# Patient Record
Sex: FEMALE | Race: WHITE | NOT HISPANIC OR LATINO | Employment: OTHER | URBAN - METROPOLITAN AREA
[De-identification: names, ages, dates, MRNs, and addresses within clinical notes are randomized per-mention and may not be internally consistent; named-entity substitution may affect disease eponyms.]

---

## 2023-02-22 LAB
ALANINE AMINOTRANSFERASE (SGPT) (U/L) IN SER/PLAS: 10 U/L (ref 7–45)
ALBUMIN (G/DL) IN SER/PLAS: 3.7 G/DL (ref 3.4–5)
ALBUMIN (MG/L) IN URINE: 124.6 MG/L
ALBUMIN/CREATININE (UG/MG) IN URINE: 106.5 UG/MG CRT (ref 0–30)
ALKALINE PHOSPHATASE (U/L) IN SER/PLAS: 61 U/L (ref 33–136)
ANION GAP IN SER/PLAS: 14 MMOL/L (ref 10–20)
ASPARTATE AMINOTRANSFERASE (SGOT) (U/L) IN SER/PLAS: 11 U/L (ref 9–39)
BASOPHILS (10*3/UL) IN BLOOD BY AUTOMATED COUNT: 0.05 X10E9/L (ref 0–0.1)
BASOPHILS/100 LEUKOCYTES IN BLOOD BY AUTOMATED COUNT: 0.6 % (ref 0–2)
BILIRUBIN TOTAL (MG/DL) IN SER/PLAS: 0.6 MG/DL (ref 0–1.2)
CALCIDIOL (25 OH VITAMIN D3) (NG/ML) IN SER/PLAS: 50 NG/ML
CALCIUM (MG/DL) IN SER/PLAS: 9.1 MG/DL (ref 8.6–10.3)
CARBON DIOXIDE, TOTAL (MMOL/L) IN SER/PLAS: 25 MMOL/L (ref 21–32)
CHLORIDE (MMOL/L) IN SER/PLAS: 106 MMOL/L (ref 98–107)
CHOLESTEROL (MG/DL) IN SER/PLAS: 240 MG/DL (ref 0–199)
CHOLESTEROL IN HDL (MG/DL) IN SER/PLAS: 43 MG/DL
CHOLESTEROL/HDL RATIO: 5.6
COBALAMIN (VITAMIN B12) (PG/ML) IN SER/PLAS: 302 PG/ML (ref 211–911)
CREATININE (MG/DL) IN SER/PLAS: 0.79 MG/DL (ref 0.5–1.05)
CREATININE (MG/DL) IN URINE: 117 MG/DL (ref 20–320)
EOSINOPHILS (10*3/UL) IN BLOOD BY AUTOMATED COUNT: 0.31 X10E9/L (ref 0–0.4)
EOSINOPHILS/100 LEUKOCYTES IN BLOOD BY AUTOMATED COUNT: 3.6 % (ref 0–6)
ERYTHROCYTE DISTRIBUTION WIDTH (RATIO) BY AUTOMATED COUNT: 15.5 % (ref 11.5–14.5)
ERYTHROCYTE MEAN CORPUSCULAR HEMOGLOBIN CONCENTRATION (G/DL) BY AUTOMATED: 29.2 G/DL (ref 32–36)
ERYTHROCYTE MEAN CORPUSCULAR VOLUME (FL) BY AUTOMATED COUNT: 104 FL (ref 80–100)
ERYTHROCYTES (10*6/UL) IN BLOOD BY AUTOMATED COUNT: 3.98 X10E12/L (ref 4–5.2)
ESTIMATED AVERAGE GLUCOSE FOR HBA1C: 137 MG/DL
GFR FEMALE: 80 ML/MIN/1.73M2
GLUCOSE (MG/DL) IN SER/PLAS: 145 MG/DL (ref 74–99)
HEMATOCRIT (%) IN BLOOD BY AUTOMATED COUNT: 41.5 % (ref 36–46)
HEMOGLOBIN (G/DL) IN BLOOD: 12.1 G/DL (ref 12–16)
HEMOGLOBIN A1C/HEMOGLOBIN TOTAL IN BLOOD: 6.4 %
IMMATURE GRANULOCYTES/100 LEUKOCYTES IN BLOOD BY AUTOMATED COUNT: 0.4 % (ref 0–0.9)
LDL: 149 MG/DL (ref 0–99)
LEUKOCYTES (10*3/UL) IN BLOOD BY AUTOMATED COUNT: 8.5 X10E9/L (ref 4.4–11.3)
LYMPHOCYTES (10*3/UL) IN BLOOD BY AUTOMATED COUNT: 1.63 X10E9/L (ref 0.8–3)
LYMPHOCYTES/100 LEUKOCYTES IN BLOOD BY AUTOMATED COUNT: 19.2 % (ref 13–44)
MONOCYTES (10*3/UL) IN BLOOD BY AUTOMATED COUNT: 0.46 X10E9/L (ref 0.05–0.8)
MONOCYTES/100 LEUKOCYTES IN BLOOD BY AUTOMATED COUNT: 5.4 % (ref 2–10)
NEUTROPHILS (10*3/UL) IN BLOOD BY AUTOMATED COUNT: 6.03 X10E9/L (ref 1.6–5.5)
NEUTROPHILS/100 LEUKOCYTES IN BLOOD BY AUTOMATED COUNT: 70.8 % (ref 40–80)
NON HDL CHOLESTEROL: 197 MG/DL
PLATELETS (10*3/UL) IN BLOOD AUTOMATED COUNT: 276 X10E9/L (ref 150–450)
POTASSIUM (MMOL/L) IN SER/PLAS: 3.6 MMOL/L (ref 3.5–5.3)
PROTEIN TOTAL: 6.5 G/DL (ref 6.4–8.2)
SODIUM (MMOL/L) IN SER/PLAS: 141 MMOL/L (ref 136–145)
THYROTROPIN (MIU/L) IN SER/PLAS BY DETECTION LIMIT <= 0.05 MIU/L: 1.76 MIU/L (ref 0.44–3.98)
TRIGLYCERIDE (MG/DL) IN SER/PLAS: 241 MG/DL (ref 0–149)
UREA NITROGEN (MG/DL) IN SER/PLAS: 20 MG/DL (ref 6–23)
VLDL: 48 MG/DL (ref 0–40)

## 2023-03-20 DIAGNOSIS — E53.8 VITAMIN B12 DEFICIENCY: Primary | ICD-10-CM

## 2023-03-20 RX ORDER — CYANOCOBALAMIN 1000 UG/ML
1000 INJECTION INTRAMUSCULAR; SUBCUTANEOUS
COMMUNITY
End: 2023-03-20 | Stop reason: SDUPTHER

## 2023-03-20 RX ORDER — CYANOCOBALAMIN 1000 UG/ML
1000 INJECTION, SOLUTION INTRAMUSCULAR; SUBCUTANEOUS
Qty: 10 ML | Refills: 0 | Status: SHIPPED | OUTPATIENT
Start: 2023-03-20 | End: 2023-07-24 | Stop reason: SDUPTHER

## 2023-05-30 DIAGNOSIS — I10 PRIMARY HYPERTENSION: Primary | ICD-10-CM

## 2023-05-30 RX ORDER — LOSARTAN POTASSIUM 25 MG/1
0.5 TABLET ORAL DAILY
COMMUNITY
Start: 2023-02-23 | End: 2023-05-30 | Stop reason: SDUPTHER

## 2023-05-30 RX ORDER — LOSARTAN POTASSIUM 25 MG/1
12.5 TABLET ORAL DAILY
Qty: 45 TABLET | Refills: 1 | Status: SHIPPED | OUTPATIENT
Start: 2023-05-30 | End: 2023-12-20 | Stop reason: SDUPTHER

## 2023-06-12 LAB
ALANINE AMINOTRANSFERASE (SGPT) (U/L) IN SER/PLAS: 9 U/L (ref 7–45)
ALBUMIN (G/DL) IN SER/PLAS: 3.8 G/DL (ref 3.4–5)
ALKALINE PHOSPHATASE (U/L) IN SER/PLAS: 66 U/L (ref 33–136)
ANION GAP IN SER/PLAS: 16 MMOL/L (ref 10–20)
ASPARTATE AMINOTRANSFERASE (SGOT) (U/L) IN SER/PLAS: 13 U/L (ref 9–39)
BASOPHILS (10*3/UL) IN BLOOD BY AUTOMATED COUNT: 0.06 X10E9/L (ref 0–0.1)
BASOPHILS/100 LEUKOCYTES IN BLOOD BY AUTOMATED COUNT: 0.5 % (ref 0–2)
BILIRUBIN TOTAL (MG/DL) IN SER/PLAS: 0.8 MG/DL (ref 0–1.2)
CALCIUM (MG/DL) IN SER/PLAS: 8.9 MG/DL (ref 8.6–10.3)
CARBON DIOXIDE, TOTAL (MMOL/L) IN SER/PLAS: 24 MMOL/L (ref 21–32)
CHLORIDE (MMOL/L) IN SER/PLAS: 106 MMOL/L (ref 98–107)
CREATININE (MG/DL) IN SER/PLAS: 0.84 MG/DL (ref 0.5–1.05)
EOSINOPHILS (10*3/UL) IN BLOOD BY AUTOMATED COUNT: 0.34 X10E9/L (ref 0–0.4)
EOSINOPHILS/100 LEUKOCYTES IN BLOOD BY AUTOMATED COUNT: 2.8 % (ref 0–6)
ERYTHROCYTE DISTRIBUTION WIDTH (RATIO) BY AUTOMATED COUNT: 14.1 % (ref 11.5–14.5)
ERYTHROCYTE MEAN CORPUSCULAR HEMOGLOBIN CONCENTRATION (G/DL) BY AUTOMATED: 29.3 G/DL (ref 32–36)
ERYTHROCYTE MEAN CORPUSCULAR VOLUME (FL) BY AUTOMATED COUNT: 112 FL (ref 80–100)
ERYTHROCYTES (10*6/UL) IN BLOOD BY AUTOMATED COUNT: 3.61 X10E12/L (ref 4–5.2)
GFR FEMALE: 74 ML/MIN/1.73M2
GLUCOSE (MG/DL) IN SER/PLAS: 176 MG/DL (ref 74–99)
HEMATOCRIT (%) IN BLOOD BY AUTOMATED COUNT: 40.6 % (ref 36–46)
HEMOGLOBIN (G/DL) IN BLOOD: 11.9 G/DL (ref 12–16)
IMMATURE GRANULOCYTES/100 LEUKOCYTES IN BLOOD BY AUTOMATED COUNT: 0.4 % (ref 0–0.9)
LEUKOCYTES (10*3/UL) IN BLOOD BY AUTOMATED COUNT: 12.3 X10E9/L (ref 4.4–11.3)
LYMPHOCYTES (10*3/UL) IN BLOOD BY AUTOMATED COUNT: 1.58 X10E9/L (ref 0.8–3)
LYMPHOCYTES/100 LEUKOCYTES IN BLOOD BY AUTOMATED COUNT: 12.9 % (ref 13–44)
MONOCYTES (10*3/UL) IN BLOOD BY AUTOMATED COUNT: 0.58 X10E9/L (ref 0.05–0.8)
MONOCYTES/100 LEUKOCYTES IN BLOOD BY AUTOMATED COUNT: 4.7 % (ref 2–10)
NEUTROPHILS (10*3/UL) IN BLOOD BY AUTOMATED COUNT: 9.67 X10E9/L (ref 1.6–5.5)
NEUTROPHILS/100 LEUKOCYTES IN BLOOD BY AUTOMATED COUNT: 78.7 % (ref 40–80)
PLATELETS (10*3/UL) IN BLOOD AUTOMATED COUNT: 270 X10E9/L (ref 150–450)
POLYCHROMASIA IN BLOOD BY LIGHT MICROSCOPY: NORMAL
POTASSIUM (MMOL/L) IN SER/PLAS: 3.5 MMOL/L (ref 3.5–5.3)
PROTEIN TOTAL: 6.6 G/DL (ref 6.4–8.2)
RBC MORPHOLOGY IN BLOOD: NORMAL
SODIUM (MMOL/L) IN SER/PLAS: 142 MMOL/L (ref 136–145)
UREA NITROGEN (MG/DL) IN SER/PLAS: 17 MG/DL (ref 6–23)

## 2023-06-13 LAB — CBC DIFFERENTIAL PATH REVIEW: NORMAL

## 2023-06-20 ENCOUNTER — OFFICE VISIT (OUTPATIENT)
Dept: PRIMARY CARE | Facility: CLINIC | Age: 73
End: 2023-06-20
Payer: MEDICARE

## 2023-06-20 VITALS
WEIGHT: 293 LBS | HEART RATE: 87 BPM | DIASTOLIC BLOOD PRESSURE: 81 MMHG | BODY MASS INDEX: 49.55 KG/M2 | SYSTOLIC BLOOD PRESSURE: 132 MMHG | OXYGEN SATURATION: 89 %

## 2023-06-20 DIAGNOSIS — R53.83 FATIGUE, UNSPECIFIED TYPE: ICD-10-CM

## 2023-06-20 DIAGNOSIS — E66.01 OBESITY, CLASS III, BMI 40-49.9 (MORBID OBESITY) (MULTI): ICD-10-CM

## 2023-06-20 DIAGNOSIS — Z79.4 TYPE 2 DIABETES MELLITUS WITHOUT COMPLICATION, WITH LONG-TERM CURRENT USE OF INSULIN (MULTI): Primary | ICD-10-CM

## 2023-06-20 DIAGNOSIS — E78.2 MIXED HYPERLIPIDEMIA: ICD-10-CM

## 2023-06-20 DIAGNOSIS — I27.20 PULMONARY HYPERTENSION (MULTI): ICD-10-CM

## 2023-06-20 DIAGNOSIS — Z12.31 ENCOUNTER FOR SCREENING MAMMOGRAM FOR BREAST CANCER: ICD-10-CM

## 2023-06-20 DIAGNOSIS — D51.0 PERNICIOUS ANEMIA: ICD-10-CM

## 2023-06-20 DIAGNOSIS — E11.42 DIABETIC PERIPHERAL NEUROPATHY (MULTI): ICD-10-CM

## 2023-06-20 DIAGNOSIS — Z12.11 SCREENING FOR COLON CANCER: ICD-10-CM

## 2023-06-20 DIAGNOSIS — M47.816 LUMBAR SPONDYLOSIS: ICD-10-CM

## 2023-06-20 DIAGNOSIS — E11.9 TYPE 2 DIABETES MELLITUS WITHOUT COMPLICATION, WITH LONG-TERM CURRENT USE OF INSULIN (MULTI): Primary | ICD-10-CM

## 2023-06-20 DIAGNOSIS — I48.91 ATRIAL FIBRILLATION WITH RAPID VENTRICULAR RESPONSE (MULTI): ICD-10-CM

## 2023-06-20 DIAGNOSIS — I10 PRIMARY HYPERTENSION: ICD-10-CM

## 2023-06-20 PROBLEM — H25.12 AGE-RELATED NUCLEAR CATARACT OF LEFT EYE: Status: ACTIVE | Noted: 2023-06-20

## 2023-06-20 PROBLEM — L12.1: Status: ACTIVE | Noted: 2023-06-20

## 2023-06-20 PROBLEM — E78.5 DYSLIPIDEMIA: Status: ACTIVE | Noted: 2023-06-20

## 2023-06-20 PROBLEM — L84 PRE-ULCERATIVE CORN OR CALLOUS: Status: ACTIVE | Noted: 2023-06-20

## 2023-06-20 PROBLEM — R79.9 ABNORMAL BLOOD CHEMISTRY: Status: ACTIVE | Noted: 2023-06-20

## 2023-06-20 PROBLEM — E66.09 CLASS 2 OBESITY DUE TO EXCESS CALORIES IN ADULT: Status: ACTIVE | Noted: 2023-06-20

## 2023-06-20 PROBLEM — M19.90 ARTHRITIS: Status: ACTIVE | Noted: 2023-06-20

## 2023-06-20 PROBLEM — J18.9 PNEUMONIA: Status: ACTIVE | Noted: 2023-06-20

## 2023-06-20 PROBLEM — E78.5 HYPERLIPEMIA: Status: ACTIVE | Noted: 2023-06-20

## 2023-06-20 PROBLEM — J31.0 CHRONIC RHINITIS: Status: ACTIVE | Noted: 2023-06-20

## 2023-06-20 PROBLEM — M54.12 CERVICAL RADICULAR PAIN: Status: ACTIVE | Noted: 2023-06-20

## 2023-06-20 PROBLEM — N20.0 CALCULUS OF KIDNEY: Status: ACTIVE | Noted: 2023-06-20

## 2023-06-20 PROBLEM — J32.9 CHRONIC SINUSITIS: Status: ACTIVE | Noted: 2023-06-20

## 2023-06-20 PROBLEM — F17.210 CIGARETTE NICOTINE DEPENDENCE, UNCOMPLICATED: Status: ACTIVE | Noted: 2023-06-20

## 2023-06-20 PROBLEM — R63.2 POLYPHAGIA: Status: ACTIVE | Noted: 2023-06-20

## 2023-06-20 PROBLEM — R06.00 DYSPNEA: Status: ACTIVE | Noted: 2023-06-20

## 2023-06-20 PROBLEM — I72.8 SPLENIC ARTERY ANEURYSM (CMS-HCC): Status: ACTIVE | Noted: 2023-06-20

## 2023-06-20 PROBLEM — Z97.5 IUD (INTRAUTERINE DEVICE) IN PLACE: Status: ACTIVE | Noted: 2023-06-20

## 2023-06-20 PROBLEM — H04.129 DRY EYE SYNDROME: Status: ACTIVE | Noted: 2023-06-20

## 2023-06-20 PROBLEM — J01.90 ACUTE SINUSITIS: Status: ACTIVE | Noted: 2023-06-20

## 2023-06-20 PROBLEM — R06.83 SNORING: Status: ACTIVE | Noted: 2023-06-20

## 2023-06-20 PROBLEM — E66.813 OBESITY, CLASS III, BMI 40-49.9 (MORBID OBESITY): Status: ACTIVE | Noted: 2023-06-20

## 2023-06-20 PROBLEM — I89.0 LYMPHEDEMA: Status: ACTIVE | Noted: 2023-06-20

## 2023-06-20 PROBLEM — E66.812 CLASS 2 OBESITY DUE TO EXCESS CALORIES IN ADULT: Status: ACTIVE | Noted: 2023-06-20

## 2023-06-20 PROBLEM — N93.9 VAGINAL BLEEDING: Status: ACTIVE | Noted: 2023-06-20

## 2023-06-20 PROBLEM — H25.11 AGE-RELATED NUCLEAR CATARACT OF RIGHT EYE: Status: ACTIVE | Noted: 2023-06-20

## 2023-06-20 PROBLEM — D05.11 DUCTAL CARCINOMA IN SITU (DCIS) OF RIGHT BREAST: Status: ACTIVE | Noted: 2023-06-20

## 2023-06-20 PROBLEM — N81.89 PELVIC FLOOR WEAKNESS: Status: ACTIVE | Noted: 2023-06-20

## 2023-06-20 PROBLEM — R92.8 MAMMOGRAM ABNORMAL: Status: ACTIVE | Noted: 2023-06-20

## 2023-06-20 PROBLEM — J11.1 INFLUENZA: Status: ACTIVE | Noted: 2023-06-20

## 2023-06-20 PROBLEM — N95.2 ATROPHIC VAGINITIS: Status: ACTIVE | Noted: 2023-06-20

## 2023-06-20 PROBLEM — M54.2 NECK PAIN: Status: ACTIVE | Noted: 2023-06-20

## 2023-06-20 PROBLEM — M51.9 LUMBAR DISC DISEASE: Status: ACTIVE | Noted: 2023-06-20

## 2023-06-20 PROBLEM — J45.909 ASTHMA (HHS-HCC): Status: ACTIVE | Noted: 2023-06-20

## 2023-06-20 PROBLEM — Q89.9 DEFORMITY: Status: ACTIVE | Noted: 2023-06-20

## 2023-06-20 PROBLEM — N63.0 BREAST LUMP: Status: ACTIVE | Noted: 2023-06-20

## 2023-06-20 PROBLEM — L98.9 SKIN LESION: Status: ACTIVE | Noted: 2023-06-20

## 2023-06-20 PROBLEM — R23.4 SKIN FISSURES: Status: ACTIVE | Noted: 2023-06-20

## 2023-06-20 PROBLEM — R05.9 COUGH: Status: ACTIVE | Noted: 2023-06-20

## 2023-06-20 PROBLEM — D22.9 BENIGN MOLE: Status: ACTIVE | Noted: 2023-06-20

## 2023-06-20 PROBLEM — R31.9 HEMATURIA: Status: ACTIVE | Noted: 2023-06-20

## 2023-06-20 PROBLEM — I26.99 PULMONARY EMBOLISM (MULTI): Status: ACTIVE | Noted: 2023-06-20

## 2023-06-20 PROBLEM — R60.9 EDEMA: Status: ACTIVE | Noted: 2023-06-20

## 2023-06-20 PROBLEM — R30.0 DYSURIA: Status: ACTIVE | Noted: 2023-06-20

## 2023-06-20 PROBLEM — T83.32XA IUD MIGRATION: Status: ACTIVE | Noted: 2023-06-20

## 2023-06-20 PROBLEM — R09.02 HYPOXIA: Status: ACTIVE | Noted: 2023-06-20

## 2023-06-20 PROBLEM — M54.16 ACUTE LUMBAR RADICULOPATHY: Status: ACTIVE | Noted: 2023-06-20

## 2023-06-20 PROBLEM — H11.30 SUBCONJUNCTIVAL HEMORRHAGE: Status: ACTIVE | Noted: 2023-06-20

## 2023-06-20 PROBLEM — R06.02 SOB (SHORTNESS OF BREATH) ON EXERTION: Status: ACTIVE | Noted: 2023-06-20

## 2023-06-20 PROBLEM — M25.539 PAIN, WRIST JOINT: Status: ACTIVE | Noted: 2023-06-20

## 2023-06-20 PROBLEM — E66.9 OBESITY: Status: ACTIVE | Noted: 2023-06-20

## 2023-06-20 PROBLEM — N39.41 URGE INCONTINENCE OF URINE: Status: ACTIVE | Noted: 2023-06-20

## 2023-06-20 PROBLEM — G47.30 APNEA, SLEEP: Status: ACTIVE | Noted: 2023-06-20

## 2023-06-20 PROBLEM — M54.50 LOW BACK PAIN: Status: ACTIVE | Noted: 2023-06-20

## 2023-06-20 PROBLEM — I26.99 PULMONARY ARTERY THROMBOSIS (MULTI): Status: ACTIVE | Noted: 2023-06-20

## 2023-06-20 PROBLEM — M72.0 DUPUYTREN CONTRACTURE: Status: ACTIVE | Noted: 2023-06-20

## 2023-06-20 PROBLEM — H52.4 MYOPIA WITH PRESBYOPIA: Status: ACTIVE | Noted: 2023-06-20

## 2023-06-20 PROBLEM — E78.00 LOW-DENSITY-LIPOID-TYPE (LDL) HYPERLIPOPROTEINEMIA: Status: ACTIVE | Noted: 2023-06-20

## 2023-06-20 PROBLEM — R93.89 ABNORMAL X-RAY: Status: ACTIVE | Noted: 2023-06-20

## 2023-06-20 PROBLEM — Z96.1 PSEUDOPHAKIA OF BOTH EYES: Status: ACTIVE | Noted: 2023-06-20

## 2023-06-20 PROBLEM — N95.0 POSTMENOPAUSAL VAGINAL BLEEDING: Status: ACTIVE | Noted: 2023-06-20

## 2023-06-20 PROBLEM — F17.200 NICOTINE USE DISORDER: Status: ACTIVE | Noted: 2022-10-31

## 2023-06-20 PROBLEM — I35.0 AORTIC STENOSIS: Status: ACTIVE | Noted: 2023-06-20

## 2023-06-20 PROBLEM — N39.0 CHRONIC UTI: Status: ACTIVE | Noted: 2023-06-20

## 2023-06-20 PROBLEM — M16.11 PRIMARY OSTEOARTHRITIS OF RIGHT HIP: Status: ACTIVE | Noted: 2023-06-20

## 2023-06-20 PROBLEM — M25.559 PAIN, HIP: Status: ACTIVE | Noted: 2023-06-20

## 2023-06-20 PROBLEM — H52.10 MYOPIA WITH PRESBYOPIA: Status: ACTIVE | Noted: 2023-06-20

## 2023-06-20 PROCEDURE — 1159F MED LIST DOCD IN RCRD: CPT | Performed by: PHYSICIAN ASSISTANT

## 2023-06-20 PROCEDURE — 99214 OFFICE O/P EST MOD 30 MIN: CPT | Performed by: PHYSICIAN ASSISTANT

## 2023-06-20 PROCEDURE — 3044F HG A1C LEVEL LT 7.0%: CPT | Performed by: PHYSICIAN ASSISTANT

## 2023-06-20 PROCEDURE — 1160F RVW MEDS BY RX/DR IN RCRD: CPT | Performed by: PHYSICIAN ASSISTANT

## 2023-06-20 PROCEDURE — 4010F ACE/ARB THERAPY RXD/TAKEN: CPT | Performed by: PHYSICIAN ASSISTANT

## 2023-06-20 PROCEDURE — 3075F SYST BP GE 130 - 139MM HG: CPT | Performed by: PHYSICIAN ASSISTANT

## 2023-06-20 PROCEDURE — 3079F DIAST BP 80-89 MM HG: CPT | Performed by: PHYSICIAN ASSISTANT

## 2023-06-20 RX ORDER — ISOPROPYL ALCOHOL 0.75 G/1
SWAB TOPICAL
COMMUNITY
Start: 2022-11-07

## 2023-06-20 RX ORDER — ASPIRIN 325 MG
50000 TABLET, DELAYED RELEASE (ENTERIC COATED) ORAL
COMMUNITY

## 2023-06-20 RX ORDER — DILTIAZEM HYDROCHLORIDE 240 MG/1
1 CAPSULE, COATED, EXTENDED RELEASE ORAL DAILY
COMMUNITY
Start: 2023-02-26 | End: 2023-06-20 | Stop reason: SDUPTHER

## 2023-06-20 RX ORDER — ENOXAPARIN SODIUM 150 MG/ML
INJECTION SUBCUTANEOUS
COMMUNITY
Start: 2020-10-19 | End: 2023-12-13

## 2023-06-20 RX ORDER — SIMVASTATIN 40 MG/1
1 TABLET, FILM COATED ORAL NIGHTLY
COMMUNITY
Start: 2011-12-12 | End: 2023-06-20 | Stop reason: ALTCHOICE

## 2023-06-20 RX ORDER — FUROSEMIDE 40 MG/1
40 TABLET ORAL DAILY
Qty: 90 TABLET | Refills: 1 | Status: SHIPPED | OUTPATIENT
Start: 2023-06-20 | End: 2023-12-11 | Stop reason: SDUPTHER

## 2023-06-20 RX ORDER — GLIMEPIRIDE 2 MG/1
TABLET ORAL
COMMUNITY
End: 2023-11-18

## 2023-06-20 RX ORDER — TRIAMCINOLONE ACETONIDE 1 MG/G
OINTMENT TOPICAL
COMMUNITY
Start: 2022-12-05

## 2023-06-20 RX ORDER — METFORMIN HYDROCHLORIDE 1000 MG/1
TABLET ORAL
COMMUNITY
Start: 2023-05-01 | End: 2023-10-24

## 2023-06-20 RX ORDER — HYDROCORTISONE 25 MG/G
OINTMENT TOPICAL
COMMUNITY
Start: 2023-01-17 | End: 2023-12-13

## 2023-06-20 RX ORDER — INSULIN DETEMIR 100 [IU]/ML
INJECTION, SOLUTION SUBCUTANEOUS
COMMUNITY
Start: 2023-05-03

## 2023-06-20 RX ORDER — PEN NEEDLE, DIABETIC 32GX 5/32"
NEEDLE, DISPOSABLE MISCELLANEOUS
Qty: 12 EACH | Refills: 11 | Status: SHIPPED | OUTPATIENT
Start: 2023-06-20

## 2023-06-20 RX ORDER — LEVOFLOXACIN 750 MG/1
1 TABLET ORAL DAILY
COMMUNITY
Start: 2022-11-21 | End: 2023-06-20 | Stop reason: ALTCHOICE

## 2023-06-20 RX ORDER — ATORVASTATIN CALCIUM 20 MG/1
20 TABLET, FILM COATED ORAL NIGHTLY
Qty: 90 TABLET | Refills: 1 | Status: SHIPPED | OUTPATIENT
Start: 2023-06-20 | End: 2024-03-25 | Stop reason: SDUPTHER

## 2023-06-20 RX ORDER — ERGOCALCIFEROL 1.25 MG/1
1 CAPSULE ORAL
COMMUNITY
Start: 2023-05-22

## 2023-06-20 RX ORDER — AZELASTINE 1 MG/ML
1 SPRAY, METERED NASAL 2 TIMES DAILY
COMMUNITY
Start: 2020-12-15 | End: 2023-12-13

## 2023-06-20 RX ORDER — DAPSONE 100 MG/1
1 TABLET ORAL
COMMUNITY

## 2023-06-20 RX ORDER — WARFARIN SODIUM 5 MG/1
TABLET ORAL
COMMUNITY
End: 2023-07-27 | Stop reason: SDUPTHER

## 2023-06-20 RX ORDER — FUROSEMIDE 40 MG/1
1 TABLET ORAL DAILY
COMMUNITY
Start: 2023-02-26 | End: 2023-06-20 | Stop reason: SDUPTHER

## 2023-06-20 RX ORDER — DILTIAZEM HYDROCHLORIDE 240 MG/1
240 CAPSULE, COATED, EXTENDED RELEASE ORAL DAILY
Qty: 90 CAPSULE | Refills: 1 | Status: SHIPPED | OUTPATIENT
Start: 2023-06-20 | End: 2024-01-17 | Stop reason: HOSPADM

## 2023-06-20 RX ORDER — PEN NEEDLE, DIABETIC 32GX 5/32"
NEEDLE, DISPOSABLE MISCELLANEOUS
COMMUNITY
Start: 2023-05-08 | End: 2023-06-20 | Stop reason: SDUPTHER

## 2023-06-20 RX ORDER — DULAGLUTIDE 0.75 MG/.5ML
INJECTION, SOLUTION SUBCUTANEOUS
COMMUNITY
Start: 2022-08-29 | End: 2023-06-20 | Stop reason: ALTCHOICE

## 2023-06-20 RX ORDER — ASPIRIN 81 MG/1
1 TABLET ORAL DAILY
COMMUNITY
Start: 2016-10-03

## 2023-06-20 RX ORDER — GABAPENTIN 300 MG/1
CAPSULE ORAL
COMMUNITY
Start: 2022-10-17 | End: 2024-05-13

## 2023-06-20 RX ORDER — ATORVASTATIN CALCIUM 20 MG/1
20 TABLET, FILM COATED ORAL NIGHTLY
COMMUNITY
Start: 2023-02-21 | End: 2023-06-20 | Stop reason: SDUPTHER

## 2023-06-20 RX ORDER — ALBUTEROL SULFATE 90 UG/1
AEROSOL, METERED RESPIRATORY (INHALATION)
COMMUNITY

## 2023-06-20 RX ORDER — NYSTATIN 100000 U/G
CREAM TOPICAL 2 TIMES DAILY
COMMUNITY
Start: 2023-04-19 | End: 2023-12-13

## 2023-06-20 RX ORDER — QUINIDINE GLUCONATE 324 MG
1 TABLET, EXTENDED RELEASE ORAL DAILY
COMMUNITY
End: 2023-12-11 | Stop reason: WASHOUT

## 2023-06-20 RX ORDER — POTASSIUM CITRATE 10 MEQ/1
TABLET, EXTENDED RELEASE ORAL EVERY 12 HOURS
COMMUNITY
Start: 2020-09-23 | End: 2023-06-20 | Stop reason: ALTCHOICE

## 2023-06-20 RX ORDER — TOPIRAMATE 100 MG/1
100 TABLET, FILM COATED ORAL 2 TIMES DAILY
COMMUNITY
End: 2023-11-14

## 2023-06-20 NOTE — ASSESSMENT & PLAN NOTE
Stable.  Continue gabapentin 300 mg.  Encourage strict control of glucose levels.  Schedule for diabetic foot exam if not up-to-date

## 2023-06-20 NOTE — ASSESSMENT & PLAN NOTE
Stable. Continue atorvastatin 20mg and 81mg ASA. Follow a mediterranean style diet and exercise as tolerated   passed out

## 2023-06-20 NOTE — ASSESSMENT & PLAN NOTE
Continue glimepiride, metformin, and levemir. Hemoglobin A1c ordered. Schedule for diabetic foot and eye exams if not UTD.

## 2023-06-20 NOTE — ASSESSMENT & PLAN NOTE
Well controlled. Continue current regimen unchanged. Follow a strict DASH diet and exercise as tolerated.

## 2023-06-20 NOTE — PROGRESS NOTES
Subjective   Frannie Blanco is a 73 y.o. female who presents for Follow-up.  HPI Frannie Blanco is a 73 y.o. female presenting for a follow up. Generally doing well.     HTN, HLD: stable. Denies any headache, dizziness, chest pain, SOB/OSHEA, palpitations.     T2DM: compliant with current regimen. Hemoglobin A1c 2/22/2021 was 6.4%. She does check BG levels at home. Running 100-120s. Diabetic eye exam: due, will schedule. Diabetic foot exam: due, will schedule. Following with Dr. Tapia, scheduled next in October.     Lung nodules: Stable. Repeat CT chest in 1 year.    OSHEA, hypoxia: On nocturnal oxygen, but uses the supplemental O2 at home when she has any chest tightness. Does monitor O2 sats at home. Using albuterol inhaler as needed. Could consider using LAMA/LABA in the future if needed.  Per pulmonary medicine, likely COPD based on PFTs.    Postmenopausal bleeding: s/p D&C and Mirena IUD placement 8/15/2019.  Follows annually with GynOnc, last seen 4/18/2023.  IUD to be changed 8/20/2024.    Pernicious anemia: due for vitamin B12 injection, daughter will give at home.     Health maintenance:  Immunizations:  -Flu: deferred to fall 2023  -Pneumococcal: UTD  -Shingrix: Recommended, obtain from pharmacy  -Tdap: recommended, obtain from local pharmacy  Mammogram due (last 2/21/22) - ordered 6/20/23  Colon CA screening due (last 4/12/13 ) - 10yrs. Ordered 6/20/23  DEXA due - ordered   PAP UTD     Last MCR: 10/3/2022 (plain MCR)    12 point ROS reviewed and negative other than as stated in HPI    /81   Pulse 87   Wt 139 kg (307 lb)   SpO2 (!) 89%   BMI 49.55 kg/m²   Objective   Physical Exam  Vitals reviewed.   Constitutional:       General: She is not in acute distress.     Appearance: Normal appearance. She is not ill-appearing.   HENT:      Head: Normocephalic and atraumatic.   Eyes:      General: No scleral icterus.     Extraocular Movements: Extraocular movements intact.      Conjunctiva/sclera:  "Conjunctivae normal.      Pupils: Pupils are equal, round, and reactive to light.   Cardiovascular:      Rate and Rhythm: Normal rate and regular rhythm.      Heart sounds: Normal heart sounds. No murmur heard.     No friction rub. No gallop.   Pulmonary:      Effort: Pulmonary effort is normal. No respiratory distress.      Breath sounds: Normal breath sounds. No stridor. No wheezing, rhonchi or rales.   Musculoskeletal:      Cervical back: Normal range of motion.      Right lower leg: No edema.      Left lower leg: No edema.   Skin:     General: Skin is warm and dry.   Neurological:      Mental Status: She is alert and oriented to person, place, and time. Mental status is at baseline.      Cranial Nerves: No cranial nerve deficit.      Gait: Gait normal.   Psychiatric:         Mood and Affect: Mood normal.         Behavior: Behavior normal.         Assessment/Plan   Problem List Items Addressed This Visit       Pulmonary hypertension (CMS/[facility])     CT chest 1/11/23 showed \"dilatation pulmonary outflow tract can be seen in the setting of  pulmonary arterial hypertension.\" Echo 5/9/22 was normal without enlargement of the pulmonary artery.     Follow with pulmonary medicine and cardiology.          Primary hypertension     Well controlled. Continue current regimen unchanged. Follow a strict DASH diet and exercise as tolerated.           Relevant Medications    furosemide (Lasix) 40 mg tablet    dilTIAZem CD (Cardizem CD) 240 mg 24 hr capsule    Pernicious anemia     Continue vitamin B12 injections monthly. Refilled syringes. CBC w/ diff and VB12 level ordered         Relevant Medications    BD Dorothy 2nd Gen Pen Needle 32 gauge x 5/32\" needle    syringe with needle 3 mL 23 x 1\" syringe    Other Relevant Orders    CBC and Auto Differential    Vitamin B12    Obesity, Class III, BMI 40-49.9 (morbid obesity) (CMS/[facility])     Follow a low carb, ADA diet. Exercise for 30 minutes daily as tolerated.          Lumbar spondylosis "     Disability placard provided         Relevant Orders    Disability Placard    Hyperlipemia     Stable. Continue atorvastatin 20mg and 81mg ASA. Follow a mediterranean style diet and exercise as tolerated         Relevant Medications    atorvastatin (Lipitor) 20 mg tablet    Other Relevant Orders    Lipid panel    Fatigue    Relevant Orders    Tsh With Reflex To Free T4 If Abnormal    Diabetic peripheral neuropathy (CMS/HCC)     Stable.  Continue gabapentin 300 mg.  Encourage strict control of glucose levels.  Schedule for diabetic foot exam if not up-to-date         Diabetes mellitus (CMS/HCC) - Primary     Continue glimepiride, metformin, and levemir. Hemoglobin A1c ordered. Schedule for diabetic foot and eye exams if not UTD.          Relevant Orders    Hemoglobin A1c    Comprehensive metabolic panel    Atrial fibrillation with rapid ventricular response (CMS/HCC)     Stable.  Continue medications unchanged.  Follow with cardiology.         Relevant Medications    dilTIAZem CD (Cardizem CD) 240 mg 24 hr capsule     Other Visit Diagnoses       Encounter for screening mammogram for breast cancer        Relevant Orders    BI mammo bilateral screening tomosynthesis    Screening for colon cancer        Relevant Orders    Colonoscopy             Follow-up in 3 to 4 months for MCR wellness or sooner as needed

## 2023-06-21 PROBLEM — I26.99 PULMONARY EMBOLISM (MULTI): Status: RESOLVED | Noted: 2023-06-20 | Resolved: 2023-06-21

## 2023-06-21 PROBLEM — I10 PRIMARY HYPERTENSION: Status: ACTIVE | Noted: 2023-06-21

## 2023-06-21 NOTE — ASSESSMENT & PLAN NOTE
"CT chest 1/11/23 showed \"dilatation pulmonary outflow tract can be seen in the setting of  pulmonary arterial hypertension.\" Echo 5/9/22 was normal without enlargement of the pulmonary artery.     Follow with pulmonary medicine and cardiology.   "

## 2023-07-24 DIAGNOSIS — E53.8 VITAMIN B12 DEFICIENCY: ICD-10-CM

## 2023-07-24 RX ORDER — CYANOCOBALAMIN 1000 UG/ML
1000 INJECTION, SOLUTION INTRAMUSCULAR; SUBCUTANEOUS
Qty: 10 ML | Refills: 0 | Status: SHIPPED | OUTPATIENT
Start: 2023-07-24 | End: 2024-03-25 | Stop reason: SDUPTHER

## 2023-07-27 DIAGNOSIS — I48.91 ATRIAL FIBRILLATION WITH RAPID VENTRICULAR RESPONSE (MULTI): Primary | ICD-10-CM

## 2023-07-27 RX ORDER — WARFARIN SODIUM 5 MG/1
TABLET ORAL
Qty: 90 TABLET | Refills: 1 | Status: SHIPPED | OUTPATIENT
Start: 2023-07-27

## 2023-07-31 LAB
ALANINE AMINOTRANSFERASE (SGPT) (U/L) IN SER/PLAS: 10 U/L (ref 7–45)
ALBUMIN (G/DL) IN SER/PLAS: 3.8 G/DL (ref 3.4–5)
ALKALINE PHOSPHATASE (U/L) IN SER/PLAS: 74 U/L (ref 33–136)
ANION GAP IN SER/PLAS: 15 MMOL/L (ref 10–20)
ASPARTATE AMINOTRANSFERASE (SGOT) (U/L) IN SER/PLAS: 12 U/L (ref 9–39)
BASOPHILS (10*3/UL) IN BLOOD BY AUTOMATED COUNT: 0.06 X10E9/L (ref 0–0.1)
BASOPHILS/100 LEUKOCYTES IN BLOOD BY AUTOMATED COUNT: 0.5 % (ref 0–2)
BILIRUBIN TOTAL (MG/DL) IN SER/PLAS: 0.9 MG/DL (ref 0–1.2)
CALCIUM (MG/DL) IN SER/PLAS: 9.3 MG/DL (ref 8.6–10.3)
CARBON DIOXIDE, TOTAL (MMOL/L) IN SER/PLAS: 23 MMOL/L (ref 21–32)
CHLORIDE (MMOL/L) IN SER/PLAS: 106 MMOL/L (ref 98–107)
CREATININE (MG/DL) IN SER/PLAS: 0.84 MG/DL (ref 0.5–1.05)
EOSINOPHILS (10*3/UL) IN BLOOD BY AUTOMATED COUNT: 0.31 X10E9/L (ref 0–0.4)
EOSINOPHILS/100 LEUKOCYTES IN BLOOD BY AUTOMATED COUNT: 2.4 % (ref 0–6)
ERYTHROCYTE DISTRIBUTION WIDTH (RATIO) BY AUTOMATED COUNT: 14.6 % (ref 11.5–14.5)
ERYTHROCYTE MEAN CORPUSCULAR HEMOGLOBIN CONCENTRATION (G/DL) BY AUTOMATED: 30.1 G/DL (ref 32–36)
ERYTHROCYTE MEAN CORPUSCULAR VOLUME (FL) BY AUTOMATED COUNT: 111 FL (ref 80–100)
ERYTHROCYTES (10*6/UL) IN BLOOD BY AUTOMATED COUNT: 3.32 X10E12/L (ref 4–5.2)
GFR FEMALE: 74 ML/MIN/1.73M2
GLUCOSE (MG/DL) IN SER/PLAS: 153 MG/DL (ref 74–99)
HEMATOCRIT (%) IN BLOOD BY AUTOMATED COUNT: 36.9 % (ref 36–46)
HEMOGLOBIN (G/DL) IN BLOOD: 11.1 G/DL (ref 12–16)
IMMATURE GRANULOCYTES/100 LEUKOCYTES IN BLOOD BY AUTOMATED COUNT: 0.3 % (ref 0–0.9)
LEUKOCYTES (10*3/UL) IN BLOOD BY AUTOMATED COUNT: 13.1 X10E9/L (ref 4.4–11.3)
LYMPHOCYTES (10*3/UL) IN BLOOD BY AUTOMATED COUNT: 1.48 X10E9/L (ref 0.8–3)
LYMPHOCYTES/100 LEUKOCYTES IN BLOOD BY AUTOMATED COUNT: 11.3 % (ref 13–44)
MONOCYTES (10*3/UL) IN BLOOD BY AUTOMATED COUNT: 0.61 X10E9/L (ref 0.05–0.8)
MONOCYTES/100 LEUKOCYTES IN BLOOD BY AUTOMATED COUNT: 4.7 % (ref 2–10)
NEUTROPHILS (10*3/UL) IN BLOOD BY AUTOMATED COUNT: 10.57 X10E9/L (ref 1.6–5.5)
NEUTROPHILS/100 LEUKOCYTES IN BLOOD BY AUTOMATED COUNT: 80.8 % (ref 40–80)
PLATELETS (10*3/UL) IN BLOOD AUTOMATED COUNT: 292 X10E9/L (ref 150–450)
POTASSIUM (MMOL/L) IN SER/PLAS: 3.8 MMOL/L (ref 3.5–5.3)
PROTEIN TOTAL: 6.8 G/DL (ref 6.4–8.2)
SODIUM (MMOL/L) IN SER/PLAS: 140 MMOL/L (ref 136–145)
UREA NITROGEN (MG/DL) IN SER/PLAS: 16 MG/DL (ref 6–23)

## 2023-08-18 PROBLEM — E78.2 MIXED HYPERLIPIDEMIA: Status: ACTIVE | Noted: 2023-08-18

## 2023-08-18 PROBLEM — E11.65 TYPE 2 DIABETES MELLITUS WITH HYPERGLYCEMIA (MULTI): Status: ACTIVE | Noted: 2023-08-18

## 2023-08-18 PROBLEM — R94.6 ABNORMAL THYROID EXAM: Status: ACTIVE | Noted: 2023-08-18

## 2023-08-18 PROBLEM — E55.9 VITAMIN D DEFICIENCY, UNSPECIFIED: Status: ACTIVE | Noted: 2023-08-18

## 2023-08-18 RX ORDER — DULAGLUTIDE 1.5 MG/.5ML
1.5 INJECTION, SOLUTION SUBCUTANEOUS
COMMUNITY
Start: 2021-09-13 | End: 2023-11-27 | Stop reason: ALTCHOICE

## 2023-08-18 RX ORDER — CALCIUM CARBONATE 600 MG
600 TABLET ORAL EVERY 12 HOURS
COMMUNITY

## 2023-08-18 RX ORDER — LIDOCAINE 40 MG/G
CREAM TOPICAL
COMMUNITY
End: 2023-12-13

## 2023-08-18 RX ORDER — DULAGLUTIDE 0.75 MG/.5ML
0.75 INJECTION, SOLUTION SUBCUTANEOUS
COMMUNITY
Start: 2021-09-13 | End: 2023-11-14 | Stop reason: ALTCHOICE

## 2023-08-18 RX ORDER — FERROUS SULFATE 325(65) MG
TABLET, DELAYED RELEASE (ENTERIC COATED) ORAL DAILY
COMMUNITY

## 2023-08-18 RX ORDER — DAPSONE 25 MG/1
2 TABLET ORAL
COMMUNITY

## 2023-09-22 ENCOUNTER — HOSPITAL ENCOUNTER (INPATIENT)
Dept: DATA CONVERSION | Facility: HOSPITAL | Age: 73
LOS: 5 days | Discharge: HOME | End: 2023-09-27
Attending: EMERGENCY MEDICINE | Admitting: INTERNAL MEDICINE
Payer: MEDICARE

## 2023-09-22 DIAGNOSIS — J44.1 CHRONIC OBSTRUCTIVE PULMONARY DISEASE WITH (ACUTE) EXACERBATION (MULTI): ICD-10-CM

## 2023-09-22 DIAGNOSIS — R09.02 HYPOXEMIA: ICD-10-CM

## 2023-09-22 LAB
ALANINE AMINOTRANSFERASE (SGPT) (U/L) IN SER/PLAS: 10 U/L (ref 7–45)
ALBUMIN (G/DL) IN SER/PLAS: 3.8 G/DL (ref 3.4–5)
ALKALINE PHOSPHATASE (U/L) IN SER/PLAS: 66 U/L (ref 33–136)
ANION GAP IN SER/PLAS: 12 MMOL/L (ref 10–20)
ASPARTATE AMINOTRANSFERASE (SGOT) (U/L) IN SER/PLAS: 11 U/L (ref 9–39)
BASOPHILS (10*3/UL) IN BLOOD BY AUTOMATED COUNT: 0.08 X10E9/L (ref 0–0.1)
BASOPHILS/100 LEUKOCYTES IN BLOOD BY AUTOMATED COUNT: 0.5 % (ref 0–2)
BILIRUBIN TOTAL (MG/DL) IN SER/PLAS: 1.1 MG/DL (ref 0–1.2)
CALCIUM (MG/DL) IN SER/PLAS: 9 MG/DL (ref 8.6–10.3)
CARBON DIOXIDE, TOTAL (MMOL/L) IN SER/PLAS: 26 MMOL/L (ref 21–32)
CHLORIDE (MMOL/L) IN SER/PLAS: 107 MMOL/L (ref 98–107)
CREATININE (MG/DL) IN SER/PLAS: 0.93 MG/DL (ref 0.5–1.05)
EOSINOPHILS (10*3/UL) IN BLOOD BY AUTOMATED COUNT: 0.25 X10E9/L (ref 0–0.4)
EOSINOPHILS/100 LEUKOCYTES IN BLOOD BY AUTOMATED COUNT: 1.5 % (ref 0–6)
ERYTHROCYTE DISTRIBUTION WIDTH (RATIO) BY AUTOMATED COUNT: 14.8 % (ref 11.5–14.5)
ERYTHROCYTE MEAN CORPUSCULAR HEMOGLOBIN CONCENTRATION (G/DL) BY AUTOMATED: 30.3 G/DL (ref 32–36)
ERYTHROCYTE MEAN CORPUSCULAR VOLUME (FL) BY AUTOMATED COUNT: 114 FL (ref 80–100)
ERYTHROCYTES (10*6/UL) IN BLOOD BY AUTOMATED COUNT: 3.04 X10E12/L (ref 4–5.2)
GFR FEMALE: 65 ML/MIN/1.73M2
GLUCOSE (MG/DL) IN SER/PLAS: 143 MG/DL (ref 74–99)
HEMATOCRIT (%) IN BLOOD BY AUTOMATED COUNT: 34.7 % (ref 36–46)
HEMOGLOBIN (G/DL) IN BLOOD: 10.5 G/DL (ref 12–16)
IMMATURE GRANULOCYTES/100 LEUKOCYTES IN BLOOD BY AUTOMATED COUNT: 0.4 % (ref 0–0.9)
LEUKOCYTES (10*3/UL) IN BLOOD BY AUTOMATED COUNT: 16.5 X10E9/L (ref 4.4–11.3)
LYMPHOCYTES (10*3/UL) IN BLOOD BY AUTOMATED COUNT: 1.07 X10E9/L (ref 0.8–3)
LYMPHOCYTES/100 LEUKOCYTES IN BLOOD BY AUTOMATED COUNT: 6.5 % (ref 13–44)
MONOCYTES (10*3/UL) IN BLOOD BY AUTOMATED COUNT: 0.64 X10E9/L (ref 0.05–0.8)
MONOCYTES/100 LEUKOCYTES IN BLOOD BY AUTOMATED COUNT: 3.9 % (ref 2–10)
NATRIURETIC PEPTIDE B (PG/ML) IN SER/PLAS: 222 PG/ML (ref 0–99)
NEUTROPHILS (10*3/UL) IN BLOOD BY AUTOMATED COUNT: 14.41 X10E9/L (ref 1.6–5.5)
NEUTROPHILS/100 LEUKOCYTES IN BLOOD BY AUTOMATED COUNT: 87.2 % (ref 40–80)
PLATELETS (10*3/UL) IN BLOOD AUTOMATED COUNT: 275 X10E9/L (ref 150–450)
POCT GLUCOSE: 309 MG/DL (ref 74–99)
POLYCHROMASIA IN BLOOD BY LIGHT MICROSCOPY: NORMAL
POTASSIUM (MMOL/L) IN SER/PLAS: 3.9 MMOL/L (ref 3.5–5.3)
PROTEIN TOTAL: 6.6 G/DL (ref 6.4–8.2)
RBC MORPHOLOGY IN BLOOD: NORMAL
SARS-COV-2 RESULT: NOT DETECTED
SODIUM (MMOL/L) IN SER/PLAS: 141 MMOL/L (ref 136–145)
TROPONIN I, HIGH SENSITIVITY: 7 NG/L (ref 0–13)
TROPONIN I, HIGH SENSITIVITY: 7 NG/L (ref 0–13)
UREA NITROGEN (MG/DL) IN SER/PLAS: 17 MG/DL (ref 6–23)

## 2023-09-23 LAB
ANION GAP IN SER/PLAS: 14 MMOL/L (ref 10–20)
CALCIUM (MG/DL) IN SER/PLAS: 8.8 MG/DL (ref 8.6–10.3)
CARBON DIOXIDE, TOTAL (MMOL/L) IN SER/PLAS: 23 MMOL/L (ref 21–32)
CHLORIDE (MMOL/L) IN SER/PLAS: 105 MMOL/L (ref 98–107)
CREATININE (MG/DL) IN SER/PLAS: 0.88 MG/DL (ref 0.5–1.05)
ERYTHROCYTE DISTRIBUTION WIDTH (RATIO) BY AUTOMATED COUNT: 14.8 % (ref 11.5–14.5)
ERYTHROCYTE MEAN CORPUSCULAR HEMOGLOBIN CONCENTRATION (G/DL) BY AUTOMATED: 29.3 G/DL (ref 32–36)
ERYTHROCYTE MEAN CORPUSCULAR VOLUME (FL) BY AUTOMATED COUNT: 115 FL (ref 80–100)
ERYTHROCYTES (10*6/UL) IN BLOOD BY AUTOMATED COUNT: 2.72 X10E12/L (ref 4–5.2)
GFR FEMALE: 70 ML/MIN/1.73M2
GLUCOSE (MG/DL) IN SER/PLAS: 243 MG/DL (ref 74–99)
HEMATOCRIT (%) IN BLOOD BY AUTOMATED COUNT: 31.4 % (ref 36–46)
HEMOGLOBIN (G/DL) IN BLOOD: 9.2 G/DL (ref 12–16)
INR IN PPP BY COAGULATION ASSAY: 3.6 (ref 0.9–1.1)
LEUKOCYTES (10*3/UL) IN BLOOD BY AUTOMATED COUNT: 8.2 X10E9/L (ref 4.4–11.3)
NRBC (PER 100 WBCS) BY AUTOMATED COUNT: 0.2 /100 WBC (ref 0–0)
PLATELETS (10*3/UL) IN BLOOD AUTOMATED COUNT: 249 X10E9/L (ref 150–450)
POCT GLUCOSE: 245 MG/DL (ref 74–99)
POCT GLUCOSE: 286 MG/DL (ref 74–99)
POCT GLUCOSE: 329 MG/DL (ref 74–99)
POCT GLUCOSE: 346 MG/DL (ref 74–99)
POTASSIUM (MMOL/L) IN SER/PLAS: 4.1 MMOL/L (ref 3.5–5.3)
PROTHROMBIN TIME (PT) IN PPP BY COAGULATION ASSAY: 41.4 SEC (ref 9.8–12.8)
SODIUM (MMOL/L) IN SER/PLAS: 138 MMOL/L (ref 136–145)
UREA NITROGEN (MG/DL) IN SER/PLAS: 21 MG/DL (ref 6–23)

## 2023-09-24 LAB
ANION GAP IN SER/PLAS: 13 MMOL/L (ref 10–20)
CALCIUM (MG/DL) IN SER/PLAS: 8.6 MG/DL (ref 8.6–10.3)
CARBON DIOXIDE, TOTAL (MMOL/L) IN SER/PLAS: 24 MMOL/L (ref 21–32)
CHLORIDE (MMOL/L) IN SER/PLAS: 104 MMOL/L (ref 98–107)
CREATININE (MG/DL) IN SER/PLAS: 1 MG/DL (ref 0.5–1.05)
ERYTHROCYTE DISTRIBUTION WIDTH (RATIO) BY AUTOMATED COUNT: 15.3 % (ref 11.5–14.5)
ERYTHROCYTE MEAN CORPUSCULAR HEMOGLOBIN CONCENTRATION (G/DL) BY AUTOMATED: 28.5 G/DL (ref 32–36)
ERYTHROCYTE MEAN CORPUSCULAR VOLUME (FL) BY AUTOMATED COUNT: 121 FL (ref 80–100)
ERYTHROCYTES (10*6/UL) IN BLOOD BY AUTOMATED COUNT: 2.61 X10E12/L (ref 4–5.2)
GFR FEMALE: 60 ML/MIN/1.73M2
GLUCOSE (MG/DL) IN SER/PLAS: 275 MG/DL (ref 74–99)
HEMATOCRIT (%) IN BLOOD BY AUTOMATED COUNT: 31.6 % (ref 36–46)
HEMOGLOBIN (G/DL) IN BLOOD: 9 G/DL (ref 12–16)
INR IN PPP BY COAGULATION ASSAY: 4.7 (ref 0.9–1.1)
IRON (UG/DL) IN SER/PLAS: 57 UG/DL (ref 35–150)
IRON BINDING CAPACITY (UG/DL) IN SER/PLAS: 263 UG/DL (ref 240–445)
IRON SATURATION (%) IN SER/PLAS: 22 % (ref 25–45)
LEUKOCYTES (10*3/UL) IN BLOOD BY AUTOMATED COUNT: 8.3 X10E9/L (ref 4.4–11.3)
PLATELETS (10*3/UL) IN BLOOD AUTOMATED COUNT: 257 X10E9/L (ref 150–450)
POCT GLUCOSE: 249 MG/DL (ref 74–99)
POCT GLUCOSE: 259 MG/DL (ref 74–99)
POCT GLUCOSE: 278 MG/DL (ref 74–99)
POCT GLUCOSE: 349 MG/DL (ref 74–99)
POTASSIUM (MMOL/L) IN SER/PLAS: 4.2 MMOL/L (ref 3.5–5.3)
PROTHROMBIN TIME (PT) IN PPP BY COAGULATION ASSAY: 53.9 SEC (ref 9.8–12.8)
SODIUM (MMOL/L) IN SER/PLAS: 137 MMOL/L (ref 136–145)
UREA NITROGEN (MG/DL) IN SER/PLAS: 28 MG/DL (ref 6–23)

## 2023-09-25 LAB
ANION GAP IN SER/PLAS: 13 MMOL/L (ref 10–20)
CALCIUM (MG/DL) IN SER/PLAS: 8.5 MG/DL (ref 8.6–10.3)
CARBON DIOXIDE, TOTAL (MMOL/L) IN SER/PLAS: 25 MMOL/L (ref 21–32)
CHLORIDE (MMOL/L) IN SER/PLAS: 104 MMOL/L (ref 98–107)
CREATININE (MG/DL) IN SER/PLAS: 1.15 MG/DL (ref 0.5–1.05)
ERYTHROCYTE DISTRIBUTION WIDTH (RATIO) BY AUTOMATED COUNT: 15 % (ref 11.5–14.5)
ERYTHROCYTE MEAN CORPUSCULAR HEMOGLOBIN CONCENTRATION (G/DL) BY AUTOMATED: 29.6 G/DL (ref 32–36)
ERYTHROCYTE MEAN CORPUSCULAR VOLUME (FL) BY AUTOMATED COUNT: 116 FL (ref 80–100)
ERYTHROCYTES (10*6/UL) IN BLOOD BY AUTOMATED COUNT: 2.69 X10E12/L (ref 4–5.2)
GFR FEMALE: 51 ML/MIN/1.73M2
GLUCOSE (MG/DL) IN SER/PLAS: 236 MG/DL (ref 74–99)
HEMATOCRIT (%) IN BLOOD BY AUTOMATED COUNT: 31.1 % (ref 36–46)
HEMOGLOBIN (G/DL) IN BLOOD: 9.2 G/DL (ref 12–16)
INR IN PPP BY COAGULATION ASSAY: 2.3 (ref 0.9–1.1)
LEUKOCYTES (10*3/UL) IN BLOOD BY AUTOMATED COUNT: 9.5 X10E9/L (ref 4.4–11.3)
PLATELETS (10*3/UL) IN BLOOD AUTOMATED COUNT: 260 X10E9/L (ref 150–450)
POCT GLUCOSE: 110 MG/DL (ref 74–99)
POCT GLUCOSE: 191 MG/DL (ref 74–99)
POCT GLUCOSE: 233 MG/DL (ref 74–99)
POCT GLUCOSE: 339 MG/DL (ref 74–99)
POTASSIUM (MMOL/L) IN SER/PLAS: 4 MMOL/L (ref 3.5–5.3)
PROTHROMBIN TIME (PT) IN PPP BY COAGULATION ASSAY: 26 SEC (ref 9.8–12.8)
SODIUM (MMOL/L) IN SER/PLAS: 138 MMOL/L (ref 136–145)
UREA NITROGEN (MG/DL) IN SER/PLAS: 34 MG/DL (ref 6–23)

## 2023-09-26 PROBLEM — R91.8 MULTIPLE LUNG NODULES ON CT: Status: ACTIVE | Noted: 2023-09-26

## 2023-09-26 PROBLEM — D18.01 HEMANGIOMA OF SKIN AND SUBCUTANEOUS TISSUE: Status: ACTIVE | Noted: 2023-08-01

## 2023-09-26 PROBLEM — J44.9 COPD (CHRONIC OBSTRUCTIVE PULMONARY DISEASE) (MULTI): Status: ACTIVE | Noted: 2023-09-26

## 2023-09-26 LAB
ANION GAP IN SER/PLAS: 17 MMOL/L (ref 10–20)
CALCIUM (MG/DL) IN SER/PLAS: 9.3 MG/DL (ref 8.6–10.3)
CARBON DIOXIDE, TOTAL (MMOL/L) IN SER/PLAS: 23 MMOL/L (ref 21–32)
CBC DIFFERENTIAL PATH REVIEW: NORMAL
CHLORIDE (MMOL/L) IN SER/PLAS: 102 MMOL/L (ref 98–107)
CREATININE (MG/DL) IN SER/PLAS: 0.98 MG/DL (ref 0.5–1.05)
ERYTHROCYTE DISTRIBUTION WIDTH (RATIO) BY AUTOMATED COUNT: 14.9 % (ref 11.5–14.5)
ERYTHROCYTE MEAN CORPUSCULAR HEMOGLOBIN CONCENTRATION (G/DL) BY AUTOMATED: 29.7 G/DL (ref 32–36)
ERYTHROCYTE MEAN CORPUSCULAR VOLUME (FL) BY AUTOMATED COUNT: 116 FL (ref 80–100)
ERYTHROCYTES (10*6/UL) IN BLOOD BY AUTOMATED COUNT: 3 X10E12/L (ref 4–5.2)
GFR FEMALE: 61 ML/MIN/1.73M2
GLUCOSE (MG/DL) IN SER/PLAS: 197 MG/DL (ref 74–99)
HEMATOCRIT (%) IN BLOOD BY AUTOMATED COUNT: 34.7 % (ref 36–46)
HEMOGLOBIN (G/DL) IN BLOOD: 10.3 G/DL (ref 12–16)
INR IN PPP BY COAGULATION ASSAY: 1.7 (ref 0.9–1.1)
LEUKOCYTES (10*3/UL) IN BLOOD BY AUTOMATED COUNT: 14.4 X10E9/L (ref 4.4–11.3)
PLATELETS (10*3/UL) IN BLOOD AUTOMATED COUNT: 285 X10E9/L (ref 150–450)
POCT GLUCOSE: 143 MG/DL (ref 74–99)
POCT GLUCOSE: 163 MG/DL (ref 74–99)
POCT GLUCOSE: 233 MG/DL (ref 74–99)
POCT GLUCOSE: 264 MG/DL (ref 74–99)
POTASSIUM (MMOL/L) IN SER/PLAS: 3.8 MMOL/L (ref 3.5–5.3)
PROTHROMBIN TIME (PT) IN PPP BY COAGULATION ASSAY: 19.2 SEC (ref 9.8–12.8)
SODIUM (MMOL/L) IN SER/PLAS: 138 MMOL/L (ref 136–145)
UREA NITROGEN (MG/DL) IN SER/PLAS: 41 MG/DL (ref 6–23)

## 2023-09-27 LAB
ANION GAP IN SER/PLAS: 15 MMOL/L (ref 10–20)
CALCIUM (MG/DL) IN SER/PLAS: 8.7 MG/DL (ref 8.6–10.3)
CARBON DIOXIDE, TOTAL (MMOL/L) IN SER/PLAS: 24 MMOL/L (ref 21–32)
CHLORIDE (MMOL/L) IN SER/PLAS: 104 MMOL/L (ref 98–107)
CREATININE (MG/DL) IN SER/PLAS: 1.1 MG/DL (ref 0.5–1.05)
ERYTHROCYTE DISTRIBUTION WIDTH (RATIO) BY AUTOMATED COUNT: 15.1 % (ref 11.5–14.5)
ERYTHROCYTE MEAN CORPUSCULAR HEMOGLOBIN CONCENTRATION (G/DL) BY AUTOMATED: 28.1 G/DL (ref 32–36)
ERYTHROCYTE MEAN CORPUSCULAR VOLUME (FL) BY AUTOMATED COUNT: 122 FL (ref 80–100)
ERYTHROCYTES (10*6/UL) IN BLOOD BY AUTOMATED COUNT: 2.77 X10E12/L (ref 4–5.2)
GFR FEMALE: 53 ML/MIN/1.73M2
GLUCOSE (MG/DL) IN SER/PLAS: 252 MG/DL (ref 74–99)
HEMATOCRIT (%) IN BLOOD BY AUTOMATED COUNT: 33.8 % (ref 36–46)
HEMOGLOBIN (G/DL) IN BLOOD: 9.5 G/DL (ref 12–16)
INR IN PPP BY COAGULATION ASSAY: 1.7 (ref 0.9–1.1)
LEUKOCYTES (10*3/UL) IN BLOOD BY AUTOMATED COUNT: 12.1 X10E9/L (ref 4.4–11.3)
NRBC (PER 100 WBCS) BY AUTOMATED COUNT: 0.2 /100 WBC (ref 0–0)
PLATELETS (10*3/UL) IN BLOOD AUTOMATED COUNT: 252 X10E9/L (ref 150–450)
POCT GLUCOSE: 154 MG/DL (ref 74–99)
POCT GLUCOSE: 234 MG/DL (ref 74–99)
POTASSIUM (MMOL/L) IN SER/PLAS: 4.3 MMOL/L (ref 3.5–5.3)
PROTHROMBIN TIME (PT) IN PPP BY COAGULATION ASSAY: 19.6 SEC (ref 9.8–12.8)
SODIUM (MMOL/L) IN SER/PLAS: 139 MMOL/L (ref 136–145)
UREA NITROGEN (MG/DL) IN SER/PLAS: 44 MG/DL (ref 6–23)

## 2023-09-29 LAB
INR IN PPP BY COAGULATION ASSAY EXTERNAL: 2.2
PROTHROMBIN TIME (PT) IN PPP BY COAGULATION ASSAY EXTERNAL: NORMAL SECONDS

## 2023-09-30 NOTE — DISCHARGE SUMMARY
Send Summary:   Discharge Summary Providers:  Provider Role Provider Name   · Referring RhiannajudyfilemonKelvin   · Attending Terra Young   · Consulting Laurel Sumemrs   · Primary Head, Siobhan JASON   · Primary Soraida Ayala       Note Recipients: HeadSiobhan, [facility] - [phone]  []       Discharge:    Summary:   Admission Date: .22-Sep-2023 12:18:00   Discharge Date: 27-Sep-2023   Attending Physician at Discharge: Terra Young   Admission Reason: COPD exacerbation   Final Discharge Diagnoses: Acute exacerbation of  COPD   Procedures: none   Condition at Discharge: Satisfactory   Disposition at Discharge: Skilled Nursing Facility  (SNF)   Vital Signs:        T   P  R  BP   MAP  SpO2   Value  36.2  75  18  122/65      93%  Date/Time 9/27 2:01 9/27 8:27 9/27 8:27 9/27 8:27 9/27 8:27  Range  (36.2C - 36.7C )  (75 - 94 )  (18 - 19 )  (122 - 141 )/ (65 - 79 )    (89% - 93% )   As of 27-Sep-2023 02:01:00, patient is on 4 L/min of oxygen via nasal cannula.    Date:            Weight/Scale Type:  Height:   22-Sep-2023 23:15  141  kg / bed  167.5  cm  Physical Exam:    Constitutional: Well developed, awake/alert/oriented x3, no distress, alert and cooperative  Eyes: Clear sclera  ENMT: mucous membranes moist, no apparent injury, no lesions seen  Head/Neck: Neck supple, no apparent injury, trachea midline  Respiratory/Thorax: Patent airways, breath sounds diminished bilaterally. On 6L NC  Cardiovascular: Regular rate and rhythm. No murmurs, gallops, or rubs  Gastrointestinal: Soft, non-tender, non-distended. +BS  Musculoskeletal: No joint swelling, normal strength  Extremities: Dependent edema bilaterally  Neurological: alert and oriented x3, grossly intact senses  Lymphatic: No significant lymphadenopathy  Psychological: Appropriate mood and behavior  Skin: Warm and dry, no lesions, no rashes    Hospital Course:    PATRICIA CHANG is a 73 year old Female who came to ED with shortness of breath, cough worse when lying  down. No chest pain. Reports dizziness/lightheadedness  when up walking around (w/o oxygen). Cough productive of yellow sputum. Denies any chest pain, pressure. No abd pain, n/v/d/c, dysuria, hematuria. Reports lower extremity edema worse when sitting up, better with lower extremities elevated. Wears 3L oxygen  chronically.     Hospital Course:  #Acute on chronic respiratory failure 2/2 COPD exacerbation, Pneumonia, HF exacerb  -Leukocytosis 16.5 >8.2> 8.3> 9.5  -Abx: Ceftriaxone, azithro (Day 3)  -BD PRN  -c/w home inhalers  -Currently on 4L via NC  -home O2: 2-3L chronically  -follows w/ pulm  -repeat CXR shows new atelectasis  -PT/OT on consult  -RT to eval/treat    #Acute on chronic diastolic heart failure (HFpEF) w/ medication noncompliance.   #Chronic Afib (Warfarin)  #HLD  -PT has not been taking her diuretic @ home  -Supratherapuetic INR (4.7) recheck in AM, hold dose for tonight  -INR 2.3; resume warfarin today  -IV Lasix BID  -Strict I&O  -c/w home ASA, Atorvastatin, Losartan.   -Cardiology consulted, appreciate recs     #Anemia, macrocytic/hypochromic  -H/H 9.2/31.4 > 9.0/31.6 > 9.2/31.1  -resume home Iron    #Mucous pemphigoid  -resume home dapsone    PPx:   -DVT: Warfarin  -GI: PPI    F: PRN  E: Replete per protocol  N:  Cardiac   A: PIV    Disposition: Pt stable to DC to SNF. Will g on 4L oxygen. Will go home on a few additional days of abx.  Code Status: Full Code     Total cumulative time spent in preparation of this discharge including documentation review, coordination of care with the medical team including PT/SW/care coordinators and treating consultants, discussion with patient and pertinent family members and  finalization of prescriptions, followup appointments and this discharge summary was approximately  45 minutes. Over 50% of the time was spent face-to-face counseling the patient on diagnosis, prescriptions, and follow up appointments.        Discharge Information:    and Continuing Care:    Lab Results - Pending:    None  Radiology Results - Pending: None   Discharge Instructions:    Activity:           activity as tolerated.          May shower..      Nutrition/Diet:           low sodium    Respiratory:           Oxygen:   Administer oxygen at 4 liters/min via nasal cannula continuous      Additional Orders:           Additional Instructions:   You have been admitted to the [facility].  If you or your family have any questions concerning your stay, you may feel free to contact us and we will be happy to assist you with any questions you have.  If it very important that you followup with your regular doctor within one week of discharge. This is to ensure that you are recovering from your hospital stay. If you do not have a regular doctor, we will be happy to assist you with obtaining a regular  doctor.   Review your medication list after discharge carefully. If you have questions regarding your medications, feel free to contact us. Make sure to bring this list to all followup appointments.  You need to call your doctor, [facility] or return to the closest ED if you develop any new or concerning symptoms such as fevers, chills, chest pain, shortness of breath, diarrhea, and so forth. New or worsening symptoms may require you to be re-evaluated  by someone sooner after discharge.   If you have any immediate questions or concerns after your discharge you may - call the [facility] Nursing Supervisor at [phone]. This phone is answered 24 hours a day but if the supervisor is temporarily unavailable, it may be 1 hour before  you receive a return call from one of the supervisors.      Rehab Services:           Occupational Therapy Orders:   Eval and Treat ([facility])          Physical Therapy Orders:   Eval and Treat ([facility])    Care Recommendation:           I recommend that INPATIENT care is required at::   Skilled          Estimated Stay:   Convalescent  stay < 30 days    Follow Up Appointments:    Coumadin (Warfarin) Follow-Up Monitoring:     Follow-Up Appointment 01:           Physician/Dept/Service:   PCP Dr. Terra Young          Reason for Referral:   follow up on hospitalization and medication management          Call to Schedule in:   1 week          Location:   [address] [place] [address]          Phone Number:   [phone]    Follow-Up Appointment 02:           Physician/Dept/Service:   Dr. Méndez- Pulmonology    Discharge Medications: Home Medication   glimepiride 2 mg oral tablet - 1 tab(s) oral once a day  metFORMIN 1000 mg oral tablet - 1 tab(s) oral 2 times a day  topiramate 100 mg oral tablet - 1 tab(s) oral 2 times a day  warfarin 5 mg oral tablet - 1 tab(s) oral once a day on Mondays and Fridays  Calcium 600+D - 1  oral 2 times a day  aspirin 81 mg oral tablet - 1 tab(s) oral once a day  gabapentin 300 mg oral tablet - 1  orally 2 times a day  acetaminophen 500 mg oral tablet - 1 tab(s) orally every 6 hours   dapsone 100 mg oral tablet - 1 tab(s) orally once a day  atorvastatin 20 mg oral tablet - 1 tab(s) orally once a day  cyanocobalamin 2 mcg/mL intramuscular solution - 1000 microgram(s) intramuscular once a month  dapsone 25 mg oral tablet - 1 tab(s) orally once a day  DilTIAZem (Eqv-Cardizem CD) 240 mg/24 hours oral capsule, extended release - 1 cap(s) orally once a day  furosemide 40 mg oral tablet - 1 tab(s) orally once a day  Levemir FlexPen - 15 units subcutaneous once a day (at bedtime)  losartan 25 mg oral tablet - 0.5 tab(s) orally once a day (at bedtime)  Vitamin D2 50,000 intl units (1.25 mg) oral capsule - 1 cap(s) orally every 2 weeks, on thursdays  warfarin 2.5 mg oral tablet - 1 tab(s) orally once a day on Tuesdays ,Wednesdays, Thursdays, Saturdays and Sundays  ferrous sulfate 325 mg (65 mg elemental iron) oral tablet - 1 tab(s) orally once a day  cefuroxime 500 mg oral tablet - 1 tab(s) orally every 12 hours x3  days  through 9/29/23     PRN Medication   Albuterol (Eqv-Proventil HFA) 90 mcg/inh inhalation aerosol - 2 puff(s) inhaled every 4 hours, As Needed   Issues to Discuss at Follow-up / Goals for Continuing Care:    Your patient was evaluated during their recent admission for Wellness Screenings.   They met the criteria to complete the following screenings:   Colonoscopy  Coronary Calcium  Please discuss these screenings at their next visit and order as you deem necessary.     DNR Status:   ·  Code Status Code Status order at time of discharge: Full Code       Electronic Signatures:  Daija Lea (APRN-CNP)  (Signed 27-Sep-2023 11:31)   Authored: Send Summary, Summary Content, Ongoing Care,  DNR Status, Note Completion      Last Updated: 27-Sep-2023 11:31 by Daija Lea (APRN-CNP)

## 2023-09-30 NOTE — H&P
History of Present Illness:   HPI:    PATRICIA CHANG is a 73 year old Female who came to ED with shortness of breath, cough worse when lying down. No chest pain. Reports dizziness/lightheadedness  when up walking around (w/o oxygen). Cough productive of yellow sputum. Denies any chest pain, pressure. No abd pain, n/v/d/c, dysuria, hematuria. Reports lower extremity edema worse when sitting up, better with lower extremities elevated. Wears 3L oxygen  chronically.     ED VS: 36.4C, HR 68, RR 24, Pox 85%, 106/67    Imaging: CT Pe: No pulmonary embolism or other acute intrathoracic  process. Mild to moderate coronary artery calcifications. Hepatic steatosis with some morphologic changes raising the possibility of underlying cirrhosis. Rim calcified splenic artery aneurysms measuring up to 2.3 cm unchanged from prior.     Labs: WBC 8.2, H/H 9.2/31.4, Plt 249, , Na 138, K 4.1, Cl 105, Bicarb 23, BUN/Cr 21/0.88  Trop 7 x2, ; INR 3.6  COVID: Negative     PMH: DM 2, Chronic Afib (Warfarin), HLD, Obesity, COPD w/ chronic respiratory failure, Anemia, Lumbar spondylosis      SH: Former smoker (quit 1 week ago) no etoh or illicit drug use  Surgical Hx: Breast biopsy (12/2018), Lumpectomy, Cataract surgery, Colonoscopy (2007,negative) Nasal septal deviation, renal lithotripsy, tubal ligation   FH: Mother (Alzheimer's dementia) Father (CAD) breast cancer  Home Meds: ASA, Losartan,  Cardizem, Warfarin, Gabapentin, Topamax, Glimepiride, Levemir, Metformin, Atorvastatin, Albuterol, Furosemide , Dapsone, VD, V12        Comorbidities:   Comorbidites:  ·  Comorbid Conditions atrial fibrillation, chronic obstructive pulmonary disease, congestive heart failure, diabetes, hypertension   ·  Type of Atrial Fibrillation Paroxysmal   ·  Type of Congestive Heart Failure diastolic   ·  CHF Additional Specificity with hypertension   ·  Acuity of CHF acute on chronic   ·  COPD with oxygen dependence   ·  Chronic Respiratory  Failure yes   ·  Oxygen Delivery at Home nasal cannula   ·  Oxygen Amount (L/min) 2   ·  Diabetes Type Type 2   ·  Insulin Dependent unknown   ·  DM Acuity or Status unknown     Social History:   Social History:  Smoking Status occasional user (use that is infrequent, sporadic, not on a daily basis) (1)   Alcohol Use denies(1)   Drug Use denies (1)              Allergies:  ·  No Known Allergies :     Medications Prior to Admission:     aspirin 81 mg oral tablet: 1 tab(s) oral once a day  Calcium 600+D: 1  oral 2 times a day  gabapentin 300 mg oral tablet: 1  orally 2 times a day  glimepiride 2 mg oral tablet: 1 tab(s) oral once a day  metFORMIN 1000 mg oral tablet: 1 tab(s) oral 2 times a day  warfarin 5 mg oral tablet: 1 tab(s) oral once a day on Mondays and Fridays  topiramate 100 mg oral tablet: 1 tab(s) oral 2 times a day  acetaminophen 500 mg oral tablet: 1 tab(s) orally every 6 hours   dapsone 100 mg oral tablet: 1 tab(s) orally once a day  Albuterol (Eqv-Proventil HFA) 90 mcg/inh inhalation aerosol: 2 puff(s) inhaled every 4 hours, As Needed  atorvastatin 20 mg oral tablet: 1 tab(s) orally once a day  cyanocobalamin 2 mcg/mL intramuscular solution: 1000 microgram(s) intramuscular once a month  dapsone 25 mg oral tablet: 1 tab(s) orally once a day  DilTIAZem (Eqv-Cardizem CD) 240 mg/24 hours oral capsule, extended release: 1 cap(s) orally once a day  furosemide 40 mg oral tablet: 1 tab(s) orally once a day  Levemir FlexPen: 15 units subcutaneous once a day (at bedtime)  losartan 25 mg oral tablet: 0.5 tab(s) orally once a day (at bedtime)  Vitamin D2 50,000 intl units (1.25 mg) oral capsule: 1 cap(s) orally every 2 weeks, on thursdays  warfarin 2.5 mg oral tablet: 1 tab(s) orally once a day on Tuesdays ,Wednesdays, Thursdays, Saturdays and Sundays.    Review of Systems:   Incomplete ROS: See HPI       Objective:     Objective Information:      T   P  R  BP   MAP  SpO2    Value  36.5  85  18  134/73   77  91%  Date/Time 9/23 6:45 9/23 6:45 9/23 6:45 9/23 6:45  9/22 18:35 9/23 6:45  Range  (36.4C - 36.5C )  (68 - 137 )  (17 - 27 )  (106 - 152 )/ (52 - 109 )  (73 - 77 )  (85% - 92% )   As of 23-Sep-2023 06:45:00, patient is on 6 L/min of oxygen via nasal cannula.      Pain reported at 9/23 5:18: sleeping    Physical Exam by System:    Constitutional: Well developed, awake/alert/oriented  x3, no distress, alert and cooperative   Eyes: Clear sclera   ENMT: MMM   Respiratory/Thorax: Faint crackles b/l, diffuse rhonchi  more prominent on L side, expiratory wheezing b/l. No accessory muscle use. POx >92% on 6L via NC   Cardiovascular: Regular, rate and rhythm, no murmurs   Gastrointestinal: Protuberant, soft   Musculoskeletal: Gross ROM intact, no joint swelling   Extremities: Dependent edema b/l   Neurological: alert and oriented x3, grossly intact  senses, motor, response and strength   Lymphatic: No significant lymphadenopathy   Psychological: Appropriate mood and behavior   Skin: Warm, dry     Medications:    Medications:          Continuous Medications       --------------------------------  No continuous medications are active       Scheduled Medications       --------------------------------    1. Albuterol 2.5 mg - Ipratropium 0.5 mg/ 3 mL Neb Soln:  3  mL  Inhalation  Every 6 Hours    2. Aspirin Enteric Coated:  81  mg  Oral  Daily    3. Atorvastatin:  20  mg  Oral  Daily    4. Azithromycin:  500  mg  Oral  Every 24 Hours    5. Calcium 500 mg - Vitamin D 200 Units:  1  tablet(s)  Oral  2 Times a Day    6. cefTRIAXone 1 gram/ Dextrose 5% IVPB Premixed Soln 50 mL:  50  mL  IntraVenous Piggyback  Every 24 Hours    7. dilTIAZem (CARDIZEM CD) Extended Release (24 hour):  240  mg  Oral  Every 24 Hours    8. Furosemide Injectable:  40  mg  IntraVenous Push  2 Times a Day    9. Gabapentin:  300  mg  Oral  2 Times a Day    10. Glimepiride:  2  mg  Oral  Daily    11. Insulin Glargine  (Lantus) Injectable:  10  unit(s)  SubCutaneous  At Bedtime    12. Insulin Lispro Moderate Corrective Scale:  unit(s)  SubCutaneous  3 Times a Day Before Meals    13. Losartan:  12.5  mg  Oral  Daily    14. predniSONE:  50  mg  Oral  Every 24 Hours    15. Topiramate:  100  mg  Oral  2 Times a Day         PRN Medications       --------------------------------    1. Acetaminophen:  650  mg  Oral  Every 4 Hours    2. Acetaminophen:  650  mg  Oral  Every 4 Hours    3. Albuterol 2.5 mg/ 3 mL Nebulizer Soln:  3  mL  Inhalation  Every 2 Hours    4. Dextromethorphan - guaiFENesin Oral Liquid:  5  mL  Oral  Every 4 Hours    5. Glucagon Injectable:  1  mg  IntraMuscular  Every 15 Minutes    6. guaiFENesin Extended Release:  600  mg  Oral  Every 12 Hours    7. LORazepam:  0.5  mg  Oral  Every 6 Hours    8. Magnesium Hydroxide -Al Hydrox -Simethicone Oral Liquid:  30  mL  Oral  Every 6 Hours    9. Magnesium Hydroxide Oral Liquid:  30  mL  Oral  Every 24 Hours    10. Ondansetron Injectable:  4  mg  IntraVenous Push  Every 4 Hours    11. Sodium Chloride 0.9% Injectable Flush:  10  mL  IntraVenous Flush  Every 8 Hours and as Needed    12. Sore Throat Lozenge:  1  lozenge(s)  Oral  Every 2 Hours    13. traZODone:  25  mg  Oral  At Bedtime         Conditional Medication Orders       --------------------------------    1. Perflutren Lipid Microsphere (Activated) 1.3 mL / NaCL 0.9% T.V. 10 mL Injectable:  0.5  mL  IntraVenous Push  Once         Currently Suspended Medications       --------------------------------    1. Furosemide:  40  mg  Oral  Daily    2. metFORMIN (GLUCOPHAGE):  1000  mg  Oral  2 Times a Day With Meals      Recent Lab Results:    Results:    CBC: 9/23/2023 06:02              \     Hgb     /                              \     9.2 L    /  WBC  ----------------  Plt               8.2       ----------------    249              /     Hct     \                              /     31.4 L    \            RBC: 2.72 L     MCV: 115 H    Neutrophil  %: 87.2      BMP: 9/23/2023 06:02  NA+        Cl-     BUN  /                         138    105    21  /  --------------------------------  Glucose                ---------------------------  243 H    K+     HCO3-   Creat \                         4.1  23    0.88  \  Calcium : 8.8     Anion Gap : 14      CMP: 9/22/2023 12:43  NA+        Cl-     BUN  /                         141    107    17  /  --------------------------------  Glucose                ---------------------------  143 H    K+     HCO3-   Creat \                         3.9    26    0.93  \           \  T Bili  /                    \  1.1  /  AST  x ---- x ALT        11 x ---- x 10         /  Alk P   \               /  66  \  Calcium : 9.0     Anion Gap : 12     Albumin : 3.8     T Protein : 6.6           Coagulation: 9/23/2023 06:02  PT  /                    41.4 H /  -------<    INR          ----------<      3.6 H  PTT\                              \                       Radiology Results:    Results:        Impression:    1. No pulmonary embolism or other acute intrathoracic process.  2. Mild to moderate coronary artery calcifications.  3. Hepatic steatosis with some morphologic changes raising the  possibility of underlying cirrhosis.  4. Rim calcified splenic artery aneurysms measuring up to 2.3 cm  unchanged from prior.        Signed by Main Chung MD     TH CT Angio Chest for PE [Sep 22 2023  3:54PM]      Impression:       1. No acute cardiopulmonary disease..        Signed by Kashif Baig MD     Xray Chest 1 View [Sep 22 2023  2:18PM]      Assessment and Plan:   Assessment:    Frannie Blanco is a 72 yo female with a PMH of  DM 2, Chronic Afib (Warfarin), HLD, Obesity, COPD w/ chronic respiratory failure, Anemia, Lumbar spondylosis who  presented to the ED with progressive shortness of breath.     #Acute on chronic respiratory failure 2/2 COPD exacerbation, Pneumonia, HF exacerb  -Leukocytosis  "16.5 >8.2  -Abx: Ceftriaxone, azithro  -BD PRN  -c/w home inhalers  -Currently on 6L via NC  -home O2: 2L chronically  -follows w/ pulm    #Acute on chronic diastolic heart failure (HFpEF) w/ medication noncompliance.   #Chronic Afib (Warfarin)  #HLD  -PT has not been taking her diuretic @ home  -Supratherapuetic INR (3.6) recheck in AM, hold dose for tonight  -IV Lasix BID  -Strict I&O  -c/w home ASA, Atorvastatin, Losartan.   -Cardiology consulted, appreciate recs     #Anemia, macrocytic/hypochromic  -H/H 9.2/31.4  -Iron studies pending     PPx:   -DVT: Warfarin   -GI: PPI      F: PRN  E: Replete per protocol  N:  CArdiac   A: PIV  Disposition: Patient requires inpatient mgmt at this time.   Code Status: Full Code           Attestation:   Note Completion:  I am a:  Scribe   Scribing on behalf of (Provider name) Terra Young   Scribe Only - Scribe Attestation I am scribing for, and in the presence of the provider.    Scribed Provider Only - Attestation to Scribe The documentation recorded by the individual acting as Scribe, in my presence, accurately and completely reflects the service(s) I personally performed and  the decisions made by me.          Electronic Signatures:  Alison Aaron (PAC)  (Entered 23-Sep-2023 08:10)   Entered: History of Present Illness, Comorbidities, Social  History, Allergies, Medications Prior to Admission, Review of Systems, Objective, Assessment and Plan, Note Completion  Terra Young)  (Signed 23-Sep-2023 20:41)   Entered: Note Completion   Authored: History of Present Illness, Comorbidities, Social History, Allergies, Medications Prior to Admission, Review of Systems, Objective,  Assessment and Plan, Note Completion      Last Updated: 23-Sep-2023 20:41 by Terra Young)    References:  1.  Data Referenced From \"Patient Profile - Adult v2\" 22-Sep-2023 23:15   "

## 2023-09-30 NOTE — PROGRESS NOTES
Service: Medicine     Subjective Data:   PATRICIA CHANG is a 73 year old Female who is Hospital Day # 5.     Pt sitting up in bed. Some shortness of breath while eating. Requiring 6lpm.    Denies fever, chills, h/a, vision or hearing changes, runny nose, sore throat, c/p, palpitations, cough, abd pain, n/v/d/c, dysuria, hematuria, numbness or tingling.    Objective Data:     Objective Information:      T   P  R  BP   MAP  SpO2   Value  36.5  83  18  141/79      90%  Date/Time 9/26 7:31 9/26 7:31 9/26 7:31 9/26 7:31 9/26 7:31  Range  (36.5C - 36.8C )  (83 - 106 )  (18 - 19 )  (133 - 166 )/ (70 - 87 )    (90% - 91% )   As of 26-Sep-2023 07:31:00, patient is on 6 L/min of oxygen via nasal cannula.      Pain reported at 9/26 9:08: 0 = None    ---- Intake and Output  -----  Mn/Dy/Year Time  Intake   Output  Net  Sep 26, 2023 6:00 am  600   1200  -600  Sep 25, 2023 10:00 pm  300   700  -400  Sep 25, 2023 2:00 pm  680   1300  -620    The Intake and Output Totals for the last 24 hours are:      Intake   Output  Net      1580   3200  -1620         Weights   9/25 11:35: BMI (kg/m2) (BMI (kg/m2))  50.196    Physical Exam by System:    Constitutional: Well developed, awake/alert/oriented  x3, no distress, alert and cooperative   Eyes: Clear sclera   ENMT: mucous membranes moist, no apparent injury,  no lesions seen   Head/Neck: Neck supple, no apparent injury, trachea  midline   Respiratory/Thorax: Patent airways, breath sounds  diminished bilaterally. On 6L NC   Cardiovascular: Regular rate and rhythm. No murmurs,  gallops, or rubs   Gastrointestinal: Soft, non-tender, non-distended.  +BS   Musculoskeletal: No joint swelling, normal strength   Extremities: Dependent edema bilaterally   Neurological: alert and oriented x3, grossly intact  senses   Lymphatic: No significant lymphadenopathy   Psychological: Appropriate mood and behavior   Skin: Warm and dry, no lesions, no rashes     Radiology Results:    Results:     I have reviewed this radiology result:         Impression:    Basilar atelectasis.     Xray Chest 1 View [Sep 26 2023  7:38AM]      Assessment and Plan:   Comorbidities:  ·  Comorbidity anemia, obesity   ·  Anemia iron deficiency anemia   ·  Obesity morbid obesity (BMI 40+)   ·  BMI 50.26     Code Status:  ·  Code Status Full Code     Assessment:    Frannie Blanco is a 74 yo female with a PMH of  DM 2, Chronic Afib (Warfarin), HLD, Obesity, COPD w/ chronic respiratory failure, Anemia, Lumbar spondylosis who  presented to the ED with progressive shortness of breath.     #Acute on chronic respiratory failure 2/2 COPD exacerbation, Pneumonia, HF exacerb  -Leukocytosis 16.5 >8.2> 8.3> 9.5  -Abx: Ceftriaxone, azithro (Day 3)  -BD PRN  -c/w home inhalers  -Currently on 6L via NC  -home O2: 2L chronically  -follows w/ pulm  -repeat CXR shows new atelectasis  -PT/OT on consult  -RT to eval/treat    #Acute on chronic diastolic heart failure (HFpEF) w/ medication noncompliance.   #Chronic Afib (Warfarin)  #HLD  -PT has not been taking her diuretic @ home  -Supratherapuetic INR (4.7) recheck in AM, hold dose for tonight  -INR 2.3; resume warfarin today  -IV Lasix BID  -Strict I&O  -c/w home ASA, Atorvastatin, Losartan.   -Cardiology consulted, appreciate recs     #Anemia, macrocytic/hypochromic  -H/H 9.2/31.4 > 9.0/31.6 > 9.2/31.1  -resume home Iron    #Mucous pemphigoid  -resume home dapsone    PPx:   -DVT: Warfarin  -GI: PPI    F: PRN  E: Replete per protocol  N:  Cardiac   A: PIV    Disposition: Patient requires inpatient mgmt at this time.   Code Status: Full Code     Total accumulated time spent face to face and not face to face preparing to see the patient, obtaining and reviewing separately obtained history; performing a medically appropriate examination and/or evaluation; counseling and educating the patient, family;  ordering medications, tests, or procedures; referring and communicating with other health care  professionals; documenting clinical information in the patient's medical record; independently interpreting results and communicating the results to the patient,  family; and care coordination was 30 minutes.      *Pt to follow up dr. Hoffman at discharge, requesting POC oxygen concentrator.           Electronic Signatures:  Daija Lea (APRN-CNP)  (Signed 26-Sep-2023 10:41)   Authored: Service, Subjective Data, Objective Data, Assessment  and Plan, Note Completion      Last Updated: 26-Sep-2023 10:41 by Daija Lea (APRN-CNP)

## 2023-09-30 NOTE — PROGRESS NOTES
Consult Type: subsequent visit/care     Service: Cardiology     Subjective Data:   PATRICIA CHANG is a 73 year old Female who is Hospital Day # 4.    Additional Information:    Sitting up in bed, no complaints. Was just up to the restroom and HR became elevated when attempting to go to the restroom. Asymptomatic. Feels breathing is intermittently  improved, still requiring continuous O2.    Objective Data:     Objective Information:      T   P  R  BP   MAP  SpO2   Value  36.6  102  18  160/85      90%  Date/Time 9/25 6:54 9/25 6:54 9/25 6:54 9/25 6:54    9/25 6:54  Range  (36.5C - 36.8C )  (68 - 106 )  (16 - 18 )  (100 - 166 )/ (60 - 87 )    (90% - 92% )   As of 25-Sep-2023 06:54:00, patient is on 5 L/min of oxygen via nasal cannula with humidification.      Pain reported at 9/25 8:55: 0 = None    ---- Intake and Output  -----  Mn/Dy/Year Time  Intake   Output  Net  Sep 25, 2023 6:00 am  0   600  -600  Sep 24, 2023 10:00 pm  0   1100  -1100  Sep 24, 2023 2:00 pm  720   2400  -1790    The Intake and Output Totals for the last 24 hours are:      Intake   Output  Net      720   7205  -9035    Physical Exam by System:    Constitutional: sitting up in bed, awake and alert,  no distress, cooperative   Eyes: sclera white, EOMI   ENMT: mucous membranes moist, no apparent injury,  no lesions seen   Head/Neck: Neck supple, no apparent injury, brisk  upstroke JVP while sitting upright   Respiratory/Thorax: Posterior bilateral lung fields  diminished to auscultation, expiratory wheezing throughout, no rhonchi or rales, equal chest expansion, mild conversational dyspnea, equal chest expansion, thorax symmetric   Cardiovascular: irregular rhythm, regular rate,   soft grade 2/6 systolic murmur   Gastrointestinal: soft, nontender, +BS   Musculoskeletal: ROM intact, no joint swelling   Extremities: No Edema, +2 DP/PT   Neurological: Alert and Oriented x3   Psychological: Appropriate mood and behavior   Skin: Warm and dry      Medication:    Medications:          Continuous Medications       --------------------------------  No continuous medications are active       Scheduled Medications       --------------------------------    1. Albuterol 2.5 mg - Ipratropium 0.5 mg/ 3 mL Neb Soln:  3  mL  Inhalation  Every 6 Hours    2. Aspirin Enteric Coated:  81  mg  Oral  Daily    3. Atorvastatin:  20  mg  Oral  Daily    4. Azithromycin:  500  mg  Oral  Every 24 Hours    5. Calcium 500 mg - Vitamin D 200 Units:  1  tablet(s)  Oral  2 Times a Day    6. cefTRIAXone 1 gram/ Dextrose 5% IVPB Premixed Soln 50 mL:  50  mL  IntraVenous Piggyback  Every 24 Hours    7. Dapsone:  125  mg  Oral  Every 24 Hours    8. dilTIAZem (CARDIZEM CD) Extended Release (24 hour):  240  mg  Oral  Every 24 Hours    9. Ferrous Gluconate:  324  mg  Oral  Daily    10. Gabapentin:  300  mg  Oral  2 Times a Day    11. Glimepiride:  2  mg  Oral  Daily    12. Insulin Glargine (Lantus) Injectable:  10  unit(s)  SubCutaneous  At Bedtime    13. Insulin Lispro Moderate Corrective Scale:  unit(s)  SubCutaneous  3 Times a Day Before Meals    14. Losartan:  12.5  mg  Oral  Daily    15. predniSONE:  50  mg  Oral  Every 24 Hours    16. Topiramate:  100  mg  Oral  2 Times a Day    17. Warfarin:  2.5  mg  Oral  <User Schedule>    18. Warfarin:  5  mg  Oral  <User Schedule>         PRN Medications       --------------------------------    1. Acetaminophen:  650  mg  Oral  Every 4 Hours    2. Acetaminophen:  650  mg  Oral  Every 4 Hours    3. Albuterol 2.5 mg/ 3 mL Nebulizer Soln:  3  mL  Inhalation  Every 2 Hours    4. Dextromethorphan - guaiFENesin Oral Liquid:  5  mL  Oral  Every 4 Hours    5. Glucagon Injectable:  1  mg  IntraMuscular  Every 15 Minutes    6. guaiFENesin Extended Release:  600  mg  Oral  Every 12 Hours    7. LORazepam:  0.5  mg  Oral  Every 6 Hours    8. Magnesium Hydroxide -Al Hydrox -Simethicone Oral Liquid:  30  mL  Oral  Every 6 Hours    9. Magnesium Hydroxide Oral  Liquid:  30  mL  Oral  Every 24 Hours    10. Ondansetron Injectable:  4  mg  IntraVenous Push  Every 4 Hours    11. Sodium Chloride 0.9% Injectable Flush:  10  mL  IntraVenous Flush  Every 8 Hours and as Needed    12. Sore Throat Lozenge:  1  lozenge(s)  Oral  Every 2 Hours    13. traZODone:  25  mg  Oral  At Bedtime         Conditional Medication Orders       --------------------------------    1. Perflutren Lipid Microsphere (Activated) 1.3 mL / NaCL 0.9% T.V. 10 mL Injectable:  0.5  mL  IntraVenous Push  Once         Currently Suspended Medications       --------------------------------    1. Furosemide:  40  mg  Oral  Daily    2. Furosemide Injectable:  40  mg  IntraVenous Push  2 Times a Day    3. metFORMIN (GLUCOPHAGE):  1000  mg  Oral  2 Times a Day With Meals      Recent Lab Results:    Results:    CBC: 9/25/2023 05:18              \     Hgb     /                              \     9.2 L    /  WBC  ----------------  Plt               9.5       ----------------    260              /     Hct     \                              /     31.1 L    \            RBC: 2.69 L    MCV: 116 H          BMP: 9/25/2023 05:18  NA+        Cl-     BUN  /                         138    104    34 H /  --------------------------------  Glucose                ---------------------------  236 H    K+     HCO3-   Creat \                         4.0  25    1.15 H \  Calcium : 8.5 L    Anion Gap : 13      Coagulation: 9/25/2023 05:18  PT  /                    26.0 H /  -------<    INR          ----------<      2.3 H  PTT\                              \                       Radiology Results:    Results:        Impression:    1. No pulmonary embolism or other acute intrathoracic process.  2. Mild to moderate coronary artery calcifications.  3. Hepatic steatosis with some morphologic changes raising the  possibility of underlying cirrhosis.  4. Rim calcified splenic artery aneurysms measuring up to 2.3 cm  unchanged from prior.         Signed by Main Chung MD     TH CT Angio Chest for PE [Sep 22 2023  3:54PM]      Impression:       1. No acute cardiopulmonary disease..        Signed by Kashif Baig MD     Xray Chest 1 View [Sep 22 2023  2:18PM]      Assessment and Plan:   Code Status:  ·  Code Status Full Code     Assessment:    72 yo women admitted with acute on chronic hypoxic respiratory failure 2/2 COPD exac vs Viral illness vs acute CHF exacerbation  Chronic Diastolic HF  Chronic Afib on Warfarin  Type II DM  obesity  HTN  HLD    Recommendations  Echo 10/2022 preserved LVEF, mild concentric LVH, moderate mitral stenosis  Repeat Echo today to assess LVEF and Valve  Appears to be approaching euvolemia, Crt slightly bumped hold furosemide today and reassess in the AM, can likely resume PO dosing  Strict Is and Os  Daily weights  Close monitoring of electrolytes and kidney function  Replace as needed for goal K>4 and Mag >2   Supportive treatment per primary team    Further recommendations to follow    Thank you for the consult, please reach out with any questions.   Discussed with Daija Lea APRN-CNP      Electronic Signatures:  Siobhan Sams (APRN-CNP)  (Signed 25-Sep-2023 11:02)   Authored: Service, Subjective Data, Objective Data, Assessment  and Plan, Note Completion      Last Updated: 25-Sep-2023 11:02 by Siobhan Sams (APRN-CNP)

## 2023-09-30 NOTE — PROGRESS NOTES
"      Service: Medicine     Subjective Data:   PATRICIA CHANG is a 73 year old Female who is Hospital Day # 3.     Overnight: no acute issues noted.   CC: States feels somewhat better, breathing has improved. Remains onO2, notes she \"drops\" quickly when she takes it off.   Complains of cough ongoing.    Objective Data:     Objective Information:      T   P  R  BP   MAP  SpO2   Value  36.5  80  18  100/63      92%  Date/Time 9/24 7:28 9/24 7:28 9/24 7:28 9/24 7:28 9/24 7:28  Range  (36C - 36.7C )  (58 - 96 )  (18 - 22 )  (100 - 135 )/ (63 - 76 )    (90% - 94% )   As of 24-Sep-2023 07:28:00, patient is on 5 L/min of oxygen via nasal cannula.      Pain reported at 9/24 3:01: sleeping    Physical Exam by System:    Constitutional: Well developed, awake/alert/oriented  x3, no distress, alert and cooperative   Eyes: Clear sclera   ENMT: MMM   Respiratory/Thorax: On 6LHFNC   Cardiovascular: Regular, rate and rhythm, no murmurs   Gastrointestinal: Protuberant, soft   Musculoskeletal: Gross ROM intact, no joint swelling   Extremities: Dependent edema b/l   Neurological: alert and oriented x3, grossly intact  senses, motor, response and strength   Lymphatic: No significant lymphadenopathy   Psychological: Appropriate mood and behavior   Skin: Warm, dry     Medication:    Medications:          Continuous Medications       --------------------------------  No continuous medications are active       Scheduled Medications       --------------------------------    1. Albuterol 2.5 mg - Ipratropium 0.5 mg/ 3 mL Neb Soln:  3  mL  Inhalation  Every 6 Hours    2. Aspirin Enteric Coated:  81  mg  Oral  Daily    3. Atorvastatin:  20  mg  Oral  Daily    4. Azithromycin:  500  mg  Oral  Every 24 Hours    5. Calcium 500 mg - Vitamin D 200 Units:  1  tablet(s)  Oral  2 Times a Day    6. cefTRIAXone 1 gram/ Dextrose 5% IVPB Premixed Soln 50 mL:  50  mL  IntraVenous Piggyback  Every 24 Hours    7. dilTIAZem (CARDIZEM CD) Extended " Release (24 hour):  240  mg  Oral  Every 24 Hours    8. Furosemide Injectable:  40  mg  IntraVenous Push  2 Times a Day    9. Gabapentin:  300  mg  Oral  2 Times a Day    10. Glimepiride:  2  mg  Oral  Daily    11. Insulin Glargine (Lantus) Injectable:  10  unit(s)  SubCutaneous  At Bedtime    12. Insulin Lispro Moderate Corrective Scale:  unit(s)  SubCutaneous  3 Times a Day Before Meals    13. Losartan:  12.5  mg  Oral  Daily    14. predniSONE:  50  mg  Oral  Every 24 Hours    15. Topiramate:  100  mg  Oral  2 Times a Day         PRN Medications       --------------------------------    1. Acetaminophen:  650  mg  Oral  Every 4 Hours    2. Acetaminophen:  650  mg  Oral  Every 4 Hours    3. Albuterol 2.5 mg/ 3 mL Nebulizer Soln:  3  mL  Inhalation  Every 2 Hours    4. Dextromethorphan - guaiFENesin Oral Liquid:  5  mL  Oral  Every 4 Hours    5. Glucagon Injectable:  1  mg  IntraMuscular  Every 15 Minutes    6. guaiFENesin Extended Release:  600  mg  Oral  Every 12 Hours    7. LORazepam:  0.5  mg  Oral  Every 6 Hours    8. Magnesium Hydroxide -Al Hydrox -Simethicone Oral Liquid:  30  mL  Oral  Every 6 Hours    9. Magnesium Hydroxide Oral Liquid:  30  mL  Oral  Every 24 Hours    10. Ondansetron Injectable:  4  mg  IntraVenous Push  Every 4 Hours    11. Sodium Chloride 0.9% Injectable Flush:  10  mL  IntraVenous Flush  Every 8 Hours and as Needed    12. Sore Throat Lozenge:  1  lozenge(s)  Oral  Every 2 Hours    13. traZODone:  25  mg  Oral  At Bedtime         Conditional Medication Orders       --------------------------------    1. Perflutren Lipid Microsphere (Activated) 1.3 mL / NaCL 0.9% T.V. 10 mL Injectable:  0.5  mL  IntraVenous Push  Once         Currently Suspended Medications       --------------------------------    1. Furosemide:  40  mg  Oral  Daily    2. metFORMIN (GLUCOPHAGE):  1000  mg  Oral  2 Times a Day With Meals      Recent Lab Results:    Results:    CBC: 9/24/2023 06:03              \     Hgb      /                              \     9.0 L    /  WBC  ----------------  Plt               8.3       ----------------    257              /     Hct     \                              /     31.6 L    \            RBC: 2.61 L    MCV: 121 H          BMP: 9/24/2023 06:03  NA+        Cl-     BUN  /                         137    104    28 H /  --------------------------------  Glucose                ---------------------------  275 H    K+     HCO3-   Creat \                         4.2  24    1.00  \  Calcium : 8.6     Anion Gap : 13      Coagulation: 9/24/2023 06:03  PT  /                    53.9 H /  -------<    INR          ----------<      4.7 H  PTT\                              \                       Assessment and Plan:   Comorbidities:  ·  Comorbidity anemia, obesity   ·  Anemia iron deficiency anemia   ·  Obesity morbid obesity (BMI 40+)   ·  BMI 50.26     Code Status:  ·  Code Status Full Code     Advance Care Planning:  Advance Care Planning: Patient didn't wish to or  was unable to provide advance care plan     Assessment:    Frannie Blanco is a 74 yo female with a PMH of  DM 2, Chronic Afib (Warfarin), HLD, Obesity, COPD w/ chronic respiratory failure, Anemia, Lumbar spondylosis who  presented to the ED with progressive shortness of breath.     #Acute on chronic respiratory failure 2/2 COPD exacerbation, Pneumonia, HF exacerb  -Leukocytosis 16.5 >8.2> 8.3   -Abx: Ceftriaxone, azithro  -BD PRN  -c/w home inhalers  -Currently on 6L via NC  -home O2: 2L chronically  -follows w/ pulm    #Acute on chronic diastolic heart failure (HFpEF) w/ medication noncompliance.   #Chronic Afib (Warfarin)  #HLD  -PT has not been taking her diuretic @ home  -Supratherapuetic INR (4.7) recheck in AM, hold dose for tonight  -IV Lasix BID  -Strict I&O  -c/w home ASA, Atorvastatin, Losartan.   -Cardiology consulted, appreciate recs     #Anemia, macrocytic/hypochromic  -H/H 9.2/31.4 > 9.0/31.6  -resume home  Iron    #Mucous pemphigoid  -resume home dapsone    PPx:   -DVT: Warfarin (held)   -GI: PPI      F: PRN  E: Replete per protocol  N:  CArdiac   A: PIV  Disposition: Patient requires inpatient mgmt at this time.   Code Status: Full Code     *Pt to follow up dr. Hoffman at discharge, requesting POC oxygen concentrator.         Attestation:   Note Completion:  I am a:  Scribe   Scribing on behalf of (Provider name) Terra Young   Scribe Only - Scribe Attestation I am scribing for, and in the presence of the provider.    Scribed Provider Only - Attestation to Scribe The documentation recorded by the individual acting as Scribe, in my presence, accurately and completely reflects the service(s) I personally performed and  the decisions made by me.          Electronic Signatures:  Alison Aaron (PAC)  (Entered 24-Sep-2023 11:12)   Entered: Service, Subjective Data, Objective Data, Assessment  and Plan, Note Completion  Terra Young)  (Signed 24-Sep-2023 18:24)   Entered: Note Completion   Authored: Service, Subjective Data, Objective Data, Assessment and Plan, Note Completion      Last Updated: 24-Sep-2023 18:24 by Terra Young)

## 2023-09-30 NOTE — PROGRESS NOTES
Service: Medicine     Subjective Data:   PATRICIA CHANG is a 73 year old Female who is Hospital Day # 4.     Pt sitting up in bed eating breakfast. Her shortness of breath doesn't feel any better or worse. She is reporting a cough that is nonproductive.    Denies fever, chills, h/a, vision or hearing changes, runny nose, sore throat, c/p, palpitations, abd pain, n/v/d/c, dysuria, hematuria, numbness or tingling.    Objective Data:     Objective Information:      T   P  R  BP   MAP  SpO2   Value  36.6  102  18  160/85      90%  Date/Time 9/25 6:54 9/25 6:54 9/25 6:54 9/25 6:54    9/25 6:54  Range  (36.5C - 36.8C )  (68 - 106 )  (16 - 18 )  (100 - 166 )/ (60 - 87 )    (90% - 92% )   As of 25-Sep-2023 06:54:00, patient is on 5 L/min of oxygen via nasal cannula with humidification.      Pain reported at 9/25 8:55: 0 = None    ---- Intake and Output  -----  Mn/Dy/Year Time  Intake   Output  Net  Sep 25, 2023 6:00 am  0   600  -600  Sep 24, 2023 10:00 pm  0   1100  -1100  Sep 24, 2023 2:00 pm  720   2400  -1680    The Intake and Output Totals for the last 24 hours are:      Intake   Output  Net      720   4100  -1660    Physical Exam by System:    Constitutional: Well developed, awake/alert/oriented  x3, no distress, alert and cooperative   Eyes: Clear sclera   ENMT: mucous membranes moist, no apparent injury,  no lesions seen   Head/Neck: Neck supple, no apparent injury, trachea  midline   Respiratory/Thorax: Patent airways, breath sounds  diminished bilaterally. On 6L NC   Cardiovascular: Regular rate and rhythm. No murmurs,  gallops, or rubs   Gastrointestinal: Soft, non-tender, non-distended.  +BS   Musculoskeletal: No joint swelling, normal strength   Extremities: Dependent edema bilaterally   Neurological: alert and oriented x3, grossly intact  senses   Lymphatic: No significant lymphadenopathy   Psychological: Appropriate mood and behavior   Skin: Warm and dry, no lesions, no rashes     Recent Lab  Results:    Results:    CBC: 9/25/2023 05:18              \     Hgb     /                              \     9.2 L    /  WBC  ----------------  Plt               9.5       ----------------    260              /     Hct     \                              /     31.1 L    \            RBC: 2.69 L    MCV: 116 H          BMP: 9/25/2023 05:18  NA+        Cl-     BUN  /                         138    104    34 H /  --------------------------------  Glucose                ---------------------------  236 H    K+     HCO3-   Creat \                         4.0  25    1.15 H \  Calcium : 8.5 L    Anion Gap : 13      Coagulation: 9/25/2023 05:18  PT  /                    26.0 H /  -------<    INR          ----------<      2.3 H  PTT\                              \                       Assessment and Plan:   Comorbidities:  ·  Comorbidity anemia, obesity   ·  Anemia iron deficiency anemia   ·  Obesity morbid obesity (BMI 40+)   ·  BMI 50.26     Code Status:  ·  Code Status Full Code     Assessment:    Frannie Blanco is a 74 yo female with a PMH of  DM 2, Chronic Afib (Warfarin), HLD, Obesity, COPD w/ chronic respiratory failure, Anemia, Lumbar spondylosis who  presented to the ED with progressive shortness of breath.     #Acute on chronic respiratory failure 2/2 COPD exacerbation, Pneumonia, HF exacerb  -Leukocytosis 16.5 >8.2> 8.3> 9.5  -Abx: Ceftriaxone, azithro (Day 2)  -BD PRN  -c/w home inhalers  -Currently on 6L via NC  -home O2: 2L chronically  -follows w/ pulm    #Acute on chronic diastolic heart failure (HFpEF) w/ medication noncompliance.   #Chronic Afib (Warfarin)  #HLD  -PT has not been taking her diuretic @ home  -Supratherapuetic INR (4.7) recheck in AM, hold dose for tonight  -INR 2.3; resume warfarin today  -IV Lasix BID  -Strict I&O  -c/w home ASA, Atorvastatin, Losartan.   -Cardiology consulted, appreciate recs     #Anemia, macrocytic/hypochromic  -H/H 9.2/31.4 > 9.0/31.6 > 9.2/31.1  -resume home  Iron    #Mucous pemphigoid  -resume home dapsone    PPx:   -DVT: Warfarin  -GI: PPI    F: PRN  E: Replete per protocol  N:  Cardiac   A: PIV    Disposition: Patient requires inpatient mgmt at this time.   Code Status: Full Code     Total accumulated time spent face to face and not face to face preparing to see the patient, obtaining and reviewing separately obtained history; performing a medically appropriate examination and/or evaluation; counseling and educating the patient, family;  ordering medications, tests, or procedures; referring and communicating with other health care professionals; documenting clinical information in the patient's medical record; independently interpreting results and communicating the results to the patient,  family; and care coordination was 30 minutes.      *Pt to follow up dr. Hoffman at discharge, requesting POC oxygen concentrator.           Electronic Signatures:  Daija Lea (APRN-CNP)  (Signed 25-Sep-2023 10:01)   Authored: Service, Subjective Data, Objective Data, Assessment  and Plan, Note Completion      Last Updated: 25-Sep-2023 10:01 by Daija Lea (APRN-CNP)

## 2023-10-02 ENCOUNTER — APPOINTMENT (OUTPATIENT)
Dept: ENDOCRINOLOGY | Facility: CLINIC | Age: 73
End: 2023-10-02
Payer: MEDICARE

## 2023-10-03 ENCOUNTER — ANTICOAGULATION - WARFARIN VISIT (OUTPATIENT)
Dept: CARDIOLOGY | Facility: CLINIC | Age: 73
End: 2023-10-03
Payer: MEDICARE

## 2023-10-03 LAB
ATRIAL RATE: 110 BPM
INR IN PPP BY COAGULATION ASSAY EXTERNAL: 2.9
PROTHROMBIN TIME (PT) IN PPP BY COAGULATION ASSAY EXTERNAL: NORMAL SECONDS
Q ONSET: 219 MS
QRS COUNT: 18 BEATS
QRS DURATION: 92 MS
QT INTERVAL: 326 MS
QTC CALCULATION(BAZETT): 441 MS
QTC FREDERICIA: 399 MS
R AXIS: -19 DEGREES
T AXIS: 53 DEGREES
T OFFSET: 382 MS
VENTRICULAR RATE: 110 BPM

## 2023-10-03 NOTE — PROGRESS NOTES
Patient identification verified with 2 identifiers.    Location: [facility] Patient Self-Testing Program [phone]    Referring Physician: Siobhan TONY  Enrollment/ Re-enrollment date: 6/15/23 Paper renewal order sent via fax to Siobhan TONY on 10/3  INR Goal: 2.0-3.0  INR monitoring is per [facility] protocol.  Anticoagulation Medication: warfarin  Indication: PE    Subjective   Bleeding signs/symptoms: No    Bruising: No   Major bleeding event: No  Thrombosis signs/symptoms: No  Thromboembolic event: No  Missed doses: No  Extra doses: No  Medication changes: No  Dietary changes: No  Change in health: No  Change in activity: No  Alcohol: No  Other concerns: No    Upcoming Surgeries:  Does the Patient Have any upcoming surgeries that require interruption in anticoagulation therapy? no  Does the patient require bridging? no      Anticoagulation Summary  As of 10/3/2023      INR goal:  2.0-3.0   TTR:  --   INR used for dosin.90 (10/3/2023)   Weekly warfarin total:  22.5 mg               Assessment/Plan   Therapeutic     1. New dose: no change    2. Next INR: 2 weeks    Received faxed INR and called patient. Verified by name and . Patient stated correct dose was taken, and denies changes or complications. She correctly read back dosing instructions.       Education provided to patient during the visit:  Patient instructed to call in interim with questions, concerns and changes.

## 2023-10-06 ENCOUNTER — ANTICOAGULATION - WARFARIN VISIT (OUTPATIENT)
Dept: CARDIOLOGY | Facility: CLINIC | Age: 73
End: 2023-10-06
Payer: MEDICARE

## 2023-10-09 ENCOUNTER — TELEPHONE (OUTPATIENT)
Dept: ENDOCRINOLOGY | Facility: CLINIC | Age: 73
End: 2023-10-09
Payer: MEDICARE

## 2023-10-09 NOTE — TELEPHONE ENCOUNTER
Frannie Blanco   1950   [id number]   [phone]       Called patient and left voice message in regards to canceling appt on 10/26 and reschedule due to provider is on PTO. Patient was informed to call office to schedule appt.

## 2023-10-17 ENCOUNTER — ANTICOAGULATION - WARFARIN VISIT (OUTPATIENT)
Dept: CARDIOLOGY | Facility: CLINIC | Age: 73
End: 2023-10-17
Payer: MEDICARE

## 2023-10-17 LAB
INR IN PPP BY COAGULATION ASSAY EXTERNAL: 3.1
PROTHROMBIN TIME (PT) IN PPP BY COAGULATION ASSAY EXTERNAL: NORMAL SECONDS

## 2023-10-17 NOTE — PROGRESS NOTES
Patient identification verified with 2 identifiers.    Location: [facility] - [address] [place] [id number] [phone]     Referring Physician: Siobhan TONY  Enrollment/ Re-enrollment date: 9/21/24   INR Goal: 2.0-3.0  INR monitoring is per [facility] protocol.  Anticoagulation Medication: warfarin  Indication: PE    Subjective   Bleeding signs/symptoms: No    Bruising: No   Major bleeding event: No  Thrombosis signs/symptoms: No  Thromboembolic event: No  Missed doses: No  Extra doses: No  Medication changes: No  Dietary changes: Yes  Diet has been abnormal since being in SNF  Change in health: No  Change in activity: No  Alcohol: No  Other concerns: No    Upcoming Surgeries:  Does the Patient Have any upcoming surgeries that require interruption in anticoagulation therapy? no  Does the patient require bridging? no      Anticoagulation Summary  As of 10/17/2023      INR goal:  2.0-3.0   TTR:  12.5 % (1.1 wk)   INR used for dosing:  3.10 (10/17/2023)   Weekly warfarin total:  22.5 mg               Assessment/Plan   Supratherapeutic     Patient states she has been in SNF for 3 weeks, and is being discharged today. Patient states diet has been inconsistent, and she is not sure how much warfarin she has been getting. Instructed patient to resume previously ordered dose and retest in one week. Pt had previously been therapeutic on current dose.     1. New dose: no change    2. Next INR: 1 week      Education provided to patient during the visit:  Patient instructed to call in interim with questions, concerns and changes.   Patient educated on interactions between medications and warfarin.   Patient educated on dietary consistency in vitamin k consumption.   Patient educated on affects of alcohol consumption while taking warfarin.   Patient educated on signs of bleeding/clotting.   Patient educated on compliance with dosing, follow up appointments, and prescribed plan of care.

## 2023-10-18 ENCOUNTER — DOCUMENTATION (OUTPATIENT)
Dept: PRIMARY CARE | Facility: CLINIC | Age: 73
End: 2023-10-18
Payer: MEDICARE

## 2023-10-18 ENCOUNTER — PATIENT OUTREACH (OUTPATIENT)
Dept: PRIMARY CARE | Facility: CLINIC | Age: 73
End: 2023-10-18
Payer: MEDICARE

## 2023-10-18 NOTE — PROGRESS NOTES
Discharge Facility:[facility]  Discharge Diagnosis:COPD Exacerbation  Admission Date:9/22/23  Discharge Date: 9/27/23    PCP Appointment Date:10/23/23  Specialist Appointment Date:   Hospital Encounter and Summary: Linked  2 attempts was made to outreach patient, no contact as of this note.

## 2023-10-24 ENCOUNTER — ANTICOAGULATION - WARFARIN VISIT (OUTPATIENT)
Dept: CARDIOLOGY | Facility: CLINIC | Age: 73
End: 2023-10-24
Payer: MEDICARE

## 2023-10-24 DIAGNOSIS — E11.65 TYPE 2 DIABETES MELLITUS WITH HYPERGLYCEMIA, UNSPECIFIED WHETHER LONG TERM INSULIN USE (MULTI): Primary | ICD-10-CM

## 2023-10-24 LAB
INR IN PPP BY COAGULATION ASSAY EXTERNAL: 3.7
PROTHROMBIN TIME (PT) IN PPP BY COAGULATION ASSAY EXTERNAL: NORMAL SECONDS

## 2023-10-24 RX ORDER — METFORMIN HYDROCHLORIDE 1000 MG/1
TABLET ORAL
Qty: 180 TABLET | Refills: 3 | Status: SHIPPED | OUTPATIENT
Start: 2023-10-24 | End: 2024-03-11 | Stop reason: SDUPTHER

## 2023-10-24 NOTE — PROGRESS NOTES
Patient identification verified with 2 identifiers.    Location: [facility] Patient Self-Testing Program [phone]    Referring Physician: JENNIFER COCHRAN PAC  Enrollment/ Re-enrollment date: 9/21/2024   INR Goal: 2.0-3.0  INR monitoring is per [facility] protocol.  Anticoagulation Medication: warfarin  Indication: Pulmonary Embolism (PE)    Subjective   Bleeding signs/symptoms: No    Bruising: No   Major bleeding event: No  Thrombosis signs/symptoms: No  Thromboembolic event: No  Missed doses: No  Extra doses: No  Medication changes: No  Dietary changes: Yes  has had zero vitamin k this week  Change in health: No  Change in activity: No  Alcohol: No  Other concerns: PT WILL EAT USUAL AMOUNT OF VIT K THIS WEEK.    Upcoming Surgeries:  Does the Patient Have any upcoming surgeries that require interruption in anticoagulation therapy? no  Does the patient require bridging? no      Anticoagulation Summary  As of 10/24/2023      INR goal:  2.0-3.0   TTR:  6.7 % (2.1 wk)   INR used for dosing:  3.70 (10/24/2023)   Weekly warfarin total:  22.5 mg               Assessment/Plan   Supratherapeutic     1. New dose:  PT WILL HOLD TODAY'S DOSE     2. Next INR: 1 week      Education provided to patient during the visit:  Patient instructed to call in interim with questions, concerns and changes.   Patient educated on interactions between medications and warfarin.   Patient educated on dietary consistency in vitamin k consumption.   Patient educated on affects of alcohol consumption while taking warfarin.   Patient educated on signs of bleeding/clotting.   Patient educated on compliance with dosing, follow up appointments, and prescribed plan of care.

## 2023-10-26 ENCOUNTER — APPOINTMENT (OUTPATIENT)
Dept: ENDOCRINOLOGY | Facility: CLINIC | Age: 73
End: 2023-10-26
Payer: MEDICARE

## 2023-10-30 DIAGNOSIS — J44.9 CHRONIC OBSTRUCTIVE PULMONARY DISEASE, UNSPECIFIED COPD TYPE (MULTI): Primary | ICD-10-CM

## 2023-10-30 RX ORDER — TIOTROPIUM BROMIDE 18 UG/1
1 CAPSULE ORAL; RESPIRATORY (INHALATION)
Qty: 30 CAPSULE | Refills: 11 | Status: ON HOLD | OUTPATIENT
Start: 2023-10-30 | End: 2024-01-03 | Stop reason: WASHOUT

## 2023-10-31 ENCOUNTER — ANTICOAGULATION - WARFARIN VISIT (OUTPATIENT)
Dept: CARDIOLOGY | Facility: CLINIC | Age: 73
End: 2023-10-31

## 2023-10-31 ENCOUNTER — TELEPHONE (OUTPATIENT)
Dept: CARDIOLOGY | Facility: CLINIC | Age: 73
End: 2023-10-31

## 2023-10-31 ENCOUNTER — TELEMEDICINE (OUTPATIENT)
Dept: DERMATOLOGY | Facility: CLINIC | Age: 73
End: 2023-10-31
Payer: MEDICARE

## 2023-10-31 DIAGNOSIS — L12.1 MUCOUS MEMBRANE PEMPHIGOID (MULTI): Primary | ICD-10-CM

## 2023-10-31 LAB
INR IN PPP BY COAGULATION ASSAY EXTERNAL: 2.9
PROTHROMBIN TIME (PT) IN PPP BY COAGULATION ASSAY EXTERNAL: NORMAL SECONDS

## 2023-10-31 PROCEDURE — 99214 OFFICE O/P EST MOD 30 MIN: CPT | Performed by: DERMATOLOGY

## 2023-10-31 NOTE — PROGRESS NOTES
Patient identification verified with 2 identifiers.    Location: [facility] Patient Self-Testing Program [phone]    Referring Physician: JENNIFER COCHRAN PA-C  Enrollment/ Re-enrollment date: 24   INR Goal: 2.0-3.0  INR monitoring is per [facility] protocol.  Anticoagulation Medication: warfarin  Indication: Pulmonary Embolism (PE)    Subjective   Bleeding signs/symptoms: No    Bruising: No   Major bleeding event: No  Thrombosis signs/symptoms: No  Thromboembolic event: No  Missed doses: No  Extra doses: No  Medication changes: No  Dietary changes: No  Change in health: No  Change in activity: No  Alcohol: No  Other concerns: No    Upcoming Surgeries:  Does the Patient Have any upcoming surgeries that require interruption in anticoagulation therapy? no  Does the patient require bridging? no      Anticoagulation Summary  As of 10/31/2023      INR goal:  2.0-3.0   TTR:  8.5 % (3.1 wk)   INR used for dosin.90 (10/31/2023)   Weekly warfarin total:  22.5 mg               Assessment/Plan   Therapeutic     1. New dose: no change    2. Next INR: 1 week      Education provided to patient during the visit:  Patient instructed to call in interim with questions, concerns and changes.

## 2023-10-31 NOTE — PROGRESS NOTES
Subjective     Frannie Blanco is a 73 y.o. female who presents for the following: No chief complaint on file..     Patient presents for follow up of her mucous membrane pemphigoid. The patient's last clinic visit was 8/2023. Patient states that her condition continues to flare, with frequent new lesions in her mouth. She states that the dental trays with triamcinolone ointment are helpful, but do not fully improve the condition. She has not noticed much improvement since increasing to 125mg of Dapsone.    Notably, patient had recent hospital admission for COPD exacerbation with pneumonia, and CHF exacerbation. She reports that her Dapsone was continued throughout her admission so there was no lapse in her medication.    Review of Systems:  No other skin or systemic complaints other than what is documented elsewhere in the note.    The following portions of the chart were reviewed this encounter and updated as appropriate:         Skin Cancer History  No skin cancer on file.      Specialty Problems          Dermatology Problems    Benign mole    Ocular pemphigoid    Pre-ulcerative corn or callous    Skin fissures    Skin lesion    Hemangioma of skin and subcutaneous tissue        Objective   Well appearing patient in no apparent distress; mood and affect are within normal limits.    A full examination was performed including scalp, head, eyes, ears, nose, lips, neck, chest, axillae, abdomen, back, buttocks, bilateral upper extremities, bilateral lower extremities, hands, feet, fingers, toes, fingernails, and toenails. All findings within normal limits unless otherwise noted below.    Assessment/Plan   1. Mucous membrane pemphigoid (2)  Left Mandibular Gingiva, Right Labial Mucosa of the Lower Lip  Oral erosions present    Mucous Membrane Pemphigoid- Flaring  - The autoimmune etiology of this diagnosis and treatment options were extensively discussed with the patient. Diagnosis is confirmed with H&E (subepi blister  with eos) and DIF (linear IgG and C3 at BM)   - Currently on Dapsone 125 mg daily and using dental trays with triamcinolone ointment  - Unable to increase dose of dapsone at this time given her hemoglobin of 9.5. No appreciable difference in hemoglobin between when she was on 100 mg and now on 125 mg.  - Continue Dapsone 125 mg daily  - Continue use of dental trays with Triamcinolone ointment   - Continue l4ogcaar CBC and CMP monitoring.  - RTC 3 Months

## 2023-11-01 ENCOUNTER — PATIENT OUTREACH (OUTPATIENT)
Dept: PRIMARY CARE | Facility: CLINIC | Age: 73
End: 2023-11-01
Payer: MEDICARE

## 2023-11-03 DIAGNOSIS — J44.9 CHRONIC OBSTRUCTIVE PULMONARY DISEASE, UNSPECIFIED COPD TYPE (MULTI): Primary | ICD-10-CM

## 2023-11-06 RX ORDER — UMECLIDINIUM 62.5 UG/1
1 AEROSOL, POWDER ORAL DAILY
Qty: 1 EACH | Refills: 5 | Status: SHIPPED | OUTPATIENT
Start: 2023-11-06 | End: 2024-11-05

## 2023-11-06 NOTE — PROGRESS NOTES
Dr. Nahum Méndez out on leave. Covering current provider, Okay for alternative inhaler covered by insurance.     Thanks,  Gill Bolton, CNP

## 2023-11-07 ENCOUNTER — ANTICOAGULATION - WARFARIN VISIT (OUTPATIENT)
Dept: CARDIOLOGY | Facility: CLINIC | Age: 73
End: 2023-11-07
Payer: MEDICARE

## 2023-11-07 NOTE — PROGRESS NOTES
Patient identification verified with 2 identifiers.    Location: [facility] Patient Self-Testing Program [phone]    Referring Physician:JENNIFER COCHRAN PA-C  Enrollment/ Re-enrollment date: 9/21/24   INR Goal: 2.0-3.0  INR monitoring is per [facility] protocol.  Anticoagulation Medication: warfarin  Indication: Pulmonary Embolism (PE)    Subjective   Bleeding signs/symptoms: No    Bruising: No   Major bleeding event: No  Thrombosis signs/symptoms: No  Thromboembolic event: No  Missed doses: No  Extra doses: No  Medication changes: No  Dietary changes: No  Change in health: No  Change in activity: No  Alcohol: No  Other concerns: No    Upcoming Surgeries:  Does the Patient Have any upcoming surgeries that require interruption in anticoagulation therapy? no  Does the patient require bridging? no      Anticoagulation Summary  As of 11/7/2023      INR goal:  2.0-3.0   TTR:  9.5 % (4.1 wk)   INR used for dosing:  3.70 (11/7/2023)   Weekly warfarin total:  20 mg               Assessment/Plan   Supratherapeutic     1. New dose:  HOLD today's warfarin dose and decrease TWD     2. Next INR: 1 week      Education provided to patient during the visit:  Patient instructed to call in interim with questions, concerns and changes.

## 2023-11-14 ENCOUNTER — ANTICOAGULATION - WARFARIN VISIT (OUTPATIENT)
Dept: CARDIOLOGY | Facility: CLINIC | Age: 73
End: 2023-11-14

## 2023-11-14 ENCOUNTER — TELEMEDICINE (OUTPATIENT)
Dept: PULMONOLOGY | Facility: CLINIC | Age: 73
End: 2023-11-14
Payer: MEDICARE

## 2023-11-14 DIAGNOSIS — R06.02 SOB (SHORTNESS OF BREATH) ON EXERTION: ICD-10-CM

## 2023-11-14 DIAGNOSIS — J44.9 CHRONIC OBSTRUCTIVE PULMONARY DISEASE, UNSPECIFIED COPD TYPE (MULTI): Primary | ICD-10-CM

## 2023-11-14 DIAGNOSIS — R09.02 HYPOXIA: ICD-10-CM

## 2023-11-14 DIAGNOSIS — M51.9 LUMBAR DISC DISEASE: Primary | ICD-10-CM

## 2023-11-14 LAB
INR IN PPP BY COAGULATION ASSAY EXTERNAL: 2.8
PROTHROMBIN TIME (PT) IN PPP BY COAGULATION ASSAY EXTERNAL: NORMAL SECONDS

## 2023-11-14 PROCEDURE — 99215 OFFICE O/P EST HI 40 MIN: CPT | Performed by: PEDIATRICS

## 2023-11-14 RX ORDER — TOPIRAMATE 100 MG/1
100 TABLET, FILM COATED ORAL 2 TIMES DAILY
Qty: 180 TABLET | Refills: 0 | Status: SHIPPED | OUTPATIENT
Start: 2023-11-14

## 2023-11-14 NOTE — PROGRESS NOTES
"Subjective   Patient ID: Frannie Blanco is a 73 y.o. female who presents for COPD hypoxia    HPI  She has requested changing to a virtual appointment.  The video for the appt worked but the audio did not so we converted to a phone visit.   Ms Blanco is having significant issues with lower extremity edema and due to that is not able to move around much at all.   She has increased her lasix dose and the edema is getting a little better.  She has an appointment with cardiology in the next week or so.  She feels her breathing is  Worse but still better than when she was admitted.  She is using oxygen continuously.  We have ordered a POC through [employer] but that has not happened as yet.  She was ordered incruse and spiriva but those were too costly so she is just using albuterol.      Previous note 8/23/2023:  Ms Blanco feels about the same, however when walking into the office her saturation was 83% in room air. with sitting she remained at 86%. we titrated oxygen for her and she needed 3lpm to maintain saturation (93%) she occasionally uses albuterol and does not feel the need for using it any more.    previous note 2/22/2023: Ms Blanco is doing well. SHe is still using oxygen at night. She has albuterol but only needs it rarely (usually when she has a \"cold\")   She ahd a PFT which shows moderate obstruction with bronchodilator response.  She ahd a repeat CT which shows resolution of previous pneumonia and a few tiny nodules that are unchanged.. There is mention of enlarged PA which could represent pulmonary hypertension however there is an echo done May 2022 which is normal.      previous note 12/28/2022: Ms Blanco is a 72yr old that was admitted to [facility] in November with MRSA pneumonia, CHF and A Fib. She feel she is now recovered. She was needing oxygen during the day but has weaned off that. She does use oxygen at night which is not new. She uses albuterol but only when she \"gets a cold\".   She apparently had a " sleep study a few years ago and that found no EULALIA but hypoxia     I reviewed the CT report from 10/31/2022 which reports patchy infiltrates consistent with pneumonia and several <8mm nodules.     She is a current smoker 1ppd for 50 years (she is down to a few cigarettes a day now     Review of Systems    See scanned documents attached to this note for review of systems, and appropriate scales/scores for this visit.       Assessment/Plan     73yr old with obesity, OSHEA and nocturnal hypoxia      1. pneumonia, COPD/SOB:  better but now with significant edema/CHF slightly better with increased lasix dose.  - keep appt with cardiology  - continue albuterol as needed  - rx for trelegy, instructed to call insurance if that is not covered to see what inhaler might be.  - would benefit from pulmonary rehab once cardiac issues are optimized      2. lung nodules:  - repeat CT stable nodules  - f/u CT in 1 year Speenber 2024    3. hypoxia: wears at night  - order for continuous with POC sent to [facility]    f/u 2-3months

## 2023-11-14 NOTE — PROGRESS NOTES
Patient identification verified with 2 identifiers.    Location: [facility] Patient Self-Testing Program [phone]    Referring Physician: JENNIFER COCHRAN  Enrollment/ Re-enrollment date: 24   INR Goal: 2.0-3.0  INR monitoring is per [facility] protocol.  Anticoagulation Medication: warfarin  Indication: Pulmonary Embolism (PE)    Subjective   Bleeding signs/symptoms: No    Bruising: No   Major bleeding event: No  Thrombosis signs/symptoms: No  Thromboembolic event: No  Missed doses: No  Extra doses: No  Medication changes: No  Dietary changes: No  Change in health: No  Change in activity: No  Alcohol: No  Other concerns: No    Upcoming Surgeries:  Does the Patient Have any upcoming surgeries that require interruption in anticoagulation therapy? no  Does the patient require bridging? no      Anticoagulation Summary  As of 2023      INR goal:  2.0-3.0   TTR:  12.0 % (1.2 mo)   INR used for dosin.80 (2023)   Weekly warfarin total:  20 mg               Assessment/Plan   Therapeutic     1. New dose: no change    2. Next INR: 1 week      Education provided to patient during the visit:  Patient instructed to call in interim with questions, concerns and changes.

## 2023-11-16 ENCOUNTER — PATIENT OUTREACH (OUTPATIENT)
Dept: PRIMARY CARE | Facility: CLINIC | Age: 73
End: 2023-11-16
Payer: MEDICARE

## 2023-11-18 DIAGNOSIS — E11.65 TYPE 2 DIABETES MELLITUS WITH HYPERGLYCEMIA, UNSPECIFIED WHETHER LONG TERM INSULIN USE (MULTI): Primary | ICD-10-CM

## 2023-11-18 RX ORDER — GLIMEPIRIDE 2 MG/1
TABLET ORAL
Qty: 90 TABLET | Refills: 3 | Status: SHIPPED | OUTPATIENT
Start: 2023-11-18 | End: 2024-11-17

## 2023-11-21 ENCOUNTER — ANTICOAGULATION - WARFARIN VISIT (OUTPATIENT)
Dept: CARDIOLOGY | Facility: CLINIC | Age: 73
End: 2023-11-21
Payer: MEDICARE

## 2023-11-21 LAB
INR IN PPP BY COAGULATION ASSAY EXTERNAL: 3.9
PROTHROMBIN TIME (PT) IN PPP BY COAGULATION ASSAY EXTERNAL: NORMAL SECONDS

## 2023-11-21 NOTE — PROGRESS NOTES
Patient identification verified with 2 identifiers.    Location: [facility] Patient Self-Testing Program [phone]    Referring Physician: Siobhan Palafox  Enrollment/ Re-enrollment date: 9/21/24   INR Goal: 2.0-3.0  INR monitoring is per [facility] protocol.  Anticoagulation Medication: warfarin  Indication: Pulmonary Embolism (PE)    Subjective   Bleeding signs/symptoms: No    Bruising: No   Major bleeding event: No  Thrombosis signs/symptoms: No  Thromboembolic event: No  Missed doses: No  Extra doses: No  Medication changes: No  Dietary changes: Yes  pt has been eating more grapefruit this past week  Change in health: No  Change in activity: No  Alcohol: No  Other concerns: No    Upcoming Surgeries:  Does the Patient Have any upcoming surgeries that require interruption in anticoagulation therapy? no  Does the patient require bridging? no      Anticoagulation Summary  As of 11/21/2023      INR goal:  2.0-3.0   TTR:  13.0 % (1.4 mo)   INR used for dosing:  3.90 (11/21/2023)   Weekly warfarin total:  20 mg               Assessment/Plan   Supratherapeutic     1. New dose:  hold 1 dose.     2. Next INR:  3 days      Education provided to patient during the visit:  Patient instructed to call in interim with questions, concerns and changes.   Patient educated on compliance with dosing, follow up appointments, and prescribed plan of care.      3

## 2023-11-22 PROBLEM — I50.9 ACUTE ON CHRONIC CONGESTIVE HEART FAILURE (MULTI): Status: ACTIVE | Noted: 2023-11-22

## 2023-11-22 PROBLEM — I50.32 CHRONIC DIASTOLIC CONGESTIVE HEART FAILURE (MULTI): Status: ACTIVE | Noted: 2023-11-22

## 2023-11-22 PROBLEM — J96.20 ACUTE ON CHRONIC RESPIRATORY FAILURE (MULTI): Status: ACTIVE | Noted: 2023-11-22

## 2023-11-27 ENCOUNTER — LAB (OUTPATIENT)
Dept: LAB | Facility: LAB | Age: 73
End: 2023-11-27
Payer: MEDICARE

## 2023-11-27 ENCOUNTER — OFFICE VISIT (OUTPATIENT)
Dept: PRIMARY CARE | Facility: CLINIC | Age: 73
End: 2023-11-27
Payer: MEDICARE

## 2023-11-27 VITALS — HEART RATE: 92 BPM | OXYGEN SATURATION: 94 % | DIASTOLIC BLOOD PRESSURE: 80 MMHG | SYSTOLIC BLOOD PRESSURE: 130 MMHG

## 2023-11-27 DIAGNOSIS — I10 PRIMARY HYPERTENSION: ICD-10-CM

## 2023-11-27 DIAGNOSIS — I50.32 CHRONIC DIASTOLIC CONGESTIVE HEART FAILURE (MULTI): ICD-10-CM

## 2023-11-27 DIAGNOSIS — E08.00 DIABETES MELLITUS DUE TO UNDERLYING CONDITION WITH HYPEROSMOLARITY WITHOUT COMA, UNSPECIFIED WHETHER LONG TERM INSULIN USE (MULTI): ICD-10-CM

## 2023-11-27 DIAGNOSIS — J44.9 CHRONIC OBSTRUCTIVE PULMONARY DISEASE, UNSPECIFIED COPD TYPE (MULTI): ICD-10-CM

## 2023-11-27 DIAGNOSIS — R53.81 PHYSICAL DECONDITIONING: ICD-10-CM

## 2023-11-27 DIAGNOSIS — R79.9 ELEVATED BUN: ICD-10-CM

## 2023-11-27 DIAGNOSIS — E11.65 TYPE 2 DIABETES MELLITUS WITH HYPERGLYCEMIA, WITH LONG-TERM CURRENT USE OF INSULIN (MULTI): ICD-10-CM

## 2023-11-27 DIAGNOSIS — Z79.4 TYPE 2 DIABETES MELLITUS WITH HYPERGLYCEMIA, WITH LONG-TERM CURRENT USE OF INSULIN (MULTI): ICD-10-CM

## 2023-11-27 DIAGNOSIS — I50.9 ACUTE ON CHRONIC CONGESTIVE HEART FAILURE, UNSPECIFIED HEART FAILURE TYPE (MULTI): ICD-10-CM

## 2023-11-27 DIAGNOSIS — I48.91 ATRIAL FIBRILLATION WITH RAPID VENTRICULAR RESPONSE (MULTI): ICD-10-CM

## 2023-11-27 DIAGNOSIS — E78.2 MIXED HYPERLIPIDEMIA: ICD-10-CM

## 2023-11-27 DIAGNOSIS — I35.0 AORTIC VALVE STENOSIS, ETIOLOGY OF CARDIAC VALVE DISEASE UNSPECIFIED: ICD-10-CM

## 2023-11-27 LAB — POC HEMOGLOBIN A1C: 4.1 % (ref 4.2–6.5)

## 2023-11-27 PROCEDURE — 3079F DIAST BP 80-89 MM HG: CPT | Performed by: INTERNAL MEDICINE

## 2023-11-27 PROCEDURE — 3075F SYST BP GE 130 - 139MM HG: CPT | Performed by: INTERNAL MEDICINE

## 2023-11-27 PROCEDURE — 4010F ACE/ARB THERAPY RXD/TAKEN: CPT | Performed by: INTERNAL MEDICINE

## 2023-11-27 PROCEDURE — 1125F AMNT PAIN NOTED PAIN PRSNT: CPT | Performed by: INTERNAL MEDICINE

## 2023-11-27 PROCEDURE — 99214 OFFICE O/P EST MOD 30 MIN: CPT | Performed by: INTERNAL MEDICINE

## 2023-11-27 PROCEDURE — 1159F MED LIST DOCD IN RCRD: CPT | Performed by: INTERNAL MEDICINE

## 2023-11-27 PROCEDURE — 3044F HG A1C LEVEL LT 7.0%: CPT | Performed by: INTERNAL MEDICINE

## 2023-11-27 PROCEDURE — 1160F RVW MEDS BY RX/DR IN RCRD: CPT | Performed by: INTERNAL MEDICINE

## 2023-11-27 PROCEDURE — 83036 HEMOGLOBIN GLYCOSYLATED A1C: CPT | Performed by: INTERNAL MEDICINE

## 2023-11-27 ASSESSMENT — PATIENT HEALTH QUESTIONNAIRE - PHQ9
2. FEELING DOWN, DEPRESSED OR HOPELESS: NOT AT ALL
SUM OF ALL RESPONSES TO PHQ9 QUESTIONS 1 AND 2: 0
1. LITTLE INTEREST OR PLEASURE IN DOING THINGS: NOT AT ALL

## 2023-11-27 NOTE — PROGRESS NOTES
Follow up from nursing home discharge, was at [facility].  Had Covid right before discharge from nursing home- got home three weeks ago  Having leg issues, retaining water, could barely walk, is now taking water pills so the edema went down. Was seeing a NP but was not seen because she was late to her appt.  She is taking Lasix 60mg in am and 40 in afternoon on her own.  She quit smoking in September.  Hx of breast ca- had mammogram in July  Due for Colonoscopy.  Using 5 liters of 02 continuous.  Cat scratched her when jumping , when the cat jumps on her she leaks a clear fluid.  Sleeps elevated  Advise low salt diet, no canned foods or fast foods.  Physical deconditioning,- start therapy, may be related to covid or fluid retention  Would like to order a new sleep study at her next visit- she was told her sleep study was negative in the past but her oxygen was dropping at night.  INR Friday was 3.2  C/w Lasix 60mg and 40 in pm - 1800ml fluid restriction.  Follow up in four months.

## 2023-11-28 ENCOUNTER — ANTICOAGULATION - WARFARIN VISIT (OUTPATIENT)
Dept: CARDIOLOGY | Facility: CLINIC | Age: 73
End: 2023-11-28
Payer: MEDICARE

## 2023-11-28 ASSESSMENT — ENCOUNTER SYMPTOMS: COUGH: 1

## 2023-11-28 NOTE — PATIENT INSTRUCTIONS
It was great to see you in the office today! Here is what we discussed at your visit today:  Please continue to take your current medications   Please get bloodwork drawn as soon as you are able. We will call you with results.  We have placed referral for physical therapy as discussed

## 2023-11-28 NOTE — PROGRESS NOTES
Patient ID: Follow up from nursing home discharge, was at [facility]. She had Covid right before discharge from nursing home- got home three weeks ago. She reports she has been having leg issues, retaining water, could barely walk, is now taking water pills so the edema has gonetdown. Was seeing a NP but was not seen for recent apt because she was late.  She reports she has been taking Lasix 60mg in am and 40 in afternoon on her own. She quit smoking in September. She reports Hx of breast ca- had mammogram in July. She reports she has been using 5 liters of 02 continuously.  She reports her cat scratched her when jumping , when the cat jumps on her she leaks a clear fluid. She sleeps with head of bed elevated.       HPI: Frannie Blanco is a 73 y.o. female with PMH remarkable for COPD, tobacco dependence, CHF, Afib, lung nodules who presents to the office today for Post Hospitalization and Establish Care.    HEALTH MAINTENANCE: Establish Care  Previous PCP: Siobhan Palafox PA-C  Smoking: Current Smoker, 1ppd x50 yrs and now a few cig per day  Mammogram (40-75): 2023  -- pt's sister passed away from breast cancer  Pap smear (21-65): 2022  Labs: BMP and CBC on 2023  HgbA1c 6.4 on 2023  Colonoscopy (45-75): -ve in , DUE  Lung cancer screening (55-80 + 30 pack year + smoking/quit in last 15 years):   DEXA (65+, q 2 years): this has been ordered multiple times but it does not appear that it has been done.  ECHO 2022 showed LVSF 60-65%, impaired relaxation, LA mild to moderately dilated, moderate MAC, plaque in the ascending aorta, peak gradient of 15.2 mmHg of aortic valve.    SOCIAL HISTORY:  Social History     Tobacco Use    Smoking status: Former     Types: Cigarettes     Quit date: 2023     Years since quittin.2    Smokeless tobacco: Never   Substance Use Topics    Alcohol use: Never    Drug use: Never     IMMUNIZATIONS:  Immunization History   Administered Date(s) Administered     Flu vaccine, quadrivalent, high-dose, preservative free, age 65y+ (FLUZONE) 10/05/2020    Influenza, High Dose Seasonal, Preservative Free 10/03/2022    Influenza, Unspecified 12/06/2011, 09/10/2012, 10/01/2013, 08/27/2014    Influenza, injectable, quadrivalent 09/03/2019    Influenza, seasonal, injectable 10/19/2015, 09/13/2016    Influenza, trivalent, adjuvanted 10/29/2019    Moderna SARS-CoV-2 Vaccination 03/04/2021, 04/01/2021, 12/02/2021, 08/03/2022    Novel influenza-H1N1-09, preservative-free 02/07/2010    Pneumococcal conjugate vaccine, 13-valent (PREVNAR 13) 10/05/2020    Pneumococcal polysaccharide vaccine, 23-valent, age 2 years and older (PNEUMOVAX 23) 09/25/2008, 08/17/2020    Td vaccine, age 7 years and older (TDVAX) 02/04/2008    Tdap vaccine, age 7 year and older (BOOSTRIX) 04/02/2013     REVIEW OF SYSTEMS:  Review of Systems   Respiratory:  Positive for cough.    All other systems reviewed and are negative.    ALLERGIES:  No Known Allergies     VITAL SIGNS:  Vitals:    11/27/23 1504   BP: 130/80   Pulse: 92   SpO2: 94%     Physical Exam  Vitals reviewed.   Constitutional:       General: She is not in acute distress.     Appearance: Normal appearance. She is not ill-appearing.   HENT:      Head: Normocephalic and atraumatic.      Right Ear: Tympanic membrane and external ear normal.      Left Ear: Tympanic membrane and external ear normal.      Nose: Nose normal.      Mouth/Throat:      Mouth: Mucous membranes are moist.      Pharynx: Oropharynx is clear.   Eyes:      Conjunctiva/sclera: Conjunctivae normal.      Pupils: Pupils are equal, round, and reactive to light.   Cardiovascular:      Rate and Rhythm: Normal rate and regular rhythm.      Heart sounds: Normal heart sounds. No murmur heard.  Pulmonary:      Effort: Pulmonary effort is normal. No respiratory distress.      Breath sounds: Normal breath sounds. No wheezing.   Abdominal:      General: There is no distension.      Palpations:  "Abdomen is soft. There is no mass.      Tenderness: There is no abdominal tenderness.   Musculoskeletal:         General: Normal range of motion.      Cervical back: Normal range of motion and neck supple.   Skin:     General: Skin is warm and dry.   Neurological:      General: No focal deficit present.      Mental Status: She is alert and oriented to person, place, and time.      Sensory: No sensory deficit.      Motor: No weakness.      Coordination: Coordination normal.      Gait: Gait normal.   Psychiatric:         Mood and Affect: Mood normal.         Behavior: Behavior normal.     MEDICATIONS:  Current Outpatient Medications on File Prior to Visit   Medication Sig Dispense Refill    albuterol 90 mcg/actuation inhaler inhale 1 to 2 puffs by mouth and INTO THE LUNGS every 4 to 6 hours if needed      aspirin 81 mg EC tablet Take 1 tablet (81 mg) by mouth once daily.      atorvastatin (Lipitor) 20 mg tablet Take 1 tablet (20 mg) by mouth once daily at bedtime. 90 tablet 1    azelastine (Astelin) 137 mcg (0.1 %) nasal spray Administer 1 spray into affected nostril(s) 2 times a day.      BD Alcohol Swabs pads, medicated       BD Dorothy 2nd Gen Pen Needle 32 gauge x 5/32\" needle Use with vitamin B12 injections monthly 12 each 11    calcium carbonate 600 mg calcium (1,500 mg) tablet Take 1 tablet (600 mg) by mouth every 12 hours. + D per directive      cholecalciferol (Vitamin D-3) 1,250 mcg (50,000 unit) capsule Take by mouth.      cyanocobalamin (Dodex) 1,000 mcg/mL injection Inject 1 mL (1,000 mcg) into the shoulder, thigh, or buttocks every 30 (thirty) days. 10 mL 0    dapsone 100 mg tablet Take 1 tablet (100 mg) by mouth once daily in the morning. Take before meals.      dapsone 25 mg tablet Take 3 tablets (75 mg) by mouth once daily in the morning. Take before meals.      dilTIAZem CD (Cardizem CD) 240 mg 24 hr capsule Take 1 capsule (240 mg) by mouth once daily. 90 capsule 1    ergocalciferol (Vitamin D-2) 1.25 " "MG (07752 UT) capsule Take 1 capsule (1,250 mcg) by mouth every 14 (fourteen) days.      ferrous gluconate (Fergon) 240 (27 Fe) MG tablet Take 1 tablet (27 mg) by mouth once daily.      ferrous sulfate 325 (65 Fe) MG EC tablet Take by mouth once daily.      fluticasone-umeclidin-vilanter (TRELEGY-ELLIPTA) 200-62.5-25 mcg blister with device Inhale 1 puff early in the morning.. Rinse mouth after taking dose 1 each 11    furosemide (Lasix) 40 mg tablet Take 1 tablet (40 mg) by mouth once daily. 90 tablet 1    gabapentin (Neurontin) 300 mg capsule take 1 capsule by mouth three times a day for 90 DAYS      glimepiride (Amaryl) 2 mg tablet take 1 tablet by mouth once daily Once a day 90 days 90 tablet 3    hydrocortisone 2.5 % ointment apply 1 APPLICATION twice a day topically if needed to affected area ON THE BODY      insulin detemir (LEVEMIR FLEXTOUCH U100 INSULIN SUBQ) Inject under the skin. As directed      losartan (Cozaar) 25 mg tablet Take 0.5 tablets (12.5 mg) by mouth once daily. 45 tablet 1    metFORMIN (Glucophage) 1,000 mg tablet take 1 tablet by mouth twice a day 90 180 tablet 3    nystatin (Mycostatin) cream Apply topically 2 times a day. to affected area      syringe with needle 3 mL 23 x 1\" syringe Use to inject vitamin b12 once monthly 12 each 11    topiramate (Topamax) 100 mg tablet take 1 tablet by mouth twice a day 180 tablet 0    triamcinolone (Kenalog) 0.1 % ointment APPLY 1 APPLICATOR AS NEEDED TOPICALLY ONCE DAILY AS NEEDED UNDER DENTAL TRAYS      warfarin (Coumadin) 5 mg tablet Take 1 tab daily or directed as coumadin clinic 90 tablet 1    CALCIUM CARBONATE-VITAMIN D3 ORAL Take 1 tablet by mouth 2 times a day.      enoxaparin (Lovenox) 150 mg/mL injection inject 1 subcutaneously twice a day as directed      Levemir FlexPen 100 unit/mL (3 mL) pen inject up to 15 units once daily with EVENING MEAL OR BEDTIME      lidocaine 4 % dressing Apply topically. Lidocaine External Cream; apply to the hip " 2-3 times daily      tiotropium (Spiriva) 18 mcg inhalation capsule Place 1 capsule (18 mcg) into inhaler and inhale once daily. 30 capsule 11    topiramate (TOPAMAX ORAL) Take 1 tablet by mouth 2 times a day.      umeclidinium (Incruse Ellipta) 62.5 mcg/actuation inhalation Inhale 1 puff (62.5 mcg) once daily. 1 each 5    [DISCONTINUED] dulaglutide (Trulicity) 1.5 mg/0.5 mL pen injector injection Inject 1.5 mg under the skin 1 (one) time per week.       No current facility-administered medications on file prior to visit.        LABORATORY DATA:  Lab Results   Component Value Date    WBC 12.1 (H) 09/27/2023    HGB 9.5 (L) 09/27/2023    HCT 33.8 (L) 09/27/2023     09/27/2023    CHOL 240 (H) 02/22/2023    TRIG 241 (H) 02/22/2023    HDL 43.0 02/22/2023    ALT 10 09/22/2023    AST 11 09/22/2023     09/27/2023    K 4.3 09/27/2023     09/27/2023    CREATININE 1.10 (H) 09/27/2023    BUN 44 (H) 09/27/2023    CO2 24 09/27/2023    TSH 1.76 02/22/2023    INR 2.80 11/28/2023    HGBA1C 4.1 (A) 11/27/2023     ASSESSMENT AND PLAN:  Healthcare Maintenance: Establish care, hospital follow up  - she was recently admitted to [facility] from 09/22-->09/27/23 for acute on chronic respiratory failure 2/2  COPD exacerbation, PNA, CHF exacerbation, was discharged to SNF from there  - she was to follow up with PCP after discharge from SNF and she was not seen because she was late to the appointment  - she received course of IV antibiotics while inpatient  - per hospital discharge summary, she was treated for acute on chronic DHF with medication noncompliance  - she reported in hospital that she had not been taking her diuretic at home  - she was given IV Lasix twice daily while inpatient  - cardiology was consulted  - 2D echo on 09/25/23 revealed: 1. Left ventricular systolic function is normal with a 60-65% estimated ejection fraction 2. Spectral Doppler shows an abnormal pattern of left ventricular diastolic  filling 3. The left atrium is moderate to severely dilated 4. The right atrium is mild to moderately dilated 5. There is moderate thickening and calcification of the anterior and posterior mitral valve leaflets 6. There is moderate to severe mitral annular calcification with mild mitral stenosis and mild mitral regurgitation.  7. The patient is in atrial fibrillation which may influence the estimate of left ventricular function and transvalvular flows 8. Aortic valve sclerosis.  - CT angio chest on 09/22/23 revealed: 1. No pulmonary embolism or other acute intrathoracic process 2. Mild to moderate coronary artery calcifications 3. Hepatic steatosis with some morphologic changes raising the possibility of underlying cirrhosis 4. Rim calcified splenic artery aneurysms measuring up to 2.3 cm unchanged from prior.  - she reports she has been using 5lpm supplemental 02 continuously since being home  - she has been taking Lasix 60mg in am and 40mg in pm, advised to continue with this dose  - Advised low salt diet, no canned foods or fast foods  - continue with 1800ml fluid restriction  - she reports she has Physical deconditioning,- start therapy, may be related to covid or fluid retention, order inputted for physical therapy  - Would like to order a new sleep study at her next visit- she was told her sleep study was negative in the past but her oxygen was dropping at night.  - INR Friday was 3.2, continue with current dose of Coumadin   - will check CMP and CBC  - Follow up in four months.       HTN/AFIB/CHF/Aortic stenosis  - see above  - c/w ASA, lipid lowering agent  - c/w Cardizem, Losartan  - c/w Coumadin  - c/w Lasix  - BP today is ok, 130/80  - follow up with cardiology    Macrocytic anemia  - continue with iron supplement     Mucous pemphigoid  -continue with dapsone    COPD  - continue with supplemental 02  - continue with inhalers  - follow up with pulmonology    DM Type 2  - hgbA1c 4.1 today  - continue with  Glimepiride and Metformin, Levemir    HLD  - c/w statin      --------------------  Written by Jazmyne Jesus LPN, acting as a scribe for Dr. Muñoz. This note accurately reflects the work and decisions made by Dr. Muñoz.     I, Dr. Muñoz, attest all medical record entries made by the scribe were under my direction and were personally dictated by me. I have reviewed the chart and agree that the record accurately reflects my performance of the history, physical exam, and assessment and plan.

## 2023-11-28 NOTE — PROGRESS NOTES
Patient identification verified with 2 identifiers.    Location: [facility] Patient Self-Testing Program [phone]    Referring Physician: Siobhan Palafox  Enrollment/ Re-enrollment date: 24   INR Goal: 2.0-3.0  INR monitoring is per [facility] protocol.  Anticoagulation Medication: warfarin  Indication: Pulmonary Embolism (PE)    Subjective   Bleeding signs/symptoms: No    Bruising: No   Major bleeding event: No  Thrombosis signs/symptoms: No  Thromboembolic event: No  Missed doses: No  Extra doses: No  Medication changes: No  Dietary changes: No  Change in health: No  Change in activity: No  Alcohol: No  Other concerns: No    Upcoming Surgeries:  Does the Patient Have any upcoming surgeries that require interruption in anticoagulation therapy? no  Does the patient require bridging? no      Anticoagulation Summary  As of 2023      INR goal:  2.0-3.0   TTR:  13.7 % (1.7 mo)   INR used for dosin.80 (2023)   Weekly warfarin total:  20 mg               Assessment/Plan   Therapeutic     1. New dose: Maintain weekly dose   2. Next INR: 1 week      Education provided to patient during the visit:  Patient instructed to call in interim with questions, concerns and changes.   Patient educated on compliance with dosing, follow up appointments, and prescribed plan of care.      3

## 2023-11-30 ENCOUNTER — APPOINTMENT (OUTPATIENT)
Dept: ENDOCRINOLOGY | Facility: CLINIC | Age: 73
End: 2023-11-30
Payer: MEDICARE

## 2023-12-02 ENCOUNTER — LAB (OUTPATIENT)
Dept: LAB | Facility: LAB | Age: 73
End: 2023-12-02
Payer: MEDICARE

## 2023-12-02 DIAGNOSIS — D51.0 PERNICIOUS ANEMIA: ICD-10-CM

## 2023-12-02 DIAGNOSIS — R79.9 ELEVATED BUN: ICD-10-CM

## 2023-12-02 DIAGNOSIS — E78.2 MIXED HYPERLIPIDEMIA: ICD-10-CM

## 2023-12-02 DIAGNOSIS — E11.9 TYPE 2 DIABETES MELLITUS WITHOUT COMPLICATION, WITH LONG-TERM CURRENT USE OF INSULIN (MULTI): ICD-10-CM

## 2023-12-02 DIAGNOSIS — Z79.4 TYPE 2 DIABETES MELLITUS WITHOUT COMPLICATION, WITH LONG-TERM CURRENT USE OF INSULIN (MULTI): ICD-10-CM

## 2023-12-02 DIAGNOSIS — R53.83 FATIGUE, UNSPECIFIED TYPE: ICD-10-CM

## 2023-12-02 LAB
ALBUMIN SERPL BCP-MCNC: 3.7 G/DL (ref 3.4–5)
ALP SERPL-CCNC: 49 U/L (ref 33–136)
ALT SERPL W P-5'-P-CCNC: 7 U/L (ref 7–45)
ANION GAP SERPL CALC-SCNC: 16 MMOL/L (ref 10–20)
AST SERPL W P-5'-P-CCNC: 9 U/L (ref 9–39)
BASOPHILS # BLD AUTO: 0.04 X10*3/UL (ref 0–0.1)
BASOPHILS NFR BLD AUTO: 0.4 %
BILIRUB SERPL-MCNC: 0.9 MG/DL (ref 0–1.2)
BUN SERPL-MCNC: 13 MG/DL (ref 6–23)
CALCIUM SERPL-MCNC: 8.4 MG/DL (ref 8.6–10.3)
CHLORIDE SERPL-SCNC: 103 MMOL/L (ref 98–107)
CHOLEST SERPL-MCNC: 123 MG/DL (ref 0–199)
CHOLESTEROL/HDL RATIO: 3.3
CO2 SERPL-SCNC: 28 MMOL/L (ref 21–32)
CREAT SERPL-MCNC: 0.8 MG/DL (ref 0.5–1.05)
EOSINOPHIL # BLD AUTO: 0.83 X10*3/UL (ref 0–0.4)
EOSINOPHIL NFR BLD AUTO: 8.9 %
ERYTHROCYTE [DISTWIDTH] IN BLOOD BY AUTOMATED COUNT: 14.3 % (ref 11.5–14.5)
EST. AVERAGE GLUCOSE BLD GHB EST-MCNC: 59 MG/DL
GFR SERPL CREATININE-BSD FRML MDRD: 78 ML/MIN/1.73M*2
GLUCOSE SERPL-MCNC: 152 MG/DL (ref 74–99)
HBA1C MFR BLD: 3.7 %
HCT VFR BLD AUTO: 33.8 % (ref 36–46)
HDLC SERPL-MCNC: 37.8 MG/DL
HGB BLD-MCNC: 9.6 G/DL (ref 12–16)
IMM GRANULOCYTES # BLD AUTO: 0.03 X10*3/UL (ref 0–0.5)
IMM GRANULOCYTES NFR BLD AUTO: 0.3 % (ref 0–0.9)
LDLC SERPL CALC-MCNC: 63 MG/DL
LYMPHOCYTES # BLD AUTO: 1.08 X10*3/UL (ref 0.8–3)
LYMPHOCYTES NFR BLD AUTO: 11.5 %
MCH RBC QN AUTO: 31.5 PG (ref 26–34)
MCHC RBC AUTO-ENTMCNC: 28.4 G/DL (ref 32–36)
MCV RBC AUTO: 111 FL (ref 80–100)
MONOCYTES # BLD AUTO: 0.55 X10*3/UL (ref 0.05–0.8)
MONOCYTES NFR BLD AUTO: 5.9 %
NEUTROPHILS # BLD AUTO: 6.84 X10*3/UL (ref 1.6–5.5)
NEUTROPHILS NFR BLD AUTO: 73 %
NON HDL CHOLESTEROL: 85 MG/DL (ref 0–149)
NRBC BLD-RTO: 0 /100 WBCS (ref 0–0)
PLATELET # BLD AUTO: 273 X10*3/UL (ref 150–450)
POTASSIUM SERPL-SCNC: 4.2 MMOL/L (ref 3.5–5.3)
PROT SERPL-MCNC: 6 G/DL (ref 6.4–8.2)
RBC # BLD AUTO: 3.05 X10*6/UL (ref 4–5.2)
SODIUM SERPL-SCNC: 143 MMOL/L (ref 136–145)
TRIGL SERPL-MCNC: 111 MG/DL (ref 0–149)
TSH SERPL-ACNC: 2.1 MIU/L (ref 0.44–3.98)
VIT B12 SERPL-MCNC: 267 PG/ML (ref 211–911)
VLDL: 22 MG/DL (ref 0–40)
WBC # BLD AUTO: 9.4 X10*3/UL (ref 4.4–11.3)

## 2023-12-02 PROCEDURE — 80053 COMPREHEN METABOLIC PANEL: CPT

## 2023-12-02 PROCEDURE — 80061 LIPID PANEL: CPT

## 2023-12-02 PROCEDURE — 82607 VITAMIN B-12: CPT

## 2023-12-02 PROCEDURE — 85025 COMPLETE CBC W/AUTO DIFF WBC: CPT

## 2023-12-02 PROCEDURE — 84443 ASSAY THYROID STIM HORMONE: CPT

## 2023-12-02 PROCEDURE — 83036 HEMOGLOBIN GLYCOSYLATED A1C: CPT

## 2023-12-02 PROCEDURE — 36415 COLL VENOUS BLD VENIPUNCTURE: CPT

## 2023-12-05 ENCOUNTER — ANTICOAGULATION - WARFARIN VISIT (OUTPATIENT)
Dept: CARDIOLOGY | Facility: CLINIC | Age: 73
End: 2023-12-05
Payer: MEDICARE

## 2023-12-05 LAB
INR IN PPP BY COAGULATION ASSAY EXTERNAL: 3.4
PROTHROMBIN TIME (PT) IN PPP BY COAGULATION ASSAY EXTERNAL: NORMAL SECONDS

## 2023-12-05 NOTE — PROGRESS NOTES
Patient identification verified with 2 identifiers.    Location: [facility] Patient Self-Testing Program [phone]    Referring Physician: Siobhan Palafox  Enrollment/ Re-enrollment date: 9/21/24   INR Goal: 2.0-3.0  INR monitoring is per [facility] protocol.  Anticoagulation Medication: warfarin  Indication: Pulmonary Embolism (PE)    Subjective   Bleeding signs/symptoms: No    Bruising: No   Major bleeding event: No  Thrombosis signs/symptoms: No  Thromboembolic event: No  Missed doses: No  Extra doses: No  Medication changes: No  Dietary changes: No  Change in health: No  Change in activity: No  Alcohol: No  Other concerns: No    Upcoming Surgeries:  Does the Patient Have any upcoming surgeries that require interruption in anticoagulation therapy? no  Does the patient require bridging? no      Anticoagulation Summary  As of 12/5/2023      INR goal:  2.0-3.0   TTR:  16.1 % (1.9 mo)   INR used for dosing:  3.40 (12/5/2023)   Weekly warfarin total:  17.5 mg               Assessment/Plan   SUPRA THERAPEUTIC    1. New dose: Decrease weekly dose   2. Next INR: 1 week      Education provided to patient during the visit:  Patient instructed to call in interim with questions, concerns and changes.   Patient educated on compliance with dosing, follow up appointments, and prescribed plan of care.

## 2023-12-11 ENCOUNTER — OFFICE VISIT (OUTPATIENT)
Dept: CARDIOLOGY | Facility: CLINIC | Age: 73
End: 2023-12-11
Payer: MEDICARE

## 2023-12-11 VITALS
SYSTOLIC BLOOD PRESSURE: 110 MMHG | HEIGHT: 66 IN | OXYGEN SATURATION: 90 % | DIASTOLIC BLOOD PRESSURE: 70 MMHG | HEART RATE: 90 BPM | BODY MASS INDEX: 49.55 KG/M2

## 2023-12-11 DIAGNOSIS — I48.91 ATRIAL FIBRILLATION WITH RAPID VENTRICULAR RESPONSE (MULTI): ICD-10-CM

## 2023-12-11 DIAGNOSIS — I50.32 CHRONIC DIASTOLIC CONGESTIVE HEART FAILURE (MULTI): ICD-10-CM

## 2023-12-11 DIAGNOSIS — E78.2 MIXED HYPERLIPIDEMIA: Primary | ICD-10-CM

## 2023-12-11 DIAGNOSIS — I10 PRIMARY HYPERTENSION: ICD-10-CM

## 2023-12-11 PROCEDURE — 99214 OFFICE O/P EST MOD 30 MIN: CPT | Mod: 25 | Performed by: NURSE PRACTITIONER

## 2023-12-11 PROCEDURE — 1125F AMNT PAIN NOTED PAIN PRSNT: CPT | Performed by: NURSE PRACTITIONER

## 2023-12-11 PROCEDURE — 3048F LDL-C <100 MG/DL: CPT | Performed by: NURSE PRACTITIONER

## 2023-12-11 PROCEDURE — 4010F ACE/ARB THERAPY RXD/TAKEN: CPT | Performed by: NURSE PRACTITIONER

## 2023-12-11 PROCEDURE — 1160F RVW MEDS BY RX/DR IN RCRD: CPT | Performed by: NURSE PRACTITIONER

## 2023-12-11 PROCEDURE — 3044F HG A1C LEVEL LT 7.0%: CPT | Performed by: NURSE PRACTITIONER

## 2023-12-11 PROCEDURE — 99214 OFFICE O/P EST MOD 30 MIN: CPT | Performed by: NURSE PRACTITIONER

## 2023-12-11 PROCEDURE — 93010 ELECTROCARDIOGRAM REPORT: CPT | Performed by: INTERNAL MEDICINE

## 2023-12-11 PROCEDURE — 93005 ELECTROCARDIOGRAM TRACING: CPT | Mod: PO | Performed by: NURSE PRACTITIONER

## 2023-12-11 PROCEDURE — 1159F MED LIST DOCD IN RCRD: CPT | Performed by: NURSE PRACTITIONER

## 2023-12-11 PROCEDURE — 3078F DIAST BP <80 MM HG: CPT | Performed by: NURSE PRACTITIONER

## 2023-12-11 PROCEDURE — 3074F SYST BP LT 130 MM HG: CPT | Performed by: NURSE PRACTITIONER

## 2023-12-11 RX ORDER — FUROSEMIDE 40 MG/1
80 TABLET ORAL DAILY
Qty: 90 TABLET | Refills: 3 | Status: SHIPPED | OUTPATIENT
Start: 2023-12-11 | End: 2024-03-25 | Stop reason: SDUPTHER

## 2023-12-11 NOTE — PROGRESS NOTES
Subjective   Frannie Blanco is a 73 y.o. female.    Chief Complaint:  Atrial Fibrillation and Hyperlipidemia    Mrs. Blanco returns for a 10 month follow up. She is seen in collaboration with Dr. Siegel. She was recently hospitalized for a COPD exacerbation, pneumonia and CHF exacerbation due to medication noncompliance at home. She is now on continuous 5 L NC oxygen. Her swelling initially improved, though she feels her legs are starting to become more swollen again. Echocardiogram 9/2023 showed an EF of 60-65% with moderate-severe mitral annular calcification with mild MS/MR and aortic valve sclerosis. She was also in a-fib during her recent echo. She remains in a-fib today, though asymptomatic and unaware. She monitors her INR at home, and is scheduled to test again tomorrow. She offers no other complaints or concerns today. She denies any complaints of chest pain, shortness of breath, lightheadedness, dizziness, palpitations, syncope, orthopnea, paroxysmal nocturnal dyspnea or bleeding concerns.          Review of Systems   All other systems reviewed and are negative.      Objective   Physical Exam  Constitutional:       Appearance: Healthy appearance. In no distress  Pulmonary:      Effort: Pulmonary effort is normal.      Breath sounds: Normal breath sounds.   Cardiovascular:      Normal rate. Irregularly irregular rhythm. Normal S1. Normal S2.       Murmurs: There is no murmur.      Carotids: right carotid pulse +2, no bruit heard over the right carotid. left carotid pulse +2, no bruit heard over the left carotid.  Edema:     Peripheral edema absent.   Abdominal:      Palpations: Abdomen is soft.   Musculoskeletal:       Cervical back: Normal range of motion.   Skin:     General: Skin is warm and dry. Normal color and pigmentation   Neurological:      Mental Status: Alert and oriented to person, place and time.   Psychiatric:     Mood and Affect: appropriate mood and appropriate affect.     EKG  obtained and reviewed. Atrial fibrillation. Anterior infarct, age undetermined. HR 85      Lab Review:   Lab Results   Component Value Date     12/02/2023    K 4.2 12/02/2023     12/02/2023    CO2 28 12/02/2023    BUN 13 12/02/2023    CREATININE 0.80 12/02/2023    GLUCOSE 152 (H) 12/02/2023    CALCIUM 8.4 (L) 12/02/2023     Lab Results   Component Value Date    WBC 9.4 12/02/2023    HGB 9.6 (L) 12/02/2023    HCT 33.8 (L) 12/02/2023     (H) 12/02/2023     12/02/2023     Lab Results   Component Value Date    CHOL 123 12/02/2023    TRIG 111 12/02/2023    HDL 37.8 12/02/2023       Assessment/Plan   Mrs. Blanco is a pleasant 73 year old  female with a past medical history significant for hypertension, hyperlipidemia, obesity, diabetes, multiple PE's on chronic anticoagulation with warfarin, COPD, breast cancer and a-fib (diagnosed 11/2022) s/p GEORGE cardioversion. Echocardiogram 9/2023 showed an EF of 60-65% with moderate-severe mitral annular calcification with mild MS/MR and aortic valve sclerosis. She was also in a-fib during her recent echo. She presents today following a recent hospitalization for a COPD exacerbation, pneumonia and CHF exacerbation due to medication noncompliance at home. She is now on continuous 5L NC O2. Her VS remain stable. Her EKG shows atrial fibrillation. We will plan for another cardioversion for this Wednesday 12/13 at [facility] should her INR remain within range. We will also increase lasix to 80 mg daily in hopes to argelia her swelling down. She will continue all other medications unchanged. She will follow up with us in clinic post cardioversion, and we will plan referral to EP at that time as well. She knows to call for any concerns.

## 2023-12-11 NOTE — PATIENT INSTRUCTIONS
We will plan for a cardioversion Wednesday this week     Increase lasix to 80 mg daily    Call for any concerns

## 2023-12-12 ENCOUNTER — ANTICOAGULATION - WARFARIN VISIT (OUTPATIENT)
Dept: CARDIOLOGY | Facility: CLINIC | Age: 73
End: 2023-12-12
Payer: MEDICARE

## 2023-12-12 ENCOUNTER — TELEPHONE (OUTPATIENT)
Dept: CARDIOLOGY | Facility: CLINIC | Age: 73
End: 2023-12-12
Payer: MEDICARE

## 2023-12-12 LAB
INR IN PPP BY COAGULATION ASSAY EXTERNAL: 2.1 (ref 2–3)
PROTHROMBIN TIME (PT) IN PPP BY COAGULATION ASSAY EXTERNAL: NORMAL SECONDS

## 2023-12-12 NOTE — PROGRESS NOTES
Patient identification verified with 2 identifiers.    Location: [facility] Patient Self-Testing Program [phone]    Referring Physician: Siobhan Palafox  Enrollment/ Re-enrollment date: 24   INR Goal: 2.0-3.0  INR monitoring is per [facility] protocol.  Anticoagulation Medication: warfarin  Indication: Pulmonary Embolism (PE)    Subjective   Bleeding signs/symptoms: No    Bruising: No   Major bleeding event: No  Thrombosis signs/symptoms: No  Thromboembolic event: No  Missed doses: No  Extra doses: No  Medication changes: No  Dietary changes: No  Change in health: No  Change in activity: No  Alcohol: No  Other concerns: No    Upcoming Surgeries:  Does the Patient Have any upcoming surgeries that require interruption in anticoagulation therapy? no  Does the patient require bridging? no      Anticoagulation Summary  As of 2023      INR goal:  2.0-3.0   TTR:  21.9 % (2.1 mo)   INR used for dosin.10 (2023)   Weekly warfarin total:  17.5 mg               Assessment/Plan  Received faxed INR self-test results and called patient. Pt confirms dosing schedule.  THERAPEUTIC    1. New dose: no change   2. Next INR: 1 week      Education provided to patient during the visit:  Patient instructed to call in interim with questions, concerns and changes.   Patient educated on compliance with dosing, follow up appointments, and prescribed plan of care.

## 2023-12-13 ENCOUNTER — HOSPITAL ENCOUNTER (OUTPATIENT)
Dept: CARDIOLOGY | Facility: HOSPITAL | Age: 73
Discharge: HOME | End: 2023-12-13
Payer: MEDICARE

## 2023-12-13 ENCOUNTER — ANESTHESIA (OUTPATIENT)
Dept: CARDIOLOGY | Facility: HOSPITAL | Age: 73
End: 2023-12-13
Payer: MEDICARE

## 2023-12-13 ENCOUNTER — ANESTHESIA EVENT (OUTPATIENT)
Dept: CARDIOLOGY | Facility: HOSPITAL | Age: 73
End: 2023-12-13
Payer: MEDICARE

## 2023-12-13 VITALS
HEART RATE: 73 BPM | TEMPERATURE: 98.4 F | SYSTOLIC BLOOD PRESSURE: 121 MMHG | DIASTOLIC BLOOD PRESSURE: 59 MMHG | BODY MASS INDEX: 47.09 KG/M2 | WEIGHT: 293 LBS | OXYGEN SATURATION: 94 % | RESPIRATION RATE: 16 BRPM | HEIGHT: 66 IN

## 2023-12-13 DIAGNOSIS — I48.91 ATRIAL FIBRILLATION STATUS POST CARDIOVERSION (MULTI): Primary | ICD-10-CM

## 2023-12-13 DIAGNOSIS — I48.91 A-FIB (MULTI): ICD-10-CM

## 2023-12-13 PROCEDURE — 92960 CARDIOVERSION ELECTRIC EXT: CPT | Performed by: INTERNAL MEDICINE

## 2023-12-13 PROCEDURE — 3700000002 HC GENERAL ANESTHESIA TIME - EACH INCREMENTAL 1 MINUTE: Performed by: ANESTHESIOLOGY

## 2023-12-13 PROCEDURE — 99100 ANES PT EXTEME AGE<1 YR&>70: CPT | Performed by: ANESTHESIOLOGY

## 2023-12-13 PROCEDURE — 93005 ELECTROCARDIOGRAM TRACING: CPT

## 2023-12-13 PROCEDURE — A92960 PR CARDIOVERSION, ELECTIVE;EXTERN: Performed by: ANESTHESIOLOGY

## 2023-12-13 PROCEDURE — 93005 ELECTROCARDIOGRAM TRACING: CPT | Mod: MUE

## 2023-12-13 PROCEDURE — 93010 ELECTROCARDIOGRAM REPORT: CPT | Performed by: STUDENT IN AN ORGANIZED HEALTH CARE EDUCATION/TRAINING PROGRAM

## 2023-12-13 PROCEDURE — 2500000004 HC RX 250 GENERAL PHARMACY W/ HCPCS (ALT 636 FOR OP/ED): Performed by: ANESTHESIOLOGIST ASSISTANT

## 2023-12-13 PROCEDURE — 99222 1ST HOSP IP/OBS MODERATE 55: CPT | Performed by: NURSE PRACTITIONER

## 2023-12-13 PROCEDURE — 92960 CARDIOVERSION ELECTRIC EXT: CPT

## 2023-12-13 PROCEDURE — 3700000001 HC GENERAL ANESTHESIA TIME - INITIAL BASE CHARGE: Performed by: ANESTHESIOLOGY

## 2023-12-13 PROCEDURE — A92960 PR CARDIOVERSION, ELECTIVE;EXTERN: Performed by: ANESTHESIOLOGIST ASSISTANT

## 2023-12-13 PROCEDURE — 2500000004 HC RX 250 GENERAL PHARMACY W/ HCPCS (ALT 636 FOR OP/ED): Performed by: PHYSICIAN ASSISTANT

## 2023-12-13 RX ORDER — SODIUM CHLORIDE 9 MG/ML
100 INJECTION, SOLUTION INTRAVENOUS CONTINUOUS
Status: DISCONTINUED | OUTPATIENT
Start: 2023-12-13 | End: 2023-12-13

## 2023-12-13 RX ORDER — PROPOFOL 10 MG/ML
INJECTION, EMULSION INTRAVENOUS AS NEEDED
Status: DISCONTINUED | OUTPATIENT
Start: 2023-12-13 | End: 2023-12-13

## 2023-12-13 RX ADMIN — PROPOFOL 50 MG: 10 INJECTION, EMULSION INTRAVENOUS at 08:44

## 2023-12-13 RX ADMIN — SODIUM CHLORIDE 100 ML/HR: 9 INJECTION, SOLUTION INTRAVENOUS at 07:56

## 2023-12-13 SDOH — HEALTH STABILITY: MENTAL HEALTH: CURRENT SMOKER: 0

## 2023-12-13 ASSESSMENT — ENCOUNTER SYMPTOMS
SHORTNESS OF BREATH: 1
HEMATOLOGIC/LYMPHATIC NEGATIVE: 1
EYES NEGATIVE: 1
MUSCULOSKELETAL NEGATIVE: 1
CONSTITUTIONAL NEGATIVE: 1
NEUROLOGICAL NEGATIVE: 1
PSYCHIATRIC NEGATIVE: 1
GASTROINTESTINAL NEGATIVE: 1
ALLERGIC/IMMUNOLOGIC NEGATIVE: 1
CARDIOVASCULAR NEGATIVE: 1
ENDOCRINE NEGATIVE: 1

## 2023-12-13 ASSESSMENT — COLUMBIA-SUICIDE SEVERITY RATING SCALE - C-SSRS
2. HAVE YOU ACTUALLY HAD ANY THOUGHTS OF KILLING YOURSELF?: NO
1. IN THE PAST MONTH, HAVE YOU WISHED YOU WERE DEAD OR WISHED YOU COULD GO TO SLEEP AND NOT WAKE UP?: NO
6. HAVE YOU EVER DONE ANYTHING, STARTED TO DO ANYTHING, OR PREPARED TO DO ANYTHING TO END YOUR LIFE?: NO

## 2023-12-13 NOTE — H&P
History Of Present Illness  Frannie Blanco is a 73 y.o. female presenting with atrial fibrillation.     Past Medical History  She has a past medical history of Acute upper respiratory infection, unspecified (09/25/2019), Acute upper respiratory infection, unspecified (10/11/2016), Encounter for screening mammogram for malignant neoplasm of breast (03/10/2015), Morbid (severe) obesity due to excess calories (CMS/[facility]) (09/17/2018), Personal history of nicotine dependence (10/26/2020), Personal history of other diseases of the respiratory system, Personal history of other drug therapy (08/17/2020), Personal history of other endocrine, nutritional and metabolic disease, Personal history of other specified conditions (10/04/2016), and Personal history of other specified conditions (07/15/2019).    Surgical History  She has a past surgical history that includes Nasal septum surgery (11/14/2012); Breast lumpectomy (11/14/2012); Tubal ligation (11/14/2012); Other surgical history (12/14/2018); Other surgical history (01/08/2021); Other surgical history (08/17/2020); Colonoscopy (06/25/2013); and BI mammo guided breast right localization (Right, 12/13/2018).     Social History  She reports that she quit smoking about 3 months ago. Her smoking use included cigarettes. She has never used smokeless tobacco. She reports that she does not drink alcohol and does not use drugs.    Family History  Family History   Problem Relation Name Age of Onset    Alzheimer's disease Mother      Coronary artery disease Father      Breast cancer Other family history         Allergies  Patient has no known allergies.    Home Medications  Current Outpatient Medications on File Prior to Encounter   Medication Sig Dispense Refill    albuterol 90 mcg/actuation inhaler inhale 1 to 2 puffs by mouth and INTO THE LUNGS every 4 to 6 hours if needed      aspirin 81 mg EC tablet Take 1 tablet (81 mg) by mouth once daily.      atorvastatin (Lipitor) 20 mg  "tablet Take 1 tablet (20 mg) by mouth once daily at bedtime. 90 tablet 1    azelastine (Astelin) 137 mcg (0.1 %) nasal spray Administer 1 spray into affected nostril(s) 2 times a day.      BD Alcohol Swabs pads, medicated       BD Dorothy 2nd Gen Pen Needle 32 gauge x 5/32\" needle Use with vitamin B12 injections monthly 12 each 11    calcium carbonate 600 mg calcium (1,500 mg) tablet Take 1 tablet (600 mg) by mouth every 12 hours. + D per directive      CALCIUM CARBONATE-VITAMIN D3 ORAL Take 1 tablet by mouth 2 times a day.      cholecalciferol (Vitamin D-3) 1,250 mcg (50,000 unit) capsule Take by mouth.      cyanocobalamin (Dodex) 1,000 mcg/mL injection Inject 1 mL (1,000 mcg) into the shoulder, thigh, or buttocks every 30 (thirty) days. 10 mL 0    dapsone 100 mg tablet Take 1 tablet (100 mg) by mouth once daily in the morning. Take before meals.      dapsone 25 mg tablet Take 3 tablets (75 mg) by mouth once daily in the morning. Take before meals.      dilTIAZem CD (Cardizem CD) 240 mg 24 hr capsule Take 1 capsule (240 mg) by mouth once daily. 90 capsule 1    enoxaparin (Lovenox) 150 mg/mL injection inject 1 subcutaneously twice a day as directed      ergocalciferol (Vitamin D-2) 1.25 MG (84035 UT) capsule Take 1 capsule (1,250 mcg) by mouth every 14 (fourteen) days.      ferrous sulfate 325 (65 Fe) MG EC tablet Take by mouth once daily.      fluticasone-umeclidin-vilanter (TRELEGY-ELLIPTA) 200-62.5-25 mcg blister with device Inhale 1 puff early in the morning.. Rinse mouth after taking dose 1 each 11    furosemide (Lasix) 40 mg tablet Take 2 tablets (80 mg) by mouth once daily. 90 tablet 3    gabapentin (Neurontin) 300 mg capsule take 1 capsule by mouth three times a day for 90 DAYS      glimepiride (Amaryl) 2 mg tablet take 1 tablet by mouth once daily Once a day 90 days 90 tablet 3    hydrocortisone 2.5 % ointment apply 1 APPLICATION twice a day topically if needed to affected area ON THE BODY      insulin detemir " "(LEVEMIR FLEXTOUCH U100 INSULIN SUBQ) Inject under the skin. As directed      Levemir FlexPen 100 unit/mL (3 mL) pen inject up to 15 units once daily with EVENING MEAL OR BEDTIME      lidocaine 4 % dressing Apply topically. Lidocaine External Cream; apply to the hip 2-3 times daily      losartan (Cozaar) 25 mg tablet Take 0.5 tablets (12.5 mg) by mouth once daily. 45 tablet 1    metFORMIN (Glucophage) 1,000 mg tablet take 1 tablet by mouth twice a day 90 180 tablet 3    nystatin (Mycostatin) cream Apply topically 2 times a day. to affected area      syringe with needle 3 mL 23 x 1\" syringe Use to inject vitamin b12 once monthly 12 each 11    tiotropium (Spiriva) 18 mcg inhalation capsule Place 1 capsule (18 mcg) into inhaler and inhale once daily. 30 capsule 11    topiramate (TOPAMAX ORAL) Take 1 tablet by mouth 2 times a day.      topiramate (Topamax) 100 mg tablet take 1 tablet by mouth twice a day 180 tablet 0    triamcinolone (Kenalog) 0.1 % ointment APPLY 1 APPLICATOR AS NEEDED TOPICALLY ONCE DAILY AS NEEDED UNDER DENTAL TRAYS      umeclidinium (Incruse Ellipta) 62.5 mcg/actuation inhalation Inhale 1 puff (62.5 mcg) once daily. 1 each 5    warfarin (Coumadin) 5 mg tablet Take 1 tab daily or directed as coumadin clinic 90 tablet 1    [DISCONTINUED] ferrous gluconate (Fergon) 240 (27 Fe) MG tablet Take 1 tablet (27 mg) by mouth once daily.      [DISCONTINUED] furosemide (Lasix) 40 mg tablet Take 1 tablet (40 mg) by mouth once daily. 90 tablet 1     No current facility-administered medications on file prior to encounter.          Inpatient Medications:  Scheduled medications   Medication Dose Route Frequency     PRN medications   Medication     Continuous Medications   Medication Dose Last Rate    sodium chloride 0.9%  100 mL/hr           Review of Systems   Constitutional: Negative.    HENT: Negative.     Eyes: Negative.    Respiratory:  Positive for shortness of breath.    Cardiovascular: Negative.  "   Gastrointestinal: Negative.    Endocrine: Negative.    Genitourinary: Negative.    Musculoskeletal: Negative.    Skin:         Reports open lymphedema wound to left lower leg   Allergic/Immunologic: Negative.    Neurological: Negative.    Hematological: Negative.    Psychiatric/Behavioral: Negative.            Physical Exam  Constitutional:       General: She is awake. She is not in acute distress.  HENT:      Nose: Nose normal.      Mouth/Throat:      Mouth: Mucous membranes are moist.      Pharynx: Oropharynx is clear.   Eyes:      Extraocular Movements: Extraocular movements intact.      Conjunctiva/sclera: Conjunctivae normal.   Cardiovascular:      Rate and Rhythm: Tachycardia present. Rhythm irregularly irregular.      Pulses: Normal pulses.           Radial pulses are 2+ on the right side and 2+ on the left side.        Dorsalis pedis pulses are 2+ on the right side and 2+ on the left side.      Heart sounds: Normal heart sounds. No murmur heard.  Pulmonary:      Effort: Pulmonary effort is normal.      Breath sounds: Normal breath sounds and air entry.      Comments: On 5L NC  Abdominal:      General: Bowel sounds are normal. There is no distension.      Palpations: Abdomen is soft.      Tenderness: There is no abdominal tenderness.   Musculoskeletal:      Cervical back: Normal range of motion and neck supple.      Right lower leg: Edema (severe lymphedema) present.      Left lower leg: Edema (severe lymphedema) present.   Skin:     General: Skin is warm and dry.   Neurological:      General: No focal deficit present.      Mental Status: She is alert and oriented to person, place, and time. Mental status is at baseline.      GCS: GCS eye subscore is 4. GCS verbal subscore is 5. GCS motor subscore is 6.   Psychiatric:         Mood and Affect: Mood normal.         Behavior: Behavior normal. Behavior is cooperative.         Thought Content: Thought content normal.         Judgment: Judgment normal.         "Sedation Plan    ASA 3     Mallampati class: II.         Last Recorded Vitals  There were no vitals taken for this visit.    Relevant Results    Labs    CBC:   Recent Labs     12/02/23  0948 09/27/23  0456 09/26/23  1230 09/25/23  0518 09/24/23  0603 09/23/23  0602   WBC 9.4 12.1* 14.4* 9.5 8.3 8.2   HGB 9.6* 9.5* 10.3* 9.2* 9.0* 9.2*   HCT 33.8* 33.8* 34.7* 31.1* 31.6* 31.4*    252 285 260 257 249   * 122* 116* 116* 121* 115*     BMP/CMP:   Recent Labs     12/02/23  0948 09/27/23  0456 09/26/23  1230 09/25/23  0518 09/24/23  0603 09/23/23  0602 09/22/23  1243 07/31/23  1330 06/12/23  1150 02/22/23  1028 05/09/22  1232    139 138 138 137 138 141 140 142 141 140   K 4.2 4.3 3.8 4.0 4.2 4.1 3.9 3.8 3.5 3.6 4.0    104 102 104 104 105 107 106 106 106 107   BUN 13 44* 41* 34* 28* 21 17 16 17 20 19   CREATININE 0.80 1.10* 0.98 1.15* 1.00 0.88 0.93 0.84 0.84 0.79 0.81   CO2 28 24 23 25 24 23 26 23 24 25 26   CALCIUM 8.4* 8.7 9.3 8.5* 8.6 8.8 9.0 9.3 8.9 9.1 9.3   PROT 6.0*  --   --   --   --   --  6.6 6.8 6.6 6.5 6.4   BILITOT 0.9  --   --   --   --   --  1.1 0.9 0.8 0.6 1.2   ALKPHOS 49  --   --   --   --   --  66 74 66 61 63   ALT 7  --   --   --   --   --  10 10 9 10 10   AST 9  --   --   --   --   --  11 12 13 11 12   GLUCOSE 152* 252* 197* 236* 275* 243* 143* 153* 176* 145* 142*      Lipid Panel:   Recent Labs     12/02/23  0948 02/22/23  1028 05/09/22  1232 09/15/21  1007 03/09/21  0933 08/03/20  1127 03/03/20  1349 10/29/19  1030 07/11/19  1540 09/21/18  1035 05/18/18  1130   CHOL 123 240* 150 125 154 145 158 158 150 140 130   HDL 37.8 43.0 42.4 40.0 48.0 47.8 45.8 48.8 50.7 44.8 40.0   CHHDL 3.3 5.6* 3.5 3.1 3.2 3.0 3.4 3.2 3.0 3.1 3.3   VLDL 22 48* 31 22 24 30 28 28 26 18 22   TRIG 111 241* 156* 110 118 148 138 138 131 90 110   NHDL 85 197  --   --   --   --   --   --   --   --   --      Cardiac       No lab exists for component: \"CK\", \"CKMBP\"   Hemoglobin A1C:   Recent Labs     " "12/02/23  0948 11/27/23  1522 02/22/23  1028 05/09/22  1232 09/15/21  1007 03/09/21  0933 08/03/20  1127 03/03/20  1349 10/29/19  1030 07/11/19  1540 09/21/18  1035 05/18/18  1130   HGBA1C 3.7 4.1* 6.4* <3.5 3.9 4.1 6.4 6.2 6.1 5.5 6.0 6.0     TSH/ Free T4:   Recent Labs     12/02/23  0948 02/22/23  1028 05/09/22  1232 09/15/21  1007 08/03/20  1127   TSH 2.10 1.76 1.42 1.07 1.57     Iron:   Recent Labs     09/24/23  0603 09/22/23  1243   TIBC 263  --    IRONSAT 22*  --    BNP  --  222*     Coag:   Results from last 7 days   Lab Units 12/12/23  0000   INR EXT  2.10     ABO: No results found for: \"ABO\"    Past Cardiology Tests (Last 3 Years):  EKG:  Encounter Date: 12/11/23   ECG 12 lead (Clinic Performed)   Result Value    Ventricular Rate 85    Atrial Rate 62    QRS Duration 102    QT Interval 384    QTC Calculation(Bazett) 456    R Axis -23    T Axis 36    QRS Count 14    Q Onset 206    T Offset 398    QTC Fredericia 431    Narrative    Atrial fibrillation  Anterior infarct , age undetermined  Abnormal ECG  When compared with ECG of 25-SEP-2023 10:00,  Nonspecific T wave abnormality, improved in Inferior leads     Echo:  No results found for this or any previous visit.    Ejection Fractions:  No results found for: \"EF\"  Cath:  No results found for this or any previous visit.    Stress Test:  No results found for this or any previous visit.    Cardiac Imaging:  No results found for this or any previous visit.      === 08/07/21 ===    MR LUMBAR SPINE WO CONTRAST    - Impression -  * Focal narrowing of the left lateral recess and neural foramen at  L3/L4.    The examination was interpreted [facility]  === 09/22/23 ===    CT ANGIO CHEST FOR PULMONARY EMBOLISM    - Impression -  1. No pulmonary embolism or other acute intrathoracic process.  2. Mild to moderate coronary artery calcifications.  3. Hepatic steatosis with some morphologic changes raising the  possibility of underlying cirrhosis.  4. Rim " calcified splenic artery aneurysms measuring up to 2.3 cm  unchanged from prior.      Signed by Main Chung MD     Assessment/Plan  Assessment/Plan   Active Problems:  There are no active Hospital Problems.        Atrial fibrillation  -INR 2.1 yesterday  -DCCV with Dr. Siegel on 12/13/23       I spent 30 minutes in the professional and overall care of this patient.      Mahin Roa, APRN-CNP, DNP

## 2023-12-13 NOTE — ANESTHESIA POSTPROCEDURE EVALUATION
Patient: Frannie Blanco    Procedure Summary       Date: 12/13/23 Room / Location: [facility]    Anesthesia Start: 0837 Anesthesia Stop: 0851    Procedure: CARDIOVERSION EXTERNAL Diagnosis: A-fib (CMS/HCC)    Scheduled Providers: Oj Siegel MD; Zeferino Alfred MD; ROSALINE Templeton Responsible Provider: Zeferino Alfred MD    Anesthesia Type: general ASA Status: 3            Anesthesia Type: general    Vitals Value Taken Time   /59 12/13/23 0950   Temp  12/13/23 1025   Pulse 73 12/13/23 0950   Resp 16 12/13/23 0950   SpO2 94 % 12/13/23 0950       Anesthesia Post Evaluation    Patient location during evaluation: bedside  Patient participation: complete - patient participated  Level of consciousness: awake and alert  Pain management: adequate  Airway patency: patent  Cardiovascular status: acceptable  Respiratory status: acceptable  Hydration status: acceptable  Postoperative Nausea and Vomiting: none        No notable events documented.

## 2023-12-13 NOTE — ANESTHESIA PREPROCEDURE EVALUATION
Patient: Frannie Blanco    Procedure Information       Anesthesia Start Date/Time: 12/13/23 0837    Scheduled providers: Oj Siegel MD; Zeferino Alfred MD; ROSALINE Templeton    Procedure: CARDIOVERSION EXTERNAL    Location: [facility]            Relevant Problems   Cardiovascular   (+) Acute on chronic congestive heart failure (CMS/HCC)   (+) Aortic stenosis   (+) Atrial fibrillation with rapid ventricular response (CMS/HCC)   (+) Chronic diastolic congestive heart failure (CMS/HCC)   (+) Hyperlipemia   (+) Low-density-lipoid-type (LDL) hyperlipoproteinemia   (+) Mixed hyperlipidemia   (+) Primary hypertension   (+) Pulmonary hypertension (CMS/HCC)      Endocrine   (+) Class 2 obesity due to excess calories in adult   (+) Diabetic peripheral neuropathy (CMS/HCC)   (+) Obesity   (+) Type 2 diabetes mellitus with hyperglycemia (CMS/HCC)      /Renal   (+) Calculus of kidney   (+) Chronic UTI      Neuro/Psych   (+) Acute lumbar radiculopathy   (+) Diabetic peripheral neuropathy (CMS/HCC)      Pulmonary   (+) Asthma   (+) COPD (chronic obstructive pulmonary disease) (CMS/HCC)   (+) Multiple lung nodules on CT   (+) Pneumonia   (+) Pulmonary hypertension (CMS/HCC)   (+) SOB (shortness of breath) on exertion      Hematology   (+) Pernicious anemia      Musculoskeletal   (+) Lumbar disc disease   (+) Lumbar spondylosis   (+) Primary osteoarthritis of right hip      Infectious Disease   (+) Chronic UTI   (+) Influenza      Other   (+) Arthritis       Clinical information reviewed:   Tobacco  Allergies  Meds   Med Hx  Surg Hx   Fam Hx          NPO Detail:  NPO/Void Status  Date of Last Liquid: 12/13/23  Date of Last Solid: 12/12/23         Physical Exam    Airway  Mallampati: I  TM distance: >3 FB  Neck ROM: full     Cardiovascular - normal exam     Dental - normal exam     Pulmonary - normal exam     Abdominal - normal exam             Anesthesia Plan    ASA 3     MAC     The patient is  not a current smoker.  Patient was not previously instructed to abstain from smoking on day of procedure.  Patient did not smoke on day of procedure.    intravenous induction   Postoperative administration of opioids is intended.  Anesthetic plan and risks discussed with patient.  Use of blood products discussed with patient who.    Plan discussed with CAA.

## 2023-12-13 NOTE — POST-PROCEDURE NOTE
Direct current cardioversion    Procedures  Direct current cardioversion (71475)    Patient history:  Please refer to the detailed history and physical on the patient's medical chart.     Procedure narrative:  The risks, benefits, and alternatives to the procedure and sedation were explained to the patient, and informed consent was obtained. The patient was in the fasting state. A grounding pad was placed. Self-adhesive anterior-posterior defibrillation pads were applied. A defibrillator was used for monitoring and the defibrillator waveform was set to biphasic. The patient was set up for continuous monitoring of surface 12 lead ECG, capnography, and pulse oximetry. Blood pressure was monitored with automatic cuff measurements. The procedure was performed under IV conscious sedation supplemented with intermittent deep sedation.   Patient reported taking their anticoagulation Warfarin without interruption in the last 4-6 weeks.    When pt had been adequately sedated, they were cardioverted with a 200J biphasic shock.  This restored sinus rhythm which was confirmed by tele and 12-lead ECG.      Summary:  Patient was successfully cardioverted from Atrial fibrillation to Sinus rhythm    Patient Instructions:  - No Driving or making legal decisions for 24 hours  - DO NOT Stop your blood thinner (Warfarin) unless emergency without checking in with your doctor    Follow up:   Tentatively patient will be discharged Today, following bed rest and subsequent ambulation, provided the recovery parameters are appropriate.   Resume AC Warfarin, DO NOT Stop your blood thinner unless emergency without checking in with your doctor.  No driving, alcohol or making legal decisions for 24 hours.  Plan for follow up with patient's cardiologist/electrophysiologist as scheduled    See complete procedural log and parameters.

## 2023-12-14 LAB
ATRIAL RATE: 115 BPM
ATRIAL RATE: 73 BPM
ATRIAL RATE: 74 BPM
P AXIS: 64 DEGREES
P AXIS: 67 DEGREES
P OFFSET: 176 MS
P OFFSET: 182 MS
P ONSET: 117 MS
P ONSET: 120 MS
PR INTERVAL: 198 MS
PR INTERVAL: 198 MS
Q ONSET: 202 MS
Q ONSET: 216 MS
Q ONSET: 219 MS
QRS COUNT: 12 BEATS
QRS COUNT: 12 BEATS
QRS COUNT: 15 BEATS
QRS DURATION: 96 MS
QRS DURATION: 98 MS
QRS DURATION: 98 MS
QT INTERVAL: 386 MS
QT INTERVAL: 408 MS
QT INTERVAL: 410 MS
QTC CALCULATION(BAZETT): 449 MS
QTC CALCULATION(BAZETT): 455 MS
QTC CALCULATION(BAZETT): 461 MS
QTC FREDERICIA: 435 MS
QTC FREDERICIA: 435 MS
QTC FREDERICIA: 440 MS
R AXIS: -17 DEGREES
R AXIS: -18 DEGREES
R AXIS: -9 DEGREES
T AXIS: 49 DEGREES
T AXIS: 55 DEGREES
T AXIS: 79 DEGREES
T OFFSET: 395 MS
T OFFSET: 421 MS
T OFFSET: 423 MS
VENTRICULAR RATE: 73 BPM
VENTRICULAR RATE: 74 BPM
VENTRICULAR RATE: 86 BPM

## 2023-12-16 LAB
ATRIAL RATE: 62 BPM
Q ONSET: 206 MS
QRS COUNT: 14 BEATS
QRS DURATION: 102 MS
QT INTERVAL: 384 MS
QTC CALCULATION(BAZETT): 456 MS
QTC FREDERICIA: 431 MS
R AXIS: -23 DEGREES
T AXIS: 36 DEGREES
T OFFSET: 398 MS
VENTRICULAR RATE: 85 BPM

## 2023-12-18 DIAGNOSIS — I10 PRIMARY HYPERTENSION: ICD-10-CM

## 2023-12-19 ENCOUNTER — ANTICOAGULATION - WARFARIN VISIT (OUTPATIENT)
Dept: CARDIOLOGY | Facility: CLINIC | Age: 73
End: 2023-12-19
Payer: MEDICARE

## 2023-12-19 LAB
INR IN PPP BY COAGULATION ASSAY EXTERNAL: 2.6
PROTHROMBIN TIME (PT) IN PPP BY COAGULATION ASSAY EXTERNAL: NORMAL SECONDS

## 2023-12-19 NOTE — PROGRESS NOTES
Patient identification verified with 2 identifiers.    Location: [facility] Patient Self-Testing Program [phone]    Referring Physician: Siobhan Palafox  Enrollment/ Re-enrollment date: 24   INR Goal: 2.0-3.0  INR monitoring is per [facility] protocol.  Anticoagulation Medication: warfarin  Indication: Pulmonary Embolism (PE)    Subjective   Bleeding signs/symptoms: No    Bruising: No   Major bleeding event: No  Thrombosis signs/symptoms: No  Thromboembolic event: No  Missed doses: No  Extra doses: No  Medication changes: No  Dietary changes: No  Change in health: No  Change in activity: No  Alcohol: No  Other concerns: No    Upcoming Surgeries:  Does the Patient Have any upcoming surgeries that require interruption in anticoagulation therapy? no  Does the patient require bridging? no      Anticoagulation Summary  As of 2023      INR goal:  2.0-3.0   TTR:  29.6 % (2.4 mo)   INR used for dosin.60 (2023)   Weekly warfarin total:  17.5 mg               Assessment/Plan  Received faxed INR self-test results and called patient. Pt confirms dosing schedule.  THERAPEUTIC    1. New dose: no change   2. Next INR: 2 weeks      Education provided to patient during the visit:  Patient instructed to call in interim with questions, concerns and changes.   Patient educated on compliance with dosing, follow up appointments, and prescribed plan of care.

## 2023-12-20 RX ORDER — LOSARTAN POTASSIUM 25 MG/1
12.5 TABLET ORAL DAILY
Qty: 45 TABLET | Refills: 1 | Status: SHIPPED | OUTPATIENT
Start: 2023-12-20 | End: 2024-01-17 | Stop reason: HOSPADM

## 2023-12-26 ENCOUNTER — HOSPITAL ENCOUNTER (EMERGENCY)
Facility: HOSPITAL | Age: 73
Discharge: HOME | End: 2023-12-26
Attending: EMERGENCY MEDICINE
Payer: MEDICARE

## 2023-12-26 ENCOUNTER — APPOINTMENT (OUTPATIENT)
Dept: RADIOLOGY | Facility: HOSPITAL | Age: 73
End: 2023-12-26
Payer: MEDICARE

## 2023-12-26 VITALS
WEIGHT: 293 LBS | HEIGHT: 66 IN | TEMPERATURE: 96.8 F | SYSTOLIC BLOOD PRESSURE: 148 MMHG | BODY MASS INDEX: 47.09 KG/M2 | DIASTOLIC BLOOD PRESSURE: 92 MMHG | RESPIRATION RATE: 19 BRPM | HEART RATE: 89 BPM | OXYGEN SATURATION: 95 %

## 2023-12-26 DIAGNOSIS — R52 PAIN: ICD-10-CM

## 2023-12-26 DIAGNOSIS — J18.9 PNEUMONIA DUE TO INFECTIOUS ORGANISM, UNSPECIFIED LATERALITY, UNSPECIFIED PART OF LUNG: Primary | ICD-10-CM

## 2023-12-26 LAB
ALBUMIN SERPL BCP-MCNC: 3.9 G/DL (ref 3.4–5)
ALP SERPL-CCNC: 46 U/L (ref 33–136)
ALT SERPL W P-5'-P-CCNC: 10 U/L (ref 7–45)
ANION GAP SERPL CALC-SCNC: 11 MMOL/L (ref 10–20)
APPEARANCE UR: CLEAR
AST SERPL W P-5'-P-CCNC: 23 U/L (ref 9–39)
BASOPHILS # BLD AUTO: 0.05 X10*3/UL (ref 0–0.1)
BASOPHILS NFR BLD AUTO: 0.4 %
BILIRUB SERPL-MCNC: 0.7 MG/DL (ref 0–1.2)
BILIRUB UR STRIP.AUTO-MCNC: NEGATIVE MG/DL
BUN SERPL-MCNC: 15 MG/DL (ref 6–23)
CALCIUM SERPL-MCNC: 8.8 MG/DL (ref 8.6–10.3)
CHLORIDE SERPL-SCNC: 106 MMOL/L (ref 98–107)
CO2 SERPL-SCNC: 29 MMOL/L (ref 21–32)
COLOR UR: ABNORMAL
CREAT SERPL-MCNC: 0.78 MG/DL (ref 0.5–1.05)
EOSINOPHIL # BLD AUTO: 0.55 X10*3/UL (ref 0–0.4)
EOSINOPHIL NFR BLD AUTO: 4.5 %
ERYTHROCYTE [DISTWIDTH] IN BLOOD BY AUTOMATED COUNT: 16.1 % (ref 11.5–14.5)
GFR SERPL CREATININE-BSD FRML MDRD: 81 ML/MIN/1.73M*2
GLUCOSE SERPL-MCNC: 165 MG/DL (ref 74–99)
GLUCOSE UR STRIP.AUTO-MCNC: NEGATIVE MG/DL
HCT VFR BLD AUTO: 29.2 % (ref 36–46)
HGB BLD-MCNC: 8.6 G/DL (ref 12–16)
HYALINE CASTS #/AREA URNS AUTO: ABNORMAL /LPF
IMM GRANULOCYTES # BLD AUTO: 0.04 X10*3/UL (ref 0–0.5)
IMM GRANULOCYTES NFR BLD AUTO: 0.3 % (ref 0–0.9)
KETONES UR STRIP.AUTO-MCNC: NEGATIVE MG/DL
LACTATE SERPL-SCNC: 2.5 MMOL/L (ref 0.4–2)
LEUKOCYTE ESTERASE UR QL STRIP.AUTO: ABNORMAL
LYMPHOCYTES # BLD AUTO: 1.86 X10*3/UL (ref 0.8–3)
LYMPHOCYTES NFR BLD AUTO: 15.1 %
MCH RBC QN AUTO: 31.2 PG (ref 26–34)
MCHC RBC AUTO-ENTMCNC: 29.5 G/DL (ref 32–36)
MCV RBC AUTO: 106 FL (ref 80–100)
MONOCYTES # BLD AUTO: 0.7 X10*3/UL (ref 0.05–0.8)
MONOCYTES NFR BLD AUTO: 5.7 %
MUCOUS THREADS #/AREA URNS AUTO: ABNORMAL /LPF
NEUTROPHILS # BLD AUTO: 9.12 X10*3/UL (ref 1.6–5.5)
NEUTROPHILS NFR BLD AUTO: 74 %
NITRITE UR QL STRIP.AUTO: NEGATIVE
NRBC BLD-RTO: 0 /100 WBCS (ref 0–0)
PH UR STRIP.AUTO: 6 [PH]
PLATELET # BLD AUTO: 265 X10*3/UL (ref 150–450)
POTASSIUM SERPL-SCNC: 4.1 MMOL/L (ref 3.5–5.3)
PROT SERPL-MCNC: 6.5 G/DL (ref 6.4–8.2)
PROT UR STRIP.AUTO-MCNC: NEGATIVE MG/DL
RBC # BLD AUTO: 2.76 X10*6/UL (ref 4–5.2)
RBC # UR STRIP.AUTO: ABNORMAL /UL
RBC #/AREA URNS AUTO: ABNORMAL /HPF
SODIUM SERPL-SCNC: 142 MMOL/L (ref 136–145)
SP GR UR STRIP.AUTO: 1.01
SQUAMOUS #/AREA URNS AUTO: ABNORMAL /HPF
UROBILINOGEN UR STRIP.AUTO-MCNC: <2 MG/DL
WBC # BLD AUTO: 12.3 X10*3/UL (ref 4.4–11.3)
WBC #/AREA URNS AUTO: ABNORMAL /HPF

## 2023-12-26 PROCEDURE — 99284 EMERGENCY DEPT VISIT MOD MDM: CPT | Mod: 25

## 2023-12-26 PROCEDURE — 81001 URINALYSIS AUTO W/SCOPE: CPT | Performed by: EMERGENCY MEDICINE

## 2023-12-26 PROCEDURE — 99285 EMERGENCY DEPT VISIT HI MDM: CPT | Performed by: EMERGENCY MEDICINE

## 2023-12-26 PROCEDURE — 9420000001 HC RT PATIENT EDUCATION 5 MIN

## 2023-12-26 PROCEDURE — 94640 AIRWAY INHALATION TREATMENT: CPT

## 2023-12-26 PROCEDURE — 36415 COLL VENOUS BLD VENIPUNCTURE: CPT | Performed by: EMERGENCY MEDICINE

## 2023-12-26 PROCEDURE — 2500000004 HC RX 250 GENERAL PHARMACY W/ HCPCS (ALT 636 FOR OP/ED): Performed by: EMERGENCY MEDICINE

## 2023-12-26 PROCEDURE — 94664 DEMO&/EVAL PT USE INHALER: CPT

## 2023-12-26 PROCEDURE — 2500000002 HC RX 250 W HCPCS SELF ADMINISTERED DRUGS (ALT 637 FOR MEDICARE OP, ALT 636 FOR OP/ED): Performed by: EMERGENCY MEDICINE

## 2023-12-26 PROCEDURE — 87086 URINE CULTURE/COLONY COUNT: CPT | Mod: GENLAB | Performed by: EMERGENCY MEDICINE

## 2023-12-26 PROCEDURE — 71250 CT THORAX DX C-: CPT

## 2023-12-26 PROCEDURE — 71250 CT THORAX DX C-: CPT | Performed by: STUDENT IN AN ORGANIZED HEALTH CARE EDUCATION/TRAINING PROGRAM

## 2023-12-26 PROCEDURE — 80053 COMPREHEN METABOLIC PANEL: CPT | Performed by: EMERGENCY MEDICINE

## 2023-12-26 PROCEDURE — 85025 COMPLETE CBC W/AUTO DIFF WBC: CPT | Performed by: EMERGENCY MEDICINE

## 2023-12-26 PROCEDURE — 83605 ASSAY OF LACTIC ACID: CPT | Performed by: EMERGENCY MEDICINE

## 2023-12-26 PROCEDURE — 96375 TX/PRO/DX INJ NEW DRUG ADDON: CPT

## 2023-12-26 PROCEDURE — 2500000005 HC RX 250 GENERAL PHARMACY W/O HCPCS: Performed by: EMERGENCY MEDICINE

## 2023-12-26 PROCEDURE — 87040 BLOOD CULTURE FOR BACTERIA: CPT | Mod: GENLAB | Performed by: EMERGENCY MEDICINE

## 2023-12-26 PROCEDURE — 96365 THER/PROPH/DIAG IV INF INIT: CPT

## 2023-12-26 RX ORDER — DEXAMETHASONE SODIUM PHOSPHATE 10 MG/ML
10 INJECTION INTRAMUSCULAR; INTRAVENOUS ONCE
Status: COMPLETED | OUTPATIENT
Start: 2023-12-26 | End: 2023-12-26

## 2023-12-26 RX ORDER — DOXYCYCLINE 100 MG/1
100 TABLET ORAL 2 TIMES DAILY
Qty: 20 TABLET | Refills: 0 | Status: SHIPPED | OUTPATIENT
Start: 2023-12-26 | End: 2024-01-17 | Stop reason: HOSPADM

## 2023-12-26 RX ORDER — DOXYCYCLINE 100 MG/10ML
INJECTION, POWDER, LYOPHILIZED, FOR SOLUTION INTRAVENOUS
Status: DISCONTINUED
Start: 2023-12-26 | End: 2023-12-26 | Stop reason: HOSPADM

## 2023-12-26 RX ORDER — ALBUTEROL SULFATE 0.83 MG/ML
2.5 SOLUTION RESPIRATORY (INHALATION) ONCE
Status: COMPLETED | OUTPATIENT
Start: 2023-12-26 | End: 2023-12-26

## 2023-12-26 RX ORDER — DEXAMETHASONE 6 MG/1
12 TABLET ORAL DAILY
Qty: 2 TABLET | Refills: 0 | Status: SHIPPED | OUTPATIENT
Start: 2023-12-26 | End: 2024-01-17 | Stop reason: HOSPADM

## 2023-12-26 RX ORDER — IPRATROPIUM BROMIDE AND ALBUTEROL SULFATE 2.5; .5 MG/3ML; MG/3ML
6 SOLUTION RESPIRATORY (INHALATION) ONCE
Status: COMPLETED | OUTPATIENT
Start: 2023-12-26 | End: 2023-12-26

## 2023-12-26 RX ADMIN — DEXAMETHASONE SODIUM PHOSPHATE 10 MG: 10 INJECTION, SOLUTION INTRAMUSCULAR; INTRAVENOUS at 19:25

## 2023-12-26 RX ADMIN — DOXYCYCLINE 100 MG: 100 INJECTION, POWDER, LYOPHILIZED, FOR SOLUTION INTRAVENOUS at 19:26

## 2023-12-26 RX ADMIN — IPRATROPIUM BROMIDE AND ALBUTEROL SULFATE 6 ML: .5; 3 SOLUTION RESPIRATORY (INHALATION) at 19:50

## 2023-12-26 RX ADMIN — Medication 5 L/MIN: at 19:54

## 2023-12-26 RX ADMIN — SODIUM CHLORIDE 1000 ML: 9 INJECTION, SOLUTION INTRAVENOUS at 19:09

## 2023-12-26 RX ADMIN — ALBUTEROL SULFATE 2.5 MG: 2.5 SOLUTION RESPIRATORY (INHALATION) at 19:50

## 2023-12-26 ASSESSMENT — PAIN DESCRIPTION - LOCATION: LOCATION: HIP

## 2023-12-26 ASSESSMENT — COLUMBIA-SUICIDE SEVERITY RATING SCALE - C-SSRS
6. HAVE YOU EVER DONE ANYTHING, STARTED TO DO ANYTHING, OR PREPARED TO DO ANYTHING TO END YOUR LIFE?: NO
1. IN THE PAST MONTH, HAVE YOU WISHED YOU WERE DEAD OR WISHED YOU COULD GO TO SLEEP AND NOT WAKE UP?: NO
2. HAVE YOU ACTUALLY HAD ANY THOUGHTS OF KILLING YOURSELF?: NO

## 2023-12-26 ASSESSMENT — PAIN DESCRIPTION - PAIN TYPE: TYPE: CHRONIC PAIN

## 2023-12-26 ASSESSMENT — PAIN - FUNCTIONAL ASSESSMENT: PAIN_FUNCTIONAL_ASSESSMENT: 0-10

## 2023-12-26 ASSESSMENT — PAIN SCALES - GENERAL: PAINLEVEL_OUTOF10: 7

## 2023-12-27 LAB
BACTERIA UR CULT: NORMAL
HOLD SPECIMEN: NORMAL

## 2023-12-27 NOTE — ED PROVIDER NOTES
HPI   Chief Complaint   Patient presents with    Shortness of Breath     Increased shortness of breath and cough for about 10 days, wears 5 liters of oxygen at all times. Denies fevers. Coughing up yellow mucous       HPI                    Ryan Coma Scale Score: 15                  Patient History   Past Medical History:   Diagnosis Date    Acute upper respiratory infection, unspecified 09/25/2019    Acute upper respiratory infection    Acute upper respiratory infection, unspecified 10/11/2016    Acute upper respiratory infection    Encounter for screening mammogram for malignant neoplasm of breast 03/10/2015    Visit for screening mammogram    Morbid (severe) obesity due to excess calories (CMS/HCC) 09/17/2018    Morbid or severe obesity due to excess calories    Personal history of nicotine dependence 10/26/2020    Personal history of nicotine dependence    Personal history of other diseases of the respiratory system     Personal history of asthma    Personal history of other drug therapy 08/17/2020    History of pneumococcal vaccination    Personal history of other endocrine, nutritional and metabolic disease     History of diabetes mellitus    Personal history of other specified conditions 10/04/2016    History of edema    Personal history of other specified conditions 07/15/2019    History of abnormal mammogram     Past Surgical History:   Procedure Laterality Date    BI MAMMO GUIDED LOCALIZATION BREAST RIGHT Right 12/13/2018    BI MAMMO GUIDED LOCALIZATION BREAST RIGHT 12/13/2018 [facility]    BREAST LUMPECTOMY  11/14/2012    Breast Surgery Lumpectomy    COLONOSCOPY  06/25/2013    Complete Colonoscopy    NASAL SEPTUM SURGERY  11/14/2012    Nasal Septal Deviation Repair    OTHER SURGICAL HISTORY  12/14/2018    Breast biopsy excisional    OTHER SURGICAL HISTORY  01/08/2021    Cataract surgery    OTHER SURGICAL HISTORY  08/17/2020    Renal lithotripsy    TUBAL LIGATION  11/14/2012    Tubal Ligation      Family History   Problem Relation Name Age of Onset    Alzheimer's disease Mother      Coronary artery disease Father      Breast cancer Other family history      Social History     Tobacco Use    Smoking status: Former     Types: Cigarettes     Quit date: 2023     Years since quittin.3    Smokeless tobacco: Never   Substance Use Topics    Alcohol use: Never    Drug use: Never       Physical Exam   ED Triage Vitals [23 1608]   Temp Heart Rate Resp BP   36.3 °C (97.4 °F) 100 20 131/51      SpO2 Temp Source Heart Rate Source Patient Position   90 % Temporal Monitor --      BP Location FiO2 (%)     -- --       Physical Exam  Constitutional:       General: She is not in acute distress.     Appearance: Normal appearance. She is not toxic-appearing.   HENT:      Head: Normocephalic and atraumatic.      Right Ear: Tympanic membrane normal.      Left Ear: Tympanic membrane normal.      Mouth/Throat:      Mouth: Mucous membranes are moist.      Pharynx: Oropharynx is clear.   Eyes:      Conjunctiva/sclera: Conjunctivae normal.      Pupils: Pupils are equal, round, and reactive to light.   Cardiovascular:      Rate and Rhythm: Normal rate and regular rhythm.      Pulses: Normal pulses.      Heart sounds: Normal heart sounds.   Pulmonary:      Effort: Pulmonary effort is normal. No respiratory distress.      Breath sounds: Wheezing present.   Abdominal:      General: Bowel sounds are normal.      Palpations: Abdomen is soft.      Tenderness: There is no abdominal tenderness. There is no guarding or rebound.   Musculoskeletal:         General: Normal range of motion.      Cervical back: Normal range of motion.   Skin:     General: Skin is warm and dry.   Neurological:      General: No focal deficit present.      Mental Status: She is alert and oriented to person, place, and time.         ED Course & MDM   Diagnoses as of 23   Pneumonia due to infectious organism, unspecified laterality, unspecified  part of lung       Medical Decision Making  Pleasant 73-year-old female presents to the ER with chief complaint of a persistent cough.  Patient came to the ED for evaluation.  Patient have a history of COPD.  She is currently on 5 L chronically.  And she is at her baseline oxygen.  Her lung exam showed slight wheeze.  After the patient a full workup showed that she may have an early pneumonia.  Due to her underlying COPD already we will be cautious and start her on antibiotics.  Again patient is at baseline for oxygen need.  Overall she is well-appearing we will give her dose of steroids I think that be helpful that this may be also including little bit of COPD exacerbation.  Again patient is at baseline for oxygen.  Explained to the patient that she will be started on antibiotics.  For further evaluation and possible continuation treatment recommend 100 follow-up with her PCP.  However if she does not feel better she would need to be come back to the ER for further evaluation and possible admission at that point.  At this time we will give her steroids and antibiotics patient will be discharged home.        Procedure  Procedures     Kelvin Conde, DO  12/26/23 1922

## 2023-12-27 NOTE — ED NOTES
Patient says she has had cold symptoms for 7-10 days. Productive cough, yellowish/green colored. Reports home oxygen use at 5 lpm NC continuous. History of COPD.     Corrie Yuen RN  12/26/23 1934

## 2023-12-31 LAB
BACTERIA BLD CULT: NORMAL
BACTERIA BLD CULT: NORMAL

## 2024-01-02 ENCOUNTER — ANTICOAGULATION - WARFARIN VISIT (OUTPATIENT)
Dept: CARDIOLOGY | Facility: CLINIC | Age: 74
End: 2024-01-02

## 2024-01-02 ENCOUNTER — APPOINTMENT (OUTPATIENT)
Dept: RADIOLOGY | Facility: HOSPITAL | Age: 74
DRG: 193 | End: 2024-01-02
Payer: MEDICARE

## 2024-01-02 ENCOUNTER — OFFICE VISIT (OUTPATIENT)
Dept: ENDOCRINOLOGY | Facility: CLINIC | Age: 74
End: 2024-01-02
Payer: MEDICARE

## 2024-01-02 ENCOUNTER — HOSPITAL ENCOUNTER (INPATIENT)
Facility: HOSPITAL | Age: 74
LOS: 13 days | Discharge: NURSING, REHAB OR LONG-TERM CARE | DRG: 193 | End: 2024-01-17
Attending: EMERGENCY MEDICINE | Admitting: INTERNAL MEDICINE
Payer: MEDICARE

## 2024-01-02 ENCOUNTER — APPOINTMENT (OUTPATIENT)
Dept: CARDIOLOGY | Facility: HOSPITAL | Age: 74
DRG: 193 | End: 2024-01-02
Payer: MEDICARE

## 2024-01-02 VITALS
DIASTOLIC BLOOD PRESSURE: 68 MMHG | BODY MASS INDEX: 47.29 KG/M2 | SYSTOLIC BLOOD PRESSURE: 133 MMHG | HEART RATE: 154 BPM | WEIGHT: 293 LBS

## 2024-01-02 DIAGNOSIS — I50.32 CHRONIC DIASTOLIC CONGESTIVE HEART FAILURE (MULTI): ICD-10-CM

## 2024-01-02 DIAGNOSIS — I10 PRIMARY HYPERTENSION: ICD-10-CM

## 2024-01-02 DIAGNOSIS — J18.9 PNEUMONIA DUE TO INFECTIOUS ORGANISM, UNSPECIFIED LATERALITY, UNSPECIFIED PART OF LUNG: ICD-10-CM

## 2024-01-02 DIAGNOSIS — I48.91 ATRIAL FIBRILLATION WITH RAPID VENTRICULAR RESPONSE (MULTI): ICD-10-CM

## 2024-01-02 DIAGNOSIS — E78.2 MIXED HYPERLIPIDEMIA: ICD-10-CM

## 2024-01-02 DIAGNOSIS — E11.65 TYPE 2 DIABETES MELLITUS WITH HYPERGLYCEMIA, UNSPECIFIED WHETHER LONG TERM INSULIN USE (MULTI): Primary | ICD-10-CM

## 2024-01-02 DIAGNOSIS — R06.02 SHORTNESS OF BREATH: Primary | ICD-10-CM

## 2024-01-02 DIAGNOSIS — E04.1 THYROID NODULE: ICD-10-CM

## 2024-01-02 DIAGNOSIS — J44.1 COPD EXACERBATION (MULTI): ICD-10-CM

## 2024-01-02 DIAGNOSIS — B33.8 RSV (RESPIRATORY SYNCYTIAL VIRUS INFECTION): ICD-10-CM

## 2024-01-02 LAB
ALBUMIN SERPL BCP-MCNC: 4.1 G/DL (ref 3.4–5)
ALP SERPL-CCNC: 48 U/L (ref 33–136)
ALT SERPL W P-5'-P-CCNC: 8 U/L (ref 7–45)
ANION GAP BLDV CALCULATED.4IONS-SCNC: 14 MMOL/L (ref 10–25)
ANION GAP SERPL CALC-SCNC: 13 MMOL/L (ref 10–20)
APPEARANCE UR: CLEAR
AST SERPL W P-5'-P-CCNC: 8 U/L (ref 9–39)
BACTERIA #/AREA URNS AUTO: ABNORMAL /HPF
BASE EXCESS BLDV CALC-SCNC: 1 MMOL/L (ref -2–3)
BASOPHILS # BLD AUTO: 0.04 X10*3/UL (ref 0–0.1)
BASOPHILS NFR BLD AUTO: 0.3 %
BILIRUB SERPL-MCNC: 1.5 MG/DL (ref 0–1.2)
BILIRUB UR STRIP.AUTO-MCNC: NEGATIVE MG/DL
BNP SERPL-MCNC: 359 PG/ML (ref 0–99)
BODY TEMPERATURE: ABNORMAL
BUN SERPL-MCNC: 22 MG/DL (ref 6–23)
CA-I BLDV-SCNC: 1.19 MMOL/L (ref 1.1–1.33)
CALCIUM SERPL-MCNC: 9.1 MG/DL (ref 8.6–10.3)
CARDIAC TROPONIN I PNL SERPL HS: 7 NG/L (ref 0–13)
CARDIAC TROPONIN I PNL SERPL HS: 7 NG/L (ref 0–13)
CHLORIDE BLDV-SCNC: 100 MMOL/L (ref 98–107)
CHLORIDE SERPL-SCNC: 102 MMOL/L (ref 98–107)
CO2 SERPL-SCNC: 29 MMOL/L (ref 21–32)
COLOR UR: YELLOW
CREAT SERPL-MCNC: 0.88 MG/DL (ref 0.5–1.05)
EOSINOPHIL # BLD AUTO: 0.08 X10*3/UL (ref 0–0.4)
EOSINOPHIL NFR BLD AUTO: 0.6 %
ERYTHROCYTE [DISTWIDTH] IN BLOOD BY AUTOMATED COUNT: 17.5 % (ref 11.5–14.5)
FLUAV RNA RESP QL NAA+PROBE: NOT DETECTED
FLUBV RNA RESP QL NAA+PROBE: NOT DETECTED
GFR SERPL CREATININE-BSD FRML MDRD: 70 ML/MIN/1.73M*2
GLUCOSE BLDV-MCNC: 164 MG/DL (ref 74–99)
GLUCOSE SERPL-MCNC: 166 MG/DL (ref 74–99)
GLUCOSE UR STRIP.AUTO-MCNC: NEGATIVE MG/DL
HCO3 BLDV-SCNC: 27.4 MMOL/L (ref 22–26)
HCT VFR BLD AUTO: 30.4 % (ref 36–46)
HCT VFR BLD EST: 28 % (ref 36–46)
HGB BLD-MCNC: 9 G/DL (ref 12–16)
HGB BLDV-MCNC: 9.2 G/DL (ref 12–16)
HYALINE CASTS #/AREA URNS AUTO: ABNORMAL /LPF
IMM GRANULOCYTES # BLD AUTO: 0.04 X10*3/UL (ref 0–0.5)
IMM GRANULOCYTES NFR BLD AUTO: 0.3 % (ref 0–0.9)
INHALED O2 CONCENTRATION: 0 %
KETONES UR STRIP.AUTO-MCNC: NEGATIVE MG/DL
LACTATE BLDV-SCNC: 2.2 MMOL/L (ref 0.4–2)
LACTATE SERPL-SCNC: 1.9 MMOL/L (ref 0.4–2)
LEUKOCYTE ESTERASE UR QL STRIP.AUTO: NEGATIVE
LYMPHOCYTES # BLD AUTO: 0.33 X10*3/UL (ref 0.8–3)
LYMPHOCYTES NFR BLD AUTO: 2.4 %
MCH RBC QN AUTO: 31.9 PG (ref 26–34)
MCHC RBC AUTO-ENTMCNC: 29.6 G/DL (ref 32–36)
MCV RBC AUTO: 108 FL (ref 80–100)
MONOCYTES # BLD AUTO: 0.78 X10*3/UL (ref 0.05–0.8)
MONOCYTES NFR BLD AUTO: 5.6 %
MUCOUS THREADS #/AREA URNS AUTO: ABNORMAL /LPF
NEUTROPHILS # BLD AUTO: 12.65 X10*3/UL (ref 1.6–5.5)
NEUTROPHILS NFR BLD AUTO: 90.8 %
NITRITE UR QL STRIP.AUTO: NEGATIVE
NRBC BLD-RTO: 0 /100 WBCS (ref 0–0)
OXYHGB MFR BLDV: 71.5 % (ref 45–75)
PCO2 BLDV: 52 MM HG (ref 41–51)
PH BLDV: 7.33 PH (ref 7.33–7.43)
PH UR STRIP.AUTO: 5 [PH]
PLATELET # BLD AUTO: 226 X10*3/UL (ref 150–450)
PO2 BLDV: 49 MM HG (ref 35–45)
POC HEMOGLOBIN A1C: 4.6 % (ref 4.2–6.5)
POTASSIUM BLDV-SCNC: 3.5 MMOL/L (ref 3.5–5.3)
POTASSIUM SERPL-SCNC: 3.5 MMOL/L (ref 3.5–5.3)
PROT SERPL-MCNC: 6.7 G/DL (ref 6.4–8.2)
PROT UR STRIP.AUTO-MCNC: NEGATIVE MG/DL
RBC # BLD AUTO: 2.82 X10*6/UL (ref 4–5.2)
RBC # UR STRIP.AUTO: ABNORMAL /UL
RBC #/AREA URNS AUTO: >20 /HPF
RSV RNA RESP QL NAA+PROBE: DETECTED
SAO2 % BLDV: 80 % (ref 45–75)
SARS-COV-2 RNA RESP QL NAA+PROBE: NOT DETECTED
SODIUM BLDV-SCNC: 138 MMOL/L (ref 136–145)
SODIUM SERPL-SCNC: 140 MMOL/L (ref 136–145)
SP GR UR STRIP.AUTO: 1.01
SQUAMOUS #/AREA URNS AUTO: ABNORMAL /HPF
UROBILINOGEN UR STRIP.AUTO-MCNC: <2 MG/DL
WBC # BLD AUTO: 13.9 X10*3/UL (ref 4.4–11.3)
WBC #/AREA URNS AUTO: ABNORMAL /HPF

## 2024-01-02 PROCEDURE — 93005 ELECTROCARDIOGRAM TRACING: CPT

## 2024-01-02 PROCEDURE — 84132 ASSAY OF SERUM POTASSIUM: CPT

## 2024-01-02 PROCEDURE — 94664 DEMO&/EVAL PT USE INHALER: CPT

## 2024-01-02 PROCEDURE — 36415 COLL VENOUS BLD VENIPUNCTURE: CPT

## 2024-01-02 PROCEDURE — 71045 X-RAY EXAM CHEST 1 VIEW: CPT | Performed by: RADIOLOGY

## 2024-01-02 PROCEDURE — 2500000004 HC RX 250 GENERAL PHARMACY W/ HCPCS (ALT 636 FOR OP/ED)

## 2024-01-02 PROCEDURE — 94640 AIRWAY INHALATION TREATMENT: CPT

## 2024-01-02 PROCEDURE — 99285 EMERGENCY DEPT VISIT HI MDM: CPT | Mod: 25

## 2024-01-02 PROCEDURE — 84484 ASSAY OF TROPONIN QUANT: CPT | Mod: 91

## 2024-01-02 PROCEDURE — 96365 THER/PROPH/DIAG IV INF INIT: CPT

## 2024-01-02 PROCEDURE — 87637 SARSCOV2&INF A&B&RSV AMP PRB: CPT

## 2024-01-02 PROCEDURE — 83036 HEMOGLOBIN GLYCOSYLATED A1C: CPT | Performed by: NURSE PRACTITIONER

## 2024-01-02 PROCEDURE — 1125F AMNT PAIN NOTED PAIN PRSNT: CPT | Performed by: NURSE PRACTITIONER

## 2024-01-02 PROCEDURE — 3078F DIAST BP <80 MM HG: CPT | Performed by: NURSE PRACTITIONER

## 2024-01-02 PROCEDURE — 4010F ACE/ARB THERAPY RXD/TAKEN: CPT | Performed by: NURSE PRACTITIONER

## 2024-01-02 PROCEDURE — 85025 COMPLETE CBC W/AUTO DIFF WBC: CPT

## 2024-01-02 PROCEDURE — 83605 ASSAY OF LACTIC ACID: CPT

## 2024-01-02 PROCEDURE — 1159F MED LIST DOCD IN RCRD: CPT | Performed by: NURSE PRACTITIONER

## 2024-01-02 PROCEDURE — 3075F SYST BP GE 130 - 139MM HG: CPT | Performed by: NURSE PRACTITIONER

## 2024-01-02 PROCEDURE — 99213 OFFICE O/P EST LOW 20 MIN: CPT | Performed by: NURSE PRACTITIONER

## 2024-01-02 PROCEDURE — 2500000002 HC RX 250 W HCPCS SELF ADMINISTERED DRUGS (ALT 637 FOR MEDICARE OP, ALT 636 FOR OP/ED)

## 2024-01-02 PROCEDURE — 96375 TX/PRO/DX INJ NEW DRUG ADDON: CPT

## 2024-01-02 PROCEDURE — 99285 EMERGENCY DEPT VISIT HI MDM: CPT | Performed by: EMERGENCY MEDICINE

## 2024-01-02 PROCEDURE — 71045 X-RAY EXAM CHEST 1 VIEW: CPT

## 2024-01-02 PROCEDURE — 87086 URINE CULTURE/COLONY COUNT: CPT

## 2024-01-02 PROCEDURE — 81001 URINALYSIS AUTO W/SCOPE: CPT

## 2024-01-02 PROCEDURE — 83880 ASSAY OF NATRIURETIC PEPTIDE: CPT

## 2024-01-02 PROCEDURE — 84484 ASSAY OF TROPONIN QUANT: CPT

## 2024-01-02 PROCEDURE — 9420000001 HC RT PATIENT EDUCATION 5 MIN

## 2024-01-02 PROCEDURE — 1036F TOBACCO NON-USER: CPT | Performed by: NURSE PRACTITIONER

## 2024-01-02 RX ORDER — CEFTRIAXONE 1 G/50ML
1 INJECTION, SOLUTION INTRAVENOUS ONCE
Status: COMPLETED | OUTPATIENT
Start: 2024-01-02 | End: 2024-01-03

## 2024-01-02 RX ORDER — IPRATROPIUM BROMIDE AND ALBUTEROL SULFATE 2.5; .5 MG/3ML; MG/3ML
3 SOLUTION RESPIRATORY (INHALATION)
Status: DISCONTINUED | OUTPATIENT
Start: 2024-01-02 | End: 2024-01-03

## 2024-01-02 RX ADMIN — IPRATROPIUM BROMIDE AND ALBUTEROL SULFATE 3 ML: .5; 3 SOLUTION RESPIRATORY (INHALATION) at 18:55

## 2024-01-02 RX ADMIN — CEFTRIAXONE 1 G: 1 INJECTION, SOLUTION INTRAVENOUS at 23:41

## 2024-01-02 RX ADMIN — METHYLPREDNISOLONE SODIUM SUCCINATE 125 MG: 125 INJECTION, POWDER, FOR SOLUTION INTRAMUSCULAR; INTRAVENOUS at 18:55

## 2024-01-02 ASSESSMENT — PAIN - FUNCTIONAL ASSESSMENT: PAIN_FUNCTIONAL_ASSESSMENT: 0-10

## 2024-01-02 ASSESSMENT — PAIN SCALES - GENERAL
PAINLEVEL_OUTOF10: 0 - NO PAIN
PAINLEVEL: 6
PAINLEVEL_OUTOF10: 0 - NO PAIN
PAINLEVEL_OUTOF10: 0 - NO PAIN

## 2024-01-02 ASSESSMENT — PATIENT HEALTH QUESTIONNAIRE - PHQ9
1. LITTLE INTEREST OR PLEASURE IN DOING THINGS: NOT AT ALL
SUM OF ALL RESPONSES TO PHQ9 QUESTIONS 1 & 2: 0
2. FEELING DOWN, DEPRESSED OR HOPELESS: NOT AT ALL

## 2024-01-02 NOTE — PATIENT INSTRUCTIONS
Target blood sugars are 100-130 when waking, and under 180 two hours after a meal and before bedtime.

## 2024-01-02 NOTE — LETTER
January 3, 2024     Terra Young MD  [address] [address]    Patient: Frannie Blanco   YOB: 1950   Date of Visit: 1/2/2024       Dear Dr. Terra Young MD:    Thank you for referring Frannie Blanco to me for evaluation. Below are my notes for this consultation.  If you have questions, please do not hesitate to call me. I look forward to following your patient along with you.       Sincerely,     Lizandro Hayes, APRN-CNP      CC: Sanya Newton MD  ______________________________________________________________________________________        HPI  74 yo with Diabetes 2, stage 0 breast cancer, Dyslipidemia , vitd def, wearing O2 presents for followup with family member. A1c 4.6% (has anemia Hgb 9.0). Pt is testing sugars 0- 1 times per day. Pt is having low sugars 0 times/week. Pt's typical blood sugars are running 100-160 on waking. Pt is trying to follow a carb controlled diet and knows reasonable carb allowances. Pt is able to afford some of her medications. Pt is not exercising.         Taking metformin, glimepiride 2mg, hasn't taken trulicity 1.5mg due to cost. Taking 15 units levemir qhs         Taking atorvastatin 20mg for lipids tolerating without side effects LDL 63         Taking losartan 25mg, diltiazem 240mg cd, lasix 40mg daily         -taking weekly b12 shots         Pt having issues with walking, using walker.         Pt had sleep study in 2021, now wearing 2L o2 overnight while sleeping.    Patient was found to have a thyroid nodule during a CT Scan, then had a dedicated US Thyroid (07/223) showing a solid nodule in the left thyroid lobe measuring 2.4 cm. She has no compressive/obstructive neck complaints. No changes in her voice.       Allergies as of 01/02/2024   • (No Known Allergies)         Review of Systems  Cardiology: Lightheadedness-denies.  Chest pain-denies.  Leg edema-denies.  Palpitations-denies.  Respiratory: Cough-denies. Shortness of  breath-admits.  Wheezing-denies.  Gastroenterology: Constipation-denies.  Diarrhea-denies.  Heartburn-denies.  Endocrinology: Cold intolerance-denies.  Heat intolerance-denies.  Sweats-denies.  Neurology: Headache-denies.  Tremor-denies.  Neuropathy in extremities-denies.  Psychology: Low energy-denies.  Irritability-denies.  Sleep disturbances-denies.      /68 (BP Location: Left arm, Patient Position: Sitting)   Pulse (!) 154   Wt 133 kg (293 lb)   LMP  (LMP Unknown)   BMI 47.29 kg/m²       Labs:      Lab Results   Component Value Date    CHOL 123 12/02/2023    TRIG 111 12/02/2023    HDL 37.8 12/02/2023    LDLCALC 63 12/02/2023     Lab Results   Component Value Date    MICROALBCREA 106.5 (H) 02/22/2023     Lab Results   Component Value Date    TSH 2.10 12/02/2023     Lab Results   Component Value Date    [id number] 267 12/02/2023     Lab Results   Component Value Date    HGBA1C 4.6 01/02/2024         Physical Exam  General Appearance: pleasant, cooperative, no acute distress  HEENT: no chemosis, no proptosis, no lid lag, no lid retraction  Neck: no lymphadenopathy, no thyromegaly, no dominant thyroid nodules  Heart: no murmurs, irregular rate and rhythm, S1 and S2  Lungs: no wheezes, no rhonci, no rales  Extremities: 2+ edema BLE      Assessment/Plan  1. Type 2 diabetes mellitus with hyperglycemia, unspecified whether long term insulin use (CMS/[facility])    -A1c likely false low secondary to anemia  -not having lows, know symptoms  -reviewed target BS  -asked her to check BS 1 time a day at different times  -explore CGM  -will meet with PharmD for coverage GLP 1 and or SGLT2     - POCT glycosylated hemoglobin (Hb A1C) manually resulted    2. Thyroid nodule  -refer to Dr. Newton  -chemically euthyroid    3. HTN   -stable    4. HLD  -LDL < 70  -tolerates statin      Follow Up: Pharm D 3 months    -labs/tests/notes reviewed  -reviewed and counseled patient on medication monitoring and side effects

## 2024-01-02 NOTE — PROGRESS NOTES
HPI   74 yo with Diabetes 2, stage 0 breast cancer, Dyslipidemia , vitd def, wearing O2 presents for followup with family member. A1c 4.6% (has anemia Hgb 9.0). Pt is testing sugars 0- 1 times per day. Pt is having low sugars 0 times/week. Pt's typical blood sugars are running 100-160 on waking. Pt is trying to follow a carb controlled diet and knows reasonable carb allowances. Pt is able to afford some of her medications. Pt is not exercising.         Taking metformin, glimepiride 2mg, hasn't taken trulicity 1.5mg due to cost. Taking 15 units levemir qhs         Taking atorvastatin 20mg for lipids tolerating without side effects LDL 63         Taking losartan 25mg, diltiazem 240mg cd, lasix 40mg daily         -taking weekly b12 shots         Pt having issues with walking, using walker.         Pt had sleep study in 2021, now wearing 2L o2 overnight while sleeping.    Patient was found to have a thyroid nodule during a CT Scan, then had a dedicated US Thyroid (07/223) showing a solid nodule in the left thyroid lobe measuring 2.4 cm. She has no compressive/obstructive neck complaints. No changes in her voice.       Allergies as of 01/02/2024    (No Known Allergies)         Review of Systems   Cardiology: Lightheadedness-denies.  Chest pain-denies.  Leg edema-denies.  Palpitations-denies.  Respiratory: Cough-denies. Shortness of breath-admits.  Wheezing-denies.  Gastroenterology: Constipation-denies.  Diarrhea-denies.  Heartburn-denies.  Endocrinology: Cold intolerance-denies.  Heat intolerance-denies.  Sweats-denies.  Neurology: Headache-denies.  Tremor-denies.  Neuropathy in extremities-denies.  Psychology: Low energy-denies.  Irritability-denies.  Sleep disturbances-denies.      /68 (BP Location: Left arm, Patient Position: Sitting)   Pulse (!) 154   Wt 133 kg (293 lb)   LMP  (LMP Unknown)   BMI 47.29 kg/m²       Labs:      Lab Results   Component Value Date    CHOL 123 12/02/2023    TRIG 111  12/02/2023    HDL 37.8 12/02/2023    LDLCALC 63 12/02/2023     Lab Results   Component Value Date    MICROALBCREA 106.5 (H) 02/22/2023     Lab Results   Component Value Date    TSH 2.10 12/02/2023     Lab Results   Component Value Date    [id number] 267 12/02/2023     Lab Results   Component Value Date    HGBA1C 4.6 01/02/2024         Physical Exam   General Appearance: pleasant, cooperative, no acute distress  HEENT: no chemosis, no proptosis, no lid lag, no lid retraction  Neck: no lymphadenopathy, no thyromegaly, no dominant thyroid nodules  Heart: no murmurs, irregular rate and rhythm, S1 and S2  Lungs: no wheezes, no rhonci, no rales  Extremities: 2+ edema BLE      Assessment/Plan   1. Type 2 diabetes mellitus with hyperglycemia, unspecified whether long term insulin use (CMS/[facility])    -A1c likely false low secondary to anemia  -not having lows, know symptoms  -reviewed target BS  -asked her to check BS 1 time a day at different times  -explore CGM  -will meet with PharmD for coverage GLP 1 and or SGLT2     - POCT glycosylated hemoglobin (Hb A1C) manually resulted    2. Thyroid nodule  -refer to Dr. Newton  -chemically euthyroid    3. HTN   -stable    4. HLD  -LDL < 70  -tolerates statin      Follow Up: Pharm D 3 months    -labs/tests/notes reviewed  -reviewed and counseled patient on medication monitoring and side effects

## 2024-01-02 NOTE — ED PROVIDER NOTES
Limitations to history: None  Independent Historians: Family  External Records Reviewed: HIE, OARRS, outpatient notes, inpatient notes, paper charts if needed    History of Present Illness:  Patient is a 73-year-old female presents to ED chief complaint of shortness of breath for the past 2 days.  Patient reports that she was recently seen here on December 26, discharged home with an antibiotic for treatment of pneumonia.  Patient reports that she has since finished her antibiotics, but is still having worsening shortness of breath.  Patient reports that normally she wears 5 L nasal cannula oxygen at all times.  Patient also reports past medical history of COPD, CHF, diabetes, pneumonia.  Patient denies any fevers, chills, nausea, vomiting, diarrhea.  Patient is alert and oriented x 3 upon examination.    Denies HA, C/P, ABD pain, Nausea, Vomiting, Diarrhea, Weakness, Dizziness, Fever, Chills.    PMFSH:   As per HPI, otherwise nurses notes reviewed in EMR.    Physical Exam:  Appearance: Alert, oriented x3, supine on exam table with head elevated, cooperative.      Skin: Intact, dry skin, no lesions, rash, petechiae or purpura.     Eyes: PERRLA, EOMs intact, Conjunctiva pink with no redness or exudates. No scleral icterus.     Ears: Hearing grossly intact.      Nose: Nares patent, no epistaxis.     Mouth: Dentition without concerning abnormalities. no obstruction of posterior pharynx.     Neck: Supple, without meningismus. Trachea at midline.     Pulmonary: Right and left upper & lowert lung fields are wheezing bilaterally. No accessory muscle use or stridor. Talking in full sentences.     Cardiac: Normal S1, S2 without murmur, rub, gallop or extrasystole.     Abdomen: Soft, nontender to light and deep palpation to all quadrants, normoactive bowel sounds.  No palpable organomegaly.  No rebound or guarding.     Genitourinary: Physical exam deferred.     Musculoskeletal: Normal gait. Full range of motion to all  extremities. Rest of the exam reveals no pain on palpation, instability, or deformity. Pulses full and equal. No cyanosis or clubbing. capillary refill <2 seconds to all examined digits.     Neurological:  Cranial nerves II through XII are grossly intact, normal sensation, no weakness, no focal findings identified.      Psychiatric: Appropriate mood and affect.    EKG interpreted by me shows   Ventricular rate of  104 bpm  MN interval                ms  QTc      334/439                      ms  No T wave elevation or depression    Labs Reviewed   URINALYSIS WITH REFLEX MICROSCOPIC - Abnormal       Result Value    Color, Urine Yellow      Appearance, Urine Clear      Specific Gravity, Urine 1.011      pH, Urine 5.0      Protein, Urine NEGATIVE      Glucose, Urine NEGATIVE      Blood, Urine LARGE (3+) (*)     Ketones, Urine NEGATIVE      Bilirubin, Urine NEGATIVE      Urobilinogen, Urine <2.0      Nitrite, Urine NEGATIVE      Leukocyte Esterase, Urine NEGATIVE     CBC WITH AUTO DIFFERENTIAL - Abnormal    WBC 13.9 (*)     nRBC 0.0      RBC 2.82 (*)     Hemoglobin 9.0 (*)     Hematocrit 30.4 (*)      (*)     MCH 31.9      MCHC 29.6 (*)     RDW 17.5 (*)     Platelets 226      Neutrophils % 90.8      Immature Granulocytes %, Automated 0.3      Lymphocytes % 2.4      Monocytes % 5.6      Eosinophils % 0.6      Basophils % 0.3      Neutrophils Absolute 12.65 (*)     Immature Granulocytes Absolute, Automated 0.04      Lymphocytes Absolute 0.33 (*)     Monocytes Absolute 0.78      Eosinophils Absolute 0.08      Basophils Absolute 0.04     COMPREHENSIVE METABOLIC PANEL - Abnormal    Glucose 166 (*)     Sodium 140      Potassium 3.5      Chloride 102      Bicarbonate 29      Anion Gap 13      Urea Nitrogen 22      Creatinine 0.88      eGFR 70      Calcium 9.1      Albumin 4.1      Alkaline Phosphatase 48      Total Protein 6.7      AST 8 (*)     Bilirubin, Total 1.5 (*)     ALT 8     B-TYPE NATRIURETIC PEPTIDE - Abnormal      (*)     Narrative:        <100 pg/mL - Heart failure unlikely  100-299 pg/mL - Intermediate probability of acute heart                  failure exacerbation. Correlate with clinical                  context and patient history.    >=300 pg/mL - Heart Failure likely. Correlate with clinical                  context and patient history.    BNP testing is performed using different testing methodology at [facility] than at other [facility]. Direct result comparisons should only be made within the same method.      RSV PCR - Abnormal    RSV PCR Detected (*)     Narrative:     This assay is an FDA-cleared, in vitro diagnostic nucleic acid amplification test for the detection of RSV from nasopharyngeal specimens, and has been validated for use at [facility]. Negative results do not preclude RSV infections, and should not be used as the sole basis for diagnosis, treatment, or other management decisions. If Influenza A/B and RSV PCR results are negative, testing for Parainfluenza virus, Adenovirus and Metapneumovirus is routinely performed for pediatric oncology and intensive care inpatients at [facility], and is available on other patients by placing an add-on request.       BLOOD GAS VENOUS FULL PANEL - Abnormal    POCT pH, Venous 7.33      POCT pCO2, Venous 52 (*)     POCT pO2, Venous 49 (*)     POCT SO2, Venous 80 (*)     POCT Oxy Hemoglobin, Venous 71.5      POCT Hematocrit Calculated, Venous 28.0 (*)     POCT Sodium, Venous 138      POCT Potassium, Venous 3.5      POCT Chloride, Venous 100      POCT Ionized Calicum, Venous 1.19      POCT Glucose, Venous 164 (*)     POCT Lactate, Venous 2.2 (*)     POCT Base Excess, Venous 1.0      POCT HCO3 Calculated, Venous 27.4 (*)     POCT Hemoglobin, Venous 9.2 (*)     POCT Anion Gap, Venous 14.0      Patient Temperature        FiO2 0     MICROSCOPIC ONLY, URINE - Abnormal    WBC, Urine 1-5      RBC, Urine >20 (*)     Squamous  Epithelial Cells, Urine 1-9 (SPARSE)      Bacteria, Urine 1+ (*)     Mucus, Urine FEW      Hyaline Casts, Urine 1+ (*)    LACTATE - Normal    Lactate 1.9      Narrative:     Venipuncture immediately after or during the administration of Metamizole may lead to falsely low results. Testing should be performed immediately  prior to Metamizole dosing.   SARS-COV-2 PCR, SYMPTOMATIC - Normal    Coronavirus 2019, PCR Not Detected      Narrative:     This assay has received FDA Emergency Use Authorization (EUA) and is only authorized for the duration of time that circumstances exist to justify the authorization of the emergency use of in vitro diagnostic tests for the detection of SARS-CoV-2 virus and/or diagnosis of COVID-19 infection under section 564(b)(1) of the Act, 21 U.S.C. 360bbb-3(b)(1). This assay is an in vitro diagnostic nucleic acid amplification test for the qualitative detection of SARS-CoV-2 from nasopharyngeal specimens and has been validated for use at [facility]. Negative results do not preclude COVID-19 infections and should not be used as the sole basis for diagnosis, treatment, or other management decisions.     INFLUENZA A AND B PCR - Normal    Flu A Result Not Detected      Flu B Result Not Detected      Narrative:     This assay is an in vitro diagnostic multiplex nucleic acid amplification test for the detection and discrimination of Influenza A & B from nasopharyngeal specimens, and has been validated for use at [facility]. Negative results do not preclude Influenza A/B infections, and should not be used as the sole basis for diagnosis, treatment, or other management decisions. If Influenza A/B and RSV PCR results are negative, testing for Parainfluenza virus, Adenovirus and Metapneumovirus is routinely performed for [facility] [place]ology and intensive care inpatients, and is available on other patients by placing an add-on request.   SERIAL  TROPONIN-INITIAL - Normal    Troponin I, High Sensitivity 7      Narrative:     Less than 99th percentile of normal range cutoff-  Female and children under 18 years old <14 ng/L; Male <21 ng/L: Negative  Repeat testing should be performed if clinically indicated.     Female and children under 18 years old 14-50 ng/L; Male 21-50 ng/L:  Consistent with possible cardiac damage and possible increased clinical   risk. Serial measurements may help to assess extent of myocardial damage.     >50 ng/L: Consistent with cardiac damage, increased clinical risk and  myocardial infarction. Serial measurements may help assess extent of   myocardial damage.      NOTE: Children less than 1 year old may have higher baseline troponin   levels and results should be interpreted in conjunction with the overall   clinical context.     NOTE: Troponin I testing is performed using a different   testing methodology at [facility] than at other   [facility]. Direct result comparisons should only   be made within the same method.   SERIAL TROPONIN, 1 HOUR - Normal    Troponin I, High Sensitivity 7      Narrative:     Less than 99th percentile of normal range cutoff-  Female and children under 18 years old <14 ng/L; Male <21 ng/L: Negative  Repeat testing should be performed if clinically indicated.     Female and children under 18 years old 14-50 ng/L; Male 21-50 ng/L:  Consistent with possible cardiac damage and possible increased clinical   risk. Serial measurements may help to assess extent of myocardial damage.     >50 ng/L: Consistent with cardiac damage, increased clinical risk and  myocardial infarction. Serial measurements may help assess extent of   myocardial damage.      NOTE: Children less than 1 year old may have higher baseline troponin   levels and results should be interpreted in conjunction with the overall   clinical context.     NOTE: Troponin I testing is performed using a different   testing methodology  at [facility] than at other   [facility]. Direct result comparisons should only   be made within the same method.   TROPONIN SERIES- (INITIAL, 1 HR)    Narrative:     The following orders were created for panel order Troponin Series, (0, 1 HR).  Procedure                               Abnormality         Status                     ---------                               -----------         ------                     Troponin I, High Sensiti...[[id number]]  Normal              Final result               Troponin, High Sensitivi...[[id number]]  Normal              Final result                 Please view results for these tests on the individual orders.   BLOOD GAS LACTIC ACID, VENOUS      XR chest 1 view   Final Result   Small right pleural effusion and basilar atelectasis or airspace   disease and mild bilateral opacities concerning for infiltrates.        MACRO:   None        Signed by: Soren Ham 1/2/2024 7:38 PM   Dictation workstation:   [id number]           Repeat Evaluation below    Summary:  Medical Decision Making:   Patient presented as described in HPI. Patient case including ROS, PE, and treatment and plan discussed with ED attending if attached as cosigner. Due to patients presentation orders completed include as documented.  Patient evaluated for complaints of shortness of breath.  Chest x-ray revealed small right pleural effusion basilar atelectasis or airspace disease and mild bilateral opacities concerning for infiltrates.  Patient recently was here, was treated for pneumonia and discharged home.  Patient reports that she has to wear her oxygen continuously at 5 L/min due to shortness of breath.  Troponin negative patient was found to be RSV positive, also found to have a mild leukocytosis.  Patient was found to be hypoxic, EKG revealed atrial fibs with RVR.  Plan is to admit patient for shortness of breath, COPD exacerbation, pneumonia, RSV.  Case findings also discussed with  ED attending Dr. Colón.  Patient was advised to follow up with PCP or recommended provider in 2-3 days for another evaluation and exam. I advised patient/guardian to return or go to closest emergency room immediately if symptoms change, get worse, new symptoms develop prior to follow up. If there is no improvement in symptoms in the next 24 hours they are advised to return for further evaluation and exam. I also explained the plan and treatment course. Patient/guardian is in agreement with plan, treatment course, and follow up and states verbally that they will comply.    Tests/Medications/Escalations of Care considered but not given:    Patient care discussed with: N/A  Social Determinants affecting care: N/A    Final diagnosis and disposition as documented in impression         Disposition:  admit     Hospitalize to _ floor under  _ per their orders. Discussed findings and treatment done here in ED with admitting physician. It would be a risk to discharge the patient in their condition due to possibility of deterioration in their condition and the need for urgent interventions.    This note has been transcribed using voice recognition and may contain grammatical errors, misplaced words, incorrect words, incorrect phrases or other errors.     Evangelina Ro, JOSELITO-CNP  01/02/24 2027

## 2024-01-03 PROBLEM — B33.8 RSV (RESPIRATORY SYNCYTIAL VIRUS INFECTION): Status: ACTIVE | Noted: 2024-01-03

## 2024-01-03 LAB
ATRIAL RATE: 101 BPM
FERRITIN SERPL-MCNC: 296 NG/ML (ref 8–150)
GLUCOSE BLD MANUAL STRIP-MCNC: 267 MG/DL (ref 74–99)
GLUCOSE BLD MANUAL STRIP-MCNC: 301 MG/DL (ref 74–99)
INR PPP: 5.2 (ref 0.9–1.1)
IRON SATN MFR SERPL: 10 % (ref 25–45)
IRON SERPL-MCNC: 28 UG/DL (ref 35–150)
PROTHROMBIN TIME: 59.3 SECONDS (ref 9.8–12.8)
Q ONSET: 218 MS
QRS COUNT: 17 BEATS
QRS DURATION: 98 MS
QT INTERVAL: 334 MS
QTC CALCULATION(BAZETT): 439 MS
QTC FREDERICIA: 401 MS
R AXIS: -14 DEGREES
T AXIS: 55 DEGREES
T OFFSET: 385 MS
TIBC SERPL-MCNC: 272 UG/DL (ref 240–445)
UIBC SERPL-MCNC: 244 UG/DL (ref 110–370)
VENTRICULAR RATE: 104 BPM

## 2024-01-03 PROCEDURE — 36415 COLL VENOUS BLD VENIPUNCTURE: CPT

## 2024-01-03 PROCEDURE — 96376 TX/PRO/DX INJ SAME DRUG ADON: CPT

## 2024-01-03 PROCEDURE — 2500000002 HC RX 250 W HCPCS SELF ADMINISTERED DRUGS (ALT 637 FOR MEDICARE OP, ALT 636 FOR OP/ED)

## 2024-01-03 PROCEDURE — 9420000001 HC RT PATIENT EDUCATION 5 MIN

## 2024-01-03 PROCEDURE — 2500000002 HC RX 250 W HCPCS SELF ADMINISTERED DRUGS (ALT 637 FOR MEDICARE OP, ALT 636 FOR OP/ED): Performed by: INTERNAL MEDICINE

## 2024-01-03 PROCEDURE — 94667 MNPJ CHEST WALL 1ST: CPT

## 2024-01-03 PROCEDURE — 82947 ASSAY GLUCOSE BLOOD QUANT: CPT

## 2024-01-03 PROCEDURE — 96375 TX/PRO/DX INJ NEW DRUG ADDON: CPT

## 2024-01-03 PROCEDURE — 2500000004 HC RX 250 GENERAL PHARMACY W/ HCPCS (ALT 636 FOR OP/ED): Performed by: EMERGENCY MEDICINE

## 2024-01-03 PROCEDURE — 85610 PROTHROMBIN TIME: CPT

## 2024-01-03 PROCEDURE — 87040 BLOOD CULTURE FOR BACTERIA: CPT | Mod: GENLAB

## 2024-01-03 PROCEDURE — 2500000001 HC RX 250 WO HCPCS SELF ADMINISTERED DRUGS (ALT 637 FOR MEDICARE OP): Performed by: EMERGENCY MEDICINE

## 2024-01-03 PROCEDURE — 2500000004 HC RX 250 GENERAL PHARMACY W/ HCPCS (ALT 636 FOR OP/ED)

## 2024-01-03 PROCEDURE — G0378 HOSPITAL OBSERVATION PER HR: HCPCS

## 2024-01-03 PROCEDURE — 84145 PROCALCITONIN (PCT): CPT | Mod: GENLAB

## 2024-01-03 PROCEDURE — 82746 ASSAY OF FOLIC ACID SERUM: CPT | Mod: GENLAB

## 2024-01-03 PROCEDURE — 96367 TX/PROPH/DG ADDL SEQ IV INF: CPT

## 2024-01-03 PROCEDURE — 96366 THER/PROPH/DIAG IV INF ADDON: CPT

## 2024-01-03 PROCEDURE — 2500000001 HC RX 250 WO HCPCS SELF ADMINISTERED DRUGS (ALT 637 FOR MEDICARE OP)

## 2024-01-03 PROCEDURE — 94640 AIRWAY INHALATION TREATMENT: CPT

## 2024-01-03 PROCEDURE — 99223 1ST HOSP IP/OBS HIGH 75: CPT

## 2024-01-03 PROCEDURE — 2500000005 HC RX 250 GENERAL PHARMACY W/O HCPCS: Performed by: NURSE PRACTITIONER

## 2024-01-03 PROCEDURE — 83540 ASSAY OF IRON: CPT

## 2024-01-03 PROCEDURE — 82728 ASSAY OF FERRITIN: CPT

## 2024-01-03 PROCEDURE — 2500000002 HC RX 250 W HCPCS SELF ADMINISTERED DRUGS (ALT 637 FOR MEDICARE OP, ALT 636 FOR OP/ED): Performed by: EMERGENCY MEDICINE

## 2024-01-03 RX ORDER — ACETAMINOPHEN 325 MG/1
650 TABLET ORAL EVERY 4 HOURS PRN
Status: DISCONTINUED | OUTPATIENT
Start: 2024-01-03 | End: 2024-01-17 | Stop reason: HOSPADM

## 2024-01-03 RX ORDER — GABAPENTIN 300 MG/1
300 CAPSULE ORAL 3 TIMES DAILY
Status: DISCONTINUED | OUTPATIENT
Start: 2024-01-03 | End: 2024-01-08

## 2024-01-03 RX ORDER — SODIUM CHLORIDE 9 MG/ML
INJECTION, SOLUTION INTRAVENOUS
Status: COMPLETED
Start: 2024-01-03 | End: 2024-01-03

## 2024-01-03 RX ORDER — PANTOPRAZOLE SODIUM 40 MG/10ML
40 INJECTION, POWDER, LYOPHILIZED, FOR SOLUTION INTRAVENOUS
Status: DISCONTINUED | OUTPATIENT
Start: 2024-01-04 | End: 2024-01-17 | Stop reason: HOSPADM

## 2024-01-03 RX ORDER — INSULIN GLARGINE 100 [IU]/ML
15 INJECTION, SOLUTION SUBCUTANEOUS NIGHTLY
Status: DISCONTINUED | OUTPATIENT
Start: 2024-01-03 | End: 2024-01-09

## 2024-01-03 RX ORDER — METOPROLOL TARTRATE 1 MG/ML
5 INJECTION, SOLUTION INTRAVENOUS EVERY 4 HOURS PRN
Status: DISCONTINUED | OUTPATIENT
Start: 2024-01-03 | End: 2024-01-17 | Stop reason: HOSPADM

## 2024-01-03 RX ORDER — TOPIRAMATE 100 MG/1
100 TABLET, FILM COATED ORAL 2 TIMES DAILY
Status: DISCONTINUED | OUTPATIENT
Start: 2024-01-03 | End: 2024-01-17 | Stop reason: HOSPADM

## 2024-01-03 RX ORDER — WARFARIN SODIUM 5 MG/1
5 TABLET ORAL DAILY
Status: DISCONTINUED | OUTPATIENT
Start: 2024-01-03 | End: 2024-01-03

## 2024-01-03 RX ORDER — METFORMIN HYDROCHLORIDE 500 MG/1
1000 TABLET ORAL
Status: DISCONTINUED | OUTPATIENT
Start: 2024-01-03 | End: 2024-01-17 | Stop reason: HOSPADM

## 2024-01-03 RX ORDER — PANTOPRAZOLE SODIUM 40 MG/1
40 TABLET, DELAYED RELEASE ORAL
Status: DISCONTINUED | OUTPATIENT
Start: 2024-01-04 | End: 2024-01-17 | Stop reason: HOSPADM

## 2024-01-03 RX ORDER — IPRATROPIUM BROMIDE AND ALBUTEROL SULFATE 2.5; .5 MG/3ML; MG/3ML
3 SOLUTION RESPIRATORY (INHALATION)
Status: DISCONTINUED | OUTPATIENT
Start: 2024-01-03 | End: 2024-01-03

## 2024-01-03 RX ORDER — FUROSEMIDE 80 MG/1
80 TABLET ORAL DAILY
Status: DISCONTINUED | OUTPATIENT
Start: 2024-01-03 | End: 2024-01-14

## 2024-01-03 RX ORDER — ACETAMINOPHEN 160 MG/5ML
650 SOLUTION ORAL EVERY 4 HOURS PRN
Status: DISCONTINUED | OUTPATIENT
Start: 2024-01-03 | End: 2024-01-17 | Stop reason: HOSPADM

## 2024-01-03 RX ORDER — WARFARIN SODIUM 5 MG/1
5 TABLET ORAL DAILY
Status: DISCONTINUED | OUTPATIENT
Start: 2024-01-03 | End: 2024-01-17 | Stop reason: HOSPADM

## 2024-01-03 RX ORDER — IPRATROPIUM BROMIDE AND ALBUTEROL SULFATE 2.5; .5 MG/3ML; MG/3ML
3 SOLUTION RESPIRATORY (INHALATION) 3 TIMES DAILY
Status: DISCONTINUED | OUTPATIENT
Start: 2024-01-03 | End: 2024-01-09

## 2024-01-03 RX ORDER — SODIUM CHLORIDE 9 MG/ML
INJECTION, SOLUTION INTRAVENOUS
Status: COMPLETED
Start: 2024-01-03 | End: 2024-01-04

## 2024-01-03 RX ORDER — LOSARTAN POTASSIUM 25 MG/1
12.5 TABLET ORAL DAILY
Status: DISCONTINUED | OUTPATIENT
Start: 2024-01-03 | End: 2024-01-17 | Stop reason: HOSPADM

## 2024-01-03 RX ORDER — TOPIRAMATE 100 MG/1
100 TABLET, FILM COATED ORAL 2 TIMES DAILY
Status: DISCONTINUED | OUTPATIENT
Start: 2024-01-03 | End: 2024-01-03

## 2024-01-03 RX ORDER — DEXTROSE 50 % IN WATER (D50W) INTRAVENOUS SYRINGE
25
Status: DISCONTINUED | OUTPATIENT
Start: 2024-01-03 | End: 2024-01-17 | Stop reason: HOSPADM

## 2024-01-03 RX ORDER — FLUTICASONE FUROATE AND VILANTEROL 200; 25 UG/1; UG/1
1 POWDER RESPIRATORY (INHALATION)
Status: DISCONTINUED | OUTPATIENT
Start: 2024-01-03 | End: 2024-01-17 | Stop reason: HOSPADM

## 2024-01-03 RX ORDER — METFORMIN HYDROCHLORIDE 500 MG/1
1000 TABLET ORAL
Status: DISCONTINUED | OUTPATIENT
Start: 2024-01-03 | End: 2024-01-03

## 2024-01-03 RX ORDER — ACETAMINOPHEN 650 MG/1
650 SUPPOSITORY RECTAL EVERY 4 HOURS PRN
Status: DISCONTINUED | OUTPATIENT
Start: 2024-01-03 | End: 2024-01-17 | Stop reason: HOSPADM

## 2024-01-03 RX ORDER — ATORVASTATIN CALCIUM 10 MG/1
20 TABLET, FILM COATED ORAL NIGHTLY
Status: DISCONTINUED | OUTPATIENT
Start: 2024-01-03 | End: 2024-01-17 | Stop reason: HOSPADM

## 2024-01-03 RX ORDER — ONDANSETRON HYDROCHLORIDE 2 MG/ML
4 INJECTION, SOLUTION INTRAVENOUS EVERY 8 HOURS PRN
Status: DISCONTINUED | OUTPATIENT
Start: 2024-01-03 | End: 2024-01-17 | Stop reason: HOSPADM

## 2024-01-03 RX ORDER — CEFTRIAXONE 1 G/50ML
1 INJECTION, SOLUTION INTRAVENOUS EVERY 24 HOURS
Status: DISCONTINUED | OUTPATIENT
Start: 2024-01-04 | End: 2024-01-07

## 2024-01-03 RX ORDER — FUROSEMIDE 40 MG/1
TABLET ORAL
Status: COMPLETED
Start: 2024-01-03 | End: 2024-01-03

## 2024-01-03 RX ORDER — GUAIFENESIN 600 MG/1
600 TABLET, EXTENDED RELEASE ORAL EVERY 12 HOURS PRN
Status: DISCONTINUED | OUTPATIENT
Start: 2024-01-03 | End: 2024-01-03

## 2024-01-03 RX ORDER — ONDANSETRON 4 MG/1
4 TABLET, ORALLY DISINTEGRATING ORAL EVERY 8 HOURS PRN
Status: DISCONTINUED | OUTPATIENT
Start: 2024-01-03 | End: 2024-01-17 | Stop reason: HOSPADM

## 2024-01-03 RX ORDER — CEFTRIAXONE 1 G/50ML
1 INJECTION, SOLUTION INTRAVENOUS EVERY 12 HOURS
Status: DISCONTINUED | OUTPATIENT
Start: 2024-01-03 | End: 2024-01-03 | Stop reason: SDUPTHER

## 2024-01-03 RX ORDER — INSULIN LISPRO 100 [IU]/ML
0-5 INJECTION, SOLUTION INTRAVENOUS; SUBCUTANEOUS
Status: DISCONTINUED | OUTPATIENT
Start: 2024-01-03 | End: 2024-01-17 | Stop reason: HOSPADM

## 2024-01-03 RX ORDER — FUROSEMIDE 40 MG/1
40 TABLET ORAL ONCE
Status: COMPLETED | OUTPATIENT
Start: 2024-01-03 | End: 2024-01-03

## 2024-01-03 RX ORDER — DAPSONE 25 MG/1
100 TABLET ORAL
Status: DISCONTINUED | OUTPATIENT
Start: 2024-01-04 | End: 2024-01-17 | Stop reason: HOSPADM

## 2024-01-03 RX ORDER — GUAIFENESIN/DEXTROMETHORPHAN 100-10MG/5
5 SYRUP ORAL EVERY 4 HOURS PRN
Status: DISCONTINUED | OUTPATIENT
Start: 2024-01-03 | End: 2024-01-17 | Stop reason: HOSPADM

## 2024-01-03 RX ORDER — DILTIAZEM HYDROCHLORIDE 120 MG/1
240 CAPSULE, COATED, EXTENDED RELEASE ORAL DAILY
Status: DISCONTINUED | OUTPATIENT
Start: 2024-01-03 | End: 2024-01-03

## 2024-01-03 RX ORDER — ASPIRIN 81 MG/1
81 TABLET ORAL DAILY
Status: DISCONTINUED | OUTPATIENT
Start: 2024-01-03 | End: 2024-01-17 | Stop reason: HOSPADM

## 2024-01-03 RX ORDER — GUAIFENESIN 600 MG/1
600 TABLET, EXTENDED RELEASE ORAL 2 TIMES DAILY
Status: DISCONTINUED | OUTPATIENT
Start: 2024-01-03 | End: 2024-01-17 | Stop reason: HOSPADM

## 2024-01-03 RX ORDER — GLIMEPIRIDE 2 MG/1
2 TABLET ORAL
Status: DISCONTINUED | OUTPATIENT
Start: 2024-01-04 | End: 2024-01-17 | Stop reason: HOSPADM

## 2024-01-03 RX ORDER — ALBUTEROL SULFATE 0.83 MG/ML
2.5 SOLUTION RESPIRATORY (INHALATION) EVERY 2 HOUR PRN
Status: DISCONTINUED | OUTPATIENT
Start: 2024-01-03 | End: 2024-01-17 | Stop reason: HOSPADM

## 2024-01-03 RX ORDER — TALC
6 POWDER (GRAM) TOPICAL DAILY
Status: DISCONTINUED | OUTPATIENT
Start: 2024-01-03 | End: 2024-01-17 | Stop reason: HOSPADM

## 2024-01-03 RX ORDER — POLYETHYLENE GLYCOL 3350 17 G/17G
17 POWDER, FOR SOLUTION ORAL DAILY
Status: DISCONTINUED | OUTPATIENT
Start: 2024-01-03 | End: 2024-01-10

## 2024-01-03 RX ORDER — GLIMEPIRIDE 2 MG/1
2 TABLET ORAL
Status: DISCONTINUED | OUTPATIENT
Start: 2024-01-03 | End: 2024-01-03

## 2024-01-03 RX ORDER — FERROUS SULFATE 325(65) MG
65 TABLET ORAL DAILY
Status: DISCONTINUED | OUTPATIENT
Start: 2024-01-03 | End: 2024-01-17 | Stop reason: HOSPADM

## 2024-01-03 RX ORDER — DILTIAZEM HYDROCHLORIDE 120 MG/1
240 CAPSULE, COATED, EXTENDED RELEASE ORAL DAILY
Status: DISCONTINUED | OUTPATIENT
Start: 2024-01-03 | End: 2024-01-07

## 2024-01-03 RX ORDER — CALCIUM CARBONATE 200(500)MG
1500 TABLET,CHEWABLE ORAL EVERY 12 HOURS
Status: DISCONTINUED | OUTPATIENT
Start: 2024-01-03 | End: 2024-01-17 | Stop reason: HOSPADM

## 2024-01-03 RX ORDER — DEXTROSE MONOHYDRATE 100 MG/ML
0.3 INJECTION, SOLUTION INTRAVENOUS ONCE AS NEEDED
Status: DISCONTINUED | OUTPATIENT
Start: 2024-01-03 | End: 2024-01-17 | Stop reason: HOSPADM

## 2024-01-03 RX ORDER — CEFTRIAXONE 1 G/50ML
INJECTION, SOLUTION INTRAVENOUS
Status: COMPLETED
Start: 2024-01-03 | End: 2024-01-03

## 2024-01-03 RX ADMIN — DILTIAZEM HYDROCHLORIDE 240 MG: 120 CAPSULE, COATED, EXTENDED RELEASE ORAL at 09:20

## 2024-01-03 RX ADMIN — GUAIFENESIN 600 MG: 600 TABLET, EXTENDED RELEASE ORAL at 21:46

## 2024-01-03 RX ADMIN — Medication 5 L/MIN: at 21:21

## 2024-01-03 RX ADMIN — INSULIN GLARGINE 15 UNITS: 100 INJECTION, SOLUTION SUBCUTANEOUS at 22:08

## 2024-01-03 RX ADMIN — ATORVASTATIN CALCIUM 20 MG: 10 TABLET, FILM COATED ORAL at 21:47

## 2024-01-03 RX ADMIN — METHYLPREDNISOLONE SODIUM SUCCINATE 40 MG: 40 INJECTION, POWDER, FOR SOLUTION INTRAMUSCULAR; INTRAVENOUS at 08:07

## 2024-01-03 RX ADMIN — TOPIRAMATE 100 MG: 100 TABLET, FILM COATED ORAL at 21:47

## 2024-01-03 RX ADMIN — IPRATROPIUM BROMIDE AND ALBUTEROL SULFATE 3 ML: .5; 3 SOLUTION RESPIRATORY (INHALATION) at 15:54

## 2024-01-03 RX ADMIN — CEFTRIAXONE 1 G: 1 INJECTION, SOLUTION INTRAVENOUS at 08:07

## 2024-01-03 RX ADMIN — GABAPENTIN 300 MG: 300 CAPSULE ORAL at 14:49

## 2024-01-03 RX ADMIN — METHYLPREDNISOLONE SODIUM SUCCINATE 40 MG: 40 INJECTION, POWDER, FOR SOLUTION INTRAMUSCULAR; INTRAVENOUS at 15:33

## 2024-01-03 RX ADMIN — IPRATROPIUM BROMIDE AND ALBUTEROL SULFATE 3 ML: .5; 3 SOLUTION RESPIRATORY (INHALATION) at 21:21

## 2024-01-03 RX ADMIN — FERROUS SULFATE TAB 325 MG (65 MG ELEMENTAL FE) 1 TABLET: 325 (65 FE) TAB at 12:59

## 2024-01-03 RX ADMIN — TOPIRAMATE 100 MG: 100 TABLET, FILM COATED ORAL at 09:20

## 2024-01-03 RX ADMIN — METFORMIN HYDROCHLORIDE 1000 MG: 500 TABLET ORAL at 09:20

## 2024-01-03 RX ADMIN — FUROSEMIDE 40 MG: 40 TABLET ORAL at 08:07

## 2024-01-03 RX ADMIN — DOXYCYCLINE 100 MG: 100 INJECTION, POWDER, LYOPHILIZED, FOR SOLUTION INTRAVENOUS at 13:04

## 2024-01-03 RX ADMIN — GUAIFENESIN AND DEXTROMETHORPHAN 5 ML: 100; 10 SYRUP ORAL at 14:48

## 2024-01-03 RX ADMIN — GABAPENTIN 300 MG: 300 CAPSULE ORAL at 21:47

## 2024-01-03 RX ADMIN — AZITHROMYCIN MONOHYDRATE 500 MG: 500 INJECTION, POWDER, LYOPHILIZED, FOR SOLUTION INTRAVENOUS at 00:25

## 2024-01-03 RX ADMIN — CALCIUM CARBONATE (ANTACID) CHEW TAB 500 MG 1500 MG: 500 CHEW TAB at 12:59

## 2024-01-03 RX ADMIN — GUAIFENESIN 600 MG: 600 TABLET, EXTENDED RELEASE ORAL at 13:03

## 2024-01-03 RX ADMIN — Medication 6 MG: at 21:47

## 2024-01-03 RX ADMIN — GLIMEPIRIDE 2 MG: 2 TABLET ORAL at 09:21

## 2024-01-03 RX ADMIN — Medication 1 LOZENGE: at 13:04

## 2024-01-03 RX ADMIN — SODIUM CHLORIDE 250 ML: 9 INJECTION, SOLUTION INTRAVENOUS at 14:51

## 2024-01-03 RX ADMIN — ASPIRIN 81 MG: 81 TABLET, COATED ORAL at 13:03

## 2024-01-03 RX ADMIN — INSULIN LISPRO 3 UNITS: 100 INJECTION, SOLUTION INTRAVENOUS; SUBCUTANEOUS at 16:52

## 2024-01-03 RX ADMIN — LOSARTAN POTASSIUM 12.5 MG: 25 TABLET, FILM COATED ORAL at 13:03

## 2024-01-03 RX ADMIN — METFORMIN HYDROCHLORIDE 1000 MG: 500 TABLET ORAL at 16:52

## 2024-01-03 SDOH — SOCIAL STABILITY: SOCIAL INSECURITY: WITHIN THE LAST YEAR, HAVE YOU BEEN AFRAID OF YOUR PARTNER OR EX-PARTNER?: NO

## 2024-01-03 SDOH — SOCIAL STABILITY: SOCIAL INSECURITY: WERE YOU ABLE TO COMPLETE ALL THE BEHAVIORAL HEALTH SCREENINGS?: YES

## 2024-01-03 SDOH — SOCIAL STABILITY: SOCIAL INSECURITY
WITHIN THE LAST YEAR, HAVE YOU BEEN KICKED, HIT, SLAPPED, OR OTHERWISE PHYSICALLY HURT BY YOUR PARTNER OR EX-PARTNER?: NO

## 2024-01-03 SDOH — SOCIAL STABILITY: SOCIAL INSECURITY: ARE YOU OR HAVE YOU BEEN THREATENED OR ABUSED PHYSICALLY, EMOTIONALLY, OR SEXUALLY BY ANYONE?: NO

## 2024-01-03 SDOH — ECONOMIC STABILITY: HOUSING INSECURITY
IN THE LAST 12 MONTHS, WAS THERE A TIME WHEN YOU DID NOT HAVE A STEADY PLACE TO SLEEP OR SLEPT IN A SHELTER (INCLUDING NOW)?: NO

## 2024-01-03 SDOH — SOCIAL STABILITY: SOCIAL INSECURITY: DOES ANYONE TRY TO KEEP YOU FROM HAVING/CONTACTING OTHER FRIENDS OR DOING THINGS OUTSIDE YOUR HOME?: NO

## 2024-01-03 SDOH — ECONOMIC STABILITY: INCOME INSECURITY: IN THE LAST 12 MONTHS, WAS THERE A TIME WHEN YOU WERE NOT ABLE TO PAY THE MORTGAGE OR RENT ON TIME?: NO

## 2024-01-03 SDOH — ECONOMIC STABILITY: FOOD INSECURITY: WITHIN THE PAST 12 MONTHS, YOU WORRIED THAT YOUR FOOD WOULD RUN OUT BEFORE YOU GOT MONEY TO BUY MORE.: NEVER TRUE

## 2024-01-03 SDOH — ECONOMIC STABILITY: FOOD INSECURITY: WITHIN THE PAST 12 MONTHS, THE FOOD YOU BOUGHT JUST DIDN'T LAST AND YOU DIDN'T HAVE MONEY TO GET MORE.: NEVER TRUE

## 2024-01-03 SDOH — SOCIAL STABILITY: SOCIAL INSECURITY: WITHIN THE LAST YEAR, HAVE YOU BEEN HUMILIATED OR EMOTIONALLY ABUSED IN OTHER WAYS BY YOUR PARTNER OR EX-PARTNER?: NO

## 2024-01-03 SDOH — SOCIAL STABILITY: SOCIAL INSECURITY: DO YOU FEEL ANYONE HAS EXPLOITED OR TAKEN ADVANTAGE OF YOU FINANCIALLY OR OF YOUR PERSONAL PROPERTY?: NO

## 2024-01-03 SDOH — SOCIAL STABILITY: SOCIAL INSECURITY
WITHIN THE LAST YEAR, HAVE TO BEEN RAPED OR FORCED TO HAVE ANY KIND OF SEXUAL ACTIVITY BY YOUR PARTNER OR EX-PARTNER?: NO

## 2024-01-03 SDOH — ECONOMIC STABILITY: INCOME INSECURITY: HOW HARD IS IT FOR YOU TO PAY FOR THE VERY BASICS LIKE FOOD, HOUSING, MEDICAL CARE, AND HEATING?: NOT HARD AT ALL

## 2024-01-03 SDOH — ECONOMIC STABILITY: INCOME INSECURITY: IN THE PAST 12 MONTHS, HAS THE ELECTRIC, GAS, OIL, OR WATER COMPANY THREATENED TO SHUT OFF SERVICE IN YOUR HOME?: NO

## 2024-01-03 SDOH — ECONOMIC STABILITY: TRANSPORTATION INSECURITY
IN THE PAST 12 MONTHS, HAS THE LACK OF TRANSPORTATION KEPT YOU FROM MEDICAL APPOINTMENTS OR FROM GETTING MEDICATIONS?: NO

## 2024-01-03 SDOH — ECONOMIC STABILITY: TRANSPORTATION INSECURITY
IN THE PAST 12 MONTHS, HAS LACK OF TRANSPORTATION KEPT YOU FROM MEETINGS, WORK, OR FROM GETTING THINGS NEEDED FOR DAILY LIVING?: NO

## 2024-01-03 SDOH — SOCIAL STABILITY: SOCIAL INSECURITY: HAVE YOU HAD THOUGHTS OF HARMING ANYONE ELSE?: NO

## 2024-01-03 SDOH — ECONOMIC STABILITY: HOUSING INSECURITY: IN THE LAST 12 MONTHS, HOW MANY PLACES HAVE YOU LIVED?: 1

## 2024-01-03 SDOH — SOCIAL STABILITY: SOCIAL INSECURITY: ABUSE: ADULT

## 2024-01-03 SDOH — SOCIAL STABILITY: SOCIAL INSECURITY: DO YOU FEEL UNSAFE GOING BACK TO THE PLACE WHERE YOU ARE LIVING?: NO

## 2024-01-03 SDOH — SOCIAL STABILITY: SOCIAL INSECURITY: ARE THERE ANY APPARENT SIGNS OF INJURIES/BEHAVIORS THAT COULD BE RELATED TO ABUSE/NEGLECT?: NO

## 2024-01-03 SDOH — SOCIAL STABILITY: SOCIAL INSECURITY: HAS ANYONE EVER THREATENED TO HURT YOUR FAMILY OR YOUR PETS?: NO

## 2024-01-03 ASSESSMENT — ENCOUNTER SYMPTOMS
PALPITATIONS: 0
WEAKNESS: 1
SHORTNESS OF BREATH: 1
DIZZINESS: 0
FATIGUE: 1
COUGH: 1
CHILLS: 0
WHEEZING: 1
FEVER: 0

## 2024-01-03 ASSESSMENT — COGNITIVE AND FUNCTIONAL STATUS - GENERAL
EATING MEALS: A LITTLE
MOVING FROM LYING ON BACK TO SITTING ON SIDE OF FLAT BED WITH BEDRAILS: A LOT
WALKING IN HOSPITAL ROOM: A LITTLE
TOILETING: TOTAL
TURNING FROM BACK TO SIDE WHILE IN FLAT BAD: A LOT
PATIENT BASELINE BEDBOUND: NO
TURNING FROM BACK TO SIDE WHILE IN FLAT BAD: A LOT
MOBILITY SCORE: 13
STANDING UP FROM CHAIR USING ARMS: A LITTLE
MOVING TO AND FROM BED TO CHAIR: A LOT
DRESSING REGULAR UPPER BODY CLOTHING: A LITTLE
PERSONAL GROOMING: A LITTLE
MOVING FROM LYING ON BACK TO SITTING ON SIDE OF FLAT BED WITH BEDRAILS: A LOT
WALKING IN HOSPITAL ROOM: A LITTLE
DAILY ACTIVITIY SCORE: 15
STANDING UP FROM CHAIR USING ARMS: A LITTLE
CLIMB 3 TO 5 STEPS WITH RAILING: TOTAL
CLIMB 3 TO 5 STEPS WITH RAILING: TOTAL
DRESSING REGULAR LOWER BODY CLOTHING: A LITTLE
HELP NEEDED FOR BATHING: A LOT
MOBILITY SCORE: 13
MOVING TO AND FROM BED TO CHAIR: A LOT

## 2024-01-03 ASSESSMENT — LIFESTYLE VARIABLES
HOW OFTEN DO YOU HAVE A DRINK CONTAINING ALCOHOL: NEVER
HOW MANY STANDARD DRINKS CONTAINING ALCOHOL DO YOU HAVE ON A TYPICAL DAY: PATIENT DOES NOT DRINK
HOW OFTEN DO YOU HAVE 6 OR MORE DRINKS ON ONE OCCASION: NEVER
SKIP TO QUESTIONS 9-10: 1
AUDIT-C TOTAL SCORE: 0
AUDIT-C TOTAL SCORE: 0

## 2024-01-03 ASSESSMENT — COLUMBIA-SUICIDE SEVERITY RATING SCALE - C-SSRS
1. IN THE PAST MONTH, HAVE YOU WISHED YOU WERE DEAD OR WISHED YOU COULD GO TO SLEEP AND NOT WAKE UP?: NO
6. HAVE YOU EVER DONE ANYTHING, STARTED TO DO ANYTHING, OR PREPARED TO DO ANYTHING TO END YOUR LIFE?: NO
2. HAVE YOU ACTUALLY HAD ANY THOUGHTS OF KILLING YOURSELF?: NO

## 2024-01-03 ASSESSMENT — ACTIVITIES OF DAILY LIVING (ADL)
LACK_OF_TRANSPORTATION: NO
HEARING - LEFT EAR: FUNCTIONAL
ADEQUATE_TO_COMPLETE_ADL: YES
JUDGMENT_ADEQUATE_SAFELY_COMPLETE_DAILY_ACTIVITIES: YES
PATIENT'S MEMORY ADEQUATE TO SAFELY COMPLETE DAILY ACTIVITIES?: YES
TOILETING: NEEDS ASSISTANCE
FEEDING YOURSELF: NEEDS ASSISTANCE
GROOMING: NEEDS ASSISTANCE
ASSISTIVE_DEVICE: CANE;WALKER
WALKS IN HOME: NEEDS ASSISTANCE
DRESSING YOURSELF: NEEDS ASSISTANCE
BATHING: NEEDS ASSISTANCE
LACK_OF_TRANSPORTATION: NO
HEARING - RIGHT EAR: FUNCTIONAL

## 2024-01-03 ASSESSMENT — PAIN SCALES - GENERAL
PAINLEVEL_OUTOF10: 3
PAINLEVEL_OUTOF10: 0 - NO PAIN

## 2024-01-03 ASSESSMENT — PAIN - FUNCTIONAL ASSESSMENT: PAIN_FUNCTIONAL_ASSESSMENT: 0-10

## 2024-01-03 ASSESSMENT — PATIENT HEALTH QUESTIONNAIRE - PHQ9
2. FEELING DOWN, DEPRESSED OR HOPELESS: NOT AT ALL
1. LITTLE INTEREST OR PLEASURE IN DOING THINGS: NOT AT ALL
SUM OF ALL RESPONSES TO PHQ9 QUESTIONS 1 & 2: 0

## 2024-01-03 NOTE — CARE PLAN
The patient's goals for the shift include      The clinical goals for the shift include ambulate without getting breathless

## 2024-01-03 NOTE — PROGRESS NOTES
TCC spoke with patient regarding discharge planning and home going needs. Patient lives  with her daughter and 14 y/o grandson in a 2 story house.  She utilizes the main floor.  Patient says she uses a walker or cane with ambulation and has been able to use the stairs when needed.  She can drive but hasn't recently.  She says she is on 5 Liters baseline oxygen at home.    Confirmed with patient PCP is  .Dr. Terra Young.  Discharge Plan is for patient to return home.  TCC will continue to follow for changes in discharge planning needs.

## 2024-01-03 NOTE — DISCHARGE INSTR - OTHER ORDERS
Thank you for choosing [facility] for your Health Care needs.  Also, thank you for allowing us to take you and your families preferences into account when determining your discharge plan.  Stay well!    Your Care Transitions Team Member: Colleen Capellan Amanda or Eliel [phone]     For questions about medications listed on your discharge instructions, please call the Nurses Station at [phone].

## 2024-01-03 NOTE — H&P
History Of Present Illness  Frannie Blanco is a 73 y.o. female presenting with cough and shortness of breath. Patient has a history of COPD and wears 5L O2 continuously at baseline. She was seen in the [facility] ED on 12/26 for the same symptoms and discharged home on doxycycline and dexamethasone for COPD exacerbation. She reports that her shortness of breath and cough continued to worsen at home and she felt weak so she came in to the ED for further evaluation. She was found to be hypoxic in the 80s on her baseline 5L O2 and was initially requiring 6L O2. HR was elevated in the 100s; patient has a known history of atrial fibrillation. ED labs were significant for , WBC 13.9, H/H 9.0/30.4. VBG showed pH 7.33, pCO2 52. Patient was found to be RSV positive. CXR was concerning for a right-sided infiltrate. Patient was given azithromycin, ceftriaxone, solumedrol, and lasix PO in the ED. Patient was initially slated for transfer to [facility] due to no bed availability at [facility] but provider at [facility] did not feel that patient was appropriate for transfer due to tachycardia (HR 110s). Patient remained in the [facility] ED overnight on antibiotics awaiting an inpatient bed. She was admitted to med/surg this morning for further treatment of community acquired pneumonia.      Past Medical History  Past Medical History:   Diagnosis Date    Acute upper respiratory infection, unspecified 09/25/2019    Acute upper respiratory infection    Acute upper respiratory infection, unspecified 10/11/2016    Acute upper respiratory infection    COPD (chronic obstructive pulmonary disease) (CMS/[facility])     Diabetes mellitus (CMS/[facility])     Encounter for screening mammogram for malignant neoplasm of breast 03/10/2015    Visit for screening mammogram    Morbid (severe) obesity due to excess calories (CMS/HCC) 09/17/2018    Morbid or severe obesity due to excess calories    Personal history of nicotine dependence 10/26/2020    Personal  history of nicotine dependence    Personal history of other diseases of the respiratory system     Personal history of asthma    Personal history of other drug therapy 08/17/2020    History of pneumococcal vaccination    Personal history of other endocrine, nutritional and metabolic disease     History of diabetes mellitus    Personal history of other specified conditions 10/04/2016    History of edema    Personal history of other specified conditions 07/15/2019    History of abnormal mammogram     COPD- 5L chronic  HFpEF   AF (on coumadin)   Macrocytic anemia   Lymphedema  Breast cancer    Surgical History  Past Surgical History:   Procedure Laterality Date    BI MAMMO GUIDED LOCALIZATION BREAST RIGHT Right 12/13/2018    BI MAMMO GUIDED LOCALIZATION BREAST RIGHT 12/13/2018 [facility]    BREAST LUMPECTOMY  11/14/2012    Breast Surgery Lumpectomy    COLONOSCOPY  06/25/2013    Complete Colonoscopy    NASAL SEPTUM SURGERY  11/14/2012    Nasal Septal Deviation Repair    OTHER SURGICAL HISTORY  12/14/2018    Breast biopsy excisional    OTHER SURGICAL HISTORY  01/08/2021    Cataract surgery    OTHER SURGICAL HISTORY  08/17/2020    Renal lithotripsy    TUBAL LIGATION  11/14/2012    Tubal Ligation        Social History  She reports that she quit smoking about 4 months ago. Her smoking use included cigarettes. She has never used smokeless tobacco. She reports that she does not drink alcohol and does not use drugs.    Family History  Family History   Problem Relation Name Age of Onset    Alzheimer's disease Mother      Coronary artery disease Father      Breast cancer Other family history         Allergies  Patient has no known allergies.    Review of Systems   Constitutional:  Positive for fatigue. Negative for chills and fever.   Respiratory:  Positive for cough, shortness of breath and wheezing.    Cardiovascular:  Negative for chest pain and palpitations.   Neurological:  Positive for weakness (generalized).  "Negative for dizziness.   All other systems reviewed and are negative.       Physical Exam  Constitutional:       General: She is not in acute distress.     Appearance: She is not toxic-appearing.   HENT:      Head: Normocephalic and atraumatic.      Mouth/Throat:      Mouth: Mucous membranes are moist.   Eyes:      Conjunctiva/sclera: Conjunctivae normal.   Cardiovascular:      Rate and Rhythm: Tachycardia present. Rhythm irregular.      Heart sounds: No murmur heard.  Pulmonary:      Effort: No respiratory distress.      Breath sounds: Wheezing and rhonchi present.   Abdominal:      General: There is no distension.      Palpations: Abdomen is soft.      Tenderness: There is no abdominal tenderness.   Musculoskeletal:      Right lower leg: Edema (lymphedema, chronic) present.      Left lower leg: Edema (lymphedema, chronic) present.   Skin:     General: Skin is warm and dry.      Findings: No rash.      Comments: Scabbed over scratch on anterior LLE near ankle   Neurological:      Mental Status: She is alert.          Last Recorded Vitals  Blood pressure 141/63, pulse (!) 111, temperature 36.9 °C (98.5 °F), temperature source Oral, resp. rate 24, height 1.676 m (5' 6\"), weight (!) 150 kg (330 lb 14.6 oz), SpO2 93 %.    Relevant Results      Scheduled medications  [START ON 1/4/2024] azithromycin, 500 mg, intravenous, q24h  [START ON 1/4/2024] cefTRIAXone, 1 g, intravenous, q24h  dilTIAZem CD, 240 mg, oral, Daily  glimepiride, 2 mg, oral, Daily with breakfast  ipratropium-albuteroL, 3 mL, nebulization, TID  melatonin, 6 mg, oral, Daily  metFORMIN, 1,000 mg, oral, BID with meals  methylPREDNISolone sodium succinate (PF), 40 mg, intravenous, q6h  [START ON 1/4/2024] pantoprazole, 40 mg, oral, Daily before breakfast   Or  [START ON 1/4/2024] pantoprazole, 40 mg, intravenous, Daily before breakfast  polyethylene glycol, 17 g, oral, Daily  topiramate, 100 mg, oral, BID  warfarin, 5 mg, oral, Daily      Continuous " medications     PRN medications  PRN medications: acetaminophen **OR** acetaminophen **OR** acetaminophen, acetaminophen **OR** acetaminophen **OR** acetaminophen, benzocaine-menthoL, dextromethorphan-guaifenesin, guaiFENesin, metoprolol, ondansetron ODT **OR** ondansetron    Results for orders placed or performed during the hospital encounter of 01/02/24 (from the past 24 hour(s))   CBC and Auto Differential   Result Value Ref Range    WBC 13.9 (H) 4.4 - 11.3 x10*3/uL    nRBC 0.0 0.0 - 0.0 /100 WBCs    RBC 2.82 (L) 4.00 - 5.20 x10*6/uL    Hemoglobin 9.0 (L) 12.0 - 16.0 g/dL    Hematocrit 30.4 (L) 36.0 - 46.0 %     (H) 80 - 100 fL    MCH 31.9 26.0 - 34.0 pg    MCHC 29.6 (L) 32.0 - 36.0 g/dL    RDW 17.5 (H) 11.5 - 14.5 %    Platelets 226 150 - 450 x10*3/uL    Neutrophils % 90.8 40.0 - 80.0 %    Immature Granulocytes %, Automated 0.3 0.0 - 0.9 %    Lymphocytes % 2.4 13.0 - 44.0 %    Monocytes % 5.6 2.0 - 10.0 %    Eosinophils % 0.6 0.0 - 6.0 %    Basophils % 0.3 0.0 - 2.0 %    Neutrophils Absolute 12.65 (H) 1.60 - 5.50 x10*3/uL    Immature Granulocytes Absolute, Automated 0.04 0.00 - 0.50 x10*3/uL    Lymphocytes Absolute 0.33 (L) 0.80 - 3.00 x10*3/uL    Monocytes Absolute 0.78 0.05 - 0.80 x10*3/uL    Eosinophils Absolute 0.08 0.00 - 0.40 x10*3/uL    Basophils Absolute 0.04 0.00 - 0.10 x10*3/uL   Comprehensive metabolic panel   Result Value Ref Range    Glucose 166 (H) 74 - 99 mg/dL    Sodium 140 136 - 145 mmol/L    Potassium 3.5 3.5 - 5.3 mmol/L    Chloride 102 98 - 107 mmol/L    Bicarbonate 29 21 - 32 mmol/L    Anion Gap 13 10 - 20 mmol/L    Urea Nitrogen 22 6 - 23 mg/dL    Creatinine 0.88 0.50 - 1.05 mg/dL    eGFR 70 >60 mL/min/1.73m*2    Calcium 9.1 8.6 - 10.3 mg/dL    Albumin 4.1 3.4 - 5.0 g/dL    Alkaline Phosphatase 48 33 - 136 U/L    Total Protein 6.7 6.4 - 8.2 g/dL    AST 8 (L) 9 - 39 U/L    Bilirubin, Total 1.5 (H) 0.0 - 1.2 mg/dL    ALT 8 7 - 45 U/L   Lactate   Result Value Ref Range    Lactate 1.9  0.4 - 2.0 mmol/L   B-Type Natriuretic Peptide   Result Value Ref Range     (H) 0 - 99 pg/mL   Blood Gas Venous Full Panel   Result Value Ref Range    POCT pH, Venous 7.33 7.33 - 7.43 pH    POCT pCO2, Venous 52 (H) 41 - 51 mm Hg    POCT pO2, Venous 49 (H) 35 - 45 mm Hg    POCT SO2, Venous 80 (H) 45 - 75 %    POCT Oxy Hemoglobin, Venous 71.5 45.0 - 75.0 %    POCT Hematocrit Calculated, Venous 28.0 (L) 36.0 - 46.0 %    POCT Sodium, Venous 138 136 - 145 mmol/L    POCT Potassium, Venous 3.5 3.5 - 5.3 mmol/L    POCT Chloride, Venous 100 98 - 107 mmol/L    POCT Ionized Calicum, Venous 1.19 1.10 - 1.33 mmol/L    POCT Glucose, Venous 164 (H) 74 - 99 mg/dL    POCT Lactate, Venous 2.2 (H) 0.4 - 2.0 mmol/L    POCT Base Excess, Venous 1.0 -2.0 - 3.0 mmol/L    POCT HCO3 Calculated, Venous 27.4 (H) 22.0 - 26.0 mmol/L    POCT Hemoglobin, Venous 9.2 (L) 12.0 - 16.0 g/dL    POCT Anion Gap, Venous 14.0 10.0 - 25.0 mmol/L    Patient Temperature      FiO2 0 %   Troponin I, High Sensitivity, Initial   Result Value Ref Range    Troponin I, High Sensitivity 7 0 - 13 ng/L   RSV PCR   Result Value Ref Range    RSV PCR Detected (A) Not Detected   SARS-CoV-2 RT PCR   Result Value Ref Range    Coronavirus 2019, PCR Not Detected Not Detected   Influenza A, and B PCR   Result Value Ref Range    Flu A Result Not Detected Not Detected    Flu B Result Not Detected Not Detected   Troponin, High Sensitivity, 1 Hour   Result Value Ref Range    Troponin I, High Sensitivity 7 0 - 13 ng/L   Urinalysis with Reflex Microscopic   Result Value Ref Range    Color, Urine Yellow Straw, Yellow    Appearance, Urine Clear Clear    Specific Gravity, Urine 1.011 1.005 - 1.035    pH, Urine 5.0 5.0, 5.5, 6.0, 6.5, 7.0, 7.5, 8.0    Protein, Urine NEGATIVE NEGATIVE mg/dL    Glucose, Urine NEGATIVE NEGATIVE mg/dL    Blood, Urine LARGE (3+) (A) NEGATIVE    Ketones, Urine NEGATIVE NEGATIVE mg/dL    Bilirubin, Urine NEGATIVE NEGATIVE    Urobilinogen, Urine <2.0 <2.0  mg/dL    Nitrite, Urine NEGATIVE NEGATIVE    Leukocyte Esterase, Urine NEGATIVE NEGATIVE   Microscopic Only, Urine   Result Value Ref Range    WBC, Urine 1-5 1-5, NONE /HPF    RBC, Urine >20 (A) NONE, 1-2, 3-5 /HPF    Squamous Epithelial Cells, Urine 1-9 (SPARSE) Reference range not established. /HPF    Bacteria, Urine 1+ (A) NONE SEEN /HPF    Mucus, Urine FEW Reference range not established. /LPF    Hyaline Casts, Urine 1+ (A) NONE /LPF     XR chest 1 view    Result Date: 1/2/2024  Interpreted By:  Soren Ham, STUDY: XR CHEST 1 VIEW;  1/2/2024 7:26 pm   INDICATION: Signs/Symptoms:sob.   COMPARISON: 9/26/2023   ACCESSION NUMBER(S): [id number]   ORDERING CLINICIAN: CARMEN CONKLIN   FINDINGS: The cardiac silhouette is stable in size. There are bilateral opacities concerning for infiltrates. Small right pleural effusion and basilar atelectasis or airspace disease. No pneumothorax.       Small right pleural effusion and basilar atelectasis or airspace disease and mild bilateral opacities concerning for infiltrates.   MACRO: None   Signed by: Soren Ham 1/2/2024 7:38 PM Dictation workstation:   [id number]    CT chest wo IV contrast    Result Date: 12/26/2023  Interpreted By:  Richie Wren, STUDY: CT CHEST WO IV CONTRAST;  12/26/2023 6:03 pm   INDICATION: Signs/Symptoms:cough.   COMPARISON: 09/22/2023 CT chest   ACCESSION NUMBER(S): [id number]   ORDERING CLINICIAN: JANNET VEE   TECHNIQUE: Contiguous axial images of the chest and upper abdomen were obtained without contrast. Coronal and sagittal reformatted images were reconstructed from the axial data.   FINDINGS:     MEDIASTINUM AND LYMPH NODES:  The esophagus appears within normal limits.  No enlarged intrathoracic or axillary lymph nodes by imaging criteria. No pneumomediastinum.   VESSELS:  Normal caliber thoracic aorta. Mild aortic atherosclerosis. The pulmonary artery is enlarged, measuring up to 3.6 cm, indicative of pulmonary hypertension.   HEART:  There is left atrial dilatation. Mitral annular calcifications. Mild coronary artery calcifications. No significant pericardial effusion.   LUNG, AIRWAYS, AND PLEURA: New small bilateral pleural effusions with adjacent passive atelectasis. There is new subsegmental atelectasis in the right middle lobe. There is a small amount debris in the lower trachea extending into the mainstem bronchi and bronchus intermedius. There is a small opacity in the left upper lobe and superior segment of left lower lobe consisting of tree-in-bud nodules suspicious for pneumonia the   OSSEOUS STRUCTURES: No acute osseous abnormality.   CHEST WALL SOFT TISSUES: No discernible abnormality.   UPPER ABDOMEN/OTHER: Multiple peripherally calcified splenic artery aneurysm measuring 1.3 cm, 1.3 cm, and 2.8 cm are similar to prior study. The liver appears cirrhotic.       1. Tree-in-bud opacities in the posterior left upper lobe and superior segment of the left lower lobe consistent with bronchiolitis/early pneumonia.   2. Small bilateral pleural effusions with adjacent passive atelectasis and right middle lobe subsegmental atelectasis.   3. Small amount of debris in the trachea and bilateral proximal bronchi that could be secretions or aspiration.   4. Three splenic artery aneurysm measuring up to 2.8 cm, unchanged.   MACRO: None.   Signed by: Richie Wren 12/26/2023 6:29 PM Dictation workstation:   [id number]       Assessment/Plan   Principal Problem:    RSV (respiratory syncytial virus infection)      #Acute on chronic hypoxic respiratory failure 2/2 RSV, community acquired pneumonia  #COPD, in acute exacerbation  - Patient was seen in [facility] ED 12/26, discharged on dexamethasone and doxycycline  - WBC 13.9 on admission   - Lactate 1.9  - COVID, flu A/B negative  - RSV positive  - Procal, BCx, sputum culture pending   - CXR: Small right pleural effusion and basilar atelectasis or airspace   disease and mild bilateral opacities  concerning for infiltrates.    - Continue ceftriaxone (day 2)  - Azithromycin changed to doxycycline due to drug interactions   - Solumedrol 40 mg IVP q8h; received one-time dose of 125 mg IVP in ED   - Tylenol PRN for fever   - Mucinex BID, Robitussin DM q4h PRN for cough   - DuoNebs TID    - Breo and Spiriva ordered (pharmacy substitute for home Trelegy)  - RT eval and treat protocol  - Bronchial hygiene  - Isolation protocol for RSV  - Monitor SpO2 continuously   - Baseline O2 5L continuously   - Wean O2 as tolerated; patient currently on 6L O2 with SpO2 91-93%    #Atrial fibrillation  #HTN  #HLD  #HFpEF, not in acute exacerbation  - Recent DCCV on 12/13 at [facility], follows with Dr. Siegel   - Trop 7 > 7   -    - Currently in AF with rate up to 130s with coughing fits; patient denies palpitations, dizziness, or chest pain  - Lopressor 5 mg IVP q4h PRN for HR > 120 bpm sustained for > 5 mins   - Continue warfarin, diltiazem, aspirin, atorvastatin, furosemide, and losartan  - INR pending; goal INR 2-3  - Strict I&Os  - Daily weights  - 2g Na diet   - Telemetry monitoring  - Daily PT/INR while inpatient     #Hematuria  - UA: 3+ blood, > 20 RBCs, 1+ bacteria  - UCx added  - Patient denies gross blood in urine or difficulties with urination   - Monitor UOP for blood   - Trend CBC      #T2DM with neuropathy   - Continue home Levemir, gabapentin, metformin and glimepiride   - SSI three times a day before meals while on steroids   - Hypoglycemia protocol  - Accuchecks ac/hs/prn  - Diabetic diet   - HbA1c 4.6     #Anemia, macrocytic  - H/H 9.0/30.4,   - Patient receives monthly B12 injections and is on ferrous sulfate  - B12 level was 267 last month per chart review   - Check iron studies, folate level   - Monitor for active bleeding  - Daily CBC to trend H/H    #History of mucous membrane pemphigoid  - Continue dapsone  - Resume outpatient derm follow up on discharge     DVT PPx: Warfarin  GI PPx:  Protonix   Diet: Diabetic, 2g Na   Code Status: Full code     Disposition: Patient requires inpatient management at this time.     Total accumulated time spent face to face and not face to face preparing to see the patient, obtaining and reviewing separately obtained history; performing a medically appropriate examination and/or evaluation; counseling and educating the patient, family; ordering medications, tests, or procedures; referring and communicating with other health care professionals; documenting clinical information in the patient's medical record; independently interpreting results and communicating the results to the patient, family; and care coordination was 65 minutes.       Radha Silvestre PA-C

## 2024-01-03 NOTE — CARE PLAN
The patient's goals for the shift include  ambulate without dropping my oxygen level    The clinical goals for the shift include ambulate without getting breathless

## 2024-01-03 NOTE — PROGRESS NOTES
Emergency Medicine Transition of Care Note.    I received Frannie Blanco in signout from  Evangelina Ro NP. Please see the previous ED provider note for all HPI, PE and MDM up to the time of signout at 2300.. This is in addition to the primary record.    In brief Frannie Blanco is an 73 y.o. female presenting for   Chief Complaint   Patient presents with    Shortness of Breath     Shortness of breath      At the time of signout we were awaiting: Call from transfer center.    Diagnoses as of 01/03/24 0021   Shortness of breath   COPD exacerbation (CMS/HCC)   RSV (respiratory syncytial virus infection)   Pneumonia due to infectious organism, unspecified laterality, unspecified part of lung       Medical Decision Making  Transfer center called and steroid spoke to them just before she left and they requested that we try to hold the patient here in hopes that a bed might come open here in the morning.  Apparently there are no beds anywhere in the system that would fit her needs.  [facility] but [facility] was a bit tachycardic and was felt to possibly need higher level of service and they could provide.  In the meantime the patient is comfortable here and will make sure she gets her antibiotics while waiting for a bed.        Final diagnoses:   [R06.02] Shortness of breath   [J44.1] COPD exacerbation (CMS/HCC)   [B33.8] RSV (respiratory syncytial virus infection)   [J18.9] Pneumonia due to infectious organism, unspecified laterality, unspecified part of lung           Procedure  Procedures    Mohan Prieto MD

## 2024-01-03 NOTE — PROGRESS NOTES
Emergency Medicine Transition of Care Note.    I received Frannie Blanco in signout from  TRACY Ro NP. Please see the previous ED provider note for all HPI, PE and MDM up to the time of signout at 2300. This is in addition to the primary record.    In brief Frannie Blanco is an 73 y.o. female presenting for   Chief Complaint   Patient presents with    Shortness of Breath     Shortness of breath      At the time of signout we were awaiting: Call from transfer center.    Diagnoses as of 01/03/24 0213   Shortness of breath   COPD exacerbation (CMS/HCC)   RSV (respiratory syncytial virus infection)   Pneumonia due to infectious organism, unspecified laterality, unspecified part of lung       Medical Decision Making  The only available bed tonight for this patient was to be at [facility], but she was tachycardic and they felt that she might have needs that would overwhelm what they are able to provide so transfer center is recommending that for now we hold the patient here, continue her antibiotics and fluids,  and wait for discharges from our facility or others in the morning to try to get her placed.        Final diagnoses:   [R06.02] Shortness of breath   [J44.1] COPD exacerbation (CMS/HCC)   [B33.8] RSV (respiratory syncytial virus infection)   [J18.9] Pneumonia due to infectious organism, unspecified laterality, unspecified part of lung           Procedure  Procedures    Mohan Prieto MD

## 2024-01-03 NOTE — PROGRESS NOTES
Emergency Medicine Transition of Care Note.    I received Frannie Blanco in signout from Dr. MITCH Prieto.  Please see the previous ED provider note for all HPI, PE and MDM up to the time of signout at 0700. This is in addition to the primary record.    In brief Frannie Blanco is an 73 y.o. female presenting for   Chief Complaint   Patient presents with    Shortness of Breath     Shortness of breath      At the time of signout we were awaiting: acceptance for admission    See Dr Prieto [facility]  Transfer center called and steroid spoke to them just before she left and they requested that we try to hold the patient here in hopes that a bed might come open here in the morning.  Apparently there are no beds anywhere in the system that would fit her needs.  [facility], but she was a bit tachycardic and was felt to possibly need higher level of service and they could provide.  In the meantime the patient is comfortable here and will make sure she gets her antibiotics while waiting for a bed.           Final diagnoses:   [R06.02] Shortness of breath   [J44.1] COPD exacerbation (CMS/HCC)   [B33.8] RSV (respiratory syncytial virus infection)   [J18.9] Pneumonia due to infectious organism, unspecified laterality, unspecified part of lung       Diagnoses as of 01/03/24 0758   Shortness of breath   COPD exacerbation (CMS/[facility])   RSV (respiratory syncytial virus infection)   Pneumonia due to infectious organism, unspecified laterality, unspecified part of lung     Results for orders placed or performed during the hospital encounter of 01/02/24   Urinalysis with Reflex Microscopic   Result Value Ref Range    Color, Urine Yellow Straw, Yellow    Appearance, Urine Clear Clear    Specific Gravity, Urine 1.011 1.005 - 1.035    pH, Urine 5.0 5.0, 5.5, 6.0, 6.5, 7.0, 7.5, 8.0    Protein, Urine NEGATIVE NEGATIVE mg/dL    Glucose, Urine NEGATIVE NEGATIVE mg/dL    Blood, Urine LARGE (3+) (A) NEGATIVE    Ketones, Urine  NEGATIVE NEGATIVE mg/dL    Bilirubin, Urine NEGATIVE NEGATIVE    Urobilinogen, Urine <2.0 <2.0 mg/dL    Nitrite, Urine NEGATIVE NEGATIVE    Leukocyte Esterase, Urine NEGATIVE NEGATIVE   CBC and Auto Differential   Result Value Ref Range    WBC 13.9 (H) 4.4 - 11.3 x10*3/uL    nRBC 0.0 0.0 - 0.0 /100 WBCs    RBC 2.82 (L) 4.00 - 5.20 x10*6/uL    Hemoglobin 9.0 (L) 12.0 - 16.0 g/dL    Hematocrit 30.4 (L) 36.0 - 46.0 %     (H) 80 - 100 fL    MCH 31.9 26.0 - 34.0 pg    MCHC 29.6 (L) 32.0 - 36.0 g/dL    RDW 17.5 (H) 11.5 - 14.5 %    Platelets 226 150 - 450 x10*3/uL    Neutrophils % 90.8 40.0 - 80.0 %    Immature Granulocytes %, Automated 0.3 0.0 - 0.9 %    Lymphocytes % 2.4 13.0 - 44.0 %    Monocytes % 5.6 2.0 - 10.0 %    Eosinophils % 0.6 0.0 - 6.0 %    Basophils % 0.3 0.0 - 2.0 %    Neutrophils Absolute 12.65 (H) 1.60 - 5.50 x10*3/uL    Immature Granulocytes Absolute, Automated 0.04 0.00 - 0.50 x10*3/uL    Lymphocytes Absolute 0.33 (L) 0.80 - 3.00 x10*3/uL    Monocytes Absolute 0.78 0.05 - 0.80 x10*3/uL    Eosinophils Absolute 0.08 0.00 - 0.40 x10*3/uL    Basophils Absolute 0.04 0.00 - 0.10 x10*3/uL   Comprehensive metabolic panel   Result Value Ref Range    Glucose 166 (H) 74 - 99 mg/dL    Sodium 140 136 - 145 mmol/L    Potassium 3.5 3.5 - 5.3 mmol/L    Chloride 102 98 - 107 mmol/L    Bicarbonate 29 21 - 32 mmol/L    Anion Gap 13 10 - 20 mmol/L    Urea Nitrogen 22 6 - 23 mg/dL    Creatinine 0.88 0.50 - 1.05 mg/dL    eGFR 70 >60 mL/min/1.73m*2    Calcium 9.1 8.6 - 10.3 mg/dL    Albumin 4.1 3.4 - 5.0 g/dL    Alkaline Phosphatase 48 33 - 136 U/L    Total Protein 6.7 6.4 - 8.2 g/dL    AST 8 (L) 9 - 39 U/L    Bilirubin, Total 1.5 (H) 0.0 - 1.2 mg/dL    ALT 8 7 - 45 U/L   Lactate   Result Value Ref Range    Lactate 1.9 0.4 - 2.0 mmol/L   B-Type Natriuretic Peptide   Result Value Ref Range     (H) 0 - 99 pg/mL   RSV PCR   Result Value Ref Range    RSV PCR Detected (A) Not Detected   Blood Gas Venous Full Panel    Result Value Ref Range    POCT pH, Venous 7.33 7.33 - 7.43 pH    POCT pCO2, Venous 52 (H) 41 - 51 mm Hg    POCT pO2, Venous 49 (H) 35 - 45 mm Hg    POCT SO2, Venous 80 (H) 45 - 75 %    POCT Oxy Hemoglobin, Venous 71.5 45.0 - 75.0 %    POCT Hematocrit Calculated, Venous 28.0 (L) 36.0 - 46.0 %    POCT Sodium, Venous 138 136 - 145 mmol/L    POCT Potassium, Venous 3.5 3.5 - 5.3 mmol/L    POCT Chloride, Venous 100 98 - 107 mmol/L    POCT Ionized Calicum, Venous 1.19 1.10 - 1.33 mmol/L    POCT Glucose, Venous 164 (H) 74 - 99 mg/dL    POCT Lactate, Venous 2.2 (H) 0.4 - 2.0 mmol/L    POCT Base Excess, Venous 1.0 -2.0 - 3.0 mmol/L    POCT HCO3 Calculated, Venous 27.4 (H) 22.0 - 26.0 mmol/L    POCT Hemoglobin, Venous 9.2 (L) 12.0 - 16.0 g/dL    POCT Anion Gap, Venous 14.0 10.0 - 25.0 mmol/L    Patient Temperature      FiO2 0 %   SARS-CoV-2 RT PCR   Result Value Ref Range    Coronavirus 2019, PCR Not Detected Not Detected   Influenza A, and B PCR   Result Value Ref Range    Flu A Result Not Detected Not Detected    Flu B Result Not Detected Not Detected   Troponin I, High Sensitivity, Initial   Result Value Ref Range    Troponin I, High Sensitivity 7 0 - 13 ng/L   Troponin, High Sensitivity, 1 Hour   Result Value Ref Range    Troponin I, High Sensitivity 7 0 - 13 ng/L   Microscopic Only, Urine   Result Value Ref Range    WBC, Urine 1-5 1-5, NONE /HPF    RBC, Urine >20 (A) NONE, 1-2, 3-5 /HPF    Squamous Epithelial Cells, Urine 1-9 (SPARSE) Reference range not established. /HPF    Bacteria, Urine 1+ (A) NONE SEEN /HPF    Mucus, Urine FEW Reference range not established. /LPF    Hyaline Casts, Urine 1+ (A) NONE /LPF        Medical Decision Making  I have discussed this case with [facility] Hospitalist MITA Swann and the patient is accepted for admission to [facility] pending discharges and available bed later today.  I have reviewed her orders.  Will monitor closely.        Final diagnoses:   [R06.02] Shortness of breath   [J44.1] COPD  exacerbation (CMS/[facility])   [B33.8] RSV (respiratory syncytial virus infection)   [J18.9] Pneumonia due to infectious organism, unspecified laterality, unspecified part of lung           Procedure  Procedures    Rhys Zavala DO

## 2024-01-04 PROBLEM — R06.02 SHORTNESS OF BREATH: Status: ACTIVE | Noted: 2024-01-04

## 2024-01-04 LAB
ANION GAP SERPL CALC-SCNC: 15 MMOL/L (ref 10–20)
BACTERIA SPEC RESP CULT: ABNORMAL
BNP SERPL-MCNC: 285 PG/ML (ref 0–99)
BUN SERPL-MCNC: 39 MG/DL (ref 6–23)
CALCIUM SERPL-MCNC: 8.8 MG/DL (ref 8.6–10.3)
CHLORIDE SERPL-SCNC: 102 MMOL/L (ref 98–107)
CO2 SERPL-SCNC: 27 MMOL/L (ref 21–32)
CREAT SERPL-MCNC: 0.99 MG/DL (ref 0.5–1.05)
ERYTHROCYTE [DISTWIDTH] IN BLOOD BY AUTOMATED COUNT: 17.1 % (ref 11.5–14.5)
FOLATE SERPL-MCNC: 6.2 NG/ML
GFR SERPL CREATININE-BSD FRML MDRD: 61 ML/MIN/1.73M*2
GLUCOSE BLD MANUAL STRIP-MCNC: 224 MG/DL (ref 74–99)
GLUCOSE BLD MANUAL STRIP-MCNC: 240 MG/DL (ref 74–99)
GLUCOSE BLD MANUAL STRIP-MCNC: 255 MG/DL (ref 74–99)
GLUCOSE BLD MANUAL STRIP-MCNC: 299 MG/DL (ref 74–99)
GLUCOSE SERPL-MCNC: 285 MG/DL (ref 74–99)
GRAM STN SPEC: ABNORMAL
HCT VFR BLD AUTO: 28.4 % (ref 36–46)
HGB BLD-MCNC: 8 G/DL (ref 12–16)
HOLD SPECIMEN: NORMAL
INR PPP: 3.6 (ref 0.9–1.1)
MCH RBC QN AUTO: 31.3 PG (ref 26–34)
MCHC RBC AUTO-ENTMCNC: 28.2 G/DL (ref 32–36)
MCV RBC AUTO: 111 FL (ref 80–100)
NRBC BLD-RTO: 0 /100 WBCS (ref 0–0)
PLATELET # BLD AUTO: 234 X10*3/UL (ref 150–450)
POTASSIUM SERPL-SCNC: 3.8 MMOL/L (ref 3.5–5.3)
PROCALCITONIN SERPL-MCNC: 0.75 NG/ML
PROTHROMBIN TIME: 40.9 SECONDS (ref 9.8–12.8)
RBC # BLD AUTO: 2.56 X10*6/UL (ref 4–5.2)
SODIUM SERPL-SCNC: 140 MMOL/L (ref 136–145)
WBC # BLD AUTO: 5.3 X10*3/UL (ref 4.4–11.3)

## 2024-01-04 PROCEDURE — 2500000005 HC RX 250 GENERAL PHARMACY W/O HCPCS: Mod: IPSPLIT

## 2024-01-04 PROCEDURE — 94640 AIRWAY INHALATION TREATMENT: CPT | Mod: IPSPLIT

## 2024-01-04 PROCEDURE — 80048 BASIC METABOLIC PNL TOTAL CA: CPT | Mod: IPSPLIT

## 2024-01-04 PROCEDURE — 9420000001 HC RT PATIENT EDUCATION 5 MIN: Mod: IPSPLIT

## 2024-01-04 PROCEDURE — 2500000002 HC RX 250 W HCPCS SELF ADMINISTERED DRUGS (ALT 637 FOR MEDICARE OP, ALT 636 FOR OP/ED): Mod: IPSPLIT | Performed by: INTERNAL MEDICINE

## 2024-01-04 PROCEDURE — 36415 COLL VENOUS BLD VENIPUNCTURE: CPT

## 2024-01-04 PROCEDURE — 83880 ASSAY OF NATRIURETIC PEPTIDE: CPT | Mod: IPSPLIT

## 2024-01-04 PROCEDURE — 85027 COMPLETE CBC AUTOMATED: CPT

## 2024-01-04 PROCEDURE — 94664 DEMO&/EVAL PT USE INHALER: CPT | Mod: IPSPLIT

## 2024-01-04 PROCEDURE — 87205 SMEAR GRAM STAIN: CPT | Mod: GENLAB

## 2024-01-04 PROCEDURE — 82947 ASSAY GLUCOSE BLOOD QUANT: CPT

## 2024-01-04 PROCEDURE — 1200000002 HC GENERAL ROOM WITH TELEMETRY DAILY: Mod: IPSPLIT

## 2024-01-04 PROCEDURE — 2500000001 HC RX 250 WO HCPCS SELF ADMINISTERED DRUGS (ALT 637 FOR MEDICARE OP): Mod: IPSPLIT

## 2024-01-04 PROCEDURE — 97161 PT EVAL LOW COMPLEX 20 MIN: CPT | Mod: GP,IPSPLIT | Performed by: PHYSICAL THERAPIST

## 2024-01-04 PROCEDURE — 2500000004 HC RX 250 GENERAL PHARMACY W/ HCPCS (ALT 636 FOR OP/ED)

## 2024-01-04 PROCEDURE — 85610 PROTHROMBIN TIME: CPT

## 2024-01-04 PROCEDURE — 97165 OT EVAL LOW COMPLEX 30 MIN: CPT | Mod: GO,IPSPLIT | Performed by: OCCUPATIONAL THERAPIST

## 2024-01-04 PROCEDURE — 94668 MNPJ CHEST WALL SBSQ: CPT | Mod: IPSPLIT

## 2024-01-04 PROCEDURE — 2500000002 HC RX 250 W HCPCS SELF ADMINISTERED DRUGS (ALT 637 FOR MEDICARE OP, ALT 636 FOR OP/ED): Mod: IPSPLIT

## 2024-01-04 PROCEDURE — 99233 SBSQ HOSP IP/OBS HIGH 50: CPT | Performed by: INTERNAL MEDICINE

## 2024-01-04 RX ADMIN — GLIMEPIRIDE 2 MG: 2 TABLET ORAL at 08:52

## 2024-01-04 RX ADMIN — TIOTROPIUM BROMIDE INHALATION SPRAY 2 PUFF: 3.12 SPRAY, METERED RESPIRATORY (INHALATION) at 09:29

## 2024-01-04 RX ADMIN — DOXYCYCLINE 100 MG: 100 INJECTION, POWDER, LYOPHILIZED, FOR SOLUTION INTRAVENOUS at 12:15

## 2024-01-04 RX ADMIN — GABAPENTIN 300 MG: 300 CAPSULE ORAL at 21:02

## 2024-01-04 RX ADMIN — CALCIUM CARBONATE (ANTACID) CHEW TAB 500 MG 1500 MG: 500 CHEW TAB at 23:59

## 2024-01-04 RX ADMIN — METHYLPREDNISOLONE SODIUM SUCCINATE 40 MG: 40 INJECTION, POWDER, FOR SOLUTION INTRAMUSCULAR; INTRAVENOUS at 17:02

## 2024-01-04 RX ADMIN — FERROUS SULFATE TAB 325 MG (65 MG ELEMENTAL FE) 1 TABLET: 325 (65 FE) TAB at 08:52

## 2024-01-04 RX ADMIN — IPRATROPIUM BROMIDE AND ALBUTEROL SULFATE 3 ML: .5; 3 SOLUTION RESPIRATORY (INHALATION) at 09:26

## 2024-01-04 RX ADMIN — GABAPENTIN 300 MG: 300 CAPSULE ORAL at 08:52

## 2024-01-04 RX ADMIN — ATORVASTATIN CALCIUM 20 MG: 10 TABLET, FILM COATED ORAL at 21:03

## 2024-01-04 RX ADMIN — FLUTICASONE FUROATE AND VILANTEROL TRIFENATATE 1 PUFF: 200; 25 POWDER RESPIRATORY (INHALATION) at 09:28

## 2024-01-04 RX ADMIN — PANTOPRAZOLE SODIUM 40 MG: 40 TABLET, DELAYED RELEASE ORAL at 06:39

## 2024-01-04 RX ADMIN — ASPIRIN 81 MG: 81 TABLET, COATED ORAL at 08:52

## 2024-01-04 RX ADMIN — METFORMIN HYDROCHLORIDE 1000 MG: 500 TABLET ORAL at 17:02

## 2024-01-04 RX ADMIN — METHYLPREDNISOLONE SODIUM SUCCINATE 40 MG: 40 INJECTION, POWDER, FOR SOLUTION INTRAMUSCULAR; INTRAVENOUS at 23:59

## 2024-01-04 RX ADMIN — METOPROLOL TARTRATE 5 MG: 5 INJECTION INTRAVENOUS at 11:27

## 2024-01-04 RX ADMIN — IPRATROPIUM BROMIDE AND ALBUTEROL SULFATE 3 ML: .5; 3 SOLUTION RESPIRATORY (INHALATION) at 16:17

## 2024-01-04 RX ADMIN — METHYLPREDNISOLONE SODIUM SUCCINATE 40 MG: 40 INJECTION, POWDER, FOR SOLUTION INTRAMUSCULAR; INTRAVENOUS at 08:53

## 2024-01-04 RX ADMIN — FUROSEMIDE 80 MG: 80 TABLET ORAL at 08:52

## 2024-01-04 RX ADMIN — IPRATROPIUM BROMIDE AND ALBUTEROL SULFATE 3 ML: .5; 3 SOLUTION RESPIRATORY (INHALATION) at 21:45

## 2024-01-04 RX ADMIN — METOPROLOL TARTRATE 5 MG: 5 INJECTION INTRAVENOUS at 23:59

## 2024-01-04 RX ADMIN — METHYLPREDNISOLONE SODIUM SUCCINATE 40 MG: 40 INJECTION, POWDER, FOR SOLUTION INTRAMUSCULAR; INTRAVENOUS at 00:42

## 2024-01-04 RX ADMIN — DILTIAZEM HYDROCHLORIDE 240 MG: 120 CAPSULE, COATED, EXTENDED RELEASE ORAL at 08:52

## 2024-01-04 RX ADMIN — INSULIN GLARGINE 15 UNITS: 100 INJECTION, SOLUTION SUBCUTANEOUS at 21:16

## 2024-01-04 RX ADMIN — INSULIN LISPRO 3 UNITS: 100 INJECTION, SOLUTION INTRAVENOUS; SUBCUTANEOUS at 12:15

## 2024-01-04 RX ADMIN — TOPIRAMATE 100 MG: 100 TABLET, FILM COATED ORAL at 21:03

## 2024-01-04 RX ADMIN — TOPIRAMATE 100 MG: 100 TABLET, FILM COATED ORAL at 08:52

## 2024-01-04 RX ADMIN — Medication 6 MG: at 21:03

## 2024-01-04 RX ADMIN — CALCIUM CARBONATE (ANTACID) CHEW TAB 500 MG 1500 MG: 500 CHEW TAB at 00:43

## 2024-01-04 RX ADMIN — GABAPENTIN 300 MG: 300 CAPSULE ORAL at 14:40

## 2024-01-04 RX ADMIN — INSULIN LISPRO 2 UNITS: 100 INJECTION, SOLUTION INTRAVENOUS; SUBCUTANEOUS at 17:02

## 2024-01-04 RX ADMIN — DOXYCYCLINE 100 MG: 100 INJECTION, POWDER, LYOPHILIZED, FOR SOLUTION INTRAVENOUS at 00:42

## 2024-01-04 RX ADMIN — INSULIN LISPRO 3 UNITS: 100 INJECTION, SOLUTION INTRAVENOUS; SUBCUTANEOUS at 08:53

## 2024-01-04 RX ADMIN — CALCIUM CARBONATE (ANTACID) CHEW TAB 500 MG 1500 MG: 500 CHEW TAB at 11:30

## 2024-01-04 RX ADMIN — GUAIFENESIN 600 MG: 600 TABLET, EXTENDED RELEASE ORAL at 08:52

## 2024-01-04 RX ADMIN — LOSARTAN POTASSIUM 12.5 MG: 25 TABLET, FILM COATED ORAL at 08:52

## 2024-01-04 RX ADMIN — GUAIFENESIN 600 MG: 600 TABLET, EXTENDED RELEASE ORAL at 21:03

## 2024-01-04 RX ADMIN — CEFTRIAXONE 1 G: 1 INJECTION, SOLUTION INTRAVENOUS at 10:50

## 2024-01-04 RX ADMIN — METFORMIN HYDROCHLORIDE 1000 MG: 500 TABLET ORAL at 08:53

## 2024-01-04 ASSESSMENT — COGNITIVE AND FUNCTIONAL STATUS - GENERAL
CLIMB 3 TO 5 STEPS WITH RAILING: A LOT
EATING MEALS: A LITTLE
PERSONAL GROOMING: A LITTLE
DAILY ACTIVITIY SCORE: 15
MOVING FROM LYING ON BACK TO SITTING ON SIDE OF FLAT BED WITH BEDRAILS: A LOT
MOBILITY SCORE: 13
EATING MEALS: A LITTLE
DRESSING REGULAR UPPER BODY CLOTHING: A LITTLE
MOBILITY SCORE: 17
MOVING TO AND FROM BED TO CHAIR: A LITTLE
MOVING FROM LYING ON BACK TO SITTING ON SIDE OF FLAT BED WITH BEDRAILS: A LITTLE
WALKING IN HOSPITAL ROOM: A LITTLE
TURNING FROM BACK TO SIDE WHILE IN FLAT BAD: A LITTLE
PERSONAL GROOMING: A LITTLE
TURNING FROM BACK TO SIDE WHILE IN FLAT BAD: A LOT
DRESSING REGULAR LOWER BODY CLOTHING: A LITTLE
DRESSING REGULAR UPPER BODY CLOTHING: A LITTLE
DRESSING REGULAR LOWER BODY CLOTHING: TOTAL
HELP NEEDED FOR BATHING: A LOT
HELP NEEDED FOR BATHING: A LOT
STANDING UP FROM CHAIR USING ARMS: A LITTLE
TOILETING: A LITTLE
DAILY ACTIVITIY SCORE: 15
STANDING UP FROM CHAIR USING ARMS: A LITTLE
CLIMB 3 TO 5 STEPS WITH RAILING: TOTAL
WALKING IN HOSPITAL ROOM: A LITTLE
TOILETING: TOTAL
MOVING TO AND FROM BED TO CHAIR: A LOT

## 2024-01-04 ASSESSMENT — PAIN - FUNCTIONAL ASSESSMENT
PAIN_FUNCTIONAL_ASSESSMENT: 0-10

## 2024-01-04 ASSESSMENT — PAIN SCALES - GENERAL
PAINLEVEL_OUTOF10: 5 - MODERATE PAIN
PAINLEVEL_OUTOF10: 0 - NO PAIN
PAINLEVEL_OUTOF10: 5 - MODERATE PAIN
PAINLEVEL_OUTOF10: 0 - NO PAIN

## 2024-01-04 ASSESSMENT — ACTIVITIES OF DAILY LIVING (ADL)
BATHING_ASSISTANCE: MODERATE
ADL_ASSISTANCE: NEEDS ASSISTANCE
ADL_ASSISTANCE: INDEPENDENT

## 2024-01-04 NOTE — PROGRESS NOTES
Occupational Therapy  Therapy Communication Note    Patient Name: Frannie Blanco  MRN: [id number]  Today's Date: 1/4/2024     Discipline: Occupational Therapy    Missed Visit Reason: Missed Visit Reason: Patient refused (Pt. declined OT evaluation as she was not up for getting into chair and removing purewick at this time. Educated pt. on importance of participating in therapy and potential for delaying discharge however, pt. continued to refuse. RN and TCC aware.) PLOF obtained.     Missed Time: Attempt 2220-8092

## 2024-01-04 NOTE — PROGRESS NOTES
Occupational Therapy    Evaluation    Patient Name: Frannie Blanco  MRN: [id number]  Today's Date: 1/4/2024  Time Calculation  Start Time: 1059  Stop Time: 1129  Time Calculation (min): 30 min    Assessment  IP OT Assessment  OT Assessment: Pt. is a 73 y.o. female referred to OT s/p admission for RSV. Pt. displays decline from baseline and difficulty with bathing, dressing, transfers, and mobitily and would benefit from skilled OT to address.  Prognosis: Good  Barriers to Discharge: None  Evaluation/Treatment Tolerance: Patient tolerated treatment well  Medical Staff Made Aware: Yes  End of Session Communication: Bedside nurse  End of Session Patient Position: Up in chair, Alarm on  Plan:  Treatment Interventions: ADL retraining, Functional transfer training, UE strengthening/ROM, Endurance training, Patient/family training, Equipment evaluation/education, Neuromuscular reeducation  OT Frequency: 3 times per week  OT Discharge Recommendations: Moderate intensity level of continued care  OT Recommended Transfer Status: Minimal assist, Assist of 1  OT - OK to Discharge: Yes (Based on completed evaluation and care plan recommendations, no barriers to discharge to next site of care)    Subjective   Current Problem:  1. Shortness of breath        2. COPD exacerbation (CMS/[facility])        3. RSV (respiratory syncytial virus infection)        4. Pneumonia due to infectious organism, unspecified laterality, unspecified part of lung          General:  General  Reason for Referral: impaired self-care d/t admission for RSV  Referred By: SHANI Proctor  Past Medical History Relevant to Rehab: obesity, COPD- 5L chronic  HFpEF   AF (on coumadin)   Macrocytic anemia   Lymphedema  Breast cancer  Missed Visit: Yes  Missed Visit Reason: Patient refused (Pt. still eating breakfast and declining OOB ax at this time.)  Family/Caregiver Present: No  Co-Treatment: PT  Prior to Session Communication: Bedside nurse  Patient Position  Received: Bed, 2 rail up, Alarm off, not on at start of session  General Comment: purewick, IV O2, telem, masimo  Precautions:  Medical Precautions: Fall precautions, Infection precautions  Vital Signs:  Heart Rate: (!) 125 (increased to 172, though inconsitent and quickly returned to 120s. Pt. states she has afib)  Heart Rate Source: Monitor  SpO2: 91 %  Pain:  Pain Assessment  Pain Assessment: 0-10  Pain Score: 5 - Moderate pain  Pain Type: Chronic pain  Pain Location: Hip  Pain Orientation: Right    Objective   Cognition:  Overall Cognitive Status: Within Functional Limits  Home Living:  Type of Home: House  Lives With: Adult children (grandson)  Home Adaptive Equipment: Cane  Home Layout: One level  Home Access: Stairs to enter with rails  Entrance Stairs-Rails: Right  Entrance Stairs-Number of Steps: 3  Bathroom Shower/Tub: Tub/shower unit  Bathroom Equipment: Grab bars in shower, Shower chair without back   Prior Function:  Level of [place]:  (Questionable historian. At times pt. states she was IND with mobility and ADLs but when performing tasks she states her daughter helps her with bed mobility, transfers, and getting socks/shoes on. Someone is always with her during steps.)  Receives Help From:  (daughter (works))  ADL Assistance: Independent  Homemaking Assistance: Needs assistance  Ambulatory Assistance: Independent (with cane of DANDY SANDOVAL for community)  IADL History:  Homemaking Responsibilities: No  Current License: Yes (reports she has not driven in a month)  ADL:  Eating Assistance: Independent  Grooming Assistance: Stand by (anticipated)  Bathing Assistance: Moderate (anticipated)  UE Dressing Assistance: Stand by (anticipated)  LE Dressing Assistance: Total  Toileting Assistance with Device: Total  Activity Tolerance:  Endurance: Decreased tolerance for upright activites  Bed Mobility/Transfers: Bed Mobility  Bed Mobility: Yes  Bed Mobility 1  Bed Mobility 1: Supine to sitting  Level of  Assistance 1: Minimum assistance  Bed Mobility Comments 1: assistance to scoot to EOB, verbal cueing for hand placement    Transfers  Transfer: Yes  Transfer 1  Transfer From 1: Bed to  Transfer to 1: Chair with arms  Technique 1: Sit to stand, Stand to sit  Transfer Device 1: Walker  Transfer Level of Assistance 1: Contact guard (pt. requested bed to be elevated)  Trials/Comments 1: x2; from chair SBA  Ambulation/Gait Training:  Ambulation/Gait Training  Ambulation/Gait Training Performed: Yes  Ambulation/Gait Training 1  Surface 1: Level tile  Device 1: Rolling walker  Assistance 1: Contact guard  Comments/Distance (ft) 1: assistance with steps to chair  Sitting Balance:  Static Sitting Balance  Static Sitting-Balance Support: Feet supported  Static Sitting-Level of Assistance: Independent  Dynamic Sitting Balance  Dynamic Sitting-Balance Support: Feet supported  Dynamic Sitting-Comments: close supervision  Standing Balance:  Static Standing Balance  Static Standing-Balance Support: Bilateral upper extremity supported  Static Standing-Level of Assistance: Contact guard  Dynamic Standing Balance  Dynamic Standing-Balance Support: Bilateral upper extremity supported  Dynamic Standing-Comments: contact guard  IADL's:   Homemaking Responsibilities: No  Current License: Yes (reports she has not driven in a month)  Vision: Vision - Basic Assessment  Current Vision: Wears glasses all the time  Sensation:  Sensation Comment: denies deficits in UE; neuropathy in LE  Strength:  Strength Comments: BUE: grossly 4/5  Coordination:  Movements are Fluid and Coordinated: Yes   Hand Function:  Hand Function  Gross Grasp: Functional  Coordination: Functional  Extremities: RUE   RUE : Within Functional Limits and LUE   LUE: Within Functional Limits    Outcome Measures: [facility] Daily Activity  Putting on and taking off regular lower body clothing: Total  Bathing (including washing, rinsing, drying): A lot  Putting on and taking off  regular upper body clothing: A little  Toileting, which includes using toilet, bedpan or urinal: A little  Taking care of personal grooming such as brushing teeth: A little  Eating Meals: A little  Daily Activity - Total Score: 15      Education Documentation  Body Mechanics, taught by Mariza Bocanegra OT at 1/4/2024  2:10 PM.  Learner: Patient  Readiness: Acceptance  Method: Explanation  Response: Needs Reinforcement  Comment: Edu on POC and DC rec. Edu on safety with transfers and endurance.    Precautions, taught by Mariza Bocanegra OT at 1/4/2024  2:10 PM.  Learner: Patient  Readiness: Acceptance  Method: Explanation  Response: Needs Reinforcement  Comment: Edu on POC and DC rec. Edu on safety with transfers and endurance.    ADL Training, taught by Mariza Bocanegra OT at 1/4/2024  2:10 PM.  Learner: Patient  Readiness: Acceptance  Method: Explanation  Response: Needs Reinforcement  Comment: Edu on POC and DC rec. Edu on safety with transfers and endurance.    Education Comments  No comments found.      Goals:   Encounter Problems       Encounter Problems (Active)       ADLs       Patient will perform UB and LB bathing  with supervision level of assistance.       Start:  01/04/24    Expected End:  01/18/24            Patient with complete lower body dressing with set-up and supervision level of assistance donning and doffing all LE clothes  with PRN adaptive equipment while edge of bed        Start:  01/04/24    Expected End:  01/18/24            Patient will complete toileting including hygiene clothing management/hygiene with stand by assist level of assistance and raised toilet seat, grab bars, and bedside commode.       Start:  01/04/24    Expected End:  01/18/24               TRANSFERS       Patient will complete sit to stand transfer with modified independent level of assistance and least restrictive device in order to improve safety and prepare for out of bed mobility.       Start:  01/04/24    Expected End:   01/18/24

## 2024-01-04 NOTE — PROGRESS NOTES
Occupational Therapy  Therapy Communication Note    Patient Name: Frannie Blanco  MRN: [id number]  Today's Date: 1/4/2024     Discipline: Occupational Therapy    Missed Visit Reason: Missed Visit Reason: Patient refused (Pt. still eating breakfast and declining OOB ax at this time.)    Missed Time: Attempt 0930

## 2024-01-04 NOTE — PROGRESS NOTES
"Frannie Blanco is a 73 y.o. female on day 0 of admission presenting with RSV (respiratory syncytial virus infection).    Subjective   Patient resting in bed, reports no change in condition.  Patient to continue current treatment plan with continued monitoring.  Patient has agreed to SNF, will need 3 midnights which will be Sunday.       Objective     Physical Exam  Constitutional:       Appearance: She is obese.   HENT:      Head: Normocephalic and atraumatic.      Nose: Nose normal.      Mouth/Throat:      Mouth: Mucous membranes are moist.   Eyes:      Extraocular Movements: Extraocular movements intact.   Cardiovascular:      Rate and Rhythm: Regular rhythm. Tachycardia present.      Pulses: Normal pulses.      Heart sounds: Normal heart sounds.   Pulmonary:      Effort: Pulmonary effort is normal.      Breath sounds: Wheezing present.   Abdominal:      General: Bowel sounds are normal.      Palpations: Abdomen is soft.   Musculoskeletal:         General: Normal range of motion.      Cervical back: Normal range of motion.      Right lower leg: Edema present.      Left lower leg: Edema present.      Comments: Chronic Lymphedema   Skin:     General: Skin is warm and dry.      Capillary Refill: Capillary refill takes less than 2 seconds.   Neurological:      Mental Status: She is alert. Mental status is at baseline.      Motor: Weakness present.      Gait: Gait abnormal.   Psychiatric:         Mood and Affect: Mood normal.         Behavior: Behavior normal.         Last Recorded Vitals  Blood pressure 137/81, pulse 104, temperature 36 °C (96.8 °F), temperature source Temporal, resp. rate 18, height 1.676 m (5' 6\"), weight (!) 152 kg (335 lb 12.2 oz), SpO2 91 %.  Intake/Output last 3 Shifts:  I/O last 3 completed shifts:  In: 410 (2.7 mL/kg) [P.O.:260; IV Piggyback:150]  Out: 526 (3.5 mL/kg) [Urine:526 (0.1 mL/kg/hr)]  Weight: 152.3 kg     Relevant Results  Scheduled medications  aspirin, 81 mg, oral, " Daily  atorvastatin, 20 mg, oral, Nightly  calcium carbonate, 1,500 mg, oral, q12h  cefTRIAXone, 1 g, intravenous, q24h  dapsone, 100 mg, oral, Daily before breakfast  dilTIAZem CD, 240 mg, oral, Daily  doxycycline, 100 mg, intravenous, q12h  ferrous sulfate (325 mg ferrous sulfate), 65 mg of iron, oral, Daily  tiotropium, 2 Inhalation, inhalation, Daily   And  fluticasone furoate-vilanteroL, 1 puff, inhalation, Daily  furosemide, 80 mg, oral, Daily  gabapentin, 300 mg, oral, TID  glimepiride, 2 mg, oral, Daily with breakfast  guaiFENesin, 600 mg, oral, BID  insulin glargine, 15 Units, subcutaneous, Nightly  insulin lispro, 0-5 Units, subcutaneous, TID with meals  ipratropium-albuteroL, 3 mL, nebulization, TID  losartan, 12.5 mg, oral, Daily  melatonin, 6 mg, oral, Daily  metFORMIN, 1,000 mg, oral, BID with meals  methylPREDNISolone sodium succinate (PF), 40 mg, intravenous, q8h  pantoprazole, 40 mg, oral, Daily before breakfast   Or  pantoprazole, 40 mg, intravenous, Daily before breakfast  polyethylene glycol, 17 g, oral, Daily  topiramate, 100 mg, oral, BID  [Held by provider] warfarin, 5 mg, oral, Daily      Continuous medications     PRN medications  PRN medications: acetaminophen **OR** acetaminophen **OR** acetaminophen, acetaminophen **OR** acetaminophen **OR** acetaminophen, albuterol, benzocaine-menthoL, dextromethorphan-guaifenesin, dextrose 10 % in water (D10W), dextrose, glucagon, metoprolol, ondansetron ODT **OR** ondansetron, oxygen    Results for orders placed or performed during the hospital encounter of 01/02/24 (from the past 24 hour(s))   POCT GLUCOSE   Result Value Ref Range    POCT Glucose 267 (H) 74 - 99 mg/dL   POCT GLUCOSE   Result Value Ref Range    POCT Glucose 301 (H) 74 - 99 mg/dL   CBC   Result Value Ref Range    WBC 5.3 4.4 - 11.3 x10*3/uL    nRBC 0.0 0.0 - 0.0 /100 WBCs    RBC 2.56 (L) 4.00 - 5.20 x10*6/uL    Hemoglobin 8.0 (L) 12.0 - 16.0 g/dL    Hematocrit 28.4 (L) 36.0 - 46.0 %      (H) 80 - 100 fL    MCH 31.3 26.0 - 34.0 pg    MCHC 28.2 (L) 32.0 - 36.0 g/dL    RDW 17.1 (H) 11.5 - 14.5 %    Platelets 234 150 - 450 x10*3/uL   Basic metabolic panel   Result Value Ref Range    Glucose 285 (H) 74 - 99 mg/dL    Sodium 140 136 - 145 mmol/L    Potassium 3.8 3.5 - 5.3 mmol/L    Chloride 102 98 - 107 mmol/L    Bicarbonate 27 21 - 32 mmol/L    Anion Gap 15 10 - 20 mmol/L    Urea Nitrogen 39 (H) 6 - 23 mg/dL    Creatinine 0.99 0.50 - 1.05 mg/dL    eGFR 61 >60 mL/min/1.73m*2    Calcium 8.8 8.6 - 10.3 mg/dL   Protime-INR   Result Value Ref Range    Protime 40.9 (H) 9.8 - 12.8 seconds    INR 3.6 (H) 0.9 - 1.1   SST TOP   Result Value Ref Range    Extra Tube Hold for add-ons.    POCT GLUCOSE   Result Value Ref Range    POCT Glucose 255 (H) 74 - 99 mg/dL   POCT GLUCOSE   Result Value Ref Range    POCT Glucose 299 (H) 74 - 99 mg/dL     ECG 12 lead    Result Date: 1/3/2024  Atrial fibrillation with rapid ventricular response Low voltage QRS Cannot rule out Anterior infarct , age undetermined Abnormal ECG When compared with ECG of 13-DEC-2023 09:49, Atrial fibrillation has replaced Sinus rhythm See ED provider note for full interpretation and clinical correlation Confirmed by Jamir Salvador ([id number]) on 1/3/2024 10:49:37 PM    XR chest 1 view    Result Date: 1/2/2024  Interpreted By:  Soren Ham, STUDY: XR CHEST 1 VIEW;  1/2/2024 7:26 pm   INDICATION: Signs/Symptoms:sob.   COMPARISON: 9/26/2023   ACCESSION NUMBER(S): [id number]   ORDERING CLINICIAN: CARMEN CONKLIN   FINDINGS: The cardiac silhouette is stable in size. There are bilateral opacities concerning for infiltrates. Small right pleural effusion and basilar atelectasis or airspace disease. No pneumothorax.       Small right pleural effusion and basilar atelectasis or airspace disease and mild bilateral opacities concerning for infiltrates.   MACRO: None   Signed by: Soren Ham 1/2/2024 7:38 PM Dictation workstation:   [id number]    CT chest  wo IV contrast    Result Date: 12/26/2023  Interpreted By:  Richie Wren, STUDY: CT CHEST WO IV CONTRAST;  12/26/2023 6:03 pm   INDICATION: Signs/Symptoms:cough.   COMPARISON: 09/22/2023 CT chest   ACCESSION NUMBER(S): [id number]   ORDERING CLINICIAN: JANNET VEE   TECHNIQUE: Contiguous axial images of the chest and upper abdomen were obtained without contrast. Coronal and sagittal reformatted images were reconstructed from the axial data.   FINDINGS:     MEDIASTINUM AND LYMPH NODES:  The esophagus appears within normal limits.  No enlarged intrathoracic or axillary lymph nodes by imaging criteria. No pneumomediastinum.   VESSELS:  Normal caliber thoracic aorta. Mild aortic atherosclerosis. The pulmonary artery is enlarged, measuring up to 3.6 cm, indicative of pulmonary hypertension.   HEART: There is left atrial dilatation. Mitral annular calcifications. Mild coronary artery calcifications. No significant pericardial effusion.   LUNG, AIRWAYS, AND PLEURA: New small bilateral pleural effusions with adjacent passive atelectasis. There is new subsegmental atelectasis in the right middle lobe. There is a small amount debris in the lower trachea extending into the mainstem bronchi and bronchus intermedius. There is a small opacity in the left upper lobe and superior segment of left lower lobe consisting of tree-in-bud nodules suspicious for pneumonia the   OSSEOUS STRUCTURES: No acute osseous abnormality.   CHEST WALL SOFT TISSUES: No discernible abnormality.   UPPER ABDOMEN/OTHER: Multiple peripherally calcified splenic artery aneurysm measuring 1.3 cm, 1.3 cm, and 2.8 cm are similar to prior study. The liver appears cirrhotic.       1. Tree-in-bud opacities in the posterior left upper lobe and superior segment of the left lower lobe consistent with bronchiolitis/early pneumonia.   2. Small bilateral pleural effusions with adjacent passive atelectasis and right middle lobe subsegmental atelectasis.   3. Small  amount of debris in the trachea and bilateral proximal bronchi that could be secretions or aspiration.   4. Three splenic artery aneurysm measuring up to 2.8 cm, unchanged.   MACRO: None.   Signed by: Richie Wren 12/26/2023 6:29 PM Dictation workstation:   [id number]    ECG 12 lead (Clinic Performed)    Result Date: 12/16/2023  Atrial fibrillation Poor R-wave progression Abnormal ECG When compared with ECG of 25-SEP-2023 10:00, Nonspecific T wave abnormality, improved in Inferior leads Confirmed by Raymon Beaulieu ([id number]) on 12/16/2023 11:49:53 PM    ECG 12 Lead    Result Date: 12/14/2023  Atrial fibrillation Low voltage QRS Abnormal ECG When compared with ECG of 11-DEC-2023 15:22, (unconfirmed) No significant change was found Confirmed by Zaki Stauffer ([id number]) on 12/14/2023 5:32:09 PM    ECG 12 lead    Result Date: 12/14/2023  Sinus rhythm with Premature supraventricular complexes Low voltage QRS Borderline ECG When compared with ECG of 13-DEC-2023 07:33, (unconfirmed) Sinus rhythm has replaced Atrial fibrillation Confirmed by Zaki Stauffer ([id number]) on 12/14/2023 5:31:33 PM    ECG 12 Lead    Result Date: 12/14/2023  Sinus rhythm with Premature atrial complexes Low voltage QRS Borderline ECG When compared with ECG of 13-DEC-2023 08:45, (unconfirmed) No significant change was found Confirmed by Zaki Stauffer ([id number]) on 12/14/2023 5:31:14 PM       Assessment/Plan   Principal Problem:    RSV (respiratory syncytial virus infection)  Active Problems:    Shortness of breath    #Acute on chronic hypoxic respiratory failure 2/2 RSV, community acquired pneumonia  #COPD, in acute exacerbation  - Patient was seen in [facility] ED 12/26, discharged on dexamethasone and doxycycline  - WBC 13.9 > 5.3  - Lactate 1.9  - COVID, flu A/B negative  - RSV positive  - Procal, BCx, sputum culture pending   - CXR: Small right pleural effusion and basilar atelectasis or airspace   disease and mild bilateral opacities concerning for  infiltrates.    - Continue ceftriaxone (day 2)  - Azithromycin changed to doxycycline due to drug interactions (Day 2)  - Solumedrol 40 mg IVP q8h; received one-time dose of 125 mg IVP in ED   - Tylenol PRN for fever   - Mucinex BID, Robitussin DM q4h PRN for cough   - DuoNebs TID    - Breo and Spiriva ordered (pharmacy substitute for home Trelegy)  - RT eval and treat protocol  - Bronchial hygiene  - Isolation protocol for RSV  - Monitor SpO2 continuously   - Baseline O2 5L continuously   - Wean O2 as tolerated; patient currently on 6L O2 with SpO2 91-93%     #Atrial fibrillation  #HTN  #HLD  #HFpEF, not in acute exacerbation  - Recent D[facility]V on 12/13 at [facility], follows with Dr. Siegel   - Trop 7 > 7   -    - Currently in AF with rate up to 130s with coughing fits; patient denies palpitations, dizziness, or chest pain  - Lopressor 5 mg IVP q4h PRN for HR > 120 bpm sustained for > 5 mins   - Continue diltiazem, aspirin, atorvastatin, furosemide, and losartan  - INR 5.2 > 3.6  - Warfarin held   - INR pending; goal INR 2-3  - Strict I&Os  - Daily weights  - 2g Na diet   - Telemetry monitoring  - Daily PT/INR while inpatient      #Hematuria  - UA: 3+ blood, > 20 RBCs, 1+ bacteria  - UCx added  - Patient denies gross blood in urine or difficulties with urination   - Monitor UOP for blood   - Trend CBC         #T2DM with neuropathy   - Continue home Levemir, gabapentin, metformin and glimepiride   - SSI three times a day before meals while on steroids   - Hypoglycemia protocol  - Accuchecks ac/hs/prn  - Diabetic diet   - HbA1c 4.6      #Anemia, macrocytic  - H/H 9.0/30.4 > 8/28.4  > 111  - Patient receives monthly B12 injections and is on ferrous sulfate  - B12 level was 267 last month per chart review   - Check iron studies, folate level   - Monitor for active bleeding  - Daily CBC to trend H/H     #History of mucous membrane pemphigoid  - Continue dapsone  - Resume outpatient derm follow up on discharge       DVT PPx: Warfarin  GI PPx: Protonix   Diet: Diabetic, 2g Na   Code Status: Full code      Disposition: Patient requires inpatient management at this time.      Total accumulated time spent face to face and not face to face preparing to see the patient, obtaining and reviewing separately obtained history; performing a medically appropriate examination and/or evaluation; counseling and educating the patient, family; ordering medications, tests, or procedures; referring and communicating with other health care professionals; documenting clinical information in the patient's medical record; independently interpreting results and communicating the results to the patient, family; and care coordination was 45 minutes.       Kadie Luque, APRN-CNP

## 2024-01-04 NOTE — PROGRESS NOTES
Physical Therapy    Physical Therapy Evaluation    Patient Name: Frannie Blanco  MRN: [id number]  Today's Date: 1/4/2024   Time Calculation  Start Time: 1100  Stop Time: 1130  Time Calculation (min): 30 min    Assessment/Plan   PT Assessment  PT Assessment Results: Decreased strength, Decreased endurance, Impaired balance, Decreased mobility, Obesity, Pain  Rehab Prognosis: Good  Evaluation/Treatment Tolerance: Patient limited by fatigue  End of Session Communication: Bedside nurse  Assessment Comment: Pleasant 73 y.o presents with impaired mobility and weakness. Pt. lives with daughter and normally is able to amb. with SC or WW IND. Pt. has 3 stairs to enter (anticipate pt. having difficulty with these at her current level). Pt. currently requires Mary for bed mobility, demonstrates decreased activity tolerance and would benefit from additional PT to address above noted limitations and prevent further decline.  End of Session Patient Position: Up in chair, Alarm on  IP OR SWING BED PT PLAN  Inpatient or Swing Bed: Inpatient  PT Plan  Treatment/Interventions: Bed mobility, Transfer training, Stair training, Strengthening, Gait training, Balance training, Endurance training, Therapeutic exercise, Therapeutic activity  PT Plan: Skilled PT  PT Frequency: 3 times per week  PT Discharge Recommendations: Moderate intensity level of continued care  PT Recommended Transfer Status: Assist x1  PT - OK to Discharge: Yes Based on completed evaluation and care plan recommendations, no barriers to discharge to next site of care        Subjective   General Visit Information:  General  Reason for Referral: impaired mobility, RSV  Referred By: Radha Silvestre PA-C  Past Medical History Relevant to Rehab: obesity, COPD- 5L chronic  HFpEF   AF (on coumadin)   Macrocytic anemia   Lymphedema  Breast cancer  Missed Visit: Yes  Missed Visit Reason: Patient refused (pt. still eating breakfast and declining to get up to chair at this  time.)  Family/Caregiver Present: No  Prior to Session Communication: Bedside nurse  Patient Position Received: Bed, 2 rail up, Alarm off, not on at start of session  General Comment: MAVIS rodríguez, 02, tele, masimo  Home Living:  Home Living  Type of Home: House  Lives With:  (daughter (works))  Home Adaptive Equipment: Cane, Walker rolling or standard, Wheelchair-manual  Home Layout: One level  Home Access: Stairs to enter with rails  Entrance Stairs-Rails: Right  Entrance Stairs-Number of Steps: 3  Bathroom Shower/Tub: Tub/shower unit  Bathroom Equipment:  (shower chair, grab bars)  Prior Level of Function:  Prior Function Per Pt/Caregiver Report  Level of [place]:  (Questionable historian. At times pt. states she was IND with mobility and ADLs but when performing tasks she states her daughter helps her with bed mobility, transfers, and getting socks/shoes on. Someone is always with her during steps.)  Receives Help From:  (daughter (works))  ADL Assistance: Needs assistance  Homemaking Assistance: Needs assistance  Ambulatory Assistance: Independent (with cane of DANDY SANDOVAL for community)  Precautions:  Precautions  Medical Precautions: Fall precautions, Infection precautions  Vital Signs:  Vital Signs  Heart Rate:  (increased to 150's at times when sitting EOB)  SpO2: 91 % dropped to 89% when sitting EOB    Objective   Pain:  Pain Assessment  Pain Assessment: 0-10  Pain Score: 5 - Moderate pain  Pain Type:  (chronic R hip pain)  Cognition:  Cognition  Overall Cognitive Status: Within Functional Limits    General Assessments:     Activity Tolerance  Endurance: Decreased tolerance for upright activites (SP02 dropped to 89% with activity and pt. became tachycardic)    Strength  Strength Comments: B hip flexion: 3+/5; 4-/5 distally (pt. stated she felt weaker)  Coordination  Movements are Fluid and Coordinated: Yes    Static Sitting Balance  Static Sitting-Comment/Number of Minutes: good  Dynamic Sitting  Balance  Dynamic Sitting-Comments: good    Static Standing Balance  Static Standing-Comment/Number of Minutes: F+ with WW  Dynamic Standing Balance  Dynamic Standing-Comments: F+ with WW  Functional Assessments:  Bed Mobility  Bed Mobility: Yes  Bed Mobility 1  Bed Mobility 1: Supine to sitting, Scooting  Level of Assistance 1: Minimum assistance  Bed Mobility Comments 1: pt. required additional time/effort to complete. Pt. was requesting for help at times without trying first.    Transfers  Transfer: Yes  Transfer 1  Transfer Device 1: Walker  Transfer Level of Assistance 1: Contact guard (with bed elevated. Pt. was able to complete transfer from chair with SBA.)  Trials/Comments 1: x2    Ambulation/Gait Training  Ambulation/Gait Training Performed: Yes  Ambulation/Gait Training 1  Device 1: Rolling walker  Assistance 1: Contact guard  Quality of Gait 1: Wide base of support, Decreased step length  Comments/Distance (ft) 1: 3 steps to chair  Extremity/Trunk Assessments:  Defer to OT for UE detail   RLE   RLE : Within Functional Limits (slightly limited 2/2 body habitus)  LLE   LLE : Within Functional Limits (slightly limited 2/2 body habitus)  Outcome Measures:  [facility] Basic Mobility  Turning from your back to your side while in a flat bed without using bedrails: A little  Moving from lying on your back to sitting on the side of a flat bed without using bedrails: A little  Moving to and from bed to chair (including a wheelchair): A little  Standing up from a chair using your arms (e.g. wheelchair or bedside chair): A little  To walk in hospital room: A little  Climbing 3-5 steps with railing: A lot  Basic Mobility - Total Score: 17    Encounter Problems       Encounter Problems (Active)       Balance       STG - Maintains dynamic standing balance without upper extremity support >=5 min        Start:  01/04/24    Expected End:  01/18/24               Mobility       LTG - Patient will ambulate household distance with  WW Leela        Start:  01/04/24    Expected End:  01/18/24            LTG - Patient will navigate 4 steps with 1 rail and cane with CGA       Start:  01/04/24    Expected End:  01/18/24               Pain - Adult          Transfers       STG - Patient will perform bed mobility with SBA        Start:  01/04/24    Expected End:  01/18/24            STG - Patient will transfer sit to and from stand Leela with WW        Start:  01/04/24    Expected End:  01/18/24                   Education Documentation  Mobility Training, taught by Jazmyne Ariza, PT at 1/4/2024 12:57 PM.  Learner: Patient  Readiness: Acceptance  Method: Explanation  Response: Verbalizes Understanding  Comment: Educated pt. on PT POC    Education Comments  No comments found.

## 2024-01-04 NOTE — PROGRESS NOTES
Physical Therapy                 Therapy Communication Note    Patient Name: Frannie Blanco  MRN: [id number]  Today's Date: 1/4/2024     Discipline: Physical Therapy    Missed Visit Reason: Missed Visit Reason: Patient refused (pt. still eating breakfast and declining to get up to chair at this time.)    Missed Time: Attempt :958

## 2024-01-04 NOTE — CARE PLAN
The patient's goals for the shift include      The clinical goals for the shift include Patient will get up to chair, tolerate baseline 5L NC and have no SOB while up in the chair.    Over the shift, the patient did not make progress toward the following goals. Barriers to progression include . Recommendations to address these barriers include .

## 2024-01-04 NOTE — CARE PLAN
The patient's goals for the shift include  rest to promote healing    The clinical goals for the shift include pt will tolerate 6L NC, and have no c/o SOB while at rest    Over the shift, the patient remained safe, and VSS. HR at times elevated >120, but did not sustain HR >120 for >5 minutes. HR on telemetry now 100-110, and remains in afib. Tolerated O2 and had no c/o SOB, no c/o pain. Rested well during the night. Tolerated IV antibiotics and remained afebrile.

## 2024-01-04 NOTE — PROGRESS NOTES
Patient informed of recommendation for MOD level of therapy/Skilled Nursing Facility upon discharge.  Printed insurance choice list for patient.  SNF choice (s) is/are:   1. [facility]  3. [facility].  [facility] will initiate referral for rehab.  Updated medical team.  TCC following for discharge planning changes.    3:09 PM  [facility] has accepted patient and [facility] can as well.  Will keep the SNFs updated.

## 2024-01-05 LAB
ANION GAP SERPL CALC-SCNC: 15 MMOL/L (ref 10–20)
BACTERIA UR CULT: NORMAL
BNP SERPL-MCNC: 368 PG/ML (ref 0–99)
BUN SERPL-MCNC: 47 MG/DL (ref 6–23)
CALCIUM SERPL-MCNC: 9 MG/DL (ref 8.6–10.3)
CARDIAC TROPONIN I PNL SERPL HS: 6 NG/L (ref 0–13)
CHLORIDE SERPL-SCNC: 101 MMOL/L (ref 98–107)
CO2 SERPL-SCNC: 27 MMOL/L (ref 21–32)
CREAT SERPL-MCNC: 1.19 MG/DL (ref 0.5–1.05)
GFR SERPL CREATININE-BSD FRML MDRD: 49 ML/MIN/1.73M*2
GLUCOSE BLD MANUAL STRIP-MCNC: 226 MG/DL (ref 74–99)
GLUCOSE BLD MANUAL STRIP-MCNC: 241 MG/DL (ref 74–99)
GLUCOSE BLD MANUAL STRIP-MCNC: 275 MG/DL (ref 74–99)
GLUCOSE BLD MANUAL STRIP-MCNC: 279 MG/DL (ref 74–99)
GLUCOSE SERPL-MCNC: 235 MG/DL (ref 74–99)
INR PPP: 2.2 (ref 0.9–1.1)
MAGNESIUM SERPL-MCNC: 2.09 MG/DL (ref 1.6–2.4)
POTASSIUM SERPL-SCNC: 4.2 MMOL/L (ref 3.5–5.3)
PROTHROMBIN TIME: 25.2 SECONDS (ref 9.8–12.8)
SODIUM SERPL-SCNC: 139 MMOL/L (ref 136–145)

## 2024-01-05 PROCEDURE — 83880 ASSAY OF NATRIURETIC PEPTIDE: CPT | Mod: IPSPLIT

## 2024-01-05 PROCEDURE — 36415 COLL VENOUS BLD VENIPUNCTURE: CPT | Mod: IPSPLIT

## 2024-01-05 PROCEDURE — 2500000004 HC RX 250 GENERAL PHARMACY W/ HCPCS (ALT 636 FOR OP/ED): Mod: IPSPLIT

## 2024-01-05 PROCEDURE — 94668 MNPJ CHEST WALL SBSQ: CPT | Mod: IPSPLIT

## 2024-01-05 PROCEDURE — 97110 THERAPEUTIC EXERCISES: CPT | Mod: GP,CQ,IPSPLIT

## 2024-01-05 PROCEDURE — 85610 PROTHROMBIN TIME: CPT | Mod: IPSPLIT

## 2024-01-05 PROCEDURE — 84484 ASSAY OF TROPONIN QUANT: CPT | Mod: IPSPLIT | Performed by: NURSE PRACTITIONER

## 2024-01-05 PROCEDURE — 2500000001 HC RX 250 WO HCPCS SELF ADMINISTERED DRUGS (ALT 637 FOR MEDICARE OP): Mod: IPSPLIT | Performed by: NURSE PRACTITIONER

## 2024-01-05 PROCEDURE — 1200000002 HC GENERAL ROOM WITH TELEMETRY DAILY: Mod: IPSPLIT

## 2024-01-05 PROCEDURE — 9420000001 HC RT PATIENT EDUCATION 5 MIN: Mod: IPSPLIT

## 2024-01-05 PROCEDURE — 80048 BASIC METABOLIC PNL TOTAL CA: CPT | Mod: IPSPLIT | Performed by: NURSE PRACTITIONER

## 2024-01-05 PROCEDURE — 97535 SELF CARE MNGMENT TRAINING: CPT | Mod: GO,IPSPLIT | Performed by: OCCUPATIONAL THERAPIST

## 2024-01-05 PROCEDURE — 2500000002 HC RX 250 W HCPCS SELF ADMINISTERED DRUGS (ALT 637 FOR MEDICARE OP, ALT 636 FOR OP/ED): Mod: IPSPLIT | Performed by: INTERNAL MEDICINE

## 2024-01-05 PROCEDURE — 97116 GAIT TRAINING THERAPY: CPT | Mod: GP,CQ,IPSPLIT

## 2024-01-05 PROCEDURE — 2500000002 HC RX 250 W HCPCS SELF ADMINISTERED DRUGS (ALT 637 FOR MEDICARE OP, ALT 636 FOR OP/ED): Mod: IPSPLIT

## 2024-01-05 PROCEDURE — 36415 COLL VENOUS BLD VENIPUNCTURE: CPT | Mod: IPSPLIT | Performed by: NURSE PRACTITIONER

## 2024-01-05 PROCEDURE — 82947 ASSAY GLUCOSE BLOOD QUANT: CPT | Mod: IPSPLIT

## 2024-01-05 PROCEDURE — 2500000001 HC RX 250 WO HCPCS SELF ADMINISTERED DRUGS (ALT 637 FOR MEDICARE OP): Mod: IPSPLIT

## 2024-01-05 PROCEDURE — 83735 ASSAY OF MAGNESIUM: CPT | Mod: IPSPLIT | Performed by: NURSE PRACTITIONER

## 2024-01-05 PROCEDURE — 99233 SBSQ HOSP IP/OBS HIGH 50: CPT | Performed by: NURSE PRACTITIONER

## 2024-01-05 PROCEDURE — 94640 AIRWAY INHALATION TREATMENT: CPT | Mod: IPSPLIT

## 2024-01-05 PROCEDURE — 2500000005 HC RX 250 GENERAL PHARMACY W/O HCPCS: Mod: IPSPLIT

## 2024-01-05 RX ORDER — BISACODYL 10 MG/1
10 SUPPOSITORY RECTAL DAILY PRN
Status: DISCONTINUED | OUTPATIENT
Start: 2024-01-05 | End: 2024-01-17 | Stop reason: HOSPADM

## 2024-01-05 RX ORDER — DOCUSATE SODIUM 100 MG/1
100 CAPSULE, LIQUID FILLED ORAL 2 TIMES DAILY
Status: DISCONTINUED | OUTPATIENT
Start: 2024-01-05 | End: 2024-01-17 | Stop reason: HOSPADM

## 2024-01-05 RX ORDER — SODIUM CHLORIDE 9 MG/ML
INJECTION, SOLUTION INTRAVENOUS
Status: COMPLETED
Start: 2024-01-05 | End: 2024-01-05

## 2024-01-05 RX ADMIN — DILTIAZEM HYDROCHLORIDE 240 MG: 120 CAPSULE, COATED, EXTENDED RELEASE ORAL at 08:08

## 2024-01-05 RX ADMIN — METFORMIN HYDROCHLORIDE 1000 MG: 500 TABLET ORAL at 08:10

## 2024-01-05 RX ADMIN — INSULIN GLARGINE 15 UNITS: 100 INJECTION, SOLUTION SUBCUTANEOUS at 20:37

## 2024-01-05 RX ADMIN — INSULIN LISPRO 2 UNITS: 100 INJECTION, SOLUTION INTRAVENOUS; SUBCUTANEOUS at 11:45

## 2024-01-05 RX ADMIN — METHYLPREDNISOLONE SODIUM SUCCINATE 40 MG: 40 INJECTION, POWDER, FOR SOLUTION INTRAMUSCULAR; INTRAVENOUS at 23:36

## 2024-01-05 RX ADMIN — PANTOPRAZOLE SODIUM 40 MG: 40 TABLET, DELAYED RELEASE ORAL at 06:20

## 2024-01-05 RX ADMIN — GABAPENTIN 300 MG: 300 CAPSULE ORAL at 08:12

## 2024-01-05 RX ADMIN — METHYLPREDNISOLONE SODIUM SUCCINATE 40 MG: 40 INJECTION, POWDER, FOR SOLUTION INTRAMUSCULAR; INTRAVENOUS at 08:11

## 2024-01-05 RX ADMIN — TIOTROPIUM BROMIDE INHALATION SPRAY 2 PUFF: 3.12 SPRAY, METERED RESPIRATORY (INHALATION) at 10:48

## 2024-01-05 RX ADMIN — GABAPENTIN 300 MG: 300 CAPSULE ORAL at 21:05

## 2024-01-05 RX ADMIN — DOXYCYCLINE 100 MG: 100 INJECTION, POWDER, LYOPHILIZED, FOR SOLUTION INTRAVENOUS at 23:36

## 2024-01-05 RX ADMIN — IPRATROPIUM BROMIDE AND ALBUTEROL SULFATE 3 ML: .5; 3 SOLUTION RESPIRATORY (INHALATION) at 21:42

## 2024-01-05 RX ADMIN — SODIUM CHLORIDE 250 ML: 9 INJECTION, SOLUTION INTRAVENOUS at 20:39

## 2024-01-05 RX ADMIN — DOXYCYCLINE 100 MG: 100 INJECTION, POWDER, LYOPHILIZED, FOR SOLUTION INTRAVENOUS at 11:45

## 2024-01-05 RX ADMIN — METHYLPREDNISOLONE SODIUM SUCCINATE 40 MG: 40 INJECTION, POWDER, FOR SOLUTION INTRAMUSCULAR; INTRAVENOUS at 17:00

## 2024-01-05 RX ADMIN — IPRATROPIUM BROMIDE AND ALBUTEROL SULFATE 3 ML: .5; 3 SOLUTION RESPIRATORY (INHALATION) at 15:29

## 2024-01-05 RX ADMIN — DAPSONE 100 MG: 25 TABLET ORAL at 06:20

## 2024-01-05 RX ADMIN — LOSARTAN POTASSIUM 12.5 MG: 25 TABLET, FILM COATED ORAL at 08:12

## 2024-01-05 RX ADMIN — INSULIN LISPRO 2 UNITS: 100 INJECTION, SOLUTION INTRAVENOUS; SUBCUTANEOUS at 08:03

## 2024-01-05 RX ADMIN — DOCUSATE SODIUM 100 MG: 100 CAPSULE, LIQUID FILLED ORAL at 11:48

## 2024-01-05 RX ADMIN — GUAIFENESIN 600 MG: 600 TABLET, EXTENDED RELEASE ORAL at 20:34

## 2024-01-05 RX ADMIN — METFORMIN HYDROCHLORIDE 1000 MG: 500 TABLET ORAL at 16:51

## 2024-01-05 RX ADMIN — SODIUM CHLORIDE 250 ML: 9 INJECTION, SOLUTION INTRAVENOUS at 06:19

## 2024-01-05 RX ADMIN — POLYETHYLENE GLYCOL 3350 17 G: 17 POWDER, FOR SOLUTION ORAL at 08:08

## 2024-01-05 RX ADMIN — DOCUSATE SODIUM 100 MG: 100 CAPSULE, LIQUID FILLED ORAL at 20:35

## 2024-01-05 RX ADMIN — FUROSEMIDE 80 MG: 80 TABLET ORAL at 08:11

## 2024-01-05 RX ADMIN — METOPROLOL TARTRATE 5 MG: 5 INJECTION INTRAVENOUS at 18:28

## 2024-01-05 RX ADMIN — TOPIRAMATE 100 MG: 100 TABLET, FILM COATED ORAL at 08:12

## 2024-01-05 RX ADMIN — INSULIN LISPRO 3 UNITS: 100 INJECTION, SOLUTION INTRAVENOUS; SUBCUTANEOUS at 16:52

## 2024-01-05 RX ADMIN — CEFTRIAXONE 1 G: 1 INJECTION, SOLUTION INTRAVENOUS at 08:09

## 2024-01-05 RX ADMIN — GABAPENTIN 300 MG: 300 CAPSULE ORAL at 14:48

## 2024-01-05 RX ADMIN — IPRATROPIUM BROMIDE AND ALBUTEROL SULFATE 3 ML: .5; 3 SOLUTION RESPIRATORY (INHALATION) at 10:48

## 2024-01-05 RX ADMIN — TOPIRAMATE 100 MG: 100 TABLET, FILM COATED ORAL at 20:35

## 2024-01-05 RX ADMIN — Medication 6 MG: at 20:35

## 2024-01-05 RX ADMIN — ASPIRIN 81 MG: 81 TABLET, COATED ORAL at 08:11

## 2024-01-05 RX ADMIN — FERROUS SULFATE TAB 325 MG (65 MG ELEMENTAL FE) 1 TABLET: 325 (65 FE) TAB at 08:12

## 2024-01-05 RX ADMIN — WARFARIN SODIUM 5 MG: 5 TABLET ORAL at 16:51

## 2024-01-05 RX ADMIN — GUAIFENESIN 600 MG: 600 TABLET, EXTENDED RELEASE ORAL at 08:12

## 2024-01-05 RX ADMIN — ATORVASTATIN CALCIUM 20 MG: 10 TABLET, FILM COATED ORAL at 20:35

## 2024-01-05 RX ADMIN — GLIMEPIRIDE 2 MG: 2 TABLET ORAL at 08:11

## 2024-01-05 RX ADMIN — CALCIUM CARBONATE (ANTACID) CHEW TAB 500 MG 1500 MG: 500 CHEW TAB at 11:45

## 2024-01-05 RX ADMIN — FLUTICASONE FUROATE AND VILANTEROL TRIFENATATE 1 PUFF: 200; 25 POWDER RESPIRATORY (INHALATION) at 10:48

## 2024-01-05 RX ADMIN — CALCIUM CARBONATE (ANTACID) CHEW TAB 500 MG 1500 MG: 500 CHEW TAB at 23:36

## 2024-01-05 RX ADMIN — DOXYCYCLINE 100 MG: 100 INJECTION, POWDER, LYOPHILIZED, FOR SOLUTION INTRAVENOUS at 00:30

## 2024-01-05 ASSESSMENT — COGNITIVE AND FUNCTIONAL STATUS - GENERAL
STANDING UP FROM CHAIR USING ARMS: A LITTLE
HELP NEEDED FOR BATHING: A LOT
DAILY ACTIVITIY SCORE: 17
WALKING IN HOSPITAL ROOM: A LITTLE
PERSONAL GROOMING: A LITTLE
PERSONAL GROOMING: A LITTLE
MOBILITY SCORE: 17
CLIMB 3 TO 5 STEPS WITH RAILING: A LOT
MOVING TO AND FROM BED TO CHAIR: A LITTLE
DRESSING REGULAR UPPER BODY CLOTHING: A LITTLE
DAILY ACTIVITIY SCORE: 17
TOILETING: A LITTLE
TURNING FROM BACK TO SIDE WHILE IN FLAT BAD: A LITTLE
HELP NEEDED FOR BATHING: A LOT
DRESSING REGULAR UPPER BODY CLOTHING: A LITTLE
WALKING IN HOSPITAL ROOM: A LITTLE
MOVING FROM LYING ON BACK TO SITTING ON SIDE OF FLAT BED WITH BEDRAILS: A LITTLE
DRESSING REGULAR LOWER BODY CLOTHING: A LOT
MOBILITY SCORE: 17
MOVING TO AND FROM BED TO CHAIR: A LITTLE
MOVING FROM LYING ON BACK TO SITTING ON SIDE OF FLAT BED WITH BEDRAILS: A LITTLE
DRESSING REGULAR LOWER BODY CLOTHING: A LOT
TURNING FROM BACK TO SIDE WHILE IN FLAT BAD: A LITTLE
CLIMB 3 TO 5 STEPS WITH RAILING: A LOT
TOILETING: A LITTLE
STANDING UP FROM CHAIR USING ARMS: A LITTLE

## 2024-01-05 ASSESSMENT — PAIN SCALES - GENERAL
PAINLEVEL_OUTOF10: 0 - NO PAIN
PAINLEVEL_OUTOF10: 6
PAINLEVEL_OUTOF10: 0 - NO PAIN
PAINLEVEL_OUTOF10: 0 - NO PAIN

## 2024-01-05 ASSESSMENT — PAIN - FUNCTIONAL ASSESSMENT
PAIN_FUNCTIONAL_ASSESSMENT: 0-10

## 2024-01-05 ASSESSMENT — ACTIVITIES OF DAILY LIVING (ADL): HOME_MANAGEMENT_TIME_ENTRY: 16

## 2024-01-05 NOTE — CARE PLAN
The patient's goals for the shift include      The clinical goals for the shift include patient will remain free from injury during shift    Over the shift, the patient remained in contact precautions for RSV. Pt received iv atb. Pt denied pain. Pt was medicated with prn IV metoprolol for heart rate that sustained for over 5 min above 120.

## 2024-01-05 NOTE — PROGRESS NOTES
Patient  improving; medication adjustments; cardiology following.  When medically stable, patient will be discharging to SNF [facility].   Authorized to admit on Sunday, 1/7 to [facility] in [place]. Needs final orders sent & [id number] completed by SONAL Narayan who can be reached thru Secure Chat 8:30-4:30 on Sunday.  She can set up transportation as well.  NP Batool Rodriguez updated on authorization acceptance today during IDT rounds.

## 2024-01-05 NOTE — PROGRESS NOTES
Physical Therapy    Physical Therapy Treatment    Patient Name: Frannie Blanco  MRN: [id number]  Today's Date: 1/5/2024  Time Calculation  Start Time: 0750  Stop Time: 0854  Time Calculation (min): 64 min     Assessment/Plan   PT Assessment  PT Assessment Results: Decreased strength, Decreased endurance, Decreased range of motion, Decreased mobility  End of Session Communication: Bedside nurse  End of Session Patient Position: Up in chair, Alarm on  PT Plan  Inpatient/Swing Bed or Outpatient: Inpatient  PT Plan  Treatment/Interventions: Bed mobility, Transfer training, Stair training, Strengthening, Gait training, Balance training, Endurance training, Therapeutic exercise, Therapeutic activity  PT Plan: Skilled PT  PT Frequency: 3 times per week  PT Discharge Recommendations: Moderate intensity level of continued care  PT Recommended Transfer Status: Assist x1  PT - OK to Discharge: Yes    General Visit Information:   PT  Visit  PT Received On: 01/05/24     Precautions:  Precautions  Medical Precautions: Fall precautions, Infection precautions    Objective   Pain:  Pain Assessment  Pain Assessment: 0-10  Pain Score: 0 - No pain  Cognition:  Cognition  Overall Cognitive Status: Within Functional Limits  Treatments:  Therapeutic Exercise  Therapeutic Exercise Performed: Yes  Therapeutic Exercise Activity 10:  (see note) Patient performed the following to facilitate increased strength to promote independence with transfers and allow progression of ambulation to allow for safe return home:  Laqs, hip flexion seated, hip abduction, hip adduction, toe raises, each x 10, quad sets, ankle pumps, each x 20.  Patient fatigues quickly and becomes SOB requiring frequent rests during session for recovery. Pulse ox at 91% at onset of session, decreased to 87% with activity, recovered to 91% with rests. Each rest was 1-2 minutes.    Bed Mobility  Bed Mobility: Yes  Bed Mobility 1  Bed Mobility 1: Supine to sitting  Level of  Assistance 1: Modified independent (With heavy use of rails and rest breaks for SOB.)    Ambulation/Gait Training  Ambulation/Gait Training Performed: Yes  Ambulation/Gait Training 1  Surface 1: Level tile  Device 1: Rolling walker  Assistance 1: Contact guard  Comments/Distance (ft) 1: 10x2  Transfers  Transfer: Yes  Transfer 1  Technique 1: Sit to stand, Stand to sit  Transfer Device 1: Walker  Transfer Level of Assistance 1: Contact guard    Outcome Measures:  [facility] Basic Mobility  Turning from your back to your side while in a flat bed without using bedrails: A little  Moving from lying on your back to sitting on the side of a flat bed without using bedrails: A little  Moving to and from bed to chair (including a wheelchair): A little  Standing up from a chair using your arms (e.g. wheelchair or bedside chair): A little  To walk in hospital room: A little  Climbing 3-5 steps with railing: A lot  Basic Mobility - Total Score: 17    Education Documentation  Home Exercise Program, taught by Soledad Dennis PTA at 1/5/2024 10:05 AM.  Learner: Patient  Readiness: Acceptance  Method: Explanation  Response: Demonstrated Understanding  Comment: Educated patient on LE strengthening exercises, patient able to demonstrate good understanding with return demonstration.    Mobility Training, taught by Soledad Dennis PTA at 1/5/2024 10:05 AM.  Learner: Patient  Readiness: Acceptance  Method: Explanation  Response: Demonstrated Understanding  Comment: Educated patient on LE strengthening exercises, patient able to demonstrate good understanding with return demonstration.    Education Comments  No comments found.      Encounter Problems       Encounter Problems (Active)       Balance       STG - Maintains dynamic standing balance without upper extremity support >=5 min  (Progressing)       Start:  01/04/24    Expected End:  01/18/24               Mobility       LTG - Patient will ambulate household distance with WW  Leela  (Progressing)       Start:  01/04/24    Expected End:  01/18/24            LTG - Patient will navigate 4 steps with 1 rail and cane with CGA (Progressing)       Start:  01/04/24    Expected End:  01/18/24               Pain - Adult          Transfers       STG - Patient will perform bed mobility with SBA  (Progressing)       Start:  01/04/24    Expected End:  01/18/24            STG - Patient will transfer sit to and from stand Leela with WW  (Progressing)       Start:  01/04/24    Expected End:  01/18/24

## 2024-01-05 NOTE — PROGRESS NOTES
Occupational Therapy    Occupational Therapy Treatment    Name: Frannie Blanco  MRN: [id number]  : 1950  Date: 24  Time Calculation  Start Time: 1415  Stop Time: 1431  Time Calculation (min): 16 min    Assessment:  OT Assessment: Pt continues to display decreased motiviation which she admits. Pt. limited by endurance and SOB. Declined further ax after grooming, but was able to complete tasks at supervision level. OT rec updated based on performance today.  Prognosis: Good  Barriers to Discharge: None  Evaluation/Treatment Tolerance: Patient tolerated treatment well, Other (Comment) (SOB/Endurance)  Medical Staff Made Aware: Yes  End of Session Communication: Bedside nurse  End of Session Patient Position: Up in chair, Alarm on  Plan:  Treatment Interventions: ADL retraining, Functional transfer training, Endurance training, UE strengthening/ROM, Patient/family training, Equipment evaluation/education, Neuromuscular reeducation  OT Frequency: 3 times per week  OT Discharge Recommendations: Low intensity level of continued care  OT Recommended Transfer Status: Stand by assist, Assist of 1    Subjective   Previous Visit Info:  OT Last Visit  OT Received On: 24  General:  General  Family/Caregiver Present: No  Patient Position Received: Up in chair, Alarm on  General Comment: O2  Precautions:  Medical Precautions: Fall precautions, Infection precautions  Vitals:  Vital Signs  Heart Rate: 103 (highest reading at 191 on masimo with quick fluctations; RN notified, pt also on tele with hx of Afib)  SpO2: 92 %  Patient Position: Sitting  Pain Assessment:  Pain Assessment  Pain Assessment: 0-10  Pain Score: 6 (after standing at the sink)  Pain Type: Chronic pain  Pain Location: Back     Objective   Activities of Daily Living: Grooming  Grooming Level of Assistance: Close supervision  Grooming Where Assessed: Standing sinkside  Grooming Comments: oral hygiene completed standing at the sink    Functional  Standing Tolerance:  Functional Standing Tolerance  Time: 7 minutes  Activity: oral hygiene  Functional Standing Tolerance Comments: no LOB, increased back pain noted  Bed Mobility/Transfers: Transfers  Transfer: Yes  Transfer 1  Transfer From 1: Chair with arms to  Transfer to 1: Stand, Sit  Technique 1: Sit to stand, Stand to sit  Transfer Device 1: Walker  Transfer Level of Assistance 1: Close supervision  Ambulation/Gait Training:  Ambulation/Gait Training  Ambulation/Gait Training Performed: Yes  Ambulation/Gait Training 1  Surface 1: Level tile  Device 1: Rolling walker  Gait Support Devices: Gait belt  Assistance 1: Close supervision  Comments/Distance (ft) 1: ambulated ~15 to sink and back    Outcome Measures:  [facility] Daily Activity  Putting on and taking off regular lower body clothing: A lot  Bathing (including washing, rinsing, drying): A lot  Putting on and taking off regular upper body clothing: A little  Toileting, which includes using toilet, bedpan or urinal: A little  Taking care of personal grooming such as brushing teeth: A little  Eating Meals: None  Daily Activity - Total Score: 17    Education Documentation  Body Mechanics, taught by Mariza Bocanegra OT at 1/5/2024  3:01 PM.  Learner: Patient  Readiness: Acceptance  Method: Explanation  Response: Verbalizes Understanding  Comment: Edu on POC transfers, EC PLB tech.    Precautions, taught by Mariza Bocanegra OT at 1/5/2024  3:01 PM.  Learner: Patient  Readiness: Acceptance  Method: Explanation  Response: Verbalizes Understanding  Comment: Edu on POC transfers, EC PLB tech.    ADL Training, taught by Mariza Bocanegra OT at 1/5/2024  3:01 PM.  Learner: Patient  Readiness: Acceptance  Method: Explanation  Response: Verbalizes Understanding  Comment: Edu on POC transfers, EC PLB tech.    Education Comments  No comments found.      Goals:  Encounter Problems       Encounter Problems (Active)       ADLs       Patient will perform UB and LB bathing  with  supervision level of assistance.       Start:  01/04/24    Expected End:  01/18/24            Patient with complete lower body dressing with set-up and supervision level of assistance donning and doffing all LE clothes  with PRN adaptive equipment while edge of bed        Start:  01/04/24    Expected End:  01/18/24            Patient will complete toileting including hygiene clothing management/hygiene with stand by assist level of assistance and raised toilet seat, grab bars, and bedside commode.       Start:  01/04/24    Expected End:  01/18/24               ADLs       Pt. will identify and engage in 3 EC/WS techniques during treatment sessions to increase independence and endurance for participation in daily tasks of choice.  (Progressing)       Start:  01/05/24    Expected End:  01/19/24               BALANCE       Patient will tolerate standing for 10 minutes to modified independent level of assistance with least restrictive device in order to improve functional activity tolerance for ADL tasks. (Progressing)       Start:  01/05/24    Expected End:  01/19/24               TRANSFERS       Patient will complete sit to stand transfer with modified independent level of assistance and least restrictive device in order to improve safety and prepare for out of bed mobility. (Progressing)       Start:  01/04/24    Expected End:  01/18/24

## 2024-01-05 NOTE — PROGRESS NOTES
Frannie Blanco is a 73 y.o. female on day 1 of admission presenting with RSV (respiratory syncytial virus infection).      Subjective   Pt states she feels constipated, denies sob beyond the usual or worsening cough. Pt denies pain, fevers.        Objective     Last Recorded Vitals  /86 (BP Location: Right arm, Patient Position: Lying)   Pulse 98   Temp 36.5 °C (97.7 °F) (Temporal)   Resp 18   Wt (!) 154 kg (339 lb 4.6 oz)   SpO2 92%   Intake/Output last 3 Shifts:    Intake/Output Summary (Last 24 hours) at 1/5/2024 1225  Last data filed at 1/5/2024 1145  Gross per 24 hour   Intake 510 ml   Output --   Net 510 ml       Admission Weight  Weight: 150 kg (330 lb) (01/02/24 1740)    Daily Weight  01/05/24 : (!) 154 kg (339 lb 4.6 oz)    Image Results  ECG 12 lead  Atrial fibrillation with rapid ventricular response  Low voltage QRS  Cannot rule out Anterior infarct , age undetermined  Abnormal ECG  When compared with ECG of 13-DEC-2023 09:49,  Atrial fibrillation has replaced Sinus rhythm  See ED provider note for full interpretation and clinical correlation  Confirmed by Jamir Salvador ([id number]) on 1/3/2024 10:49:37 PM      Physical Exam  Constitutional:       Appearance: She is obese.   HENT:      Head: Normocephalic and atraumatic.      Mouth/Throat:      Mouth: Mucous membranes are moist.   Eyes:      Extraocular Movements: Extraocular movements intact.   Cardiovascular:      Rate and Rhythm: Tachycardia present. Rhythm irregular.      Pulses: Normal pulses.      Heart sounds: Normal heart sounds.   Pulmonary:      Effort: Pulmonary effort is normal.      Breath sounds: Wheezing present.      Comments: Few scattered wheezes  Abdominal:      General: Bowel sounds are normal.      Palpations: Abdomen is soft.   Musculoskeletal:         General: Normal range of motion.      Cervical back: Normal range of motion and neck supple.   Skin:     General: Skin is warm and dry.      Capillary Refill: Capillary  refill takes 2 to 3 seconds.   Neurological:      General: No focal deficit present.      Mental Status: She is alert and oriented to person, place, and time.   Psychiatric:         Mood and Affect: Mood normal.         Behavior: Behavior normal.         Relevant Results           Scheduled medications  aspirin, 81 mg, oral, Daily  atorvastatin, 20 mg, oral, Nightly  calcium carbonate, 1,500 mg, oral, q12h  cefTRIAXone, 1 g, intravenous, q24h  dapsone, 100 mg, oral, Daily before breakfast  dilTIAZem CD, 240 mg, oral, Daily  docusate sodium, 100 mg, oral, BID  doxycycline, 100 mg, intravenous, q12h  ferrous sulfate (325 mg ferrous sulfate), 65 mg of iron, oral, Daily  tiotropium, 2 Inhalation, inhalation, Daily   And  fluticasone furoate-vilanteroL, 1 puff, inhalation, Daily  furosemide, 80 mg, oral, Daily  gabapentin, 300 mg, oral, TID  glimepiride, 2 mg, oral, Daily with breakfast  guaiFENesin, 600 mg, oral, BID  insulin glargine, 15 Units, subcutaneous, Nightly  insulin lispro, 0-5 Units, subcutaneous, TID with meals  ipratropium-albuteroL, 3 mL, nebulization, TID  losartan, 12.5 mg, oral, Daily  melatonin, 6 mg, oral, Daily  metFORMIN, 1,000 mg, oral, BID with meals  methylPREDNISolone sodium succinate (PF), 40 mg, intravenous, q8h  pantoprazole, 40 mg, oral, Daily before breakfast   Or  pantoprazole, 40 mg, intravenous, Daily before breakfast  polyethylene glycol, 17 g, oral, Daily  topiramate, 100 mg, oral, BID  warfarin, 5 mg, oral, Daily      Continuous medications     PRN medications  PRN medications: acetaminophen **OR** acetaminophen **OR** acetaminophen, acetaminophen **OR** acetaminophen **OR** acetaminophen, albuterol, benzocaine-menthoL, bisacodyl, dextromethorphan-guaifenesin, dextrose 10 % in water (D10W), dextrose, glucagon, metoprolol, ondansetron ODT **OR** ondansetron, oxygen    Results for orders placed or performed during the hospital encounter of 01/02/24 (from the past 24 hour(s))   POCT  GLUCOSE   Result Value Ref Range    POCT Glucose 240 (H) 74 - 99 mg/dL   POCT GLUCOSE   Result Value Ref Range    POCT Glucose 224 (H) 74 - 99 mg/dL   Basic metabolic panel   Result Value Ref Range    Glucose 235 (H) 74 - 99 mg/dL    Sodium 139 136 - 145 mmol/L    Potassium 4.2 3.5 - 5.3 mmol/L    Chloride 101 98 - 107 mmol/L    Bicarbonate 27 21 - 32 mmol/L    Anion Gap 15 10 - 20 mmol/L    Urea Nitrogen 47 (H) 6 - 23 mg/dL    Creatinine 1.19 (H) 0.50 - 1.05 mg/dL    eGFR 49 (L) >60 mL/min/1.73m*2    Calcium 9.0 8.6 - 10.3 mg/dL   Protime-INR   Result Value Ref Range    Protime 25.2 (H) 9.8 - 12.8 seconds    INR 2.2 (H) 0.9 - 1.1   B-type natriuretic peptide   Result Value Ref Range     (H) 0 - 99 pg/mL   POCT GLUCOSE   Result Value Ref Range    POCT Glucose 241 (H) 74 - 99 mg/dL   POCT GLUCOSE   Result Value Ref Range    POCT Glucose 226 (H) 74 - 99 mg/dL       Assessment/Plan    #Acute on chronic hypoxic respiratory failure 2/2 RSV, community acquired pneumonia  #COPD, in acute exacerbation  - Patient was seen in [place] ED 12/26, discharged on dexamethasone and doxycycline  - WBC 13.9 > 5.3  - Lactate 1.9  - COVID, flu A/B negative  - RSV positive  - Procal 0.75, BCx, sputum culture significant salivary contamination   - CXR: Small right pleural effusion and basilar atelectasis or airspace   disease and mild bilateral opacities concerning for infiltrates.    - Continue ceftriaxone (day 3)  - Azithromycin changed to doxycycline due to drug interactions (Day 3)  - Solumedrol 40 mg IVP q8h (day 3); received one-time dose of 125 mg IVP in ED   - Tylenol PRN for fever   - Mucinex BID, Robitussin DM q4h PRN for cough   - DuoNebs TID    - Breo and Spiriva ordered (pharmacy substitute for home Trelegy)  - RT eval and treat protocol  - Bronchial hygiene  - Isolation protocol for RSV  - Monitor SpO2 continuously   - Baseline O2 5L continuously   - Wean O2 as tolerated; patient currently on 5L O2 with SpO2 91-93% -       #Atrial fibrillation  #HTN  #HLD  #HFpEF, not in acute exacerbation (diastolic dysfunction and mod MR,mild aortic stenosis  - Recent D[facility]V on 12/13 at [facility], follows with Dr. Siegel   - 9/25/23 echo:   - Trop 7 > 7   - >285>368   - Currently in AF with rate up to 130s with coughing fits; patient denies palpitations, dizziness, or chest pain  - Lopressor 5 mg IVP q4h PRN for HR > 120 bpm sustained for > 5 mins   - Continue diltiazem, aspirin, atorvastatin, furosemide, and losartan  - INR 5.2 > 3.6  - Warfarin held   - INR pending; goal INR 2-3  - Strict I&Os  - Daily weights  - 2g Na diet   - Telemetry monitoring  - Daily PT/INR while inpatient      #Hematuria  - UA: 3+ blood, > 20 RBCs, 1+ bacteria  - UCx added  - Patient denies gross blood in urine or difficulties with urination   - Monitor UOP for blood   - Trend CBC         #T2DM with neuropathy   - Continue home Levemir, gabapentin, metformin and glimepiride   - SSI three times a day before meals while on steroids   - Hypoglycemia protocol  - Accuchecks ac/hs/prn  - Diabetic diet   - HbA1c 4.6      #Anemia, macrocytic  - H/H 9.0/30.4 > 8/28.4  > 111  - Patient receives monthly B12 injections and is on ferrous sulfate  - B12 level was 267 last month per chart review   - Check iron studies, folate level   - Monitor for active bleeding  - Daily CBC to trend H/H     #History of mucous membrane pemphigoid  - Continue dapsone  - Resume outpatient derm follow up on discharge      DVT PPx: Warfarin  GI PPx: Protonix   Diet: Diabetic, 2g Na   Code Status: Full code      Disposition: Patient requires inpatient management at this time.      Total accumulated time spent face to face and not face to face preparing to see the patient, obtaining and reviewing separately obtained history; performing a medically appropriate examination and/or evaluation; counseling and educating the patient, family; ordering medications, tests, or procedures; referring and  communicating with other health care professionals; documenting clinical information in the patient's medical record; independently interpreting results and communicating the results to the patient, family; and care coordination was 45 minutes.               Principal Problem:    RSV (respiratory syncytial virus infection)  Active Problems:    Shortness of breath                  Batool Rodriguez, APRN-CNP

## 2024-01-06 ENCOUNTER — APPOINTMENT (OUTPATIENT)
Dept: RADIOLOGY | Facility: HOSPITAL | Age: 74
DRG: 193 | End: 2024-01-06
Payer: MEDICARE

## 2024-01-06 LAB
ALBUMIN SERPL BCP-MCNC: 4.1 G/DL (ref 3.4–5)
ALP SERPL-CCNC: 45 U/L (ref 33–136)
ALT SERPL W P-5'-P-CCNC: 10 U/L (ref 7–45)
ANION GAP SERPL CALC-SCNC: 14 MMOL/L (ref 10–20)
ANION GAP SERPL CALC-SCNC: 16 MMOL/L (ref 10–20)
AST SERPL W P-5'-P-CCNC: 6 U/L (ref 9–39)
BASOPHILS # BLD AUTO: 0 X10*3/UL (ref 0–0.1)
BASOPHILS NFR BLD AUTO: 0 %
BILIRUB SERPL-MCNC: 0.6 MG/DL (ref 0–1.2)
BUN SERPL-MCNC: 49 MG/DL (ref 6–23)
BUN SERPL-MCNC: 51 MG/DL (ref 6–23)
CALCIUM SERPL-MCNC: 8.9 MG/DL (ref 8.6–10.3)
CALCIUM SERPL-MCNC: 9.1 MG/DL (ref 8.6–10.3)
CHLORIDE SERPL-SCNC: 100 MMOL/L (ref 98–107)
CHLORIDE SERPL-SCNC: 101 MMOL/L (ref 98–107)
CO2 SERPL-SCNC: 26 MMOL/L (ref 21–32)
CO2 SERPL-SCNC: 27 MMOL/L (ref 21–32)
CREAT SERPL-MCNC: 0.98 MG/DL (ref 0.5–1.05)
CREAT SERPL-MCNC: 1.11 MG/DL (ref 0.5–1.05)
CRP SERPL-MCNC: 1.41 MG/DL
EOSINOPHIL # BLD AUTO: 0 X10*3/UL (ref 0–0.4)
EOSINOPHIL NFR BLD AUTO: 0 %
ERYTHROCYTE [DISTWIDTH] IN BLOOD BY AUTOMATED COUNT: 16.3 % (ref 11.5–14.5)
ERYTHROCYTE [DISTWIDTH] IN BLOOD BY AUTOMATED COUNT: 16.4 % (ref 11.5–14.5)
ERYTHROCYTE [SEDIMENTATION RATE] IN BLOOD BY WESTERGREN METHOD: 3 MM/H (ref 0–30)
GFR SERPL CREATININE-BSD FRML MDRD: 53 ML/MIN/1.73M*2
GFR SERPL CREATININE-BSD FRML MDRD: 61 ML/MIN/1.73M*2
GLUCOSE BLD MANUAL STRIP-MCNC: 235 MG/DL (ref 74–99)
GLUCOSE BLD MANUAL STRIP-MCNC: 284 MG/DL (ref 74–99)
GLUCOSE BLD MANUAL STRIP-MCNC: 312 MG/DL (ref 74–99)
GLUCOSE BLD MANUAL STRIP-MCNC: 360 MG/DL (ref 74–99)
GLUCOSE SERPL-MCNC: 270 MG/DL (ref 74–99)
GLUCOSE SERPL-MCNC: 304 MG/DL (ref 74–99)
HCT VFR BLD AUTO: 28.7 % (ref 36–46)
HCT VFR BLD AUTO: 29.4 % (ref 36–46)
HGB BLD-MCNC: 8.1 G/DL (ref 12–16)
HGB BLD-MCNC: 8.4 G/DL (ref 12–16)
HOLD SPECIMEN: NORMAL
IMM GRANULOCYTES # BLD AUTO: 0.07 X10*3/UL (ref 0–0.5)
IMM GRANULOCYTES NFR BLD AUTO: 1.3 % (ref 0–0.9)
INR PPP: 2 (ref 0.9–1.1)
LYMPHOCYTES # BLD AUTO: 0.27 X10*3/UL (ref 0.8–3)
LYMPHOCYTES NFR BLD AUTO: 5.1 %
MAGNESIUM SERPL-MCNC: 2.13 MG/DL (ref 1.6–2.4)
MCH RBC QN AUTO: 31.5 PG (ref 26–34)
MCH RBC QN AUTO: 31.6 PG (ref 26–34)
MCHC RBC AUTO-ENTMCNC: 28.2 G/DL (ref 32–36)
MCHC RBC AUTO-ENTMCNC: 28.6 G/DL (ref 32–36)
MCV RBC AUTO: 110 FL (ref 80–100)
MCV RBC AUTO: 112 FL (ref 80–100)
MONOCYTES # BLD AUTO: 0.33 X10*3/UL (ref 0.05–0.8)
MONOCYTES NFR BLD AUTO: 6.2 %
NEUTROPHILS # BLD AUTO: 4.67 X10*3/UL (ref 1.6–5.5)
NEUTROPHILS NFR BLD AUTO: 87.4 %
NRBC BLD-RTO: 0 /100 WBCS (ref 0–0)
NRBC BLD-RTO: 0 /100 WBCS (ref 0–0)
PLATELET # BLD AUTO: 191 X10*3/UL (ref 150–450)
PLATELET # BLD AUTO: 225 X10*3/UL (ref 150–450)
POTASSIUM SERPL-SCNC: 4.3 MMOL/L (ref 3.5–5.3)
POTASSIUM SERPL-SCNC: 4.4 MMOL/L (ref 3.5–5.3)
PROT SERPL-MCNC: 6.5 G/DL (ref 6.4–8.2)
PROTHROMBIN TIME: 23.2 SECONDS (ref 9.8–12.8)
RBC # BLD AUTO: 2.56 X10*6/UL (ref 4–5.2)
RBC # BLD AUTO: 2.67 X10*6/UL (ref 4–5.2)
SODIUM SERPL-SCNC: 137 MMOL/L (ref 136–145)
SODIUM SERPL-SCNC: 139 MMOL/L (ref 136–145)
WBC # BLD AUTO: 5.3 X10*3/UL (ref 4.4–11.3)
WBC # BLD AUTO: 8.1 X10*3/UL (ref 4.4–11.3)

## 2024-01-06 PROCEDURE — 36415 COLL VENOUS BLD VENIPUNCTURE: CPT | Mod: IPSPLIT

## 2024-01-06 PROCEDURE — 2500000002 HC RX 250 W HCPCS SELF ADMINISTERED DRUGS (ALT 637 FOR MEDICARE OP, ALT 636 FOR OP/ED): Mod: IPSPLIT

## 2024-01-06 PROCEDURE — 70498 CT ANGIOGRAPHY NECK: CPT | Performed by: RADIOLOGY

## 2024-01-06 PROCEDURE — 2500000002 HC RX 250 W HCPCS SELF ADMINISTERED DRUGS (ALT 637 FOR MEDICARE OP, ALT 636 FOR OP/ED): Mod: IPSPLIT | Performed by: INTERNAL MEDICINE

## 2024-01-06 PROCEDURE — 86140 C-REACTIVE PROTEIN: CPT | Mod: IPSPLIT | Performed by: STUDENT IN AN ORGANIZED HEALTH CARE EDUCATION/TRAINING PROGRAM

## 2024-01-06 PROCEDURE — 70498 CT ANGIOGRAPHY NECK: CPT | Mod: IPSPLIT

## 2024-01-06 PROCEDURE — 36415 COLL VENOUS BLD VENIPUNCTURE: CPT | Mod: IPSPLIT | Performed by: NURSE PRACTITIONER

## 2024-01-06 PROCEDURE — 85060 BLOOD SMEAR INTERPRETATION: CPT | Performed by: NURSE PRACTITIONER

## 2024-01-06 PROCEDURE — 80048 BASIC METABOLIC PNL TOTAL CA: CPT | Mod: IPSPLIT,CCI | Performed by: NURSE PRACTITIONER

## 2024-01-06 PROCEDURE — 2500000001 HC RX 250 WO HCPCS SELF ADMINISTERED DRUGS (ALT 637 FOR MEDICARE OP): Mod: IPSPLIT

## 2024-01-06 PROCEDURE — 1200000002 HC GENERAL ROOM WITH TELEMETRY DAILY: Mod: IPSPLIT

## 2024-01-06 PROCEDURE — 85652 RBC SED RATE AUTOMATED: CPT | Mod: IPSPLIT | Performed by: STUDENT IN AN ORGANIZED HEALTH CARE EDUCATION/TRAINING PROGRAM

## 2024-01-06 PROCEDURE — 85027 COMPLETE CBC AUTOMATED: CPT | Mod: IPSPLIT | Performed by: STUDENT IN AN ORGANIZED HEALTH CARE EDUCATION/TRAINING PROGRAM

## 2024-01-06 PROCEDURE — 70450 CT HEAD/BRAIN W/O DYE: CPT | Performed by: STUDENT IN AN ORGANIZED HEALTH CARE EDUCATION/TRAINING PROGRAM

## 2024-01-06 PROCEDURE — 85610 PROTHROMBIN TIME: CPT | Mod: IPSPLIT

## 2024-01-06 PROCEDURE — 94668 MNPJ CHEST WALL SBSQ: CPT | Mod: IPSPLIT

## 2024-01-06 PROCEDURE — 2500000005 HC RX 250 GENERAL PHARMACY W/O HCPCS: Mod: IPSPLIT | Performed by: NURSE PRACTITIONER

## 2024-01-06 PROCEDURE — 70450 CT HEAD/BRAIN W/O DYE: CPT | Mod: IPSPLIT

## 2024-01-06 PROCEDURE — 2550000001 HC RX 255 CONTRASTS: Mod: IPSPLIT | Performed by: INTERNAL MEDICINE

## 2024-01-06 PROCEDURE — 85025 COMPLETE CBC W/AUTO DIFF WBC: CPT | Mod: IPSPLIT | Performed by: NURSE PRACTITIONER

## 2024-01-06 PROCEDURE — 84075 ASSAY ALKALINE PHOSPHATASE: CPT | Mod: IPSPLIT | Performed by: STUDENT IN AN ORGANIZED HEALTH CARE EDUCATION/TRAINING PROGRAM

## 2024-01-06 PROCEDURE — 99233 SBSQ HOSP IP/OBS HIGH 50: CPT

## 2024-01-06 PROCEDURE — 2500000001 HC RX 250 WO HCPCS SELF ADMINISTERED DRUGS (ALT 637 FOR MEDICARE OP): Mod: IPSPLIT | Performed by: NURSE PRACTITIONER

## 2024-01-06 PROCEDURE — 9420000001 HC RT PATIENT EDUCATION 5 MIN: Mod: IPSPLIT

## 2024-01-06 PROCEDURE — 94640 AIRWAY INHALATION TREATMENT: CPT | Mod: IPSPLIT

## 2024-01-06 PROCEDURE — 2500000004 HC RX 250 GENERAL PHARMACY W/ HCPCS (ALT 636 FOR OP/ED): Mod: IPSPLIT

## 2024-01-06 PROCEDURE — 70496 CT ANGIOGRAPHY HEAD: CPT | Performed by: RADIOLOGY

## 2024-01-06 PROCEDURE — 2500000005 HC RX 250 GENERAL PHARMACY W/O HCPCS: Mod: IPSPLIT

## 2024-01-06 PROCEDURE — 70496 CT ANGIOGRAPHY HEAD: CPT | Mod: IPSPLIT

## 2024-01-06 PROCEDURE — 82947 ASSAY GLUCOSE BLOOD QUANT: CPT | Mod: IPSPLIT

## 2024-01-06 PROCEDURE — 83735 ASSAY OF MAGNESIUM: CPT | Mod: IPSPLIT | Performed by: NURSE PRACTITIONER

## 2024-01-06 RX ORDER — SODIUM CHLORIDE 9 MG/ML
INJECTION, SOLUTION INTRAVENOUS
Status: COMPLETED
Start: 2024-01-06 | End: 2024-01-07

## 2024-01-06 RX ADMIN — INSULIN LISPRO 4 UNITS: 100 INJECTION, SOLUTION INTRAVENOUS; SUBCUTANEOUS at 17:30

## 2024-01-06 RX ADMIN — GABAPENTIN 300 MG: 300 CAPSULE ORAL at 20:27

## 2024-01-06 RX ADMIN — FLUTICASONE FUROATE AND VILANTEROL TRIFENATATE 1 PUFF: 200; 25 POWDER RESPIRATORY (INHALATION) at 11:18

## 2024-01-06 RX ADMIN — DOCUSATE SODIUM 100 MG: 100 CAPSULE, LIQUID FILLED ORAL at 20:27

## 2024-01-06 RX ADMIN — IOHEXOL 75 ML: 350 INJECTION, SOLUTION INTRAVENOUS at 17:19

## 2024-01-06 RX ADMIN — Medication 5 L/MIN: at 11:32

## 2024-01-06 RX ADMIN — DILTIAZEM HYDROCHLORIDE 240 MG: 120 CAPSULE, COATED, EXTENDED RELEASE ORAL at 08:42

## 2024-01-06 RX ADMIN — GABAPENTIN 300 MG: 300 CAPSULE ORAL at 14:57

## 2024-01-06 RX ADMIN — METFORMIN HYDROCHLORIDE 1000 MG: 500 TABLET ORAL at 17:27

## 2024-01-06 RX ADMIN — IPRATROPIUM BROMIDE AND ALBUTEROL SULFATE 3 ML: .5; 3 SOLUTION RESPIRATORY (INHALATION) at 21:38

## 2024-01-06 RX ADMIN — IPRATROPIUM BROMIDE AND ALBUTEROL SULFATE 3 ML: .5; 3 SOLUTION RESPIRATORY (INHALATION) at 16:09

## 2024-01-06 RX ADMIN — TIOTROPIUM BROMIDE INHALATION SPRAY 2 PUFF: 3.12 SPRAY, METERED RESPIRATORY (INHALATION) at 11:18

## 2024-01-06 RX ADMIN — DOCUSATE SODIUM 100 MG: 100 CAPSULE, LIQUID FILLED ORAL at 08:42

## 2024-01-06 RX ADMIN — GUAIFENESIN 600 MG: 600 TABLET, EXTENDED RELEASE ORAL at 20:26

## 2024-01-06 RX ADMIN — INSULIN LISPRO 2 UNITS: 100 INJECTION, SOLUTION INTRAVENOUS; SUBCUTANEOUS at 08:34

## 2024-01-06 RX ADMIN — LOSARTAN POTASSIUM 12.5 MG: 25 TABLET, FILM COATED ORAL at 08:42

## 2024-01-06 RX ADMIN — DOXYCYCLINE 100 MG: 100 INJECTION, POWDER, LYOPHILIZED, FOR SOLUTION INTRAVENOUS at 11:23

## 2024-01-06 RX ADMIN — METHYLPREDNISOLONE SODIUM SUCCINATE 40 MG: 40 INJECTION, POWDER, FOR SOLUTION INTRAMUSCULAR; INTRAVENOUS at 17:27

## 2024-01-06 RX ADMIN — METOPROLOL TARTRATE 5 MG: 5 INJECTION INTRAVENOUS at 17:39

## 2024-01-06 RX ADMIN — METOPROLOL TARTRATE 5 MG: 5 INJECTION INTRAVENOUS at 12:03

## 2024-01-06 RX ADMIN — TOPIRAMATE 100 MG: 100 TABLET, FILM COATED ORAL at 20:28

## 2024-01-06 RX ADMIN — TOPIRAMATE 100 MG: 100 TABLET, FILM COATED ORAL at 08:43

## 2024-01-06 RX ADMIN — INSULIN GLARGINE 15 UNITS: 100 INJECTION, SOLUTION SUBCUTANEOUS at 20:26

## 2024-01-06 RX ADMIN — INSULIN LISPRO 3 UNITS: 100 INJECTION, SOLUTION INTRAVENOUS; SUBCUTANEOUS at 13:04

## 2024-01-06 RX ADMIN — FUROSEMIDE 80 MG: 80 TABLET ORAL at 08:42

## 2024-01-06 RX ADMIN — POLYETHYLENE GLYCOL 3350 17 G: 17 POWDER, FOR SOLUTION ORAL at 08:43

## 2024-01-06 RX ADMIN — METFORMIN HYDROCHLORIDE 1000 MG: 500 TABLET ORAL at 08:43

## 2024-01-06 RX ADMIN — Medication 6 MG: at 20:26

## 2024-01-06 RX ADMIN — DAPSONE 100 MG: 25 TABLET ORAL at 06:41

## 2024-01-06 RX ADMIN — CEFTRIAXONE 1 G: 1 INJECTION, SOLUTION INTRAVENOUS at 08:42

## 2024-01-06 RX ADMIN — GUAIFENESIN AND DEXTROMETHORPHAN 5 ML: 100; 10 SYRUP ORAL at 11:18

## 2024-01-06 RX ADMIN — FERROUS SULFATE TAB 325 MG (65 MG ELEMENTAL FE) 1 TABLET: 325 (65 FE) TAB at 08:43

## 2024-01-06 RX ADMIN — CALCIUM CARBONATE (ANTACID) CHEW TAB 500 MG 1500 MG: 500 CHEW TAB at 12:00

## 2024-01-06 RX ADMIN — GABAPENTIN 300 MG: 300 CAPSULE ORAL at 08:42

## 2024-01-06 RX ADMIN — ATORVASTATIN CALCIUM 20 MG: 10 TABLET, FILM COATED ORAL at 20:27

## 2024-01-06 RX ADMIN — GLIMEPIRIDE 2 MG: 2 TABLET ORAL at 08:42

## 2024-01-06 RX ADMIN — WARFARIN SODIUM 5 MG: 5 TABLET ORAL at 17:27

## 2024-01-06 RX ADMIN — GUAIFENESIN 600 MG: 600 TABLET, EXTENDED RELEASE ORAL at 08:42

## 2024-01-06 RX ADMIN — PANTOPRAZOLE SODIUM 40 MG: 40 TABLET, DELAYED RELEASE ORAL at 06:41

## 2024-01-06 RX ADMIN — IPRATROPIUM BROMIDE AND ALBUTEROL SULFATE 3 ML: .5; 3 SOLUTION RESPIRATORY (INHALATION) at 11:17

## 2024-01-06 RX ADMIN — ASPIRIN 81 MG: 81 TABLET, COATED ORAL at 08:42

## 2024-01-06 RX ADMIN — METHYLPREDNISOLONE SODIUM SUCCINATE 40 MG: 40 INJECTION, POWDER, FOR SOLUTION INTRAMUSCULAR; INTRAVENOUS at 08:41

## 2024-01-06 ASSESSMENT — COGNITIVE AND FUNCTIONAL STATUS - GENERAL
HELP NEEDED FOR BATHING: A LOT
STANDING UP FROM CHAIR USING ARMS: A LOT
PERSONAL GROOMING: A LOT
DRESSING REGULAR LOWER BODY CLOTHING: A LOT
CLIMB 3 TO 5 STEPS WITH RAILING: A LOT
MOVING TO AND FROM BED TO CHAIR: A LITTLE
MOBILITY SCORE: 17
TOILETING: A LOT
DRESSING REGULAR UPPER BODY CLOTHING: A LOT
DAILY ACTIVITIY SCORE: 14
WALKING IN HOSPITAL ROOM: A LITTLE
TURNING FROM BACK TO SIDE WHILE IN FLAT BAD: A LITTLE

## 2024-01-06 ASSESSMENT — PAIN - FUNCTIONAL ASSESSMENT
PAIN_FUNCTIONAL_ASSESSMENT: 0-10

## 2024-01-06 ASSESSMENT — PAIN SCALES - GENERAL
PAINLEVEL_OUTOF10: 0 - NO PAIN

## 2024-01-06 NOTE — CARE PLAN
The patient's goals for the shift include  getting some rest.    The clinical goals for the shift include patient will report less SOB by end of shift    Over the shift, the patient did not make progress toward the following goals. Barriers to progression include increase symptoms of respiratory distress. Recommendations to address these barriers include monitor patients respiratory treatment plan.

## 2024-01-06 NOTE — PROGRESS NOTES
"Frannie Blanco is a 73 y.o. female on day 2 of admission presenting with RSV (respiratory syncytial virus infection).    Subjective   No overnight events reported.     Patient remains on her baseline 5L O2 this morning. She still has a cough and congestion. She was started on Miralax and Colace for constipation yesterday, has not had a BM yet. Suppository ordered PRN. Plan of care discussed with patient, all questions answered at this time.        Objective     Physical Exam  Constitutional:       General: She is not in acute distress.     Appearance: She is obese. She is not toxic-appearing.   HENT:      Head: Normocephalic and atraumatic.      Mouth/Throat:      Mouth: Mucous membranes are moist.   Eyes:      Conjunctiva/sclera: Conjunctivae normal.   Cardiovascular:      Rate and Rhythm: Tachycardia present. Rhythm irregular.   Pulmonary:      Effort: No respiratory distress.      Breath sounds: Wheezing and rales present.   Abdominal:      General: There is no distension.      Palpations: Abdomen is soft.      Tenderness: There is no abdominal tenderness.   Musculoskeletal:         General: Swelling (lower extremity edema consistent with history of lymphedema) present.   Skin:     General: Skin is warm and dry.      Findings: No rash.   Neurological:      Mental Status: She is alert and oriented to person, place, and time.   Psychiatric:         Mood and Affect: Mood normal.         Behavior: Behavior normal.         Last Recorded Vitals  Blood pressure 144/71, pulse 106, temperature 36.1 °C (97 °F), temperature source Temporal, resp. rate 19, height 1.676 m (5' 6\"), weight (!) 155 kg (341 lb 11.4 oz), SpO2 94 %.  Intake/Output last 3 Shifts:  I/O last 3 completed shifts:  In: 610 (3.9 mL/kg) [P.O.:360; IV Piggyback:250]  Out: 2200 (14.2 mL/kg) [Urine:2200 (0.4 mL/kg/hr)]  Weight: 155 kg     Relevant Results        Scheduled medications  aspirin, 81 mg, oral, Daily  atorvastatin, 20 mg, oral, Nightly  calcium " carbonate, 1,500 mg, oral, q12h  cefTRIAXone, 1 g, intravenous, q24h  dapsone, 100 mg, oral, Daily before breakfast  dilTIAZem CD, 240 mg, oral, Daily  docusate sodium, 100 mg, oral, BID  doxycycline, 100 mg, intravenous, q12h  ferrous sulfate (325 mg ferrous sulfate), 65 mg of iron, oral, Daily  tiotropium, 2 Inhalation, inhalation, Daily   And  fluticasone furoate-vilanteroL, 1 puff, inhalation, Daily  furosemide, 80 mg, oral, Daily  gabapentin, 300 mg, oral, TID  glimepiride, 2 mg, oral, Daily with breakfast  guaiFENesin, 600 mg, oral, BID  insulin glargine, 15 Units, subcutaneous, Nightly  insulin lispro, 0-5 Units, subcutaneous, TID with meals  ipratropium-albuteroL, 3 mL, nebulization, TID  losartan, 12.5 mg, oral, Daily  melatonin, 6 mg, oral, Daily  metFORMIN, 1,000 mg, oral, BID with meals  methylPREDNISolone sodium succinate (PF), 40 mg, intravenous, q8h  pantoprazole, 40 mg, oral, Daily before breakfast   Or  pantoprazole, 40 mg, intravenous, Daily before breakfast  polyethylene glycol, 17 g, oral, Daily  topiramate, 100 mg, oral, BID  warfarin, 5 mg, oral, Daily      Continuous medications     PRN medications  PRN medications: acetaminophen **OR** acetaminophen **OR** acetaminophen, acetaminophen **OR** acetaminophen **OR** acetaminophen, albuterol, benzocaine-menthoL, bisacodyl, dextromethorphan-guaifenesin, dextrose 10 % in water (D10W), dextrose, glucagon, metoprolol, ondansetron ODT **OR** ondansetron, oxygen    Results for orders placed or performed during the hospital encounter of 01/02/24 (from the past 24 hour(s))   POCT GLUCOSE   Result Value Ref Range    POCT Glucose 279 (H) 74 - 99 mg/dL   POCT GLUCOSE   Result Value Ref Range    POCT Glucose 275 (H) 74 - 99 mg/dL   SST TOP   Result Value Ref Range    Extra Tube Hold for add-ons.    CBC and Auto Differential   Result Value Ref Range    WBC 5.3 4.4 - 11.3 x10*3/uL    nRBC 0.0 0.0 - 0.0 /100 WBCs    RBC 2.56 (L) 4.00 - 5.20 x10*6/uL     Hemoglobin 8.1 (L) 12.0 - 16.0 g/dL    Hematocrit 28.7 (L) 36.0 - 46.0 %     (H) 80 - 100 fL    MCH 31.6 26.0 - 34.0 pg    MCHC 28.2 (L) 32.0 - 36.0 g/dL    RDW 16.3 (H) 11.5 - 14.5 %    Platelets 191 150 - 450 x10*3/uL    Neutrophils % 87.4 40.0 - 80.0 %    Immature Granulocytes %, Automated 1.3 (H) 0.0 - 0.9 %    Lymphocytes % 5.1 13.0 - 44.0 %    Monocytes % 6.2 2.0 - 10.0 %    Eosinophils % 0.0 0.0 - 6.0 %    Basophils % 0.0 0.0 - 2.0 %    Neutrophils Absolute 4.67 1.60 - 5.50 x10*3/uL    Immature Granulocytes Absolute, Automated 0.07 0.00 - 0.50 x10*3/uL    Lymphocytes Absolute 0.27 (L) 0.80 - 3.00 x10*3/uL    Monocytes Absolute 0.33 0.05 - 0.80 x10*3/uL    Eosinophils Absolute 0.00 0.00 - 0.40 x10*3/uL    Basophils Absolute 0.00 0.00 - 0.10 x10*3/uL   Basic metabolic panel   Result Value Ref Range    Glucose 270 (H) 74 - 99 mg/dL    Sodium 139 136 - 145 mmol/L    Potassium 4.4 3.5 - 5.3 mmol/L    Chloride 101 98 - 107 mmol/L    Bicarbonate 26 21 - 32 mmol/L    Anion Gap 16 10 - 20 mmol/L    Urea Nitrogen 49 (H) 6 - 23 mg/dL    Creatinine 0.98 0.50 - 1.05 mg/dL    eGFR 61 >60 mL/min/1.73m*2    Calcium 9.1 8.6 - 10.3 mg/dL   Magnesium   Result Value Ref Range    Magnesium 2.13 1.60 - 2.40 mg/dL   POCT GLUCOSE   Result Value Ref Range    POCT Glucose 235 (H) 74 - 99 mg/dL   Protime-INR   Result Value Ref Range    Protime 23.2 (H) 9.8 - 12.8 seconds    INR 2.0 (H) 0.9 - 1.1   POCT GLUCOSE   Result Value Ref Range    POCT Glucose 284 (H) 74 - 99 mg/dL     ECG 12 lead    Result Date: 1/3/2024  Atrial fibrillation with rapid ventricular response Low voltage QRS Cannot rule out Anterior infarct , age undetermined Abnormal ECG When compared with ECG of 13-DEC-2023 09:49, Atrial fibrillation has replaced Sinus rhythm See ED provider note for full interpretation and clinical correlation Confirmed by Jamir Salvador ([id number]) on 1/3/2024 10:49:37 PM    XR chest 1 view    Result Date: 1/2/2024  Interpreted By:   Soren aHm, STUDY: XR CHEST 1 VIEW;  1/2/2024 7:26 pm   INDICATION: Signs/Symptoms:sob.   COMPARISON: 9/26/2023   ACCESSION NUMBER(S): [id number]   ORDERING CLINICIAN: CARMEN CONKLIN   FINDINGS: The cardiac silhouette is stable in size. There are bilateral opacities concerning for infiltrates. Small right pleural effusion and basilar atelectasis or airspace disease. No pneumothorax.       Small right pleural effusion and basilar atelectasis or airspace disease and mild bilateral opacities concerning for infiltrates.   MACRO: None   Signed by: Soren Ham 1/2/2024 7:38 PM Dictation workstation:   [id number]    CT chest wo IV contrast    Result Date: 12/26/2023  Interpreted By:  Richie Wren, STUDY: CT CHEST WO IV CONTRAST;  12/26/2023 6:03 pm   INDICATION: Signs/Symptoms:cough.   COMPARISON: 09/22/2023 CT chest   ACCESSION NUMBER(S): [id number]   ORDERING CLINICIAN: JANNET VEE   TECHNIQUE: Contiguous axial images of the chest and upper abdomen were obtained without contrast. Coronal and sagittal reformatted images were reconstructed from the axial data.   FINDINGS:     MEDIASTINUM AND LYMPH NODES:  The esophagus appears within normal limits.  No enlarged intrathoracic or axillary lymph nodes by imaging criteria. No pneumomediastinum.   VESSELS:  Normal caliber thoracic aorta. Mild aortic atherosclerosis. The pulmonary artery is enlarged, measuring up to 3.6 cm, indicative of pulmonary hypertension.   HEART: There is left atrial dilatation. Mitral annular calcifications. Mild coronary artery calcifications. No significant pericardial effusion.   LUNG, AIRWAYS, AND PLEURA: New small bilateral pleural effusions with adjacent passive atelectasis. There is new subsegmental atelectasis in the right middle lobe. There is a small amount debris in the lower trachea extending into the mainstem bronchi and bronchus intermedius. There is a small opacity in the left upper lobe and superior segment of left lower  lobe consisting of tree-in-bud nodules suspicious for pneumonia the   OSSEOUS STRUCTURES: No acute osseous abnormality.   CHEST WALL SOFT TISSUES: No discernible abnormality.   UPPER ABDOMEN/OTHER: Multiple peripherally calcified splenic artery aneurysm measuring 1.3 cm, 1.3 cm, and 2.8 cm are similar to prior study. The liver appears cirrhotic.       1. Tree-in-bud opacities in the posterior left upper lobe and superior segment of the left lower lobe consistent with bronchiolitis/early pneumonia.   2. Small bilateral pleural effusions with adjacent passive atelectasis and right middle lobe subsegmental atelectasis.   3. Small amount of debris in the trachea and bilateral proximal bronchi that could be secretions or aspiration.   4. Three splenic artery aneurysm measuring up to 2.8 cm, unchanged.   MACRO: None.   Signed by: Richie Wren 12/26/2023 6:29 PM Dictation workstation:   [id number]             Assessment/Plan   Principal Problem:    RSV (respiratory syncytial virus infection)  Active Problems:    Shortness of breath    #Acute on chronic hypoxic respiratory failure 2/2 RSV, community acquired pneumonia (improving)   #COPD, in acute exacerbation  - Patient was seen in [place] ED 12/26, discharged on dexamethasone and doxycycline  - WBC 13.9 >> 5.3  - Lactate 1.9  - COVID, flu A/B negative  - RSV positive  - Procal 0.75  - BCx no growth x 2 days   - sputum culture contained significant salivary contamination   - CXR: Small right pleural effusion and basilar atelectasis or airspace   disease and mild bilateral opacities concerning for infiltrates.    - Continue ceftriaxone (day 4)  - Azithromycin changed to doxycycline due to drug interactions (Day 4)  - Continue Solumedrol 40 mg IVP q8h; received one-time dose of 125 mg IVP in ED   - Tylenol PRN for fever   - Mucinex BID, Robitussin DM q4h PRN for cough   - DuoNebs TID    - Breo and Spiriva ordered (pharmacy substitute for home Trelegy)  - RT eval and  treat protocol  - Bronchial hygiene  - Isolation protocol for RSV  - Monitor SpO2 continuously   - Baseline O2 5L continuously   - Wean O2 as tolerated; patient currently on baseline 5L      #Atrial fibrillation  #HTN  #HLD  #HFpEF, not in acute exacerbation  - Recent DCCV on 12/13 at [facility], follows with Dr. Siegel   - Trop 7 > 7   - >285>368   - Currently in AF with rate up to 130s with coughing fits; patient denies palpitations, dizziness, or chest pain  - Lopressor 5 mg IVP q4h PRN for HR > 120 bpm sustained for > 5 mins   - Continue diltiazem, aspirin, atorvastatin, furosemide, and losartan  - INR 2.0 today, continue warfarin   - Goal INR 2-3, daily PT/INR while inpatient   - Strict I&Os: LOS - 2456 ml   - Daily weights  - 2g Na diet   - Telemetry monitoring  - Currently on baseline 5L O2      #Hematuria  - UA: 3+ blood, > 20 RBCs, 1+ bacteria  - UCx with no significant growth   - Patient denies gross blood in urine or difficulties with urination   - Monitor UOP for blood - none reported as of 1/6   - Trend CBC; H/H stable      #T2DM with neuropathy   - Continue Lantus, gabapentin, metformin and glimepiride   - SSI three times a day before meals while on steroids   - Hypoglycemia protocol  - Accuchecks ac/hs/prn  - Diabetic diet   - HbA1c 4.6      #Anemia, macrocytic  - H/H stable, 8.1/28.7 with    - B12 level was 267 last month per chart review   - Folate 6.2   - Iron studies: Fe 28, TIBC 272, 10% saturation  - Patient receives monthly B12 injections and is on ferrous sulfate; continue   - Monitor for active bleeding  - Daily CBC to trend H/H     #History of mucous membrane pemphigoid  - Continue dapsone  - Resume outpatient derm follow up on discharge      DVT PPx: Warfarin  GI PPx: Protonix   Diet: Diabetic, 2g Na   Code Status: Full code      Disposition: Patient requires inpatient management at this time.      Total accumulated time spent face to face and not face to face preparing to see  the patient, obtaining and reviewing separately obtained history; performing a medically appropriate examination and/or evaluation; counseling and educating the patient, family; ordering medications, tests, or procedures; referring and communicating with other health care professionals; documenting clinical information in the patient's medical record; independently interpreting results and communicating the results to the patient, family; and care coordination was 45 minutes.             Radha Silvestre PA-C

## 2024-01-06 NOTE — CARE PLAN
The patient's goals for the shift include      The clinical goals for the shift include Patient will maintain oxygen sat > or = 92% this shift.    Goal not met. When eating and on 5 lpm O2 via nc patient desats to 85%. Patient had a change of bilateral eyes condition. Patient stated she seen sparkling light when looking downward. Stroke alert protocol called and completed.

## 2024-01-07 ENCOUNTER — APPOINTMENT (OUTPATIENT)
Dept: RADIOLOGY | Facility: HOSPITAL | Age: 74
DRG: 193 | End: 2024-01-07
Payer: MEDICARE

## 2024-01-07 ENCOUNTER — APPOINTMENT (OUTPATIENT)
Dept: CARDIOLOGY | Facility: HOSPITAL | Age: 74
DRG: 193 | End: 2024-01-07
Payer: MEDICARE

## 2024-01-07 LAB
ANION GAP BLDA CALCULATED.4IONS-SCNC: 7 MMO/L (ref 10–25)
ANION GAP SERPL CALC-SCNC: 12 MMOL/L (ref 10–20)
ANION GAP SERPL CALC-SCNC: 14 MMOL/L (ref 10–20)
BASE EXCESS BLDA CALC-SCNC: 5.6 MMOL/L (ref -2–3)
BASOPHILS # BLD AUTO: 0 X10*3/UL (ref 0–0.1)
BASOPHILS NFR BLD AUTO: 0 %
BODY TEMPERATURE: ABNORMAL
BUN SERPL-MCNC: 47 MG/DL (ref 6–23)
BUN SERPL-MCNC: 51 MG/DL (ref 6–23)
CA-I BLDA-SCNC: 1.29 MMOL/L (ref 1.1–1.33)
CALCIUM SERPL-MCNC: 8.8 MG/DL (ref 8.6–10.3)
CALCIUM SERPL-MCNC: 8.9 MG/DL (ref 8.6–10.3)
CARDIAC TROPONIN I PNL SERPL HS: 7 NG/L (ref 0–13)
CHLORIDE BLDA-SCNC: 102 MMOL/L (ref 98–107)
CHLORIDE SERPL-SCNC: 102 MMOL/L (ref 98–107)
CHLORIDE SERPL-SCNC: 102 MMOL/L (ref 98–107)
CO2 SERPL-SCNC: 28 MMOL/L (ref 21–32)
CO2 SERPL-SCNC: 31 MMOL/L (ref 21–32)
CREAT SERPL-MCNC: 0.91 MG/DL (ref 0.5–1.05)
CREAT SERPL-MCNC: 1.01 MG/DL (ref 0.5–1.05)
D DIMER PPP FEU-MCNC: <215 NG/ML FEU
EOSINOPHIL # BLD AUTO: 0 X10*3/UL (ref 0–0.4)
EOSINOPHIL NFR BLD AUTO: 0 %
ERYTHROCYTE [DISTWIDTH] IN BLOOD BY AUTOMATED COUNT: 16.2 % (ref 11.5–14.5)
ERYTHROCYTE [DISTWIDTH] IN BLOOD BY AUTOMATED COUNT: 16.3 % (ref 11.5–14.5)
GFR SERPL CREATININE-BSD FRML MDRD: 59 ML/MIN/1.73M*2
GFR SERPL CREATININE-BSD FRML MDRD: 67 ML/MIN/1.73M*2
GLUCOSE BLD MANUAL STRIP-MCNC: 234 MG/DL (ref 74–99)
GLUCOSE BLD MANUAL STRIP-MCNC: 263 MG/DL (ref 74–99)
GLUCOSE BLD MANUAL STRIP-MCNC: 268 MG/DL (ref 74–99)
GLUCOSE BLD MANUAL STRIP-MCNC: 302 MG/DL (ref 74–99)
GLUCOSE BLD MANUAL STRIP-MCNC: 311 MG/DL (ref 74–99)
GLUCOSE BLDA-MCNC: 321 MG/DL (ref 74–99)
GLUCOSE SERPL-MCNC: 277 MG/DL (ref 74–99)
GLUCOSE SERPL-MCNC: 292 MG/DL (ref 74–99)
HCO3 BLDA-SCNC: 34.2 MMOL/L (ref 22–26)
HCT VFR BLD AUTO: 28.5 % (ref 36–46)
HCT VFR BLD AUTO: 28.8 % (ref 36–46)
HCT VFR BLD EST: 25 % (ref 36–46)
HGB BLD-MCNC: 8 G/DL (ref 12–16)
HGB BLD-MCNC: 8.1 G/DL (ref 12–16)
HGB BLDA-MCNC: 8.4 G/DL (ref 12–16)
HOLD SPECIMEN: NORMAL
IMM GRANULOCYTES # BLD AUTO: 0.08 X10*3/UL (ref 0–0.5)
IMM GRANULOCYTES NFR BLD AUTO: 1.2 % (ref 0–0.9)
INHALED O2 CONCENTRATION: 50 %
INR PPP: 2.3 (ref 0.9–1.1)
LACTATE BLDA-SCNC: 2.7 MMOL/L (ref 0.4–2)
LYMPHOCYTES # BLD AUTO: 0.34 X10*3/UL (ref 0.8–3)
LYMPHOCYTES NFR BLD AUTO: 5.1 %
MCH RBC QN AUTO: 30.9 PG (ref 26–34)
MCH RBC QN AUTO: 31.5 PG (ref 26–34)
MCHC RBC AUTO-ENTMCNC: 27.8 G/DL (ref 32–36)
MCHC RBC AUTO-ENTMCNC: 28.4 G/DL (ref 32–36)
MCV RBC AUTO: 111 FL (ref 80–100)
MCV RBC AUTO: 111 FL (ref 80–100)
MONOCYTES # BLD AUTO: 0.38 X10*3/UL (ref 0.05–0.8)
MONOCYTES NFR BLD AUTO: 5.7 %
MRSA DNA SPEC QL NAA+PROBE: NOT DETECTED
NEUTROPHILS # BLD AUTO: 5.89 X10*3/UL (ref 1.6–5.5)
NEUTROPHILS NFR BLD AUTO: 88 %
NRBC BLD-RTO: 0 /100 WBCS (ref 0–0)
NRBC BLD-RTO: 0.3 /100 WBCS (ref 0–0)
OXYHGB MFR BLDA: 92.4 % (ref 94–98)
PCO2 BLDA: 68 MM HG (ref 38–42)
PH BLDA: 7.31 PH (ref 7.38–7.42)
PLATELET # BLD AUTO: 192 X10*3/UL (ref 150–450)
PLATELET # BLD AUTO: 194 X10*3/UL (ref 150–450)
PO2 BLDA: 82 MM HG (ref 85–95)
POTASSIUM BLDA-SCNC: 4.7 MMOL/L (ref 3.5–5.3)
POTASSIUM SERPL-SCNC: 4.5 MMOL/L (ref 3.5–5.3)
POTASSIUM SERPL-SCNC: 4.6 MMOL/L (ref 3.5–5.3)
PROTHROMBIN TIME: 26 SECONDS (ref 9.8–12.8)
RBC # BLD AUTO: 2.57 X10*6/UL (ref 4–5.2)
RBC # BLD AUTO: 2.59 X10*6/UL (ref 4–5.2)
SAO2 % BLDA: 98 % (ref 94–100)
SODIUM BLDA-SCNC: 138 MMOL/L (ref 136–145)
SODIUM SERPL-SCNC: 139 MMOL/L (ref 136–145)
SODIUM SERPL-SCNC: 140 MMOL/L (ref 136–145)
WBC # BLD AUTO: 6.7 X10*3/UL (ref 4.4–11.3)
WBC # BLD AUTO: 7.1 X10*3/UL (ref 4.4–11.3)

## 2024-01-07 PROCEDURE — 84484 ASSAY OF TROPONIN QUANT: CPT | Mod: IPSPLIT | Performed by: EMERGENCY MEDICINE

## 2024-01-07 PROCEDURE — 94668 MNPJ CHEST WALL SBSQ: CPT | Mod: IPSPLIT

## 2024-01-07 PROCEDURE — 2550000001 HC RX 255 CONTRASTS: Mod: IPSPLIT | Performed by: INTERNAL MEDICINE

## 2024-01-07 PROCEDURE — 2500000005 HC RX 250 GENERAL PHARMACY W/O HCPCS: Mod: IPSPLIT | Performed by: NURSE PRACTITIONER

## 2024-01-07 PROCEDURE — 84132 ASSAY OF SERUM POTASSIUM: CPT | Mod: IPSPLIT | Performed by: EMERGENCY MEDICINE

## 2024-01-07 PROCEDURE — 2020000001 HC ICU ROOM DAILY: Mod: IPSPLIT

## 2024-01-07 PROCEDURE — 93010 ELECTROCARDIOGRAM REPORT: CPT | Performed by: INTERNAL MEDICINE

## 2024-01-07 PROCEDURE — 82947 ASSAY GLUCOSE BLOOD QUANT: CPT | Mod: IPSPLIT

## 2024-01-07 PROCEDURE — 93005 ELECTROCARDIOGRAM TRACING: CPT | Mod: IPSPLIT

## 2024-01-07 PROCEDURE — 9420000001 HC RT PATIENT EDUCATION 5 MIN: Mod: IPSPLIT

## 2024-01-07 PROCEDURE — 70496 CT ANGIOGRAPHY HEAD: CPT | Performed by: STUDENT IN AN ORGANIZED HEALTH CARE EDUCATION/TRAINING PROGRAM

## 2024-01-07 PROCEDURE — 94640 AIRWAY INHALATION TREATMENT: CPT | Mod: IPSPLIT

## 2024-01-07 PROCEDURE — 71045 X-RAY EXAM CHEST 1 VIEW: CPT | Mod: IPSPLIT

## 2024-01-07 PROCEDURE — 85610 PROTHROMBIN TIME: CPT | Mod: IPSPLIT

## 2024-01-07 PROCEDURE — 94640 AIRWAY INHALATION TREATMENT: CPT

## 2024-01-07 PROCEDURE — 36415 COLL VENOUS BLD VENIPUNCTURE: CPT | Mod: IPSPLIT | Performed by: EMERGENCY MEDICINE

## 2024-01-07 PROCEDURE — 70450 CT HEAD/BRAIN W/O DYE: CPT | Mod: IPSPLIT

## 2024-01-07 PROCEDURE — 2500000002 HC RX 250 W HCPCS SELF ADMINISTERED DRUGS (ALT 637 FOR MEDICARE OP, ALT 636 FOR OP/ED): Mod: IPSPLIT | Performed by: INTERNAL MEDICINE

## 2024-01-07 PROCEDURE — 70498 CT ANGIOGRAPHY NECK: CPT | Performed by: STUDENT IN AN ORGANIZED HEALTH CARE EDUCATION/TRAINING PROGRAM

## 2024-01-07 PROCEDURE — 99233 SBSQ HOSP IP/OBS HIGH 50: CPT

## 2024-01-07 PROCEDURE — 36415 COLL VENOUS BLD VENIPUNCTURE: CPT | Mod: IPSPLIT | Performed by: NURSE PRACTITIONER

## 2024-01-07 PROCEDURE — 2500000005 HC RX 250 GENERAL PHARMACY W/O HCPCS: Mod: IPSPLIT

## 2024-01-07 PROCEDURE — 84132 ASSAY OF SERUM POTASSIUM: CPT | Mod: IPSPLIT | Performed by: NURSE PRACTITIONER

## 2024-01-07 PROCEDURE — 36600 WITHDRAWAL OF ARTERIAL BLOOD: CPT | Mod: IPSPLIT

## 2024-01-07 PROCEDURE — 2500000001 HC RX 250 WO HCPCS SELF ADMINISTERED DRUGS (ALT 637 FOR MEDICARE OP): Mod: IPSPLIT

## 2024-01-07 PROCEDURE — 87641 MR-STAPH DNA AMP PROBE: CPT | Mod: IPSPLIT

## 2024-01-07 PROCEDURE — 85027 COMPLETE CBC AUTOMATED: CPT | Mod: IPSPLIT | Performed by: EMERGENCY MEDICINE

## 2024-01-07 PROCEDURE — 2500000004 HC RX 250 GENERAL PHARMACY W/ HCPCS (ALT 636 FOR OP/ED): Mod: IPSPLIT | Performed by: NURSE PRACTITIONER

## 2024-01-07 PROCEDURE — 71045 X-RAY EXAM CHEST 1 VIEW: CPT | Performed by: RADIOLOGY

## 2024-01-07 PROCEDURE — 2500000004 HC RX 250 GENERAL PHARMACY W/ HCPCS (ALT 636 FOR OP/ED): Mod: IPSPLIT

## 2024-01-07 PROCEDURE — 85025 COMPLETE CBC W/AUTO DIFF WBC: CPT | Mod: IPSPLIT | Performed by: NURSE PRACTITIONER

## 2024-01-07 PROCEDURE — 2500000001 HC RX 250 WO HCPCS SELF ADMINISTERED DRUGS (ALT 637 FOR MEDICARE OP): Mod: IPSPLIT | Performed by: NURSE PRACTITIONER

## 2024-01-07 PROCEDURE — 2500000002 HC RX 250 W HCPCS SELF ADMINISTERED DRUGS (ALT 637 FOR MEDICARE OP, ALT 636 FOR OP/ED): Mod: IPSPLIT

## 2024-01-07 PROCEDURE — 85379 FIBRIN DEGRADATION QUANT: CPT | Mod: IPSPLIT | Performed by: EMERGENCY MEDICINE

## 2024-01-07 PROCEDURE — 70498 CT ANGIOGRAPHY NECK: CPT | Mod: IPSPLIT

## 2024-01-07 RX ORDER — DEXTROSE MONOHYDRATE 5 G/100ML
INJECTION INTRAVENOUS
Status: COMPLETED
Start: 2024-01-07 | End: 2024-01-08

## 2024-01-07 RX ORDER — DILTIAZEM HYDROCHLORIDE 5 MG/ML
INJECTION INTRAVENOUS
Status: COMPLETED
Start: 2024-01-07 | End: 2024-01-07

## 2024-01-07 RX ORDER — VANCOMYCIN 750 MG/150ML
750 INJECTION, SOLUTION INTRAVENOUS EVERY 12 HOURS
Status: DISCONTINUED | OUTPATIENT
Start: 2024-01-07 | End: 2024-01-07

## 2024-01-07 RX ORDER — NICARDIPINE HYDROCHLORIDE 0.2 MG/ML
INJECTION INTRAVENOUS
Status: DISPENSED
Start: 2024-01-07 | End: 2024-01-08

## 2024-01-07 RX ORDER — FUROSEMIDE 10 MG/ML
20 INJECTION INTRAMUSCULAR; INTRAVENOUS ONCE
Status: COMPLETED | OUTPATIENT
Start: 2024-01-07 | End: 2024-01-07

## 2024-01-07 RX ORDER — METOPROLOL TARTRATE 1 MG/ML
5 INJECTION, SOLUTION INTRAVENOUS
Status: COMPLETED | OUTPATIENT
Start: 2024-01-07 | End: 2024-01-07

## 2024-01-07 RX ORDER — WATER 1000 ML/1000ML
INJECTION, SOLUTION INTRAVENOUS
Status: DISPENSED
Start: 2024-01-07 | End: 2024-01-08

## 2024-01-07 RX ORDER — PREDNISONE 20 MG/1
40 TABLET ORAL DAILY
Status: DISCONTINUED | OUTPATIENT
Start: 2024-01-07 | End: 2024-01-14

## 2024-01-07 RX ADMIN — METHYLPREDNISOLONE SODIUM SUCCINATE 40 MG: 40 INJECTION, POWDER, FOR SOLUTION INTRAMUSCULAR; INTRAVENOUS at 00:00

## 2024-01-07 RX ADMIN — METFORMIN HYDROCHLORIDE 1000 MG: 500 TABLET ORAL at 09:17

## 2024-01-07 RX ADMIN — FERROUS SULFATE TAB 325 MG (65 MG ELEMENTAL FE) 1 TABLET: 325 (65 FE) TAB at 09:16

## 2024-01-07 RX ADMIN — PREDNISONE 40 MG: 20 TABLET ORAL at 09:27

## 2024-01-07 RX ADMIN — ASPIRIN 81 MG: 81 TABLET, COATED ORAL at 09:17

## 2024-01-07 RX ADMIN — DOXYCYCLINE 100 MG: 100 INJECTION, POWDER, LYOPHILIZED, FOR SOLUTION INTRAVENOUS at 01:30

## 2024-01-07 RX ADMIN — POLYETHYLENE GLYCOL 3350 17 G: 17 POWDER, FOR SOLUTION ORAL at 09:16

## 2024-01-07 RX ADMIN — DOCUSATE SODIUM 100 MG: 100 CAPSULE, LIQUID FILLED ORAL at 21:19

## 2024-01-07 RX ADMIN — PIPERACILLIN SODIUM AND TAZOBACTAM SODIUM 4.5 G: 4; .5 INJECTION, SOLUTION INTRAVENOUS at 21:28

## 2024-01-07 RX ADMIN — METOROPROLOL TARTRATE 5 MG: 5 INJECTION, SOLUTION INTRAVENOUS at 16:57

## 2024-01-07 RX ADMIN — PANTOPRAZOLE SODIUM 40 MG: 40 TABLET, DELAYED RELEASE ORAL at 06:09

## 2024-01-07 RX ADMIN — INSULIN LISPRO 3 UNITS: 100 INJECTION, SOLUTION INTRAVENOUS; SUBCUTANEOUS at 18:16

## 2024-01-07 RX ADMIN — FUROSEMIDE 80 MG: 80 TABLET ORAL at 09:16

## 2024-01-07 RX ADMIN — VANCOMYCIN 750 MG: 750 INJECTION, SOLUTION INTRAVENOUS at 10:26

## 2024-01-07 RX ADMIN — DILTIAZEM HYDROCHLORIDE 125 MG: 5 INJECTION INTRAVENOUS at 23:59

## 2024-01-07 RX ADMIN — INSULIN LISPRO 3 UNITS: 100 INJECTION, SOLUTION INTRAVENOUS; SUBCUTANEOUS at 09:07

## 2024-01-07 RX ADMIN — IPRATROPIUM BROMIDE AND ALBUTEROL SULFATE 3 ML: .5; 3 SOLUTION RESPIRATORY (INHALATION) at 09:51

## 2024-01-07 RX ADMIN — GABAPENTIN 300 MG: 300 CAPSULE ORAL at 09:16

## 2024-01-07 RX ADMIN — LOSARTAN POTASSIUM 12.5 MG: 25 TABLET, FILM COATED ORAL at 09:16

## 2024-01-07 RX ADMIN — PIPERACILLIN SODIUM AND TAZOBACTAM SODIUM 4.5 G: 4; .5 INJECTION, SOLUTION INTRAVENOUS at 09:27

## 2024-01-07 RX ADMIN — WARFARIN SODIUM 5 MG: 5 TABLET ORAL at 17:22

## 2024-01-07 RX ADMIN — IPRATROPIUM BROMIDE AND ALBUTEROL SULFATE 3 ML: .5; 3 SOLUTION RESPIRATORY (INHALATION) at 15:48

## 2024-01-07 RX ADMIN — GLIMEPIRIDE 2 MG: 2 TABLET ORAL at 09:16

## 2024-01-07 RX ADMIN — INSULIN LISPRO 4 UNITS: 100 INJECTION, SOLUTION INTRAVENOUS; SUBCUTANEOUS at 11:41

## 2024-01-07 RX ADMIN — GUAIFENESIN 600 MG: 600 TABLET, EXTENDED RELEASE ORAL at 09:16

## 2024-01-07 RX ADMIN — IOHEXOL 75 ML: 350 INJECTION, SOLUTION INTRAVENOUS at 01:41

## 2024-01-07 RX ADMIN — GUAIFENESIN 600 MG: 600 TABLET, EXTENDED RELEASE ORAL at 21:19

## 2024-01-07 RX ADMIN — METOPROLOL TARTRATE 5 MG: 5 INJECTION INTRAVENOUS at 13:57

## 2024-01-07 RX ADMIN — ATORVASTATIN CALCIUM 20 MG: 10 TABLET, FILM COATED ORAL at 21:19

## 2024-01-07 RX ADMIN — METOPROLOL TARTRATE 5 MG: 5 INJECTION INTRAVENOUS at 23:38

## 2024-01-07 RX ADMIN — DILTIAZEM HYDROCHLORIDE 5 MG/HR: 5 INJECTION INTRAVENOUS at 23:45

## 2024-01-07 RX ADMIN — METOROPROLOL TARTRATE 5 MG: 5 INJECTION, SOLUTION INTRAVENOUS at 17:22

## 2024-01-07 RX ADMIN — METOPROLOL TARTRATE 5 MG: 5 INJECTION INTRAVENOUS at 00:25

## 2024-01-07 RX ADMIN — METOPROLOL TARTRATE 5 MG: 5 INJECTION INTRAVENOUS at 15:52

## 2024-01-07 RX ADMIN — FLUTICASONE FUROATE AND VILANTEROL TRIFENATATE 1 PUFF: 200; 25 POWDER RESPIRATORY (INHALATION) at 09:55

## 2024-01-07 RX ADMIN — TOPIRAMATE 100 MG: 100 TABLET, FILM COATED ORAL at 09:27

## 2024-01-07 RX ADMIN — DILTIAZEM HYDROCHLORIDE 300 MG: 120 CAPSULE, COATED, EXTENDED RELEASE ORAL at 09:17

## 2024-01-07 RX ADMIN — FUROSEMIDE 20 MG: 10 INJECTION, SOLUTION INTRAVENOUS at 15:50

## 2024-01-07 RX ADMIN — TIOTROPIUM BROMIDE INHALATION SPRAY 2 PUFF: 3.12 SPRAY, METERED RESPIRATORY (INHALATION) at 09:52

## 2024-01-07 RX ADMIN — METFORMIN HYDROCHLORIDE 1000 MG: 500 TABLET ORAL at 17:22

## 2024-01-07 RX ADMIN — Medication 5 L/MIN: at 09:55

## 2024-01-07 RX ADMIN — DOCUSATE SODIUM 100 MG: 100 CAPSULE, LIQUID FILLED ORAL at 09:17

## 2024-01-07 RX ADMIN — PIPERACILLIN SODIUM AND TAZOBACTAM SODIUM 4.5 G: 4; .5 INJECTION, SOLUTION INTRAVENOUS at 14:31

## 2024-01-07 RX ADMIN — TOPIRAMATE 100 MG: 100 TABLET, FILM COATED ORAL at 21:19

## 2024-01-07 RX ADMIN — DAPSONE 100 MG: 25 TABLET ORAL at 06:09

## 2024-01-07 RX ADMIN — CALCIUM CARBONATE (ANTACID) CHEW TAB 500 MG 1500 MG: 500 CHEW TAB at 11:47

## 2024-01-07 RX ADMIN — INSULIN GLARGINE 15 UNITS: 100 INJECTION, SOLUTION SUBCUTANEOUS at 21:37

## 2024-01-07 RX ADMIN — METOPROLOL TARTRATE 5 MG: 5 INJECTION INTRAVENOUS at 18:09

## 2024-01-07 ASSESSMENT — COGNITIVE AND FUNCTIONAL STATUS - GENERAL
HELP NEEDED FOR BATHING: A LOT
WALKING IN HOSPITAL ROOM: A LOT
STANDING UP FROM CHAIR USING ARMS: A LOT
TOILETING: A LOT
HELP NEEDED FOR BATHING: A LOT
STANDING UP FROM CHAIR USING ARMS: A LOT
DRESSING REGULAR UPPER BODY CLOTHING: A LOT
WALKING IN HOSPITAL ROOM: A LITTLE
CLIMB 3 TO 5 STEPS WITH RAILING: A LOT
MOVING TO AND FROM BED TO CHAIR: A LOT
STANDING UP FROM CHAIR USING ARMS: A LOT
WALKING IN HOSPITAL ROOM: A LITTLE
MOVING FROM LYING ON BACK TO SITTING ON SIDE OF FLAT BED WITH BEDRAILS: A LITTLE
CLIMB 3 TO 5 STEPS WITH RAILING: A LOT
TURNING FROM BACK TO SIDE WHILE IN FLAT BAD: A LITTLE
MOVING TO AND FROM BED TO CHAIR: A LITTLE
MOBILITY SCORE: 14
DRESSING REGULAR LOWER BODY CLOTHING: A LOT
DAILY ACTIVITIY SCORE: 14
TURNING FROM BACK TO SIDE WHILE IN FLAT BAD: A LITTLE
MOVING TO AND FROM BED TO CHAIR: A LITTLE
MOBILITY SCORE: 17
CLIMB 3 TO 5 STEPS WITH RAILING: A LOT
MOBILITY SCORE: 17
DRESSING REGULAR LOWER BODY CLOTHING: A LOT
TURNING FROM BACK TO SIDE WHILE IN FLAT BAD: A LITTLE
PERSONAL GROOMING: A LOT
DAILY ACTIVITIY SCORE: 14
TOILETING: A LOT
DRESSING REGULAR UPPER BODY CLOTHING: A LOT
PERSONAL GROOMING: A LOT

## 2024-01-07 ASSESSMENT — PAIN - FUNCTIONAL ASSESSMENT
PAIN_FUNCTIONAL_ASSESSMENT: PAINAD (PAIN ASSESSMENT IN ADVANCED DEMENTIA SCALE)
PAIN_FUNCTIONAL_ASSESSMENT: 0-10
PAIN_FUNCTIONAL_ASSESSMENT: 0-10

## 2024-01-07 ASSESSMENT — PAIN SCALES - GENERAL
PAINLEVEL_OUTOF10: 0 - NO PAIN
PAINLEVEL_OUTOF10: 0 - NO PAIN

## 2024-01-07 NOTE — PROGRESS NOTES
Frannie Blanco is a 73 y.o. female on day 3 of admission presenting with RSV (respiratory syncytial virus infection).    Subjective     Two rapid responses were called yesterday evening/overnight for change in mental status; patient was reportedly confused when she had previously been A&Ox3. NIH at that time was documented as 3; possible facial droop per nursing documentation. CT head and CTA head/neck were completed as part of the rapid response and did not show any acute findings.      Patient seen and evaluated with attending Dr. Muñoz this morning. Patient immediately recognized Dr. Muñoz by name and answered his questions appropriately. She had no focal deficits on exam. Patient is now requiring 6L O2. She has significantly worse wheezing and crackles on exam; antibiotics escalated to Vancomycin and Zosyn. Cardizem dose was increased today due to persistent tachycardia on telemetry; patient denies any palpitations, chest pain, dizziness, or lightheadedness.     Objective     Physical Exam  Constitutional:       General: She is not in acute distress.     Appearance: She is obese.   HENT:      Head: Normocephalic and atraumatic.      Mouth/Throat:      Mouth: Mucous membranes are moist.   Eyes:      Conjunctiva/sclera: Conjunctivae normal.   Cardiovascular:      Rate and Rhythm: Tachycardia present. Rhythm irregular.   Pulmonary:      Effort: No respiratory distress.      Breath sounds: Wheezing and rhonchi present.   Abdominal:      General: There is no distension.      Palpations: Abdomen is soft.      Tenderness: There is no abdominal tenderness.   Musculoskeletal:         General: Swelling (lower extremity swelling consistent with history of lymphedema) present.   Skin:     General: Skin is warm and dry.      Findings: No rash.   Neurological:      General: No focal deficit present.      Mental Status: She is alert and oriented to person, place, and time.      Cranial Nerves: No facial asymmetry.      Motor:  "No weakness.   Psychiatric:         Mood and Affect: Mood normal.         Behavior: Behavior normal.         Last Recorded Vitals  Blood pressure 142/86, pulse (!) 116, temperature 36.7 °C (98.1 °F), temperature source Temporal, resp. rate 22, height 1.676 m (5' 6\"), weight (!) 154 kg (339 lb 8.1 oz), SpO2 (!) 88 %.  Intake/Output last 3 Shifts:  I/O last 3 completed shifts:  In: 440 (2.9 mL/kg) [P.O.:240; IV Piggyback:200]  Out: 4350 (28.2 mL/kg) [Urine:4350 (0.8 mL/kg/hr)]  Weight: 154 kg     Relevant Results          Scheduled medications  aspirin, 81 mg, oral, Daily  atorvastatin, 20 mg, oral, Nightly  calcium carbonate, 1,500 mg, oral, q12h  dapsone, 100 mg, oral, Daily before breakfast  dilTIAZem CD, 300 mg, oral, Daily  docusate sodium, 100 mg, oral, BID  ferrous sulfate (325 mg ferrous sulfate), 65 mg of iron, oral, Daily  tiotropium, 2 Inhalation, inhalation, Daily   And  fluticasone furoate-vilanteroL, 1 puff, inhalation, Daily  furosemide, 80 mg, oral, Daily  gabapentin, 300 mg, oral, TID  glimepiride, 2 mg, oral, Daily with breakfast  guaiFENesin, 600 mg, oral, BID  insulin glargine, 15 Units, subcutaneous, Nightly  insulin lispro, 0-5 Units, subcutaneous, TID with meals  ipratropium-albuteroL, 3 mL, nebulization, TID  losartan, 12.5 mg, oral, Daily  melatonin, 6 mg, oral, Daily  metFORMIN, 1,000 mg, oral, BID with meals  pantoprazole, 40 mg, oral, Daily before breakfast   Or  pantoprazole, 40 mg, intravenous, Daily before breakfast  piperacillin-tazobactam, 4.5 g, intravenous, q6h  polyethylene glycol, 17 g, oral, Daily  predniSONE, 40 mg, oral, Daily  topiramate, 100 mg, oral, BID  vancomycin-diluent combo no.1, 750 mg, intravenous, q12h  warfarin, 5 mg, oral, Daily      Continuous medications     PRN medications  PRN medications: acetaminophen **OR** acetaminophen **OR** acetaminophen, acetaminophen **OR** acetaminophen **OR** acetaminophen, albuterol, benzocaine-menthoL, bisacodyl, " dextromethorphan-guaifenesin, dextrose 10 % in water (D10W), dextrose, glucagon, metoprolol, ondansetron ODT **OR** ondansetron, oxygen    Results for orders placed or performed during the hospital encounter of 01/02/24 (from the past 24 hour(s))   POCT GLUCOSE   Result Value Ref Range    POCT Glucose 312 (H) 74 - 99 mg/dL   Light Blue Top   Result Value Ref Range    Extra Tube Hold for add-ons.    SST TOP   Result Value Ref Range    Extra Tube Hold for add-ons.    Gray Top   Result Value Ref Range    Extra Tube Hold for add-ons.    CBC   Result Value Ref Range    WBC 8.1 4.4 - 11.3 x10*3/uL    nRBC 0.0 0.0 - 0.0 /100 WBCs    RBC 2.67 (L) 4.00 - 5.20 x10*6/uL    Hemoglobin 8.4 (L) 12.0 - 16.0 g/dL    Hematocrit 29.4 (L) 36.0 - 46.0 %     (H) 80 - 100 fL    MCH 31.5 26.0 - 34.0 pg    MCHC 28.6 (L) 32.0 - 36.0 g/dL    RDW 16.4 (H) 11.5 - 14.5 %    Platelets 225 150 - 450 x10*3/uL   Comprehensive metabolic panel   Result Value Ref Range    Glucose 304 (H) 74 - 99 mg/dL    Sodium 137 136 - 145 mmol/L    Potassium 4.3 3.5 - 5.3 mmol/L    Chloride 100 98 - 107 mmol/L    Bicarbonate 27 21 - 32 mmol/L    Anion Gap 14 10 - 20 mmol/L    Urea Nitrogen 51 (H) 6 - 23 mg/dL    Creatinine 1.11 (H) 0.50 - 1.05 mg/dL    eGFR 53 (L) >60 mL/min/1.73m*2    Calcium 8.9 8.6 - 10.3 mg/dL    Albumin 4.1 3.4 - 5.0 g/dL    Alkaline Phosphatase 45 33 - 136 U/L    Total Protein 6.5 6.4 - 8.2 g/dL    AST 6 (L) 9 - 39 U/L    Bilirubin, Total 0.6 0.0 - 1.2 mg/dL    ALT 10 7 - 45 U/L   C-reactive protein   Result Value Ref Range    C-Reactive Protein 1.41 (H) <1.00 mg/dL   Sedimentation rate, automated   Result Value Ref Range    Sedimentation Rate 3 0 - 30 mm/h   POCT GLUCOSE   Result Value Ref Range    POCT Glucose 360 (H) 74 - 99 mg/dL   POCT GLUCOSE   Result Value Ref Range    POCT Glucose 302 (H) 74 - 99 mg/dL   Blood Gas Arterial Full Panel   Result Value Ref Range    POCT pH, Arterial 7.31 (L) 7.38 - 7.42 pH    POCT pCO2, Arterial 68  (H) 38 - 42 mm Hg    POCT pO2, Arterial 82 (L) 85 - 95 mm Hg    POCT SO2, Arterial 98 94 - 100 %    POCT Oxy Hemoglobin, Arterial 92.4 (L) 94.0 - 98.0 %    POCT Hematocrit Calculated, Arterial 25.0 (L) 36.0 - 46.0 %    POCT Sodium, Arterial 138 136 - 145 mmol/L    POCT Potassium, Arterial 4.7 3.5 - 5.3 mmol/L    POCT Chloride, Arterial 102 98 - 107 mmol/L    POCT Ionized Calcium, Arterial 1.29 1.10 - 1.33 mmol/L    POCT Glucose, Arterial 321 (H) 74 - 99 mg/dL    POCT Lactate, Arterial 2.7 (H) 0.4 - 2.0 mmol/L    POCT Base Excess, Arterial 5.6 (H) -2.0 - 3.0 mmol/L    POCT HCO3 Calculated, Arterial 34.2 (H) 22.0 - 26.0 mmol/L    POCT Hemoglobin, Arterial 8.4 (L) 12.0 - 16.0 g/dL    POCT Anion Gap, Arterial 7 (L) 10 - 25 mmo/L    Patient Temperature      FiO2 50 %   Troponin I, High Sensitivity   Result Value Ref Range    Troponin I, High Sensitivity 7 0 - 13 ng/L   D-dimer, VTE Exclusion   Result Value Ref Range    D-Dimer, Quantitative VTE Exclusion <215 <=500 ng/mL FEU   CBC   Result Value Ref Range    WBC 7.1 4.4 - 11.3 x10*3/uL    nRBC 0.0 0.0 - 0.0 /100 WBCs    RBC 2.57 (L) 4.00 - 5.20 x10*6/uL    Hemoglobin 8.1 (L) 12.0 - 16.0 g/dL    Hematocrit 28.5 (L) 36.0 - 46.0 %     (H) 80 - 100 fL    MCH 31.5 26.0 - 34.0 pg    MCHC 28.4 (L) 32.0 - 36.0 g/dL    RDW 16.3 (H) 11.5 - 14.5 %    Platelets 194 150 - 450 x10*3/uL   Basic metabolic panel   Result Value Ref Range    Glucose 292 (H) 74 - 99 mg/dL    Sodium 139 136 - 145 mmol/L    Potassium 4.5 3.5 - 5.3 mmol/L    Chloride 102 98 - 107 mmol/L    Bicarbonate 28 21 - 32 mmol/L    Anion Gap 14 10 - 20 mmol/L    Urea Nitrogen 51 (H) 6 - 23 mg/dL    Creatinine 1.01 0.50 - 1.05 mg/dL    eGFR 59 (L) >60 mL/min/1.73m*2    Calcium 8.9 8.6 - 10.3 mg/dL   POCT GLUCOSE   Result Value Ref Range    POCT Glucose 268 (H) 74 - 99 mg/dL   Red Top   Result Value Ref Range    Extra Tube Hold for add-ons.    SST TOP   Result Value Ref Range    Extra Tube Hold for add-ons.    Gray  Top   Result Value Ref Range    Extra Tube Hold for add-ons.    CBC and Auto Differential   Result Value Ref Range    WBC 6.7 4.4 - 11.3 x10*3/uL    nRBC 0.3 (H) 0.0 - 0.0 /100 WBCs    RBC 2.59 (L) 4.00 - 5.20 x10*6/uL    Hemoglobin 8.0 (L) 12.0 - 16.0 g/dL    Hematocrit 28.8 (L) 36.0 - 46.0 %     (H) 80 - 100 fL    MCH 30.9 26.0 - 34.0 pg    MCHC 27.8 (L) 32.0 - 36.0 g/dL    RDW 16.2 (H) 11.5 - 14.5 %    Platelets 192 150 - 450 x10*3/uL    Neutrophils % 88.0 40.0 - 80.0 %    Immature Granulocytes %, Automated 1.2 (H) 0.0 - 0.9 %    Lymphocytes % 5.1 13.0 - 44.0 %    Monocytes % 5.7 2.0 - 10.0 %    Eosinophils % 0.0 0.0 - 6.0 %    Basophils % 0.0 0.0 - 2.0 %    Neutrophils Absolute 5.89 (H) 1.60 - 5.50 x10*3/uL    Immature Granulocytes Absolute, Automated 0.08 0.00 - 0.50 x10*3/uL    Lymphocytes Absolute 0.34 (L) 0.80 - 3.00 x10*3/uL    Monocytes Absolute 0.38 0.05 - 0.80 x10*3/uL    Eosinophils Absolute 0.00 0.00 - 0.40 x10*3/uL    Basophils Absolute 0.00 0.00 - 0.10 x10*3/uL   Basic metabolic panel   Result Value Ref Range    Glucose 277 (H) 74 - 99 mg/dL    Sodium 140 136 - 145 mmol/L    Potassium 4.6 3.5 - 5.3 mmol/L    Chloride 102 98 - 107 mmol/L    Bicarbonate 31 21 - 32 mmol/L    Anion Gap 12 10 - 20 mmol/L    Urea Nitrogen 47 (H) 6 - 23 mg/dL    Creatinine 0.91 0.50 - 1.05 mg/dL    eGFR 67 >60 mL/min/1.73m*2    Calcium 8.8 8.6 - 10.3 mg/dL   Protime-INR   Result Value Ref Range    Protime 26.0 (H) 9.8 - 12.8 seconds    INR 2.3 (H) 0.9 - 1.1   MRSA Surveillance for Vancomycin De-escalation, PCR    Specimen: Anterior Nares; Swab   Result Value Ref Range    MRSA PCR Not Detected Not Detected   POCT GLUCOSE   Result Value Ref Range    POCT Glucose 311 (H) 74 - 99 mg/dL       CT head wo IV contrast    Result Date: 1/7/2024  Interpreted By:  Jaxon Fernandez, STUDY: CT ANGIO HEAD AND NECK W AND WO IV CONTRAST; CT HEAD WO IV CONTRAST; 1/7/2024 1:20 am; 1/7/2024 1:10 am   INDICATION: Signs/Symptoms:Confusion.    COMPARISON: CT and CT angiogram dated 01/06/2024.   ACCESSION NUMBER(S): [id number]; [id number]   ORDERING CLINICIAN: JAC SANTOS   TECHNIQUE: Unenhanced CT images of the head were obtained. Subsequently,  75 mL Omnipaque 350 was administered intravenously and axial images of the head and neck were acquired.  Coronal, sagittal, and 3-D reconstructions were provided for review.   FINDINGS: There is a mostly calcified meningioma within the upper left parietal convexity measuring 1.7 x 1.5 cm and also calcified meningioma measuring 1.1 x 8.2 cm overlying the inferolateral right frontal lobe. No evidence of associated vasogenic edema or mass effect. There is no evidence of midline shift, hydrocephalus, or acute intracranial hemorrhage. Gray-white matter differentiation is maintained.   There is a small amount of fluid in the caudal mastoid air cells. There is mild to moderate polypoid mucosal thickening of the left maxillary sinus and anterior ethmoid air cells. These findings are unchanged.   CTA HEAD FINDINGS:   Anterior circulation: The bilateral intracranial internal carotid arteries, bilateral carotid terminals, bilateral proximal anterior and middle cerebral arteries are normal.   Posterior circulation: Bilateral intracranial vertebral arteries, vertebrobasilar junction, basilar artery and proximal posterior cerebral arteries are normal.   Venous contamination limits evaluation for small aneurysms, without gross evidence of intracranial aneurysm.   CTA NECK FINDINGS:   Right carotid vessels: There are mild atherosclerotic calcifications of the carotid bifurcation with 0% stenosis by NASCET criteria. The remainder of the right internal cervical carotid artery is widely patent without focal stenosis.   Left carotid vessels: The common carotid artery is normal. The carotid bifurcation is normal. The internal carotid artery in the neck is normal. There is 0% stenosis by NASCET criteria. .   Vertebral vessels:   The visualized segments of the cervical vertebral arteries are normal in caliber.   There is redemonstration of multifocal nodular and ground-glass opacities in the visualized upper lung fields suspicious for multifocal pneumonia. There is also small right pleural effusion partially visualized.       No evidence of acute cortical infarct or acute intracranial hemorrhage. There are calcified meningiomas noted overlying the upper left frontoparietal convexity and overlying the inferolateral right frontal lobe without evidence of significant mass effect or vasogenic edema. No interval change.   No evidence for significant stenosis of the cervical vessels.   No evidence for significant stenosis or large branch vessel cutoffs of the intracranial vessels.   Partially visualized ground-glass and nodular opacities in the upper lung fields suspicious for multifocal pneumonia for example viral pneumonia.   MACRO:   None   Signed by: Jaxon Fernandez 1/7/2024 2:06 AM Dictation workstation:   [id number]    CT angio head and neck w and wo IV contrast    Result Date: 1/7/2024  Interpreted By:  Jaxon Fernandez, STUDY: CT ANGIO HEAD AND NECK W AND WO IV CONTRAST; CT HEAD WO IV CONTRAST; 1/7/2024 1:20 am; 1/7/2024 1:10 am   INDICATION: Signs/Symptoms:Confusion.   COMPARISON: CT and CT angiogram dated 01/06/2024.   ACCESSION NUMBER(S): [id number]; [id number]   ORDERING CLINICIAN: JAC SANTOS   TECHNIQUE: Unenhanced CT images of the head were obtained. Subsequently,  75 mL Omnipaque 350 was administered intravenously and axial images of the head and neck were acquired.  Coronal, sagittal, and 3-D reconstructions were provided for review.   FINDINGS: There is a mostly calcified meningioma within the upper left parietal convexity measuring 1.7 x 1.5 cm and also calcified meningioma measuring 1.1 x 8.2 cm overlying the inferolateral right frontal lobe. No evidence of associated vasogenic edema or mass effect. There is no evidence of  midline shift, hydrocephalus, or acute intracranial hemorrhage. Gray-white matter differentiation is maintained.   There is a small amount of fluid in the caudal mastoid air cells. There is mild to moderate polypoid mucosal thickening of the left maxillary sinus and anterior ethmoid air cells. These findings are unchanged.   CTA HEAD FINDINGS:   Anterior circulation: The bilateral intracranial internal carotid arteries, bilateral carotid terminals, bilateral proximal anterior and middle cerebral arteries are normal.   Posterior circulation: Bilateral intracranial vertebral arteries, vertebrobasilar junction, basilar artery and proximal posterior cerebral arteries are normal.   Venous contamination limits evaluation for small aneurysms, without gross evidence of intracranial aneurysm.   CTA NECK FINDINGS:   Right carotid vessels: There are mild atherosclerotic calcifications of the carotid bifurcation with 0% stenosis by NASCET criteria. The remainder of the right internal cervical carotid artery is widely patent without focal stenosis.   Left carotid vessels: The common carotid artery is normal. The carotid bifurcation is normal. The internal carotid artery in the neck is normal. There is 0% stenosis by NASCET criteria. .   Vertebral vessels:  The visualized segments of the cervical vertebral arteries are normal in caliber.   There is redemonstration of multifocal nodular and ground-glass opacities in the visualized upper lung fields suspicious for multifocal pneumonia. There is also small right pleural effusion partially visualized.       No evidence of acute cortical infarct or acute intracranial hemorrhage. There are calcified meningiomas noted overlying the upper left frontoparietal convexity and overlying the inferolateral right frontal lobe without evidence of significant mass effect or vasogenic edema. No interval change.   No evidence for significant stenosis of the cervical vessels.   No evidence for  significant stenosis or large branch vessel cutoffs of the intracranial vessels.   Partially visualized ground-glass and nodular opacities in the upper lung fields suspicious for multifocal pneumonia for example viral pneumonia.   MACRO:   None   Signed by: Jaxon Fernandez 1/7/2024 2:06 AM Dictation workstation:   [id number]    CT angio brain attack head w IV contrast and post procedure    Result Date: 1/6/2024  Interpreted By:  Jazmyne Teresa, STUDY: CT ANGIO BRAIN ATTACK HEAD W IV CONTRAST AND POST PROCEDURE; CT ANGIO BRAIN ATTACK NECK W IV CONTRAST AND POST PROCEDURE;  1/6/2024 5:16 pm   INDICATION: Signs/Symptoms:AMS.   COMPARISON: None.   ACCESSION NUMBER(S): [id number]; [id number]   ORDERING CLINICIAN: LYNN FLORES   TECHNIQUE: 75 mL Omnipaque 350 was administered intravenously and axial images of the head and neck were acquired.  Coronal, sagittal, and 3-D reconstructions were provided for review.   The technologist notes the patient had multiple IV malfunctions.   FINDINGS: CT head: Please see CT head performed the same day for intracranial findings.   CTA HEAD FINDINGS:   The examination is degraded by venous contamination.   Anterior circulation: The bilateral intracranial internal carotid arteries, bilateral carotid terminals, bilateral proximal anterior and middle cerebral arteries are patent.   Posterior circulation: Bilateral intracranial vertebral arteries, vertebrobasilar junction, basilar artery and proximal posterior cerebral arteries are patent.   CTA NECK FINDINGS:   The examination is limited by the timing of scan relative to the contrast injection.   Mild atherosclerotic calcification along the aortic arch. Within the limitation of patient motion artifact no significant stenosis at the origins of the great vessels.   Right carotid vessels: The common carotid artery is patent. Mild calcified plaque at the carotid bifurcation without significant stenosis by NASCET criteria. The internal  carotid artery in the neck is patent without significant stenosis.   Left carotid vessels: The common carotid artery is patent.. The carotid bifurcation is patent without significant stenosis. The internal carotid artery in the neck is patent without significant stenosis.   Vertebral vessels:  The visualized segments of the cervical vertebral arteries are patent.   The left thyroid lobe is slightly asymmetrically larger than the right with a calcification. Please see thyroid ultrasound 07/24/2023 for further details. Incidental note of multiple tonsilloliths. There is prominent ossification of the stylohyoid ligament bilaterally. Soft tissues of the neck are otherwise unremarkable.   There are patchy opacities within the visualized upper lungs bilaterally concerning for multifocal pneumonia. There is a small pleural effusion on the right. Mild degenerative changes of the cervical spine.       The examination is degraded by venous contamination, patient motion artifact and difficulties with the IV. Within this limitation:   CTA neck:   No evidence for significant stenosis of the cervical vessels.   Patchy opacities within the upper lungs bilaterally concerning for multifocal pneumonia. There is a small pleural effusion on the right.   CTA head:   No evidence for significant stenosis or large branch vessel cutoffs of the intracranial vessels.   Please see CT head performed the same day for intracranial findings.   MACRO: None   Signed by: Jazmyne Teresa 1/6/2024 5:34 PM Dictation workstation:   [id number]    CT angio brain attack neck w IV contrast and post procedure    Result Date: 1/6/2024  Interpreted By:  Jazmyne Teresa, STUDY: CT ANGIO BRAIN ATTACK HEAD W IV CONTRAST AND POST PROCEDURE; CT ANGIO BRAIN ATTACK NECK W IV CONTRAST AND POST PROCEDURE;  1/6/2024 5:16 pm   INDICATION: Signs/Symptoms:AMS.   COMPARISON: None.   ACCESSION NUMBER(S): [id number]; [id number]   ORDERING CLINICIAN: LYNN FLORES    TECHNIQUE: 75 mL Omnipaque 350 was administered intravenously and axial images of the head and neck were acquired.  Coronal, sagittal, and 3-D reconstructions were provided for review.   The technologist notes the patient had multiple IV malfunctions.   FINDINGS: CT head: Please see CT head performed the same day for intracranial findings.   CTA HEAD FINDINGS:   The examination is degraded by venous contamination.   Anterior circulation: The bilateral intracranial internal carotid arteries, bilateral carotid terminals, bilateral proximal anterior and middle cerebral arteries are patent.   Posterior circulation: Bilateral intracranial vertebral arteries, vertebrobasilar junction, basilar artery and proximal posterior cerebral arteries are patent.   CTA NECK FINDINGS:   The examination is limited by the timing of scan relative to the contrast injection.   Mild atherosclerotic calcification along the aortic arch. Within the limitation of patient motion artifact no significant stenosis at the origins of the great vessels.   Right carotid vessels: The common carotid artery is patent. Mild calcified plaque at the carotid bifurcation without significant stenosis by NASCET criteria. The internal carotid artery in the neck is patent without significant stenosis.   Left carotid vessels: The common carotid artery is patent.. The carotid bifurcation is patent without significant stenosis. The internal carotid artery in the neck is patent without significant stenosis.   Vertebral vessels:  The visualized segments of the cervical vertebral arteries are patent.   The left thyroid lobe is slightly asymmetrically larger than the right with a calcification. Please see thyroid ultrasound 07/24/2023 for further details. Incidental note of multiple tonsilloliths. There is prominent ossification of the stylohyoid ligament bilaterally. Soft tissues of the neck are otherwise unremarkable.   There are patchy opacities within the visualized  upper lungs bilaterally concerning for multifocal pneumonia. There is a small pleural effusion on the right. Mild degenerative changes of the cervical spine.       The examination is degraded by venous contamination, patient motion artifact and difficulties with the IV. Within this limitation:   CTA neck:   No evidence for significant stenosis of the cervical vessels.   Patchy opacities within the upper lungs bilaterally concerning for multifocal pneumonia. There is a small pleural effusion on the right.   CTA head:   No evidence for significant stenosis or large branch vessel cutoffs of the intracranial vessels.   Please see CT head performed the same day for intracranial findings.   MACRO: None   Signed by: Jazmyne Teresa 1/6/2024 5:34 PM Dictation workstation:   [id number]    CT brain attack head wo IV contrast    Result Date: 1/6/2024  Interpreted By:  Richie Wren, STUDY: CT BRAIN ATTACK HEAD WO IV CONTRAST;  1/6/2024 4:37 pm   INDICATION: Signs/Symptoms:AMS.   COMPARISON: 11/18/2012   ACCESSION NUMBER(S): [id number]   ORDERING CLINICIAN: LYNN FLORES   TECHNIQUE: Noncontrast axial CT images of head were obtained with coronal and sagittal reconstructed images.   FINDINGS: BRAIN PARENCHYMA: Mild periventricular and subcortical hemispheric white matter hypodensities are most compatible with chronic small vessel ischemic disease. No acute intraparenchymal hemorrhage or parenchymal evidence of acute large territory ischemic infarct. No mass-effect. Gray-white matter distinction is preserved.   VENTRICLES and EXTRA-AXIAL SPACES: There is a 1.5 cm calcified meningioma arising from the left parasagittal frontal parietal dura. There is another 1.3 cm more densely calcified meningioma arising the right frontotemporal dura. No acute extra-axial or intraventricular hemorrhage. No effacement of cerebral sulci. Ventricles and sulci are age-concordant. There is a partial empty sella.   PARANASAL SINUSES/MASTOIDS:  Trace fluid left mastoid air cells. No hemorrhage or air-fluid levels within the visualized paranasal sinuses. The mastoids are well aerated.   CALVARIUM/ORBITS:  No skull fracture.  The orbits and globes are intact to the extent visualized.   EXTRACRANIAL SOFT TISSUES: No discernible abnormality.       1. No evidence of acute intracranial hemorrhage, mass effect, or parenchymal evidence of an acute large territory ischemic infarct.   2. Calcified meningiomas rising from the left parasagittal frontal parietal dura and right frontotemporal dura measuring 1.5 cm and 1.3 cm, respectively. No significant mass effect.     MACRO: Richie Wren discussed the significance and urgency of this critical finding by EPIC HAIKU with  LYNN FLORES on 1/6/2024 at 4:50 p.m. with confirmation of receipt. (**-RCF-**) Findings:  See findings.   Signed by: Richie rWen 1/6/2024 4:53 PM Dictation workstation:   [id number]    ECG 12 lead    Result Date: 1/3/2024  Atrial fibrillation with rapid ventricular response Low voltage QRS Cannot rule out Anterior infarct , age undetermined Abnormal ECG When compared with ECG of 13-DEC-2023 09:49, Atrial fibrillation has replaced Sinus rhythm See ED provider note for full interpretation and clinical correlation Confirmed by Jamir Salvador ([id number]) on 1/3/2024 10:49:37 PM    XR chest 1 view    Result Date: 1/2/2024  Interpreted By:  Soren Ham, STUDY: XR CHEST 1 VIEW;  1/2/2024 7:26 pm   INDICATION: Signs/Symptoms:sob.   COMPARISON: 9/26/2023   ACCESSION NUMBER(S): [id number]   ORDERING CLINICIAN: CARMEN CONKLIN   FINDINGS: The cardiac silhouette is stable in size. There are bilateral opacities concerning for infiltrates. Small right pleural effusion and basilar atelectasis or airspace disease. No pneumothorax.       Small right pleural effusion and basilar atelectasis or airspace disease and mild bilateral opacities concerning for infiltrates.   MACRO: None   Signed by: Soren  Jitendra 1/2/2024 7:38 PM Dictation workstation:   [id number]    CT chest wo IV contrast    Result Date: 12/26/2023  Interpreted By:  Richie Wren, STUDY: CT CHEST WO IV CONTRAST;  12/26/2023 6:03 pm   INDICATION: Signs/Symptoms:cough.   COMPARISON: 09/22/2023 CT chest   ACCESSION NUMBER(S): [id number]   ORDERING CLINICIAN: JANNET VEE   TECHNIQUE: Contiguous axial images of the chest and upper abdomen were obtained without contrast. Coronal and sagittal reformatted images were reconstructed from the axial data.   FINDINGS:     MEDIASTINUM AND LYMPH NODES:  The esophagus appears within normal limits.  No enlarged intrathoracic or axillary lymph nodes by imaging criteria. No pneumomediastinum.   VESSELS:  Normal caliber thoracic aorta. Mild aortic atherosclerosis. The pulmonary artery is enlarged, measuring up to 3.6 cm, indicative of pulmonary hypertension.   HEART: There is left atrial dilatation. Mitral annular calcifications. Mild coronary artery calcifications. No significant pericardial effusion.   LUNG, AIRWAYS, AND PLEURA: New small bilateral pleural effusions with adjacent passive atelectasis. There is new subsegmental atelectasis in the right middle lobe. There is a small amount debris in the lower trachea extending into the mainstem bronchi and bronchus intermedius. There is a small opacity in the left upper lobe and superior segment of left lower lobe consisting of tree-in-bud nodules suspicious for pneumonia the   OSSEOUS STRUCTURES: No acute osseous abnormality.   CHEST WALL SOFT TISSUES: No discernible abnormality.   UPPER ABDOMEN/OTHER: Multiple peripherally calcified splenic artery aneurysm measuring 1.3 cm, 1.3 cm, and 2.8 cm are similar to prior study. The liver appears cirrhotic.       1. Tree-in-bud opacities in the posterior left upper lobe and superior segment of the left lower lobe consistent with bronchiolitis/early pneumonia.   2. Small bilateral pleural effusions with adjacent  passive atelectasis and right middle lobe subsegmental atelectasis.   3. Small amount of debris in the trachea and bilateral proximal bronchi that could be secretions or aspiration.   4. Three splenic artery aneurysm measuring up to 2.8 cm, unchanged.   MACRO: None.   Signed by: Richie Wren 12/26/2023 6:29 PM Dictation workstation:   [id number]    ECG 12 lead (Clinic Performed)    Result Date: 12/16/2023  Atrial fibrillation Poor R-wave progression Abnormal ECG When compared with ECG of 25-SEP-2023 10:00, Nonspecific T wave abnormality, improved in Inferior leads Confirmed by Raymon Beaulieu ([id number]) on 12/16/2023 11:49:53 PM    ECG 12 Lead    Result Date: 12/14/2023  Atrial fibrillation Low voltage QRS Abnormal ECG When compared with ECG of 11-DEC-2023 15:22, (unconfirmed) No significant change was found Confirmed by Zaki Stauffer ([id number]) on 12/14/2023 5:32:09 PM    ECG 12 lead    Result Date: 12/14/2023  Sinus rhythm with Premature supraventricular complexes Low voltage QRS Borderline ECG When compared with ECG of 13-DEC-2023 07:33, (unconfirmed) Sinus rhythm has replaced Atrial fibrillation Confirmed by Zaki Stauffer ([id number]) on 12/14/2023 5:31:33 PM    ECG 12 Lead    Result Date: 12/14/2023  Sinus rhythm with Premature atrial complexes Low voltage QRS Borderline ECG When compared with ECG of 13-DEC-2023 08:45, (unconfirmed) No significant change was found Confirmed by Zaki Stauffer ([id number]) on 12/14/2023 5:31:14 PM                  Assessment/Plan   Principal Problem:    RSV (respiratory syncytial virus infection)  Active Problems:    Shortness of breath    #Encephalopathy 2/2 infection vs. AF RVR (improving)   - RRT called overnight for change in mental status; patient was previously A&O x 3 but became confused and unable to tell staff the year or her birthday  - CT head and CTA head/neck showed no acute infarct or hemorrhage; no significant stenosis of the vessels. Calcified meningiomas noted without  evidence of mass effect or vasogenic edema (noted to be no interval change)  - NIHSS 0 today  - Cardizem dose increased for tachycardia; monitor on telemetry   - Antibiotics escalated for increasing O2 requirement; patient now on Vancomycin and Zosyn   - Medications reviewed with attending, Solumedrol IV changed to Prednisone PO   - Hold gabapentin and melatonin  - Neuro checks q4h    #Acute on chronic hypoxic respiratory failure 2/2 RSV, community acquired pneumonia  #COPD, in acute exacerbation  - Patient was seen in [facility] 12/26, discharged on dexamethasone and doxycycline  - WBC 13.9 >> 5.3 > 6.7 today   - Lactate 1.9  - COVID, flu A/B negative  - RSV positive  - Procal 0.75  - BCx no growth x 3 days   - sputum culture contained significant salivary contamination; repeat pending   - CXR: Small right pleural effusion and basilar atelectasis or airspace disease and mild bilateral opacities concerning for infiltrates.    - Tylenol PRN for fever   - Mucinex BID, Robitussin DM q4h PRN for cough   - DuoNebs TID    - Breo and Spiriva ordered (pharmacy substitute for home Trelegy)  - RT eval and treat protocol  - Bronchial hygiene  - Isolation protocol for RSV  - Monitor SpO2 continuously   - Baseline O2 5L continuously   - Wean O2 as tolerated; patient now requiring 6L with worsening rhonchi on exam  - Ceftriaxone and doxycycline discontinued; start Vancomycin and Zosyn (day 1)   - Solumedrol 40 mg IVP q8h changed to prednisone 40 mg PO every day due to encephalopathy   - Consult pulm on 1/8 when available   - CXR ordered for 1/8     #Atrial fibrillation with RVR (improving)   #HTN  #HLD  #HFpEF, concern for acute exacerbation  - Recent DCCV on 12/13 at [facility], follows with Dr. Siegel   - Trop 7 > 7   - >285>368   - Telemetry monitoring- notified by charge RN this morning that patient's HR has been consistently in the 120s-130s on telemetry. Upon reviewing the flowsheet documentation for 1/5-1/6, only two  instances with HR > 110 bpm are charted  - Diltiazem dose increased to 300 mg daily   - Lopressor 5 mg IVP q4h PRN for HR > 120 bpm sustained for > 5 mins   - Continue aspirin, atorvastatin, furosemide, and losartan  - INR 2.3 today, continue warfarin   - Goal INR 2-3, daily PT/INR while inpatient   - Strict I&Os: LOS - 3696 ml   - Daily weights: up 4kg since admission with O2 requirement now up to 6L, will give lasix 20 mg IVP x 1 dose (in addition to patient's home lasix 80 mg PO daily)    - 2g Na diet, 1800 mL FR   - Consult [facility]VI 1/8- patient seen at [facility]      #Hematuria  - UA: 3+ blood, > 20 RBCs, 1+ bacteria  - UCx with no significant growth   - Patient denies gross blood in urine or difficulties with urination   - Monitor UOP for blood - none reported as of 1/7  - Trend CBC; H/H stable      #T2DM with neuropathy   - Continue Lantus, metformin and glimepiride   - Gabapentin held due to change in mental status  - SSI three times a day before meals while on steroids   - Hypoglycemia protocol  - Accuchecks ac/hs/prn  - Diabetic diet   - HbA1c 4.6      #Anemia, macrocytic  - H/H stable, 8.0/28.8 with   - B12 level was 267 last month per chart review   - Folate 6.2   - Iron studies: Fe 28, TIBC 272, 10% saturation  - Patient receives monthly B12 injections and is on ferrous sulfate; continue   - Monitor for active bleeding  - Daily CBC to trend H/H     #History of mucous membrane pemphigoid  - Continue dapsone  - Resume outpatient derm follow up on discharge      DVT PPx: Warfarin  GI PPx: Protonix   Diet: Diabetic, 2g Na   Code Status: Full code      Disposition: Patient requires inpatient management at this time.      Total accumulated time spent face to face and not face to face preparing to see the patient, obtaining and reviewing separately obtained history; performing a medically appropriate examination and/or evaluation; counseling and educating the patient, family; ordering medications, tests, or  procedures; referring and communicating with other health care professionals; documenting clinical information in the patient's medical record; independently interpreting results and communicating the results to the patient, family; and care coordination was 45 minutes.          Radha Silvestre PA-C

## 2024-01-07 NOTE — PROGRESS NOTES
"Vancomycin Dosing by Pharmacy- INITIAL    Frannie Blanco is a 73 y.o. year old female who Pharmacy has been consulted for vancomycin dosing for pneumonia. Based on the patient's indication and renal status this patient will be dosed based on a goal AUC of 400-600.     Renal function is currently stable.    Visit Vitals  /86 (BP Location: Left arm, Patient Position: Lying)   Pulse (!) 116   Temp 36.7 °C (98.1 °F) (Temporal)   Resp 22        Lab Results   Component Value Date    CREATININE 1.01 01/07/2024    CREATININE 1.11 (H) 01/06/2024    CREATININE 0.98 01/06/2024    CREATININE 1.19 (H) 01/05/2024        Patient weight is No results found for: \"PTWEIGHT\"    No results found for: \"CULTURE\"     I/O last 3 completed shifts:  In: 440 (2.9 mL/kg) [P.O.:240; IV Piggyback:200]  Out: 4350 (28.2 mL/kg) [Urine:4350 (0.8 mL/kg/hr)]  Weight: 154 kg   [unfilled]    Lab Results   Component Value Date    PATIENTTEMP  01/07/2024      Comment:      NOTE: Patient Results are Not Corrected for Temperature    PATIENTTEMP  01/02/2024      Comment:      NOTE: Patient Results are Not Corrected for Temperature          Assessment/Plan     Patient will not be given a loading dose.  Will initiate vancomycin maintenance,  750 mg every 12 hours.    This dosing regimen is predicted by [employer] to result in the following pharmacokinetic parameters:  Loading dose: N/A  Regimen: 750 mg IV every 12 hours.  Start time: 09:06 on 01/07/2024  Exposure target: AUC24 (range)400-600 mg/L.hr   AUC24,ss: 503 mg/L.hr  Probability of AUC24 > 400: 68 %  Ctrough,ss: 16 mg/L  Probability of Ctrough,ss > 20: 35 %  Probability of nephrotoxicity (Lodise JORGE 2009): 11 %    Follow-up level will be ordered on 1/8/24 at 0500 unless clinically indicated sooner.  Will continue to monitor renal function daily while on vancomycin and order serum creatinine at least every 48 hours if not already ordered.  Follow for continued vancomycin needs, clinical " response, and signs/symptoms of toxicity.       Francisca Rios, PharmD

## 2024-01-07 NOTE — NURSING NOTE
"01/7/24 0024: Rapid response called by this RN d/t increased confusion, and lethargy and is now AxOx1-2, not alert to time, and when asked at times where they were patient stated \"2013\" and is unable to state birthday. Previously patient was AxOx4. When asked to smile this RN noticed a slight facial droop to the R side, when reassessed a few seconds later the patient smile was symmetrical. NIH done and pt is a 3, previously NIH was a 0. Labs ordered, CT and  CT angio neck, and head.     0035: pt given PRN metop for HR >120 for >5 minutes  "

## 2024-01-07 NOTE — NURSING NOTE
administered Lopressor IVP.     administered Lopressor IVP.     administered Lopressor IVP.    Updated daughter Tiffany on patients condition and the order to transfer to ICU.     administered Lopressor IVP.

## 2024-01-07 NOTE — CARE PLAN
The patient's goals for the shift include  rest to promote healing    The clinical goals for the shift include patient will tolerate IV antibiotic and remain afebrile    Over the shift, the patient remained safe. Change in neuro status occurred, and CT of head and neck done. Tolerated IV antibiotics, and remained afebrile. No c/o pain. Given PRN metop for elevated HR >120.

## 2024-01-07 NOTE — CONSULTS
Vancomycin Dosing by Pharmacy- Cessation of Therapy    Consult to pharmacy for vancomycin dosing has been discontinued by the prescriber, pharmacy will sign off at this time.    Please call pharmacy if there are further questions or re-enter a consult if vancomycin is resumed.     Francisca Rios, ChiquitaD

## 2024-01-08 ENCOUNTER — APPOINTMENT (OUTPATIENT)
Dept: CARDIOLOGY | Facility: HOSPITAL | Age: 74
DRG: 193 | End: 2024-01-08
Payer: MEDICARE

## 2024-01-08 ENCOUNTER — ANESTHESIA (OUTPATIENT)
Dept: OPERATING ROOM | Facility: HOSPITAL | Age: 74
DRG: 193 | End: 2024-01-08
Payer: MEDICARE

## 2024-01-08 ENCOUNTER — ANESTHESIA EVENT (OUTPATIENT)
Dept: OPERATING ROOM | Facility: HOSPITAL | Age: 74
DRG: 193 | End: 2024-01-08
Payer: MEDICARE

## 2024-01-08 LAB
ANION GAP SERPL CALC-SCNC: 13 MMOL/L (ref 10–20)
BACTERIA BLD CULT: NORMAL
BACTERIA BLD CULT: NORMAL
BASOPHILS # BLD AUTO: 0.01 X10*3/UL (ref 0–0.1)
BASOPHILS NFR BLD AUTO: 0.1 %
BUN SERPL-MCNC: 47 MG/DL (ref 6–23)
CALCIUM SERPL-MCNC: 8.6 MG/DL (ref 8.6–10.3)
CHLORIDE SERPL-SCNC: 102 MMOL/L (ref 98–107)
CO2 SERPL-SCNC: 31 MMOL/L (ref 21–32)
CREAT SERPL-MCNC: 0.99 MG/DL (ref 0.5–1.05)
EOSINOPHIL # BLD AUTO: 0.01 X10*3/UL (ref 0–0.4)
EOSINOPHIL NFR BLD AUTO: 0.1 %
ERYTHROCYTE [DISTWIDTH] IN BLOOD BY AUTOMATED COUNT: 16.3 % (ref 11.5–14.5)
GFR SERPL CREATININE-BSD FRML MDRD: 61 ML/MIN/1.73M*2
GLUCOSE BLD MANUAL STRIP-MCNC: 113 MG/DL (ref 74–99)
GLUCOSE BLD MANUAL STRIP-MCNC: 122 MG/DL (ref 74–99)
GLUCOSE BLD MANUAL STRIP-MCNC: 144 MG/DL (ref 74–99)
GLUCOSE BLD MANUAL STRIP-MCNC: 224 MG/DL (ref 74–99)
GLUCOSE BLD MANUAL STRIP-MCNC: 83 MG/DL (ref 74–99)
GLUCOSE BLD MANUAL STRIP-MCNC: 92 MG/DL (ref 74–99)
GLUCOSE SERPL-MCNC: 123 MG/DL (ref 74–99)
HCT VFR BLD AUTO: 29.1 % (ref 36–46)
HGB BLD-MCNC: 8.4 G/DL (ref 12–16)
IMM GRANULOCYTES # BLD AUTO: 0.14 X10*3/UL (ref 0–0.5)
IMM GRANULOCYTES NFR BLD AUTO: 1.2 % (ref 0–0.9)
INR PPP: 2.8 (ref 0.9–1.1)
LACTATE BLDV-SCNC: 1.9 MMOL/L (ref 0.4–2)
LYMPHOCYTES # BLD AUTO: 1.1 X10*3/UL (ref 0.8–3)
LYMPHOCYTES NFR BLD AUTO: 9.7 %
MCH RBC QN AUTO: 31.7 PG (ref 26–34)
MCHC RBC AUTO-ENTMCNC: 28.9 G/DL (ref 32–36)
MCV RBC AUTO: 110 FL (ref 80–100)
MONOCYTES # BLD AUTO: 0.88 X10*3/UL (ref 0.05–0.8)
MONOCYTES NFR BLD AUTO: 7.7 %
NEUTROPHILS # BLD AUTO: 9.22 X10*3/UL (ref 1.6–5.5)
NEUTROPHILS NFR BLD AUTO: 81.2 %
NRBC BLD-RTO: 0.5 /100 WBCS (ref 0–0)
PLATELET # BLD AUTO: 204 X10*3/UL (ref 150–450)
POTASSIUM SERPL-SCNC: 4 MMOL/L (ref 3.5–5.3)
PROTHROMBIN TIME: 32.1 SECONDS (ref 9.8–12.8)
RBC # BLD AUTO: 2.65 X10*6/UL (ref 4–5.2)
SODIUM SERPL-SCNC: 142 MMOL/L (ref 136–145)
WBC # BLD AUTO: 11.4 X10*3/UL (ref 4.4–11.3)

## 2024-01-08 PROCEDURE — 83605 ASSAY OF LACTIC ACID: CPT | Mod: IPSPLIT | Performed by: EMERGENCY MEDICINE

## 2024-01-08 PROCEDURE — 2500000004 HC RX 250 GENERAL PHARMACY W/ HCPCS (ALT 636 FOR OP/ED): Mod: IPSPLIT

## 2024-01-08 PROCEDURE — 2500000005 HC RX 250 GENERAL PHARMACY W/O HCPCS: Mod: IPSPLIT | Performed by: NURSE PRACTITIONER

## 2024-01-08 PROCEDURE — 85025 COMPLETE CBC W/AUTO DIFF WBC: CPT | Mod: IPSPLIT

## 2024-01-08 PROCEDURE — 93010 ELECTROCARDIOGRAM REPORT: CPT | Performed by: INTERNAL MEDICINE

## 2024-01-08 PROCEDURE — 94640 AIRWAY INHALATION TREATMENT: CPT | Mod: IPSPLIT

## 2024-01-08 PROCEDURE — 3700000001 HC GENERAL ANESTHESIA TIME - INITIAL BASE CHARGE: Mod: IPSPLIT | Performed by: INTERNAL MEDICINE

## 2024-01-08 PROCEDURE — 2500000002 HC RX 250 W HCPCS SELF ADMINISTERED DRUGS (ALT 637 FOR MEDICARE OP, ALT 636 FOR OP/ED): Mod: IPSPLIT | Performed by: INTERNAL MEDICINE

## 2024-01-08 PROCEDURE — 2500000002 HC RX 250 W HCPCS SELF ADMINISTERED DRUGS (ALT 637 FOR MEDICARE OP, ALT 636 FOR OP/ED): Mod: IPSPLIT

## 2024-01-08 PROCEDURE — 2500000001 HC RX 250 WO HCPCS SELF ADMINISTERED DRUGS (ALT 637 FOR MEDICARE OP): Mod: IPSPLIT

## 2024-01-08 PROCEDURE — 2500000005 HC RX 250 GENERAL PHARMACY W/O HCPCS: Mod: IPSPLIT

## 2024-01-08 PROCEDURE — 2500000004 HC RX 250 GENERAL PHARMACY W/ HCPCS (ALT 636 FOR OP/ED): Mod: IPSPLIT | Performed by: NURSE ANESTHETIST, CERTIFIED REGISTERED

## 2024-01-08 PROCEDURE — 3700000002 HC GENERAL ANESTHESIA TIME - EACH INCREMENTAL 1 MINUTE: Mod: IPSPLIT | Performed by: INTERNAL MEDICINE

## 2024-01-08 PROCEDURE — 2020000001 HC ICU ROOM DAILY: Mod: IPSPLIT

## 2024-01-08 PROCEDURE — 80048 BASIC METABOLIC PNL TOTAL CA: CPT | Mod: IPSPLIT

## 2024-01-08 PROCEDURE — 2500000005 HC RX 250 GENERAL PHARMACY W/O HCPCS: Mod: IPSPLIT | Performed by: NURSE ANESTHETIST, CERTIFIED REGISTERED

## 2024-01-08 PROCEDURE — 99222 1ST HOSP IP/OBS MODERATE 55: CPT | Performed by: NURSE PRACTITIONER

## 2024-01-08 PROCEDURE — 5A2204Z RESTORATION OF CARDIAC RHYTHM, SINGLE: ICD-10-PCS | Performed by: NURSE PRACTITIONER

## 2024-01-08 PROCEDURE — 94640 AIRWAY INHALATION TREATMENT: CPT

## 2024-01-08 PROCEDURE — 9420000001 HC RT PATIENT EDUCATION 5 MIN: Mod: IPSPLIT

## 2024-01-08 PROCEDURE — 99233 SBSQ HOSP IP/OBS HIGH 50: CPT

## 2024-01-08 PROCEDURE — 85610 PROTHROMBIN TIME: CPT | Mod: IPSPLIT

## 2024-01-08 PROCEDURE — 2500000001 HC RX 250 WO HCPCS SELF ADMINISTERED DRUGS (ALT 637 FOR MEDICARE OP): Mod: IPSPLIT | Performed by: NURSE PRACTITIONER

## 2024-01-08 PROCEDURE — 2500000002 HC RX 250 W HCPCS SELF ADMINISTERED DRUGS (ALT 637 FOR MEDICARE OP, ALT 636 FOR OP/ED): Mod: IPSPLIT | Performed by: NURSE PRACTITIONER

## 2024-01-08 PROCEDURE — 36415 COLL VENOUS BLD VENIPUNCTURE: CPT | Mod: IPSPLIT

## 2024-01-08 PROCEDURE — 93005 ELECTROCARDIOGRAM TRACING: CPT | Mod: IPSPLIT

## 2024-01-08 PROCEDURE — 94664 DEMO&/EVAL PT USE INHALER: CPT | Mod: IPSPLIT

## 2024-01-08 PROCEDURE — 36415 COLL VENOUS BLD VENIPUNCTURE: CPT | Mod: IPSPLIT | Performed by: EMERGENCY MEDICINE

## 2024-01-08 PROCEDURE — 82947 ASSAY GLUCOSE BLOOD QUANT: CPT | Mod: IPSPLIT

## 2024-01-08 RX ORDER — AMIODARONE HYDROCHLORIDE 200 MG/1
200 TABLET ORAL 3 TIMES DAILY
Status: DISCONTINUED | OUTPATIENT
Start: 2024-01-08 | End: 2024-01-08

## 2024-01-08 RX ORDER — AMIODARONE HYDROCHLORIDE 200 MG/1
200 TABLET ORAL 2 TIMES DAILY
Status: DISCONTINUED | OUTPATIENT
Start: 2024-01-08 | End: 2024-01-08

## 2024-01-08 RX ORDER — SODIUM CHLORIDE, SODIUM LACTATE, POTASSIUM CHLORIDE, CALCIUM CHLORIDE 600; 310; 30; 20 MG/100ML; MG/100ML; MG/100ML; MG/100ML
INJECTION, SOLUTION INTRAVENOUS CONTINUOUS PRN
Status: DISCONTINUED | OUTPATIENT
Start: 2024-01-08 | End: 2024-01-08

## 2024-01-08 RX ORDER — GABAPENTIN 100 MG/1
200 CAPSULE ORAL 2 TIMES DAILY
Status: DISCONTINUED | OUTPATIENT
Start: 2024-01-08 | End: 2024-01-17 | Stop reason: HOSPADM

## 2024-01-08 RX ORDER — PROPOFOL 10 MG/ML
INJECTION, EMULSION INTRAVENOUS AS NEEDED
Status: DISCONTINUED | OUTPATIENT
Start: 2024-01-08 | End: 2024-01-08

## 2024-01-08 RX ORDER — MIDAZOLAM HYDROCHLORIDE 1 MG/ML
INJECTION, SOLUTION INTRAMUSCULAR; INTRAVENOUS AS NEEDED
Status: DISCONTINUED | OUTPATIENT
Start: 2024-01-08 | End: 2024-01-08

## 2024-01-08 RX ORDER — AMIODARONE HYDROCHLORIDE 200 MG/1
200 TABLET ORAL 3 TIMES DAILY
Status: DISCONTINUED | OUTPATIENT
Start: 2024-01-08 | End: 2024-01-17 | Stop reason: HOSPADM

## 2024-01-08 RX ORDER — FUROSEMIDE 10 MG/ML
20 INJECTION INTRAMUSCULAR; INTRAVENOUS ONCE
Status: COMPLETED | OUTPATIENT
Start: 2024-01-08 | End: 2024-01-08

## 2024-01-08 RX ORDER — ETOMIDATE 2 MG/ML
INJECTION INTRAVENOUS AS NEEDED
Status: DISCONTINUED | OUTPATIENT
Start: 2024-01-08 | End: 2024-01-08

## 2024-01-08 RX ADMIN — FERROUS SULFATE TAB 325 MG (65 MG ELEMENTAL FE) 1 TABLET: 325 (65 FE) TAB at 09:16

## 2024-01-08 RX ADMIN — AMIODARONE HYDROCHLORIDE 200 MG: 200 TABLET ORAL at 17:27

## 2024-01-08 RX ADMIN — Medication 6 L/MIN: at 16:09

## 2024-01-08 RX ADMIN — METFORMIN HYDROCHLORIDE 1000 MG: 500 TABLET ORAL at 17:27

## 2024-01-08 RX ADMIN — PIPERACILLIN SODIUM AND TAZOBACTAM SODIUM 4.5 G: 4; .5 INJECTION, SOLUTION INTRAVENOUS at 09:03

## 2024-01-08 RX ADMIN — ETOMIDATE 4 MG: 2 INJECTION INTRAVENOUS at 13:21

## 2024-01-08 RX ADMIN — TIOTROPIUM BROMIDE INHALATION SPRAY 2 PUFF: 3.12 SPRAY, METERED RESPIRATORY (INHALATION) at 08:31

## 2024-01-08 RX ADMIN — DOCUSATE SODIUM 100 MG: 100 CAPSULE, LIQUID FILLED ORAL at 09:16

## 2024-01-08 RX ADMIN — PROPOFOL 30 MG: 10 INJECTION, EMULSION INTRAVENOUS at 13:19

## 2024-01-08 RX ADMIN — ACETAMINOPHEN 650 MG: 325 TABLET ORAL at 01:29

## 2024-01-08 RX ADMIN — WARFARIN SODIUM 5 MG: 5 TABLET ORAL at 18:06

## 2024-01-08 RX ADMIN — MIDAZOLAM 2 MG: 1 INJECTION INTRAMUSCULAR; INTRAVENOUS at 13:19

## 2024-01-08 RX ADMIN — ATORVASTATIN CALCIUM 20 MG: 10 TABLET, FILM COATED ORAL at 21:17

## 2024-01-08 RX ADMIN — PREDNISONE 40 MG: 20 TABLET ORAL at 09:03

## 2024-01-08 RX ADMIN — ASPIRIN 81 MG: 81 TABLET, COATED ORAL at 09:03

## 2024-01-08 RX ADMIN — PROPOFOL 30 MG: 10 INJECTION, EMULSION INTRAVENOUS at 13:21

## 2024-01-08 RX ADMIN — PROPOFOL 30 MG: 10 INJECTION, EMULSION INTRAVENOUS at 13:20

## 2024-01-08 RX ADMIN — GABAPENTIN 200 MG: 100 CAPSULE ORAL at 21:18

## 2024-01-08 RX ADMIN — GLIMEPIRIDE 2 MG: 2 TABLET ORAL at 08:23

## 2024-01-08 RX ADMIN — DOCUSATE SODIUM 100 MG: 100 CAPSULE, LIQUID FILLED ORAL at 21:18

## 2024-01-08 RX ADMIN — DEXTROSE MONOHYDRATE: 5 INJECTION INTRAVENOUS at 00:00

## 2024-01-08 RX ADMIN — FUROSEMIDE 20 MG: 10 INJECTION, SOLUTION INTRAVENOUS at 10:01

## 2024-01-08 RX ADMIN — GUAIFENESIN 600 MG: 600 TABLET, EXTENDED RELEASE ORAL at 21:17

## 2024-01-08 RX ADMIN — DAPSONE 100 MG: 25 TABLET ORAL at 06:06

## 2024-01-08 RX ADMIN — IPRATROPIUM BROMIDE AND ALBUTEROL SULFATE 3 ML: .5; 3 SOLUTION RESPIRATORY (INHALATION) at 16:09

## 2024-01-08 RX ADMIN — AMIODARONE HYDROCHLORIDE 200 MG: 200 TABLET ORAL at 11:48

## 2024-01-08 RX ADMIN — ETOMIDATE 6 MG: 2 INJECTION INTRAVENOUS at 13:19

## 2024-01-08 RX ADMIN — GUAIFENESIN 600 MG: 600 TABLET, EXTENDED RELEASE ORAL at 09:03

## 2024-01-08 RX ADMIN — TOPIRAMATE 100 MG: 100 TABLET, FILM COATED ORAL at 09:03

## 2024-01-08 RX ADMIN — PIPERACILLIN SODIUM AND TAZOBACTAM SODIUM 4.5 G: 4; .5 INJECTION, SOLUTION INTRAVENOUS at 21:19

## 2024-01-08 RX ADMIN — IPRATROPIUM BROMIDE AND ALBUTEROL SULFATE 3 ML: .5; 3 SOLUTION RESPIRATORY (INHALATION) at 20:35

## 2024-01-08 RX ADMIN — METOPROLOL TARTRATE 5 MG: 5 INJECTION INTRAVENOUS at 04:45

## 2024-01-08 RX ADMIN — PIPERACILLIN SODIUM AND TAZOBACTAM SODIUM 4.5 G: 4; .5 INJECTION, SOLUTION INTRAVENOUS at 14:58

## 2024-01-08 RX ADMIN — POLYETHYLENE GLYCOL 3350 17 G: 17 POWDER, FOR SOLUTION ORAL at 09:03

## 2024-01-08 RX ADMIN — IPRATROPIUM BROMIDE AND ALBUTEROL SULFATE 3 ML: .5; 3 SOLUTION RESPIRATORY (INHALATION) at 08:29

## 2024-01-08 RX ADMIN — GABAPENTIN 200 MG: 100 CAPSULE ORAL at 09:11

## 2024-01-08 RX ADMIN — PIPERACILLIN SODIUM AND TAZOBACTAM SODIUM 4.5 G: 4; .5 INJECTION, SOLUTION INTRAVENOUS at 03:39

## 2024-01-08 RX ADMIN — AMIODARONE HYDROCHLORIDE 200 MG: 200 TABLET ORAL at 23:05

## 2024-01-08 RX ADMIN — LOSARTAN POTASSIUM 12.5 MG: 25 TABLET, FILM COATED ORAL at 09:03

## 2024-01-08 RX ADMIN — INSULIN GLARGINE 15 UNITS: 100 INJECTION, SOLUTION SUBCUTANEOUS at 21:18

## 2024-01-08 RX ADMIN — FLUTICASONE FUROATE AND VILANTEROL TRIFENATATE 1 PUFF: 200; 25 POWDER RESPIRATORY (INHALATION) at 08:32

## 2024-01-08 RX ADMIN — PANTOPRAZOLE SODIUM 40 MG: 40 TABLET, DELAYED RELEASE ORAL at 06:06

## 2024-01-08 RX ADMIN — SODIUM CHLORIDE, POTASSIUM CHLORIDE, SODIUM LACTATE AND CALCIUM CHLORIDE: 600; 310; 30; 20 INJECTION, SOLUTION INTRAVENOUS at 13:19

## 2024-01-08 RX ADMIN — METFORMIN HYDROCHLORIDE 1000 MG: 500 TABLET ORAL at 08:23

## 2024-01-08 RX ADMIN — FUROSEMIDE 80 MG: 80 TABLET ORAL at 09:03

## 2024-01-08 RX ADMIN — TOPIRAMATE 100 MG: 100 TABLET, FILM COATED ORAL at 21:18

## 2024-01-08 RX ADMIN — DEXTROSE MONOHYDRATE 25 G: 25 INJECTION, SOLUTION INTRAVENOUS at 11:32

## 2024-01-08 SDOH — HEALTH STABILITY: MENTAL HEALTH: CURRENT SMOKER: 0

## 2024-01-08 ASSESSMENT — PAIN SCALES - GENERAL
PAINLEVEL_OUTOF10: 0 - NO PAIN
PAINLEVEL_OUTOF10: 0 - NO PAIN
PAIN_LEVEL: 0
PAINLEVEL_OUTOF10: 0 - NO PAIN
PAINLEVEL_OUTOF10: 3

## 2024-01-08 ASSESSMENT — PAIN - FUNCTIONAL ASSESSMENT
PAIN_FUNCTIONAL_ASSESSMENT: 0-10

## 2024-01-08 ASSESSMENT — PAIN DESCRIPTION - ORIENTATION: ORIENTATION: LEFT

## 2024-01-08 ASSESSMENT — PAIN DESCRIPTION - LOCATION: LOCATION: HIP

## 2024-01-08 NOTE — CONSULTS
Cardiology Consult Note  Patient: Frannie Blanco  Unit/Bed: 01/01-A  YOB: 1950    Admitting Diagnosis: Shortness of breath [R06.02]  RSV (respiratory syncytial virus infection) [B33.8]  COPD exacerbation (CMS/[facility]) [J44.1]  Pneumonia due to infectious organism, unspecified laterality, unspecified part of lung [J18.9]  Date:  1/2/2024  Hospital Day: 4  Attending: Terra Young MD      Chief Complaint   Patient presents with    Shortness of Breath     Shortness of breath         SUBJECTIVE:     History of Present Illness:  Frannie Blanco is a 73 y.o. female patient who is being seen at the request of Dr. Muñoz  for inpatient consultation of Atrial fibrillation with RVR in the setting of underlying viral illness. Presents to ED with complaints of persistent coughing and SOB. Baseline O2 5L via NC, COPD. Recently seen 12/26 and given medication for COPD exacerbation. Symptoms continued and worsened accompanied by weakness. Found to be RSV + in ED, right sided infiltrate on CXR and tachycardic. HR initially in the 130s, started on diltiazem gtt with minimal control. She continues to complain of SOB, but denies CP or palpitations. Denies orthopnea, or feelings of bloating. Recent DCCV 12/13 at [facility] follows with Dr. Siegel, reverted back to Afib some time after.     PMHx significant for: CAP, Chronic respiratory failure on 5L O2, HFpEF, Atrial fibrillation on warfarin, Macrocytic anemia, Breast CA, Lymphedema, Morbid obesity   All previous records reviewed.    Review of Systems:   Review of Systems    Allergies:  No Known Allergies   Home Medication:  Medications Prior to Admission   Medication Sig Dispense Refill Last Dose    albuterol 90 mcg/actuation inhaler inhale 1 to 2 puffs by mouth and INTO THE LUNGS every 4 to 6 hours if needed   1/2/2024    aspirin 81 mg EC tablet Take 1 tablet (81 mg) by mouth once daily.   Past Week at 2100    atorvastatin (Lipitor) 20 mg tablet Take 1 tablet (20  "mg) by mouth once daily at bedtime. 90 tablet 1 Past Week at 2100    BD Alcohol Swabs pads, medicated    2024    BD Dorothy 2nd Gen Pen Needle 32 gauge x \" needle Use with vitamin B12 injections monthly 12 each 11 2024    calcium carbonate 600 mg calcium (1,500 mg) tablet Take 1 tablet (600 mg) by mouth every 12 hours. + D per directive   2024 at 0800    CALCIUM CARBONATE-VITAMIN D3 ORAL Take 1 tablet by mouth 2 times a day.   2024 at 0800    cholecalciferol (Vitamin D-3) 1,250 mcg (50,000 unit) capsule Take 1 capsule (50,000 Units) by mouth 1 (one) time per week. Every 2 weeks   Past Week    cyanocobalamin (Dodex) 1,000 mcg/mL injection Inject 1 mL (1,000 mcg) into the shoulder, thigh, or buttocks every 30 (thirty) days. 10 mL 0 Past Month    dapsone 100 mg tablet Take 1 tablet (100 mg) by mouth once daily in the morning. Take before meals.   2024    dapsone 25 mg tablet Take 2 tablets (50 mg) by mouth once daily in the morning. Take before meals.   2024    dexAMETHasone (Decadron) 6 mg tablet Take 2 tablets (12 mg) by mouth once daily for 1 day. 2 tablet 0     dilTIAZem CD (Cardizem CD) 240 mg 24 hr capsule Take 1 capsule (240 mg) by mouth once daily. 90 capsule 1 2024    [] doxycycline (Adoxa) 100 mg tablet Take 1 tablet (100 mg) by mouth 2 times a day for 10 days. Take with a full glass of water and do not lie down for at least 30 minutes after 20 tablet 0 2024    ergocalciferol (Vitamin D-2) 1.25 MG (38336 UT) capsule Take 1 capsule (1,250 mcg) by mouth every 14 (fourteen) days.   Past Week    ferrous sulfate 325 (65 Fe) MG EC tablet Take by mouth once daily.   2024    fluticasone-umeclidin-vilanter (TRELEGY-ELLIPTA) 200-62.5-25 mcg blister with device Inhale 1 puff early in the morning.. Rinse mouth after taking dose 1 each 11 2024    furosemide (Lasix) 40 mg tablet Take 2 tablets (80 mg) by mouth once daily. 90 tablet 3 2024    gabapentin (Neurontin) 300 mg " "capsule take 1 capsule by mouth three times a day for 90 DAYS   1/2/2024    glimepiride (Amaryl) 2 mg tablet take 1 tablet by mouth once daily Once a day 90 days 90 tablet 3 1/3/2024    insulin detemir (LEVEMIR FLEXTOUCH U100 INSULIN SUBQ) Inject under the skin. As directed   1/2/2024    Levemir FlexPen 100 unit/mL (3 mL) pen inject up to 15 units once daily with EVENING MEAL OR BEDTIME       losartan (Cozaar) 25 mg tablet Take 0.5 tablets (12.5 mg) by mouth once daily. (Patient taking differently: Take 0.5 tablets (12.5 mg) by mouth once daily. Few days ago ran out of pills) 45 tablet 1 Past Week    metFORMIN (Glucophage) 1,000 mg tablet take 1 tablet by mouth twice a day 90 180 tablet 3 1/3/2024    syringe with needle 3 mL 23 x 1\" syringe Use to inject vitamin b12 once monthly 12 each 11     topiramate (Topamax) 100 mg tablet take 1 tablet by mouth twice a day 180 tablet 0 1/2/2024 at 0800    triamcinolone (Kenalog) 0.1 % ointment APPLY 1 APPLICATOR AS NEEDED TOPICALLY ONCE DAILY AS NEEDED UNDER DENTAL TRAYS   1/2/2024    umeclidinium (Incruse Ellipta) 62.5 mcg/actuation inhalation Inhale 1 puff (62.5 mcg) once daily. 1 each 5 1/2/2024    warfarin (Coumadin) 5 mg tablet Take 1 tab daily or directed as coumadin clinic 90 tablet 1 1/1/2024 at 1800      PMHx:  Past Medical History:   Diagnosis Date    Acute upper respiratory infection, unspecified 09/25/2019    Acute upper respiratory infection    Acute upper respiratory infection, unspecified 10/11/2016    Acute upper respiratory infection    COPD (chronic obstructive pulmonary disease) (CMS/[facility])     Diabetes mellitus (CMS/[facility])     Encounter for screening mammogram for malignant neoplasm of breast 03/10/2015    Visit for screening mammogram    Morbid (severe) obesity due to excess calories (CMS/HCC) 09/17/2018    Morbid or severe obesity due to excess calories    Personal history of nicotine dependence 10/26/2020    Personal history of nicotine dependence    " Personal history of other diseases of the respiratory system     Personal history of asthma    Personal history of other drug therapy 2020    History of pneumococcal vaccination    Personal history of other endocrine, nutritional and metabolic disease     History of diabetes mellitus    Personal history of other specified conditions 10/04/2016    History of edema    Personal history of other specified conditions 07/15/2019    History of abnormal mammogram       PSHx:  Past Surgical History:   Procedure Laterality Date    BI MAMMO GUIDED LOCALIZATION BREAST RIGHT Right 2018    BI MAMMO GUIDED LOCALIZATION BREAST RIGHT 2018 [facility]    BREAST LUMPECTOMY  2012    Breast Surgery Lumpectomy    COLONOSCOPY  2013    Complete Colonoscopy    NASAL SEPTUM SURGERY  2012    Nasal Septal Deviation Repair    OTHER SURGICAL HISTORY  2018    Breast biopsy excisional    OTHER SURGICAL HISTORY  2021    Cataract surgery    OTHER SURGICAL HISTORY  2020    Renal lithotripsy    TUBAL LIGATION  2012    Tubal Ligation       Social Hx:  Social History     Socioeconomic History    Marital status:      Spouse name: None    Number of children: None    Years of education: None    Highest education level: None   Occupational History    None   Tobacco Use    Smoking status: Former     Types: Cigarettes     Quit date: 2023     Years since quittin.3    Smokeless tobacco: Never   Substance and Sexual Activity    Alcohol use: Never    Drug use: Never    Sexual activity: None   Other Topics Concern    None   Social History Narrative    None     Social Determinants of Health     Financial Resource Strain: Low Risk  (1/3/2024)    Overall Financial Resource Strain (CARDIA)     Difficulty of Paying Living Expenses: Not hard at all   Food Insecurity: No Food Insecurity (1/3/2024)    Hunger Vital Sign     Worried About Running Out of Food in the Last Year: Never true      Ran Out of Food in the Last Year: Never true   Transportation Needs: No Transportation Needs (1/3/2024)    PRAPARE - Transportation     Lack of Transportation (Medical): No     Lack of Transportation (Non-Medical): No   Physical Activity: Not on file   Stress: Not on file   Social Connections: Not on file   Intimate Partner Violence: Not At Risk (1/3/2024)    Humiliation, Afraid, Rape, and Kick questionnaire     Fear of Current or Ex-Partner: No     Emotionally Abused: No     Physically Abused: No     Sexually Abused: No   Housing Stability: Low Risk  (1/3/2024)    Housing Stability Vital Sign     Unable to Pay for Housing in the Last Year: No     Number of Places Lived in the Last Year: 1     Unstable Housing in the Last Year: No       Family Hx:  Family History   Problem Relation Name Age of Onset    Alzheimer's disease Mother      Coronary artery disease Father      Breast cancer Other family history        OBJECTIVE  Vitals:   Visit Vitals  /63 (BP Location: Right arm, Patient Position: Lying)   Pulse (!) 112   Temp 36.3 °C (97.3 °F) (Temporal)   Resp 19        Wt Readings from Last 5 Encounters:   01/07/24 (!) 154 kg (339 lb 8.1 oz)   01/02/24 133 kg (293 lb)   12/26/23 150 kg (330 lb)   12/13/23 150 kg (330 lb)   08/23/23 140 kg (307 lb 14.4 oz)       Intake / Output:   I/O last 3 completed shifts:  In: 1376 (8.9 mL/kg) [P.O.:360; I.V.:324 (2.1 mL/kg); IV Piggyback:692]  Out: 3250 (21.1 mL/kg) [Urine:3250 (0.6 mL/kg/hr)]  Weight: 154 kg      Tele monitoring: Afib rate 110-120s    Physical Examination:    Vitals reviewed.   Constitutional:       General: Awake.      Appearance: Normal and healthy appearance. Well-developed and not in distress. Morbidly obese.   Eyes:      General: Lids are normal.      Pupils: Pupils are equal, round, and reactive to light.   HENT:      Nose: Nose normal.    Mouth/Throat:      Lips: Pink.      Mouth: Mucous membranes are moist.   Neck:      Vascular: JVD normal.    Pulmonary:      Effort: Pulmonary effort is normal.      Breath sounds: Decreased air movement present. Wheezing present. Rhonchi present.   Chest:      Chest wall: Not tender to palpatation.   Cardiovascular:      PMI at left midclavicular line. Normal rate. Irregularly irregular rhythm. Normal S1. Normal S2.       Murmurs: There is no murmur.   Edema:     Peripheral edema present.     Thigh: bilateral 1+ pitting edema of the thigh.     Pretibial: bilateral 1+ pitting edema of the pretibial area.     Ankle: bilateral 1+ pitting edema of the ankle.     Feet: bilateral 1+ pitting edema of the feet.  Abdominal:      General: Bowel sounds are normal.      Palpations: Abdomen is soft.      Tenderness: There is no abdominal tenderness.   Musculoskeletal: Normal range of motion.      Cervical back: Full passive range of motion without pain, normal range of motion and neck supple. Skin:     General: Skin is warm and dry.   Neurological:      General: No focal deficit present.      Mental Status: Alert, oriented to person, place, and time and oriented to person, place and time. Mental status is at baseline.   Psychiatric:         Attention and Perception: Attention and perception normal.         Mood and Affect: Mood and affect normal.         Speech: Speech normal.         Behavior: Behavior normal. Behavior is cooperative.         Thought Content: Thought content normal.          LABS:  CMP:   Recent Labs     01/08/24  0433 01/07/24  0858 01/07/24  0045 01/06/24  1711 01/06/24  0746 01/05/24  0539 01/04/24  0717 01/02/24  1840 12/26/23  1733 12/02/23  0948    140 139 137 139 139 140 140 142 143   K 4.0 4.6 4.5 4.3 4.4 4.2 3.8 3.5 4.1 4.2    102 102 100 101 101 102 102 106 103   CO2 31 31 28 27 26 27 27 29 29 28   ANIONGAP 13 12 14 14 16 15 15 13 11 16   BUN 47* 47* 51* 51* 49* 47* 39* 22 15 13   CREATININE 0.99 0.91 1.01 1.11* 0.98 1.19* 0.99 0.88 0.78 0.80   EGFR 61 67 59* 53* 61 49* 61 70 81 78   MG  --    "--   --   --  2.13 2.09  --   --   --   --      LIVER ENZYMES:   Recent Labs     01/06/24  1711 01/02/24  1840 12/26/23  1733 12/02/23  0948 09/22/23  1243 07/31/23  1330 06/12/23  1150 02/22/23  1028   ALBUMIN 4.1 4.1 3.9 3.7 3.8 3.8 3.8 3.7   ALT 10 8 10 7 10 10 9 10   AST 6* 8* 23 9 11 12 13 11   BILITOT 0.6 1.5* 0.7 0.9 1.1 0.9 0.8 0.6     CBC:  Recent Labs     01/08/24  0433 01/07/24  0858 01/07/24  0045 01/06/24  1711 01/06/24  0746 01/04/24  0717 01/02/24  1840 12/26/23  1733   WBC 11.4* 6.7 7.1 8.1 5.3 5.3 13.9* 12.3*   HGB 8.4* 8.0* 8.1* 8.4* 8.1* 8.0* 9.0* 8.6*   HCT 29.1* 28.8* 28.5* 29.4* 28.7* 28.4* 30.4* 29.2*    192 194 225 191 234 226 265   * 111* 111* 110* 112* 111* 108* 106*     COAG:   Recent Labs     01/07/24  0858 01/06/24  1026 01/05/24  0548 01/04/24  0717 01/03/24  1247 12/19/23  0000 12/12/23  0000 12/05/23  0000   INR 2.3* 2.0* 2.2* 3.6* 5.2* 2.60 2.10 3.40     ABO:   Recent Labs     08/13/19  0902   ABO O     HEME/ENDO:  Recent Labs     01/03/24  1247 01/02/24  1612 12/02/23  0948 11/27/23  1522 09/24/23  0603 02/22/23  1028   FERRITIN 296*  --   --   --   --   --    IRONSAT 10*  --   --   --  22*  --    TSH  --   --  2.10  --   --  1.76   HGBA1C  --  4.6 3.7   < >  --  6.4*    < > = values in this interval not displayed.      CARDIAC:   Recent Labs     01/07/24  0045 01/05/24  0549 01/05/24  0539 01/04/24  0717 01/02/24  1945 01/02/24  1840 09/22/23  1357 09/22/23  1243   TROPHS 7  --  6  --  7 7 7 7   BNP  --  368*  --  285*  --  359*  --  222*       Lipid Panel:  No results found for: \"HDL\", \"CHHDL\", \"VLDL\", \"TRIG\", \"NHDL\"     Current Medications:   MEDICATIONS  Infusions:  dilTIAZem, Last Rate: 15 mg/hr (01/08/24 0530)      Scheduled:  amiodarone, 200 mg, BID  aspirin, 81 mg, Daily  atorvastatin, 20 mg, Nightly  calcium carbonate, 1,500 mg, q12h  dapsone, 100 mg, Daily before breakfast  [Held by provider] dilTIAZem CD, 300 mg, Daily  docusate sodium, 100 mg, BID  ferrous " sulfate (325 mg ferrous sulfate), 65 mg of iron, Daily  tiotropium, 2 Inhalation, Daily   And  fluticasone furoate-vilanteroL, 1 puff, Daily  furosemide, 80 mg, Daily  gabapentin, 200 mg, BID  glimepiride, 2 mg, Daily with breakfast  guaiFENesin, 600 mg, BID  insulin glargine, 15 Units, Nightly  insulin lispro, 0-5 Units, TID with meals  ipratropium-albuteroL, 3 mL, TID  losartan, 12.5 mg, Daily  [Held by provider] melatonin, 6 mg, Daily  metFORMIN, 1,000 mg, BID with meals  niCARdipine in NaCl (iso-os), ,   pantoprazole, 40 mg, Daily before breakfast   Or  pantoprazole, 40 mg, Daily before breakfast  piperacillin-tazobactam, 4.5 g, q6h  polyethylene glycol, 17 g, Daily  predniSONE, 40 mg, Daily  topiramate, 100 mg, BID  warfarin, 5 mg, Daily      PRN:  acetaminophen, 650 mg, q4h PRN   Or  acetaminophen, 650 mg, q4h PRN   Or  acetaminophen, 650 mg, q4h PRN  acetaminophen, 650 mg, q4h PRN   Or  acetaminophen, 650 mg, q4h PRN   Or  acetaminophen, 650 mg, q4h PRN  albuterol, 2.5 mg, q2h PRN  benzocaine-menthoL, 1 lozenge, q2h PRN  bisacodyl, 10 mg, Daily PRN  dextromethorphan-guaifenesin, 5 mL, q4h PRN  dextrose 10 % in water (D10W), 0.3 g/kg/hr, Once PRN  dextrose, 25 g, q15 min PRN  glucagon, 1 mg, q15 min PRN  metoprolol, 5 mg, q4h PRN  niCARdipine in NaCl (iso-os), ,   ondansetron ODT, 4 mg, q8h PRN   Or  ondansetron, 4 mg, q8h PRN  oxygen, , Continuous PRN - O2/gases        Cardiovascular studies:    EKG:  Encounter Date: 01/02/24   ECG 12 lead   Result Value    Ventricular Rate 104    Atrial Rate 101    QRS Duration 98    QT Interval 334    QTC Calculation(Bazett) 439    R Axis -14    T Axis 55    QRS Count 17    Q Onset 218    T Offset 385    QTC Fredericia 401    Narrative    Atrial fibrillation with rapid ventricular response  Low voltage QRS  Cannot rule out Anterior infarct , age undetermined  Abnormal ECG  When compared with ECG of 13-DEC-2023 09:49,  Atrial fibrillation has replaced Sinus rhythm  See ED  provider note for full interpretation and clinical correlation  Confirmed by Jamir Salvador ([id number]) on 1/3/2024 10:49:37 PM     Echocardiogram: ERICH([id number]:1:1825)   Echocardiogram     Narrative  [facility], [address] [address]  Tel [phone] and Fax [phone]    TRANSTHORACIC ECHOCARDIOGRAM REPORT      Patient Name:     PATRICIA RODRIGUES CHARLENE  Reading Physician:  [id number] Laurel Summers MD  Study Date:       9/25/2023          Referring           JENNIFER SAHNI  Physician:  MRN/PID:          [id number]           PCP:  Accession/Order#: [id number]          Department          [facility]  Location:  YOB: 1950          Fellow:  Gender:           F                  Nurse:  Admit Date:       9/22/2023          Sonographer:        Bi Maldonado RDCS  Admission Status: Inpatient -        Additional Staff:  Routine  Height:           167.64 cm          CC Report to:  Weight:           140.62 kg          Study Type:         Echocardiogram  BSA:              2.41 m2  Blood Pressure: 160 /85 mmHg    Diagnosis/ICD: I50.32-Chronic diastolic (congestive) heart failure (CHF)  Indication:    Congestive Heart Failure  Procedure/CPT: Echo Complete w Full [id number]    Patient History:  Pertinent History: AF, CHF, DM, HTN, PE.    Study Detail: The following Echo studies were performed: 2D, M-Mode, Doppler and  color flow.      PHYSICIAN INTERPRETATION:  Left Ventricle: The left ventricular systolic function is normal, with an estimated ejection fraction of 60-65%. The patient is in atrial fibrillation which may influence the estimate of left ventricular function and transvalvular flows. There are no regional wall motion abnormalities. The left ventricular cavity size is normal. Spectral Doppler shows an abnormal pattern of left ventricular diastolic filling.  Left Atrium: The left atrium is moderate to severely dilated.  Right Ventricle: The right ventricle is  normal in size. There is normal right ventricular global systolic function.  Right Atrium: The right atrium is mild to moderately dilated.  Aortic Valve: The aortic valve is trileaflet. There is mild aortic valve cusp calcification. There is mild aortic valve thickening. There is evidence of mildly elevated transaortic gradients consistent with sclerosis of the aortic valve.  There is no evidence of aortic valve regurgitation. The peak instantaneous gradient of the aortic valve is 12.2 mmHg.  Mitral Valve: The mitral valve is abnormal. There is moderate thickening and calcification of the anterior and posterior mitral valve leaflets. There is evidence of mild mitral valve stenosis. The doppler estimated mean and peak diastolic pressure gradients are 6.5 mmHg and 16.4 mmHg respectively. There is moderate to severe mitral annular calcification. There is mild mitral valve regurgitation.  Tricuspid Valve: The tricuspid valve is structurally normal. There is mild tricuspid regurgitation.  Pulmonic Valve: The pulmonic valve is not well visualized. There is physiologic pulmonic valve regurgitation.  Pericardium: There is no pericardial effusion noted.  Aorta: The aortic root was not well visualized.      CONCLUSIONS:  1. Left ventricular systolic function is normal with a 60-65% estimated ejection fraction.  2. Spectral Doppler shows an abnormal pattern of left ventricular diastolic filling.  3. The left atrium is moderate to severely dilated.  4. The right atrium is mild to moderately dilated.  5. There is moderate thickening and calcification of the anterior and posterior mitral valve leaflets.  6. There is moderate to severe mitral annular calcification with mild mitral stenosis and mild mitral regurgitation.  7. The patient is in atrial fibrillation which may influence the estimate of left ventricular function and transvalvular flows.  8. Aortic valve sclerosis.    QUANTITATIVE DATA SUMMARY:  2D MEASUREMENTS:  Normal  Ranges:  Ao Root d:     2.90 cm    (2.0-3.7cm)  LAs:           4.90 cm    (2.7-4.0cm)  IVSd:          1.33 cm    (0.6-1.1cm)  LVPWd:         1.30 cm    (0.6-1.1cm)  LVIDd:         5.03 cm    (3.9-5.9cm)  LVIDs:         3.43 cm  LV Mass Index: 111.5 g/m2  LV % FS        31.8 %    LA VOLUME:  Normal Ranges:  LA Vol A4C:        98.6 ml    (22+/-6mL/m2)  LA Vol A2C:        87.0 ml  LA Vol BP:         98.3 ml  LA Vol Index A4C:  40.9ml/m2  LA Vol Index A2C:  36.1 ml/m2  LA Vol Index BP:   40.8 ml/m2  LA Area A4C:       28.9 cm2  LA Area A2C:       25.6 cm2  LA Major Axis A4C: 7.2 cm  LA Major Axis A2C: 6.4 cm    RA VOLUME BY A/L METHOD:  Normal Ranges:  RA Vol A4C:        41.6 ml    (8.3-19.5ml)  RA Vol Index A4C:  17.2 ml/m2  RA Area A4C:       16.4 cm2  RA Major Axis A4C: 5.5 cm    M-MODE MEASUREMENTS:  Normal Ranges:  Ao Root: 2.90 cm (2.0-3.7cm)  LAs:     5.30 cm (2.7-4.0cm)    AORTA MEASUREMENTS:  Normal Ranges:  Asc Ao, d: 3.10 cm (2.1-3.4cm)    LV SYSTOLIC FUNCTION BY 2D PLANIMETRY (MOD):  Normal Ranges:  EF-A4C View: 61.4 % (>=55%)  EF-A2C View: 61.0 %  EF-Biplane:  62.2 %    LV DIASTOLIC FUNCTION:  Normal Ranges:  MV Peak E:    1.70 m/s (0.7-1.2 m/s)  MV e'         0.06 m/s (>8.0)  MV lateral e' 0.13 m/s  MV medial e'  0.08 m/s  E/e' Ratio:   26.10    (<8.0)    MITRAL VALVE:  Normal Ranges:  MV Vmax:    2.03 m/s  (<=1.3m/s)  MV peak P.4 mmHg (<5mmHg)  MV mean P.5 mmHg  (<2mmHg)    AORTIC VALVE:  Normal Ranges:  AoV Vmax:      1.75 m/s  (<=1.7m/s)  AoV Peak P.2 mmHg (<20mmHg)  LVOT Max Bryn:  1.71 m/s  (<=1.1m/s)  LVOT VTI:      29.90 cm  LVOT Diameter: 2.10 cm   (1.8-2.4cm)  AoV Area,Vmax: 3.38 cm2  (2.5-4.5cm2)      RIGHT VENTRICLE:  RV Basal 3.60 cm  RV Mid   2.50 cm  RV Major 6.2 cm  TAPSE:   26.1 mm  RV s'    0.15 m/s    TRICUSPID VALVE/RVSP:  Normal Ranges:  Peak TR Velocity: 2.14 m/s  RV Syst Pressure: 26.3 mmHg (< 30mmHg)  IVC Diam:         3.00 cm    PULMONIC VALVE:  Normal Ranges:  PV Max  Bryn: 1.5 m/s  (0.6-0.9m/s)  PV Max P.5 mmHg      [id number] Laurel Summers MD  Electronically signed on 2023 at 2:57:55 PM      Stress Testing IMNIKOLAIULT(ELO4030:1:): No results found for this or any previous visit from the past  days.    Cardiac Catheterization: No results found for this or any previous visit from the past  days.  No results found for this or any previous visit from the past 3650 days.     Cardiac Scoring: No results found for this or any previous visit from the past 1825 days.    AAA : No results found for this or any previous visit from the past  days.    OTHER: No results found for this or any previous visit from the past  days.      LAST IMAGING RESULTS   XR chest 1 view  Narrative: Interpreted By:  Mohsen Law,   STUDY:  XR CHEST 1 VIEW;  2024 4:04 pm      INDICATION:  Signs/Symptoms:worsening pneumonia.      COMPARISON:  Chest x-ray 2024      ACCESSION NUMBER(S):  [id number]      ORDERING CLINICIAN:  ALBERTO PICKETT      FINDINGS:  Multiple overlying leads are present.      CARDIOMEDIASTINAL SILHOUETTE:  Cardiomediastinal silhouette is stable in size and configuration.  Dense mitral annular calcifications noted.      LUNGS:  There are bilateral patchy airspace opacities with slight interval  improvement in the right lung base and slight interval worsening on  the left. These findings may relate to developing edema versus  infection. Small bilateral pleural effusions. No pneumothorax.      ABDOMEN:  No remarkable upper abdominal findings.      BONES:  Degenerative changes of the spine and bilateral shoulders.      Impression: There are patchy bilateral airspace opacities with slight interval  worsening on the and slight interval improvement on the right.  Findings may relate to edema versus infection. Attention on continued  short-term follow-up is advised.      Small bilateral pleural effusions are noted with slight interval  improvement on the  right.      MACRO:  None      Signed by: Mohsen Basiliodelma 1/7/2024 4:18 PM  Dictation workstation:   [id number]  CT head wo IV contrast, CT angio head and neck w and wo IV contrast  Narrative: Interpreted By:  Jaxon Fernandez,   STUDY:  CT ANGIO HEAD AND NECK W AND WO IV CONTRAST; CT HEAD WO IV CONTRAST;  1/7/2024 1:20 am; 1/7/2024 1:10 am      INDICATION:  Signs/Symptoms:Confusion.      COMPARISON:  CT and CT angiogram dated 01/06/2024.      ACCESSION NUMBER(S):  [id number]; [id number]      ORDERING CLINICIAN:  JAC SANTOS      TECHNIQUE:  Unenhanced CT images of the head were obtained. Subsequently,  75 mL  Omnipaque 350 was administered intravenously and axial images of the  head and neck were acquired.  Coronal, sagittal, and 3-D  reconstructions were provided for review.      FINDINGS:  There is a mostly calcified meningioma within the upper left parietal  convexity measuring 1.7 x 1.5 cm and also calcified meningioma  measuring 1.1 x 8.2 cm overlying the inferolateral right frontal  lobe. No evidence of associated vasogenic edema or mass effect. There  is no evidence of midline shift, hydrocephalus, or acute intracranial  hemorrhage. Gray-white matter differentiation is maintained.      There is a small amount of fluid in the caudal mastoid air cells.  There is mild to moderate polypoid mucosal thickening of the left  maxillary sinus and anterior ethmoid air cells. These findings are  unchanged.      CTA HEAD FINDINGS:      Anterior circulation: The bilateral intracranial internal carotid  arteries, bilateral carotid terminals, bilateral proximal anterior  and middle cerebral arteries are normal.      Posterior circulation: Bilateral intracranial vertebral arteries,  vertebrobasilar junction, basilar artery and proximal posterior  cerebral arteries are normal.      Venous contamination limits evaluation for small aneurysms, without  gross evidence of intracranial aneurysm.      CTA NECK FINDINGS:       Right carotid vessels: There are mild atherosclerotic calcifications  of the carotid bifurcation with 0% stenosis by NASCET criteria. The  remainder of the right internal cervical carotid artery is widely  patent without focal stenosis.      Left carotid vessels: The common carotid artery is normal. The  carotid bifurcation is normal. The internal carotid artery in the  neck is normal. There is 0% stenosis by NASCET criteria. .      Vertebral vessels:  The visualized segments of the cervical vertebral  arteries are normal in caliber.      There is redemonstration of multifocal nodular and ground-glass  opacities in the visualized upper lung fields suspicious for  multifocal pneumonia. There is also small right pleural effusion  partially visualized.      Impression: No evidence of acute cortical infarct or acute intracranial  hemorrhage. There are calcified meningiomas noted overlying the upper  left frontoparietal convexity and overlying the inferolateral right  frontal lobe without evidence of significant mass effect or vasogenic  edema. No interval change.      No evidence for significant stenosis of the cervical vessels.      No evidence for significant stenosis or large branch vessel cutoffs  of the intracranial vessels.      Partially visualized ground-glass and nodular opacities in the upper  lung fields suspicious for multifocal pneumonia for example viral  pneumonia.      MACRO:      None      Signed by: Jaxon Fernandez 1/7/2024 2:06 AM  Dictation workstation:   [id number]      Assessment:    Patient Active Problem List   Diagnosis    Urge incontinence of urine    Subconjunctival hemorrhage    Splenic artery aneurysm (CMS/HCC)    Pulmonary hypertension (CMS/HCC)    Pulmonary artery thrombosis (CMS/HCC)    Vaginal bleeding    Postmenopausal vaginal bleeding    Pseudophakia of both eyes    Primary osteoarthritis of right hip    Polyphagia    Pneumonia    Pernicious anemia    Pelvic floor weakness    Pain,  wrist joint    Pain, hip    Ocular pemphigoid    Obesity    BMI 45.0-49.9, adult (CMS/[facility])    Class 2 obesity due to excess calories in adult    Neck pain    Myopia with presbyopia    Mammogram abnormal    Abnormal x-ray    Lymphedema    Lumbar spondylosis    Lumbar disc disease    Low-density-lipoid-type (LDL) hyperlipoproteinemia    Low back pain    IUD migration    IUD (intrauterine device) in place    Influenza    Hyperlipemia    Hematuria    Fatigue    Edema    Dysuria    SOB (shortness of breath) on exertion    Dyspnea    Dyslipidemia    Dupuytren contracture    Ductal carcinoma in situ (DCIS) of right breast    Dry eye syndrome    Diabetic peripheral neuropathy (CMS/HCC)    Diabetes mellitus (CMS/[facility])    Deformity    Snoring    Cough    Cigarette nicotine dependence, uncomplicated    Chronic UTI    Cervical radicular pain    Calculus of kidney    Breast lump    Benign mole    Skin lesion    Skin fissures    Pre-ulcerative corn or callous    Atrophic vaginitis    Acute lumbar radiculopathy    Hypoxia    Asthma    Apnea, sleep    Arthritis    Aortic stenosis    Age-related nuclear cataract of right eye    Age-related nuclear cataract of left eye    Chronic sinusitis    Chronic rhinitis    Acute sinusitis    Abnormal blood chemistry    Atrial fibrillation with rapid ventricular response (CMS/[facility])    Nicotine use disorder    Primary hypertension    Type 2 diabetes mellitus with hyperglycemia (CMS/HCC)    Vitamin D deficiency, unspecified    Mixed hyperlipidemia    Abnormal thyroid exam    COPD (chronic obstructive pulmonary disease) (CMS/HCC)    Hemangioma of skin and subcutaneous tissue    Multiple lung nodules on CT    Acute on chronic congestive heart failure (CMS/HCC)    Acute on chronic respiratory failure (CMS/HCC)    Chronic diastolic congestive heart failure (CMS/HCC)    RSV (respiratory syncytial virus infection)    Shortness of breath        Assessment/Plan   Problem List Items Addressed This Visit              ICD-10-CM    Shortness of breath - Primary R06.02    * (Principal) RSV (respiratory syncytial virus infection) B33.8    Pneumonia J18.9    Atrial fibrillation with rapid ventricular response (CMS/HCC) I48.91    Relevant Medications    metoprolol tartrate (Lopressor) injection 5 mg    dilTIAZem CD (Cardizem CD) 24 hr capsule 300 mg    metoprolol tartrate (Lopressor) injection 5 mg (Completed)    dilTIAZem (Cardizem) 125 mg in dextrose 5 % in water (D5W) 125 mL (1 mg/mL) infusion    niCARdipine in NaCl (iso-os) (Cardene) infusion  - [employer] Override Pull    dilTIAZem (Cardizem) injection  - Omnicell Override Pull (Completed)    Other Relevant Orders    Cardioversion External     Other Visit Diagnoses         Codes    COPD exacerbation (CMS/[facility])     J44.1          72 yo women admitted with Acute on chronic hypoxic respiratory failure 2/2 RSV, CAP, COPD in acute exacerbation  Encephalopathy 2/2 infection vs Afib with RVR  Chronic Afib in RVR on warfarin  HTN, HLD  HFpEF, acute on chronic 2/2 above  Type II DM with neuropathy  Anemia  Morbid Obesity  History of noncompliance    Plan:  DCCV today with Dr. Summers for symptomatic Afib with RVR and difficult to control HR, patient having SOB and volume overload.   Amiodarone  mg BID with plan to discontinue diltiazem gtt after procedure  Continue all medication as prescribed including PO Furosemide, consider IV dosing as needed in addition  Supportive treatment per primary team for underlying illness  Plan for OP follow up with primary cardiologist and referral to EP, Appointment with Dr. Siegel 1/15/2024 already arranged.     Thank you for the opportunity to participate in the care of your patient.  Do not hesitate to call if you have any questions.    Case discussed with Dr. Summers and Radha Silvestre PA-C    Electronically signed by JOSELITO Mae-CNP on 1/8/2024 at 11:14 AM  ____________________________________________________________  Division  of Cardiovascular Medicine  [facility] [place]  [facility]

## 2024-01-08 NOTE — PROGRESS NOTES
Physical Therapy                 Therapy Communication Note    Patient Name: Frannie Blanco  MRN: [id number]  Today's Date: 1/8/2024     Discipline: Physical Therapy    Missed Visit Reason: Missed Visit Reason:  (Pt. getting cardioversion today. Will hold tx at this time. Discussed with RN)    Missed Time: Cancel 0857

## 2024-01-08 NOTE — NURSING NOTE
[facility] Dr Enriquez notified of this ICU admission via phone. Updated Dr on POC and V/S, no new orders.

## 2024-01-08 NOTE — PROGRESS NOTES
"Frannie Blanco is a 73 y.o. female on day 4 of admission presenting with RSV (respiratory syncytial virus infection).    Subjective     Patient transferred to ICU overnight for HR persistently in the 130s. She was started on a diltiazem drip at that time. Patient denied any cardiac symptoms.     Patient was seen by cardiology today and underwent cardioversion this afternoon. She remains in atrial fibrillation on telemetry but rate is better controlled in the 90s. She denies any dizziness, lightheadedness, chest pain, or palpitations. She is still requiring 6L O2, will give an additional Lasix 20 mg IVP x 1 dose and monitor response.        Objective     Physical Exam  Constitutional:       General: She is not in acute distress.     Appearance: She is obese. She is not toxic-appearing.   HENT:      Head: Normocephalic and atraumatic.      Mouth/Throat:      Mouth: Mucous membranes are moist.   Eyes:      Conjunctiva/sclera: Conjunctivae normal.   Cardiovascular:      Rate and Rhythm: Normal rate. Rhythm irregular.   Pulmonary:      Effort: No respiratory distress.      Breath sounds: Rhonchi and rales present.      Comments: Improving compared to yesterday  Abdominal:      General: There is no distension.      Palpations: Abdomen is soft.      Tenderness: There is no abdominal tenderness.   Musculoskeletal:         General: No swelling (lower extremity edema consistent with history of lymphedema).   Skin:     General: Skin is warm and dry.      Findings: No rash.   Neurological:      Mental Status: She is alert and oriented to person, place, and time.   Psychiatric:         Mood and Affect: Mood normal.         Behavior: Behavior normal.         Last Recorded Vitals  Blood pressure 121/67, pulse 90, temperature 36.6 °C (97.9 °F), temperature source Temporal, resp. rate 22, height 1.676 m (5' 5.98\"), weight (!) 154 kg (339 lb 8.1 oz), SpO2 92 %.  Intake/Output last 3 Shifts:  I/O last 3 completed shifts:  In: 1376 " (8.9 mL/kg) [P.O.:360; I.V.:324 (2.1 mL/kg); IV Piggyback:692]  Out: 3250 (21.1 mL/kg) [Urine:3250 (0.6 mL/kg/hr)]  Weight: 154 kg     Relevant Results       This patient currently has cardiac telemetry ordered; if you would like to modify or discontinue the telemetry order, click here to go to the orders activity to modify/discontinue the order.      Scheduled medications  amiodarone, 200 mg, oral, TID  aspirin, 81 mg, oral, Daily  atorvastatin, 20 mg, oral, Nightly  calcium carbonate, 1,500 mg, oral, q12h  dapsone, 100 mg, oral, Daily before breakfast  [Held by provider] dilTIAZem CD, 300 mg, oral, Daily  docusate sodium, 100 mg, oral, BID  ferrous sulfate (325 mg ferrous sulfate), 65 mg of iron, oral, Daily  tiotropium, 2 Inhalation, inhalation, Daily   And  fluticasone furoate-vilanteroL, 1 puff, inhalation, Daily  furosemide, 80 mg, oral, Daily  gabapentin, 200 mg, oral, BID  glimepiride, 2 mg, oral, Daily with breakfast  guaiFENesin, 600 mg, oral, BID  insulin glargine, 15 Units, subcutaneous, Nightly  insulin lispro, 0-5 Units, subcutaneous, TID with meals  ipratropium-albuteroL, 3 mL, nebulization, TID  losartan, 12.5 mg, oral, Daily  [Held by provider] melatonin, 6 mg, oral, Daily  metFORMIN, 1,000 mg, oral, BID with meals  pantoprazole, 40 mg, oral, Daily before breakfast   Or  pantoprazole, 40 mg, intravenous, Daily before breakfast  piperacillin-tazobactam, 4.5 g, intravenous, q6h  polyethylene glycol, 17 g, oral, Daily  predniSONE, 40 mg, oral, Daily  topiramate, 100 mg, oral, BID  warfarin, 5 mg, oral, Daily      Continuous medications  dilTIAZem, 5-15 mg/hr, Last Rate: 15 mg/hr (01/08/24 0530)      PRN medications  PRN medications: acetaminophen **OR** acetaminophen **OR** acetaminophen, acetaminophen **OR** acetaminophen **OR** acetaminophen, albuterol, benzocaine-menthoL, bisacodyl, dextromethorphan-guaifenesin, dextrose 10 % in water (D10W), dextrose, glucagon, metoprolol, ondansetron ODT **OR**  ondansetron, oxygen    Results for orders placed or performed during the hospital encounter of 01/02/24 (from the past 24 hour(s))   POCT GLUCOSE   Result Value Ref Range    POCT Glucose 263 (H) 74 - 99 mg/dL   POCT GLUCOSE   Result Value Ref Range    POCT Glucose 234 (H) 74 - 99 mg/dL   Basic metabolic panel   Result Value Ref Range    Glucose 123 (H) 74 - 99 mg/dL    Sodium 142 136 - 145 mmol/L    Potassium 4.0 3.5 - 5.3 mmol/L    Chloride 102 98 - 107 mmol/L    Bicarbonate 31 21 - 32 mmol/L    Anion Gap 13 10 - 20 mmol/L    Urea Nitrogen 47 (H) 6 - 23 mg/dL    Creatinine 0.99 0.50 - 1.05 mg/dL    eGFR 61 >60 mL/min/1.73m*2    Calcium 8.6 8.6 - 10.3 mg/dL   CBC and Auto Differential   Result Value Ref Range    WBC 11.4 (H) 4.4 - 11.3 x10*3/uL    nRBC 0.5 (H) 0.0 - 0.0 /100 WBCs    RBC 2.65 (L) 4.00 - 5.20 x10*6/uL    Hemoglobin 8.4 (L) 12.0 - 16.0 g/dL    Hematocrit 29.1 (L) 36.0 - 46.0 %     (H) 80 - 100 fL    MCH 31.7 26.0 - 34.0 pg    MCHC 28.9 (L) 32.0 - 36.0 g/dL    RDW 16.3 (H) 11.5 - 14.5 %    Platelets 204 150 - 450 x10*3/uL    Neutrophils % 81.2 40.0 - 80.0 %    Immature Granulocytes %, Automated 1.2 (H) 0.0 - 0.9 %    Lymphocytes % 9.7 13.0 - 44.0 %    Monocytes % 7.7 2.0 - 10.0 %    Eosinophils % 0.1 0.0 - 6.0 %    Basophils % 0.1 0.0 - 2.0 %    Neutrophils Absolute 9.22 (H) 1.60 - 5.50 x10*3/uL    Immature Granulocytes Absolute, Automated 0.14 0.00 - 0.50 x10*3/uL    Lymphocytes Absolute 1.10 0.80 - 3.00 x10*3/uL    Monocytes Absolute 0.88 (H) 0.05 - 0.80 x10*3/uL    Eosinophils Absolute 0.01 0.00 - 0.40 x10*3/uL    Basophils Absolute 0.01 0.00 - 0.10 x10*3/uL   Blood Gas Lactic Acid, Venous   Result Value Ref Range    POCT Lactate, Venous 1.9 0.4 - 2.0 mmol/L   POCT GLUCOSE   Result Value Ref Range    POCT Glucose 92 74 - 99 mg/dL   POCT GLUCOSE   Result Value Ref Range    POCT Glucose 83 74 - 99 mg/dL   POCT GLUCOSE   Result Value Ref Range    POCT Glucose 113 (H) 74 - 99 mg/dL   ECG 12 lead    Result Value Ref Range    Ventricular Rate 92 BPM    Atrial Rate 92 BPM    TN Interval 142 ms    QRS Duration 96 ms    QT Interval 364 ms    QTC Calculation(Bazett) 450 ms    P Axis 64 degrees    R Axis -22 degrees    T Axis 58 degrees    QRS Count 15 beats    Q Onset 217 ms    P Onset 146 ms    P Offset 203 ms    T Offset 399 ms    QTC Fredericia 419 ms     ECG 12 lead    Result Date: 1/8/2024  Sinus rhythm with Premature atrial complexes with Aberrant conduction Cannot rule out Anterior infarct (cited on or before 02-JAN-2024) Abnormal ECG When compared with ECG of 02-JAN-2024 17:57, Sinus rhythm has replaced Atrial fibrillation    XR chest 1 view    Result Date: 1/7/2024  Interpreted By:  Mohsen Law, STUDY: XR CHEST 1 VIEW;  1/7/2024 4:04 pm   INDICATION: Signs/Symptoms:worsening pneumonia.   COMPARISON: Chest x-ray 01/02/2024   ACCESSION NUMBER(S): [id number]   ORDERING CLINICIAN: ALBERTO PICKETT   FINDINGS: Multiple overlying leads are present.   CARDIOMEDIASTINAL SILHOUETTE: Cardiomediastinal silhouette is stable in size and configuration. Dense mitral annular calcifications noted.   LUNGS: There are bilateral patchy airspace opacities with slight interval improvement in the right lung base and slight interval worsening on the left. These findings may relate to developing edema versus infection. Small bilateral pleural effusions. No pneumothorax.   ABDOMEN: No remarkable upper abdominal findings.   BONES: Degenerative changes of the spine and bilateral shoulders.       There are patchy bilateral airspace opacities with slight interval worsening on the and slight interval improvement on the right. Findings may relate to edema versus infection. Attention on continued short-term follow-up is advised.   Small bilateral pleural effusions are noted with slight interval improvement on the right.   MACRO: None   Signed by: Mohsen Law 1/7/2024 4:18 PM Dictation workstation:   [id number]    CT head wo IV  contrast    Result Date: 1/7/2024  Interpreted By:  Jaxon Fernandez, STUDY: CT ANGIO HEAD AND NECK W AND WO IV CONTRAST; CT HEAD WO IV CONTRAST; 1/7/2024 1:20 am; 1/7/2024 1:10 am   INDICATION: Signs/Symptoms:Confusion.   COMPARISON: CT and CT angiogram dated 01/06/2024.   ACCESSION NUMBER(S): [id number]; [id number]   ORDERING CLINICIAN: JAC SANTOS   TECHNIQUE: Unenhanced CT images of the head were obtained. Subsequently,  75 mL Omnipaque 350 was administered intravenously and axial images of the head and neck were acquired.  Coronal, sagittal, and 3-D reconstructions were provided for review.   FINDINGS: There is a mostly calcified meningioma within the upper left parietal convexity measuring 1.7 x 1.5 cm and also calcified meningioma measuring 1.1 x 8.2 cm overlying the inferolateral right frontal lobe. No evidence of associated vasogenic edema or mass effect. There is no evidence of midline shift, hydrocephalus, or acute intracranial hemorrhage. Gray-white matter differentiation is maintained.   There is a small amount of fluid in the caudal mastoid air cells. There is mild to moderate polypoid mucosal thickening of the left maxillary sinus and anterior ethmoid air cells. These findings are unchanged.   CTA HEAD FINDINGS:   Anterior circulation: The bilateral intracranial internal carotid arteries, bilateral carotid terminals, bilateral proximal anterior and middle cerebral arteries are normal.   Posterior circulation: Bilateral intracranial vertebral arteries, vertebrobasilar junction, basilar artery and proximal posterior cerebral arteries are normal.   Venous contamination limits evaluation for small aneurysms, without gross evidence of intracranial aneurysm.   CTA NECK FINDINGS:   Right carotid vessels: There are mild atherosclerotic calcifications of the carotid bifurcation with 0% stenosis by NASCET criteria. The remainder of the right internal cervical carotid artery is widely patent without focal  stenosis.   Left carotid vessels: The common carotid artery is normal. The carotid bifurcation is normal. The internal carotid artery in the neck is normal. There is 0% stenosis by NASCET criteria. .   Vertebral vessels:  The visualized segments of the cervical vertebral arteries are normal in caliber.   There is redemonstration of multifocal nodular and ground-glass opacities in the visualized upper lung fields suspicious for multifocal pneumonia. There is also small right pleural effusion partially visualized.       No evidence of acute cortical infarct or acute intracranial hemorrhage. There are calcified meningiomas noted overlying the upper left frontoparietal convexity and overlying the inferolateral right frontal lobe without evidence of significant mass effect or vasogenic edema. No interval change.   No evidence for significant stenosis of the cervical vessels.   No evidence for significant stenosis or large branch vessel cutoffs of the intracranial vessels.   Partially visualized ground-glass and nodular opacities in the upper lung fields suspicious for multifocal pneumonia for example viral pneumonia.   MACRO:   None   Signed by: Jaxon Fernandez 1/7/2024 2:06 AM Dictation workstation:   [id number]    CT angio head and neck w and wo IV contrast    Result Date: 1/7/2024  Interpreted By:  Jaxon Fernandez, STUDY: CT ANGIO HEAD AND NECK W AND WO IV CONTRAST; CT HEAD WO IV CONTRAST; 1/7/2024 1:20 am; 1/7/2024 1:10 am   INDICATION: Signs/Symptoms:Confusion.   COMPARISON: CT and CT angiogram dated 01/06/2024.   ACCESSION NUMBER(S): [id number]; [id number]   ORDERING CLINICIAN: JAC SANTOS   TECHNIQUE: Unenhanced CT images of the head were obtained. Subsequently,  75 mL Omnipaque 350 was administered intravenously and axial images of the head and neck were acquired.  Coronal, sagittal, and 3-D reconstructions were provided for review.   FINDINGS: There is a mostly calcified meningioma within the upper left  parietal convexity measuring 1.7 x 1.5 cm and also calcified meningioma measuring 1.1 x 8.2 cm overlying the inferolateral right frontal lobe. No evidence of associated vasogenic edema or mass effect. There is no evidence of midline shift, hydrocephalus, or acute intracranial hemorrhage. Gray-white matter differentiation is maintained.   There is a small amount of fluid in the caudal mastoid air cells. There is mild to moderate polypoid mucosal thickening of the left maxillary sinus and anterior ethmoid air cells. These findings are unchanged.   CTA HEAD FINDINGS:   Anterior circulation: The bilateral intracranial internal carotid arteries, bilateral carotid terminals, bilateral proximal anterior and middle cerebral arteries are normal.   Posterior circulation: Bilateral intracranial vertebral arteries, vertebrobasilar junction, basilar artery and proximal posterior cerebral arteries are normal.   Venous contamination limits evaluation for small aneurysms, without gross evidence of intracranial aneurysm.   CTA NECK FINDINGS:   Right carotid vessels: There are mild atherosclerotic calcifications of the carotid bifurcation with 0% stenosis by NASCET criteria. The remainder of the right internal cervical carotid artery is widely patent without focal stenosis.   Left carotid vessels: The common carotid artery is normal. The carotid bifurcation is normal. The internal carotid artery in the neck is normal. There is 0% stenosis by NASCET criteria. .   Vertebral vessels:  The visualized segments of the cervical vertebral arteries are normal in caliber.   There is redemonstration of multifocal nodular and ground-glass opacities in the visualized upper lung fields suspicious for multifocal pneumonia. There is also small right pleural effusion partially visualized.       No evidence of acute cortical infarct or acute intracranial hemorrhage. There are calcified meningiomas noted overlying the upper left frontoparietal  convexity and overlying the inferolateral right frontal lobe without evidence of significant mass effect or vasogenic edema. No interval change.   No evidence for significant stenosis of the cervical vessels.   No evidence for significant stenosis or large branch vessel cutoffs of the intracranial vessels.   Partially visualized ground-glass and nodular opacities in the upper lung fields suspicious for multifocal pneumonia for example viral pneumonia.   MACRO:   None   Signed by: Jaxon Fernandez 1/7/2024 2:06 AM Dictation workstation:   [id number]    CT angio brain attack head w IV contrast and post procedure    Result Date: 1/6/2024  Interpreted By:  Jazmyne Teresa, STUDY: CT ANGIO BRAIN ATTACK HEAD W IV CONTRAST AND POST PROCEDURE; CT ANGIO BRAIN ATTACK NECK W IV CONTRAST AND POST PROCEDURE;  1/6/2024 5:16 pm   INDICATION: Signs/Symptoms:AMS.   COMPARISON: None.   ACCESSION NUMBER(S): [id number]; [id number]   ORDERING CLINICIAN: LYNN FLORES   TECHNIQUE: 75 mL Omnipaque 350 was administered intravenously and axial images of the head and neck were acquired.  Coronal, sagittal, and 3-D reconstructions were provided for review.   The technologist notes the patient had multiple IV malfunctions.   FINDINGS: CT head: Please see CT head performed the same day for intracranial findings.   CTA HEAD FINDINGS:   The examination is degraded by venous contamination.   Anterior circulation: The bilateral intracranial internal carotid arteries, bilateral carotid terminals, bilateral proximal anterior and middle cerebral arteries are patent.   Posterior circulation: Bilateral intracranial vertebral arteries, vertebrobasilar junction, basilar artery and proximal posterior cerebral arteries are patent.   CTA NECK FINDINGS:   The examination is limited by the timing of scan relative to the contrast injection.   Mild atherosclerotic calcification along the aortic arch. Within the limitation of patient motion artifact no  significant stenosis at the origins of the great vessels.   Right carotid vessels: The common carotid artery is patent. Mild calcified plaque at the carotid bifurcation without significant stenosis by NASCET criteria. The internal carotid artery in the neck is patent without significant stenosis.   Left carotid vessels: The common carotid artery is patent.. The carotid bifurcation is patent without significant stenosis. The internal carotid artery in the neck is patent without significant stenosis.   Vertebral vessels:  The visualized segments of the cervical vertebral arteries are patent.   The left thyroid lobe is slightly asymmetrically larger than the right with a calcification. Please see thyroid ultrasound 07/24/2023 for further details. Incidental note of multiple tonsilloliths. There is prominent ossification of the stylohyoid ligament bilaterally. Soft tissues of the neck are otherwise unremarkable.   There are patchy opacities within the visualized upper lungs bilaterally concerning for multifocal pneumonia. There is a small pleural effusion on the right. Mild degenerative changes of the cervical spine.       The examination is degraded by venous contamination, patient motion artifact and difficulties with the IV. Within this limitation:   CTA neck:   No evidence for significant stenosis of the cervical vessels.   Patchy opacities within the upper lungs bilaterally concerning for multifocal pneumonia. There is a small pleural effusion on the right.   CTA head:   No evidence for significant stenosis or large branch vessel cutoffs of the intracranial vessels.   Please see CT head performed the same day for intracranial findings.   MACRO: None   Signed by: Jazmyne Teresa 1/6/2024 5:34 PM Dictation workstation:   [id number]    CT angio brain attack neck w IV contrast and post procedure    Result Date: 1/6/2024  Interpreted By:  Jazmyne Teresa, STUDY: CT ANGIO BRAIN ATTACK HEAD W IV CONTRAST AND POST PROCEDURE; CT  ANGIO BRAIN ATTACK NECK W IV CONTRAST AND POST PROCEDURE;  1/6/2024 5:16 pm   INDICATION: Signs/Symptoms:AMS.   COMPARISON: None.   ACCESSION NUMBER(S): [id number]; [id number]   ORDERING CLINICIAN: LYNN FLORES   TECHNIQUE: 75 mL Omnipaque 350 was administered intravenously and axial images of the head and neck were acquired.  Coronal, sagittal, and 3-D reconstructions were provided for review.   The technologist notes the patient had multiple IV malfunctions.   FINDINGS: CT head: Please see CT head performed the same day for intracranial findings.   CTA HEAD FINDINGS:   The examination is degraded by venous contamination.   Anterior circulation: The bilateral intracranial internal carotid arteries, bilateral carotid terminals, bilateral proximal anterior and middle cerebral arteries are patent.   Posterior circulation: Bilateral intracranial vertebral arteries, vertebrobasilar junction, basilar artery and proximal posterior cerebral arteries are patent.   CTA NECK FINDINGS:   The examination is limited by the timing of scan relative to the contrast injection.   Mild atherosclerotic calcification along the aortic arch. Within the limitation of patient motion artifact no significant stenosis at the origins of the great vessels.   Right carotid vessels: The common carotid artery is patent. Mild calcified plaque at the carotid bifurcation without significant stenosis by NASCET criteria. The internal carotid artery in the neck is patent without significant stenosis.   Left carotid vessels: The common carotid artery is patent.. The carotid bifurcation is patent without significant stenosis. The internal carotid artery in the neck is patent without significant stenosis.   Vertebral vessels:  The visualized segments of the cervical vertebral arteries are patent.   The left thyroid lobe is slightly asymmetrically larger than the right with a calcification. Please see thyroid ultrasound 07/24/2023 for further  details. Incidental note of multiple tonsilloliths. There is prominent ossification of the stylohyoid ligament bilaterally. Soft tissues of the neck are otherwise unremarkable.   There are patchy opacities within the visualized upper lungs bilaterally concerning for multifocal pneumonia. There is a small pleural effusion on the right. Mild degenerative changes of the cervical spine.       The examination is degraded by venous contamination, patient motion artifact and difficulties with the IV. Within this limitation:   CTA neck:   No evidence for significant stenosis of the cervical vessels.   Patchy opacities within the upper lungs bilaterally concerning for multifocal pneumonia. There is a small pleural effusion on the right.   CTA head:   No evidence for significant stenosis or large branch vessel cutoffs of the intracranial vessels.   Please see CT head performed the same day for intracranial findings.   MACRO: None   Signed by: Jazmyne Teresa 1/6/2024 5:34 PM Dictation workstation:   [id number]    CT brain attack head wo IV contrast    Result Date: 1/6/2024  Interpreted By:  Richie Wren, STUDY: CT BRAIN ATTACK HEAD WO IV CONTRAST;  1/6/2024 4:37 pm   INDICATION: Signs/Symptoms:AMS.   COMPARISON: 11/18/2012   ACCESSION NUMBER(S): [id number]   ORDERING CLINICIAN: LYNN FLORES   TECHNIQUE: Noncontrast axial CT images of head were obtained with coronal and sagittal reconstructed images.   FINDINGS: BRAIN PARENCHYMA: Mild periventricular and subcortical hemispheric white matter hypodensities are most compatible with chronic small vessel ischemic disease. No acute intraparenchymal hemorrhage or parenchymal evidence of acute large territory ischemic infarct. No mass-effect. Gray-white matter distinction is preserved.   VENTRICLES and EXTRA-AXIAL SPACES: There is a 1.5 cm calcified meningioma arising from the left parasagittal frontal parietal dura. There is another 1.3 cm more densely calcified meningioma  arising the right frontotemporal dura. No acute extra-axial or intraventricular hemorrhage. No effacement of cerebral sulci. Ventricles and sulci are age-concordant. There is a partial empty sella.   PARANASAL SINUSES/MASTOIDS: Trace fluid left mastoid air cells. No hemorrhage or air-fluid levels within the visualized paranasal sinuses. The mastoids are well aerated.   CALVARIUM/ORBITS:  No skull fracture.  The orbits and globes are intact to the extent visualized.   EXTRACRANIAL SOFT TISSUES: No discernible abnormality.       1. No evidence of acute intracranial hemorrhage, mass effect, or parenchymal evidence of an acute large territory ischemic infarct.   2. Calcified meningiomas rising from the left parasagittal frontal parietal dura and right frontotemporal dura measuring 1.5 cm and 1.3 cm, respectively. No significant mass effect.     MACRO: Richie Wren discussed the significance and urgency of this critical finding by EPIC HAIKU with  LYNN FLORES on 1/6/2024 at 4:50 p.m. with confirmation of receipt. (**-RCF-**) Findings:  See findings.   Signed by: Richie Wren 1/6/2024 4:53 PM Dictation workstation:   [id number]    ECG 12 lead    Result Date: 1/3/2024  Atrial fibrillation with rapid ventricular response Low voltage QRS Cannot rule out Anterior infarct , age undetermined Abnormal ECG When compared with ECG of 13-DEC-2023 09:49, Atrial fibrillation has replaced Sinus rhythm See ED provider note for full interpretation and clinical correlation Confirmed by Jamir Salvador ([id number]) on 1/3/2024 10:49:37 PM    XR chest 1 view    Result Date: 1/2/2024  Interpreted By:  Soren Ham, STUDY: XR CHEST 1 VIEW;  1/2/2024 7:26 pm   INDICATION: Signs/Symptoms:sob.   COMPARISON: 9/26/2023   ACCESSION NUMBER(S): [id number]   ORDERING CLINICIAN: CARMEN CONKLIN   FINDINGS: The cardiac silhouette is stable in size. There are bilateral opacities concerning for infiltrates. Small right pleural effusion and  basilar atelectasis or airspace disease. No pneumothorax.       Small right pleural effusion and basilar atelectasis or airspace disease and mild bilateral opacities concerning for infiltrates.   MACRO: None   Signed by: Soren Ham 1/2/2024 7:38 PM Dictation workstation:   [id number]    CT chest wo IV contrast    Result Date: 12/26/2023  Interpreted By:  Richie Wren, STUDY: CT CHEST WO IV CONTRAST;  12/26/2023 6:03 pm   INDICATION: Signs/Symptoms:cough.   COMPARISON: 09/22/2023 CT chest   ACCESSION NUMBER(S): [id number]   ORDERING CLINICIAN: JANNET VEE   TECHNIQUE: Contiguous axial images of the chest and upper abdomen were obtained without contrast. Coronal and sagittal reformatted images were reconstructed from the axial data.   FINDINGS:     MEDIASTINUM AND LYMPH NODES:  The esophagus appears within normal limits.  No enlarged intrathoracic or axillary lymph nodes by imaging criteria. No pneumomediastinum.   VESSELS:  Normal caliber thoracic aorta. Mild aortic atherosclerosis. The pulmonary artery is enlarged, measuring up to 3.6 cm, indicative of pulmonary hypertension.   HEART: There is left atrial dilatation. Mitral annular calcifications. Mild coronary artery calcifications. No significant pericardial effusion.   LUNG, AIRWAYS, AND PLEURA: New small bilateral pleural effusions with adjacent passive atelectasis. There is new subsegmental atelectasis in the right middle lobe. There is a small amount debris in the lower trachea extending into the mainstem bronchi and bronchus intermedius. There is a small opacity in the left upper lobe and superior segment of left lower lobe consisting of tree-in-bud nodules suspicious for pneumonia the   OSSEOUS STRUCTURES: No acute osseous abnormality.   CHEST WALL SOFT TISSUES: No discernible abnormality.   UPPER ABDOMEN/OTHER: Multiple peripherally calcified splenic artery aneurysm measuring 1.3 cm, 1.3 cm, and 2.8 cm are similar to prior study. The liver  appears cirrhotic.       1. Tree-in-bud opacities in the posterior left upper lobe and superior segment of the left lower lobe consistent with bronchiolitis/early pneumonia.   2. Small bilateral pleural effusions with adjacent passive atelectasis and right middle lobe subsegmental atelectasis.   3. Small amount of debris in the trachea and bilateral proximal bronchi that could be secretions or aspiration.   4. Three splenic artery aneurysm measuring up to 2.8 cm, unchanged.   MACRO: None.   Signed by: Richie Wren 12/26/2023 6:29 PM Dictation workstation:   [id number]    ECG 12 lead (Clinic Performed)    Result Date: 12/16/2023  Atrial fibrillation Poor R-wave progression Abnormal ECG When compared with ECG of 25-SEP-2023 10:00, Nonspecific T wave abnormality, improved in Inferior leads Confirmed by Raymon Beaulieu ([id number]) on 12/16/2023 11:49:53 PM    ECG 12 Lead    Result Date: 12/14/2023  Atrial fibrillation Low voltage QRS Abnormal ECG When compared with ECG of 11-DEC-2023 15:22, (unconfirmed) No significant change was found Confirmed by Zaki Stauffer ([id number]) on 12/14/2023 5:32:09 PM    ECG 12 lead    Result Date: 12/14/2023  Sinus rhythm with Premature supraventricular complexes Low voltage QRS Borderline ECG When compared with ECG of 13-DEC-2023 07:33, (unconfirmed) Sinus rhythm has replaced Atrial fibrillation Confirmed by Zaki Stauffer ([id number]) on 12/14/2023 5:31:33 PM    ECG 12 Lead    Result Date: 12/14/2023  Sinus rhythm with Premature atrial complexes Low voltage QRS Borderline ECG When compared with ECG of 13-DEC-2023 08:45, (unconfirmed) No significant change was found Confirmed by Zaki Stauffer ([id number]) on 12/14/2023 5:31:14 PM              Assessment/Plan   Principal Problem:    RSV (respiratory syncytial virus infection)  Active Problems:    Shortness of breath    #Encephalopathy 2/2 infection vs. AF RVR (resolved)   - RRT called overnight 1/7 for change in mental status; patient was previously A&O  x 3 but became confused and unable to tell staff the year or her birthday  - CT head and CTA head/neck showed no acute infarct or hemorrhage; no significant stenosis of the vessels. Calcified meningiomas noted without evidence of mass effect or vasogenic edema (noted to be no interval change)  - NIHSS 0 today  - Patient cardioverted 1/8, remains in AF with rate controlled in the 90s   - Antibiotics escalated for increasing O2 requirement; patient now on Vancomycin and Zosyn   - Medications reviewed with attending, Solumedrol IV changed to Prednisone PO   - Hold melatonin; gabapentin decreased to 200 mg BID   - Neuro checks q4h     #Acute on chronic hypoxic respiratory failure 2/2 RSV, community acquired pneumonia  #COPD, in acute exacerbation  - Patient was seen in [place] [facility] 12/26, discharged on dexamethasone and doxycycline  - WBC 13.9 >> 5.3 > 11.4 today   - Lactate 1.9  - COVID, flu A/B negative  - RSV positive  - Procal 0.75  - BCx no growth- final report   - sputum culture contained significant salivary contamination; repeat pending   - CXR: Small right pleural effusion and basilar atelectasis or airspace disease and mild bilateral opacities concerning for infiltrates.    - Tylenol PRN for fever   - Mucinex BID, Robitussin DM q4h PRN for cough   - DuoNebs TID    - Breo and Spiriva ordered (pharmacy substitute for home Trelegy)  - RT eval and treat protocol  - Bronchial hygiene  - Isolation protocol for RSV  - Baseline O2 5L continuously   - Wean O2 as tolerated; patient currently requiring 6L O2   - Continue Vancomycin and Zosyn (day 2)   - Solumedrol 40 mg IVP q8h changed to prednisone 40 mg PO every day due to encephalopathy   - CXR 1/8: There are patchy bilateral airspace opacities with slight interval   worsening on the and slight interval improvement on the right. Findings may relate to edema versus infection. Attention on continued short-term follow-up is advised. Small bilateral pleural effusions are  noted with slight interval improvement on the right.   - ID consulted for worsening pneumonia, appreciate recs      #Atrial fibrillation with RVR, s/p cardioversion   #HTN  #HLD  #HFpEF, concern for acute exacerbation  - Recent DCCV on 12/13 at [facility], follows with Dr. Siegel   - Trop 7 > 7   - >285>368   - Telemetry monitoring- notified by charge RN this morning that patient's HR has been consistently in the 120s-130s on telemetry. Upon reviewing the flowsheet documentation for 1/5-1/6, only two instances with HR > 110 bpm are charted  - Patient was transferred to ICU overnight and placed on a diltiazem drip for HR persistently in the 130s  - Cardioversion completed today; patient remains in AF with rate controlled in the 90s   - Start amiodarone 200 mg PO TID x 7 days per cardiology   - Lopressor 5 mg IVP q4h PRN for HR > 120 bpm sustained for > 5 mins   - Continue aspirin, atorvastatin, furosemide, and losartan  - INR 2.3, continue warfarin   - Goal INR 2-3, daily PT/INR while inpatient   - Strict I&Os: LOS - 5630 ml   - Daily weights: up 4kg since admission with O2 requirement now up to 6L, Lasix 20 mg IVP repeated 1/8   - 2g Na diet, 1800 mL FR   - Cardiology consulted, appreciate recs      #Hematuria  - UA: 3+ blood, > 20 RBCs, 1+ bacteria  - UCx with no significant growth   - Patient denies gross blood in urine or difficulties with urination   - Monitor UOP for blood - none reported as of 1/8  - Trend CBC; H/H stable      #T2DM with neuropathy   - Continue Lantus, metformin and glimepiride   - Gabapentin decreased to 200 mg BID due to change in mental status  - SSI three times a day before meals while on steroids   - Hypoglycemia protocol  - Accuchecks ac/hs/prn  - Diabetic diet   - HbA1c 4.6      #Anemia, macrocytic  - H/H stable, 8.4/29.1 with   - B12 level was 267 last month per chart review   - Folate 6.2   - Iron studies: Fe 28, TIBC 272, 10% saturation  - Patient receives monthly B12  injections and is on ferrous sulfate; continue   - Monitor for active bleeding  - Daily CBC to trend H/H     #History of mucous membrane pemphigoid  - Continue dapsone  - Resume outpatient derm follow up on discharge      DVT PPx: Warfarin  GI PPx: Protonix   Diet: Diabetic, 2g Na   Code Status: Full code      Disposition: Patient requires inpatient management at this time.      Total accumulated time spent face to face and not face to face preparing to see the patient, obtaining and reviewing separately obtained history; performing a medically appropriate examination and/or evaluation; counseling and educating the patient, family; ordering medications, tests, or procedures; referring and communicating with other health care professionals; documenting clinical information in the patient's medical record; independently interpreting results and communicating the results to the patient, family; and care coordination was 45 minutes.        Radha Silvestre PA-C

## 2024-01-08 NOTE — NURSING NOTE
Pt glucose 68, 1 amp of D50% given per order of MILANA MIGUEL Pt. Aware of pending cardioversion. No questions per pt. At this time.

## 2024-01-08 NOTE — CARE PLAN
The patient's goals for the shift include to be free from injury    The clinical goals for the shift include pt will remain free from injury during shift    Over the shift the patient was free from injury. Pt was transferred from m/s to ICU for tachycardia, afib on tele. Pt was started on a cardizem drip which is currently running at 5 ml/hr. Pt was given 2 doses of prn iv push metoprolol 5 mg each. Pt was medicated for chronic hip pain during shift. Pt is on 6L of O2 per NC. Pt is not participating in care but does follow commands and will answer questions. Swallows pills whole with water. Purewic draining yellow urine. Pt remains in contact precautions for RSV.

## 2024-01-08 NOTE — ANESTHESIA PREPROCEDURE EVALUATION
Patient: Frannie Blanco    Procedure Information       Date/Time: 01/08/24 1225    Procedure: Echocardiogram Transesophageal    Location: [facility] 03 / Virtual [facility] OR    Surgeons: Laurel Summers MD            Relevant Problems   Cardiovascular   (+) Acute on chronic congestive heart failure (CMS/HCC)   (+) Aortic stenosis   (+) Atrial fibrillation with rapid ventricular response (CMS/HCC)   (+) Chronic diastolic congestive heart failure (CMS/HCC)   (+) Hyperlipemia   (+) Low-density-lipoid-type (LDL) hyperlipoproteinemia   (+) Mixed hyperlipidemia   (+) Primary hypertension   (+) Pulmonary hypertension (CMS/HCC)      Endocrine   (+) Class 2 obesity due to excess calories in adult   (+) Diabetic peripheral neuropathy (CMS/HCC)   (+) Obesity   (+) Type 2 diabetes mellitus with hyperglycemia (CMS/HCC)      /Renal   (+) Calculus of kidney   (+) Chronic UTI      Neuro/Psych   (+) Acute lumbar radiculopathy   (+) Diabetic peripheral neuropathy (CMS/HCC)      Pulmonary   (+) Asthma   (+) COPD (chronic obstructive pulmonary disease) (CMS/HCC)   (+) Multiple lung nodules on CT   (+) Pneumonia   (+) Pulmonary hypertension (CMS/HCC)   (+) SOB (shortness of breath) on exertion      Hematology   (+) Pernicious anemia      Musculoskeletal   (+) Lumbar disc disease   (+) Lumbar spondylosis   (+) Primary osteoarthritis of right hip      Infectious Disease   (+) Chronic UTI   (+) Influenza   (+) RSV (respiratory syncytial virus infection)      Other   (+) Arthritis       Clinical information reviewed:   Tobacco  Allergies  Meds   Med Hx  Surg Hx   Fam Hx  Soc Hx        NPO Detail:  No data recorded     Physical Exam    Airway  Mallampati: III  TM distance: >3 FB  Neck ROM: full     Cardiovascular   Rhythm: irregular  Rate: abnormal     Dental    Pulmonary   (+) decreased breath sounds     Abdominal   (+) obese             Anesthesia Plan    ASA 4     MAC     The patient is not a current smoker.    intravenous induction    Anesthetic plan and risks discussed with patient.  Use of blood products discussed with patient who consented to blood products.    Plan discussed with attending.

## 2024-01-08 NOTE — NURSING NOTE
1/7/24 1949: assumed care of pt. Hr 132, bp 137/93. PT is AxOx2. No c/o pain. Resting in bed at this time. Awaiting bed to ICU    1/7/2024 2020: EKG done. RUPERT YEE-JEAN CARLOS notified by Charge RN .

## 2024-01-08 NOTE — NURSING NOTE
Contacted Damien Najera. Pt heart rate up to 175. Requested patient be icu and not m/s border. Damien changed her to ICU and is going to put an order for cardizem drip.

## 2024-01-08 NOTE — NURSING NOTE
Cardioversion.   1319 time out ANEESH Sands RN..  Julio MELENDREZ, RN, RASHID Ernandez..   1326 Synchronized shock, 360 kate x1 delivered by ALO Kim RN. Resulting rhythm NSR with PACs. Pad placed anterior & posterior by Dr. Summers. End time 1328. Cardizem drip dc'd & amiodarone increased to TID.

## 2024-01-08 NOTE — NURSING NOTE
Cardioversion completed by Dr. Summers, Pt.drowsy but awakens easily. See paper record of events for cardioversion.

## 2024-01-08 NOTE — ANESTHESIA POSTPROCEDURE EVALUATION
Patient: Frannie Blanco    Procedure Summary       Date: 01/08/24 Room / Location: [facility] 03 / [facility]    Anesthesia Start: 1317 Anesthesia Stop: 1336    Procedure: Echocardiogram Transesophageal Diagnosis:     Surgeons: Laurel Summers MD Responsible Provider: ANA Pablo    Anesthesia Type: MAC ASA Status: 4            Anesthesia Type: MAC    Vitals Value Taken Time   /55 01/08/24 1336   Temp 36 01/08/24 1339   Pulse 117 01/08/24 1339   Resp 23 01/08/24 1339   SpO2 94 % 01/08/24 1339   Vitals shown include unvalidated device data.    Anesthesia Post Evaluation    Patient location during evaluation: ICU  Patient participation: complete - patient participated  Level of consciousness: responsive to light touch  Pain score: 0  Pain management: adequate  Airway patency: patent  Cardiovascular status: acceptable (in NSR upon leaving from cardioversion)  Respiratory status: acceptable and nasal cannula (on pt's normal nasal canula as at home)  Hydration status: acceptable  Postoperative Nausea and Vomiting: none        No notable events documented.

## 2024-01-08 NOTE — PROGRESS NOTES
7:29 AM  Patient in ICU- updates sent to [facility].  Patient does not need precert to be discharged to Rehab/SNF when medically stable.

## 2024-01-09 LAB
ANION GAP SERPL CALC-SCNC: 13 MMOL/L (ref 10–20)
BASOPHILS # BLD AUTO: 0.01 X10*3/UL (ref 0–0.1)
BASOPHILS NFR BLD AUTO: 0.1 %
BNP SERPL-MCNC: 243 PG/ML (ref 0–99)
BUN SERPL-MCNC: 38 MG/DL (ref 6–23)
CALCIUM SERPL-MCNC: 8.2 MG/DL (ref 8.6–10.3)
CHLORIDE SERPL-SCNC: 102 MMOL/L (ref 98–107)
CO2 SERPL-SCNC: 35 MMOL/L (ref 21–32)
CREAT SERPL-MCNC: 0.87 MG/DL (ref 0.5–1.05)
EGFRCR SERPLBLD CKD-EPI 2021: 71 ML/MIN/1.73M*2
EOSINOPHIL # BLD AUTO: 0.08 X10*3/UL (ref 0–0.4)
EOSINOPHIL NFR BLD AUTO: 0.7 %
ERYTHROCYTE [DISTWIDTH] IN BLOOD BY AUTOMATED COUNT: 16.6 % (ref 11.5–14.5)
GLUCOSE BLD MANUAL STRIP-MCNC: 114 MG/DL (ref 74–99)
GLUCOSE BLD MANUAL STRIP-MCNC: 223 MG/DL (ref 74–99)
GLUCOSE BLD MANUAL STRIP-MCNC: 272 MG/DL (ref 74–99)
GLUCOSE BLD MANUAL STRIP-MCNC: 61 MG/DL (ref 74–99)
GLUCOSE SERPL-MCNC: 94 MG/DL (ref 74–99)
HCT VFR BLD AUTO: 30.2 % (ref 36–46)
HGB BLD-MCNC: 8.6 G/DL (ref 12–16)
IMM GRANULOCYTES # BLD AUTO: 0.1 X10*3/UL (ref 0–0.5)
IMM GRANULOCYTES NFR BLD AUTO: 0.9 % (ref 0–0.9)
INR PPP: 3 (ref 0.9–1.1)
LYMPHOCYTES # BLD AUTO: 1.16 X10*3/UL (ref 0.8–3)
LYMPHOCYTES NFR BLD AUTO: 10.8 %
MCH RBC QN AUTO: 31.9 PG (ref 26–34)
MCHC RBC AUTO-ENTMCNC: 28.5 G/DL (ref 32–36)
MCV RBC AUTO: 112 FL (ref 80–100)
MONOCYTES # BLD AUTO: 0.52 X10*3/UL (ref 0.05–0.8)
MONOCYTES NFR BLD AUTO: 4.8 %
NEUTROPHILS # BLD AUTO: 8.92 X10*3/UL (ref 1.6–5.5)
NEUTROPHILS NFR BLD AUTO: 82.7 %
NRBC BLD-RTO: 0.5 /100 WBCS (ref 0–0)
PATH REVIEW-CBC DIFFERENTIAL: NORMAL
PLATELET # BLD AUTO: 188 X10*3/UL (ref 150–450)
POTASSIUM SERPL-SCNC: 3.5 MMOL/L (ref 3.5–5.3)
PROTHROMBIN TIME: 34.4 SECONDS (ref 9.8–12.8)
RBC # BLD AUTO: 2.7 X10*6/UL (ref 4–5.2)
SODIUM SERPL-SCNC: 146 MMOL/L (ref 136–145)
WBC # BLD AUTO: 10.8 X10*3/UL (ref 4.4–11.3)

## 2024-01-09 PROCEDURE — 85610 PROTHROMBIN TIME: CPT | Mod: IPSPLIT

## 2024-01-09 PROCEDURE — 94668 MNPJ CHEST WALL SBSQ: CPT | Mod: IPSPLIT

## 2024-01-09 PROCEDURE — 5A0935A ASSISTANCE WITH RESPIRATORY VENTILATION, LESS THAN 24 CONSECUTIVE HOURS, HIGH NASAL FLOW/VELOCITY: ICD-10-PCS | Performed by: INTERNAL MEDICINE

## 2024-01-09 PROCEDURE — 83880 ASSAY OF NATRIURETIC PEPTIDE: CPT | Mod: IPSPLIT

## 2024-01-09 PROCEDURE — 2500000004 HC RX 250 GENERAL PHARMACY W/ HCPCS (ALT 636 FOR OP/ED): Mod: IPSPLIT

## 2024-01-09 PROCEDURE — 9420000001 HC RT PATIENT EDUCATION 5 MIN: Mod: IPSPLIT

## 2024-01-09 PROCEDURE — 94640 AIRWAY INHALATION TREATMENT: CPT | Mod: IPSPLIT

## 2024-01-09 PROCEDURE — 99233 SBSQ HOSP IP/OBS HIGH 50: CPT | Performed by: INTERNAL MEDICINE

## 2024-01-09 PROCEDURE — 2500000002 HC RX 250 W HCPCS SELF ADMINISTERED DRUGS (ALT 637 FOR MEDICARE OP, ALT 636 FOR OP/ED): Mod: IPSPLIT

## 2024-01-09 PROCEDURE — 36415 COLL VENOUS BLD VENIPUNCTURE: CPT | Mod: IPSPLIT

## 2024-01-09 PROCEDURE — 2020000001 HC ICU ROOM DAILY: Mod: IPSPLIT

## 2024-01-09 PROCEDURE — 2500000002 HC RX 250 W HCPCS SELF ADMINISTERED DRUGS (ALT 637 FOR MEDICARE OP, ALT 636 FOR OP/ED): Mod: IPSPLIT | Performed by: INTERNAL MEDICINE

## 2024-01-09 PROCEDURE — 94640 AIRWAY INHALATION TREATMENT: CPT

## 2024-01-09 PROCEDURE — 2500000005 HC RX 250 GENERAL PHARMACY W/O HCPCS: Mod: IPSPLIT | Performed by: INTERNAL MEDICINE

## 2024-01-09 PROCEDURE — 2500000001 HC RX 250 WO HCPCS SELF ADMINISTERED DRUGS (ALT 637 FOR MEDICARE OP): Mod: IPSPLIT

## 2024-01-09 PROCEDURE — 85025 COMPLETE CBC W/AUTO DIFF WBC: CPT | Mod: IPSPLIT

## 2024-01-09 PROCEDURE — 2500000004 HC RX 250 GENERAL PHARMACY W/ HCPCS (ALT 636 FOR OP/ED): Mod: IPSPLIT | Performed by: NURSE PRACTITIONER

## 2024-01-09 PROCEDURE — 94660 CPAP INITIATION&MGMT: CPT | Mod: IPSPLIT

## 2024-01-09 PROCEDURE — 82947 ASSAY GLUCOSE BLOOD QUANT: CPT | Mod: IPSPLIT

## 2024-01-09 PROCEDURE — 80048 BASIC METABOLIC PNL TOTAL CA: CPT | Mod: IPSPLIT

## 2024-01-09 PROCEDURE — 99232 SBSQ HOSP IP/OBS MODERATE 35: CPT | Performed by: NURSE PRACTITIONER

## 2024-01-09 RX ORDER — IPRATROPIUM BROMIDE AND ALBUTEROL SULFATE 2.5; .5 MG/3ML; MG/3ML
3 SOLUTION RESPIRATORY (INHALATION) 4 TIMES DAILY
Status: DISCONTINUED | OUTPATIENT
Start: 2024-01-09 | End: 2024-01-13

## 2024-01-09 RX ORDER — FUROSEMIDE 10 MG/ML
20 INJECTION INTRAMUSCULAR; INTRAVENOUS ONCE
Status: COMPLETED | OUTPATIENT
Start: 2024-01-09 | End: 2024-01-09

## 2024-01-09 RX ADMIN — INSULIN LISPRO 3 UNITS: 100 INJECTION, SOLUTION INTRAVENOUS; SUBCUTANEOUS at 16:42

## 2024-01-09 RX ADMIN — PIPERACILLIN SODIUM AND TAZOBACTAM SODIUM 4.5 G: 4; .5 INJECTION, SOLUTION INTRAVENOUS at 08:35

## 2024-01-09 RX ADMIN — Medication 8 L/MIN: at 20:19

## 2024-01-09 RX ADMIN — FERROUS SULFATE TAB 325 MG (65 MG ELEMENTAL FE) 1 TABLET: 325 (65 FE) TAB at 08:35

## 2024-01-09 RX ADMIN — CALCIUM CARBONATE (ANTACID) CHEW TAB 500 MG 1500 MG: 500 CHEW TAB at 11:45

## 2024-01-09 RX ADMIN — AMIODARONE HYDROCHLORIDE 200 MG: 200 TABLET ORAL at 22:44

## 2024-01-09 RX ADMIN — METFORMIN HYDROCHLORIDE 1000 MG: 500 TABLET ORAL at 08:35

## 2024-01-09 RX ADMIN — PIPERACILLIN SODIUM AND TAZOBACTAM SODIUM 4.5 G: 4; .5 INJECTION, SOLUTION INTRAVENOUS at 03:16

## 2024-01-09 RX ADMIN — PANTOPRAZOLE SODIUM 40 MG: 40 TABLET, DELAYED RELEASE ORAL at 06:13

## 2024-01-09 RX ADMIN — CALCIUM CARBONATE (ANTACID) CHEW TAB 500 MG 1500 MG: 500 CHEW TAB at 00:02

## 2024-01-09 RX ADMIN — GUAIFENESIN 600 MG: 600 TABLET, EXTENDED RELEASE ORAL at 20:31

## 2024-01-09 RX ADMIN — FUROSEMIDE 20 MG: 10 INJECTION, SOLUTION INTRAVENOUS at 14:27

## 2024-01-09 RX ADMIN — IPRATROPIUM BROMIDE AND ALBUTEROL SULFATE 3 ML: .5; 3 SOLUTION RESPIRATORY (INHALATION) at 17:49

## 2024-01-09 RX ADMIN — ASPIRIN 81 MG: 81 TABLET, COATED ORAL at 08:35

## 2024-01-09 RX ADMIN — DAPSONE 100 MG: 25 TABLET ORAL at 06:13

## 2024-01-09 RX ADMIN — IPRATROPIUM BROMIDE AND ALBUTEROL SULFATE 3 ML: .5; 3 SOLUTION RESPIRATORY (INHALATION) at 09:51

## 2024-01-09 RX ADMIN — Medication 8 L/MIN: at 10:01

## 2024-01-09 RX ADMIN — AMIODARONE HYDROCHLORIDE 200 MG: 200 TABLET ORAL at 10:34

## 2024-01-09 RX ADMIN — FUROSEMIDE 80 MG: 80 TABLET ORAL at 08:35

## 2024-01-09 RX ADMIN — TOPIRAMATE 100 MG: 100 TABLET, FILM COATED ORAL at 08:35

## 2024-01-09 RX ADMIN — GABAPENTIN 200 MG: 100 CAPSULE ORAL at 20:31

## 2024-01-09 RX ADMIN — GLIMEPIRIDE 2 MG: 2 TABLET ORAL at 08:35

## 2024-01-09 RX ADMIN — LOSARTAN POTASSIUM 12.5 MG: 25 TABLET, FILM COATED ORAL at 08:35

## 2024-01-09 RX ADMIN — TOPIRAMATE 100 MG: 100 TABLET, FILM COATED ORAL at 20:30

## 2024-01-09 RX ADMIN — GABAPENTIN 200 MG: 100 CAPSULE ORAL at 08:35

## 2024-01-09 RX ADMIN — PREDNISONE 40 MG: 20 TABLET ORAL at 08:35

## 2024-01-09 RX ADMIN — FLUTICASONE FUROATE AND VILANTEROL TRIFENATATE 1 PUFF: 200; 25 POWDER RESPIRATORY (INHALATION) at 10:00

## 2024-01-09 RX ADMIN — METFORMIN HYDROCHLORIDE 1000 MG: 500 TABLET ORAL at 16:42

## 2024-01-09 RX ADMIN — GUAIFENESIN 600 MG: 600 TABLET, EXTENDED RELEASE ORAL at 08:35

## 2024-01-09 RX ADMIN — AMIODARONE HYDROCHLORIDE 200 MG: 200 TABLET ORAL at 16:42

## 2024-01-09 RX ADMIN — PIPERACILLIN SODIUM AND TAZOBACTAM SODIUM 4.5 G: 4; .5 INJECTION, SOLUTION INTRAVENOUS at 20:30

## 2024-01-09 RX ADMIN — TIOTROPIUM BROMIDE INHALATION SPRAY 2 PUFF: 3.12 SPRAY, METERED RESPIRATORY (INHALATION) at 09:59

## 2024-01-09 RX ADMIN — PIPERACILLIN SODIUM AND TAZOBACTAM SODIUM 4.5 G: 4; .5 INJECTION, SOLUTION INTRAVENOUS at 14:27

## 2024-01-09 RX ADMIN — ATORVASTATIN CALCIUM 20 MG: 10 TABLET, FILM COATED ORAL at 20:31

## 2024-01-09 RX ADMIN — IPRATROPIUM BROMIDE AND ALBUTEROL SULFATE 3 ML: .5; 3 SOLUTION RESPIRATORY (INHALATION) at 20:18

## 2024-01-09 ASSESSMENT — PAIN SCALES - GENERAL
PAINLEVEL_OUTOF10: 0 - NO PAIN

## 2024-01-09 ASSESSMENT — COGNITIVE AND FUNCTIONAL STATUS - GENERAL
DAILY ACTIVITIY SCORE: 14
STANDING UP FROM CHAIR USING ARMS: A LOT
MOVING FROM LYING ON BACK TO SITTING ON SIDE OF FLAT BED WITH BEDRAILS: A LOT
PERSONAL GROOMING: A LOT
TURNING FROM BACK TO SIDE WHILE IN FLAT BAD: A LOT
WALKING IN HOSPITAL ROOM: A LOT
MOBILITY SCORE: 12
DRESSING REGULAR LOWER BODY CLOTHING: A LOT
MOVING TO AND FROM BED TO CHAIR: A LOT
DRESSING REGULAR UPPER BODY CLOTHING: A LOT
HELP NEEDED FOR BATHING: A LOT
TOILETING: A LOT
CLIMB 3 TO 5 STEPS WITH RAILING: A LOT

## 2024-01-09 ASSESSMENT — PAIN - FUNCTIONAL ASSESSMENT
PAIN_FUNCTIONAL_ASSESSMENT: 0-10

## 2024-01-09 NOTE — PROGRESS NOTES
Sent updates to  [facility].  Patient has been accepted when medically stable.      12:18 pm  [facility] oxygen demand max is 10L

## 2024-01-09 NOTE — CARE PLAN
The patient's goals for the shift include maintian oxygen leveel above 90A% & be injury free    The clinical goals for the shift include pulse ox >92% this shift  Patient out of bed this am to bedside commode for bm. Became sob and placed on nasal cannula hi-flow 8 liters with pulse ox 88-91%. Good response to lasix. New iv started v/s stable will continue to monitor

## 2024-01-09 NOTE — PROGRESS NOTES
Occupational Therapy                 Therapy Communication Note    Patient Name: Frannie Blanco  MRN: [id number]  Today's Date: 1/9/2024     Discipline: Occupational Therapy    Missed Visit Reason: Missed Visit Reason:  (pt O2 needs increased from 5L to 8L and continues to desaturate. Nsg holding treatment this date). Notified ABE.    Missed Time: Attempt

## 2024-01-09 NOTE — CARE PLAN
The patient's goals for the shift include maintian oxygen leveel above 90A% & be injury free    The clinical goals for the shift include pulse ox 92% without increase in O2    Pt on 6L NC  overnight. VS stable. Remains in SR with frequent PACs/ PVCs. Pt alert to name and place only with confusion. Remains on IV antibiotic.

## 2024-01-09 NOTE — PROGRESS NOTES
"Frannie Blanco is a 73 y.o. female on day 5 of admission presenting with RSV (respiratory syncytial virus infection).    Subjective   Sitting up in bed, complaining of SOB but feeling improved from yesterday. Denies CP, palpitations. In no acute distress., oxygenating on O2, remains in NSR on telemetry 90s rate.       Objective     Physical Exam  Constitutional:       General: She is not in acute distress.     Appearance: Normal appearance. She is well-developed. She is morbidly obese.   Eyes:      Pupils: Pupils are equal, round, and reactive to light.   Neck:      Vascular: No carotid bruit.   Cardiovascular:      Rate and Rhythm: Normal rate and regular rhythm.      Pulses: Normal pulses.      Heart sounds: Normal heart sounds. No murmur heard.  Pulmonary:      Effort: Pulmonary effort is normal. No respiratory distress.      Breath sounds: Decreased air movement present. Decreased breath sounds and rhonchi present.   Abdominal:      General: There is no distension.      Palpations: Abdomen is soft.      Tenderness: There is no abdominal tenderness.   Musculoskeletal:         General: No swelling.      Cervical back: Neck supple.      Right lower le+ Pitting Edema present.      Left lower le+ Pitting Edema present.   Skin:     General: Skin is warm.   Neurological:      Mental Status: She is alert and oriented to person, place, and time. Mental status is at baseline.   Psychiatric:         Mood and Affect: Mood normal.         Behavior: Behavior normal. Behavior is cooperative.         Last Recorded Vitals  Blood pressure 131/60, pulse 104, temperature 36 °C (96.8 °F), resp. rate 26, height 1.676 m (5' 5.98\"), weight 149 kg (327 lb 9.7 oz), SpO2 (!) 89 %.  Intake/Output last 3 Shifts:  I/O last 3 completed shifts:  In: 1666 (11.2 mL/kg) [P.O.:800; I.V.:424 (2.9 mL/kg); IV Piggyback:442]  Out: 3650 (24.6 mL/kg) [Urine:3650 (0.7 mL/kg/hr)]  Weight: 148.6 kg     Relevant Results  Scheduled " medications  amiodarone, 200 mg, oral, TID  aspirin, 81 mg, oral, Daily  atorvastatin, 20 mg, oral, Nightly  calcium carbonate, 1,500 mg, oral, q12h  dapsone, 100 mg, oral, Daily before breakfast  [Held by provider] dilTIAZem CD, 300 mg, oral, Daily  docusate sodium, 100 mg, oral, BID  ferrous sulfate (325 mg ferrous sulfate), 65 mg of iron, oral, Daily  tiotropium, 2 Inhalation, inhalation, Daily   And  fluticasone furoate-vilanteroL, 1 puff, inhalation, Daily  furosemide, 80 mg, oral, Daily  gabapentin, 200 mg, oral, BID  glimepiride, 2 mg, oral, Daily with breakfast  guaiFENesin, 600 mg, oral, BID  insulin lispro, 0-5 Units, subcutaneous, TID with meals  ipratropium-albuteroL, 3 mL, nebulization, 4x daily  losartan, 12.5 mg, oral, Daily  [Held by provider] melatonin, 6 mg, oral, Daily  metFORMIN, 1,000 mg, oral, BID with meals  pantoprazole, 40 mg, oral, Daily before breakfast   Or  pantoprazole, 40 mg, intravenous, Daily before breakfast  piperacillin-tazobactam, 4.5 g, intravenous, q6h  polyethylene glycol, 17 g, oral, Daily  predniSONE, 40 mg, oral, Daily  topiramate, 100 mg, oral, BID  [Held by provider] warfarin, 5 mg, oral, Daily      Continuous medications     PRN medications  PRN medications: acetaminophen **OR** acetaminophen **OR** acetaminophen, acetaminophen **OR** acetaminophen **OR** acetaminophen, albuterol, benzocaine-menthoL, bisacodyl, dextromethorphan-guaifenesin, dextrose 10 % in water (D10W), dextrose, glucagon, metoprolol, ondansetron ODT **OR** ondansetron, oxygen  Results for orders placed or performed during the hospital encounter of 01/02/24 (from the past 24 hour(s))   POCT GLUCOSE   Result Value Ref Range    POCT Glucose 122 (H) 74 - 99 mg/dL   Protime-INR   Result Value Ref Range    Protime 32.1 (H) 9.8 - 12.8 seconds    INR 2.8 (H) 0.9 - 1.1   POCT GLUCOSE   Result Value Ref Range    POCT Glucose 144 (H) 74 - 99 mg/dL   POCT GLUCOSE   Result Value Ref Range    POCT Glucose 224 (H) 74  - 99 mg/dL   Basic metabolic panel   Result Value Ref Range    Glucose 94 74 - 99 mg/dL    Sodium 146 (H) 136 - 145 mmol/L    Potassium 3.5 3.5 - 5.3 mmol/L    Chloride 102 98 - 107 mmol/L    Bicarbonate 35 (H) 21 - 32 mmol/L    Anion Gap 13 10 - 20 mmol/L    Urea Nitrogen 38 (H) 6 - 23 mg/dL    Creatinine 0.87 0.50 - 1.05 mg/dL    eGFR 71 >60 mL/min/1.73m*2    Calcium 8.2 (L) 8.6 - 10.3 mg/dL   CBC and Auto Differential   Result Value Ref Range    WBC 10.8 4.4 - 11.3 x10*3/uL    nRBC 0.5 (H) 0.0 - 0.0 /100 WBCs    RBC 2.70 (L) 4.00 - 5.20 x10*6/uL    Hemoglobin 8.6 (L) 12.0 - 16.0 g/dL    Hematocrit 30.2 (L) 36.0 - 46.0 %     (H) 80 - 100 fL    MCH 31.9 26.0 - 34.0 pg    MCHC 28.5 (L) 32.0 - 36.0 g/dL    RDW 16.6 (H) 11.5 - 14.5 %    Platelets 188 150 - 450 x10*3/uL    Neutrophils % 82.7 40.0 - 80.0 %    Immature Granulocytes %, Automated 0.9 0.0 - 0.9 %    Lymphocytes % 10.8 13.0 - 44.0 %    Monocytes % 4.8 2.0 - 10.0 %    Eosinophils % 0.7 0.0 - 6.0 %    Basophils % 0.1 0.0 - 2.0 %    Neutrophils Absolute 8.92 (H) 1.60 - 5.50 x10*3/uL    Immature Granulocytes Absolute, Automated 0.10 0.00 - 0.50 x10*3/uL    Lymphocytes Absolute 1.16 0.80 - 3.00 x10*3/uL    Monocytes Absolute 0.52 0.05 - 0.80 x10*3/uL    Eosinophils Absolute 0.08 0.00 - 0.40 x10*3/uL    Basophils Absolute 0.01 0.00 - 0.10 x10*3/uL   Protime-INR   Result Value Ref Range    Protime 34.4 (H) 9.8 - 12.8 seconds    INR 3.0 (H) 0.9 - 1.1   B-type natriuretic peptide   Result Value Ref Range     (H) 0 - 99 pg/mL   POCT GLUCOSE   Result Value Ref Range    POCT Glucose 61 (L) 74 - 99 mg/dL   POCT GLUCOSE   Result Value Ref Range    POCT Glucose 114 (H) 74 - 99 mg/dL     ECG 12 lead    Result Date: 1/9/2024  Atrial fibrillation with rapid ventricular response Low voltage QRS Cannot rule out Anterior infarct , age undetermined Abnormal ECG When compared with ECG of 07-JAN-2024 00:28, (unconfirmed) No significant change was found    ECG 12  lead    Result Date: 1/9/2024  Atrial fibrillation with rapid ventricular response Abnormal ECG When compared with ECG of 02-JAN-2024 17:57, No significant change was found    ECG 12 lead    Result Date: 1/8/2024  Sinus rhythm with Premature atrial complexes with Aberrant conduction Cannot rule out Anterior infarct (cited on or before 02-JAN-2024) Abnormal ECG When compared with ECG of 02-JAN-2024 17:57, Sinus rhythm has replaced Atrial fibrillation    XR chest 1 view    Result Date: 1/7/2024  Interpreted By:  Mohsen Law, STUDY: XR CHEST 1 VIEW;  1/7/2024 4:04 pm   INDICATION: Signs/Symptoms:worsening pneumonia.   COMPARISON: Chest x-ray 01/02/2024   ACCESSION NUMBER(S): [id number]   ORDERING CLINICIAN: ALBERTO PICKETT   FINDINGS: Multiple overlying leads are present.   CARDIOMEDIASTINAL SILHOUETTE: Cardiomediastinal silhouette is stable in size and configuration. Dense mitral annular calcifications noted.   LUNGS: There are bilateral patchy airspace opacities with slight interval improvement in the right lung base and slight interval worsening on the left. These findings may relate to developing edema versus infection. Small bilateral pleural effusions. No pneumothorax.   ABDOMEN: No remarkable upper abdominal findings.   BONES: Degenerative changes of the spine and bilateral shoulders.       There are patchy bilateral airspace opacities with slight interval worsening on the and slight interval improvement on the right. Findings may relate to edema versus infection. Attention on continued short-term follow-up is advised.   Small bilateral pleural effusions are noted with slight interval improvement on the right.   MACRO: None   Signed by: Mohsen Law 1/7/2024 4:18 PM Dictation workstation:   [id number]    CT head wo IV contrast    Result Date: 1/7/2024  Interpreted By:  Jaxon Fernandez, STUDY: CT ANGIO HEAD AND NECK W AND WO IV CONTRAST; CT HEAD WO IV CONTRAST; 1/7/2024 1:20 am; 1/7/2024 1:10 am    INDICATION: Signs/Symptoms:Confusion.   COMPARISON: CT and CT angiogram dated 01/06/2024.   ACCESSION NUMBER(S): [id number]; [id number]   ORDERING CLINICIAN: JAC SANTOS   TECHNIQUE: Unenhanced CT images of the head were obtained. Subsequently,  75 mL Omnipaque 350 was administered intravenously and axial images of the head and neck were acquired.  Coronal, sagittal, and 3-D reconstructions were provided for review.   FINDINGS: There is a mostly calcified meningioma within the upper left parietal convexity measuring 1.7 x 1.5 cm and also calcified meningioma measuring 1.1 x 8.2 cm overlying the inferolateral right frontal lobe. No evidence of associated vasogenic edema or mass effect. There is no evidence of midline shift, hydrocephalus, or acute intracranial hemorrhage. Gray-white matter differentiation is maintained.   There is a small amount of fluid in the caudal mastoid air cells. There is mild to moderate polypoid mucosal thickening of the left maxillary sinus and anterior ethmoid air cells. These findings are unchanged.   CTA HEAD FINDINGS:   Anterior circulation: The bilateral intracranial internal carotid arteries, bilateral carotid terminals, bilateral proximal anterior and middle cerebral arteries are normal.   Posterior circulation: Bilateral intracranial vertebral arteries, vertebrobasilar junction, basilar artery and proximal posterior cerebral arteries are normal.   Venous contamination limits evaluation for small aneurysms, without gross evidence of intracranial aneurysm.   CTA NECK FINDINGS:   Right carotid vessels: There are mild atherosclerotic calcifications of the carotid bifurcation with 0% stenosis by NASCET criteria. The remainder of the right internal cervical carotid artery is widely patent without focal stenosis.   Left carotid vessels: The common carotid artery is normal. The carotid bifurcation is normal. The internal carotid artery in the neck is normal. There is 0% stenosis by  NASCET criteria. .   Vertebral vessels:  The visualized segments of the cervical vertebral arteries are normal in caliber.   There is redemonstration of multifocal nodular and ground-glass opacities in the visualized upper lung fields suspicious for multifocal pneumonia. There is also small right pleural effusion partially visualized.       No evidence of acute cortical infarct or acute intracranial hemorrhage. There are calcified meningiomas noted overlying the upper left frontoparietal convexity and overlying the inferolateral right frontal lobe without evidence of significant mass effect or vasogenic edema. No interval change.   No evidence for significant stenosis of the cervical vessels.   No evidence for significant stenosis or large branch vessel cutoffs of the intracranial vessels.   Partially visualized ground-glass and nodular opacities in the upper lung fields suspicious for multifocal pneumonia for example viral pneumonia.   MACRO:   None   Signed by: Jaxon Fernandez 1/7/2024 2:06 AM Dictation workstation:   [id number]    CT angio head and neck w and wo IV contrast    Result Date: 1/7/2024  Interpreted By:  Jaxon Fernandez, STUDY: CT ANGIO HEAD AND NECK W AND WO IV CONTRAST; CT HEAD WO IV CONTRAST; 1/7/2024 1:20 am; 1/7/2024 1:10 am   INDICATION: Signs/Symptoms:Confusion.   COMPARISON: CT and CT angiogram dated 01/06/2024.   ACCESSION NUMBER(S): [id number]; [id number]   ORDERING CLINICIAN: JAC SANTOS   TECHNIQUE: Unenhanced CT images of the head were obtained. Subsequently,  75 mL Omnipaque 350 was administered intravenously and axial images of the head and neck were acquired.  Coronal, sagittal, and 3-D reconstructions were provided for review.   FINDINGS: There is a mostly calcified meningioma within the upper left parietal convexity measuring 1.7 x 1.5 cm and also calcified meningioma measuring 1.1 x 8.2 cm overlying the inferolateral right frontal lobe. No evidence of associated vasogenic  edema or mass effect. There is no evidence of midline shift, hydrocephalus, or acute intracranial hemorrhage. Gray-white matter differentiation is maintained.   There is a small amount of fluid in the caudal mastoid air cells. There is mild to moderate polypoid mucosal thickening of the left maxillary sinus and anterior ethmoid air cells. These findings are unchanged.   CTA HEAD FINDINGS:   Anterior circulation: The bilateral intracranial internal carotid arteries, bilateral carotid terminals, bilateral proximal anterior and middle cerebral arteries are normal.   Posterior circulation: Bilateral intracranial vertebral arteries, vertebrobasilar junction, basilar artery and proximal posterior cerebral arteries are normal.   Venous contamination limits evaluation for small aneurysms, without gross evidence of intracranial aneurysm.   CTA NECK FINDINGS:   Right carotid vessels: There are mild atherosclerotic calcifications of the carotid bifurcation with 0% stenosis by NASCET criteria. The remainder of the right internal cervical carotid artery is widely patent without focal stenosis.   Left carotid vessels: The common carotid artery is normal. The carotid bifurcation is normal. The internal carotid artery in the neck is normal. There is 0% stenosis by NASCET criteria. .   Vertebral vessels:  The visualized segments of the cervical vertebral arteries are normal in caliber.   There is redemonstration of multifocal nodular and ground-glass opacities in the visualized upper lung fields suspicious for multifocal pneumonia. There is also small right pleural effusion partially visualized.       No evidence of acute cortical infarct or acute intracranial hemorrhage. There are calcified meningiomas noted overlying the upper left frontoparietal convexity and overlying the inferolateral right frontal lobe without evidence of significant mass effect or vasogenic edema. No interval change.   No evidence for significant stenosis of  the cervical vessels.   No evidence for significant stenosis or large branch vessel cutoffs of the intracranial vessels.   Partially visualized ground-glass and nodular opacities in the upper lung fields suspicious for multifocal pneumonia for example viral pneumonia.   MACRO:   None   Signed by: Jaxon Fernandez 1/7/2024 2:06 AM Dictation workstation:   [id number]    CT angio brain attack head w IV contrast and post procedure    Result Date: 1/6/2024  Interpreted By:  Jazmyne Teresa, STUDY: CT ANGIO BRAIN ATTACK HEAD W IV CONTRAST AND POST PROCEDURE; CT ANGIO BRAIN ATTACK NECK W IV CONTRAST AND POST PROCEDURE;  1/6/2024 5:16 pm   INDICATION: Signs/Symptoms:AMS.   COMPARISON: None.   ACCESSION NUMBER(S): [id number]; [id number]   ORDERING CLINICIAN: LYNN FLORES   TECHNIQUE: 75 mL Omnipaque 350 was administered intravenously and axial images of the head and neck were acquired.  Coronal, sagittal, and 3-D reconstructions were provided for review.   The technologist notes the patient had multiple IV malfunctions.   FINDINGS: CT head: Please see CT head performed the same day for intracranial findings.   CTA HEAD FINDINGS:   The examination is degraded by venous contamination.   Anterior circulation: The bilateral intracranial internal carotid arteries, bilateral carotid terminals, bilateral proximal anterior and middle cerebral arteries are patent.   Posterior circulation: Bilateral intracranial vertebral arteries, vertebrobasilar junction, basilar artery and proximal posterior cerebral arteries are patent.   CTA NECK FINDINGS:   The examination is limited by the timing of scan relative to the contrast injection.   Mild atherosclerotic calcification along the aortic arch. Within the limitation of patient motion artifact no significant stenosis at the origins of the great vessels.   Right carotid vessels: The common carotid artery is patent. Mild calcified plaque at the carotid bifurcation without significant  stenosis by NASCET criteria. The internal carotid artery in the neck is patent without significant stenosis.   Left carotid vessels: The common carotid artery is patent.. The carotid bifurcation is patent without significant stenosis. The internal carotid artery in the neck is patent without significant stenosis.   Vertebral vessels:  The visualized segments of the cervical vertebral arteries are patent.   The left thyroid lobe is slightly asymmetrically larger than the right with a calcification. Please see thyroid ultrasound 07/24/2023 for further details. Incidental note of multiple tonsilloliths. There is prominent ossification of the stylohyoid ligament bilaterally. Soft tissues of the neck are otherwise unremarkable.   There are patchy opacities within the visualized upper lungs bilaterally concerning for multifocal pneumonia. There is a small pleural effusion on the right. Mild degenerative changes of the cervical spine.       The examination is degraded by venous contamination, patient motion artifact and difficulties with the IV. Within this limitation:   CTA neck:   No evidence for significant stenosis of the cervical vessels.   Patchy opacities within the upper lungs bilaterally concerning for multifocal pneumonia. There is a small pleural effusion on the right.   CTA head:   No evidence for significant stenosis or large branch vessel cutoffs of the intracranial vessels.   Please see CT head performed the same day for intracranial findings.   MACRO: None   Signed by: Jazmyne Teresa 1/6/2024 5:34 PM Dictation workstation:   [id number]    CT angio brain attack neck w IV contrast and post procedure    Result Date: 1/6/2024  Interpreted By:  Jazmyne Teresa, STUDY: CT ANGIO BRAIN ATTACK HEAD W IV CONTRAST AND POST PROCEDURE; CT ANGIO BRAIN ATTACK NECK W IV CONTRAST AND POST PROCEDURE;  1/6/2024 5:16 pm   INDICATION: Signs/Symptoms:AMS.   COMPARISON: None.   ACCESSION NUMBER(S): [id number]; [id number]    ORDERING CLINICIAN: LYNN FLORES   TECHNIQUE: 75 mL Omnipaque 350 was administered intravenously and axial images of the head and neck were acquired.  Coronal, sagittal, and 3-D reconstructions were provided for review.   The technologist notes the patient had multiple IV malfunctions.   FINDINGS: CT head: Please see CT head performed the same day for intracranial findings.   CTA HEAD FINDINGS:   The examination is degraded by venous contamination.   Anterior circulation: The bilateral intracranial internal carotid arteries, bilateral carotid terminals, bilateral proximal anterior and middle cerebral arteries are patent.   Posterior circulation: Bilateral intracranial vertebral arteries, vertebrobasilar junction, basilar artery and proximal posterior cerebral arteries are patent.   CTA NECK FINDINGS:   The examination is limited by the timing of scan relative to the contrast injection.   Mild atherosclerotic calcification along the aortic arch. Within the limitation of patient motion artifact no significant stenosis at the origins of the great vessels.   Right carotid vessels: The common carotid artery is patent. Mild calcified plaque at the carotid bifurcation without significant stenosis by NASCET criteria. The internal carotid artery in the neck is patent without significant stenosis.   Left carotid vessels: The common carotid artery is patent.. The carotid bifurcation is patent without significant stenosis. The internal carotid artery in the neck is patent without significant stenosis.   Vertebral vessels:  The visualized segments of the cervical vertebral arteries are patent.   The left thyroid lobe is slightly asymmetrically larger than the right with a calcification. Please see thyroid ultrasound 07/24/2023 for further details. Incidental note of multiple tonsilloliths. There is prominent ossification of the stylohyoid ligament bilaterally. Soft tissues of the neck are otherwise unremarkable.   There are  patchy opacities within the visualized upper lungs bilaterally concerning for multifocal pneumonia. There is a small pleural effusion on the right. Mild degenerative changes of the cervical spine.       The examination is degraded by venous contamination, patient motion artifact and difficulties with the IV. Within this limitation:   CTA neck:   No evidence for significant stenosis of the cervical vessels.   Patchy opacities within the upper lungs bilaterally concerning for multifocal pneumonia. There is a small pleural effusion on the right.   CTA head:   No evidence for significant stenosis or large branch vessel cutoffs of the intracranial vessels.   Please see CT head performed the same day for intracranial findings.   MACRO: None   Signed by: Jazmyne Teresa 1/6/2024 5:34 PM Dictation workstation:   [id number]    CT brain attack head wo IV contrast    Result Date: 1/6/2024  Interpreted By:  Richie Wren, STUDY: CT BRAIN ATTACK HEAD WO IV CONTRAST;  1/6/2024 4:37 pm   INDICATION: Signs/Symptoms:AMS.   COMPARISON: 11/18/2012   ACCESSION NUMBER(S): [id number]   ORDERING CLINICIAN: LYNN FLORES   TECHNIQUE: Noncontrast axial CT images of head were obtained with coronal and sagittal reconstructed images.   FINDINGS: BRAIN PARENCHYMA: Mild periventricular and subcortical hemispheric white matter hypodensities are most compatible with chronic small vessel ischemic disease. No acute intraparenchymal hemorrhage or parenchymal evidence of acute large territory ischemic infarct. No mass-effect. Gray-white matter distinction is preserved.   VENTRICLES and EXTRA-AXIAL SPACES: There is a 1.5 cm calcified meningioma arising from the left parasagittal frontal parietal dura. There is another 1.3 cm more densely calcified meningioma arising the right frontotemporal dura. No acute extra-axial or intraventricular hemorrhage. No effacement of cerebral sulci. Ventricles and sulci are age-concordant. There is a partial empty  sella.   PARANASAL SINUSES/MASTOIDS: Trace fluid left mastoid air cells. No hemorrhage or air-fluid levels within the visualized paranasal sinuses. The mastoids are well aerated.   CALVARIUM/ORBITS:  No skull fracture.  The orbits and globes are intact to the extent visualized.   EXTRACRANIAL SOFT TISSUES: No discernible abnormality.       1. No evidence of acute intracranial hemorrhage, mass effect, or parenchymal evidence of an acute large territory ischemic infarct.   2. Calcified meningiomas rising from the left parasagittal frontal parietal dura and right frontotemporal dura measuring 1.5 cm and 1.3 cm, respectively. No significant mass effect.     MACRO: Richie Wren discussed the significance and urgency of this critical finding by EPIC HAIKU with  LYNN FLORES on 1/6/2024 at 4:50 p.m. with confirmation of receipt. (**-RCF-**) Findings:  See findings.   Signed by: Richie Wren 1/6/2024 4:53 PM Dictation workstation:   [id number]    ECG 12 lead    Result Date: 1/3/2024  Atrial fibrillation with rapid ventricular response Low voltage QRS Cannot rule out Anterior infarct , age undetermined Abnormal ECG When compared with ECG of 13-DEC-2023 09:49, Atrial fibrillation has replaced Sinus rhythm See ED provider note for full interpretation and clinical correlation Confirmed by Jamir Salvador ([id number]) on 1/3/2024 10:49:37 PM    XR chest 1 view    Result Date: 1/2/2024  Interpreted By:  Soren Ham, STUDY: XR CHEST 1 VIEW;  1/2/2024 7:26 pm   INDICATION: Signs/Symptoms:sob.   COMPARISON: 9/26/2023   ACCESSION NUMBER(S): [id number]   ORDERING CLINICIAN: CARMEN CONKLIN   FINDINGS: The cardiac silhouette is stable in size. There are bilateral opacities concerning for infiltrates. Small right pleural effusion and basilar atelectasis or airspace disease. No pneumothorax.       Small right pleural effusion and basilar atelectasis or airspace disease and mild bilateral opacities concerning for infiltrates.    MACRO: None   Signed by: Soren Ham 1/2/2024 7:38 PM Dictation workstation:   [id number]    CT chest wo IV contrast    Result Date: 12/26/2023  Interpreted By:  Richie Wren, STUDY: CT CHEST WO IV CONTRAST;  12/26/2023 6:03 pm   INDICATION: Signs/Symptoms:cough.   COMPARISON: 09/22/2023 CT chest   ACCESSION NUMBER(S): [id number]   ORDERING CLINICIAN: JANNET VEE   TECHNIQUE: Contiguous axial images of the chest and upper abdomen were obtained without contrast. Coronal and sagittal reformatted images were reconstructed from the axial data.   FINDINGS:     MEDIASTINUM AND LYMPH NODES:  The esophagus appears within normal limits.  No enlarged intrathoracic or axillary lymph nodes by imaging criteria. No pneumomediastinum.   VESSELS:  Normal caliber thoracic aorta. Mild aortic atherosclerosis. The pulmonary artery is enlarged, measuring up to 3.6 cm, indicative of pulmonary hypertension.   HEART: There is left atrial dilatation. Mitral annular calcifications. Mild coronary artery calcifications. No significant pericardial effusion.   LUNG, AIRWAYS, AND PLEURA: New small bilateral pleural effusions with adjacent passive atelectasis. There is new subsegmental atelectasis in the right middle lobe. There is a small amount debris in the lower trachea extending into the mainstem bronchi and bronchus intermedius. There is a small opacity in the left upper lobe and superior segment of left lower lobe consisting of tree-in-bud nodules suspicious for pneumonia the   OSSEOUS STRUCTURES: No acute osseous abnormality.   CHEST WALL SOFT TISSUES: No discernible abnormality.   UPPER ABDOMEN/OTHER: Multiple peripherally calcified splenic artery aneurysm measuring 1.3 cm, 1.3 cm, and 2.8 cm are similar to prior study. The liver appears cirrhotic.       1. Tree-in-bud opacities in the posterior left upper lobe and superior segment of the left lower lobe consistent with bronchiolitis/early pneumonia.   2. Small bilateral  pleural effusions with adjacent passive atelectasis and right middle lobe subsegmental atelectasis.   3. Small amount of debris in the trachea and bilateral proximal bronchi that could be secretions or aspiration.   4. Three splenic artery aneurysm measuring up to 2.8 cm, unchanged.   MACRO: None.   Signed by: Richie Wren 12/26/2023 6:29 PM Dictation workstation:   [id number]    ECG 12 lead (Clinic Performed)    Result Date: 12/16/2023  Atrial fibrillation Poor R-wave progression Abnormal ECG When compared with ECG of 25-SEP-2023 10:00, Nonspecific T wave abnormality, improved in Inferior leads Confirmed by Raymon Beaulieu ([id number]) on 12/16/2023 11:49:53 PM    ECG 12 Lead    Result Date: 12/14/2023  Atrial fibrillation Low voltage QRS Abnormal ECG When compared with ECG of 11-DEC-2023 15:22, (unconfirmed) No significant change was found Confirmed by Zaki Stauffer ([id number]) on 12/14/2023 5:32:09 PM    ECG 12 lead    Result Date: 12/14/2023  Sinus rhythm with Premature supraventricular complexes Low voltage QRS Borderline ECG When compared with ECG of 13-DEC-2023 07:33, (unconfirmed) Sinus rhythm has replaced Atrial fibrillation Confirmed by Zaki Stauffer ([id number]) on 12/14/2023 5:31:33 PM    ECG 12 Lead    Result Date: 12/14/2023  Sinus rhythm with Premature atrial complexes Low voltage QRS Borderline ECG When compared with ECG of 13-DEC-2023 08:45, (unconfirmed) No significant change was found Confirmed by Zaki Stauffer ([id number]) on 12/14/2023 5:31:14 PM      Assessment/Plan   Principal Problem:    RSV (respiratory syncytial virus infection)  Active Problems:    Shortness of breath    74 yo women admitted with Acute on chronic hypoxic respiratory failure 2/2 RSV, CAP, COPD in acute exacerbation  Encephalopathy 2/2 infection vs Afib with RVR  Chronic Afib in RVR on warfarin  HTN, HLD  HFpEF, acute on chronic 2/2 above  Type II DM with neuropathy  Anemia  Morbid Obesity  History of noncompliance     Plan:  Remains in  NS[facility] post DCCV yesterday and rate controlled  Continue Amiodarone  mg BID for at least 3 weeks  Continue all medication as prescribed including PO Furosemide, consider IV dosing  daily PRN  Recommend Furosemide 20 mg IVP x1 today  Supportive treatment per primary team for underlying illness  Plan for OP follow up with primary cardiologist and referral to EP, Appointment with Dr. Siegel 1/15/2024 already arranged.      Thank you for the opportunity to participate in the care of your patient. Will follow.   Do not hesitate to call if you have any questions.    Discussed with JOSELITO Malhotra-JEAN CARLOS Sams, JOSELITO-CNP

## 2024-01-09 NOTE — PROGRESS NOTES
Physical Therapy                 Therapy Communication Note    Patient Name: Frannie Blanco  MRN: [id number]  Today's Date: 1/9/2024     Discipline: Physical Therapy    Missed Visit Reason: Missed Visit Reason:  (Attempted PT with patient, patients pulse ox in the 80's. Waited for recovery, however, patient remained in the 80's, informed nurse and RT. Will inform supervising therapist.)    Missed Time: Attempt 0945

## 2024-01-09 NOTE — CARE PLAN
Possible postviral pneumonia-negative MRSA PCR  RSV  Acute on chronic respiratory failure      Repeat sputum culture  Continue Zosyn  Oxygen as needed  Supportive care  Monitor temperature and WBC

## 2024-01-09 NOTE — PROGRESS NOTES
"Frannie Blanco is a 73 y.o. female on day 5 of admission presenting with RSV (respiratory syncytial virus infection).    Subjective   Patient does report feeling better, continues to have extreme SOB with activity, also noted to have a moist non-productive.  Patient to continue current treatment recommendations and will discharge to [facility] once medically stable.         Objective     Physical Exam  Constitutional:       Appearance: She is obese.   HENT:      Head: Normocephalic and atraumatic.      Nose: Nose normal.      Mouth/Throat:      Mouth: Mucous membranes are moist.   Eyes:      Extraocular Movements: Extraocular movements intact.   Cardiovascular:      Rate and Rhythm: Normal rate and regular rhythm.      Pulses: Normal pulses.      Heart sounds: Normal heart sounds.   Pulmonary:      Effort: Pulmonary effort is normal.      Breath sounds: Wheezing present.   Abdominal:      General: Bowel sounds are normal.      Palpations: Abdomen is soft.   Musculoskeletal:      Cervical back: Normal range of motion.      Right lower leg: Edema present.      Left lower leg: Edema present.      Comments: 2+ pitting   Skin:     General: Skin is warm and dry.      Capillary Refill: Capillary refill takes less than 2 seconds.   Neurological:      Mental Status: She is alert. Mental status is at baseline.      Motor: Weakness present.      Gait: Gait abnormal.   Psychiatric:         Mood and Affect: Mood normal.         Behavior: Behavior normal.         Last Recorded Vitals  Blood pressure 131/58, pulse 87, temperature 36 °C (96.8 °F), resp. rate 23, height 1.676 m (5' 5.98\"), weight 149 kg (327 lb 9.7 oz), SpO2 (!) 89 %.  Intake/Output last 3 Shifts:  I/O last 3 completed shifts:  In: 1666 (11.2 mL/kg) [P.O.:800; I.V.:424 (2.9 mL/kg); IV Piggyback:442]  Out: 3650 (24.6 mL/kg) [Urine:3650 (0.7 mL/kg/hr)]  Weight: 148.6 kg     Relevant Results  Scheduled medications  amiodarone, 200 mg, oral, TID  aspirin, 81 mg, " oral, Daily  atorvastatin, 20 mg, oral, Nightly  calcium carbonate, 1,500 mg, oral, q12h  dapsone, 100 mg, oral, Daily before breakfast  [Held by provider] dilTIAZem CD, 300 mg, oral, Daily  docusate sodium, 100 mg, oral, BID  ferrous sulfate (325 mg ferrous sulfate), 65 mg of iron, oral, Daily  tiotropium, 2 Inhalation, inhalation, Daily   And  fluticasone furoate-vilanteroL, 1 puff, inhalation, Daily  furosemide, 80 mg, oral, Daily  gabapentin, 200 mg, oral, BID  glimepiride, 2 mg, oral, Daily with breakfast  guaiFENesin, 600 mg, oral, BID  insulin lispro, 0-5 Units, subcutaneous, TID with meals  ipratropium-albuteroL, 3 mL, nebulization, TID  losartan, 12.5 mg, oral, Daily  [Held by provider] melatonin, 6 mg, oral, Daily  metFORMIN, 1,000 mg, oral, BID with meals  pantoprazole, 40 mg, oral, Daily before breakfast   Or  pantoprazole, 40 mg, intravenous, Daily before breakfast  piperacillin-tazobactam, 4.5 g, intravenous, q6h  polyethylene glycol, 17 g, oral, Daily  predniSONE, 40 mg, oral, Daily  topiramate, 100 mg, oral, BID  [Held by provider] warfarin, 5 mg, oral, Daily      Continuous medications     PRN medications  PRN medications: acetaminophen **OR** acetaminophen **OR** acetaminophen, acetaminophen **OR** acetaminophen **OR** acetaminophen, albuterol, benzocaine-menthoL, bisacodyl, dextromethorphan-guaifenesin, dextrose 10 % in water (D10W), dextrose, glucagon, metoprolol, ondansetron ODT **OR** ondansetron, oxygen    Results for orders placed or performed during the hospital encounter of 01/02/24 (from the past 24 hour(s))   POCT GLUCOSE   Result Value Ref Range    POCT Glucose 113 (H) 74 - 99 mg/dL   ECG 12 lead   Result Value Ref Range    Ventricular Rate 92 BPM    Atrial Rate 92 BPM    AZ Interval 142 ms    QRS Duration 96 ms    QT Interval 364 ms    QTC Calculation(Bazett) 450 ms    P Axis 64 degrees    R Axis -22 degrees    T Axis 58 degrees    QRS Count 15 beats    Q Onset 217 ms    P Onset 146 ms     P Offset 203 ms    T Offset 399 ms    QTC Fredericia 419 ms   POCT GLUCOSE   Result Value Ref Range    POCT Glucose 122 (H) 74 - 99 mg/dL   Protime-INR   Result Value Ref Range    Protime 32.1 (H) 9.8 - 12.8 seconds    INR 2.8 (H) 0.9 - 1.1   POCT GLUCOSE   Result Value Ref Range    POCT Glucose 144 (H) 74 - 99 mg/dL   POCT GLUCOSE   Result Value Ref Range    POCT Glucose 224 (H) 74 - 99 mg/dL   Basic metabolic panel   Result Value Ref Range    Glucose 94 74 - 99 mg/dL    Sodium 146 (H) 136 - 145 mmol/L    Potassium 3.5 3.5 - 5.3 mmol/L    Chloride 102 98 - 107 mmol/L    Bicarbonate 35 (H) 21 - 32 mmol/L    Anion Gap 13 10 - 20 mmol/L    Urea Nitrogen 38 (H) 6 - 23 mg/dL    Creatinine 0.87 0.50 - 1.05 mg/dL    eGFR 71 >60 mL/min/1.73m*2    Calcium 8.2 (L) 8.6 - 10.3 mg/dL   CBC and Auto Differential   Result Value Ref Range    WBC 10.8 4.4 - 11.3 x10*3/uL    nRBC 0.5 (H) 0.0 - 0.0 /100 WBCs    RBC 2.70 (L) 4.00 - 5.20 x10*6/uL    Hemoglobin 8.6 (L) 12.0 - 16.0 g/dL    Hematocrit 30.2 (L) 36.0 - 46.0 %     (H) 80 - 100 fL    MCH 31.9 26.0 - 34.0 pg    MCHC 28.5 (L) 32.0 - 36.0 g/dL    RDW 16.6 (H) 11.5 - 14.5 %    Platelets 188 150 - 450 x10*3/uL    Neutrophils % 82.7 40.0 - 80.0 %    Immature Granulocytes %, Automated 0.9 0.0 - 0.9 %    Lymphocytes % 10.8 13.0 - 44.0 %    Monocytes % 4.8 2.0 - 10.0 %    Eosinophils % 0.7 0.0 - 6.0 %    Basophils % 0.1 0.0 - 2.0 %    Neutrophils Absolute 8.92 (H) 1.60 - 5.50 x10*3/uL    Immature Granulocytes Absolute, Automated 0.10 0.00 - 0.50 x10*3/uL    Lymphocytes Absolute 1.16 0.80 - 3.00 x10*3/uL    Monocytes Absolute 0.52 0.05 - 0.80 x10*3/uL    Eosinophils Absolute 0.08 0.00 - 0.40 x10*3/uL    Basophils Absolute 0.01 0.00 - 0.10 x10*3/uL   Protime-INR   Result Value Ref Range    Protime 34.4 (H) 9.8 - 12.8 seconds    INR 3.0 (H) 0.9 - 1.1   POCT GLUCOSE   Result Value Ref Range    POCT Glucose 61 (L) 74 - 99 mg/dL   POCT GLUCOSE   Result Value Ref Range    POCT  Glucose 114 (H) 74 - 99 mg/dL     ECG 12 lead    Result Date: 1/9/2024  Atrial fibrillation with rapid ventricular response Low voltage QRS Cannot rule out Anterior infarct , age undetermined Abnormal ECG When compared with ECG of 07-JAN-2024 00:28, (unconfirmed) No significant change was found    ECG 12 lead    Result Date: 1/9/2024  Atrial fibrillation with rapid ventricular response Abnormal ECG When compared with ECG of 02-JAN-2024 17:57, No significant change was found    ECG 12 lead    Result Date: 1/8/2024  Sinus rhythm with Premature atrial complexes with Aberrant conduction Cannot rule out Anterior infarct (cited on or before 02-JAN-2024) Abnormal ECG When compared with ECG of 02-JAN-2024 17:57, Sinus rhythm has replaced Atrial fibrillation    XR chest 1 view    Result Date: 1/7/2024  Interpreted By:  Mohsen Law, STUDY: XR CHEST 1 VIEW;  1/7/2024 4:04 pm   INDICATION: Signs/Symptoms:worsening pneumonia.   COMPARISON: Chest x-ray 01/02/2024   ACCESSION NUMBER(S): [id number]   ORDERING CLINICIAN: ALBERTO PICKETT   FINDINGS: Multiple overlying leads are present.   CARDIOMEDIASTINAL SILHOUETTE: Cardiomediastinal silhouette is stable in size and configuration. Dense mitral annular calcifications noted.   LUNGS: There are bilateral patchy airspace opacities with slight interval improvement in the right lung base and slight interval worsening on the left. These findings may relate to developing edema versus infection. Small bilateral pleural effusions. No pneumothorax.   ABDOMEN: No remarkable upper abdominal findings.   BONES: Degenerative changes of the spine and bilateral shoulders.       There are patchy bilateral airspace opacities with slight interval worsening on the and slight interval improvement on the right. Findings may relate to edema versus infection. Attention on continued short-term follow-up is advised.   Small bilateral pleural effusions are noted with slight interval improvement on the  right.   MACRO: None   Signed by: Mohsen Lwa 1/7/2024 4:18 PM Dictation workstation:   [id number]    CT head wo IV contrast    Result Date: 1/7/2024  Interpreted By:  Jaxon Fernandez, STUDY: CT ANGIO HEAD AND NECK W AND WO IV CONTRAST; CT HEAD WO IV CONTRAST; 1/7/2024 1:20 am; 1/7/2024 1:10 am   INDICATION: Signs/Symptoms:Confusion.   COMPARISON: CT and CT angiogram dated 01/06/2024.   ACCESSION NUMBER(S): [id number]; [id number]   ORDERING CLINICIAN: JAC SANTOS   TECHNIQUE: Unenhanced CT images of the head were obtained. Subsequently,  75 mL Omnipaque 350 was administered intravenously and axial images of the head and neck were acquired.  Coronal, sagittal, and 3-D reconstructions were provided for review.   FINDINGS: There is a mostly calcified meningioma within the upper left parietal convexity measuring 1.7 x 1.5 cm and also calcified meningioma measuring 1.1 x 8.2 cm overlying the inferolateral right frontal lobe. No evidence of associated vasogenic edema or mass effect. There is no evidence of midline shift, hydrocephalus, or acute intracranial hemorrhage. Gray-white matter differentiation is maintained.   There is a small amount of fluid in the caudal mastoid air cells. There is mild to moderate polypoid mucosal thickening of the left maxillary sinus and anterior ethmoid air cells. These findings are unchanged.   CTA HEAD FINDINGS:   Anterior circulation: The bilateral intracranial internal carotid arteries, bilateral carotid terminals, bilateral proximal anterior and middle cerebral arteries are normal.   Posterior circulation: Bilateral intracranial vertebral arteries, vertebrobasilar junction, basilar artery and proximal posterior cerebral arteries are normal.   Venous contamination limits evaluation for small aneurysms, without gross evidence of intracranial aneurysm.   CTA NECK FINDINGS:   Right carotid vessels: There are mild atherosclerotic calcifications of the carotid bifurcation with 0%  stenosis by NASCET criteria. The remainder of the right internal cervical carotid artery is widely patent without focal stenosis.   Left carotid vessels: The common carotid artery is normal. The carotid bifurcation is normal. The internal carotid artery in the neck is normal. There is 0% stenosis by NASCET criteria. .   Vertebral vessels:  The visualized segments of the cervical vertebral arteries are normal in caliber.   There is redemonstration of multifocal nodular and ground-glass opacities in the visualized upper lung fields suspicious for multifocal pneumonia. There is also small right pleural effusion partially visualized.       No evidence of acute cortical infarct or acute intracranial hemorrhage. There are calcified meningiomas noted overlying the upper left frontoparietal convexity and overlying the inferolateral right frontal lobe without evidence of significant mass effect or vasogenic edema. No interval change.   No evidence for significant stenosis of the cervical vessels.   No evidence for significant stenosis or large branch vessel cutoffs of the intracranial vessels.   Partially visualized ground-glass and nodular opacities in the upper lung fields suspicious for multifocal pneumonia for example viral pneumonia.   MACRO:   None   Signed by: Jaxon Fernandez 1/7/2024 2:06 AM Dictation workstation:   [id number]    CT angio head and neck w and wo IV contrast    Result Date: 1/7/2024  Interpreted By:  Jaxon Fernandez, STUDY: CT ANGIO HEAD AND NECK W AND WO IV CONTRAST; CT HEAD WO IV CONTRAST; 1/7/2024 1:20 am; 1/7/2024 1:10 am   INDICATION: Signs/Symptoms:Confusion.   COMPARISON: CT and CT angiogram dated 01/06/2024.   ACCESSION NUMBER(S): [id number]; [id number]   ORDERING CLINICIAN: JAC SANTOS   TECHNIQUE: Unenhanced CT images of the head were obtained. Subsequently,  75 mL Omnipaque 350 was administered intravenously and axial images of the head and neck were acquired.  Coronal, sagittal, and  3-D reconstructions were provided for review.   FINDINGS: There is a mostly calcified meningioma within the upper left parietal convexity measuring 1.7 x 1.5 cm and also calcified meningioma measuring 1.1 x 8.2 cm overlying the inferolateral right frontal lobe. No evidence of associated vasogenic edema or mass effect. There is no evidence of midline shift, hydrocephalus, or acute intracranial hemorrhage. Gray-white matter differentiation is maintained.   There is a small amount of fluid in the caudal mastoid air cells. There is mild to moderate polypoid mucosal thickening of the left maxillary sinus and anterior ethmoid air cells. These findings are unchanged.   CTA HEAD FINDINGS:   Anterior circulation: The bilateral intracranial internal carotid arteries, bilateral carotid terminals, bilateral proximal anterior and middle cerebral arteries are normal.   Posterior circulation: Bilateral intracranial vertebral arteries, vertebrobasilar junction, basilar artery and proximal posterior cerebral arteries are normal.   Venous contamination limits evaluation for small aneurysms, without gross evidence of intracranial aneurysm.   CTA NECK FINDINGS:   Right carotid vessels: There are mild atherosclerotic calcifications of the carotid bifurcation with 0% stenosis by NASCET criteria. The remainder of the right internal cervical carotid artery is widely patent without focal stenosis.   Left carotid vessels: The common carotid artery is normal. The carotid bifurcation is normal. The internal carotid artery in the neck is normal. There is 0% stenosis by NASCET criteria. .   Vertebral vessels:  The visualized segments of the cervical vertebral arteries are normal in caliber.   There is redemonstration of multifocal nodular and ground-glass opacities in the visualized upper lung fields suspicious for multifocal pneumonia. There is also small right pleural effusion partially visualized.       No evidence of acute cortical infarct  or acute intracranial hemorrhage. There are calcified meningiomas noted overlying the upper left frontoparietal convexity and overlying the inferolateral right frontal lobe without evidence of significant mass effect or vasogenic edema. No interval change.   No evidence for significant stenosis of the cervical vessels.   No evidence for significant stenosis or large branch vessel cutoffs of the intracranial vessels.   Partially visualized ground-glass and nodular opacities in the upper lung fields suspicious for multifocal pneumonia for example viral pneumonia.   MACRO:   None   Signed by: Jaxon Fernandez 1/7/2024 2:06 AM Dictation workstation:   [id number]    CT angio brain attack head w IV contrast and post procedure    Result Date: 1/6/2024  Interpreted By:  Jazmyne Teresa, STUDY: CT ANGIO BRAIN ATTACK HEAD W IV CONTRAST AND POST PROCEDURE; CT ANGIO BRAIN ATTACK NECK W IV CONTRAST AND POST PROCEDURE;  1/6/2024 5:16 pm   INDICATION: Signs/Symptoms:AMS.   COMPARISON: None.   ACCESSION NUMBER(S): [id number]; [id number]   ORDERING CLINICIAN: LYNN FLORES   TECHNIQUE: 75 mL Omnipaque 350 was administered intravenously and axial images of the head and neck were acquired.  Coronal, sagittal, and 3-D reconstructions were provided for review.   The technologist notes the patient had multiple IV malfunctions.   FINDINGS: CT head: Please see CT head performed the same day for intracranial findings.   CTA HEAD FINDINGS:   The examination is degraded by venous contamination.   Anterior circulation: The bilateral intracranial internal carotid arteries, bilateral carotid terminals, bilateral proximal anterior and middle cerebral arteries are patent.   Posterior circulation: Bilateral intracranial vertebral arteries, vertebrobasilar junction, basilar artery and proximal posterior cerebral arteries are patent.   CTA NECK FINDINGS:   The examination is limited by the timing of scan relative to the contrast injection.    Mild atherosclerotic calcification along the aortic arch. Within the limitation of patient motion artifact no significant stenosis at the origins of the great vessels.   Right carotid vessels: The common carotid artery is patent. Mild calcified plaque at the carotid bifurcation without significant stenosis by NASCET criteria. The internal carotid artery in the neck is patent without significant stenosis.   Left carotid vessels: The common carotid artery is patent.. The carotid bifurcation is patent without significant stenosis. The internal carotid artery in the neck is patent without significant stenosis.   Vertebral vessels:  The visualized segments of the cervical vertebral arteries are patent.   The left thyroid lobe is slightly asymmetrically larger than the right with a calcification. Please see thyroid ultrasound 07/24/2023 for further details. Incidental note of multiple tonsilloliths. There is prominent ossification of the stylohyoid ligament bilaterally. Soft tissues of the neck are otherwise unremarkable.   There are patchy opacities within the visualized upper lungs bilaterally concerning for multifocal pneumonia. There is a small pleural effusion on the right. Mild degenerative changes of the cervical spine.       The examination is degraded by venous contamination, patient motion artifact and difficulties with the IV. Within this limitation:   CTA neck:   No evidence for significant stenosis of the cervical vessels.   Patchy opacities within the upper lungs bilaterally concerning for multifocal pneumonia. There is a small pleural effusion on the right.   CTA head:   No evidence for significant stenosis or large branch vessel cutoffs of the intracranial vessels.   Please see CT head performed the same day for intracranial findings.   MACRO: None   Signed by: Jazmyne Teresa 1/6/2024 5:34 PM Dictation workstation:   [id number]    CT angio brain attack neck w IV contrast and post procedure    Result Date:  1/6/2024  Interpreted By:  Jazmyne Teresa, STUDY: CT ANGIO BRAIN ATTACK HEAD W IV CONTRAST AND POST PROCEDURE; CT ANGIO BRAIN ATTACK NECK W IV CONTRAST AND POST PROCEDURE;  1/6/2024 5:16 pm   INDICATION: Signs/Symptoms:AMS.   COMPARISON: None.   ACCESSION NUMBER(S): [id number]; [id number]   ORDERING CLINICIAN: LYNN FLORES   TECHNIQUE: 75 mL Omnipaque 350 was administered intravenously and axial images of the head and neck were acquired.  Coronal, sagittal, and 3-D reconstructions were provided for review.   The technologist notes the patient had multiple IV malfunctions.   FINDINGS: CT head: Please see CT head performed the same day for intracranial findings.   CTA HEAD FINDINGS:   The examination is degraded by venous contamination.   Anterior circulation: The bilateral intracranial internal carotid arteries, bilateral carotid terminals, bilateral proximal anterior and middle cerebral arteries are patent.   Posterior circulation: Bilateral intracranial vertebral arteries, vertebrobasilar junction, basilar artery and proximal posterior cerebral arteries are patent.   CTA NECK FINDINGS:   The examination is limited by the timing of scan relative to the contrast injection.   Mild atherosclerotic calcification along the aortic arch. Within the limitation of patient motion artifact no significant stenosis at the origins of the great vessels.   Right carotid vessels: The common carotid artery is patent. Mild calcified plaque at the carotid bifurcation without significant stenosis by NASCET criteria. The internal carotid artery in the neck is patent without significant stenosis.   Left carotid vessels: The common carotid artery is patent.. The carotid bifurcation is patent without significant stenosis. The internal carotid artery in the neck is patent without significant stenosis.   Vertebral vessels:  The visualized segments of the cervical vertebral arteries are patent.   The left thyroid lobe is slightly  asymmetrically larger than the right with a calcification. Please see thyroid ultrasound 07/24/2023 for further details. Incidental note of multiple tonsilloliths. There is prominent ossification of the stylohyoid ligament bilaterally. Soft tissues of the neck are otherwise unremarkable.   There are patchy opacities within the visualized upper lungs bilaterally concerning for multifocal pneumonia. There is a small pleural effusion on the right. Mild degenerative changes of the cervical spine.       The examination is degraded by venous contamination, patient motion artifact and difficulties with the IV. Within this limitation:   CTA neck:   No evidence for significant stenosis of the cervical vessels.   Patchy opacities within the upper lungs bilaterally concerning for multifocal pneumonia. There is a small pleural effusion on the right.   CTA head:   No evidence for significant stenosis or large branch vessel cutoffs of the intracranial vessels.   Please see CT head performed the same day for intracranial findings.   MACRO: None   Signed by: Jazmyne Teresa 1/6/2024 5:34 PM Dictation workstation:   [id number]    CT brain attack head wo IV contrast    Result Date: 1/6/2024  Interpreted By:  Richie Wren, STUDY: CT BRAIN ATTACK HEAD WO IV CONTRAST;  1/6/2024 4:37 pm   INDICATION: Signs/Symptoms:AMS.   COMPARISON: 11/18/2012   ACCESSION NUMBER(S): [id number]   ORDERING CLINICIAN: LYNN FLORES   TECHNIQUE: Noncontrast axial CT images of head were obtained with coronal and sagittal reconstructed images.   FINDINGS: BRAIN PARENCHYMA: Mild periventricular and subcortical hemispheric white matter hypodensities are most compatible with chronic small vessel ischemic disease. No acute intraparenchymal hemorrhage or parenchymal evidence of acute large territory ischemic infarct. No mass-effect. Gray-white matter distinction is preserved.   VENTRICLES and EXTRA-AXIAL SPACES: There is a 1.5 cm calcified meningioma  arising from the left parasagittal frontal parietal dura. There is another 1.3 cm more densely calcified meningioma arising the right frontotemporal dura. No acute extra-axial or intraventricular hemorrhage. No effacement of cerebral sulci. Ventricles and sulci are age-concordant. There is a partial empty sella.   PARANASAL SINUSES/MASTOIDS: Trace fluid left mastoid air cells. No hemorrhage or air-fluid levels within the visualized paranasal sinuses. The mastoids are well aerated.   CALVARIUM/ORBITS:  No skull fracture.  The orbits and globes are intact to the extent visualized.   EXTRACRANIAL SOFT TISSUES: No discernible abnormality.       1. No evidence of acute intracranial hemorrhage, mass effect, or parenchymal evidence of an acute large territory ischemic infarct.   2. Calcified meningiomas rising from the left parasagittal frontal parietal dura and right frontotemporal dura measuring 1.5 cm and 1.3 cm, respectively. No significant mass effect.     MACRO: Richie Wren discussed the significance and urgency of this critical finding [facility]  LYNN FLORES on 1/6/2024 at 4:50 p.m. with confirmation of receipt. (**-RCF-**) Findings:  See findings.   Signed by: Richie Wren 1/6/2024 4:53 PM Dictation workstation:   [id number]    ECG 12 lead    Result Date: 1/3/2024  Atrial fibrillation with rapid ventricular response Low voltage QRS Cannot rule out Anterior infarct , age undetermined Abnormal ECG When compared with ECG of 13-DEC-2023 09:49, Atrial fibrillation has replaced Sinus rhythm See ED provider note for full interpretation and clinical correlation Confirmed by Jamir Salvador ([id number]) on 1/3/2024 10:49:37 PM    XR chest 1 view    Result Date: 1/2/2024  Interpreted By:  Soren Ham, STUDY: XR CHEST 1 VIEW;  1/2/2024 7:26 pm   INDICATION: Signs/Symptoms:sob.   COMPARISON: 9/26/2023   ACCESSION NUMBER(S): [id number]   ORDERING CLINICIAN: CARMEN CONKLIN   FINDINGS: The cardiac  silhouette is stable in size. There are bilateral opacities concerning for infiltrates. Small right pleural effusion and basilar atelectasis or airspace disease. No pneumothorax.       Small right pleural effusion and basilar atelectasis or airspace disease and mild bilateral opacities concerning for infiltrates.   MACRO: None   Signed by: Soren Ham 1/2/2024 7:38 PM Dictation workstation:   [id number]    CT chest wo IV contrast    Result Date: 12/26/2023  Interpreted By:  Richie Wren, STUDY: CT CHEST WO IV CONTRAST;  12/26/2023 6:03 pm   INDICATION: Signs/Symptoms:cough.   COMPARISON: 09/22/2023 CT chest   ACCESSION NUMBER(S): [id number]   ORDERING CLINICIAN: JANNET VEE   TECHNIQUE: Contiguous axial images of the chest and upper abdomen were obtained without contrast. Coronal and sagittal reformatted images were reconstructed from the axial data.   FINDINGS:     MEDIASTINUM AND LYMPH NODES:  The esophagus appears within normal limits.  No enlarged intrathoracic or axillary lymph nodes by imaging criteria. No pneumomediastinum.   VESSELS:  Normal caliber thoracic aorta. Mild aortic atherosclerosis. The pulmonary artery is enlarged, measuring up to 3.6 cm, indicative of pulmonary hypertension.   HEART: There is left atrial dilatation. Mitral annular calcifications. Mild coronary artery calcifications. No significant pericardial effusion.   LUNG, AIRWAYS, AND PLEURA: New small bilateral pleural effusions with adjacent passive atelectasis. There is new subsegmental atelectasis in the right middle lobe. There is a small amount debris in the lower trachea extending into the mainstem bronchi and bronchus intermedius. There is a small opacity in the left upper lobe and superior segment of left lower lobe consisting of tree-in-bud nodules suspicious for pneumonia the   OSSEOUS STRUCTURES: No acute osseous abnormality.   CHEST WALL SOFT TISSUES: No discernible abnormality.   UPPER ABDOMEN/OTHER: Multiple  peripherally calcified splenic artery aneurysm measuring 1.3 cm, 1.3 cm, and 2.8 cm are similar to prior study. The liver appears cirrhotic.       1. Tree-in-bud opacities in the posterior left upper lobe and superior segment of the left lower lobe consistent with bronchiolitis/early pneumonia.   2. Small bilateral pleural effusions with adjacent passive atelectasis and right middle lobe subsegmental atelectasis.   3. Small amount of debris in the trachea and bilateral proximal bronchi that could be secretions or aspiration.   4. Three splenic artery aneurysm measuring up to 2.8 cm, unchanged.   MACRO: None.   Signed by: Richie Wren 12/26/2023 6:29 PM Dictation workstation:   [id number]    ECG 12 lead (Clinic Performed)    Result Date: 12/16/2023  Atrial fibrillation Poor R-wave progression Abnormal ECG When compared with ECG of 25-SEP-2023 10:00, Nonspecific T wave abnormality, improved in Inferior leads Confirmed by Raymon Beaulieu ([id number]) on 12/16/2023 11:49:53 PM    ECG 12 Lead    Result Date: 12/14/2023  Atrial fibrillation Low voltage QRS Abnormal ECG When compared with ECG of 11-DEC-2023 15:22, (unconfirmed) No significant change was found Confirmed by Zaki Stauffer ([id number]) on 12/14/2023 5:32:09 PM    ECG 12 lead    Result Date: 12/14/2023  Sinus rhythm with Premature supraventricular complexes Low voltage QRS Borderline ECG When compared with ECG of 13-DEC-2023 07:33, (unconfirmed) Sinus rhythm has replaced Atrial fibrillation Confirmed by Zaki Stauffer ([id number]) on 12/14/2023 5:31:33 PM    ECG 12 Lead    Result Date: 12/14/2023  Sinus rhythm with Premature atrial complexes Low voltage QRS Borderline ECG When compared with ECG of 13-DEC-2023 08:45, (unconfirmed) No significant change was found Confirmed by Zaki Stauffer ([id number]) on 12/14/2023 5:31:14 PM     Assessment/Plan   Principal Problem:    RSV (respiratory syncytial virus infection)  Active Problems:    Shortness of breath    #Encephalopathy 2/2  infection vs. AF RVR (resolved)   - RRT called overnight 1/7 for change in mental status; patient was previously A&O x 3 but became confused and unable to tell staff the year or her birthday  - CT head and CTA head/neck showed no acute infarct or hemorrhage; no significant stenosis of the vessels. Calcified meningiomas noted without evidence of mass effect or vasogenic edema (noted to be no interval change)  - NIHSS 0 today  - Patient cardioverted 1/8, remains in AF with rate controlled in the 90s   - Antibiotics escalated for increasing O2 requirement; patient now on Vancomycin (MRSA Negative) stopped and Zosyn (Day 3)  - Medications reviewed with attending, Solumedrol IV changed to Prednisone PO   - Hold melatonin; gabapentin decreased to 200 mg BID   - Neuro checks q4h  - INR 3 - Coumadin Held     #Acute on chronic hypoxic respiratory failure 2/2 RSV, community acquired pneumonia  #COPD, in acute exacerbation  - Patient was seen in [facility] 12/26, discharged on dexamethasone and doxycycline  - WBC 13.9 >> 5.3 > 11.4 > 10.8  - Lactate 1.9  - COVID, flu A/B negative  - RSV positive  - Procal 0.75  - BCx no growth- final report   - sputum culture contained significant salivary contamination; repeat pending   - CXR: Small right pleural effusion and basilar atelectasis or airspace disease and mild bilateral opacities concerning for infiltrates.    - Tylenol PRN for fever   - Mucinex BID, Robitussin DM q4h PRN for cough   - DuoNebs TID    - Breo and Spiriva ordered (pharmacy substitute for home Trelegy)  - RT eval and treat protocol  - Bronchial hygiene  - Isolation protocol for RSV  - Baseline O2 5L continuously   - Wean O2 as tolerated; patient currently requiring 8L O2   - Continue Zosyn (day 3)   - Solumedrol 40 mg IVP q8h changed to prednisone 40 mg PO every day due to encephalopathy   - CXR 1/8: There are patchy bilateral airspace opacities with slight interval   worsening on the and slight interval improvement  on the right. Findings may relate to edema versus infection. Attention on continued short-term follow-up is advised. Small bilateral pleural effusions are noted with slight interval improvement on the right.   - ID consulted for worsening pneumonia, appreciate recs      #Atrial fibrillation with RVR, s/p cardioversion   #HTN  #HLD  #HFpEF, concern for acute exacerbation  - Recent DCCV on 12/13 at [facility], follows with Dr. Siegel   - Trop 7 > 7   - >285>368 > 243  - Telemetry monitoring- notified by charge RN this morning that patient's HR has been consistently in the 120s-130s on telemetry. Upon reviewing the flowsheet documentation for 1/5-1/6, only two instances with HR > 110 bpm are charted  - Patient was transferred to ICU overnight and placed on a diltiazem drip for HR persistently in the 130s  - Cardioversion completed today; patient remains in AF with rate controlled in the 90s   - Start amiodarone 200 mg PO TID x 7 days per cardiology   - Lopressor 5 mg IVP q4h PRN for HR > 120 bpm sustained for > 5 mins   - Continue aspirin, atorvastatin, furosemide, and losartan  - INR 2.3, continue warfarin   - Goal INR 2-3, daily PT/INR while inpatient   - Strict I&Os: LOS - 5630 ml   - Daily weights: up 4kg since admission with O2 requirement now up to 6L, Lasix 20 mg IVP repeated 1/8   - 2g Na diet, 1800 mL FR   - Cardiology consulted, appreciate recs      #Hematuria  - UA: 3+ blood, > 20 RBCs, 1+ bacteria  - UCx with no significant growth   - Patient denies gross blood in urine or difficulties with urination   - Monitor UOP for blood - none reported as of 1/8  - Trend CBC; H/H stable      #T2DM with neuropathy   - Continue Lantus, metformin and glimepiride   - Gabapentin decreased to 200 mg BID due to change in mental status  - SSI three times a day before meals while on steroids   - Hypoglycemia protocol  - Accuchecks ac/hs/prn  - Diabetic diet   - HbA1c 4.6      #Anemia, macrocytic  - H/H stable, 8.4/29.1   > 8.6/30.2 with  > 112  - B12 level was 267 last month per chart review   - Folate 6.2   - Iron studies: Fe 28, TIBC 272, 10% saturation  - Patient receives monthly B12 injections and is on ferrous sulfate; continue   - Monitor for active bleeding  - Daily CBC to trend H/H     #History of mucous membrane pemphigoid  - Continue dapsone  - Resume outpatient derm follow up on discharge      DVT PPx: Warfarin  GI PPx: Protonix   Diet: Diabetic, 2g Na   Code Status: Full code      Disposition: Patient requires inpatient management at this time.      Total accumulated time spent face to face and not face to face preparing to see the patient, obtaining and reviewing separately obtained history; performing a medically appropriate examination and/or evaluation; counseling and educating the patient, family; ordering medications, tests, or procedures; referring and communicating with other health care professionals; documenting clinical information in the patient's medical record; independently interpreting results and communicating the results to the patient, family; and care coordination was 45 minutes.       JOSELITO Ramirez-CNP

## 2024-01-10 ENCOUNTER — APPOINTMENT (OUTPATIENT)
Dept: RADIOLOGY | Facility: HOSPITAL | Age: 74
DRG: 193 | End: 2024-01-10
Payer: MEDICARE

## 2024-01-10 LAB
ANION GAP SERPL CALC-SCNC: <7 MMOL/L (ref 10–20)
BASOPHILS # BLD AUTO: 0 X10*3/UL (ref 0–0.1)
BASOPHILS NFR BLD AUTO: 0 %
BUN SERPL-MCNC: 31 MG/DL (ref 6–23)
CALCIUM SERPL-MCNC: 7.8 MG/DL (ref 8.6–10.3)
CHLORIDE SERPL-SCNC: 99 MMOL/L (ref 98–107)
CO2 SERPL-SCNC: 42 MMOL/L (ref 21–32)
CREAT SERPL-MCNC: 0.85 MG/DL (ref 0.5–1.05)
EGFRCR SERPLBLD CKD-EPI 2021: 73 ML/MIN/1.73M*2
EOSINOPHIL # BLD AUTO: 0.14 X10*3/UL (ref 0–0.4)
EOSINOPHIL NFR BLD AUTO: 1.9 %
ERYTHROCYTE [DISTWIDTH] IN BLOOD BY AUTOMATED COUNT: 16.8 % (ref 11.5–14.5)
GLUCOSE BLD MANUAL STRIP-MCNC: 149 MG/DL (ref 74–99)
GLUCOSE BLD MANUAL STRIP-MCNC: 248 MG/DL (ref 74–99)
GLUCOSE BLD MANUAL STRIP-MCNC: 276 MG/DL (ref 74–99)
GLUCOSE BLD MANUAL STRIP-MCNC: 71 MG/DL (ref 74–99)
GLUCOSE SERPL-MCNC: 106 MG/DL (ref 74–99)
HCT VFR BLD AUTO: 27.2 % (ref 36–46)
HGB BLD-MCNC: 8 G/DL (ref 12–16)
IMM GRANULOCYTES # BLD AUTO: 0.04 X10*3/UL (ref 0–0.5)
IMM GRANULOCYTES NFR BLD AUTO: 0.5 % (ref 0–0.9)
INR PPP: 3.9 (ref 0.9–1.1)
LYMPHOCYTES # BLD AUTO: 0.83 X10*3/UL (ref 0.8–3)
LYMPHOCYTES NFR BLD AUTO: 11 %
MCH RBC QN AUTO: 31.6 PG (ref 26–34)
MCHC RBC AUTO-ENTMCNC: 29.4 G/DL (ref 32–36)
MCV RBC AUTO: 108 FL (ref 80–100)
MONOCYTES # BLD AUTO: 0.39 X10*3/UL (ref 0.05–0.8)
MONOCYTES NFR BLD AUTO: 5.2 %
NEUTROPHILS # BLD AUTO: 6.14 X10*3/UL (ref 1.6–5.5)
NEUTROPHILS NFR BLD AUTO: 81.4 %
NRBC BLD-RTO: 0 /100 WBCS (ref 0–0)
PLATELET # BLD AUTO: 173 X10*3/UL (ref 150–450)
POTASSIUM SERPL-SCNC: 4.8 MMOL/L (ref 3.5–5.3)
PROTHROMBIN TIME: 44.1 SECONDS (ref 9.8–12.8)
RBC # BLD AUTO: 2.53 X10*6/UL (ref 4–5.2)
SODIUM SERPL-SCNC: 142 MMOL/L (ref 136–145)
WBC # BLD AUTO: 7.5 X10*3/UL (ref 4.4–11.3)

## 2024-01-10 PROCEDURE — 2500000002 HC RX 250 W HCPCS SELF ADMINISTERED DRUGS (ALT 637 FOR MEDICARE OP, ALT 636 FOR OP/ED): Mod: IPSPLIT

## 2024-01-10 PROCEDURE — 36415 COLL VENOUS BLD VENIPUNCTURE: CPT | Mod: IPSPLIT

## 2024-01-10 PROCEDURE — 2500000005 HC RX 250 GENERAL PHARMACY W/O HCPCS: Mod: IPSPLIT

## 2024-01-10 PROCEDURE — 2020000001 HC ICU ROOM DAILY: Mod: IPSPLIT

## 2024-01-10 PROCEDURE — 2500000005 HC RX 250 GENERAL PHARMACY W/O HCPCS: Mod: IPSPLIT | Performed by: INTERNAL MEDICINE

## 2024-01-10 PROCEDURE — 2500000004 HC RX 250 GENERAL PHARMACY W/ HCPCS (ALT 636 FOR OP/ED): Mod: IPSPLIT

## 2024-01-10 PROCEDURE — 85610 PROTHROMBIN TIME: CPT | Mod: IPSPLIT

## 2024-01-10 PROCEDURE — 2500000001 HC RX 250 WO HCPCS SELF ADMINISTERED DRUGS (ALT 637 FOR MEDICARE OP): Mod: IPSPLIT | Performed by: NURSE PRACTITIONER

## 2024-01-10 PROCEDURE — 99233 SBSQ HOSP IP/OBS HIGH 50: CPT

## 2024-01-10 PROCEDURE — 94668 MNPJ CHEST WALL SBSQ: CPT | Mod: IPSPLIT

## 2024-01-10 PROCEDURE — 94667 MNPJ CHEST WALL 1ST: CPT | Mod: IPSPLIT

## 2024-01-10 PROCEDURE — 99233 SBSQ HOSP IP/OBS HIGH 50: CPT | Performed by: NURSE PRACTITIONER

## 2024-01-10 PROCEDURE — 97530 THERAPEUTIC ACTIVITIES: CPT | Mod: GP,CQ,IPSPLIT

## 2024-01-10 PROCEDURE — 94640 AIRWAY INHALATION TREATMENT: CPT | Mod: IPSPLIT

## 2024-01-10 PROCEDURE — 97110 THERAPEUTIC EXERCISES: CPT | Mod: GP,CQ,IPSPLIT

## 2024-01-10 PROCEDURE — 2500000002 HC RX 250 W HCPCS SELF ADMINISTERED DRUGS (ALT 637 FOR MEDICARE OP, ALT 636 FOR OP/ED): Mod: IPSPLIT | Performed by: INTERNAL MEDICINE

## 2024-01-10 PROCEDURE — 2500000001 HC RX 250 WO HCPCS SELF ADMINISTERED DRUGS (ALT 637 FOR MEDICARE OP): Mod: IPSPLIT

## 2024-01-10 PROCEDURE — 2550000001 HC RX 255 CONTRASTS: Mod: IPSPLIT | Performed by: INTERNAL MEDICINE

## 2024-01-10 PROCEDURE — 85025 COMPLETE CBC W/AUTO DIFF WBC: CPT | Mod: IPSPLIT

## 2024-01-10 PROCEDURE — 71275 CT ANGIOGRAPHY CHEST: CPT | Mod: IPSPLIT

## 2024-01-10 PROCEDURE — 71275 CT ANGIOGRAPHY CHEST: CPT | Performed by: RADIOLOGY

## 2024-01-10 PROCEDURE — 94640 AIRWAY INHALATION TREATMENT: CPT

## 2024-01-10 PROCEDURE — 94660 CPAP INITIATION&MGMT: CPT | Mod: IPSPLIT

## 2024-01-10 PROCEDURE — 82947 ASSAY GLUCOSE BLOOD QUANT: CPT | Mod: IPSPLIT

## 2024-01-10 PROCEDURE — 80048 BASIC METABOLIC PNL TOTAL CA: CPT | Mod: IPSPLIT

## 2024-01-10 RX ORDER — METOPROLOL TARTRATE 25 MG/1
25 TABLET, FILM COATED ORAL 3 TIMES DAILY
Status: DISCONTINUED | OUTPATIENT
Start: 2024-01-10 | End: 2024-01-12

## 2024-01-10 RX ORDER — FUROSEMIDE 10 MG/ML
INJECTION INTRAMUSCULAR; INTRAVENOUS
Status: COMPLETED
Start: 2024-01-10 | End: 2024-01-10

## 2024-01-10 RX ORDER — FUROSEMIDE 10 MG/ML
40 INJECTION INTRAMUSCULAR; INTRAVENOUS ONCE
Status: COMPLETED | OUTPATIENT
Start: 2024-01-10 | End: 2024-01-10

## 2024-01-10 RX ADMIN — IPRATROPIUM BROMIDE AND ALBUTEROL SULFATE 3 ML: .5; 3 SOLUTION RESPIRATORY (INHALATION) at 09:30

## 2024-01-10 RX ADMIN — PANTOPRAZOLE SODIUM 40 MG: 40 TABLET, DELAYED RELEASE ORAL at 06:24

## 2024-01-10 RX ADMIN — PIPERACILLIN SODIUM AND TAZOBACTAM SODIUM 4.5 G: 4; .5 INJECTION, SOLUTION INTRAVENOUS at 21:07

## 2024-01-10 RX ADMIN — INSULIN LISPRO 2 UNITS: 100 INJECTION, SOLUTION INTRAVENOUS; SUBCUTANEOUS at 12:43

## 2024-01-10 RX ADMIN — FERROUS SULFATE TAB 325 MG (65 MG ELEMENTAL FE) 1 TABLET: 325 (65 FE) TAB at 08:32

## 2024-01-10 RX ADMIN — DAPSONE 100 MG: 25 TABLET ORAL at 06:24

## 2024-01-10 RX ADMIN — TOPIRAMATE 100 MG: 100 TABLET, FILM COATED ORAL at 08:32

## 2024-01-10 RX ADMIN — PIPERACILLIN SODIUM AND TAZOBACTAM SODIUM 4.5 G: 4; .5 INJECTION, SOLUTION INTRAVENOUS at 15:27

## 2024-01-10 RX ADMIN — GUAIFENESIN 600 MG: 600 TABLET, EXTENDED RELEASE ORAL at 08:32

## 2024-01-10 RX ADMIN — METOPROLOL TARTRATE 25 MG: 25 TABLET, FILM COATED ORAL at 15:27

## 2024-01-10 RX ADMIN — LOSARTAN POTASSIUM 12.5 MG: 25 TABLET, FILM COATED ORAL at 08:31

## 2024-01-10 RX ADMIN — DOCUSATE SODIUM 100 MG: 100 CAPSULE, LIQUID FILLED ORAL at 08:32

## 2024-01-10 RX ADMIN — PIPERACILLIN SODIUM AND TAZOBACTAM SODIUM 4.5 G: 4; .5 INJECTION, SOLUTION INTRAVENOUS at 03:00

## 2024-01-10 RX ADMIN — FUROSEMIDE 80 MG: 80 TABLET ORAL at 08:32

## 2024-01-10 RX ADMIN — IPRATROPIUM BROMIDE AND ALBUTEROL SULFATE 3 ML: .5; 3 SOLUTION RESPIRATORY (INHALATION) at 18:09

## 2024-01-10 RX ADMIN — METOPROLOL TARTRATE 25 MG: 25 TABLET, FILM COATED ORAL at 12:43

## 2024-01-10 RX ADMIN — GABAPENTIN 200 MG: 100 CAPSULE ORAL at 08:32

## 2024-01-10 RX ADMIN — ASPIRIN 81 MG: 81 TABLET, COATED ORAL at 08:31

## 2024-01-10 RX ADMIN — TOPIRAMATE 100 MG: 100 TABLET, FILM COATED ORAL at 21:08

## 2024-01-10 RX ADMIN — AMIODARONE HYDROCHLORIDE 200 MG: 200 TABLET ORAL at 07:05

## 2024-01-10 RX ADMIN — IOHEXOL 100 ML: 350 INJECTION, SOLUTION INTRAVENOUS at 15:14

## 2024-01-10 RX ADMIN — TIOTROPIUM BROMIDE INHALATION SPRAY 2 PUFF: 3.12 SPRAY, METERED RESPIRATORY (INHALATION) at 09:36

## 2024-01-10 RX ADMIN — METFORMIN HYDROCHLORIDE 1000 MG: 500 TABLET ORAL at 17:23

## 2024-01-10 RX ADMIN — METOPROLOL TARTRATE 25 MG: 25 TABLET, FILM COATED ORAL at 21:08

## 2024-01-10 RX ADMIN — POLYETHYLENE GLYCOL 3350 17 G: 17 POWDER, FOR SOLUTION ORAL at 08:31

## 2024-01-10 RX ADMIN — ATORVASTATIN CALCIUM 20 MG: 10 TABLET, FILM COATED ORAL at 21:08

## 2024-01-10 RX ADMIN — GABAPENTIN 200 MG: 100 CAPSULE ORAL at 21:08

## 2024-01-10 RX ADMIN — GUAIFENESIN 600 MG: 600 TABLET, EXTENDED RELEASE ORAL at 21:08

## 2024-01-10 RX ADMIN — METOPROLOL TARTRATE 5 MG: 5 INJECTION INTRAVENOUS at 12:43

## 2024-01-10 RX ADMIN — Medication 8 L/MIN: at 09:33

## 2024-01-10 RX ADMIN — AMIODARONE HYDROCHLORIDE 200 MG: 200 TABLET ORAL at 17:23

## 2024-01-10 RX ADMIN — FUROSEMIDE 40 MG: 10 INJECTION, SOLUTION INTRAMUSCULAR; INTRAVENOUS at 12:42

## 2024-01-10 RX ADMIN — IPRATROPIUM BROMIDE AND ALBUTEROL SULFATE 3 ML: .5; 3 SOLUTION RESPIRATORY (INHALATION) at 20:56

## 2024-01-10 RX ADMIN — GLIMEPIRIDE 2 MG: 2 TABLET ORAL at 08:32

## 2024-01-10 RX ADMIN — METFORMIN HYDROCHLORIDE 1000 MG: 500 TABLET ORAL at 08:31

## 2024-01-10 RX ADMIN — FUROSEMIDE 40 MG: 10 INJECTION INTRAMUSCULAR; INTRAVENOUS at 12:42

## 2024-01-10 RX ADMIN — AMIODARONE HYDROCHLORIDE 200 MG: 200 TABLET ORAL at 23:24

## 2024-01-10 RX ADMIN — INSULIN LISPRO 3 UNITS: 100 INJECTION, SOLUTION INTRAVENOUS; SUBCUTANEOUS at 17:23

## 2024-01-10 RX ADMIN — PIPERACILLIN SODIUM AND TAZOBACTAM SODIUM 4.5 G: 4; .5 INJECTION, SOLUTION INTRAVENOUS at 08:35

## 2024-01-10 RX ADMIN — PREDNISONE 40 MG: 20 TABLET ORAL at 08:32

## 2024-01-10 RX ADMIN — METOPROLOL TARTRATE 5 MG: 5 INJECTION INTRAVENOUS at 08:37

## 2024-01-10 RX ADMIN — CALCIUM CARBONATE (ANTACID) CHEW TAB 500 MG 1500 MG: 500 CHEW TAB at 08:31

## 2024-01-10 RX ADMIN — FLUTICASONE FUROATE AND VILANTEROL TRIFENATATE 1 PUFF: 200; 25 POWDER RESPIRATORY (INHALATION) at 09:34

## 2024-01-10 RX ADMIN — CALCIUM CARBONATE (ANTACID) CHEW TAB 500 MG 1500 MG: 500 CHEW TAB at 21:08

## 2024-01-10 ASSESSMENT — PAIN SCALES - GENERAL
PAINLEVEL_OUTOF10: 0 - NO PAIN

## 2024-01-10 ASSESSMENT — PAIN - FUNCTIONAL ASSESSMENT
PAIN_FUNCTIONAL_ASSESSMENT: 0-10
PAIN_FUNCTIONAL_ASSESSMENT: CPOT (CRITICAL CARE PAIN OBSERVATION TOOL)

## 2024-01-10 ASSESSMENT — COGNITIVE AND FUNCTIONAL STATUS - GENERAL
STANDING UP FROM CHAIR USING ARMS: A LOT
MOBILITY SCORE: 11
WALKING IN HOSPITAL ROOM: A LOT
CLIMB 3 TO 5 STEPS WITH RAILING: TOTAL
MOVING TO AND FROM BED TO CHAIR: A LOT
MOVING FROM LYING ON BACK TO SITTING ON SIDE OF FLAT BED WITH BEDRAILS: A LOT
TURNING FROM BACK TO SIDE WHILE IN FLAT BAD: A LOT

## 2024-01-10 NOTE — PROGRESS NOTES
Occupational Therapy  Therapy Communication Note    Patient Name: Frannie Blanco  MRN: [id number]  Today's Date: 1/10/2024     Discipline: Occupational Therapy    Missed Visit Reason: Missed Visit Reason: Patient placed on medical hold (RN deferred OT therapy session as pt. reverted back to A fib, and is expected to have CT angio to r/o PE. Notified ABE)    Missed Time: Attempt 1335

## 2024-01-10 NOTE — NURSING NOTE
Patient was OOB to chair, but patient having difficulty standing or getting out of chair due to body habitus. Patient's heart rate in 170s and patient hypoxic with minimal exertion. Coughing and slow, deep breathing encouraged. PT and nurse assisted patient back to bed for safety. [place] attempted, but patient unable to adequately open her legs for placement. Patient hypoxic in [id number]/low 80s. HFNL increased to 10L while eating breakfast. Daija Lea CNP, in to see patient and made aware HR up to 170s. Left lower leg seeping clear fluid. Lopressor 5mg IV given for tachycardia.

## 2024-01-10 NOTE — PROGRESS NOTES
"Frannie Blanco is a 73 y.o. female on day 6 of admission presenting with RSV (respiratory syncytial virus infection).    Subjective   Awake in bed, no complaints  She went back into AF with RVR,  140's on telemetry      Objective     Physical Exam  Constitutional:       General: She is not in acute distress.     Appearance: Normal appearance. She is well-developed. She is morbidly obese.   Eyes:      Pupils: Pupils are equal, round, and reactive to light.   Neck:      Vascular: No carotid bruit.   Cardiovascular:      Rate and Rhythm: Normal rate and regular rhythm.      Pulses: Normal pulses.      Heart sounds: Normal heart sounds. No murmur heard.  Pulmonary:      Effort: Pulmonary effort is normal. No respiratory distress.      Breath sounds: Decreased air movement present. Decreased breath sounds and wheezing present.   Abdominal:      General: There is no distension.      Palpations: Abdomen is soft.      Tenderness: There is no abdominal tenderness.   Musculoskeletal:         General: No swelling.      Cervical back: Neck supple.      Right lower leg: No edema.      Left lower leg: No edema.   Skin:     General: Skin is warm.   Neurological:      Mental Status: She is alert and oriented to person, place, and time. Mental status is at baseline.   Psychiatric:         Mood and Affect: Mood normal.         Behavior: Behavior normal. Behavior is cooperative.         Last Recorded Vitals  Blood pressure 116/64, pulse (!) 126, temperature 35.9 °C (96.6 °F), temperature source Temporal, resp. rate 15, height 1.676 m (5' 5.98\"), weight 149 kg (327 lb 9.7 oz), SpO2 93 %.  Intake/Output last 3 Shifts:  I/O last 3 completed shifts:  In: 1980 (13.3 mL/kg) [P.O.:1550; IV Piggyback:430]  Out: 5500 (37 mL/kg) [Urine:5500 (1 mL/kg/hr)]  Weight: 148.6 kg     Relevant Results  Scheduled medications  amiodarone, 200 mg, oral, TID  aspirin, 81 mg, oral, Daily  atorvastatin, 20 mg, oral, Nightly  calcium carbonate, 1,500 mg, " oral, q12h  dapsone, 100 mg, oral, Daily before breakfast  [Held by provider] dilTIAZem CD, 300 mg, oral, Daily  docusate sodium, 100 mg, oral, BID  ferrous sulfate (325 mg ferrous sulfate), 65 mg of iron, oral, Daily  tiotropium, 2 Inhalation, inhalation, Daily   And  fluticasone furoate-vilanteroL, 1 puff, inhalation, Daily  furosemide, 80 mg, oral, Daily  gabapentin, 200 mg, oral, BID  glimepiride, 2 mg, oral, Daily with breakfast  guaiFENesin, 600 mg, oral, BID  insulin lispro, 0-5 Units, subcutaneous, TID with meals  ipratropium-albuteroL, 3 mL, nebulization, 4x daily  losartan, 12.5 mg, oral, Daily  [Held by provider] melatonin, 6 mg, oral, Daily  metFORMIN, 1,000 mg, oral, BID with meals  pantoprazole, 40 mg, oral, Daily before breakfast   Or  pantoprazole, 40 mg, intravenous, Daily before breakfast  piperacillin-tazobactam, 4.5 g, intravenous, q6h  polyethylene glycol, 17 g, oral, Daily  predniSONE, 40 mg, oral, Daily  topiramate, 100 mg, oral, BID  [Held by provider] warfarin, 5 mg, oral, Daily      Continuous medications     PRN medications  PRN medications: acetaminophen **OR** acetaminophen **OR** acetaminophen, acetaminophen **OR** acetaminophen **OR** acetaminophen, albuterol, benzocaine-menthoL, bisacodyl, dextromethorphan-guaifenesin, dextrose 10 % in water (D10W), dextrose, glucagon, metoprolol, ondansetron ODT **OR** ondansetron, oxygen  Results for orders placed or performed during the hospital encounter of 01/02/24 (from the past 24 hour(s))   POCT GLUCOSE   Result Value Ref Range    POCT Glucose 114 (H) 74 - 99 mg/dL   POCT GLUCOSE   Result Value Ref Range    POCT Glucose 272 (H) 74 - 99 mg/dL   POCT GLUCOSE   Result Value Ref Range    POCT Glucose 223 (H) 74 - 99 mg/dL   Basic metabolic panel   Result Value Ref Range    Glucose 106 (H) 74 - 99 mg/dL    Sodium 142 136 - 145 mmol/L    Potassium 4.8 3.5 - 5.3 mmol/L    Chloride 99 98 - 107 mmol/L    Bicarbonate 42 (HH) 21 - 32 mmol/L    Anion Gap  <7 (L) 10 - 20 mmol/L    Urea Nitrogen 31 (H) 6 - 23 mg/dL    Creatinine 0.85 0.50 - 1.05 mg/dL    eGFR 73 >60 mL/min/1.73m*2    Calcium 7.8 (L) 8.6 - 10.3 mg/dL   CBC and Auto Differential   Result Value Ref Range    WBC 7.5 4.4 - 11.3 x10*3/uL    nRBC 0.0 0.0 - 0.0 /100 WBCs    RBC 2.53 (L) 4.00 - 5.20 x10*6/uL    Hemoglobin 8.0 (L) 12.0 - 16.0 g/dL    Hematocrit 27.2 (L) 36.0 - 46.0 %     (H) 80 - 100 fL    MCH 31.6 26.0 - 34.0 pg    MCHC 29.4 (L) 32.0 - 36.0 g/dL    RDW 16.8 (H) 11.5 - 14.5 %    Platelets 173 150 - 450 x10*3/uL    Neutrophils % 81.4 40.0 - 80.0 %    Immature Granulocytes %, Automated 0.5 0.0 - 0.9 %    Lymphocytes % 11.0 13.0 - 44.0 %    Monocytes % 5.2 2.0 - 10.0 %    Eosinophils % 1.9 0.0 - 6.0 %    Basophils % 0.0 0.0 - 2.0 %    Neutrophils Absolute 6.14 (H) 1.60 - 5.50 x10*3/uL    Immature Granulocytes Absolute, Automated 0.04 0.00 - 0.50 x10*3/uL    Lymphocytes Absolute 0.83 0.80 - 3.00 x10*3/uL    Monocytes Absolute 0.39 0.05 - 0.80 x10*3/uL    Eosinophils Absolute 0.14 0.00 - 0.40 x10*3/uL    Basophils Absolute 0.00 0.00 - 0.10 x10*3/uL   Protime-INR   Result Value Ref Range    Protime 44.1 (H) 9.8 - 12.8 seconds    INR 3.9 (H) 0.9 - 1.1   POCT GLUCOSE   Result Value Ref Range    POCT Glucose 71 (L) 74 - 99 mg/dL     ECG 12 lead    Result Date: 1/9/2024  Atrial fibrillation with rapid ventricular response Low voltage QRS Cannot rule out Anterior infarct , age undetermined Abnormal ECG When compared with ECG of 07-JAN-2024 00:28, (unconfirmed) No significant change was found    ECG 12 lead    Result Date: 1/9/2024  Atrial fibrillation with rapid ventricular response Abnormal ECG When compared with ECG of 02-JAN-2024 17:57, No significant change was found    ECG 12 lead    Result Date: 1/8/2024  Sinus rhythm with Premature atrial complexes with Aberrant conduction Cannot rule out Anterior infarct (cited on or before 02-JAN-2024) Abnormal ECG When compared with ECG of 02-JAN-2024  17:57, Sinus rhythm has replaced Atrial fibrillation    XR chest 1 view    Result Date: 1/7/2024  Interpreted By:  Mohsen Law, STUDY: XR CHEST 1 VIEW;  1/7/2024 4:04 pm   INDICATION: Signs/Symptoms:worsening pneumonia.   COMPARISON: Chest x-ray 01/02/2024   ACCESSION NUMBER(S): [id number]   ORDERING CLINICIAN: ALBERTO PICKETT   FINDINGS: Multiple overlying leads are present.   CARDIOMEDIASTINAL SILHOUETTE: Cardiomediastinal silhouette is stable in size and configuration. Dense mitral annular calcifications noted.   LUNGS: There are bilateral patchy airspace opacities with slight interval improvement in the right lung base and slight interval worsening on the left. These findings may relate to developing edema versus infection. Small bilateral pleural effusions. No pneumothorax.   ABDOMEN: No remarkable upper abdominal findings.   BONES: Degenerative changes of the spine and bilateral shoulders.       There are patchy bilateral airspace opacities with slight interval worsening on the and slight interval improvement on the right. Findings may relate to edema versus infection. Attention on continued short-term follow-up is advised.   Small bilateral pleural effusions are noted with slight interval improvement on the right.   MACRO: None   Signed by: Mohsen Law 1/7/2024 4:18 PM Dictation workstation:   [id number]    CT head wo IV contrast    Result Date: 1/7/2024  Interpreted By:  Jaxon Fernandez, STUDY: CT ANGIO HEAD AND NECK W AND WO IV CONTRAST; CT HEAD WO IV CONTRAST; 1/7/2024 1:20 am; 1/7/2024 1:10 am   INDICATION: Signs/Symptoms:Confusion.   COMPARISON: CT and CT angiogram dated 01/06/2024.   ACCESSION NUMBER(S): [id number]; [id number]   ORDERING CLINICIAN: JAC SANTOS   TECHNIQUE: Unenhanced CT images of the head were obtained. Subsequently,  75 mL Omnipaque 350 was administered intravenously and axial images of the head and neck were acquired.  Coronal, sagittal, and 3-D reconstructions were  provided for review.   FINDINGS: There is a mostly calcified meningioma within the upper left parietal convexity measuring 1.7 x 1.5 cm and also calcified meningioma measuring 1.1 x 8.2 cm overlying the inferolateral right frontal lobe. No evidence of associated vasogenic edema or mass effect. There is no evidence of midline shift, hydrocephalus, or acute intracranial hemorrhage. Gray-white matter differentiation is maintained.   There is a small amount of fluid in the caudal mastoid air cells. There is mild to moderate polypoid mucosal thickening of the left maxillary sinus and anterior ethmoid air cells. These findings are unchanged.   CTA HEAD FINDINGS:   Anterior circulation: The bilateral intracranial internal carotid arteries, bilateral carotid terminals, bilateral proximal anterior and middle cerebral arteries are normal.   Posterior circulation: Bilateral intracranial vertebral arteries, vertebrobasilar junction, basilar artery and proximal posterior cerebral arteries are normal.   Venous contamination limits evaluation for small aneurysms, without gross evidence of intracranial aneurysm.   CTA NECK FINDINGS:   Right carotid vessels: There are mild atherosclerotic calcifications of the carotid bifurcation with 0% stenosis by NASCET criteria. The remainder of the right internal cervical carotid artery is widely patent without focal stenosis.   Left carotid vessels: The common carotid artery is normal. The carotid bifurcation is normal. The internal carotid artery in the neck is normal. There is 0% stenosis by NASCET criteria. .   Vertebral vessels:  The visualized segments of the cervical vertebral arteries are normal in caliber.   There is redemonstration of multifocal nodular and ground-glass opacities in the visualized upper lung fields suspicious for multifocal pneumonia. There is also small right pleural effusion partially visualized.       No evidence of acute cortical infarct or acute intracranial  hemorrhage. There are calcified meningiomas noted overlying the upper left frontoparietal convexity and overlying the inferolateral right frontal lobe without evidence of significant mass effect or vasogenic edema. No interval change.   No evidence for significant stenosis of the cervical vessels.   No evidence for significant stenosis or large branch vessel cutoffs of the intracranial vessels.   Partially visualized ground-glass and nodular opacities in the upper lung fields suspicious for multifocal pneumonia for example viral pneumonia.   MACRO:   None   Signed by: Jaxon Fernandez 1/7/2024 2:06 AM Dictation workstation:   [id number]    CT angio head and neck w and wo IV contrast    Result Date: 1/7/2024  Interpreted By:  Jaxon Fernandez, STUDY: CT ANGIO HEAD AND NECK W AND WO IV CONTRAST; CT HEAD WO IV CONTRAST; 1/7/2024 1:20 am; 1/7/2024 1:10 am   INDICATION: Signs/Symptoms:Confusion.   COMPARISON: CT and CT angiogram dated 01/06/2024.   ACCESSION NUMBER(S): [id number]; [id number]   ORDERING CLINICIAN: JAC SANTOS   TECHNIQUE: Unenhanced CT images of the head were obtained. Subsequently,  75 mL Omnipaque 350 was administered intravenously and axial images of the head and neck were acquired.  Coronal, sagittal, and 3-D reconstructions were provided for review.   FINDINGS: There is a mostly calcified meningioma within the upper left parietal convexity measuring 1.7 x 1.5 cm and also calcified meningioma measuring 1.1 x 8.2 cm overlying the inferolateral right frontal lobe. No evidence of associated vasogenic edema or mass effect. There is no evidence of midline shift, hydrocephalus, or acute intracranial hemorrhage. Gray-white matter differentiation is maintained.   There is a small amount of fluid in the caudal mastoid air cells. There is mild to moderate polypoid mucosal thickening of the left maxillary sinus and anterior ethmoid air cells. These findings are unchanged.   CTA HEAD FINDINGS:   Anterior  circulation: The bilateral intracranial internal carotid arteries, bilateral carotid terminals, bilateral proximal anterior and middle cerebral arteries are normal.   Posterior circulation: Bilateral intracranial vertebral arteries, vertebrobasilar junction, basilar artery and proximal posterior cerebral arteries are normal.   Venous contamination limits evaluation for small aneurysms, without gross evidence of intracranial aneurysm.   CTA NECK FINDINGS:   Right carotid vessels: There are mild atherosclerotic calcifications of the carotid bifurcation with 0% stenosis by NASCET criteria. The remainder of the right internal cervical carotid artery is widely patent without focal stenosis.   Left carotid vessels: The common carotid artery is normal. The carotid bifurcation is normal. The internal carotid artery in the neck is normal. There is 0% stenosis by NASCET criteria. .   Vertebral vessels:  The visualized segments of the cervical vertebral arteries are normal in caliber.   There is redemonstration of multifocal nodular and ground-glass opacities in the visualized upper lung fields suspicious for multifocal pneumonia. There is also small right pleural effusion partially visualized.       No evidence of acute cortical infarct or acute intracranial hemorrhage. There are calcified meningiomas noted overlying the upper left frontoparietal convexity and overlying the inferolateral right frontal lobe without evidence of significant mass effect or vasogenic edema. No interval change.   No evidence for significant stenosis of the cervical vessels.   No evidence for significant stenosis or large branch vessel cutoffs of the intracranial vessels.   Partially visualized ground-glass and nodular opacities in the upper lung fields suspicious for multifocal pneumonia for example viral pneumonia.   MACRO:   None   Signed by: Jaxon Fernandez 1/7/2024 2:06 AM Dictation workstation:   [id number]    CT angio brain attack head w IV  contrast and post procedure    Result Date: 1/6/2024  Interpreted By:  Jazmyne Teresa, STUDY: CT ANGIO BRAIN ATTACK HEAD W IV CONTRAST AND POST PROCEDURE; CT ANGIO BRAIN ATTACK NECK W IV CONTRAST AND POST PROCEDURE;  1/6/2024 5:16 pm   INDICATION: Signs/Symptoms:AMS.   COMPARISON: None.   ACCESSION NUMBER(S): [id number]; [id number]   ORDERING CLINICIAN: LYNN FLORES   TECHNIQUE: 75 mL Omnipaque 350 was administered intravenously and axial images of the head and neck were acquired.  Coronal, sagittal, and 3-D reconstructions were provided for review.   The technologist notes the patient had multiple IV malfunctions.   FINDINGS: CT head: Please see CT head performed the same day for intracranial findings.   CTA HEAD FINDINGS:   The examination is degraded by venous contamination.   Anterior circulation: The bilateral intracranial internal carotid arteries, bilateral carotid terminals, bilateral proximal anterior and middle cerebral arteries are patent.   Posterior circulation: Bilateral intracranial vertebral arteries, vertebrobasilar junction, basilar artery and proximal posterior cerebral arteries are patent.   CTA NECK FINDINGS:   The examination is limited by the timing of scan relative to the contrast injection.   Mild atherosclerotic calcification along the aortic arch. Within the limitation of patient motion artifact no significant stenosis at the origins of the great vessels.   Right carotid vessels: The common carotid artery is patent. Mild calcified plaque at the carotid bifurcation without significant stenosis by NASCET criteria. The internal carotid artery in the neck is patent without significant stenosis.   Left carotid vessels: The common carotid artery is patent.. The carotid bifurcation is patent without significant stenosis. The internal carotid artery in the neck is patent without significant stenosis.   Vertebral vessels:  The visualized segments of the cervical vertebral arteries are  patent.   The left thyroid lobe is slightly asymmetrically larger than the right with a calcification. Please see thyroid ultrasound 07/24/2023 for further details. Incidental note of multiple tonsilloliths. There is prominent ossification of the stylohyoid ligament bilaterally. Soft tissues of the neck are otherwise unremarkable.   There are patchy opacities within the visualized upper lungs bilaterally concerning for multifocal pneumonia. There is a small pleural effusion on the right. Mild degenerative changes of the cervical spine.       The examination is degraded by venous contamination, patient motion artifact and difficulties with the IV. Within this limitation:   CTA neck:   No evidence for significant stenosis of the cervical vessels.   Patchy opacities within the upper lungs bilaterally concerning for multifocal pneumonia. There is a small pleural effusion on the right.   CTA head:   No evidence for significant stenosis or large branch vessel cutoffs of the intracranial vessels.   Please see CT head performed the same day for intracranial findings.   MACRO: None   Signed by: Jazmyne Teresa 1/6/2024 5:34 PM Dictation workstation:   [id number]    CT angio brain attack neck w IV contrast and post procedure    Result Date: 1/6/2024  Interpreted By:  Jazmyne Teresa, STUDY: CT ANGIO BRAIN ATTACK HEAD W IV CONTRAST AND POST PROCEDURE; CT ANGIO BRAIN ATTACK NECK W IV CONTRAST AND POST PROCEDURE;  1/6/2024 5:16 pm   INDICATION: Signs/Symptoms:AMS.   COMPARISON: None.   ACCESSION NUMBER(S): [id number]; [id number]   ORDERING CLINICIAN: LYNN FLORES   TECHNIQUE: 75 mL Omnipaque 350 was administered intravenously and axial images of the head and neck were acquired.  Coronal, sagittal, and 3-D reconstructions were provided for review.   The technologist notes the patient had multiple IV malfunctions.   FINDINGS: CT head: Please see CT head performed the same day for intracranial findings.   CTA HEAD FINDINGS:    The examination is degraded by venous contamination.   Anterior circulation: The bilateral intracranial internal carotid arteries, bilateral carotid terminals, bilateral proximal anterior and middle cerebral arteries are patent.   Posterior circulation: Bilateral intracranial vertebral arteries, vertebrobasilar junction, basilar artery and proximal posterior cerebral arteries are patent.   CTA NECK FINDINGS:   The examination is limited by the timing of scan relative to the contrast injection.   Mild atherosclerotic calcification along the aortic arch. Within the limitation of patient motion artifact no significant stenosis at the origins of the great vessels.   Right carotid vessels: The common carotid artery is patent. Mild calcified plaque at the carotid bifurcation without significant stenosis by NASCET criteria. The internal carotid artery in the neck is patent without significant stenosis.   Left carotid vessels: The common carotid artery is patent.. The carotid bifurcation is patent without significant stenosis. The internal carotid artery in the neck is patent without significant stenosis.   Vertebral vessels:  The visualized segments of the cervical vertebral arteries are patent.   The left thyroid lobe is slightly asymmetrically larger than the right with a calcification. Please see thyroid ultrasound 07/24/2023 for further details. Incidental note of multiple tonsilloliths. There is prominent ossification of the stylohyoid ligament bilaterally. Soft tissues of the neck are otherwise unremarkable.   There are patchy opacities within the visualized upper lungs bilaterally concerning for multifocal pneumonia. There is a small pleural effusion on the right. Mild degenerative changes of the cervical spine.       The examination is degraded by venous contamination, patient motion artifact and difficulties with the IV. Within this limitation:   CTA neck:   No evidence for significant stenosis of the cervical  vessels.   Patchy opacities within the upper lungs bilaterally concerning for multifocal pneumonia. There is a small pleural effusion on the right.   CTA head:   No evidence for significant stenosis or large branch vessel cutoffs of the intracranial vessels.   Please see CT head performed the same day for intracranial findings.   MACRO: None   Signed by: Jazmyne Teresa 1/6/2024 5:34 PM Dictation workstation:   [id number]    CT brain attack head wo IV contrast    Result Date: 1/6/2024  Interpreted By:  Richie Wren, STUDY: CT BRAIN ATTACK HEAD WO IV CONTRAST;  1/6/2024 4:37 pm   INDICATION: Signs/Symptoms:AMS.   COMPARISON: 11/18/2012   ACCESSION NUMBER(S): [id number]   ORDERING CLINICIAN: LYNN FLORES   TECHNIQUE: Noncontrast axial CT images of head were obtained with coronal and sagittal reconstructed images.   FINDINGS: BRAIN PARENCHYMA: Mild periventricular and subcortical hemispheric white matter hypodensities are most compatible with chronic small vessel ischemic disease. No acute intraparenchymal hemorrhage or parenchymal evidence of acute large territory ischemic infarct. No mass-effect. Gray-white matter distinction is preserved.   VENTRICLES and EXTRA-AXIAL SPACES: There is a 1.5 cm calcified meningioma arising from the left parasagittal frontal parietal dura. There is another 1.3 cm more densely calcified meningioma arising the right frontotemporal dura. No acute extra-axial or intraventricular hemorrhage. No effacement of cerebral sulci. Ventricles and sulci are age-concordant. There is a partial empty sella.   PARANASAL SINUSES/MASTOIDS: Trace fluid left mastoid air cells. No hemorrhage or air-fluid levels within the visualized paranasal sinuses. The mastoids are well aerated.   CALVARIUM/ORBITS:  No skull fracture.  The orbits and globes are intact to the extent visualized.   EXTRACRANIAL SOFT TISSUES: No discernible abnormality.       1. No evidence of acute intracranial hemorrhage, mass  effect, or parenchymal evidence of an acute large territory ischemic infarct.   2. Calcified meningiomas rising from the left parasagittal frontal parietal dura and right frontotemporal dura measuring 1.5 cm and 1.3 cm, respectively. No significant mass effect.     MACRO: Richie Wren discussed the significance and urgency of this critical finding by EPIC HAIKU with  LYNN FLORES on 1/6/2024 at 4:50 p.m. with confirmation of receipt. (**-RCF-**) Findings:  See findings.   Signed by: Richie Wren 1/6/2024 4:53 PM Dictation workstation:   [id number]    ECG 12 lead    Result Date: 1/3/2024  Atrial fibrillation with rapid ventricular response Low voltage QRS Cannot rule out Anterior infarct , age undetermined Abnormal ECG When compared with ECG of 13-DEC-2023 09:49, Atrial fibrillation has replaced Sinus rhythm See ED provider note for full interpretation and clinical correlation Confirmed by Jamir Salvador ([id number]) on 1/3/2024 10:49:37 PM    XR chest 1 view    Result Date: 1/2/2024  Interpreted By:  Soren Ham, STUDY: XR CHEST 1 VIEW;  1/2/2024 7:26 pm   INDICATION: Signs/Symptoms:sob.   COMPARISON: 9/26/2023   ACCESSION NUMBER(S): [id number]   ORDERING CLINICIAN: CARMEN CONKLIN   FINDINGS: The cardiac silhouette is stable in size. There are bilateral opacities concerning for infiltrates. Small right pleural effusion and basilar atelectasis or airspace disease. No pneumothorax.       Small right pleural effusion and basilar atelectasis or airspace disease and mild bilateral opacities concerning for infiltrates.   MACRO: None   Signed by: Soren Ham 1/2/2024 7:38 PM Dictation workstation:   [id number]    CT chest wo IV contrast    Result Date: 12/26/2023  Interpreted By:  Richie Wren, STUDY: CT CHEST WO IV CONTRAST;  12/26/2023 6:03 pm   INDICATION: Signs/Symptoms:cough.   COMPARISON: 09/22/2023 CT chest   ACCESSION NUMBER(S): [id number]   ORDERING CLINICIAN: JANNET VEE   TECHNIQUE:  Contiguous axial images of the chest and upper abdomen were obtained without contrast. Coronal and sagittal reformatted images were reconstructed from the axial data.   FINDINGS:     MEDIASTINUM AND LYMPH NODES:  The esophagus appears within normal limits.  No enlarged intrathoracic or axillary lymph nodes by imaging criteria. No pneumomediastinum.   VESSELS:  Normal caliber thoracic aorta. Mild aortic atherosclerosis. The pulmonary artery is enlarged, measuring up to 3.6 cm, indicative of pulmonary hypertension.   HEART: There is left atrial dilatation. Mitral annular calcifications. Mild coronary artery calcifications. No significant pericardial effusion.   LUNG, AIRWAYS, AND PLEURA: New small bilateral pleural effusions with adjacent passive atelectasis. There is new subsegmental atelectasis in the right middle lobe. There is a small amount debris in the lower trachea extending into the mainstem bronchi and bronchus intermedius. There is a small opacity in the left upper lobe and superior segment of left lower lobe consisting of tree-in-bud nodules suspicious for pneumonia the   OSSEOUS STRUCTURES: No acute osseous abnormality.   CHEST WALL SOFT TISSUES: No discernible abnormality.   UPPER ABDOMEN/OTHER: Multiple peripherally calcified splenic artery aneurysm measuring 1.3 cm, 1.3 cm, and 2.8 cm are similar to prior study. The liver appears cirrhotic.       1. Tree-in-bud opacities in the posterior left upper lobe and superior segment of the left lower lobe consistent with bronchiolitis/early pneumonia.   2. Small bilateral pleural effusions with adjacent passive atelectasis and right middle lobe subsegmental atelectasis.   3. Small amount of debris in the trachea and bilateral proximal bronchi that could be secretions or aspiration.   4. Three splenic artery aneurysm measuring up to 2.8 cm, unchanged.   MACRO: None.   Signed by: Richie Wren 12/26/2023 6:29 PM Dictation workstation:   [id number]    ECG 12  lead (Clinic Performed)    Result Date: 12/16/2023  Atrial fibrillation Poor R-wave progression Abnormal ECG When compared with ECG of 25-SEP-2023 10:00, Nonspecific T wave abnormality, improved in Inferior leads Confirmed by Raymon Beaulieu ([id number]) on 12/16/2023 11:49:53 PM    ECG 12 Lead    Result Date: 12/14/2023  Atrial fibrillation Low voltage QRS Abnormal ECG When compared with ECG of 11-DEC-2023 15:22, (unconfirmed) No significant change was found Confirmed by Zaki Stauffer ([id number]) on 12/14/2023 5:32:09 PM    ECG 12 lead    Result Date: 12/14/2023  Sinus rhythm with Premature supraventricular complexes Low voltage QRS Borderline ECG When compared with ECG of 13-DEC-2023 07:33, (unconfirmed) Sinus rhythm has replaced Atrial fibrillation Confirmed by Zaki Stauffer ([id number]) on 12/14/2023 5:31:33 PM    ECG 12 Lead    Result Date: 12/14/2023  Sinus rhythm with Premature atrial complexes Low voltage QRS Borderline ECG When compared with ECG of 13-DEC-2023 08:45, (unconfirmed) No significant change was found Confirmed by Zaki Stauffer ([id number]) on 12/14/2023 5:31:14 PM      Assessment/Plan   Principal Problem:    RSV (respiratory syncytial virus infection)  Active Problems:    Shortness of breath    73 YOF with chronic AF, now with RVR in the setting of  Acute on chronic hypoxic respiratory failure 2/2 RSV, CAP, COPD and HFpEF in acute exacerbation    S/p DCCV on 1/08/2024 with reversion to AF on 1/10/24  Long term OAC with warfarin  HTN, controlled   HLD  T2DM with neuropathy  Anemia  Morbid Obesity BMI 52  History of noncompliance       Plan:  Add metoprolol 25 mg TID for better HR control   Continue amiodarone PO loading   Resume warfarin when INR 2.0    - she has moderate to severe mitral annular calcification with mild mitral insufficiency and stenosis   Supportive care of infection / COPD per primary team      Will follow while hospitalized     01/10/24 at 9:54 AM - JOSELITO Liang-CNP

## 2024-01-10 NOTE — PROGRESS NOTES
Physical Therapy    Physical Therapy Treatment    Patient Name: Frannie Blanco  MRN: [id number]  Today's Date: 1/10/2024  Time Calculation  Start Time: 0735  Stop Time: 0817  Time Calculation (min): 42 min     Assessment/Plan   PT Assessment  PT Assessment Results: Decreased strength, Decreased range of motion, Decreased endurance, Decreased mobility, Obesity  End of Session Communication: Bedside nurse  End of Session Patient Position: Bed, 2 rail up, Alarm off, caregiver present  PT Plan  Inpatient/Swing Bed or Outpatient: Inpatient  PT Plan  Treatment/Interventions: Bed mobility, Transfer training, Therapeutic exercise  PT Plan: Skilled PT  PT Frequency: 3 times per week  PT Discharge Recommendations: Moderate intensity level of continued care  PT Recommended Transfer Status: Assist x1  PT - OK to Discharge: Yes    General Visit Information:   PT  Visit  PT Received On: 01/10/24  Patient fatigued quickly requiring frequent rests during session for recovery.  Pulse ox dropped to 88% with exertion, recovered to 91% with seated rest of 30-60 seconds.  HR increased to 160-170 with exertion, recovered to 120 with seated rest of 2-3 minutes.  Nurse informed of HR, cleared patient to continue as long as HR recovered with rest.     Precautions:  Precautions  Medical Precautions: Fall precautions, Infection precautions    Objective   Pain:  Pain Assessment  Pain Assessment: 0-10  Pain Score: 0 - No pain  Cognition:  Cognition  Overall Cognitive Status: Within Functional Limits  Treatments:  Therapeutic Exercise  Therapeutic Exercise Performed: Yes  Therapeutic Exercise Activity 1: laqs x 20 with vcs required for eccentric control  Therapeutic Exercise Activity 2: hip flexion seated, patient unable to clear floor with RLE  Therapeutic Exercise Activity 3: iso hip adduction/abduction x 20  Therapeutic Exercise Activity 4: toe raisess/heel raises x 20    Bed Mobility  Bed Mobility: Yes  Bed Mobility 1  Bed Mobility 1:  Sitting to supine, Scooting  Level of Assistance 1: Maximum assistance (Assist of 2 required)    Transfers  Transfer: Yes  Transfer 1  Technique 1: Sit to stand, Stand to sit  Transfer Device 1: Walker, Gait belt  Transfer Level of Assistance 1: Minimal verbal cues, Maximum assistance, +2 (Vcs required for proper positioning prior to attempting to stand. Max assist of 2 required for push off and for eccentric control with stand to sit.)    Outcome Measures:  [facility] Basic Mobility  Turning from your back to your side while in a flat bed without using bedrails: A lot  Moving from lying on your back to sitting on the side of a flat bed without using bedrails: A lot  Moving to and from bed to chair (including a wheelchair): A lot  Standing up from a chair using your arms (e.g. wheelchair or bedside chair): A lot  To walk in hospital room: A lot  Climbing 3-5 steps with railing: Total  Basic Mobility - Total Score: 11    Education Documentation  Home Exercise Program, taught by Soledad Dennis PTA at 1/10/2024 11:10 AM.  Learner: Patient  Readiness: Acceptance  Method: Explanation  Response: Demonstrated Understanding  Comment: Educated patient on proper positioning prior to attempting sit to stand transfer.    Mobility Training, taught by Soledad Dennis PTA at 1/10/2024 11:10 AM.  Learner: Patient  Readiness: Acceptance  Method: Explanation  Response: Demonstrated Understanding  Comment: Educated patient on proper positioning prior to attempting sit to stand transfer.    Education Comments  No comments found.      Encounter Problems       Encounter Problems (Active)       Balance       STG - Maintains dynamic standing balance without upper extremity support >=5 min  (Progressing)       Start:  01/04/24    Expected End:  01/18/24               Mobility       LTG - Patient will ambulate household distance with WW Leela  (Progressing)       Start:  01/04/24    Expected End:  01/18/24            LTG - Patient will  navigate 4 steps with 1 rail and cane with CGA (Progressing)       Start:  01/04/24    Expected End:  01/18/24               Pain - Adult          Transfers       STG - Patient will perform bed mobility with SBA  (Progressing)       Start:  01/04/24    Expected End:  01/18/24            STG - Patient will transfer sit to and from stand Leela with WW  (Progressing)       Start:  01/04/24    Expected End:  01/18/24

## 2024-01-10 NOTE — PROGRESS NOTES
Frannie Blanco is a 73 y.o. female on day 6 of admission presenting with RSV (respiratory syncytial virus infection).      Subjective   Pt assessed at bedside. Attempted to get up with therapy but HR went up to the 170s in afib. Pt feels more short of breath today, requiring more oxygen.        Objective     Last Recorded Vitals  /64 (BP Location: Right arm, Patient Position: Lying)   Pulse (!) 137   Temp 35.9 °C (96.6 °F) (Temporal)   Resp 20   Wt 149 kg (327 lb 9.7 oz)   SpO2 96%   Intake/Output last 3 Shifts:    Intake/Output Summary (Last 24 hours) at 1/10/2024 1314  Last data filed at 1/10/2024 1240  Gross per 24 hour   Intake 1806 ml   Output 4050 ml   Net -2244 ml       Admission Weight  Weight: 150 kg (330 lb) (01/02/24 1740)    Daily Weight  01/09/24 : 149 kg (327 lb 9.7 oz)    Image Results  ECG 12 lead  Atrial fibrillation with rapid ventricular response  Low voltage QRS  Cannot rule out Anterior infarct , age undetermined  Abnormal ECG  When compared with ECG of 07-JAN-2024 00:28, (unconfirmed)  No significant change was found  ECG 12 lead  Atrial fibrillation with rapid ventricular response  Abnormal ECG  When compared with ECG of 02-JAN-2024 17:57,  No significant change was found      Physical Exam  Constitutional:       Appearance: She is obese.   HENT:      Head: Normocephalic.      Nose: Nose normal.      Mouth/Throat:      Pharynx: Oropharynx is clear.   Eyes:      Conjunctiva/sclera: Conjunctivae normal.   Cardiovascular:      Rate and Rhythm: Tachycardia present. Rhythm irregular.   Pulmonary:      Breath sounds: Wheezing present.   Abdominal:      General: Bowel sounds are normal.      Palpations: Abdomen is soft.   Musculoskeletal:      Cervical back: Normal range of motion and neck supple.      Right lower leg: Edema present.      Left lower leg: Edema present.   Skin:     General: Skin is dry.   Neurological:      Mental Status: She is alert and oriented to person, place, and  time.   Psychiatric:         Mood and Affect: Mood normal.         Behavior: Behavior normal.         Relevant Results  Scheduled medications  amiodarone, 200 mg, oral, TID  aspirin, 81 mg, oral, Daily  atorvastatin, 20 mg, oral, Nightly  calcium carbonate, 1,500 mg, oral, q12h  dapsone, 100 mg, oral, Daily before breakfast  [Held by provider] dilTIAZem CD, 300 mg, oral, Daily  docusate sodium, 100 mg, oral, BID  ferrous sulfate (325 mg ferrous sulfate), 65 mg of iron, oral, Daily  tiotropium, 2 Inhalation, inhalation, Daily   And  fluticasone furoate-vilanteroL, 1 puff, inhalation, Daily  furosemide, 80 mg, oral, Daily  gabapentin, 200 mg, oral, BID  glimepiride, 2 mg, oral, Daily with breakfast  guaiFENesin, 600 mg, oral, BID  insulin lispro, 0-5 Units, subcutaneous, TID with meals  ipratropium-albuteroL, 3 mL, nebulization, 4x daily  losartan, 12.5 mg, oral, Daily  [Held by provider] melatonin, 6 mg, oral, Daily  metFORMIN, 1,000 mg, oral, BID with meals  metoprolol tartrate, 25 mg, oral, TID  pantoprazole, 40 mg, oral, Daily before breakfast   Or  pantoprazole, 40 mg, intravenous, Daily before breakfast  piperacillin-tazobactam, 4.5 g, intravenous, q6h  predniSONE, 40 mg, oral, Daily  topiramate, 100 mg, oral, BID  [Held by provider] warfarin, 5 mg, oral, Daily      Continuous medications     PRN medications  PRN medications: acetaminophen **OR** acetaminophen **OR** acetaminophen, acetaminophen **OR** acetaminophen **OR** acetaminophen, albuterol, benzocaine-menthoL, bisacodyl, dextromethorphan-guaifenesin, dextrose 10 % in water (D10W), dextrose, glucagon, metoprolol, ondansetron ODT **OR** ondansetron, oxygen    Results for orders placed or performed during the hospital encounter of 01/02/24 (from the past 24 hour(s))   POCT GLUCOSE   Result Value Ref Range    POCT Glucose 272 (H) 74 - 99 mg/dL   POCT GLUCOSE   Result Value Ref Range    POCT Glucose 223 (H) 74 - 99 mg/dL   Basic metabolic panel   Result Value  Ref Range    Glucose 106 (H) 74 - 99 mg/dL    Sodium 142 136 - 145 mmol/L    Potassium 4.8 3.5 - 5.3 mmol/L    Chloride 99 98 - 107 mmol/L    Bicarbonate 42 (HH) 21 - 32 mmol/L    Anion Gap <7 (L) 10 - 20 mmol/L    Urea Nitrogen 31 (H) 6 - 23 mg/dL    Creatinine 0.85 0.50 - 1.05 mg/dL    eGFR 73 >60 mL/min/1.73m*2    Calcium 7.8 (L) 8.6 - 10.3 mg/dL   CBC and Auto Differential   Result Value Ref Range    WBC 7.5 4.4 - 11.3 x10*3/uL    nRBC 0.0 0.0 - 0.0 /100 WBCs    RBC 2.53 (L) 4.00 - 5.20 x10*6/uL    Hemoglobin 8.0 (L) 12.0 - 16.0 g/dL    Hematocrit 27.2 (L) 36.0 - 46.0 %     (H) 80 - 100 fL    MCH 31.6 26.0 - 34.0 pg    MCHC 29.4 (L) 32.0 - 36.0 g/dL    RDW 16.8 (H) 11.5 - 14.5 %    Platelets 173 150 - 450 x10*3/uL    Neutrophils % 81.4 40.0 - 80.0 %    Immature Granulocytes %, Automated 0.5 0.0 - 0.9 %    Lymphocytes % 11.0 13.0 - 44.0 %    Monocytes % 5.2 2.0 - 10.0 %    Eosinophils % 1.9 0.0 - 6.0 %    Basophils % 0.0 0.0 - 2.0 %    Neutrophils Absolute 6.14 (H) 1.60 - 5.50 x10*3/uL    Immature Granulocytes Absolute, Automated 0.04 0.00 - 0.50 x10*3/uL    Lymphocytes Absolute 0.83 0.80 - 3.00 x10*3/uL    Monocytes Absolute 0.39 0.05 - 0.80 x10*3/uL    Eosinophils Absolute 0.14 0.00 - 0.40 x10*3/uL    Basophils Absolute 0.00 0.00 - 0.10 x10*3/uL   Protime-INR   Result Value Ref Range    Protime 44.1 (H) 9.8 - 12.8 seconds    INR 3.9 (H) 0.9 - 1.1   POCT GLUCOSE   Result Value Ref Range    POCT Glucose 71 (L) 74 - 99 mg/dL   POCT GLUCOSE   Result Value Ref Range    POCT Glucose 248 (H) 74 - 99 mg/dL          Assessment/Plan   This patient currently has cardiac telemetry ordered; if you would like to modify or discontinue the telemetry order, click here to go to the orders activity to modify/discontinue the order.      Principal Problem:    RSV (respiratory syncytial virus infection)  Active Problems:    Shortness of breath    #Encephalopathy 2/2 infection vs. AF RVR (resolved)   - RRT called overnight 1/7  for change in mental status; patient was previously A&O x 3 but became confused and unable to tell staff the year or her birthday  - CT head and CTA head/neck showed no acute infarct or hemorrhage; no significant stenosis of the vessels. Calcified meningiomas noted without evidence of mass effect or vasogenic edema (noted to be no interval change)  - NIHSS 0 today  - Patient cardioverted 1/8, remains in AF with rate controlled in the 90s   - Antibiotics escalated for increasing O2 requirement; patient now on Vancomycin (MRSA Negative) stopped and Zosyn (Day 4)  - Medications reviewed with attending, Solumedrol IV changed to Prednisone PO   - Hold melatonin; gabapentin decreased to 200 mg BID   - Neuro checks q4h; discontinue      #Acute on chronic hypoxic respiratory failure 2/2 RSV, community acquired pneumonia  #COPD, in acute exacerbation  - Patient was seen in [place] ED 12/26, discharged on dexamethasone and doxycycline  - WBC 13.9 >> 5.3 > 11.4 > 10.8  - Lactate 1.9  - COVID, flu A/B negative  - RSV positive  - Procal 0.75  - BCx no growth- final report   - sputum culture contained significant salivary contamination; repeat pending   - CXR: Small right pleural effusion and basilar atelectasis or airspace disease and mild bilateral opacities concerning for infiltrates.    - CT chest pending today for increased o2 requirements  - Tylenol PRN for fever   - Mucinex BID, Robitussin DM q4h PRN for cough   - DuoNebs TID    - Breo and Spiriva ordered (pharmacy substitute for home Trelegy)  - RT eval and treat protocol  - Bronchial hygiene  - Isolation protocol for RSV  - Baseline O2 5L continuously   - Wean O2 as tolerated; patient currently requiring 8L O2   - Continue Zosyn (day 4)   - Solumedrol 40 mg IVP q8h changed to prednisone 40 mg PO every day due to encephalopathy   - CXR 1/8: There are patchy bilateral airspace opacities with slight interval   worsening on the and slight interval improvement on the right.  Findings may relate to edema versus infection. Attention on continued short-term follow-up is advised. Small bilateral pleural effusions are noted with slight interval improvement on the right.   - ID consulted for worsening pneumonia, appreciate recs      #Atrial fibrillation with RVR, s/p cardioversion   #HTN  #HLD  #HFpEF, concern for acute exacerbation  #Supratherapeutic INR on warfarin   - Recent DCCV on 12/13 at [facility], follows with Dr. Siegel   - Trop 7 > 7   - >285>368 > 243  - Telemetry monitoring- notified by charge RN this morning that patient's HR has been consistently in the 120s-130s on telemetry. Upon reviewing the flowsheet documentation for 1/5-1/6, only two instances with HR > 110 bpm are charted  - Patient was transferred to ICU overnight and placed on a diltiazem drip for HR persistently in the 130s  - Cardioversion completed today; patient remains in AF with rate controlled in the 90s   - Start amiodarone 200 mg PO TID x 7 days per cardiology   - Lopressor 5 mg IVP q4h PRN for HR > 120 bpm sustained for > 5 mins   - Continue aspirin, atorvastatin, furosemide, and losartan  - INR 2.3 > 3.0 > 3.8  - Warfarin on hold   - Goal INR 2-3, daily PT/INR while inpatient   - Strict I&Os: LOS - 5630 ml   - Daily weights: up 4kg since admission with O2 requirement now up to 6L, Lasix 20 mg IVP repeated 1/8   - 2g Na diet, 1800 mL FR   - Cardiology consulted, appreciate recs   - Patient back into afib with RVR on 1/10  - Cardiology added metop TID for rate control      #Hematuria  - UA: 3+ blood, > 20 RBCs, 1+ bacteria  - UCx with no significant growth   - Patient denies gross blood in urine or difficulties with urination   - Monitor UOP for blood - none reported as of 1/8  - Trend CBC; H/H stable      #T2DM with neuropathy   - Continue Lantus, metformin and glimepiride   - Gabapentin decreased to 200 mg BID due to change in mental status  - SSI three times a day before meals while on steroids   -  Hypoglycemia protocol  - Accuchecks ac/hs/prn  - Diabetic diet   - HbA1c 4.6      #Anemia, macrocytic  - H/H stable, 8.4/29.1  > 8.6/30.2 with  > 112  - B12 level was 267 last month per chart review   - Folate 6.2   - Iron studies: Fe 28, TIBC 272, 10% saturation  - Patient receives monthly B12 injections and is on ferrous sulfate; continue   - Monitor for active bleeding  - Daily CBC to trend H/H     #History of mucous membrane pemphigoid  - Continue dapsone  - Resume outpatient derm follow up on discharge     DVT ppx  -warfarin, on hold     F: PRN  E: Replete per protocol  N: ADA, Cardiac, 1800ml FR  A: PIV    Disposition: Pt requires more than 2 inpatient days at this time   Code Status: Full Code    Total accumulated time spent face to face and not face to face preparing to see the patient, obtaining and reviewing separately obtained history; performing a medically appropriate examination and/or evaluation; counseling and educating the patient, family; ordering medications, tests, or procedures; referring and communicating with other health care professionals; documenting clinical information in the patient's medical record; independently interpreting results and communicating the results to the patient, family; and care coordination was 45 minutes.           JOSELITO Trevizo-CNP

## 2024-01-10 NOTE — PROGRESS NOTES
Per IDT rounds, patient fluctuating with oxygen demand 8-10 L oxygen.  Updates sent to [facility].  Patient does not need precert to go to [facility].  TCC following.

## 2024-01-10 NOTE — CARE PLAN
The patient's goals for the shift include maintian oxygen leveel above 90A% & be injury free    The clinical goals for the shift include continue diuresis and improved oxygenation    Over the shift, the patient did not make progress toward the following goals. Patient unmotivated to provide care for self; ie turning and self care activities. Oxygenation remained the same. Diuresised over 1L throughout shift

## 2024-01-11 LAB
ANION GAP SERPL CALC-SCNC: 7 MMOL/L (ref 10–20)
BASOPHILS # BLD AUTO: 0.01 X10*3/UL (ref 0–0.1)
BASOPHILS NFR BLD AUTO: 0.1 %
BUN SERPL-MCNC: 30 MG/DL (ref 6–23)
CALCIUM SERPL-MCNC: 8.3 MG/DL (ref 8.6–10.3)
CHLORIDE SERPL-SCNC: 101 MMOL/L (ref 98–107)
CO2 SERPL-SCNC: 40 MMOL/L (ref 21–32)
CREAT SERPL-MCNC: 0.96 MG/DL (ref 0.5–1.05)
EGFRCR SERPLBLD CKD-EPI 2021: 63 ML/MIN/1.73M*2
EOSINOPHIL # BLD AUTO: 0.28 X10*3/UL (ref 0–0.4)
EOSINOPHIL NFR BLD AUTO: 2.9 %
ERYTHROCYTE [DISTWIDTH] IN BLOOD BY AUTOMATED COUNT: 17 % (ref 11.5–14.5)
GLUCOSE BLD MANUAL STRIP-MCNC: 139 MG/DL (ref 74–99)
GLUCOSE BLD MANUAL STRIP-MCNC: 181 MG/DL (ref 74–99)
GLUCOSE BLD MANUAL STRIP-MCNC: 325 MG/DL (ref 74–99)
GLUCOSE BLD MANUAL STRIP-MCNC: 344 MG/DL (ref 74–99)
GLUCOSE BLD MANUAL STRIP-MCNC: 61 MG/DL (ref 74–99)
GLUCOSE SERPL-MCNC: 57 MG/DL (ref 74–99)
HCT VFR BLD AUTO: 29.4 % (ref 36–46)
HGB BLD-MCNC: 8.4 G/DL (ref 12–16)
IMM GRANULOCYTES # BLD AUTO: 0.04 X10*3/UL (ref 0–0.5)
IMM GRANULOCYTES NFR BLD AUTO: 0.4 % (ref 0–0.9)
INR PPP: 3.1 (ref 0.9–1.1)
LYMPHOCYTES # BLD AUTO: 1.16 X10*3/UL (ref 0.8–3)
LYMPHOCYTES NFR BLD AUTO: 11.9 %
MCH RBC QN AUTO: 31.3 PG (ref 26–34)
MCHC RBC AUTO-ENTMCNC: 28.6 G/DL (ref 32–36)
MCV RBC AUTO: 110 FL (ref 80–100)
MONOCYTES # BLD AUTO: 0.67 X10*3/UL (ref 0.05–0.8)
MONOCYTES NFR BLD AUTO: 6.9 %
NEUTROPHILS # BLD AUTO: 7.56 X10*3/UL (ref 1.6–5.5)
NEUTROPHILS NFR BLD AUTO: 77.8 %
NRBC BLD-RTO: 0 /100 WBCS (ref 0–0)
PLATELET # BLD AUTO: 187 X10*3/UL (ref 150–450)
POTASSIUM SERPL-SCNC: 3.2 MMOL/L (ref 3.5–5.3)
PROTHROMBIN TIME: 35 SECONDS (ref 9.8–12.8)
RBC # BLD AUTO: 2.68 X10*6/UL (ref 4–5.2)
SODIUM SERPL-SCNC: 145 MMOL/L (ref 136–145)
WBC # BLD AUTO: 9.7 X10*3/UL (ref 4.4–11.3)

## 2024-01-11 PROCEDURE — 85610 PROTHROMBIN TIME: CPT | Mod: IPSPLIT

## 2024-01-11 PROCEDURE — 2500000002 HC RX 250 W HCPCS SELF ADMINISTERED DRUGS (ALT 637 FOR MEDICARE OP, ALT 636 FOR OP/ED): Mod: IPSPLIT | Performed by: INTERNAL MEDICINE

## 2024-01-11 PROCEDURE — 82947 ASSAY GLUCOSE BLOOD QUANT: CPT | Mod: IPSPLIT

## 2024-01-11 PROCEDURE — 94640 AIRWAY INHALATION TREATMENT: CPT | Mod: IPSPLIT

## 2024-01-11 PROCEDURE — 99233 SBSQ HOSP IP/OBS HIGH 50: CPT | Performed by: NURSE PRACTITIONER

## 2024-01-11 PROCEDURE — 2500000001 HC RX 250 WO HCPCS SELF ADMINISTERED DRUGS (ALT 637 FOR MEDICARE OP): Mod: IPSPLIT

## 2024-01-11 PROCEDURE — 2500000001 HC RX 250 WO HCPCS SELF ADMINISTERED DRUGS (ALT 637 FOR MEDICARE OP): Mod: IPSPLIT | Performed by: NURSE PRACTITIONER

## 2024-01-11 PROCEDURE — 36415 COLL VENOUS BLD VENIPUNCTURE: CPT | Mod: IPSPLIT

## 2024-01-11 PROCEDURE — 97530 THERAPEUTIC ACTIVITIES: CPT | Mod: GP,CQ,IPSPLIT

## 2024-01-11 PROCEDURE — 94668 MNPJ CHEST WALL SBSQ: CPT | Mod: IPSPLIT

## 2024-01-11 PROCEDURE — 2500000002 HC RX 250 W HCPCS SELF ADMINISTERED DRUGS (ALT 637 FOR MEDICARE OP, ALT 636 FOR OP/ED): Mod: IPSPLIT

## 2024-01-11 PROCEDURE — 94660 CPAP INITIATION&MGMT: CPT | Mod: IPSPLIT

## 2024-01-11 PROCEDURE — 97535 SELF CARE MNGMENT TRAINING: CPT | Mod: GO,IPSPLIT | Performed by: OCCUPATIONAL THERAPIST

## 2024-01-11 PROCEDURE — 2500000004 HC RX 250 GENERAL PHARMACY W/ HCPCS (ALT 636 FOR OP/ED): Mod: IPSPLIT

## 2024-01-11 PROCEDURE — 97110 THERAPEUTIC EXERCISES: CPT | Mod: GP,CQ,IPSPLIT

## 2024-01-11 PROCEDURE — 85025 COMPLETE CBC W/AUTO DIFF WBC: CPT | Mod: IPSPLIT

## 2024-01-11 PROCEDURE — 9420000001 HC RT PATIENT EDUCATION 5 MIN: Mod: IPSPLIT

## 2024-01-11 PROCEDURE — 2500000005 HC RX 250 GENERAL PHARMACY W/O HCPCS: Mod: IPSPLIT | Performed by: INTERNAL MEDICINE

## 2024-01-11 PROCEDURE — 80048 BASIC METABOLIC PNL TOTAL CA: CPT | Mod: IPSPLIT

## 2024-01-11 PROCEDURE — 99232 SBSQ HOSP IP/OBS MODERATE 35: CPT

## 2024-01-11 PROCEDURE — 2020000001 HC ICU ROOM DAILY: Mod: IPSPLIT

## 2024-01-11 RX ORDER — POTASSIUM CHLORIDE 20 MEQ/1
40 TABLET, EXTENDED RELEASE ORAL
Status: COMPLETED | OUTPATIENT
Start: 2024-01-11 | End: 2024-01-11

## 2024-01-11 RX ADMIN — TOPIRAMATE 100 MG: 100 TABLET, FILM COATED ORAL at 20:45

## 2024-01-11 RX ADMIN — GABAPENTIN 200 MG: 100 CAPSULE ORAL at 08:47

## 2024-01-11 RX ADMIN — INSULIN LISPRO 1 UNITS: 100 INJECTION, SOLUTION INTRAVENOUS; SUBCUTANEOUS at 11:36

## 2024-01-11 RX ADMIN — FLUTICASONE FUROATE AND VILANTEROL TRIFENATATE 1 PUFF: 200; 25 POWDER RESPIRATORY (INHALATION) at 10:01

## 2024-01-11 RX ADMIN — METFORMIN HYDROCHLORIDE 1000 MG: 500 TABLET ORAL at 16:56

## 2024-01-11 RX ADMIN — TIOTROPIUM BROMIDE INHALATION SPRAY 2 PUFF: 3.12 SPRAY, METERED RESPIRATORY (INHALATION) at 10:00

## 2024-01-11 RX ADMIN — POTASSIUM CHLORIDE 40 MEQ: 1500 TABLET, EXTENDED RELEASE ORAL at 11:14

## 2024-01-11 RX ADMIN — IPRATROPIUM BROMIDE AND ALBUTEROL SULFATE 3 ML: .5; 3 SOLUTION RESPIRATORY (INHALATION) at 13:29

## 2024-01-11 RX ADMIN — METOPROLOL TARTRATE 25 MG: 25 TABLET, FILM COATED ORAL at 15:13

## 2024-01-11 RX ADMIN — Medication 6 L/MIN: at 09:58

## 2024-01-11 RX ADMIN — METOPROLOL TARTRATE 25 MG: 25 TABLET, FILM COATED ORAL at 20:45

## 2024-01-11 RX ADMIN — METOPROLOL TARTRATE 25 MG: 25 TABLET, FILM COATED ORAL at 08:46

## 2024-01-11 RX ADMIN — FUROSEMIDE 80 MG: 80 TABLET ORAL at 08:46

## 2024-01-11 RX ADMIN — PIPERACILLIN SODIUM AND TAZOBACTAM SODIUM 4.5 G: 4; .5 INJECTION, SOLUTION INTRAVENOUS at 08:46

## 2024-01-11 RX ADMIN — CALCIUM CARBONATE (ANTACID) CHEW TAB 500 MG 1500 MG: 500 CHEW TAB at 08:46

## 2024-01-11 RX ADMIN — INSULIN LISPRO 4 UNITS: 100 INJECTION, SOLUTION INTRAVENOUS; SUBCUTANEOUS at 16:49

## 2024-01-11 RX ADMIN — IPRATROPIUM BROMIDE AND ALBUTEROL SULFATE 3 ML: .5; 3 SOLUTION RESPIRATORY (INHALATION) at 21:55

## 2024-01-11 RX ADMIN — DOCUSATE SODIUM 100 MG: 100 CAPSULE, LIQUID FILLED ORAL at 08:46

## 2024-01-11 RX ADMIN — IPRATROPIUM BROMIDE AND ALBUTEROL SULFATE 3 ML: .5; 3 SOLUTION RESPIRATORY (INHALATION) at 17:31

## 2024-01-11 RX ADMIN — FERROUS SULFATE TAB 325 MG (65 MG ELEMENTAL FE) 1 TABLET: 325 (65 FE) TAB at 08:46

## 2024-01-11 RX ADMIN — DOCUSATE SODIUM 100 MG: 100 CAPSULE, LIQUID FILLED ORAL at 20:45

## 2024-01-11 RX ADMIN — PIPERACILLIN SODIUM AND TAZOBACTAM SODIUM 4.5 G: 4; .5 INJECTION, SOLUTION INTRAVENOUS at 15:13

## 2024-01-11 RX ADMIN — AMIODARONE HYDROCHLORIDE 200 MG: 200 TABLET ORAL at 23:00

## 2024-01-11 RX ADMIN — CALCIUM CARBONATE (ANTACID) CHEW TAB 500 MG 1500 MG: 500 CHEW TAB at 20:45

## 2024-01-11 RX ADMIN — AMIODARONE HYDROCHLORIDE 200 MG: 200 TABLET ORAL at 16:56

## 2024-01-11 RX ADMIN — IPRATROPIUM BROMIDE AND ALBUTEROL SULFATE 3 ML: .5; 3 SOLUTION RESPIRATORY (INHALATION) at 09:58

## 2024-01-11 RX ADMIN — DAPSONE 100 MG: 25 TABLET ORAL at 06:24

## 2024-01-11 RX ADMIN — LOSARTAN POTASSIUM 12.5 MG: 25 TABLET, FILM COATED ORAL at 08:46

## 2024-01-11 RX ADMIN — POTASSIUM CHLORIDE 40 MEQ: 1500 TABLET, EXTENDED RELEASE ORAL at 08:46

## 2024-01-11 RX ADMIN — GUAIFENESIN 600 MG: 600 TABLET, EXTENDED RELEASE ORAL at 20:45

## 2024-01-11 RX ADMIN — ASPIRIN 81 MG: 81 TABLET, COATED ORAL at 08:47

## 2024-01-11 RX ADMIN — ATORVASTATIN CALCIUM 20 MG: 10 TABLET, FILM COATED ORAL at 20:45

## 2024-01-11 RX ADMIN — PIPERACILLIN SODIUM AND TAZOBACTAM SODIUM 4.5 G: 4; .5 INJECTION, SOLUTION INTRAVENOUS at 03:01

## 2024-01-11 RX ADMIN — PREDNISONE 40 MG: 20 TABLET ORAL at 08:53

## 2024-01-11 RX ADMIN — GABAPENTIN 200 MG: 100 CAPSULE ORAL at 20:45

## 2024-01-11 RX ADMIN — AMIODARONE HYDROCHLORIDE 200 MG: 200 TABLET ORAL at 11:14

## 2024-01-11 RX ADMIN — PANTOPRAZOLE SODIUM 40 MG: 40 TABLET, DELAYED RELEASE ORAL at 06:24

## 2024-01-11 RX ADMIN — GUAIFENESIN 600 MG: 600 TABLET, EXTENDED RELEASE ORAL at 08:47

## 2024-01-11 RX ADMIN — TOPIRAMATE 100 MG: 100 TABLET, FILM COATED ORAL at 08:53

## 2024-01-11 RX ADMIN — PIPERACILLIN SODIUM AND TAZOBACTAM SODIUM 4.5 G: 4; .5 INJECTION, SOLUTION INTRAVENOUS at 20:45

## 2024-01-11 ASSESSMENT — COGNITIVE AND FUNCTIONAL STATUS - GENERAL
HELP NEEDED FOR BATHING: A LOT
DAILY ACTIVITIY SCORE: 16
MOVING TO AND FROM BED TO CHAIR: A LOT
DRESSING REGULAR LOWER BODY CLOTHING: A LOT
HELP NEEDED FOR BATHING: A LOT
TURNING FROM BACK TO SIDE WHILE IN FLAT BAD: A LOT
CLIMB 3 TO 5 STEPS WITH RAILING: A LOT
STANDING UP FROM CHAIR USING ARMS: A LOT
DAILY ACTIVITIY SCORE: 12
DRESSING REGULAR LOWER BODY CLOTHING: A LOT
STANDING UP FROM CHAIR USING ARMS: A LOT
MOBILITY SCORE: 12
CLIMB 3 TO 5 STEPS WITH RAILING: TOTAL
TURNING FROM BACK TO SIDE WHILE IN FLAT BAD: A LOT
MOVING TO AND FROM BED TO CHAIR: A LOT
MOVING FROM LYING ON BACK TO SITTING ON SIDE OF FLAT BED WITH BEDRAILS: A LOT
TOILETING: A LOT
WALKING IN HOSPITAL ROOM: A LOT
PERSONAL GROOMING: A LOT
TOILETING: A LITTLE
MOVING FROM LYING ON BACK TO SITTING ON SIDE OF FLAT BED WITH BEDRAILS: A LOT
EATING MEALS: A LITTLE
DRESSING REGULAR UPPER BODY CLOTHING: A LITTLE
DRESSING REGULAR UPPER BODY CLOTHING: A LOT
PERSONAL GROOMING: A LITTLE
WALKING IN HOSPITAL ROOM: A LOT
MOBILITY SCORE: 11
EATING MEALS: A LOT

## 2024-01-11 ASSESSMENT — PAIN - FUNCTIONAL ASSESSMENT
PAIN_FUNCTIONAL_ASSESSMENT: 0-10

## 2024-01-11 ASSESSMENT — PAIN SCALES - GENERAL
PAINLEVEL_OUTOF10: 0 - NO PAIN

## 2024-01-11 ASSESSMENT — ACTIVITIES OF DAILY LIVING (ADL): HOME_MANAGEMENT_TIME_ENTRY: 35

## 2024-01-11 NOTE — NURSING NOTE
"Stage 1 blanchable reddened area noted to left lower buttocks.Pt. aware of need to turn to each side to side to help 't wanna turn.\" Pt. Tuned to left side with encouragement to turn after explanation of need to turn provided.  "

## 2024-01-11 NOTE — CARE PLAN
The patient's goals for the shift include Increase activity as tolerated this shift 1/10/24 1900.    The clinical goals for the shift include pulse ox 92% without increase in O2 this shift    Pt remains in a.fib this shift- rate low 100s or less. O2 @ 8L HFNC. SOB with exertion.  Remains on IV antibiotic.

## 2024-01-11 NOTE — PROGRESS NOTES
Physical Therapy    Physical Therapy Treatment    Patient Name: Frannie Blanco  MRN: [id number]  Today's Date: 1/11/2024  Time Calculation  Start Time: 0918  Stop Time: 0956  Time Calculation (min): 38 min     Assessment/Plan   PT Assessment  PT Assessment Results: Decreased strength, Decreased endurance, Decreased range of motion, Decreased mobility, Obesity  End of Session Communication: Bedside nurse  End of Session Patient Position: Bed, 4 rail up, Alarm off, not on at start of session  PT Plan  Inpatient/Swing Bed or Outpatient: Inpatient  PT Plan  Treatment/Interventions: Bed mobility, Transfer training, Gait training, Therapeutic exercise  PT Plan: Skilled PT  PT Frequency: 3 times per week  PT Discharge Recommendations: Moderate intensity level of continued care  PT Recommended Transfer Status: Assist x1  PT - OK to Discharge: Yes    General Visit Information:   PT  Visit  PT Received On: 01/11/24       Subjective   Precautions:  Precautions  Medical Precautions: Fall precautions, Infection precautions    Objective   Pain:  Pain Assessment  Pain Assessment: 0-10  Pain Score: 0 - No pain  Cognition:  Cognition  Overall Cognitive Status: Within Functional Limits  Treatments:  Therapeutic Exercise  Therapeutic Exercise Performed: Yes  Therapeutic Exercise Activity 1: quad sets x 20  Therapeutic Exercise Activity 2: ankle pumps x 20  Therapeutic Exercise Activity 3: saqs x 20 with vcs required for eccentric control  Therapeutic Exercise Activity 4: iso hip adduction x 20  Therapeutic Exercise Activity 5: iso hip abduction x 20    Bed Mobility  Bed Mobility: Yes  Bed Mobility 1  Bed Mobility 1: Rolling right, Rolling left  Level of Assistance 1: Maximum assistance, Minimal verbal cues (assist of 2-3 with vcs for proper hand placement for patient to assist)  Bed Mobility 3  Bed Mobility 3: Scooting  Level of Assistance 3: Minimal verbal cues, Maximum assistance (Assist of 3 required with bed in trendelenburg, vcs  required for proper hand placement for patient to assist.)    Outcome Measures:  [facility] Basic Mobility  Turning from your back to your side while in a flat bed without using bedrails: A lot  Moving from lying on your back to sitting on the side of a flat bed without using bedrails: A lot  Moving to and from bed to chair (including a wheelchair): A lot  Standing up from a chair using your arms (e.g. wheelchair or bedside chair): A lot  To walk in hospital room: A lot  Climbing 3-5 steps with railing: Total  Basic Mobility - Total Score: 11    Education Documentation  Home Exercise Program, taught by Soledad Dennis PTA at 1/11/2024  1:38 PM.  Learner: Patient  Readiness: Acceptance  Method: Demonstration  Response: Demonstrated Understanding  Comment: Educated patient on proper performance of LE strengthening exercises and proper hand placement to assist with transfers.    Mobility Training, taught by Soledad Dennis PTA at 1/11/2024  1:38 PM.  Learner: Patient  Readiness: Acceptance  Method: Demonstration  Response: Demonstrated Understanding  Comment: Educated patient on proper performance of LE strengthening exercises and proper hand placement to assist with transfers.    Education Comments  No comments found.      Encounter Problems       Encounter Problems (Active)       Balance       STG - Maintains dynamic standing balance without upper extremity support >=5 min  (Progressing)       Start:  01/04/24    Expected End:  01/18/24               Mobility       LTG - Patient will ambulate household distance with WW Leela  (Progressing)       Start:  01/04/24    Expected End:  01/18/24            LTG - Patient will navigate 4 steps with 1 rail and cane with CGA (Progressing)       Start:  01/04/24    Expected End:  01/18/24               Pain - Adult          Transfers       STG - Patient will perform bed mobility with SBA  (Progressing)       Start:  01/04/24    Expected End:  01/18/24            STG -  Patient will transfer sit to and from stand Leela with WW  (Progressing)       Start:  01/04/24    Expected End:  01/18/24

## 2024-01-11 NOTE — CARE PLAN
The patient's goals for the shift include increae activity as tolerated this shift.    The clinical goals for the shift include oxygen level maintained at 92% or greater this shift.    Over the shift, the patient did not make progress toward the following goals. Barriers Pt.to progression include heart rhythm remains in atrial fibrillation . Pt. Oxygen saturation dropped to 88% with exertion but recovered with rest. Pt. Able to sit in chair today & tolerated well.

## 2024-01-11 NOTE — PROGRESS NOTES
Occupational Therapy    Occupational Therapy Treatment    Name: Frannie Blanco  MRN: [id number]  : 1950  Date: 24  Time Calculation  Start Time:   Stop Time: 1146  Time Calculation (min): 35 min    Assessment:  OT Assessment: Pt. displays  increased performance with transfer and was able to use the BSC today. o2 maintained throughout. Still with limited endurance.  Prognosis: Good  Barriers to Discharge: None  Evaluation/Treatment Tolerance: Patient tolerated treatment well  Medical Staff Made Aware: Yes  End of Session Communication: Bedside nurse  End of Session Patient Position: Up in chair  Plan:  Treatment Interventions: ADL retraining, Functional transfer training, UE strengthening/ROM, Endurance training, Patient/family training, Equipment evaluation/education, Neuromuscular reeducation  OT Frequency: 3 times per week  OT Discharge Recommendations: Moderate intensity level of continued care    Subjective   Previous Visit Info:  OT Last Visit  OT Received On: 24  General:  General  Missed Visit: Yes  Missed Visit Reason: Patient placed on medical hold (RN deferred OT therapy session as pt. reverted back to A fib, and is expected to have CT angio to r/o PE. Notified ABE)  Family/Caregiver Present:  (RN in and out of room to pass meds.)  Prior to Session Communication: Bedside nurse  Patient Position Received: Bed, 2 rail up, Alarm off, not on at start of session  General Comment: purewick, BP cuff, O2 HF NC; pulse ox, tele  Precautions:  Medical Precautions: Fall precautions, Infection precautions (RSV)  Pain Assessment:  Pain Assessment  Pain Assessment: 0-10  Pain Score: 0 - No pain     Objective   Activities of Daily Living: LE Dressing  LE Dressing: Yes  Adult Briefs Level of Assistance: Maximum assistance  LE Dressing Where Assessed: Edge of bed  LE Dressing Comments: not successful d/t body habitus and incompatible size    Toileting  Toileting Level of Assistance: Minimum  assistance  Where Assessed: Bedside commode  Toileting Comments: pt. required assist to complete perihygiene.    Bed Mobility/Transfers: Bed Mobility  Bed Mobility: Yes  Bed Mobility 1  Bed Mobility 1: Supine to sitting  Level of Assistance 1: Moderate assistance    Transfers  Transfer: Yes  Transfer 1  Transfer From 1: Bed to  Transfer to 1: Commode-standard, Chair with arms (BSC;)  Technique 1: Sit to stand, Stand to sit  Transfer Device 1: Walker  Transfer Level of Assistance 1: Contact guard  Trials/Comments 1: bed elevated; BSC elevated    Toilet Transfers  Toilet Transfer From: Bed  Toilet Transfer Type: To and from  Toilet Transfer to: Standard bedside commode  Toilet Transfer Technique: Ambulating  Toilet Transfers: Contact guard    Ambulation/Gait Training:  Ambulation/Gait Training  Ambulation/Gait Training Performed: Yes  Ambulation/Gait Training 1  Surface 1: Level tile  Device 1: Rolling walker  Assistance 1: Contact guard    Outcome Measures:  [facility] Daily Activity  Putting on and taking off regular lower body clothing: A lot  Bathing (including washing, rinsing, drying): A lot  Putting on and taking off regular upper body clothing: A little  Toileting, which includes using toilet, bedpan or urinal: A little  Taking care of personal grooming such as brushing teeth: A little  Eating Meals: A little  Daily Activity - Total Score: 16    Education Documentation  Body Mechanics, taught by Mariza Bocanegra OT at 1/11/2024 12:20 PM.  Learner: Patient  Readiness: Acceptance  Method: Explanation  Response: Verbalizes Understanding  Comment: Edu on increasing ax level, transfers, and safety with FWW. Encouragment to engage in more ax.    Precautions, taught by Mariza Bocanegra OT at 1/11/2024 12:20 PM.  Learner: Patient  Readiness: Acceptance  Method: Explanation  Response: Verbalizes Understanding  Comment: Edu on increasing ax level, transfers, and safety with FWW. Encouragment to engage in more ax.    ADL Training,  taught by Mariza Bocanegra OT at 1/11/2024 12:20 PM.  Learner: Patient  Readiness: Acceptance  Method: Explanation  Response: Verbalizes Understanding  Comment: Edu on increasing ax level, transfers, and safety with FWW. Encouragment to engage in more ax.    Education Comments  No comments found.      Goals:  Encounter Problems       Encounter Problems (Active)       ADLs       Patient will perform UB and LB bathing  with supervision level of assistance.       Start:  01/04/24    Expected End:  01/18/24            Patient with complete lower body dressing with set-up and supervision level of assistance donning and doffing all LE clothes  with PRN adaptive equipment while edge of bed  (Not Progressing)       Start:  01/04/24    Expected End:  01/18/24            Patient will complete toileting including hygiene clothing management/hygiene with stand by assist level of assistance and raised toilet seat, grab bars, and bedside commode. (Progressing)       Start:  01/04/24    Expected End:  01/18/24               ADLs       Pt. will identify and engage in 3 EC/WS techniques during treatment sessions to increase independence and endurance for participation in daily tasks of choice.  (Progressing)       Start:  01/05/24    Expected End:  01/19/24               BALANCE       Patient will tolerate standing for 10 minutes to modified independent level of assistance with least restrictive device in order to improve functional activity tolerance for ADL tasks. (Progressing)       Start:  01/05/24    Expected End:  01/19/24               TRANSFERS       Patient will complete sit to stand transfer with modified independent level of assistance and least restrictive device in order to improve safety and prepare for out of bed mobility. (Progressing)       Start:  01/04/24    Expected End:  01/18/24

## 2024-01-11 NOTE — PROGRESS NOTES
"Frannie Blanco is a 73 y.o. female on day 7 of admission presenting with RSV (respiratory syncytial virus infection).    Subjective    Awake in bed, no complaints       Objective     Physical Exam  Constitutional:       General: She is not in acute distress.     Appearance: Normal appearance. She is well-developed. She is morbidly obese.   Eyes:      Pupils: Pupils are equal, round, and reactive to light.   Neck:      Vascular: No carotid bruit.   Cardiovascular:      Rate and Rhythm: Normal rate and regular rhythm.      Pulses: Normal pulses.      Heart sounds: Normal heart sounds. No murmur heard.  Pulmonary:      Effort: Pulmonary effort is normal. No respiratory distress.      Breath sounds: Decreased air movement present. Decreased breath sounds and wheezing present.   Abdominal:      General: There is no distension.      Palpations: Abdomen is soft.      Tenderness: There is no abdominal tenderness.   Musculoskeletal:         General: No swelling.      Cervical back: Neck supple.      Right lower leg: No edema.      Left lower leg: No edema.   Skin:     General: Skin is warm.   Neurological:      Mental Status: She is alert and oriented to person, place, and time. Mental status is at baseline.   Psychiatric:         Mood and Affect: Mood normal.         Behavior: Behavior normal. Behavior is cooperative.         Last Recorded Vitals  Blood pressure 115/56, pulse 100, temperature 36.2 °C (97.2 °F), temperature source Temporal, resp. rate 16, height 1.676 m (5' 5.98\"), weight 143 kg (315 lb 0.6 oz), SpO2 93 %.  Intake/Output last 3 Shifts:  I/O last 3 completed shifts:  In: 1690 (11.8 mL/kg) [P.O.:1490; IV Piggyback:200]  Out: 3800 (26.6 mL/kg) [Urine:3800 (0.7 mL/kg/hr)]  Weight: 142.9 kg     Relevant Results  Scheduled medications  amiodarone, 200 mg, oral, TID  aspirin, 81 mg, oral, Daily  atorvastatin, 20 mg, oral, Nightly  calcium carbonate, 1,500 mg, oral, q12h  dapsone, 100 mg, oral, Daily before " breakfast  [Held by provider] dilTIAZem CD, 300 mg, oral, Daily  docusate sodium, 100 mg, oral, BID  ferrous sulfate (325 mg ferrous sulfate), 65 mg of iron, oral, Daily  tiotropium, 2 Inhalation, inhalation, Daily   And  fluticasone furoate-vilanteroL, 1 puff, inhalation, Daily  furosemide, 80 mg, oral, Daily  gabapentin, 200 mg, oral, BID  glimepiride, 2 mg, oral, Daily with breakfast  guaiFENesin, 600 mg, oral, BID  insulin lispro, 0-5 Units, subcutaneous, TID with meals  ipratropium-albuteroL, 3 mL, nebulization, 4x daily  losartan, 12.5 mg, oral, Daily  [Held by provider] melatonin, 6 mg, oral, Daily  metFORMIN, 1,000 mg, oral, BID with meals  metoprolol tartrate, 25 mg, oral, TID  pantoprazole, 40 mg, oral, Daily before breakfast   Or  pantoprazole, 40 mg, intravenous, Daily before breakfast  piperacillin-tazobactam, 4.5 g, intravenous, q6h  potassium chloride CR, 40 mEq, oral, q2h  predniSONE, 40 mg, oral, Daily  topiramate, 100 mg, oral, BID  [Held by provider] warfarin, 5 mg, oral, Daily      Continuous medications     PRN medications  PRN medications: acetaminophen **OR** acetaminophen **OR** acetaminophen, acetaminophen **OR** acetaminophen **OR** acetaminophen, albuterol, benzocaine-menthoL, bisacodyl, dextromethorphan-guaifenesin, dextrose 10 % in water (D10W), dextrose, glucagon, metoprolol, ondansetron ODT **OR** ondansetron, oxygen  Results for orders placed or performed during the hospital encounter of 01/02/24 (from the past 24 hour(s))   POCT GLUCOSE   Result Value Ref Range    POCT Glucose 248 (H) 74 - 99 mg/dL   POCT GLUCOSE   Result Value Ref Range    POCT Glucose 276 (H) 74 - 99 mg/dL   POCT GLUCOSE   Result Value Ref Range    POCT Glucose 149 (H) 74 - 99 mg/dL   Basic metabolic panel   Result Value Ref Range    Glucose 57 (L) 74 - 99 mg/dL    Sodium 145 136 - 145 mmol/L    Potassium 3.2 (L) 3.5 - 5.3 mmol/L    Chloride 101 98 - 107 mmol/L    Bicarbonate 40 (HH) 21 - 32 mmol/L    Anion Gap 7  (L) 10 - 20 mmol/L    Urea Nitrogen 30 (H) 6 - 23 mg/dL    Creatinine 0.96 0.50 - 1.05 mg/dL    eGFR 63 >60 mL/min/1.73m*2    Calcium 8.3 (L) 8.6 - 10.3 mg/dL   CBC and Auto Differential   Result Value Ref Range    WBC 9.7 4.4 - 11.3 x10*3/uL    nRBC 0.0 0.0 - 0.0 /100 WBCs    RBC 2.68 (L) 4.00 - 5.20 x10*6/uL    Hemoglobin 8.4 (L) 12.0 - 16.0 g/dL    Hematocrit 29.4 (L) 36.0 - 46.0 %     (H) 80 - 100 fL    MCH 31.3 26.0 - 34.0 pg    MCHC 28.6 (L) 32.0 - 36.0 g/dL    RDW 17.0 (H) 11.5 - 14.5 %    Platelets 187 150 - 450 x10*3/uL    Neutrophils % 77.8 40.0 - 80.0 %    Immature Granulocytes %, Automated 0.4 0.0 - 0.9 %    Lymphocytes % 11.9 13.0 - 44.0 %    Monocytes % 6.9 2.0 - 10.0 %    Eosinophils % 2.9 0.0 - 6.0 %    Basophils % 0.1 0.0 - 2.0 %    Neutrophils Absolute 7.56 (H) 1.60 - 5.50 x10*3/uL    Immature Granulocytes Absolute, Automated 0.04 0.00 - 0.50 x10*3/uL    Lymphocytes Absolute 1.16 0.80 - 3.00 x10*3/uL    Monocytes Absolute 0.67 0.05 - 0.80 x10*3/uL    Eosinophils Absolute 0.28 0.00 - 0.40 x10*3/uL    Basophils Absolute 0.01 0.00 - 0.10 x10*3/uL   Protime-INR   Result Value Ref Range    Protime 35.0 (H) 9.8 - 12.8 seconds    INR 3.1 (H) 0.9 - 1.1   POCT GLUCOSE   Result Value Ref Range    POCT Glucose 61 (L) 74 - 99 mg/dL   POCT GLUCOSE   Result Value Ref Range    POCT Glucose 139 (H) 74 - 99 mg/dL     ECG 12 lead    Result Date: 1/9/2024  Atrial fibrillation with rapid ventricular response Low voltage QRS Cannot rule out Anterior infarct , age undetermined Abnormal ECG When compared with ECG of 07-JAN-2024 00:28, (unconfirmed) No significant change was found    ECG 12 lead    Result Date: 1/9/2024  Atrial fibrillation with rapid ventricular response Abnormal ECG When compared with ECG of 02-JAN-2024 17:57, No significant change was found    ECG 12 lead    Result Date: 1/8/2024  Sinus rhythm with Premature atrial complexes with Aberrant conduction Cannot rule out Anterior infarct (cited on or  before 02-JAN-2024) Abnormal ECG When compared with ECG of 02-JAN-2024 17:57, Sinus rhythm has replaced Atrial fibrillation    XR chest 1 view    Result Date: 1/7/2024  Interpreted By:  Mohsen Law, STUDY: XR CHEST 1 VIEW;  1/7/2024 4:04 pm   INDICATION: Signs/Symptoms:worsening pneumonia.   COMPARISON: Chest x-ray 01/02/2024   ACCESSION NUMBER(S): [id number]   ORDERING CLINICIAN: ALBERTO PICKETT   FINDINGS: Multiple overlying leads are present.   CARDIOMEDIASTINAL SILHOUETTE: Cardiomediastinal silhouette is stable in size and configuration. Dense mitral annular calcifications noted.   LUNGS: There are bilateral patchy airspace opacities with slight interval improvement in the right lung base and slight interval worsening on the left. These findings may relate to developing edema versus infection. Small bilateral pleural effusions. No pneumothorax.   ABDOMEN: No remarkable upper abdominal findings.   BONES: Degenerative changes of the spine and bilateral shoulders.       There are patchy bilateral airspace opacities with slight interval worsening on the and slight interval improvement on the right. Findings may relate to edema versus infection. Attention on continued short-term follow-up is advised.   Small bilateral pleural effusions are noted with slight interval improvement on the right.   MACRO: None   Signed by: Mohsen Law 1/7/2024 4:18 PM Dictation workstation:   [id number]    CT head wo IV contrast    Result Date: 1/7/2024  Interpreted By:  Jaxon Fernandez, STUDY: CT ANGIO HEAD AND NECK W AND WO IV CONTRAST; CT HEAD WO IV CONTRAST; 1/7/2024 1:20 am; 1/7/2024 1:10 am   INDICATION: Signs/Symptoms:Confusion.   COMPARISON: CT and CT angiogram dated 01/06/2024.   ACCESSION NUMBER(S): [id number]; [id number]   ORDERING CLINICIAN: JAC SANTOS   TECHNIQUE: Unenhanced CT images of the head were obtained. Subsequently,  75 mL Omnipaque 350 was administered intravenously and axial images of the head and  neck were acquired.  Coronal, sagittal, and 3-D reconstructions were provided for review.   FINDINGS: There is a mostly calcified meningioma within the upper left parietal convexity measuring 1.7 x 1.5 cm and also calcified meningioma measuring 1.1 x 8.2 cm overlying the inferolateral right frontal lobe. No evidence of associated vasogenic edema or mass effect. There is no evidence of midline shift, hydrocephalus, or acute intracranial hemorrhage. Gray-white matter differentiation is maintained.   There is a small amount of fluid in the caudal mastoid air cells. There is mild to moderate polypoid mucosal thickening of the left maxillary sinus and anterior ethmoid air cells. These findings are unchanged.   CTA HEAD FINDINGS:   Anterior circulation: The bilateral intracranial internal carotid arteries, bilateral carotid terminals, bilateral proximal anterior and middle cerebral arteries are normal.   Posterior circulation: Bilateral intracranial vertebral arteries, vertebrobasilar junction, basilar artery and proximal posterior cerebral arteries are normal.   Venous contamination limits evaluation for small aneurysms, without gross evidence of intracranial aneurysm.   CTA NECK FINDINGS:   Right carotid vessels: There are mild atherosclerotic calcifications of the carotid bifurcation with 0% stenosis by NASCET criteria. The remainder of the right internal cervical carotid artery is widely patent without focal stenosis.   Left carotid vessels: The common carotid artery is normal. The carotid bifurcation is normal. The internal carotid artery in the neck is normal. There is 0% stenosis by NASCET criteria. .   Vertebral vessels:  The visualized segments of the cervical vertebral arteries are normal in caliber.   There is redemonstration of multifocal nodular and ground-glass opacities in the visualized upper lung fields suspicious for multifocal pneumonia. There is also small right pleural effusion partially visualized.        No evidence of acute cortical infarct or acute intracranial hemorrhage. There are calcified meningiomas noted overlying the upper left frontoparietal convexity and overlying the inferolateral right frontal lobe without evidence of significant mass effect or vasogenic edema. No interval change.   No evidence for significant stenosis of the cervical vessels.   No evidence for significant stenosis or large branch vessel cutoffs of the intracranial vessels.   Partially visualized ground-glass and nodular opacities in the upper lung fields suspicious for multifocal pneumonia for example viral pneumonia.   MACRO:   None   Signed by: Jaxon Fernandez 1/7/2024 2:06 AM Dictation workstation:   [id number]    CT angio head and neck w and wo IV contrast    Result Date: 1/7/2024  Interpreted By:  Jaxon Fernandez, STUDY: CT ANGIO HEAD AND NECK W AND WO IV CONTRAST; CT HEAD WO IV CONTRAST; 1/7/2024 1:20 am; 1/7/2024 1:10 am   INDICATION: Signs/Symptoms:Confusion.   COMPARISON: CT and CT angiogram dated 01/06/2024.   ACCESSION NUMBER(S): [id number]; [id number]   ORDERING CLINICIAN: JAC SANTOS   TECHNIQUE: Unenhanced CT images of the head were obtained. Subsequently,  75 mL Omnipaque 350 was administered intravenously and axial images of the head and neck were acquired.  Coronal, sagittal, and 3-D reconstructions were provided for review.   FINDINGS: There is a mostly calcified meningioma within the upper left parietal convexity measuring 1.7 x 1.5 cm and also calcified meningioma measuring 1.1 x 8.2 cm overlying the inferolateral right frontal lobe. No evidence of associated vasogenic edema or mass effect. There is no evidence of midline shift, hydrocephalus, or acute intracranial hemorrhage. Gray-white matter differentiation is maintained.   There is a small amount of fluid in the caudal mastoid air cells. There is mild to moderate polypoid mucosal thickening of the left maxillary sinus and anterior ethmoid air cells.  These findings are unchanged.   CTA HEAD FINDINGS:   Anterior circulation: The bilateral intracranial internal carotid arteries, bilateral carotid terminals, bilateral proximal anterior and middle cerebral arteries are normal.   Posterior circulation: Bilateral intracranial vertebral arteries, vertebrobasilar junction, basilar artery and proximal posterior cerebral arteries are normal.   Venous contamination limits evaluation for small aneurysms, without gross evidence of intracranial aneurysm.   CTA NECK FINDINGS:   Right carotid vessels: There are mild atherosclerotic calcifications of the carotid bifurcation with 0% stenosis by NASCET criteria. The remainder of the right internal cervical carotid artery is widely patent without focal stenosis.   Left carotid vessels: The common carotid artery is normal. The carotid bifurcation is normal. The internal carotid artery in the neck is normal. There is 0% stenosis by NASCET criteria. .   Vertebral vessels:  The visualized segments of the cervical vertebral arteries are normal in caliber.   There is redemonstration of multifocal nodular and ground-glass opacities in the visualized upper lung fields suspicious for multifocal pneumonia. There is also small right pleural effusion partially visualized.       No evidence of acute cortical infarct or acute intracranial hemorrhage. There are calcified meningiomas noted overlying the upper left frontoparietal convexity and overlying the inferolateral right frontal lobe without evidence of significant mass effect or vasogenic edema. No interval change.   No evidence for significant stenosis of the cervical vessels.   No evidence for significant stenosis or large branch vessel cutoffs of the intracranial vessels.   Partially visualized ground-glass and nodular opacities in the upper lung fields suspicious for multifocal pneumonia for example viral pneumonia.   MACRO:   None   Signed by: Jaxon Fernandez 1/7/2024 2:06 AM Dictation  workstation:   [id number]    CT angio brain attack head w IV contrast and post procedure    Result Date: 1/6/2024  Interpreted By:  Jazmyne Teresa, STUDY: CT ANGIO BRAIN ATTACK HEAD W IV CONTRAST AND POST PROCEDURE; CT ANGIO BRAIN ATTACK NECK W IV CONTRAST AND POST PROCEDURE;  1/6/2024 5:16 pm   INDICATION: Signs/Symptoms:AMS.   COMPARISON: None.   ACCESSION NUMBER(S): [id number]; [id number]   ORDERING CLINICIAN: LYNN FLORES   TECHNIQUE: 75 mL Omnipaque 350 was administered intravenously and axial images of the head and neck were acquired.  Coronal, sagittal, and 3-D reconstructions were provided for review.   The technologist notes the patient had multiple IV malfunctions.   FINDINGS: CT head: Please see CT head performed the same day for intracranial findings.   CTA HEAD FINDINGS:   The examination is degraded by venous contamination.   Anterior circulation: The bilateral intracranial internal carotid arteries, bilateral carotid terminals, bilateral proximal anterior and middle cerebral arteries are patent.   Posterior circulation: Bilateral intracranial vertebral arteries, vertebrobasilar junction, basilar artery and proximal posterior cerebral arteries are patent.   CTA NECK FINDINGS:   The examination is limited by the timing of scan relative to the contrast injection.   Mild atherosclerotic calcification along the aortic arch. Within the limitation of patient motion artifact no significant stenosis at the origins of the great vessels.   Right carotid vessels: The common carotid artery is patent. Mild calcified plaque at the carotid bifurcation without significant stenosis by NASCET criteria. The internal carotid artery in the neck is patent without significant stenosis.   Left carotid vessels: The common carotid artery is patent.. The carotid bifurcation is patent without significant stenosis. The internal carotid artery in the neck is patent without significant stenosis.   Vertebral vessels:  The  visualized segments of the cervical vertebral arteries are patent.   The left thyroid lobe is slightly asymmetrically larger than the right with a calcification. Please see thyroid ultrasound 07/24/2023 for further details. Incidental note of multiple tonsilloliths. There is prominent ossification of the stylohyoid ligament bilaterally. Soft tissues of the neck are otherwise unremarkable.   There are patchy opacities within the visualized upper lungs bilaterally concerning for multifocal pneumonia. There is a small pleural effusion on the right. Mild degenerative changes of the cervical spine.       The examination is degraded by venous contamination, patient motion artifact and difficulties with the IV. Within this limitation:   CTA neck:   No evidence for significant stenosis of the cervical vessels.   Patchy opacities within the upper lungs bilaterally concerning for multifocal pneumonia. There is a small pleural effusion on the right.   CTA head:   No evidence for significant stenosis or large branch vessel cutoffs of the intracranial vessels.   Please see CT head performed the same day for intracranial findings.   MACRO: None   Signed by: Jazmyne Teresa 1/6/2024 5:34 PM Dictation workstation:   [id number]    CT angio brain attack neck w IV contrast and post procedure    Result Date: 1/6/2024  Interpreted By:  Jazmyne Teresa, STUDY: CT ANGIO BRAIN ATTACK HEAD W IV CONTRAST AND POST PROCEDURE; CT ANGIO BRAIN ATTACK NECK W IV CONTRAST AND POST PROCEDURE;  1/6/2024 5:16 pm   INDICATION: Signs/Symptoms:AMS.   COMPARISON: None.   ACCESSION NUMBER(S): [id number]; [id number]   ORDERING CLINICIAN: LYNN FLORES   TECHNIQUE: 75 mL Omnipaque 350 was administered intravenously and axial images of the head and neck were acquired.  Coronal, sagittal, and 3-D reconstructions were provided for review.   The technologist notes the patient had multiple IV malfunctions.   FINDINGS: CT head: Please see CT head performed the  same day for intracranial findings.   CTA HEAD FINDINGS:   The examination is degraded by venous contamination.   Anterior circulation: The bilateral intracranial internal carotid arteries, bilateral carotid terminals, bilateral proximal anterior and middle cerebral arteries are patent.   Posterior circulation: Bilateral intracranial vertebral arteries, vertebrobasilar junction, basilar artery and proximal posterior cerebral arteries are patent.   CTA NECK FINDINGS:   The examination is limited by the timing of scan relative to the contrast injection.   Mild atherosclerotic calcification along the aortic arch. Within the limitation of patient motion artifact no significant stenosis at the origins of the great vessels.   Right carotid vessels: The common carotid artery is patent. Mild calcified plaque at the carotid bifurcation without significant stenosis by NASCET criteria. The internal carotid artery in the neck is patent without significant stenosis.   Left carotid vessels: The common carotid artery is patent.. The carotid bifurcation is patent without significant stenosis. The internal carotid artery in the neck is patent without significant stenosis.   Vertebral vessels:  The visualized segments of the cervical vertebral arteries are patent.   The left thyroid lobe is slightly asymmetrically larger than the right with a calcification. Please see thyroid ultrasound 07/24/2023 for further details. Incidental note of multiple tonsilloliths. There is prominent ossification of the stylohyoid ligament bilaterally. Soft tissues of the neck are otherwise unremarkable.   There are patchy opacities within the visualized upper lungs bilaterally concerning for multifocal pneumonia. There is a small pleural effusion on the right. Mild degenerative changes of the cervical spine.       The examination is degraded by venous contamination, patient motion artifact and difficulties with the IV. Within this limitation:   CTA neck:    No evidence for significant stenosis of the cervical vessels.   Patchy opacities within the upper lungs bilaterally concerning for multifocal pneumonia. There is a small pleural effusion on the right.   CTA head:   No evidence for significant stenosis or large branch vessel cutoffs of the intracranial vessels.   Please see CT head performed the same day for intracranial findings.   MACRO: None   Signed by: Jazmyne Teresa 1/6/2024 5:34 PM Dictation workstation:   [id number]    CT brain attack head wo IV contrast    Result Date: 1/6/2024  Interpreted By:  Richie Wren, STUDY: CT BRAIN ATTACK HEAD WO IV CONTRAST;  1/6/2024 4:37 pm   INDICATION: Signs/Symptoms:AMS.   COMPARISON: 11/18/2012   ACCESSION NUMBER(S): [id number]   ORDERING CLINICIAN: LYNN FLORES   TECHNIQUE: Noncontrast axial CT images of head were obtained with coronal and sagittal reconstructed images.   FINDINGS: BRAIN PARENCHYMA: Mild periventricular and subcortical hemispheric white matter hypodensities are most compatible with chronic small vessel ischemic disease. No acute intraparenchymal hemorrhage or parenchymal evidence of acute large territory ischemic infarct. No mass-effect. Gray-white matter distinction is preserved.   VENTRICLES and EXTRA-AXIAL SPACES: There is a 1.5 cm calcified meningioma arising from the left parasagittal frontal parietal dura. There is another 1.3 cm more densely calcified meningioma arising the right frontotemporal dura. No acute extra-axial or intraventricular hemorrhage. No effacement of cerebral sulci. Ventricles and sulci are age-concordant. There is a partial empty sella.   PARANASAL SINUSES/MASTOIDS: Trace fluid left mastoid air cells. No hemorrhage or air-fluid levels within the visualized paranasal sinuses. The mastoids are well aerated.   CALVARIUM/ORBITS:  No skull fracture.  The orbits and globes are intact to the extent visualized.   EXTRACRANIAL SOFT TISSUES: No discernible abnormality.       1.  No evidence of acute intracranial hemorrhage, mass effect, or parenchymal evidence of an acute large territory ischemic infarct.   2. Calcified meningiomas rising from the left parasagittal frontal parietal dura and right frontotemporal dura measuring 1.5 cm and 1.3 cm, respectively. No significant mass effect.     MACRO: Richie Wren discussed the significance and urgency of this critical finding by EPIC HAIKU with  LYNN FLORES on 1/6/2024 at 4:50 p.m. with confirmation of receipt. (**-RCF-**) Findings:  See findings.   Signed by: Richie Wren 1/6/2024 4:53 PM Dictation workstation:   [id number]    ECG 12 lead    Result Date: 1/3/2024  Atrial fibrillation with rapid ventricular response Low voltage QRS Cannot rule out Anterior infarct , age undetermined Abnormal ECG When compared with ECG of 13-DEC-2023 09:49, Atrial fibrillation has replaced Sinus rhythm See ED provider note for full interpretation and clinical correlation Confirmed by Jamir Salvador ([id number]) on 1/3/2024 10:49:37 PM    XR chest 1 view    Result Date: 1/2/2024  Interpreted By:  Soren Ham, STUDY: XR CHEST 1 VIEW;  1/2/2024 7:26 pm   INDICATION: Signs/Symptoms:sob.   COMPARISON: 9/26/2023   ACCESSION NUMBER(S): [id number]   ORDERING CLINICIAN: CARMEN CONKLIN   FINDINGS: The cardiac silhouette is stable in size. There are bilateral opacities concerning for infiltrates. Small right pleural effusion and basilar atelectasis or airspace disease. No pneumothorax.       Small right pleural effusion and basilar atelectasis or airspace disease and mild bilateral opacities concerning for infiltrates.   MACRO: None   Signed by: Soren Ham 1/2/2024 7:38 PM Dictation workstation:   [id number]    CT chest wo IV contrast    Result Date: 12/26/2023  Interpreted By:  Richie Wren, STUDY: CT CHEST WO IV CONTRAST;  12/26/2023 6:03 pm   INDICATION: Signs/Symptoms:cough.   COMPARISON: 09/22/2023 CT chest   ACCESSION NUMBER(S): [id number]    ORDERING CLINICIAN: JANNET VEE   TECHNIQUE: Contiguous axial images of the chest and upper abdomen were obtained without contrast. Coronal and sagittal reformatted images were reconstructed from the axial data.   FINDINGS:     MEDIASTINUM AND LYMPH NODES:  The esophagus appears within normal limits.  No enlarged intrathoracic or axillary lymph nodes by imaging criteria. No pneumomediastinum.   VESSELS:  Normal caliber thoracic aorta. Mild aortic atherosclerosis. The pulmonary artery is enlarged, measuring up to 3.6 cm, indicative of pulmonary hypertension.   HEART: There is left atrial dilatation. Mitral annular calcifications. Mild coronary artery calcifications. No significant pericardial effusion.   LUNG, AIRWAYS, AND PLEURA: New small bilateral pleural effusions with adjacent passive atelectasis. There is new subsegmental atelectasis in the right middle lobe. There is a small amount debris in the lower trachea extending into the mainstem bronchi and bronchus intermedius. There is a small opacity in the left upper lobe and superior segment of left lower lobe consisting of tree-in-bud nodules suspicious for pneumonia the   OSSEOUS STRUCTURES: No acute osseous abnormality.   CHEST WALL SOFT TISSUES: No discernible abnormality.   UPPER ABDOMEN/OTHER: Multiple peripherally calcified splenic artery aneurysm measuring 1.3 cm, 1.3 cm, and 2.8 cm are similar to prior study. The liver appears cirrhotic.       1. Tree-in-bud opacities in the posterior left upper lobe and superior segment of the left lower lobe consistent with bronchiolitis/early pneumonia.   2. Small bilateral pleural effusions with adjacent passive atelectasis and right middle lobe subsegmental atelectasis.   3. Small amount of debris in the trachea and bilateral proximal bronchi that could be secretions or aspiration.   4. Three splenic artery aneurysm measuring up to 2.8 cm, unchanged.   MACRO: None.   Signed by: Richie Wren 12/26/2023 6:29 PM  Dictation workstation:   [id number]    ECG 12 lead (Clinic Performed)    Result Date: 12/16/2023  Atrial fibrillation Poor R-wave progression Abnormal ECG When compared with ECG of 25-SEP-2023 10:00, Nonspecific T wave abnormality, improved in Inferior leads Confirmed by Raymon Beaulieu ([id number]) on 12/16/2023 11:49:53 PM    ECG 12 Lead    Result Date: 12/14/2023  Atrial fibrillation Low voltage QRS Abnormal ECG When compared with ECG of 11-DEC-2023 15:22, (unconfirmed) No significant change was found Confirmed by Zaki Stauffer ([id number]) on 12/14/2023 5:32:09 PM    ECG 12 lead    Result Date: 12/14/2023  Sinus rhythm with Premature supraventricular complexes Low voltage QRS Borderline ECG When compared with ECG of 13-DEC-2023 07:33, (unconfirmed) Sinus rhythm has replaced Atrial fibrillation Confirmed by Zaki Stauffer ([id number]) on 12/14/2023 5:31:33 PM    ECG 12 Lead    Result Date: 12/14/2023  Sinus rhythm with Premature atrial complexes Low voltage QRS Borderline ECG When compared with ECG of 13-DEC-2023 08:45, (unconfirmed) No significant change was found Confirmed by Zaki Stauffer ([id number]) on 12/14/2023 5:31:14 PM      Assessment/Plan   Principal Problem:    RSV (respiratory syncytial virus infection)  Active Problems:    Shortness of breath    73 YOF with chronic AF, now with RVR in the setting of  Acute on chronic hypoxic respiratory failure 2/2 RSV, CAP, COPD and HFpEF in acute exacerbation -- improving     S/p DCCV on 1/08/2024 with reversion to AF on 1/10/24  Long term OAC with warfarin  HTN, controlled   HLD  T2DM with neuropathy  Anemia  Morbid Obesity BMI 52  History of noncompliance       Plan:  Transition to PO lasix -- currently at 80 mg daily   Continue metoprolol 25 mg TID   Continue amiodarone loading   Resume warfarin when INR 2.0  Potassium replacement       Thank you  for the consult   Please call or message with questions or concerns   The case was discussed with JOSELITO Trevizo-JEAN CARLOS        01/11/24 at 9:07 AM - JOSELITO Liang-CNP

## 2024-01-11 NOTE — PROGRESS NOTES
Frannie Blanco is a 73 y.o. female on day 7 of admission presenting with RSV (respiratory syncytial virus infection).      Subjective   Pt assessed at bedside. Feels ok this morning. Reports coughing up a lot of mucous. Discussed CT results and the need for metaneb to release secretions. Pt is still afib on the monitor and slightly tachycardic in the low 100s. Pt denies any acute concerns.        Objective     Last Recorded Vitals  /56 (BP Location: Right arm, Patient Position: Sitting)   Pulse 100   Temp 36.2 °C (97.2 °F) (Temporal)   Resp 16   Wt 143 kg (315 lb 0.6 oz)   SpO2 93%   Intake/Output last 3 Shifts:    Intake/Output Summary (Last 24 hours) at 1/11/2024 0959  Last data filed at 1/11/2024 0800  Gross per 24 hour   Intake 1480 ml   Output 2300 ml   Net -820 ml       Admission Weight  Weight: 150 kg (330 lb) (01/02/24 1740)    Daily Weight  01/11/24 : 143 kg (315 lb 0.6 oz)    Image Results  CT angio chest for pulmonary embolism  Narrative: Interpreted By:  Yao Torres,   STUDY:  CT ANGIO CHEST FOR PULMONARY EMBOLISM;  1/10/2024 3:12 pm      INDICATION:  Signs/Symptoms:worsening sob.      COMPARISON:  12/26/2023      ACCESSION NUMBER(S):  [id number]      ORDERING CLINICIAN:  OLAF SOLIMAN      TECHNIQUE:  Helical data acquisition of the chest was obtained with  75 mL  Omnipaque 350. Images were reformatted in axial, coronal, and  sagittal planes.MIP reformatted images were also generated.      FINDINGS:  Motion degraded exam.      LUNGS and AIRWAYS:  Small left and moderate right layering pleural effusions. Overlying  atelectasis. Increased severe atelectasis involving the right middle  lobe which is nearly collapsed. There are diffusely increased patchy  areas consolidation and ground-glass opacity scattered throughout  both lungs. Interlobular septal thickening is also noted in some  areas. The right middle lobe bronchus is at least partially occluded.  The remaining central airways  otherwise appear patent. No  bronchiectasis.      MEDIASTINUM and FANG, LOWER NECK AND AXILLA:  The visualized thyroid gland is grossly unremarkable.      No thoracic lymphadenopathy by CT criteria.      Possible small sliding hiatal hernia.      HEART and VESSELS:  Mild cardiomegaly. Dense mitral annulus calcification.      No significant pericardial effusion.      No pulmonary embolism identified on motion degraded exam. Dilatation  of the main pulmonary artery to 40 mm suggests pulmonary hypertension.      Mild coronary atherosclerosis.      Thoracic aorta and great vessels are mildly atherosclerotic but  otherwise patent without aneurysm.      UPPER ABDOMEN:  27 mm distal splenic artery aneurysm is probably thrombosed, not  significantly changed to prior exam.      CHEST WALL and OSSEOUS STRUCTURES:  No suspicious osseous lesions. Multilevel degenerative changes of the  thoracic spine.      Impression: 1.  No pulmonary embolism identified on motion degraded exam.  2. Pulmonary edema with right greater than left pleural effusions.  3. Near occlusion of the right middle lobe bronchus causing worsening  atelectasis.          Signed by: Yao Torres 1/10/2024 3:28 PM  Dictation workstation:   [id number]      Physical Exam  Constitutional:       Appearance: She is obese.   HENT:      Head: Normocephalic.      Nose: Nose normal.      Mouth/Throat:      Pharynx: Oropharynx is clear.   Eyes:      Conjunctiva/sclera: Conjunctivae normal.   Cardiovascular:      Rate and Rhythm: Tachycardia present. Rhythm irregular.   Pulmonary:      Breath sounds: Decreased breath sounds present.   Abdominal:      General: Bowel sounds are normal.      Palpations: Abdomen is soft.   Musculoskeletal:      Cervical back: Normal range of motion and neck supple.      Right lower leg: Edema present.      Left lower leg: Edema present.   Skin:     Findings: Erythema present.   Neurological:      Mental Status: She is alert and oriented to  person, place, and time.   Psychiatric:         Mood and Affect: Mood normal.         Behavior: Behavior normal.         Relevant Results  Scheduled medications  amiodarone, 200 mg, oral, TID  aspirin, 81 mg, oral, Daily  atorvastatin, 20 mg, oral, Nightly  calcium carbonate, 1,500 mg, oral, q12h  dapsone, 100 mg, oral, Daily before breakfast  [Held by provider] dilTIAZem CD, 300 mg, oral, Daily  docusate sodium, 100 mg, oral, BID  ferrous sulfate (325 mg ferrous sulfate), 65 mg of iron, oral, Daily  tiotropium, 2 Inhalation, inhalation, Daily   And  fluticasone furoate-vilanteroL, 1 puff, inhalation, Daily  furosemide, 80 mg, oral, Daily  gabapentin, 200 mg, oral, BID  glimepiride, 2 mg, oral, Daily with breakfast  guaiFENesin, 600 mg, oral, BID  insulin lispro, 0-5 Units, subcutaneous, TID with meals  ipratropium-albuteroL, 3 mL, nebulization, 4x daily  losartan, 12.5 mg, oral, Daily  [Held by provider] melatonin, 6 mg, oral, Daily  metFORMIN, 1,000 mg, oral, BID with meals  metoprolol tartrate, 25 mg, oral, TID  pantoprazole, 40 mg, oral, Daily before breakfast   Or  pantoprazole, 40 mg, intravenous, Daily before breakfast  piperacillin-tazobactam, 4.5 g, intravenous, q6h  potassium chloride CR, 40 mEq, oral, q2h  predniSONE, 40 mg, oral, Daily  topiramate, 100 mg, oral, BID  [Held by provider] warfarin, 5 mg, oral, Daily      Continuous medications     PRN medications  PRN medications: acetaminophen **OR** acetaminophen **OR** acetaminophen, acetaminophen **OR** acetaminophen **OR** acetaminophen, albuterol, benzocaine-menthoL, bisacodyl, dextromethorphan-guaifenesin, dextrose 10 % in water (D10W), dextrose, glucagon, metoprolol, ondansetron ODT **OR** ondansetron, oxygen    Results for orders placed or performed during the hospital encounter of 01/02/24 (from the past 24 hour(s))   POCT GLUCOSE   Result Value Ref Range    POCT Glucose 248 (H) 74 - 99 mg/dL   POCT GLUCOSE   Result Value Ref Range    POCT Glucose  276 (H) 74 - 99 mg/dL   POCT GLUCOSE   Result Value Ref Range    POCT Glucose 149 (H) 74 - 99 mg/dL   Basic metabolic panel   Result Value Ref Range    Glucose 57 (L) 74 - 99 mg/dL    Sodium 145 136 - 145 mmol/L    Potassium 3.2 (L) 3.5 - 5.3 mmol/L    Chloride 101 98 - 107 mmol/L    Bicarbonate 40 (HH) 21 - 32 mmol/L    Anion Gap 7 (L) 10 - 20 mmol/L    Urea Nitrogen 30 (H) 6 - 23 mg/dL    Creatinine 0.96 0.50 - 1.05 mg/dL    eGFR 63 >60 mL/min/1.73m*2    Calcium 8.3 (L) 8.6 - 10.3 mg/dL   CBC and Auto Differential   Result Value Ref Range    WBC 9.7 4.4 - 11.3 x10*3/uL    nRBC 0.0 0.0 - 0.0 /100 WBCs    RBC 2.68 (L) 4.00 - 5.20 x10*6/uL    Hemoglobin 8.4 (L) 12.0 - 16.0 g/dL    Hematocrit 29.4 (L) 36.0 - 46.0 %     (H) 80 - 100 fL    MCH 31.3 26.0 - 34.0 pg    MCHC 28.6 (L) 32.0 - 36.0 g/dL    RDW 17.0 (H) 11.5 - 14.5 %    Platelets 187 150 - 450 x10*3/uL    Neutrophils % 77.8 40.0 - 80.0 %    Immature Granulocytes %, Automated 0.4 0.0 - 0.9 %    Lymphocytes % 11.9 13.0 - 44.0 %    Monocytes % 6.9 2.0 - 10.0 %    Eosinophils % 2.9 0.0 - 6.0 %    Basophils % 0.1 0.0 - 2.0 %    Neutrophils Absolute 7.56 (H) 1.60 - 5.50 x10*3/uL    Immature Granulocytes Absolute, Automated 0.04 0.00 - 0.50 x10*3/uL    Lymphocytes Absolute 1.16 0.80 - 3.00 x10*3/uL    Monocytes Absolute 0.67 0.05 - 0.80 x10*3/uL    Eosinophils Absolute 0.28 0.00 - 0.40 x10*3/uL    Basophils Absolute 0.01 0.00 - 0.10 x10*3/uL   Protime-INR   Result Value Ref Range    Protime 35.0 (H) 9.8 - 12.8 seconds    INR 3.1 (H) 0.9 - 1.1   POCT GLUCOSE   Result Value Ref Range    POCT Glucose 61 (L) 74 - 99 mg/dL   POCT GLUCOSE   Result Value Ref Range    POCT Glucose 139 (H) 74 - 99 mg/dL       Assessment/Plan   This patient currently has cardiac telemetry ordered; if you would like to modify or discontinue the telemetry order, click here to go to the orders activity to modify/discontinue the order.      Principal Problem:    RSV (respiratory syncytial  virus infection)  Active Problems:    Shortness of breath    #Encephalopathy 2/2 infection vs. AF RVR (resolved)   - RRT called overnight 1/7 for change in mental status; patient was previously A&O x 3 but became confused and unable to tell staff the year or her birthday  - CT head and CTA head/neck showed no acute infarct or hemorrhage; no significant stenosis of the vessels. Calcified meningiomas noted without evidence of mass effect or vasogenic edema (noted to be no interval change)  - NIHSS 0 today  - Patient cardioverted 1/8, remains in AF with rate controlled in the 90s   - Antibiotics escalated for increasing O2 requirement; patient now on Vancomycin (MRSA Negative) stopped and Zosyn (Day 4)  - Medications reviewed with attending, Solumedrol IV changed to Prednisone PO   - Hold melatonin; gabapentin decreased to 200 mg BID   - Neuro checks q4h; discontinue      #Acute on chronic hypoxic respiratory failure 2/2 RSV, community acquired pneumonia, improving   #COPD, in acute exacerbation  - Patient was seen in [place] 12/26, discharged on dexamethasone and doxycycline  - WBC 13.9 >> 5.3 > 11.4 > 10.8 > 9.7  - Lactate 1.9  - COVID, flu A/B negative  - RSV positive  - Procal 0.75  - BCx no growth- final report   - sputum culture contained significant salivary contamination; repeat pending   - CXR: Small right pleural effusion and basilar atelectasis or airspace disease and mild bilateral opacities concerning for infiltrates.    - CT chest (1/10): 1.  No pulmonary embolism identified on motion degraded exam.  2. Pulmonary edema with right greater than left pleural effusions.  3. Near occlusion of the right middle lobe bronchus causing worsening  atelectasis.  - Start metaneb, continue mucinex  - Tylenol PRN for fever   - Mucinex BID, Robitussin DM q4h PRN for cough   - DuoNebs TID    - Breo and Spiriva ordered (pharmacy substitute for home Trelegy)  - RT eval and treat protocol  - Bronchial hygiene  - Isolation  protocol for RSV  - Baseline O2 5L continuously   - Wean O2 as tolerated; patient currently requiring 6L O2   - Continue Zosyn (day 5)   - Solumedrol 40 mg IVP q8h changed to prednisone 40 mg PO every day due to encephalopathy   - CXR 1/8: There are patchy bilateral airspace opacities with slight interval   worsening on the and slight interval improvement on the right. Findings may relate to edema versus infection. Attention on continued short-term follow-up is advised. Small bilateral pleural effusions are noted with slight interval improvement on the right.   - ID consulted for worsening pneumonia, appreciate recs      #Atrial fibrillation with RVR, s/p cardioversion   #HTN  #HLD  #HFpEF, concern for acute exacerbation  #Supratherapeutic INR on warfarin   - Recent DCCV on 12/13 at [facility], follows with Dr. Siegel   - Trop 7 > 7   - >285>368 > 243  - Telemetry monitoring- notified by charge RN this morning that patient's HR has been consistently in the 120s-130s on telemetry. Upon reviewing the flowsheet documentation for 1/5-1/6, only two instances with HR > 110 bpm are charted  - Patient was transferred to ICU overnight and placed on a diltiazem drip for HR persistently in the 130s  - Cardioversion completed today; patient remains in AF with rate controlled in the 90s   - Start amiodarone 200 mg PO TID x 7 days per cardiology   - Lopressor 5 mg IVP q4h PRN for HR > 120 bpm sustained for > 5 mins   - Continue aspirin, atorvastatin, furosemide, and losartan  - INR 2.3 > 3.0 > 3.8 > 3.1  - Warfarin on hold   - Goal INR 2-3, daily PT/INR while inpatient   - Strict I&Os: LOS - 9064  - Daily weights  - 2g Na diet, 1800 mL FR   - Cardiology consulted, appreciate recs   - Patient back into afib with RVR on 1/10  - Cardiology added metop TID for rate control     #Hematuria  - UA: 3+ blood, > 20 RBCs, 1+ bacteria  - UCx with no significant growth   - Patient denies gross blood in urine or difficulties with  urination   - Monitor UOP for blood - none reported as of 1/8  - Trend CBC; H/H stable      #T2DM with neuropathy   - Continue Lantus, metformin and glimepiride   - Gabapentin decreased to 200 mg BID due to change in mental status  - SSI three times a day before meals while on steroids   - Hypoglycemia protocol  - Accuchecks ac/hs/prn  - Diabetic diet   - HbA1c 4.6      #Anemia, macrocytic  - H/H stable, 8.4/29.1  > 8.6/30.2 with  > 112  - B12 level was 267 last month per chart review   - Folate 6.2   - Iron studies: Fe 28, TIBC 272, 10% saturation  - Patient receives monthly B12 injections and is on ferrous sulfate; continue   - Monitor for active bleeding  - Daily CBC to trend H/H     #History of mucous membrane pemphigoid  - Continue dapsone  - Resume outpatient derm follow up on discharge      DVT ppx  -warfarin, on hold until INR is 2-3     F: PRN  E: Replete per protocol  N: ADA, Cardiac, 1800ml FR  A: PIV     Disposition: Pt requires more than 2 inpatient days at this time   Code Status: Full Code     Total accumulated time spent face to face and not face to face preparing to see the patient, obtaining and reviewing separately obtained history; performing a medically appropriate examination and/or evaluation; counseling and educating the patient, family; ordering medications, tests, or procedures; referring and communicating with other health care professionals; documenting clinical information in the patient's medical record; independently interpreting results and communicating the results to the patient, family; and care coordination was 30 minutes.           JOSELITO Trevizo-CNP

## 2024-01-11 NOTE — NURSING NOTE
Glucose noted to be 57 per lab value not called to ICU staff by lab. Fingerstick glucose checked. 240ml orange juice accepted without difficulty .Oral hyperglycemia medications held per telephone order of Daija GALEANO NP. Repeated fingerstick glucose within normal limits after orange juice & breakfast ate.

## 2024-01-11 NOTE — CARE PLAN
The patient's goals for the shift include Increase activity as tolerated this shift 1/10/24 1900.    The clinical goals for the shift include Maintain pulse ox at 90% or greater on 8 liters high flow this shift 1/10/24 1900.    Patient got up to chair this am, however was having difficulty getting up out of chair. Pulse ox dropped with activity, however recovered well while on 8 liter High Flow. Patient heart rate elevated this am, metoprolol IV and PO given. Additional dose of IV lasix given.

## 2024-01-12 LAB
ANION GAP SERPL CALC-SCNC: 10 MMOL/L (ref 10–20)
BASOPHILS # BLD AUTO: 0 X10*3/UL (ref 0–0.1)
BASOPHILS NFR BLD AUTO: 0 %
BUN SERPL-MCNC: 29 MG/DL (ref 6–23)
CALCIUM SERPL-MCNC: 8.7 MG/DL (ref 8.6–10.3)
CHLORIDE SERPL-SCNC: 100 MMOL/L (ref 98–107)
CO2 SERPL-SCNC: 39 MMOL/L (ref 21–32)
CREAT SERPL-MCNC: 0.94 MG/DL (ref 0.5–1.05)
EGFRCR SERPLBLD CKD-EPI 2021: 65 ML/MIN/1.73M*2
EOSINOPHIL # BLD AUTO: 0.16 X10*3/UL (ref 0–0.4)
EOSINOPHIL NFR BLD AUTO: 2.3 %
ERYTHROCYTE [DISTWIDTH] IN BLOOD BY AUTOMATED COUNT: 16.6 % (ref 11.5–14.5)
GLUCOSE BLD MANUAL STRIP-MCNC: 101 MG/DL (ref 74–99)
GLUCOSE BLD MANUAL STRIP-MCNC: 113 MG/DL (ref 74–99)
GLUCOSE BLD MANUAL STRIP-MCNC: 148 MG/DL (ref 74–99)
GLUCOSE BLD MANUAL STRIP-MCNC: 307 MG/DL (ref 74–99)
GLUCOSE SERPL-MCNC: 144 MG/DL (ref 74–99)
HCT VFR BLD AUTO: 29.5 % (ref 36–46)
HGB BLD-MCNC: 8.6 G/DL (ref 12–16)
IMM GRANULOCYTES # BLD AUTO: 0.03 X10*3/UL (ref 0–0.5)
IMM GRANULOCYTES NFR BLD AUTO: 0.4 % (ref 0–0.9)
INR PPP: 2.2 (ref 0.9–1.1)
LYMPHOCYTES # BLD AUTO: 0.95 X10*3/UL (ref 0.8–3)
LYMPHOCYTES NFR BLD AUTO: 13.8 %
MCH RBC QN AUTO: 31.9 PG (ref 26–34)
MCHC RBC AUTO-ENTMCNC: 29.2 G/DL (ref 32–36)
MCV RBC AUTO: 109 FL (ref 80–100)
MONOCYTES # BLD AUTO: 0.57 X10*3/UL (ref 0.05–0.8)
MONOCYTES NFR BLD AUTO: 8.3 %
NEUTROPHILS # BLD AUTO: 5.16 X10*3/UL (ref 1.6–5.5)
NEUTROPHILS NFR BLD AUTO: 75.2 %
NRBC BLD-RTO: 0 /100 WBCS (ref 0–0)
PLATELET # BLD AUTO: 185 X10*3/UL (ref 150–450)
POTASSIUM SERPL-SCNC: 3.4 MMOL/L (ref 3.5–5.3)
PROTHROMBIN TIME: 24.8 SECONDS (ref 9.8–12.8)
RBC # BLD AUTO: 2.7 X10*6/UL (ref 4–5.2)
SODIUM SERPL-SCNC: 146 MMOL/L (ref 136–145)
WBC # BLD AUTO: 6.9 X10*3/UL (ref 4.4–11.3)

## 2024-01-12 PROCEDURE — 2500000002 HC RX 250 W HCPCS SELF ADMINISTERED DRUGS (ALT 637 FOR MEDICARE OP, ALT 636 FOR OP/ED): Mod: IPSPLIT

## 2024-01-12 PROCEDURE — 2500000002 HC RX 250 W HCPCS SELF ADMINISTERED DRUGS (ALT 637 FOR MEDICARE OP, ALT 636 FOR OP/ED): Mod: IPSPLIT | Performed by: INTERNAL MEDICINE

## 2024-01-12 PROCEDURE — 1200000002 HC GENERAL ROOM WITH TELEMETRY DAILY: Mod: IPSPLIT

## 2024-01-12 PROCEDURE — 99232 SBSQ HOSP IP/OBS MODERATE 35: CPT

## 2024-01-12 PROCEDURE — 82947 ASSAY GLUCOSE BLOOD QUANT: CPT | Mod: IPSPLIT

## 2024-01-12 PROCEDURE — 36415 COLL VENOUS BLD VENIPUNCTURE: CPT | Mod: IPSPLIT

## 2024-01-12 PROCEDURE — 99232 SBSQ HOSP IP/OBS MODERATE 35: CPT | Performed by: NURSE PRACTITIONER

## 2024-01-12 PROCEDURE — 80048 BASIC METABOLIC PNL TOTAL CA: CPT | Mod: IPSPLIT

## 2024-01-12 PROCEDURE — 2500000001 HC RX 250 WO HCPCS SELF ADMINISTERED DRUGS (ALT 637 FOR MEDICARE OP): Mod: IPSPLIT

## 2024-01-12 PROCEDURE — 94640 AIRWAY INHALATION TREATMENT: CPT

## 2024-01-12 PROCEDURE — 2500000001 HC RX 250 WO HCPCS SELF ADMINISTERED DRUGS (ALT 637 FOR MEDICARE OP): Mod: IPSPLIT | Performed by: NURSE PRACTITIONER

## 2024-01-12 PROCEDURE — 85025 COMPLETE CBC W/AUTO DIFF WBC: CPT | Mod: IPSPLIT

## 2024-01-12 PROCEDURE — 97530 THERAPEUTIC ACTIVITIES: CPT | Mod: GP,CQ,IPSPLIT

## 2024-01-12 PROCEDURE — 2500000004 HC RX 250 GENERAL PHARMACY W/ HCPCS (ALT 636 FOR OP/ED): Mod: IPSPLIT

## 2024-01-12 PROCEDURE — 97110 THERAPEUTIC EXERCISES: CPT | Mod: GP,CQ,IPSPLIT

## 2024-01-12 PROCEDURE — 85610 PROTHROMBIN TIME: CPT | Mod: IPSPLIT

## 2024-01-12 PROCEDURE — 94640 AIRWAY INHALATION TREATMENT: CPT | Mod: IPSPLIT

## 2024-01-12 PROCEDURE — 94668 MNPJ CHEST WALL SBSQ: CPT | Mod: IPSPLIT

## 2024-01-12 PROCEDURE — 97116 GAIT TRAINING THERAPY: CPT | Mod: GP,CQ,IPSPLIT

## 2024-01-12 RX ORDER — METOPROLOL TARTRATE 25 MG/1
25 TABLET, FILM COATED ORAL EVERY 6 HOURS
Status: DISCONTINUED | OUTPATIENT
Start: 2024-01-12 | End: 2024-01-17 | Stop reason: HOSPADM

## 2024-01-12 RX ADMIN — PREDNISONE 40 MG: 20 TABLET ORAL at 09:46

## 2024-01-12 RX ADMIN — INSULIN LISPRO 4 UNITS: 100 INJECTION, SOLUTION INTRAVENOUS; SUBCUTANEOUS at 16:40

## 2024-01-12 RX ADMIN — METFORMIN HYDROCHLORIDE 1000 MG: 500 TABLET ORAL at 08:46

## 2024-01-12 RX ADMIN — FUROSEMIDE 80 MG: 80 TABLET ORAL at 09:47

## 2024-01-12 RX ADMIN — AMIODARONE HYDROCHLORIDE 200 MG: 200 TABLET ORAL at 11:14

## 2024-01-12 RX ADMIN — WARFARIN SODIUM 5 MG: 5 TABLET ORAL at 17:50

## 2024-01-12 RX ADMIN — METOPROLOL TARTRATE 25 MG: 25 TABLET, FILM COATED ORAL at 09:46

## 2024-01-12 RX ADMIN — ATORVASTATIN CALCIUM 20 MG: 10 TABLET, FILM COATED ORAL at 22:16

## 2024-01-12 RX ADMIN — TIOTROPIUM BROMIDE INHALATION SPRAY 2 PUFF: 3.12 SPRAY, METERED RESPIRATORY (INHALATION) at 08:57

## 2024-01-12 RX ADMIN — AMIODARONE HYDROCHLORIDE 200 MG: 200 TABLET ORAL at 22:16

## 2024-01-12 RX ADMIN — FERROUS SULFATE TAB 325 MG (65 MG ELEMENTAL FE) 1 TABLET: 325 (65 FE) TAB at 09:46

## 2024-01-12 RX ADMIN — METOPROLOL TARTRATE 25 MG: 25 TABLET, FILM COATED ORAL at 22:16

## 2024-01-12 RX ADMIN — ASPIRIN 81 MG: 81 TABLET, COATED ORAL at 09:46

## 2024-01-12 RX ADMIN — AMIODARONE HYDROCHLORIDE 200 MG: 200 TABLET ORAL at 16:40

## 2024-01-12 RX ADMIN — GLIMEPIRIDE 2 MG: 2 TABLET ORAL at 08:46

## 2024-01-12 RX ADMIN — CALCIUM CARBONATE (ANTACID) CHEW TAB 500 MG 1500 MG: 500 CHEW TAB at 09:46

## 2024-01-12 RX ADMIN — METOPROLOL TARTRATE 25 MG: 25 TABLET, FILM COATED ORAL at 16:39

## 2024-01-12 RX ADMIN — DOCUSATE SODIUM 100 MG: 100 CAPSULE, LIQUID FILLED ORAL at 22:16

## 2024-01-12 RX ADMIN — GABAPENTIN 200 MG: 100 CAPSULE ORAL at 22:16

## 2024-01-12 RX ADMIN — TOPIRAMATE 100 MG: 100 TABLET, FILM COATED ORAL at 22:16

## 2024-01-12 RX ADMIN — IPRATROPIUM BROMIDE AND ALBUTEROL SULFATE 3 ML: .5; 3 SOLUTION RESPIRATORY (INHALATION) at 17:04

## 2024-01-12 RX ADMIN — PIPERACILLIN SODIUM AND TAZOBACTAM SODIUM 4.5 G: 4; .5 INJECTION, SOLUTION INTRAVENOUS at 09:45

## 2024-01-12 RX ADMIN — PIPERACILLIN SODIUM AND TAZOBACTAM SODIUM 4.5 G: 4; .5 INJECTION, SOLUTION INTRAVENOUS at 03:46

## 2024-01-12 RX ADMIN — IPRATROPIUM BROMIDE AND ALBUTEROL SULFATE 3 ML: .5; 3 SOLUTION RESPIRATORY (INHALATION) at 08:31

## 2024-01-12 RX ADMIN — METFORMIN HYDROCHLORIDE 1000 MG: 500 TABLET ORAL at 16:40

## 2024-01-12 RX ADMIN — GUAIFENESIN 600 MG: 600 TABLET, EXTENDED RELEASE ORAL at 22:16

## 2024-01-12 RX ADMIN — DOCUSATE SODIUM 100 MG: 100 CAPSULE, LIQUID FILLED ORAL at 09:47

## 2024-01-12 RX ADMIN — FLUTICASONE FUROATE AND VILANTEROL TRIFENATATE 1 PUFF: 200; 25 POWDER RESPIRATORY (INHALATION) at 08:58

## 2024-01-12 RX ADMIN — PIPERACILLIN SODIUM AND TAZOBACTAM SODIUM 4.5 G: 4; .5 INJECTION, SOLUTION INTRAVENOUS at 22:00

## 2024-01-12 RX ADMIN — CALCIUM CARBONATE (ANTACID) CHEW TAB 500 MG 1500 MG: 500 CHEW TAB at 22:16

## 2024-01-12 RX ADMIN — DAPSONE 100 MG: 25 TABLET ORAL at 06:24

## 2024-01-12 RX ADMIN — GUAIFENESIN 600 MG: 600 TABLET, EXTENDED RELEASE ORAL at 09:46

## 2024-01-12 RX ADMIN — PIPERACILLIN SODIUM AND TAZOBACTAM SODIUM 4.5 G: 4; .5 INJECTION, SOLUTION INTRAVENOUS at 16:47

## 2024-01-12 RX ADMIN — PANTOPRAZOLE SODIUM 40 MG: 40 TABLET, DELAYED RELEASE ORAL at 06:26

## 2024-01-12 RX ADMIN — GABAPENTIN 200 MG: 100 CAPSULE ORAL at 09:46

## 2024-01-12 RX ADMIN — TOPIRAMATE 100 MG: 100 TABLET, FILM COATED ORAL at 09:46

## 2024-01-12 RX ADMIN — IPRATROPIUM BROMIDE AND ALBUTEROL SULFATE 3 ML: .5; 3 SOLUTION RESPIRATORY (INHALATION) at 13:58

## 2024-01-12 ASSESSMENT — COGNITIVE AND FUNCTIONAL STATUS - GENERAL
EATING MEALS: A LOT
DRESSING REGULAR LOWER BODY CLOTHING: A LOT
MOVING TO AND FROM BED TO CHAIR: A LOT
CLIMB 3 TO 5 STEPS WITH RAILING: TOTAL
PERSONAL GROOMING: A LOT
STANDING UP FROM CHAIR USING ARMS: A LITTLE
MOVING FROM LYING ON BACK TO SITTING ON SIDE OF FLAT BED WITH BEDRAILS: A LOT
WALKING IN HOSPITAL ROOM: A LITTLE
DRESSING REGULAR UPPER BODY CLOTHING: A LOT
TURNING FROM BACK TO SIDE WHILE IN FLAT BAD: A LITTLE
MOBILITY SCORE: 12
MOVING FROM LYING ON BACK TO SITTING ON SIDE OF FLAT BED WITH BEDRAILS: A LOT
MOBILITY SCORE: 15
TOILETING: A LOT
DAILY ACTIVITIY SCORE: 12
TURNING FROM BACK TO SIDE WHILE IN FLAT BAD: A LOT
CLIMB 3 TO 5 STEPS WITH RAILING: TOTAL
HELP NEEDED FOR BATHING: A LOT
WALKING IN HOSPITAL ROOM: A LITTLE
MOVING TO AND FROM BED TO CHAIR: A LITTLE
STANDING UP FROM CHAIR USING ARMS: A LOT

## 2024-01-12 ASSESSMENT — PAIN SCALES - GENERAL
PAINLEVEL_OUTOF10: 0 - NO PAIN

## 2024-01-12 ASSESSMENT — PAIN - FUNCTIONAL ASSESSMENT
PAIN_FUNCTIONAL_ASSESSMENT: 0-10

## 2024-01-12 NOTE — NURSING NOTE
Pt. Able to get up to BSC with walker & assist of 1 RN. PT. Returned to bed after using BSC & franco-care provided.

## 2024-01-12 NOTE — PROGRESS NOTES
"Frannie Blanco is a 73 y.o. female on day 8 of admission presenting with RSV (respiratory syncytial virus infection).    Subjective         Objective     Physical Exam  Constitutional:       General: She is not in acute distress.     Appearance: Normal appearance. She is well-developed. She is morbidly obese.   Eyes:      Pupils: Pupils are equal, round, and reactive to light.   Neck:      Vascular: No carotid bruit.   Cardiovascular:      Rate and Rhythm: Normal rate and regular rhythm.      Pulses: Normal pulses.      Heart sounds: Normal heart sounds. No murmur heard.  Pulmonary:      Effort: Pulmonary effort is normal. No respiratory distress.      Breath sounds: Decreased air movement present. Decreased breath sounds present.   Abdominal:      General: There is no distension.      Palpations: Abdomen is soft.      Tenderness: There is no abdominal tenderness.   Musculoskeletal:         General: No swelling.      Cervical back: Neck supple.      Right lower leg: No edema.      Left lower leg: No edema.   Skin:     General: Skin is warm.   Neurological:      Mental Status: She is alert and oriented to person, place, and time. Mental status is at baseline.   Psychiatric:         Mood and Affect: Mood normal.         Behavior: Behavior normal. Behavior is cooperative.         Last Recorded Vitals  Blood pressure 87/52, pulse 83, temperature 35.9 °C (96.6 °F), temperature source Temporal, resp. rate 20, height 1.676 m (5' 5.98\"), weight 140 kg (308 lb 13.8 oz), SpO2 91 %.  Intake/Output last 3 Shifts:  I/O last 3 completed shifts:  In: 1922.6 (13.7 mL/kg) [P.O.:1520; I.V.:202.6 (1.4 mL/kg); IV Piggyback:200]  Out: 2550 (18.2 mL/kg) [Urine:2550 (0.5 mL/kg/hr)]  Weight: 140.1 kg     Relevant Results  Scheduled medications  amiodarone, 200 mg, oral, TID  aspirin, 81 mg, oral, Daily  atorvastatin, 20 mg, oral, Nightly  calcium carbonate, 1,500 mg, oral, q12h  dapsone, 100 mg, oral, Daily before breakfast  [Held by " provider] dilTIAZem CD, 300 mg, oral, Daily  docusate sodium, 100 mg, oral, BID  ferrous sulfate (325 mg ferrous sulfate), 65 mg of iron, oral, Daily  tiotropium, 2 Inhalation, inhalation, Daily   And  fluticasone furoate-vilanteroL, 1 puff, inhalation, Daily  furosemide, 80 mg, oral, Daily  gabapentin, 200 mg, oral, BID  glimepiride, 2 mg, oral, Daily with breakfast  guaiFENesin, 600 mg, oral, BID  insulin lispro, 0-5 Units, subcutaneous, TID with meals  ipratropium-albuteroL, 3 mL, nebulization, 4x daily  losartan, 12.5 mg, oral, Daily  [Held by provider] melatonin, 6 mg, oral, Daily  metFORMIN, 1,000 mg, oral, BID with meals  metoprolol tartrate, 25 mg, oral, TID  pantoprazole, 40 mg, oral, Daily before breakfast   Or  pantoprazole, 40 mg, intravenous, Daily before breakfast  piperacillin-tazobactam, 4.5 g, intravenous, q6h  predniSONE, 40 mg, oral, Daily  topiramate, 100 mg, oral, BID  warfarin, 5 mg, oral, Daily      Continuous medications     PRN medications  PRN medications: acetaminophen **OR** acetaminophen **OR** acetaminophen, acetaminophen **OR** acetaminophen **OR** acetaminophen, albuterol, benzocaine-menthoL, bisacodyl, dextromethorphan-guaifenesin, dextrose 10 % in water (D10W), dextrose, glucagon, metoprolol, ondansetron ODT **OR** ondansetron, oxygen  Results for orders placed or performed during the hospital encounter of 01/02/24 (from the past 24 hour(s))   POCT GLUCOSE   Result Value Ref Range    POCT Glucose 181 (H) 74 - 99 mg/dL   POCT GLUCOSE   Result Value Ref Range    POCT Glucose 344 (H) 74 - 99 mg/dL   POCT GLUCOSE   Result Value Ref Range    POCT Glucose 325 (H) 74 - 99 mg/dL   Basic metabolic panel   Result Value Ref Range    Glucose 144 (H) 74 - 99 mg/dL    Sodium 146 (H) 136 - 145 mmol/L    Potassium 3.4 (L) 3.5 - 5.3 mmol/L    Chloride 100 98 - 107 mmol/L    Bicarbonate 39 (H) 21 - 32 mmol/L    Anion Gap 10 10 - 20 mmol/L    Urea Nitrogen 29 (H) 6 - 23 mg/dL    Creatinine 0.94 0.50 -  1.05 mg/dL    eGFR 65 >60 mL/min/1.73m*2    Calcium 8.7 8.6 - 10.3 mg/dL   CBC and Auto Differential   Result Value Ref Range    WBC 6.9 4.4 - 11.3 x10*3/uL    nRBC 0.0 0.0 - 0.0 /100 WBCs    RBC 2.70 (L) 4.00 - 5.20 x10*6/uL    Hemoglobin 8.6 (L) 12.0 - 16.0 g/dL    Hematocrit 29.5 (L) 36.0 - 46.0 %     (H) 80 - 100 fL    MCH 31.9 26.0 - 34.0 pg    MCHC 29.2 (L) 32.0 - 36.0 g/dL    RDW 16.6 (H) 11.5 - 14.5 %    Platelets 185 150 - 450 x10*3/uL    Neutrophils % 75.2 40.0 - 80.0 %    Immature Granulocytes %, Automated 0.4 0.0 - 0.9 %    Lymphocytes % 13.8 13.0 - 44.0 %    Monocytes % 8.3 2.0 - 10.0 %    Eosinophils % 2.3 0.0 - 6.0 %    Basophils % 0.0 0.0 - 2.0 %    Neutrophils Absolute 5.16 1.60 - 5.50 x10*3/uL    Immature Granulocytes Absolute, Automated 0.03 0.00 - 0.50 x10*3/uL    Lymphocytes Absolute 0.95 0.80 - 3.00 x10*3/uL    Monocytes Absolute 0.57 0.05 - 0.80 x10*3/uL    Eosinophils Absolute 0.16 0.00 - 0.40 x10*3/uL    Basophils Absolute 0.00 0.00 - 0.10 x10*3/uL   Protime-INR   Result Value Ref Range    Protime 24.8 (H) 9.8 - 12.8 seconds    INR 2.2 (H) 0.9 - 1.1   POCT GLUCOSE   Result Value Ref Range    POCT Glucose 113 (H) 74 - 99 mg/dL   POCT GLUCOSE   Result Value Ref Range    POCT Glucose 101 (H) 74 - 99 mg/dL     ECG 12 lead    Result Date: 1/9/2024  Atrial fibrillation with rapid ventricular response Low voltage QRS Cannot rule out Anterior infarct , age undetermined Abnormal ECG When compared with ECG of 07-JAN-2024 00:28, (unconfirmed) No significant change was found    ECG 12 lead    Result Date: 1/9/2024  Atrial fibrillation with rapid ventricular response Abnormal ECG When compared with ECG of 02-JAN-2024 17:57, No significant change was found    ECG 12 lead    Result Date: 1/8/2024  Sinus rhythm with Premature atrial complexes with Aberrant conduction Cannot rule out Anterior infarct (cited on or before 02-JAN-2024) Abnormal ECG When compared with ECG of 02-JAN-2024 17:57, Sinus  rhythm has replaced Atrial fibrillation    XR chest 1 view    Result Date: 1/7/2024  Interpreted By:  Mohsen Law, STUDY: XR CHEST 1 VIEW;  1/7/2024 4:04 pm   INDICATION: Signs/Symptoms:worsening pneumonia.   COMPARISON: Chest x-ray 01/02/2024   ACCESSION NUMBER(S): [id number]   ORDERING CLINICIAN: ALBERTO PICKETT   FINDINGS: Multiple overlying leads are present.   CARDIOMEDIASTINAL SILHOUETTE: Cardiomediastinal silhouette is stable in size and configuration. Dense mitral annular calcifications noted.   LUNGS: There are bilateral patchy airspace opacities with slight interval improvement in the right lung base and slight interval worsening on the left. These findings may relate to developing edema versus infection. Small bilateral pleural effusions. No pneumothorax.   ABDOMEN: No remarkable upper abdominal findings.   BONES: Degenerative changes of the spine and bilateral shoulders.       There are patchy bilateral airspace opacities with slight interval worsening on the and slight interval improvement on the right. Findings may relate to edema versus infection. Attention on continued short-term follow-up is advised.   Small bilateral pleural effusions are noted with slight interval improvement on the right.   MACRO: None   Signed by: Mohsen Law 1/7/2024 4:18 PM Dictation workstation:   [id number]    CT head wo IV contrast    Result Date: 1/7/2024  Interpreted By:  Jaxon Fernandez, STUDY: CT ANGIO HEAD AND NECK W AND WO IV CONTRAST; CT HEAD WO IV CONTRAST; 1/7/2024 1:20 am; 1/7/2024 1:10 am   INDICATION: Signs/Symptoms:Confusion.   COMPARISON: CT and CT angiogram dated 01/06/2024.   ACCESSION NUMBER(S): [id number]; [id number]   ORDERING CLINICIAN: JAC SANTOS   TECHNIQUE: Unenhanced CT images of the head were obtained. Subsequently,  75 mL Omnipaque 350 was administered intravenously and axial images of the head and neck were acquired.  Coronal, sagittal, and 3-D reconstructions were provided for  review.   FINDINGS: There is a mostly calcified meningioma within the upper left parietal convexity measuring 1.7 x 1.5 cm and also calcified meningioma measuring 1.1 x 8.2 cm overlying the inferolateral right frontal lobe. No evidence of associated vasogenic edema or mass effect. There is no evidence of midline shift, hydrocephalus, or acute intracranial hemorrhage. Gray-white matter differentiation is maintained.   There is a small amount of fluid in the caudal mastoid air cells. There is mild to moderate polypoid mucosal thickening of the left maxillary sinus and anterior ethmoid air cells. These findings are unchanged.   CTA HEAD FINDINGS:   Anterior circulation: The bilateral intracranial internal carotid arteries, bilateral carotid terminals, bilateral proximal anterior and middle cerebral arteries are normal.   Posterior circulation: Bilateral intracranial vertebral arteries, vertebrobasilar junction, basilar artery and proximal posterior cerebral arteries are normal.   Venous contamination limits evaluation for small aneurysms, without gross evidence of intracranial aneurysm.   CTA NECK FINDINGS:   Right carotid vessels: There are mild atherosclerotic calcifications of the carotid bifurcation with 0% stenosis by NASCET criteria. The remainder of the right internal cervical carotid artery is widely patent without focal stenosis.   Left carotid vessels: The common carotid artery is normal. The carotid bifurcation is normal. The internal carotid artery in the neck is normal. There is 0% stenosis by NASCET criteria. .   Vertebral vessels:  The visualized segments of the cervical vertebral arteries are normal in caliber.   There is redemonstration of multifocal nodular and ground-glass opacities in the visualized upper lung fields suspicious for multifocal pneumonia. There is also small right pleural effusion partially visualized.       No evidence of acute cortical infarct or acute intracranial hemorrhage. There  are calcified meningiomas noted overlying the upper left frontoparietal convexity and overlying the inferolateral right frontal lobe without evidence of significant mass effect or vasogenic edema. No interval change.   No evidence for significant stenosis of the cervical vessels.   No evidence for significant stenosis or large branch vessel cutoffs of the intracranial vessels.   Partially visualized ground-glass and nodular opacities in the upper lung fields suspicious for multifocal pneumonia for example viral pneumonia.   MACRO:   None   Signed by: Jaxon Fernandez 1/7/2024 2:06 AM Dictation workstation:   [id number]    CT angio head and neck w and wo IV contrast    Result Date: 1/7/2024  Interpreted By:  Jaxon Fernandez, STUDY: CT ANGIO HEAD AND NECK W AND WO IV CONTRAST; CT HEAD WO IV CONTRAST; 1/7/2024 1:20 am; 1/7/2024 1:10 am   INDICATION: Signs/Symptoms:Confusion.   COMPARISON: CT and CT angiogram dated 01/06/2024.   ACCESSION NUMBER(S): [id number]; [id number]   ORDERING CLINICIAN: JAC SANTOS   TECHNIQUE: Unenhanced CT images of the head were obtained. Subsequently,  75 mL Omnipaque 350 was administered intravenously and axial images of the head and neck were acquired.  Coronal, sagittal, and 3-D reconstructions were provided for review.   FINDINGS: There is a mostly calcified meningioma within the upper left parietal convexity measuring 1.7 x 1.5 cm and also calcified meningioma measuring 1.1 x 8.2 cm overlying the inferolateral right frontal lobe. No evidence of associated vasogenic edema or mass effect. There is no evidence of midline shift, hydrocephalus, or acute intracranial hemorrhage. Gray-white matter differentiation is maintained.   There is a small amount of fluid in the caudal mastoid air cells. There is mild to moderate polypoid mucosal thickening of the left maxillary sinus and anterior ethmoid air cells. These findings are unchanged.   CTA HEAD FINDINGS:   Anterior circulation: The  bilateral intracranial internal carotid arteries, bilateral carotid terminals, bilateral proximal anterior and middle cerebral arteries are normal.   Posterior circulation: Bilateral intracranial vertebral arteries, vertebrobasilar junction, basilar artery and proximal posterior cerebral arteries are normal.   Venous contamination limits evaluation for small aneurysms, without gross evidence of intracranial aneurysm.   CTA NECK FINDINGS:   Right carotid vessels: There are mild atherosclerotic calcifications of the carotid bifurcation with 0% stenosis by NASCET criteria. The remainder of the right internal cervical carotid artery is widely patent without focal stenosis.   Left carotid vessels: The common carotid artery is normal. The carotid bifurcation is normal. The internal carotid artery in the neck is normal. There is 0% stenosis by NASCET criteria. .   Vertebral vessels:  The visualized segments of the cervical vertebral arteries are normal in caliber.   There is redemonstration of multifocal nodular and ground-glass opacities in the visualized upper lung fields suspicious for multifocal pneumonia. There is also small right pleural effusion partially visualized.       No evidence of acute cortical infarct or acute intracranial hemorrhage. There are calcified meningiomas noted overlying the upper left frontoparietal convexity and overlying the inferolateral right frontal lobe without evidence of significant mass effect or vasogenic edema. No interval change.   No evidence for significant stenosis of the cervical vessels.   No evidence for significant stenosis or large branch vessel cutoffs of the intracranial vessels.   Partially visualized ground-glass and nodular opacities in the upper lung fields suspicious for multifocal pneumonia for example viral pneumonia.   MACRO:   None   Signed by: Jaxon Fernandez 1/7/2024 2:06 AM Dictation workstation:   [id number]    CT angio brain attack head w IV contrast and post  procedure    Result Date: 1/6/2024  Interpreted By:  Jazmyne Teresa, STUDY: CT ANGIO BRAIN ATTACK HEAD W IV CONTRAST AND POST PROCEDURE; CT ANGIO BRAIN ATTACK NECK W IV CONTRAST AND POST PROCEDURE;  1/6/2024 5:16 pm   INDICATION: Signs/Symptoms:AMS.   COMPARISON: None.   ACCESSION NUMBER(S): [id number]; [id number]   ORDERING CLINICIAN: LYNN FLORES   TECHNIQUE: 75 mL Omnipaque 350 was administered intravenously and axial images of the head and neck were acquired.  Coronal, sagittal, and 3-D reconstructions were provided for review.   The technologist notes the patient had multiple IV malfunctions.   FINDINGS: CT head: Please see CT head performed the same day for intracranial findings.   CTA HEAD FINDINGS:   The examination is degraded by venous contamination.   Anterior circulation: The bilateral intracranial internal carotid arteries, bilateral carotid terminals, bilateral proximal anterior and middle cerebral arteries are patent.   Posterior circulation: Bilateral intracranial vertebral arteries, vertebrobasilar junction, basilar artery and proximal posterior cerebral arteries are patent.   CTA NECK FINDINGS:   The examination is limited by the timing of scan relative to the contrast injection.   Mild atherosclerotic calcification along the aortic arch. Within the limitation of patient motion artifact no significant stenosis at the origins of the great vessels.   Right carotid vessels: The common carotid artery is patent. Mild calcified plaque at the carotid bifurcation without significant stenosis by NASCET criteria. The internal carotid artery in the neck is patent without significant stenosis.   Left carotid vessels: The common carotid artery is patent.. The carotid bifurcation is patent without significant stenosis. The internal carotid artery in the neck is patent without significant stenosis.   Vertebral vessels:  The visualized segments of the cervical vertebral arteries are patent.   The left  thyroid lobe is slightly asymmetrically larger than the right with a calcification. Please see thyroid ultrasound 07/24/2023 for further details. Incidental note of multiple tonsilloliths. There is prominent ossification of the stylohyoid ligament bilaterally. Soft tissues of the neck are otherwise unremarkable.   There are patchy opacities within the visualized upper lungs bilaterally concerning for multifocal pneumonia. There is a small pleural effusion on the right. Mild degenerative changes of the cervical spine.       The examination is degraded by venous contamination, patient motion artifact and difficulties with the IV. Within this limitation:   CTA neck:   No evidence for significant stenosis of the cervical vessels.   Patchy opacities within the upper lungs bilaterally concerning for multifocal pneumonia. There is a small pleural effusion on the right.   CTA head:   No evidence for significant stenosis or large branch vessel cutoffs of the intracranial vessels.   Please see CT head performed the same day for intracranial findings.   MACRO: None   Signed by: Jazmyne Teresa 1/6/2024 5:34 PM Dictation workstation:   [id number]    CT angio brain attack neck w IV contrast and post procedure    Result Date: 1/6/2024  Interpreted By:  Jazmyne Teresa, STUDY: CT ANGIO BRAIN ATTACK HEAD W IV CONTRAST AND POST PROCEDURE; CT ANGIO BRAIN ATTACK NECK W IV CONTRAST AND POST PROCEDURE;  1/6/2024 5:16 pm   INDICATION: Signs/Symptoms:AMS.   COMPARISON: None.   ACCESSION NUMBER(S): [id number]; [id number]   ORDERING CLINICIAN: LYNN FLORES   TECHNIQUE: 75 mL Omnipaque 350 was administered intravenously and axial images of the head and neck were acquired.  Coronal, sagittal, and 3-D reconstructions were provided for review.   The technologist notes the patient had multiple IV malfunctions.   FINDINGS: CT head: Please see CT head performed the same day for intracranial findings.   CTA HEAD FINDINGS:   The examination is  degraded by venous contamination.   Anterior circulation: The bilateral intracranial internal carotid arteries, bilateral carotid terminals, bilateral proximal anterior and middle cerebral arteries are patent.   Posterior circulation: Bilateral intracranial vertebral arteries, vertebrobasilar junction, basilar artery and proximal posterior cerebral arteries are patent.   CTA NECK FINDINGS:   The examination is limited by the timing of scan relative to the contrast injection.   Mild atherosclerotic calcification along the aortic arch. Within the limitation of patient motion artifact no significant stenosis at the origins of the great vessels.   Right carotid vessels: The common carotid artery is patent. Mild calcified plaque at the carotid bifurcation without significant stenosis by NASCET criteria. The internal carotid artery in the neck is patent without significant stenosis.   Left carotid vessels: The common carotid artery is patent.. The carotid bifurcation is patent without significant stenosis. The internal carotid artery in the neck is patent without significant stenosis.   Vertebral vessels:  The visualized segments of the cervical vertebral arteries are patent.   The left thyroid lobe is slightly asymmetrically larger than the right with a calcification. Please see thyroid ultrasound 07/24/2023 for further details. Incidental note of multiple tonsilloliths. There is prominent ossification of the stylohyoid ligament bilaterally. Soft tissues of the neck are otherwise unremarkable.   There are patchy opacities within the visualized upper lungs bilaterally concerning for multifocal pneumonia. There is a small pleural effusion on the right. Mild degenerative changes of the cervical spine.       The examination is degraded by venous contamination, patient motion artifact and difficulties with the IV. Within this limitation:   CTA neck:   No evidence for significant stenosis of the cervical vessels.   Patchy  opacities within the upper lungs bilaterally concerning for multifocal pneumonia. There is a small pleural effusion on the right.   CTA head:   No evidence for significant stenosis or large branch vessel cutoffs of the intracranial vessels.   Please see CT head performed the same day for intracranial findings.   MACRO: None   Signed by: Jazmyne Teresa 1/6/2024 5:34 PM Dictation workstation:   [id number]    CT brain attack head wo IV contrast    Result Date: 1/6/2024  Interpreted By:  Richie Wren, STUDY: CT BRAIN ATTACK HEAD WO IV CONTRAST;  1/6/2024 4:37 pm   INDICATION: Signs/Symptoms:AMS.   COMPARISON: 11/18/2012   ACCESSION NUMBER(S): [id number]   ORDERING CLINICIAN: LYNN FLORES   TECHNIQUE: Noncontrast axial CT images of head were obtained with coronal and sagittal reconstructed images.   FINDINGS: BRAIN PARENCHYMA: Mild periventricular and subcortical hemispheric white matter hypodensities are most compatible with chronic small vessel ischemic disease. No acute intraparenchymal hemorrhage or parenchymal evidence of acute large territory ischemic infarct. No mass-effect. Gray-white matter distinction is preserved.   VENTRICLES and EXTRA-AXIAL SPACES: There is a 1.5 cm calcified meningioma arising from the left parasagittal frontal parietal dura. There is another 1.3 cm more densely calcified meningioma arising the right frontotemporal dura. No acute extra-axial or intraventricular hemorrhage. No effacement of cerebral sulci. Ventricles and sulci are age-concordant. There is a partial empty sella.   PARANASAL SINUSES/MASTOIDS: Trace fluid left mastoid air cells. No hemorrhage or air-fluid levels within the visualized paranasal sinuses. The mastoids are well aerated.   CALVARIUM/ORBITS:  No skull fracture.  The orbits and globes are intact to the extent visualized.   EXTRACRANIAL SOFT TISSUES: No discernible abnormality.       1. No evidence of acute intracranial hemorrhage, mass effect, or parenchymal  evidence of an acute large territory ischemic infarct.   2. Calcified meningiomas rising from the left parasagittal frontal parietal dura and right frontotemporal dura measuring 1.5 cm and 1.3 cm, respectively. No significant mass effect.     MACRO: Richie Wren discussed the significance and urgency of this critical finding by EPIC HAIKU with  LYNN FLORES on 1/6/2024 at 4:50 p.m. with confirmation of receipt. (**-RCF-**) Findings:  See findings.   Signed by: Richie Wren 1/6/2024 4:53 PM Dictation workstation:   [id number]    ECG 12 lead    Result Date: 1/3/2024  Atrial fibrillation with rapid ventricular response Low voltage QRS Cannot rule out Anterior infarct , age undetermined Abnormal ECG When compared with ECG of 13-DEC-2023 09:49, Atrial fibrillation has replaced Sinus rhythm See ED provider note for full interpretation and clinical correlation Confirmed by Jamir Salvador ([id number]) on 1/3/2024 10:49:37 PM    XR chest 1 view    Result Date: 1/2/2024  Interpreted By:  Soren Ham, STUDY: XR CHEST 1 VIEW;  1/2/2024 7:26 pm   INDICATION: Signs/Symptoms:sob.   COMPARISON: 9/26/2023   ACCESSION NUMBER(S): [id number]   ORDERING CLINICIAN: CARMEN CONKLIN   FINDINGS: The cardiac silhouette is stable in size. There are bilateral opacities concerning for infiltrates. Small right pleural effusion and basilar atelectasis or airspace disease. No pneumothorax.       Small right pleural effusion and basilar atelectasis or airspace disease and mild bilateral opacities concerning for infiltrates.   MACRO: None   Signed by: Soren Ham 1/2/2024 7:38 PM Dictation workstation:   [id number]    CT chest wo IV contrast    Result Date: 12/26/2023  Interpreted By:  Richie Wren, STUDY: CT CHEST WO IV CONTRAST;  12/26/2023 6:03 pm   INDICATION: Signs/Symptoms:cough.   COMPARISON: 09/22/2023 CT chest   ACCESSION NUMBER(S): [id number]   ORDERING CLINICIAN: JANNET VEE   TECHNIQUE: Contiguous axial images of  the chest and upper abdomen were obtained without contrast. Coronal and sagittal reformatted images were reconstructed from the axial data.   FINDINGS:     MEDIASTINUM AND LYMPH NODES:  The esophagus appears within normal limits.  No enlarged intrathoracic or axillary lymph nodes by imaging criteria. No pneumomediastinum.   VESSELS:  Normal caliber thoracic aorta. Mild aortic atherosclerosis. The pulmonary artery is enlarged, measuring up to 3.6 cm, indicative of pulmonary hypertension.   HEART: There is left atrial dilatation. Mitral annular calcifications. Mild coronary artery calcifications. No significant pericardial effusion.   LUNG, AIRWAYS, AND PLEURA: New small bilateral pleural effusions with adjacent passive atelectasis. There is new subsegmental atelectasis in the right middle lobe. There is a small amount debris in the lower trachea extending into the mainstem bronchi and bronchus intermedius. There is a small opacity in the left upper lobe and superior segment of left lower lobe consisting of tree-in-bud nodules suspicious for pneumonia the   OSSEOUS STRUCTURES: No acute osseous abnormality.   CHEST WALL SOFT TISSUES: No discernible abnormality.   UPPER ABDOMEN/OTHER: Multiple peripherally calcified splenic artery aneurysm measuring 1.3 cm, 1.3 cm, and 2.8 cm are similar to prior study. The liver appears cirrhotic.       1. Tree-in-bud opacities in the posterior left upper lobe and superior segment of the left lower lobe consistent with bronchiolitis/early pneumonia.   2. Small bilateral pleural effusions with adjacent passive atelectasis and right middle lobe subsegmental atelectasis.   3. Small amount of debris in the trachea and bilateral proximal bronchi that could be secretions or aspiration.   4. Three splenic artery aneurysm measuring up to 2.8 cm, unchanged.   MACRO: None.   Signed by: Richie Wren 12/26/2023 6:29 PM Dictation workstation:   [id number]    ECG 12 lead (Clinic  Performed)    Result Date: 12/16/2023  Atrial fibrillation Poor R-wave progression Abnormal ECG When compared with ECG of 25-SEP-2023 10:00, Nonspecific T wave abnormality, improved in Inferior leads Confirmed by Raymon Beaulieu ([id number]) on 12/16/2023 11:49:53 PM    ECG 12 Lead    Result Date: 12/14/2023  Atrial fibrillation Low voltage QRS Abnormal ECG When compared with ECG of 11-DEC-2023 15:22, (unconfirmed) No significant change was found Confirmed by Zaki Stauffer ([id number]) on 12/14/2023 5:32:09 PM    ECG 12 lead    Result Date: 12/14/2023  Sinus rhythm with Premature supraventricular complexes Low voltage QRS Borderline ECG When compared with ECG of 13-DEC-2023 07:33, (unconfirmed) Sinus rhythm has replaced Atrial fibrillation Confirmed by Zaki Stauffer ([id number]) on 12/14/2023 5:31:33 PM    ECG 12 Lead    Result Date: 12/14/2023  Sinus rhythm with Premature atrial complexes Low voltage QRS Borderline ECG When compared with ECG of 13-DEC-2023 08:45, (unconfirmed) No significant change was found Confirmed by Zaki Stauffer ([id number]) on 12/14/2023 5:31:14 PM      Assessment/Plan   Principal Problem:    RSV (respiratory syncytial virus infection)  Active Problems:    Shortness of breath    73 YOF with chronic AF, now with RVR in the setting of  Acute on chronic hypoxic respiratory failure 2/2 RSV, CAP, COPD and HFpEF in acute exacerbation -- improving     S/p DCCV on 1/08/2024 with reversion to AF on 1/10/24  Long term OAC with warfarin  HTN, controlled   HLD  T2DM with neuropathy  Anemia  Morbid Obesity BMI 52  History of noncompliance     Plan:  PO lasix -- currently at 80 mg daily   Continue metoprolol 25 mg TID   Continue amiodarone loading   Resume warfarin when INR 2.0  Potassium replacement   Losartan on hold due to hypotension overnight  Plan for OP follow up with primary cardiologist and referral to EP, Appointment with Dr. Siegel 1/15/2024 already arranged.      Thank you  for the consult   Please call or  message with questions or concerns   The case was discussed with KIKE Trevizo       01/12/24 at 8:54 AM - KIKE Mae

## 2024-01-12 NOTE — PROGRESS NOTES
Physical Therapy    Physical Therapy Treatment    Patient Name: Frannie Blanco  MRN: [id number]  Today's Date: 1/12/2024  Time Calculation  Start Time: 0730  Stop Time: 0825  Time Calculation (min): 55 min       Assessment/Plan   PT Assessment  PT Assessment Results: Decreased strength, Decreased range of motion, Decreased endurance, Decreased mobility, Obesity  End of Session Communication: Bedside nurse  End of Session Patient Position: Up in chair, Alarm off, not on at start of session  PT Plan  Inpatient/Swing Bed or Outpatient: Inpatient  PT Plan  Treatment/Interventions: Bed mobility, Transfer training, Gait training, Therapeutic exercise  PT Plan: Skilled PT  PT Frequency: 3 times per week  PT Discharge Recommendations: Moderate intensity level of continued care  PT Recommended Transfer Status: Assist x1  PT - OK to Discharge: Yes    General Visit Information:   PT  Visit  PT Received On: 01/12/24     Precautions:  Precautions  Medical Precautions: Fall precautions, Infection precautions    Objective   Pain:  Pain Assessment  Pain Assessment: 0-10  Pain Score: 0 - No pain  Cognition:  Cognition  Overall Cognitive Status: Within Functional Limits    Treatments:  Therapeutic Exercise  Therapeutic Exercise Performed: Yes  Therapeutic Exercise Activity 1: quad sets x 20 with vcs required to increase quad contraction  Therapeutic Exercise Activity 2: ankle pumps x 20  Therapeutic Exercise Activity 3: saqs x 20  Therapeutic Exercise Activity 4: iso hip adduction/abduction supine x 20  Therapeutic Exercise Activity 5: laqs x 20  Therapeutic Exercise Activity 6: hip flexion seated x 20  Therapeutic Exercise Activity 10: iso hip abdcuction/adduction seated x 20    Bed Mobility  Bed Mobility: Yes  Bed Mobility 1  Bed Mobility 1: Supine to sitting  Level of Assistance 1: Moderate assistance (Patient able to initiate supine to sit, required mod assist of 1 to complete.)    Ambulation/Gait Training  Ambulation/Gait  Training Performed: Yes  Ambulation/Gait Training 1  Surface 1: Level tile  Device 1: Rolling walker  Gait Support Devices: Gait belt  Assistance 1: Minimum assistance, Minimal verbal cues (Vcs required to maintain erect posture and for directional changes, min assist required due to fatigue and weakness.)  Quality of Gait 1: Decreased step length, Forward flexed posture, Antalgic  Comments/Distance (ft) 1: 5,7  Transfers  Transfer: Yes  Transfer 1  Technique 1: Sit to stand, Stand to sit  Transfer Device 1: Walker, Gait belt  Transfer Level of Assistance 1: Minimum assistance, Minimal verbal cues  Trials/Comments 1: Vcs required for proper positioning prior to attempting to stand, min assist required for push off and for eccentric control with stand to sit.    Outcome Measures:  [facility] Basic Mobility  Turning from your back to your side while in a flat bed without using bedrails: A lot  Moving from lying on your back to sitting on the side of a flat bed without using bedrails: A lot  Moving to and from bed to chair (including a wheelchair): A lot  Standing up from a chair using your arms (e.g. wheelchair or bedside chair): A lot  To walk in hospital room: A little  Climbing 3-5 steps with railing: Total  Basic Mobility - Total Score: 12    Education Documentation  Home Exercise Program, taught by Soledad Dennis PTA at 1/12/2024  9:28 AM.  Learner: Patient  Readiness: Acceptance  Method: Explanation  Response: Demonstrated Understanding  Comment: Educated patient on proper turns with fww.    Mobility Training, taught by Soledad Dennis PTA at 1/12/2024  9:28 AM.  Learner: Patient  Readiness: Acceptance  Method: Explanation  Response: Demonstrated Understanding  Comment: Educated patient on proper turns with fww.    Education Comments  No comments found.      Encounter Problems       Encounter Problems (Active)       Balance       STG - Maintains dynamic standing balance without upper extremity support >=5  min  (Progressing)       Start:  01/04/24    Expected End:  01/18/24               Mobility       LTG - Patient will ambulate household distance with WW Leela  (Progressing)       Start:  01/04/24    Expected End:  01/18/24            LTG - Patient will navigate 4 steps with 1 rail and cane with CGA (Progressing)       Start:  01/04/24    Expected End:  01/18/24               Pain - Adult          Transfers       STG - Patient will perform bed mobility with SBA  (Progressing)       Start:  01/04/24    Expected End:  01/18/24            STG - Patient will transfer sit to and from stand Leela with WW  (Progressing)       Start:  01/04/24    Expected End:  01/18/24

## 2024-01-12 NOTE — PROGRESS NOTES
Patient is authorized to admit to [facility] in [facility] will need to be notified of discharge after discharge orders are in and goldenrod signed.  She will send [phone] and final discharge orders.  She can also set up transportation.  List of DSC left at nurses station and with supervisor.

## 2024-01-12 NOTE — CARE PLAN
Problem: Pain - Adult  Goal: Verbalizes/displays adequate comfort level or baseline comfort level  Outcome: Progressing     Problem: Fall/Injury  Goal: Verbalize understanding of personal risk factors for fall in the hospital  Outcome: Progressing  Goal: Verbalize understanding of risk factor reduction measures to prevent injury from fall in the home  Outcome: Progressing  Goal: Use assistive devices by end of the shift  Outcome: Progressing  Goal: Pace activities to prevent fatigue by end of the shift  Outcome: Progressing     Problem: Respiratory  Goal: Clear secretions with interventions this shift  Outcome: Progressing  Goal: Minimize anxiety/maximize coping throughout shift  Outcome: Progressing  Goal: Minimal/no exertional discomfort or dyspnea this shift  Outcome: Progressing  Goal: Tolerate pulmonary toileting this shift  Outcome: Progressing  Goal: Verbalize decreased shortness of breath this shift  Outcome: Progressing  Goal: Wean oxygen to maintain O2 saturation per order/standard this shift  Outcome: Progressing  Goal: Increase self care and/or family involvement in next 24 hours  Outcome: Progressing     Problem: Discharge Planning  Goal: Discharge to home or other facility with appropriate resources  Outcome: Progressing     Problem: Chronic Conditions and Co-morbidities  Goal: Patient's chronic conditions and co-morbidity symptoms are monitored and maintained or improved  Outcome: Progressing     Problem: Arrythmia/Dysrhythmia  Goal: Lab values return to normal range  Outcome: Progressing  Goal: No evidence of post procedure complications  Outcome: Progressing  Goal: Promote self management  Outcome: Progressing  Goal: Serial ECG will return to baseline  Outcome: Progressing  Goal: Verbalize understanding of procedures/devices  Outcome: Progressing  Goal: Vital signs return to baseline  Outcome: Progressing     Problem: Skin  Goal: Prevent/minimize sheer/friction injuries  Outcome:  Progressing  Flowsheets (Taken 1/12/2024 1502)  Prevent/minimize sheer/friction injuries: Increase activity/out of bed for meals  Goal: Decreased wound size/increased tissue granulation at next dressing change  Outcome: Progressing  Goal: Participates in plan/prevention/treatment measures  Outcome: Progressing  Goal: Prevent/manage excess moisture  Outcome: Progressing  Goal: Promote/optimize nutrition  Outcome: Progressing  Goal: Promote skin healing  Outcome: Progressing   The patient's goals for the shift include increase activity this shift    The clinical goals for the shift include maintain pt. safety

## 2024-01-12 NOTE — NURSING NOTE
Pt. Oxygen level dropped to 85% after oxygen decreased to 5 L & rate change by RT. Oxygen turned back to 6 l low flow from 5 L low flow

## 2024-01-12 NOTE — PROGRESS NOTES
Frannie Blanco is a 73 y.o. female on day 8 of admission presenting with RSV (respiratory syncytial virus infection).      Subjective   Pt assessed at bedside. Sitting in the chair on her chronic 5L of oxygen. Feels a little better, however, is pretty nervous about her health.        Objective     Last Recorded Vitals  BP 87/52   Pulse 83   Temp 35.9 °C (96.6 °F) (Temporal)   Resp 20   Wt 140 kg (308 lb 13.8 oz)   SpO2 90%   Intake/Output last 3 Shifts:    Intake/Output Summary (Last 24 hours) at 1/12/2024 0905  Last data filed at 1/12/2024 0346  Gross per 24 hour   Intake 682.62 ml   Output 1600 ml   Net -917.38 ml       Admission Weight  Weight: 150 kg (330 lb) (01/02/24 1740)    Daily Weight  01/12/24 : 140 kg (308 lb 13.8 oz)    Image Results  CT angio chest for pulmonary embolism  Narrative: Interpreted By:  Yao Torres,   STUDY:  CT ANGIO CHEST FOR PULMONARY EMBOLISM;  1/10/2024 3:12 pm      INDICATION:  Signs/Symptoms:worsening sob.      COMPARISON:  12/26/2023      ACCESSION NUMBER(S):  [id number]      ORDERING CLINICIAN:  OLAF SOLIMAN      TECHNIQUE:  Helical data acquisition of the chest was obtained with  75 mL  Omnipaque 350. Images were reformatted in axial, coronal, and  sagittal planes.MIP reformatted images were also generated.      FINDINGS:  Motion degraded exam.      LUNGS and AIRWAYS:  Small left and moderate right layering pleural effusions. Overlying  atelectasis. Increased severe atelectasis involving the right middle  lobe which is nearly collapsed. There are diffusely increased patchy  areas consolidation and ground-glass opacity scattered throughout  both lungs. Interlobular septal thickening is also noted in some  areas. The right middle lobe bronchus is at least partially occluded.  The remaining central airways otherwise appear patent. No  bronchiectasis.      MEDIASTINUM and FANG, LOWER NECK AND AXILLA:  The visualized thyroid gland is grossly unremarkable.      No thoracic  lymphadenopathy by CT criteria.      Possible small sliding hiatal hernia.      HEART and VESSELS:  Mild cardiomegaly. Dense mitral annulus calcification.      No significant pericardial effusion.      No pulmonary embolism identified on motion degraded exam. Dilatation  of the main pulmonary artery to 40 mm suggests pulmonary hypertension.      Mild coronary atherosclerosis.      Thoracic aorta and great vessels are mildly atherosclerotic but  otherwise patent without aneurysm.      UPPER ABDOMEN:  27 mm distal splenic artery aneurysm is probably thrombosed, not  significantly changed to prior exam.      CHEST WALL and OSSEOUS STRUCTURES:  No suspicious osseous lesions. Multilevel degenerative changes of the  thoracic spine.      Impression: 1.  No pulmonary embolism identified on motion degraded exam.  2. Pulmonary edema with right greater than left pleural effusions.  3. Near occlusion of the right middle lobe bronchus causing worsening  atelectasis.          Signed by: Yao Torres 1/10/2024 3:28 PM  Dictation workstation:   [id number]      Physical Exam  Constitutional:       Appearance: She is obese.   HENT:      Head: Normocephalic.      Nose: Nose normal.      Mouth/Throat:      Pharynx: Oropharynx is clear.   Eyes:      Conjunctiva/sclera: Conjunctivae normal.   Cardiovascular:      Rate and Rhythm: Normal rate. Rhythm irregular.   Pulmonary:      Breath sounds: Wheezing present.   Abdominal:      General: Bowel sounds are normal.      Palpations: Abdomen is soft.   Musculoskeletal:      Cervical back: Normal range of motion and neck supple.      Right lower leg: Edema present.      Left lower leg: Edema present.   Skin:     General: Skin is dry.   Neurological:      Mental Status: She is alert and oriented to person, place, and time.   Psychiatric:         Mood and Affect: Mood normal.         Behavior: Behavior normal.         Relevant Results  Scheduled medications  amiodarone, 200 mg, oral,  TID  aspirin, 81 mg, oral, Daily  atorvastatin, 20 mg, oral, Nightly  calcium carbonate, 1,500 mg, oral, q12h  dapsone, 100 mg, oral, Daily before breakfast  [Held by provider] dilTIAZem CD, 300 mg, oral, Daily  docusate sodium, 100 mg, oral, BID  ferrous sulfate (325 mg ferrous sulfate), 65 mg of iron, oral, Daily  tiotropium, 2 Inhalation, inhalation, Daily   And  fluticasone furoate-vilanteroL, 1 puff, inhalation, Daily  furosemide, 80 mg, oral, Daily  gabapentin, 200 mg, oral, BID  glimepiride, 2 mg, oral, Daily with breakfast  guaiFENesin, 600 mg, oral, BID  insulin lispro, 0-5 Units, subcutaneous, TID with meals  ipratropium-albuteroL, 3 mL, nebulization, 4x daily  [Held by provider] losartan, 12.5 mg, oral, Daily  [Held by provider] melatonin, 6 mg, oral, Daily  metFORMIN, 1,000 mg, oral, BID with meals  metoprolol tartrate, 25 mg, oral, TID  pantoprazole, 40 mg, oral, Daily before breakfast   Or  pantoprazole, 40 mg, intravenous, Daily before breakfast  piperacillin-tazobactam, 4.5 g, intravenous, q6h  predniSONE, 40 mg, oral, Daily  topiramate, 100 mg, oral, BID  warfarin, 5 mg, oral, Daily      Continuous medications     PRN medications  PRN medications: acetaminophen **OR** acetaminophen **OR** acetaminophen, acetaminophen **OR** acetaminophen **OR** acetaminophen, albuterol, benzocaine-menthoL, bisacodyl, dextromethorphan-guaifenesin, dextrose 10 % in water (D10W), dextrose, glucagon, metoprolol, ondansetron ODT **OR** ondansetron, oxygen    Results for orders placed or performed during the hospital encounter of 01/02/24 (from the past 24 hour(s))   POCT GLUCOSE   Result Value Ref Range    POCT Glucose 181 (H) 74 - 99 mg/dL   POCT GLUCOSE   Result Value Ref Range    POCT Glucose 344 (H) 74 - 99 mg/dL   POCT GLUCOSE   Result Value Ref Range    POCT Glucose 325 (H) 74 - 99 mg/dL   Basic metabolic panel   Result Value Ref Range    Glucose 144 (H) 74 - 99 mg/dL    Sodium 146 (H) 136 - 145 mmol/L    Potassium  3.4 (L) 3.5 - 5.3 mmol/L    Chloride 100 98 - 107 mmol/L    Bicarbonate 39 (H) 21 - 32 mmol/L    Anion Gap 10 10 - 20 mmol/L    Urea Nitrogen 29 (H) 6 - 23 mg/dL    Creatinine 0.94 0.50 - 1.05 mg/dL    eGFR 65 >60 mL/min/1.73m*2    Calcium 8.7 8.6 - 10.3 mg/dL   CBC and Auto Differential   Result Value Ref Range    WBC 6.9 4.4 - 11.3 x10*3/uL    nRBC 0.0 0.0 - 0.0 /100 WBCs    RBC 2.70 (L) 4.00 - 5.20 x10*6/uL    Hemoglobin 8.6 (L) 12.0 - 16.0 g/dL    Hematocrit 29.5 (L) 36.0 - 46.0 %     (H) 80 - 100 fL    MCH 31.9 26.0 - 34.0 pg    MCHC 29.2 (L) 32.0 - 36.0 g/dL    RDW 16.6 (H) 11.5 - 14.5 %    Platelets 185 150 - 450 x10*3/uL    Neutrophils % 75.2 40.0 - 80.0 %    Immature Granulocytes %, Automated 0.4 0.0 - 0.9 %    Lymphocytes % 13.8 13.0 - 44.0 %    Monocytes % 8.3 2.0 - 10.0 %    Eosinophils % 2.3 0.0 - 6.0 %    Basophils % 0.0 0.0 - 2.0 %    Neutrophils Absolute 5.16 1.60 - 5.50 x10*3/uL    Immature Granulocytes Absolute, Automated 0.03 0.00 - 0.50 x10*3/uL    Lymphocytes Absolute 0.95 0.80 - 3.00 x10*3/uL    Monocytes Absolute 0.57 0.05 - 0.80 x10*3/uL    Eosinophils Absolute 0.16 0.00 - 0.40 x10*3/uL    Basophils Absolute 0.00 0.00 - 0.10 x10*3/uL   Protime-INR   Result Value Ref Range    Protime 24.8 (H) 9.8 - 12.8 seconds    INR 2.2 (H) 0.9 - 1.1   POCT GLUCOSE   Result Value Ref Range    POCT Glucose 113 (H) 74 - 99 mg/dL   POCT GLUCOSE   Result Value Ref Range    POCT Glucose 101 (H) 74 - 99 mg/dL       Assessment/Plan   This patient currently has cardiac telemetry ordered; if you would like to modify or discontinue the telemetry order, click here to go to the orders activity to modify/discontinue the order.      Principal Problem:    RSV (respiratory syncytial virus infection)  Active Problems:    Shortness of breath    #Encephalopathy 2/2 infection vs. AF RVR (resolved)   - RRT called overnight 1/7 for change in mental status; patient was previously A&O x 3 but became confused and unable to  tell staff the year or her birthday  - CT head and CTA head/neck showed no acute infarct or hemorrhage; no significant stenosis of the vessels. Calcified meningiomas noted without evidence of mass effect or vasogenic edema (noted to be no interval change)  - NIHSS 0 today  - Patient cardioverted 1/8, remains in AF with rate controlled in the 90s   - Antibiotics escalated for increasing O2 requirement; patient now on Vancomycin (MRSA Negative) stopped and Zosyn (Day 4)  - Medications reviewed with attending, Solumedrol IV changed to Prednisone PO   - Hold melatonin; gabapentin decreased to 200 mg BID   - Neuro checks q4h; discontinue      #Acute on chronic hypoxic respiratory failure 2/2 RSV, community acquired pneumonia, improving   #COPD, in acute exacerbation  - Patient was seen in [place] [facility] 12/26, discharged on dexamethasone and doxycycline  - WBC 13.9 >> 5.3 > 11.4 > 10.8 > 9.7  - Lactate 1.9  - COVID, flu A/B negative  - RSV positive  - Procal 0.75  - BCx no growth- final report   - sputum culture contained significant salivary contamination; repeat pending   - CXR: Small right pleural effusion and basilar atelectasis or airspace disease and mild bilateral opacities concerning for infiltrates.    - CT chest (1/10): 1.  No pulmonary embolism identified on motion degraded exam.  2. Pulmonary edema with right greater than left pleural effusions.  3. Near occlusion of the right middle lobe bronchus causing worsening  atelectasis.  - Start metaneb, continue mucinex  - Tylenol PRN for fever   - Mucinex BID, Robitussin DM q4h PRN for cough   - DuoNebs TID    - Breo and Spiriva ordered (pharmacy substitute for home Trelegy)  - RT eval and treat protocol  - Bronchial hygiene  - Isolation protocol for RSV for 10 days, discontinue isolation   - Baseline O2 5L continuously   - Wean O2 as tolerated; patient currently requiring 5L O2   - Continue Zosyn (day 6)   - Solumedrol 40 mg IVP q8h changed to prednisone 40 mg PO every  day due to encephalopathy   - CXR 1/8: There are patchy bilateral airspace opacities with slight interval   worsening on the and slight interval improvement on the right. Findings may relate to edema versus infection. Attention on continued short-term follow-up is advised. Small bilateral pleural effusions are noted with slight interval improvement on the right.   - ID consulted for worsening pneumonia, appreciate recs      #Atrial fibrillation with RVR, s/p cardioversion   #HTN  #HLD  #HFpEF, concern for acute exacerbation  #Supratherapeutic INR on warfarin   - Recent DCCV on 12/13 at [facility], follows with Dr. Siegel   - Trop 7 > 7   - >285>368 > 243  - Telemetry monitoring- notified by charge RN this morning that patient's HR has been consistently in the 120s-130s on telemetry. Upon reviewing the flowsheet documentation for 1/5-1/6, only two instances with HR > 110 bpm are charted  - Patient was transferred to ICU overnight and placed on a diltiazem drip for HR persistently in the 130s  - Cardioversion completed today; patient remains in AF with rate controlled in the 90s   - Start amiodarone 200 mg PO TID x 7 days per cardiology   - Lopressor 5 mg IVP q4h PRN for HR > 120 bpm sustained for > 5 mins   - Continue aspirin, atorvastatin, furosemide, and losartan  - INR 2.3 > 3.0 > 3.8 > 3.1 > 2.2  - Warfarin on hold; restart warfarin   - Goal INR 2-3, daily PT/INR while inpatient   - Strict I&Os: LOS - 9471.4  - Daily weights  - 2g Na diet, 1800 mL FR   - Cardiology consulted, appreciate recs   - Patient back into afib with RVR on 1/10  - Cardiology added metop TID for rate control     #Hematuria  - UA: 3+ blood, > 20 RBCs, 1+ bacteria  - UCx with no significant growth   - Patient denies gross blood in urine or difficulties with urination   - Monitor UOP for blood - none reported as of 1/8  - Trend CBC; H/H stable      #T2DM with neuropathy   - Continue Lantus, metformin and glimepiride   - Gabapentin  decreased to 200 mg BID due to change in mental status  - SSI three times a day before meals while on steroids   - Hypoglycemia protocol  - Accuchecks ac/hs/prn  - Diabetic diet   - HbA1c 4.6      #Anemia, macrocytic  - H/H stable, 8.4/29.1  > 8.6/30.2 with  > 112  - B12 level was 267 last month per chart review   - Folate 6.2   - Iron studies: Fe 28, TIBC 272, 10% saturation  - Patient receives monthly B12 injections and is on ferrous sulfate; continue   - Monitor for active bleeding  - Daily CBC to trend H/H     #History of mucous membrane pemphigoid  - Continue dapsone  - Resume outpatient derm follow up on discharge      DVT ppx  -warfarin     F: PRN  E: Replete per protocol  N: ADA, Cardiac, 1800ml FR  A: PIV     Disposition: Pt requires more than 2 inpatient days at this time   Code Status: Full Code     Total accumulated time spent face to face and not face to face preparing to see the patient, obtaining and reviewing separately obtained history; performing a medically appropriate examination and/or evaluation; counseling and educating the patient, family; ordering medications, tests, or procedures; referring and communicating with other health care professionals; documenting clinical information in the patient's medical record; independently interpreting results and communicating the results to the patient, family; and care coordination was 30 minutes.         JOSELITO Trevizo-CNP

## 2024-01-13 ENCOUNTER — APPOINTMENT (OUTPATIENT)
Dept: RADIOLOGY | Facility: HOSPITAL | Age: 74
DRG: 193 | End: 2024-01-13
Payer: MEDICARE

## 2024-01-13 LAB
ANION GAP SERPL CALC-SCNC: 12 MMOL/L (ref 10–20)
BNP SERPL-MCNC: 274 PG/ML (ref 0–99)
BUN SERPL-MCNC: 31 MG/DL (ref 6–23)
CALCIUM SERPL-MCNC: 8.8 MG/DL (ref 8.6–10.3)
CHLORIDE SERPL-SCNC: 100 MMOL/L (ref 98–107)
CO2 SERPL-SCNC: 35 MMOL/L (ref 21–32)
CREAT SERPL-MCNC: 0.98 MG/DL (ref 0.5–1.05)
EGFRCR SERPLBLD CKD-EPI 2021: 61 ML/MIN/1.73M*2
ERYTHROCYTE [DISTWIDTH] IN BLOOD BY AUTOMATED COUNT: 16.3 % (ref 11.5–14.5)
GLUCOSE BLD MANUAL STRIP-MCNC: 150 MG/DL (ref 74–99)
GLUCOSE BLD MANUAL STRIP-MCNC: 279 MG/DL (ref 74–99)
GLUCOSE BLD MANUAL STRIP-MCNC: 309 MG/DL (ref 74–99)
GLUCOSE BLD MANUAL STRIP-MCNC: 321 MG/DL (ref 74–99)
GLUCOSE BLD MANUAL STRIP-MCNC: 82 MG/DL (ref 74–99)
GLUCOSE SERPL-MCNC: 78 MG/DL (ref 74–99)
HCT VFR BLD AUTO: 29.2 % (ref 36–46)
HGB BLD-MCNC: 8.3 G/DL (ref 12–16)
INR PPP: 2 (ref 0.9–1.1)
MCH RBC QN AUTO: 31.4 PG (ref 26–34)
MCHC RBC AUTO-ENTMCNC: 28.4 G/DL (ref 32–36)
MCV RBC AUTO: 111 FL (ref 80–100)
NRBC BLD-RTO: 0 /100 WBCS (ref 0–0)
PLATELET # BLD AUTO: 176 X10*3/UL (ref 150–450)
POTASSIUM SERPL-SCNC: 3.4 MMOL/L (ref 3.5–5.3)
PROTHROMBIN TIME: 23.2 SECONDS (ref 9.8–12.8)
RBC # BLD AUTO: 2.64 X10*6/UL (ref 4–5.2)
SODIUM SERPL-SCNC: 144 MMOL/L (ref 136–145)
WBC # BLD AUTO: 7.1 X10*3/UL (ref 4.4–11.3)

## 2024-01-13 PROCEDURE — 82947 ASSAY GLUCOSE BLOOD QUANT: CPT | Mod: IPSPLIT

## 2024-01-13 PROCEDURE — 9420000001 HC RT PATIENT EDUCATION 5 MIN: Mod: IPSPLIT

## 2024-01-13 PROCEDURE — 71045 X-RAY EXAM CHEST 1 VIEW: CPT | Performed by: RADIOLOGY

## 2024-01-13 PROCEDURE — 94668 MNPJ CHEST WALL SBSQ: CPT | Mod: IPSPLIT

## 2024-01-13 PROCEDURE — 94640 AIRWAY INHALATION TREATMENT: CPT | Mod: IPSPLIT

## 2024-01-13 PROCEDURE — 82374 ASSAY BLOOD CARBON DIOXIDE: CPT | Mod: IPSPLIT

## 2024-01-13 PROCEDURE — 1200000002 HC GENERAL ROOM WITH TELEMETRY DAILY: Mod: IPSPLIT

## 2024-01-13 PROCEDURE — 85027 COMPLETE CBC AUTOMATED: CPT | Mod: IPSPLIT

## 2024-01-13 PROCEDURE — 2500000001 HC RX 250 WO HCPCS SELF ADMINISTERED DRUGS (ALT 637 FOR MEDICARE OP): Mod: IPSPLIT

## 2024-01-13 PROCEDURE — 99233 SBSQ HOSP IP/OBS HIGH 50: CPT | Performed by: INTERNAL MEDICINE

## 2024-01-13 PROCEDURE — 2500000002 HC RX 250 W HCPCS SELF ADMINISTERED DRUGS (ALT 637 FOR MEDICARE OP, ALT 636 FOR OP/ED): Mod: IPSPLIT | Performed by: NURSE PRACTITIONER

## 2024-01-13 PROCEDURE — 2500000004 HC RX 250 GENERAL PHARMACY W/ HCPCS (ALT 636 FOR OP/ED): Mod: IPSPLIT

## 2024-01-13 PROCEDURE — 2500000005 HC RX 250 GENERAL PHARMACY W/O HCPCS: Mod: IPSPLIT | Performed by: NURSE PRACTITIONER

## 2024-01-13 PROCEDURE — 36415 COLL VENOUS BLD VENIPUNCTURE: CPT | Mod: IPSPLIT

## 2024-01-13 PROCEDURE — 2500000002 HC RX 250 W HCPCS SELF ADMINISTERED DRUGS (ALT 637 FOR MEDICARE OP, ALT 636 FOR OP/ED): Mod: IPSPLIT

## 2024-01-13 PROCEDURE — 71045 X-RAY EXAM CHEST 1 VIEW: CPT | Mod: IPSPLIT

## 2024-01-13 PROCEDURE — 85610 PROTHROMBIN TIME: CPT | Mod: IPSPLIT

## 2024-01-13 PROCEDURE — 83880 ASSAY OF NATRIURETIC PEPTIDE: CPT | Mod: IPSPLIT

## 2024-01-13 PROCEDURE — 94660 CPAP INITIATION&MGMT: CPT | Mod: IPSPLIT

## 2024-01-13 RX ORDER — IPRATROPIUM BROMIDE AND ALBUTEROL SULFATE 2.5; .5 MG/3ML; MG/3ML
3 SOLUTION RESPIRATORY (INHALATION) 3 TIMES DAILY
Status: DISCONTINUED | OUTPATIENT
Start: 2024-01-13 | End: 2024-01-17 | Stop reason: HOSPADM

## 2024-01-13 RX ORDER — SODIUM CHLORIDE 9 MG/ML
INJECTION, SOLUTION INTRAVENOUS
Status: COMPLETED
Start: 2024-01-13 | End: 2024-01-13

## 2024-01-13 RX ORDER — POTASSIUM CHLORIDE 20 MEQ/1
40 TABLET, EXTENDED RELEASE ORAL ONCE
Status: COMPLETED | OUTPATIENT
Start: 2024-01-13 | End: 2024-01-13

## 2024-01-13 RX ADMIN — IPRATROPIUM BROMIDE AND ALBUTEROL SULFATE 3 ML: .5; 3 SOLUTION RESPIRATORY (INHALATION) at 08:58

## 2024-01-13 RX ADMIN — FERROUS SULFATE TAB 325 MG (65 MG ELEMENTAL FE) 1 TABLET: 325 (65 FE) TAB at 09:39

## 2024-01-13 RX ADMIN — AMIODARONE HYDROCHLORIDE 200 MG: 200 TABLET ORAL at 11:19

## 2024-01-13 RX ADMIN — AMIODARONE HYDROCHLORIDE 200 MG: 200 TABLET ORAL at 23:28

## 2024-01-13 RX ADMIN — PIPERACILLIN SODIUM AND TAZOBACTAM SODIUM 4.5 G: 4; .5 INJECTION, SOLUTION INTRAVENOUS at 09:40

## 2024-01-13 RX ADMIN — DAPSONE 100 MG: 25 TABLET ORAL at 06:15

## 2024-01-13 RX ADMIN — ASPIRIN 81 MG: 81 TABLET, COATED ORAL at 09:39

## 2024-01-13 RX ADMIN — PIPERACILLIN SODIUM AND TAZOBACTAM SODIUM 4.5 G: 4; .5 INJECTION, SOLUTION INTRAVENOUS at 21:14

## 2024-01-13 RX ADMIN — METOPROLOL TARTRATE 25 MG: 25 TABLET, FILM COATED ORAL at 16:07

## 2024-01-13 RX ADMIN — IPRATROPIUM BROMIDE AND ALBUTEROL SULFATE 3 ML: .5; 3 SOLUTION RESPIRATORY (INHALATION) at 14:57

## 2024-01-13 RX ADMIN — GABAPENTIN 200 MG: 100 CAPSULE ORAL at 09:39

## 2024-01-13 RX ADMIN — GUAIFENESIN 600 MG: 600 TABLET, EXTENDED RELEASE ORAL at 21:14

## 2024-01-13 RX ADMIN — PIPERACILLIN SODIUM AND TAZOBACTAM SODIUM 4.5 G: 4; .5 INJECTION, SOLUTION INTRAVENOUS at 16:07

## 2024-01-13 RX ADMIN — IPRATROPIUM BROMIDE AND ALBUTEROL SULFATE 3 ML: .5; 3 SOLUTION RESPIRATORY (INHALATION) at 22:12

## 2024-01-13 RX ADMIN — POTASSIUM CHLORIDE 40 MEQ: 1500 TABLET, EXTENDED RELEASE ORAL at 11:47

## 2024-01-13 RX ADMIN — METOPROLOL TARTRATE 25 MG: 25 TABLET, FILM COATED ORAL at 21:14

## 2024-01-13 RX ADMIN — GLIMEPIRIDE 2 MG: 2 TABLET ORAL at 09:38

## 2024-01-13 RX ADMIN — CALCIUM CARBONATE (ANTACID) CHEW TAB 500 MG 1500 MG: 500 CHEW TAB at 09:39

## 2024-01-13 RX ADMIN — CALCIUM CARBONATE (ANTACID) CHEW TAB 500 MG 1500 MG: 500 CHEW TAB at 21:14

## 2024-01-13 RX ADMIN — GABAPENTIN 200 MG: 100 CAPSULE ORAL at 21:14

## 2024-01-13 RX ADMIN — TIOTROPIUM BROMIDE INHALATION SPRAY 2 PUFF: 3.12 SPRAY, METERED RESPIRATORY (INHALATION) at 08:58

## 2024-01-13 RX ADMIN — PREDNISONE 40 MG: 20 TABLET ORAL at 09:39

## 2024-01-13 RX ADMIN — Medication 10 L/MIN: at 14:57

## 2024-01-13 RX ADMIN — WARFARIN SODIUM 5 MG: 5 TABLET ORAL at 17:04

## 2024-01-13 RX ADMIN — ATORVASTATIN CALCIUM 20 MG: 10 TABLET, FILM COATED ORAL at 21:14

## 2024-01-13 RX ADMIN — FLUTICASONE FUROATE AND VILANTEROL TRIFENATATE 1 PUFF: 200; 25 POWDER RESPIRATORY (INHALATION) at 08:58

## 2024-01-13 RX ADMIN — PIPERACILLIN SODIUM AND TAZOBACTAM SODIUM 4.5 G: 4; .5 INJECTION, SOLUTION INTRAVENOUS at 03:09

## 2024-01-13 RX ADMIN — GUAIFENESIN 600 MG: 600 TABLET, EXTENDED RELEASE ORAL at 09:39

## 2024-01-13 RX ADMIN — FUROSEMIDE 80 MG: 80 TABLET ORAL at 09:38

## 2024-01-13 RX ADMIN — METOPROLOL TARTRATE 25 MG: 25 TABLET, FILM COATED ORAL at 09:39

## 2024-01-13 RX ADMIN — TOPIRAMATE 100 MG: 100 TABLET, FILM COATED ORAL at 21:14

## 2024-01-13 RX ADMIN — TOPIRAMATE 100 MG: 100 TABLET, FILM COATED ORAL at 09:39

## 2024-01-13 RX ADMIN — METFORMIN HYDROCHLORIDE 1000 MG: 500 TABLET ORAL at 17:04

## 2024-01-13 RX ADMIN — DOCUSATE SODIUM 100 MG: 100 CAPSULE, LIQUID FILLED ORAL at 21:14

## 2024-01-13 RX ADMIN — PANTOPRAZOLE SODIUM 40 MG: 40 TABLET, DELAYED RELEASE ORAL at 06:15

## 2024-01-13 RX ADMIN — METOPROLOL TARTRATE 25 MG: 25 TABLET, FILM COATED ORAL at 03:09

## 2024-01-13 RX ADMIN — INSULIN LISPRO 4 UNITS: 100 INJECTION, SOLUTION INTRAVENOUS; SUBCUTANEOUS at 17:02

## 2024-01-13 RX ADMIN — METFORMIN HYDROCHLORIDE 1000 MG: 500 TABLET ORAL at 09:38

## 2024-01-13 RX ADMIN — AMIODARONE HYDROCHLORIDE 200 MG: 200 TABLET ORAL at 17:04

## 2024-01-13 ASSESSMENT — PAIN SCALES - GENERAL
PAINLEVEL_OUTOF10: 0 - NO PAIN

## 2024-01-13 ASSESSMENT — PAIN - FUNCTIONAL ASSESSMENT
PAIN_FUNCTIONAL_ASSESSMENT: 0-10

## 2024-01-13 NOTE — CARE PLAN
The patient's goals for the shift include increase activity this shift    The clinical goals for the shift include Pt will tolerate IV abx throughout shift.      Patient remained safe and stable throughout shift. VS were stable and pt denied pain. Pt currently on 10L high flow O2. IV abx were administered and pt tolerated well. Pt sat in chair all day and tolerating well. No other complaints at this time. Pt resting in chair with call light within reach.

## 2024-01-13 NOTE — PROGRESS NOTES
"Frannie Blanco is a 73 y.o. female on day 9 of admission presenting with RSV (respiratory syncytial virus infection).    Subjective   Patient sitting in chair, requiring increasing oxygen demands due to pulse ox in the 80s.  Patient was intended to return to [place], does not want to return there.  Will discuss with case management once oxygen demands decrease, patient agrees with this plan.         Objective     Physical Exam  Constitutional:       Appearance: She is obese.   HENT:      Head: Normocephalic and atraumatic.      Nose: Nose normal.      Mouth/Throat:      Mouth: Mucous membranes are moist.   Eyes:      Extraocular Movements: Extraocular movements intact.   Cardiovascular:      Rate and Rhythm: Normal rate. Rhythm irregular.      Pulses: Normal pulses.      Heart sounds: Normal heart sounds.   Pulmonary:      Effort: Pulmonary effort is normal.      Breath sounds: Wheezing and rhonchi present.   Abdominal:      General: Bowel sounds are normal.      Palpations: Abdomen is soft.   Musculoskeletal:      Cervical back: Normal range of motion.      Right lower leg: Edema present.      Left lower leg: Edema present.   Skin:     General: Skin is warm and dry.      Capillary Refill: Capillary refill takes less than 2 seconds.   Neurological:      Mental Status: She is alert. Mental status is at baseline.      Motor: Weakness present.      Gait: Gait abnormal.   Psychiatric:         Mood and Affect: Mood normal.         Behavior: Behavior normal.         Last Recorded Vitals  Blood pressure 108/65, pulse 93, temperature 36 °C (96.8 °F), temperature source Temporal, resp. rate 18, height 1.676 m (5' 5.98\"), weight 140 kg (308 lb 13.8 oz), SpO2 92 %.  Intake/Output last 3 Shifts:  I/O last 3 completed shifts:  In: 920 (6.6 mL/kg) [P.O.:720; IV Piggyback:200]  Out: 1650 (11.8 mL/kg) [Urine:1650 (0.3 mL/kg/hr)]  Weight: 140.1 kg     Relevant Results  Scheduled medications  amiodarone, 200 mg, oral, " TID  aspirin, 81 mg, oral, Daily  atorvastatin, 20 mg, oral, Nightly  calcium carbonate, 1,500 mg, oral, q12h  dapsone, 100 mg, oral, Daily before breakfast  [Held by provider] dilTIAZem CD, 300 mg, oral, Daily  docusate sodium, 100 mg, oral, BID  ferrous sulfate (325 mg ferrous sulfate), 65 mg of iron, oral, Daily  tiotropium, 2 Inhalation, inhalation, Daily   And  fluticasone furoate-vilanteroL, 1 puff, inhalation, Daily  furosemide, 80 mg, oral, Daily  gabapentin, 200 mg, oral, BID  glimepiride, 2 mg, oral, Daily with breakfast  guaiFENesin, 600 mg, oral, BID  insulin lispro, 0-5 Units, subcutaneous, TID with meals  ipratropium-albuteroL, 3 mL, nebulization, TID  [Held by provider] losartan, 12.5 mg, oral, Daily  [Held by provider] melatonin, 6 mg, oral, Daily  metFORMIN, 1,000 mg, oral, BID with meals  metoprolol tartrate, 25 mg, oral, q6h  pantoprazole, 40 mg, oral, Daily before breakfast   Or  pantoprazole, 40 mg, intravenous, Daily before breakfast  piperacillin-tazobactam, 4.5 g, intravenous, q6h  predniSONE, 40 mg, oral, Daily  topiramate, 100 mg, oral, BID  warfarin, 5 mg, oral, Daily      Continuous medications     PRN medications  PRN medications: acetaminophen **OR** acetaminophen **OR** acetaminophen, acetaminophen **OR** acetaminophen **OR** acetaminophen, albuterol, benzocaine-menthoL, bisacodyl, dextromethorphan-guaifenesin, dextrose 10 % in water (D10W), dextrose, glucagon, metoprolol, ondansetron ODT **OR** ondansetron, oxygen, oxygen    Results for orders placed or performed during the hospital encounter of 01/02/24 (from the past 24 hour(s))   POCT GLUCOSE   Result Value Ref Range    POCT Glucose 307 (H) 74 - 99 mg/dL   POCT GLUCOSE   Result Value Ref Range    POCT Glucose 279 (H) 74 - 99 mg/dL   CBC   Result Value Ref Range    WBC 7.1 4.4 - 11.3 x10*3/uL    nRBC 0.0 0.0 - 0.0 /100 WBCs    RBC 2.64 (L) 4.00 - 5.20 x10*6/uL    Hemoglobin 8.3 (L) 12.0 - 16.0 g/dL    Hematocrit 29.2 (L) 36.0 - 46.0  %     (H) 80 - 100 fL    MCH 31.4 26.0 - 34.0 pg    MCHC 28.4 (L) 32.0 - 36.0 g/dL    RDW 16.3 (H) 11.5 - 14.5 %    Platelets 176 150 - 450 x10*3/uL   Basic Metabolic Panel   Result Value Ref Range    Glucose 78 74 - 99 mg/dL    Sodium 144 136 - 145 mmol/L    Potassium 3.4 (L) 3.5 - 5.3 mmol/L    Chloride 100 98 - 107 mmol/L    Bicarbonate 35 (H) 21 - 32 mmol/L    Anion Gap 12 10 - 20 mmol/L    Urea Nitrogen 31 (H) 6 - 23 mg/dL    Creatinine 0.98 0.50 - 1.05 mg/dL    eGFR 61 >60 mL/min/1.73m*2    Calcium 8.8 8.6 - 10.3 mg/dL   Protime-INR   Result Value Ref Range    Protime 23.2 (H) 9.8 - 12.8 seconds    INR 2.0 (H) 0.9 - 1.1   POCT GLUCOSE   Result Value Ref Range    POCT Glucose 82 74 - 99 mg/dL   POCT GLUCOSE   Result Value Ref Range    POCT Glucose 150 (H) 74 - 99 mg/dL     CT angio chest for pulmonary embolism    Result Date: 1/10/2024  Interpreted By:  Yao Torres, STUDY: CT ANGIO CHEST FOR PULMONARY EMBOLISM;  1/10/2024 3:12 pm   INDICATION: Signs/Symptoms:worsening sob.   COMPARISON: 12/26/2023   ACCESSION NUMBER(S): [id number]   ORDERING CLINICIAN: OLAF SOLIMAN   TECHNIQUE: Helical data acquisition of the chest was obtained with  75 mL Omnipaque 350. Images were reformatted in axial, coronal, and sagittal planes.MIP reformatted images were also generated.   FINDINGS: Motion degraded exam.   LUNGS and AIRWAYS: Small left and moderate right layering pleural effusions. Overlying atelectasis. Increased severe atelectasis involving the right middle lobe which is nearly collapsed. There are diffusely increased patchy areas consolidation and ground-glass opacity scattered throughout both lungs. Interlobular septal thickening is also noted in some areas. The right middle lobe bronchus is at least partially occluded. The remaining central airways otherwise appear patent. No bronchiectasis.   MEDIASTINUM and FANG, LOWER NECK AND AXILLA: The visualized thyroid gland is grossly unremarkable.   No thoracic  lymphadenopathy by CT criteria.   Possible small sliding hiatal hernia.   HEART and VESSELS: Mild cardiomegaly. Dense mitral annulus calcification.   No significant pericardial effusion.   No pulmonary embolism identified on motion degraded exam. Dilatation of the main pulmonary artery to 40 mm suggests pulmonary hypertension.   Mild coronary atherosclerosis.   Thoracic aorta and great vessels are mildly atherosclerotic but otherwise patent without aneurysm.   UPPER ABDOMEN: 27 mm distal splenic artery aneurysm is probably thrombosed, not significantly changed to prior exam.   CHEST WALL and OSSEOUS STRUCTURES: No suspicious osseous lesions. Multilevel degenerative changes of the thoracic spine.       1.  No pulmonary embolism identified on motion degraded exam. 2. Pulmonary edema with right greater than left pleural effusions. 3. Near occlusion of the right middle lobe bronchus causing worsening atelectasis.     Signed by: Yao Torres 1/10/2024 3:28 PM Dictation workstation:   [id number]    ECG 12 lead    Result Date: 1/9/2024  Atrial fibrillation with rapid ventricular response Low voltage QRS Cannot rule out Anterior infarct , age undetermined Abnormal ECG When compared with ECG of 07-JAN-2024 00:28, (unconfirmed) No significant change was found    ECG 12 lead    Result Date: 1/9/2024  Atrial fibrillation with rapid ventricular response Abnormal ECG When compared with ECG of 02-JAN-2024 17:57, No significant change was found    ECG 12 lead    Result Date: 1/8/2024  Sinus rhythm with Premature atrial complexes with Aberrant conduction Cannot rule out Anterior infarct (cited on or before 02-JAN-2024) Abnormal ECG When compared with ECG of 02-JAN-2024 17:57, Sinus rhythm has replaced Atrial fibrillation    XR chest 1 view    Result Date: 1/7/2024  Interpreted By:  Mohsen Law, STUDY: XR CHEST 1 VIEW;  1/7/2024 4:04 pm   INDICATION: Signs/Symptoms:worsening pneumonia.   COMPARISON: Chest x-ray 01/02/2024    ACCESSION NUMBER(S): [id number]   ORDERING CLINICIAN: ALBERTO PICKETT   FINDINGS: Multiple overlying leads are present.   CARDIOMEDIASTINAL SILHOUETTE: Cardiomediastinal silhouette is stable in size and configuration. Dense mitral annular calcifications noted.   LUNGS: There are bilateral patchy airspace opacities with slight interval improvement in the right lung base and slight interval worsening on the left. These findings may relate to developing edema versus infection. Small bilateral pleural effusions. No pneumothorax.   ABDOMEN: No remarkable upper abdominal findings.   BONES: Degenerative changes of the spine and bilateral shoulders.       There are patchy bilateral airspace opacities with slight interval worsening on the and slight interval improvement on the right. Findings may relate to edema versus infection. Attention on continued short-term follow-up is advised.   Small bilateral pleural effusions are noted with slight interval improvement on the right.   MACRO: None   Signed by: Mohsen Law 1/7/2024 4:18 PM Dictation workstation:   [id number]    CT head wo IV contrast    Result Date: 1/7/2024  Interpreted By:  Jaxon Fernandez, STUDY: CT ANGIO HEAD AND NECK W AND WO IV CONTRAST; CT HEAD WO IV CONTRAST; 1/7/2024 1:20 am; 1/7/2024 1:10 am   INDICATION: Signs/Symptoms:Confusion.   COMPARISON: CT and CT angiogram dated 01/06/2024.   ACCESSION NUMBER(S): [id number]; [id number]   ORDERING CLINICIAN: JAC SANTOS   TECHNIQUE: Unenhanced CT images of the head were obtained. Subsequently,  75 mL Omnipaque 350 was administered intravenously and axial images of the head and neck were acquired.  Coronal, sagittal, and 3-D reconstructions were provided for review.   FINDINGS: There is a mostly calcified meningioma within the upper left parietal convexity measuring 1.7 x 1.5 cm and also calcified meningioma measuring 1.1 x 8.2 cm overlying the inferolateral right frontal lobe. No evidence of associated  vasogenic edema or mass effect. There is no evidence of midline shift, hydrocephalus, or acute intracranial hemorrhage. Gray-white matter differentiation is maintained.   There is a small amount of fluid in the caudal mastoid air cells. There is mild to moderate polypoid mucosal thickening of the left maxillary sinus and anterior ethmoid air cells. These findings are unchanged.   CTA HEAD FINDINGS:   Anterior circulation: The bilateral intracranial internal carotid arteries, bilateral carotid terminals, bilateral proximal anterior and middle cerebral arteries are normal.   Posterior circulation: Bilateral intracranial vertebral arteries, vertebrobasilar junction, basilar artery and proximal posterior cerebral arteries are normal.   Venous contamination limits evaluation for small aneurysms, without gross evidence of intracranial aneurysm.   CTA NECK FINDINGS:   Right carotid vessels: There are mild atherosclerotic calcifications of the carotid bifurcation with 0% stenosis by NASCET criteria. The remainder of the right internal cervical carotid artery is widely patent without focal stenosis.   Left carotid vessels: The common carotid artery is normal. The carotid bifurcation is normal. The internal carotid artery in the neck is normal. There is 0% stenosis by NASCET criteria. .   Vertebral vessels:  The visualized segments of the cervical vertebral arteries are normal in caliber.   There is redemonstration of multifocal nodular and ground-glass opacities in the visualized upper lung fields suspicious for multifocal pneumonia. There is also small right pleural effusion partially visualized.       No evidence of acute cortical infarct or acute intracranial hemorrhage. There are calcified meningiomas noted overlying the upper left frontoparietal convexity and overlying the inferolateral right frontal lobe without evidence of significant mass effect or vasogenic edema. No interval change.   No evidence for significant  stenosis of the cervical vessels.   No evidence for significant stenosis or large branch vessel cutoffs of the intracranial vessels.   Partially visualized ground-glass and nodular opacities in the upper lung fields suspicious for multifocal pneumonia for example viral pneumonia.   MACRO:   None   Signed by: Jaxon Fernandez 1/7/2024 2:06 AM Dictation workstation:   [id number]    CT angio head and neck w and wo IV contrast    Result Date: 1/7/2024  Interpreted By:  Jaxon Fernandez, STUDY: CT ANGIO HEAD AND NECK W AND WO IV CONTRAST; CT HEAD WO IV CONTRAST; 1/7/2024 1:20 am; 1/7/2024 1:10 am   INDICATION: Signs/Symptoms:Confusion.   COMPARISON: CT and CT angiogram dated 01/06/2024.   ACCESSION NUMBER(S): [id number]; [id number]   ORDERING CLINICIAN: JAC SANTOS   TECHNIQUE: Unenhanced CT images of the head were obtained. Subsequently,  75 mL Omnipaque 350 was administered intravenously and axial images of the head and neck were acquired.  Coronal, sagittal, and 3-D reconstructions were provided for review.   FINDINGS: There is a mostly calcified meningioma within the upper left parietal convexity measuring 1.7 x 1.5 cm and also calcified meningioma measuring 1.1 x 8.2 cm overlying the inferolateral right frontal lobe. No evidence of associated vasogenic edema or mass effect. There is no evidence of midline shift, hydrocephalus, or acute intracranial hemorrhage. Gray-white matter differentiation is maintained.   There is a small amount of fluid in the caudal mastoid air cells. There is mild to moderate polypoid mucosal thickening of the left maxillary sinus and anterior ethmoid air cells. These findings are unchanged.   CTA HEAD FINDINGS:   Anterior circulation: The bilateral intracranial internal carotid arteries, bilateral carotid terminals, bilateral proximal anterior and middle cerebral arteries are normal.   Posterior circulation: Bilateral intracranial vertebral arteries, vertebrobasilar junction, basilar  artery and proximal posterior cerebral arteries are normal.   Venous contamination limits evaluation for small aneurysms, without gross evidence of intracranial aneurysm.   CTA NECK FINDINGS:   Right carotid vessels: There are mild atherosclerotic calcifications of the carotid bifurcation with 0% stenosis by NASCET criteria. The remainder of the right internal cervical carotid artery is widely patent without focal stenosis.   Left carotid vessels: The common carotid artery is normal. The carotid bifurcation is normal. The internal carotid artery in the neck is normal. There is 0% stenosis by NASCET criteria. .   Vertebral vessels:  The visualized segments of the cervical vertebral arteries are normal in caliber.   There is redemonstration of multifocal nodular and ground-glass opacities in the visualized upper lung fields suspicious for multifocal pneumonia. There is also small right pleural effusion partially visualized.       No evidence of acute cortical infarct or acute intracranial hemorrhage. There are calcified meningiomas noted overlying the upper left frontoparietal convexity and overlying the inferolateral right frontal lobe without evidence of significant mass effect or vasogenic edema. No interval change.   No evidence for significant stenosis of the cervical vessels.   No evidence for significant stenosis or large branch vessel cutoffs of the intracranial vessels.   Partially visualized ground-glass and nodular opacities in the upper lung fields suspicious for multifocal pneumonia for example viral pneumonia.   MACRO:   None   Signed by: Jaxon Fernandez 1/7/2024 2:06 AM Dictation workstation:   [id number]    CT angio brain attack head w IV contrast and post procedure    Result Date: 1/6/2024  Interpreted By:  Jazmyne Teresa, STUDY: CT ANGIO BRAIN ATTACK HEAD W IV CONTRAST AND POST PROCEDURE; CT ANGIO BRAIN ATTACK NECK W IV CONTRAST AND POST PROCEDURE;  1/6/2024 5:16 pm   INDICATION: Signs/Symptoms:AMS.    COMPARISON: None.   ACCESSION NUMBER(S): [id number]; [id number]   ORDERING CLINICIAN: LYNN FLORES   TECHNIQUE: 75 mL Omnipaque 350 was administered intravenously and axial images of the head and neck were acquired.  Coronal, sagittal, and 3-D reconstructions were provided for review.   The technologist notes the patient had multiple IV malfunctions.   FINDINGS: CT head: Please see CT head performed the same day for intracranial findings.   CTA HEAD FINDINGS:   The examination is degraded by venous contamination.   Anterior circulation: The bilateral intracranial internal carotid arteries, bilateral carotid terminals, bilateral proximal anterior and middle cerebral arteries are patent.   Posterior circulation: Bilateral intracranial vertebral arteries, vertebrobasilar junction, basilar artery and proximal posterior cerebral arteries are patent.   CTA NECK FINDINGS:   The examination is limited by the timing of scan relative to the contrast injection.   Mild atherosclerotic calcification along the aortic arch. Within the limitation of patient motion artifact no significant stenosis at the origins of the great vessels.   Right carotid vessels: The common carotid artery is patent. Mild calcified plaque at the carotid bifurcation without significant stenosis by NASCET criteria. The internal carotid artery in the neck is patent without significant stenosis.   Left carotid vessels: The common carotid artery is patent.. The carotid bifurcation is patent without significant stenosis. The internal carotid artery in the neck is patent without significant stenosis.   Vertebral vessels:  The visualized segments of the cervical vertebral arteries are patent.   The left thyroid lobe is slightly asymmetrically larger than the right with a calcification. Please see thyroid ultrasound 07/24/2023 for further details. Incidental note of multiple tonsilloliths. There is prominent ossification of the stylohyoid ligament  bilaterally. Soft tissues of the neck are otherwise unremarkable.   There are patchy opacities within the visualized upper lungs bilaterally concerning for multifocal pneumonia. There is a small pleural effusion on the right. Mild degenerative changes of the cervical spine.       The examination is degraded by venous contamination, patient motion artifact and difficulties with the IV. Within this limitation:   CTA neck:   No evidence for significant stenosis of the cervical vessels.   Patchy opacities within the upper lungs bilaterally concerning for multifocal pneumonia. There is a small pleural effusion on the right.   CTA head:   No evidence for significant stenosis or large branch vessel cutoffs of the intracranial vessels.   Please see CT head performed the same day for intracranial findings.   MACRO: None   Signed by: Jazmyne Teresa 1/6/2024 5:34 PM Dictation workstation:   [id number]    CT angio brain attack neck w IV contrast and post procedure    Result Date: 1/6/2024  Interpreted By:  Jazmyne Teresa, STUDY: CT ANGIO BRAIN ATTACK HEAD W IV CONTRAST AND POST PROCEDURE; CT ANGIO BRAIN ATTACK NECK W IV CONTRAST AND POST PROCEDURE;  1/6/2024 5:16 pm   INDICATION: Signs/Symptoms:AMS.   COMPARISON: None.   ACCESSION NUMBER(S): [id number]; [id number]   ORDERING CLINICIAN: LYNN FLORES   TECHNIQUE: 75 mL Omnipaque 350 was administered intravenously and axial images of the head and neck were acquired.  Coronal, sagittal, and 3-D reconstructions were provided for review.   The technologist notes the patient had multiple IV malfunctions.   FINDINGS: CT head: Please see CT head performed the same day for intracranial findings.   CTA HEAD FINDINGS:   The examination is degraded by venous contamination.   Anterior circulation: The bilateral intracranial internal carotid arteries, bilateral carotid terminals, bilateral proximal anterior and middle cerebral arteries are patent.   Posterior circulation: Bilateral  intracranial vertebral arteries, vertebrobasilar junction, basilar artery and proximal posterior cerebral arteries are patent.   CTA NECK FINDINGS:   The examination is limited by the timing of scan relative to the contrast injection.   Mild atherosclerotic calcification along the aortic arch. Within the limitation of patient motion artifact no significant stenosis at the origins of the great vessels.   Right carotid vessels: The common carotid artery is patent. Mild calcified plaque at the carotid bifurcation without significant stenosis by NASCET criteria. The internal carotid artery in the neck is patent without significant stenosis.   Left carotid vessels: The common carotid artery is patent.. The carotid bifurcation is patent without significant stenosis. The internal carotid artery in the neck is patent without significant stenosis.   Vertebral vessels:  The visualized segments of the cervical vertebral arteries are patent.   The left thyroid lobe is slightly asymmetrically larger than the right with a calcification. Please see thyroid ultrasound 07/24/2023 for further details. Incidental note of multiple tonsilloliths. There is prominent ossification of the stylohyoid ligament bilaterally. Soft tissues of the neck are otherwise unremarkable.   There are patchy opacities within the visualized upper lungs bilaterally concerning for multifocal pneumonia. There is a small pleural effusion on the right. Mild degenerative changes of the cervical spine.       The examination is degraded by venous contamination, patient motion artifact and difficulties with the IV. Within this limitation:   CTA neck:   No evidence for significant stenosis of the cervical vessels.   Patchy opacities within the upper lungs bilaterally concerning for multifocal pneumonia. There is a small pleural effusion on the right.   CTA head:   No evidence for significant stenosis or large branch vessel cutoffs of the intracranial vessels.   Please  see CT head performed the same day for intracranial findings.   MACRO: None   Signed by: Jazmyne Teresa 1/6/2024 5:34 PM Dictation workstation:   [id number]    CT brain attack head wo IV contrast    Result Date: 1/6/2024  Interpreted By:  Richie Wren, STUDY: CT BRAIN ATTACK HEAD WO IV CONTRAST;  1/6/2024 4:37 pm   INDICATION: Signs/Symptoms:AMS.   COMPARISON: 11/18/2012   ACCESSION NUMBER(S): [id number]   ORDERING CLINICIAN: LYNN FLORES   TECHNIQUE: Noncontrast axial CT images of head were obtained with coronal and sagittal reconstructed images.   FINDINGS: BRAIN PARENCHYMA: Mild periventricular and subcortical hemispheric white matter hypodensities are most compatible with chronic small vessel ischemic disease. No acute intraparenchymal hemorrhage or parenchymal evidence of acute large territory ischemic infarct. No mass-effect. Gray-white matter distinction is preserved.   VENTRICLES and EXTRA-AXIAL SPACES: There is a 1.5 cm calcified meningioma arising from the left parasagittal frontal parietal dura. There is another 1.3 cm more densely calcified meningioma arising the right frontotemporal dura. No acute extra-axial or intraventricular hemorrhage. No effacement of cerebral sulci. Ventricles and sulci are age-concordant. There is a partial empty sella.   PARANASAL SINUSES/MASTOIDS: Trace fluid left mastoid air cells. No hemorrhage or air-fluid levels within the visualized paranasal sinuses. The mastoids are well aerated.   CALVARIUM/ORBITS:  No skull fracture.  The orbits and globes are intact to the extent visualized.   EXTRACRANIAL SOFT TISSUES: No discernible abnormality.       1. No evidence of acute intracranial hemorrhage, mass effect, or parenchymal evidence of an acute large territory ischemic infarct.   2. Calcified meningiomas rising from the left parasagittal frontal parietal dura and right frontotemporal dura measuring 1.5 cm and 1.3 cm, respectively. No significant mass effect.     MACRO:  Richie Wren discussed the significance and urgency of this critical finding by EPIC HAIKU with  LYNN FLORES on 1/6/2024 at 4:50 p.m. with confirmation of receipt. (**-RCF-**) Findings:  See findings.   Signed by: Richie Wren 1/6/2024 4:53 PM Dictation workstation:   [id number]    ECG 12 lead    Result Date: 1/3/2024  Atrial fibrillation with rapid ventricular response Low voltage QRS Cannot rule out Anterior infarct , age undetermined Abnormal ECG When compared with ECG of 13-DEC-2023 09:49, Atrial fibrillation has replaced Sinus rhythm See ED provider note for full interpretation and clinical correlation Confirmed by Jamir Salvador ([id number]) on 1/3/2024 10:49:37 PM    XR chest 1 view    Result Date: 1/2/2024  Interpreted By:  Soren Ham, STUDY: XR CHEST 1 VIEW;  1/2/2024 7:26 pm   INDICATION: Signs/Symptoms:sob.   COMPARISON: 9/26/2023   ACCESSION NUMBER(S): [id number]   ORDERING CLINICIAN: CARMEN CONKLIN   FINDINGS: The cardiac silhouette is stable in size. There are bilateral opacities concerning for infiltrates. Small right pleural effusion and basilar atelectasis or airspace disease. No pneumothorax.       Small right pleural effusion and basilar atelectasis or airspace disease and mild bilateral opacities concerning for infiltrates.   MACRO: None   Signed by: Soren Ham 1/2/2024 7:38 PM Dictation workstation:   [id number]    CT chest wo IV contrast    Result Date: 12/26/2023  Interpreted By:  Richie Wren, STUDY: CT CHEST WO IV CONTRAST;  12/26/2023 6:03 pm   INDICATION: Signs/Symptoms:cough.   COMPARISON: 09/22/2023 CT chest   ACCESSION NUMBER(S): [id number]   ORDERING CLINICIAN: JANNET VEE   TECHNIQUE: Contiguous axial images of the chest and upper abdomen were obtained without contrast. Coronal and sagittal reformatted images were reconstructed from the axial data.   FINDINGS:     MEDIASTINUM AND LYMPH NODES:  The esophagus appears within normal limits.  No enlarged  intrathoracic or axillary lymph nodes by imaging criteria. No pneumomediastinum.   VESSELS:  Normal caliber thoracic aorta. Mild aortic atherosclerosis. The pulmonary artery is enlarged, measuring up to 3.6 cm, indicative of pulmonary hypertension.   HEART: There is left atrial dilatation. Mitral annular calcifications. Mild coronary artery calcifications. No significant pericardial effusion.   LUNG, AIRWAYS, AND PLEURA: New small bilateral pleural effusions with adjacent passive atelectasis. There is new subsegmental atelectasis in the right middle lobe. There is a small amount debris in the lower trachea extending into the mainstem bronchi and bronchus intermedius. There is a small opacity in the left upper lobe and superior segment of left lower lobe consisting of tree-in-bud nodules suspicious for pneumonia the   OSSEOUS STRUCTURES: No acute osseous abnormality.   CHEST WALL SOFT TISSUES: No discernible abnormality.   UPPER ABDOMEN/OTHER: Multiple peripherally calcified splenic artery aneurysm measuring 1.3 cm, 1.3 cm, and 2.8 cm are similar to prior study. The liver appears cirrhotic.       1. Tree-in-bud opacities in the posterior left upper lobe and superior segment of the left lower lobe consistent with bronchiolitis/early pneumonia.   2. Small bilateral pleural effusions with adjacent passive atelectasis and right middle lobe subsegmental atelectasis.   3. Small amount of debris in the trachea and bilateral proximal bronchi that could be secretions or aspiration.   4. Three splenic artery aneurysm measuring up to 2.8 cm, unchanged.   MACRO: None.   Signed by: Richie Wren 12/26/2023 6:29 PM Dictation workstation:   [id number]       Assessment/Plan   Principal Problem:    RSV (respiratory syncytial virus infection)  Active Problems:    Shortness of breath    #Encephalopathy 2/2 infection vs. AF RVR (resolved)   - RRT called overnight 1/7 for change in mental status; patient was previously A&O x 3 but  became confused and unable to tell staff the year or her birthday  - CT head and CTA head/neck showed no acute infarct or hemorrhage; no significant stenosis of the vessels. Calcified meningiomas noted without evidence of mass effect or vasogenic edema (noted to be no interval change)  - NIHSS 0 today  - Patient cardioverted 1/8, remains in AF with rate controlled in the 90s   - Antibiotics escalated for increasing O2 requirement; patient now on Vancomycin (MRSA Negative) stopped and Zosyn (Day 7)  - Medications reviewed with attending, Solumedrol IV changed to Prednisone PO   - Hold melatonin; gabapentin decreased to 200 mg BID   - Neuro checks q4h; discontinue      #Acute on chronic hypoxic respiratory failure 2/2 RSV, community acquired pneumonia, improving   #COPD, in acute exacerbation  - Patient was seen in [place] [facility] 12/26, discharged on dexamethasone and doxycycline  - WBC 13.9 >> 5.3 > 11.4 > 10.8 > 9.7 > 7.1  - Lactate 1.9  - COVID, flu A/B negative  - RSV positive  - Procal 0.75  - BCx no growth- final report   - sputum culture contained significant salivary contamination; repeat pending   - CXR: Small right pleural effusion and basilar atelectasis or airspace disease and mild bilateral opacities concerning for infiltrates.    - CT chest (1/10): 1.  No pulmonary embolism identified on motion degraded exam.  2. Pulmonary edema with right greater than left pleural effusions.  3. Near occlusion of the right middle lobe bronchus causing worsening  atelectasis.  - Start metaneb, continue mucinex  - Tylenol PRN for fever   - Mucinex BID, Robitussin DM q4h PRN for cough   - DuoNebs TID    - Breo and Spiriva ordered (pharmacy substitute for home Trelegy)  - RT eval and treat protocol  - Bronchial hygiene  - Isolation protocol for RSV for 10 days, discontinue isolation   - Baseline O2 5L continuously   - Wean O2 as tolerated; patient currently requiring 10 L High Flow   - Solumedrol 40 mg IVP q8h changed to  prednisone 40 mg PO every day due to encephalopathy   - CXR 1/8: There are patchy bilateral airspace opacities with slight interval   worsening on the and slight interval improvement on the right. Findings may relate to edema versus infection. Attention on continued short-term follow-up is advised. Small bilateral pleural effusions are noted with slight interval improvement on the right.   - ID consulted for worsening pneumonia, appreciate recs   -CXR Ordered  -     #Atrial fibrillation with RVR, s/p cardioversion   #HTN  #HLD  #HFpEF, concern for acute exacerbation  #Supratherapeutic INR on warfarin   - Recent DCCV on 12/13 at [facility], follows with Dr. Siegel   - Trop 7 > 7   - >285>368 > 243  - Telemetry monitoring- notified by charge RN this morning that patient's HR has been consistently in the 120s-130s on telemetry. Upon reviewing the flowsheet documentation for 1/5-1/6, only two instances with HR > 110 bpm are charted  - Patient was transferred to ICU overnight and placed on a diltiazem drip for HR persistently in the 130s  - Cardioversion completed today; patient remains in AF with rate controlled in the 90s   - Start amiodarone 200 mg PO TID x 7 days per cardiology   - Lopressor 5 mg IVP q4h PRN for HR > 120 bpm sustained for > 5 mins   - Continue aspirin, atorvastatin, furosemide, and losartan  - INR 2.3 > 3.0 > 3.8 > 3.1 > 2.2  - Warfarin on hold; restart warfarin   - Goal INR 2-3, daily PT/INR while inpatient   - Strict I&Os: LOS - 9471.4  - Daily weights  - 2g Na diet, 1800 mL FR   - Cardiology consulted, appreciate recs   - Patient back into afib with RVR on 1/10  - Cardiology added metop TID for rate control     #Hematuria  - UA: 3+ blood, > 20 RBCs, 1+ bacteria  - UCx with no significant growth   - Patient denies gross blood in urine or difficulties with urination   - Monitor UOP for blood - none reported as of 1/8  - Trend CBC; H/H stable      #T2DM with neuropathy   - Continue  Lantus, metformin and glimepiride   - Gabapentin decreased to 200 mg BID due to change in mental status  - SSI three times a day before meals while on steroids   - Hypoglycemia protocol  - Accuchecks ac/hs/prn  - Diabetic diet   - HbA1c 4.6      #Anemia, macrocytic  - H/H stable 8.6/30.2 > 8.3/29.2 with  > 111  - B12 level was 267 last month per chart review   - Folate 6.2   - Iron studies: Fe 28, TIBC 272, 10% saturation  - Patient receives monthly B12 injections and is on ferrous sulfate; continue   - Monitor for active bleeding  - Daily CBC to trend H/H     #History of mucous membrane pemphigoid  - Continue dapsone  - Resume outpatient derm follow up on discharge      DVT ppx  -warfarin     F: PRN  E: Replete per protocol  N: ADA, Cardiac, 1800ml FR  A: PIV     Disposition: Pt requires more than 2 inpatient days at this time   Code Status: Full Code     Total accumulated time spent face to face and not face to face preparing to see the patient, obtaining and reviewing separately obtained history; performing a medically appropriate examination and/or evaluation; counseling and educating the patient, family; ordering medications, tests, or procedures; referring and communicating with other health care professionals; documenting clinical information in the patient's medical record; independently interpreting results and communicating the results to the patient, family; and care coordination was 45 minutes.       JOSELITO Ramirez-CNP

## 2024-01-13 NOTE — CONSULTS
Inpatient consult to Infectious Diseases  Consult performed by: Paxton Garcia MD  Consult ordered by: JOSELITO Trevizo-CNP          Primary MD: Terra Young MD    Reason For Consult  RSV    History Of Present Illness  Frannie Blanco is a 73 y.o. female presenting with cough and shortness of breath.  She has a background history of COPD on 5 L of oxygen at baseline.  She was evaluated at [facility] emergency room on 12/26/2023 and was discharged on dexamethasone and doxycycline.  She had interval worsening and came to the hospital for further evaluation of mine.  Chest x-ray was suggestive of right-sided infiltrate.  She was admitted for further evaluation and management.  Her RSV PCR was positive for 1/2/2024.  She was placed on empiric antibiotic therapy.  CT chest was remarkable for atelectasis due to occlusion of right main bronchus.  She was on high flow, 10 L of oxygen.    Past Medical History  She has a past medical history of Acute upper respiratory infection, unspecified (09/25/2019), Acute upper respiratory infection, unspecified (10/11/2016), COPD (chronic obstructive pulmonary disease) (CMS/[facility]), Diabetes mellitus (CMS/[facility]), Encounter for screening mammogram for malignant neoplasm of breast (03/10/2015), Morbid (severe) obesity due to excess calories (CMS/[facility]) (09/17/2018), Personal history of nicotine dependence (10/26/2020), Personal history of other diseases of the respiratory system, Personal history of other drug therapy (08/17/2020), Personal history of other endocrine, nutritional and metabolic disease, Personal history of other specified conditions (10/04/2016), and Personal history of other specified conditions (07/15/2019).    Surgical History  She has a past surgical history that includes Nasal septum surgery (11/14/2012); Breast lumpectomy (11/14/2012); Tubal ligation (11/14/2012); Other surgical history (12/14/2018); Other surgical history (01/08/2021); Other  surgical history (2020); Colonoscopy (2013); and BI mammo guided breast right localization (Right, 2018).     Social History     Occupational History    Not on file   Tobacco Use    Smoking status: Former     Types: Cigarettes     Quit date: 2023     Years since quittin.3    Smokeless tobacco: Never   Substance and Sexual Activity    Alcohol use: Never    Drug use: Never    Sexual activity: Not on file     Travel History   Travel since 23    No documented travel since 23         Family History  Family History   Problem Relation Name Age of Onset    Alzheimer's disease Mother      Coronary artery disease Father      Breast cancer Other family history      Allergies  Patient has no known allergies.     Immunization History   Administered Date(s) Administered    Flu vaccine, quadrivalent, high-dose, preservative free, age 65y+ (FLUZONE) 10/05/2020    Influenza, High Dose Seasonal, Preservative Free 10/03/2022    Influenza, Unspecified 2011, 09/10/2012, 10/01/2013, 2014    Influenza, injectable, quadrivalent 2019    Influenza, seasonal, injectable 10/19/2015, 2016    Influenza, trivalent, adjuvanted 10/29/2019    Moderna SARS-CoV-2 Vaccination 2021, 2021, 2021, 2022    Novel influenza-H1N1-09, preservative-free 2010    Pneumococcal conjugate vaccine, 13-valent (PREVNAR 13) 10/05/2020    Pneumococcal polysaccharide vaccine, 23-valent, age 2 years and older (PNEUMOVAX 23) 2008, 2020    Td vaccine, age 7 years and older (TDVAX) 2008    Tdap vaccine, age 7 year and older (BOOSTRIX) 2013     Medications  Home medications:  Medications Prior to Admission   Medication Sig Dispense Refill Last Dose    albuterol 90 mcg/actuation inhaler inhale 1 to 2 puffs by mouth and INTO THE LUNGS every 4 to 6 hours if needed   2024    aspirin 81 mg EC tablet Take 1 tablet (81 mg) by mouth once daily.   Past Week at 2100     "atorvastatin (Lipitor) 20 mg tablet Take 1 tablet (20 mg) by mouth once daily at bedtime. 90 tablet 1 Past Week at 2100    BD Alcohol Swabs pads, medicated    2024    BD Dorothy 2nd Gen Pen Needle 32 gauge x \" needle Use with vitamin B12 injections monthly 12 each 11 2024    calcium carbonate 600 mg calcium (1,500 mg) tablet Take 1 tablet (600 mg) by mouth every 12 hours. + D per directive   2024 at 0800    CALCIUM CARBONATE-VITAMIN D3 ORAL Take 1 tablet by mouth 2 times a day.   2024 at 0800    cholecalciferol (Vitamin D-3) 1,250 mcg (50,000 unit) capsule Take 1 capsule (50,000 Units) by mouth 1 (one) time per week. Every 2 weeks   Past Week    cyanocobalamin (Dodex) 1,000 mcg/mL injection Inject 1 mL (1,000 mcg) into the shoulder, thigh, or buttocks every 30 (thirty) days. 10 mL 0 Past Month    dapsone 100 mg tablet Take 1 tablet (100 mg) by mouth once daily in the morning. Take before meals.   2024    dapsone 25 mg tablet Take 2 tablets (50 mg) by mouth once daily in the morning. Take before meals.   2024    dexAMETHasone (Decadron) 6 mg tablet Take 2 tablets (12 mg) by mouth once daily for 1 day. 2 tablet 0     dilTIAZem CD (Cardizem CD) 240 mg 24 hr capsule Take 1 capsule (240 mg) by mouth once daily. 90 capsule 1 2024    [] doxycycline (Adoxa) 100 mg tablet Take 1 tablet (100 mg) by mouth 2 times a day for 10 days. Take with a full glass of water and do not lie down for at least 30 minutes after 20 tablet 0 2024    ergocalciferol (Vitamin D-2) 1.25 MG (22158 UT) capsule Take 1 capsule (1,250 mcg) by mouth every 14 (fourteen) days.   Past Week    ferrous sulfate 325 (65 Fe) MG EC tablet Take by mouth once daily.   2024    fluticasone-umeclidin-vilanter (TRELEGY-ELLIPTA) 200-62.5-25 mcg blister with device Inhale 1 puff early in the morning.. Rinse mouth after taking dose 1 each 11 2024    furosemide (Lasix) 40 mg tablet Take 2 tablets (80 mg) by mouth once daily. " "90 tablet 3 1/2/2024    gabapentin (Neurontin) 300 mg capsule take 1 capsule by mouth three times a day for 90 DAYS   1/2/2024    glimepiride (Amaryl) 2 mg tablet take 1 tablet by mouth once daily Once a day 90 days 90 tablet 3 1/3/2024    insulin detemir (LEVEMIR FLEXTOUCH U100 INSULIN SUBQ) Inject under the skin. As directed   1/2/2024    Levemir FlexPen 100 unit/mL (3 mL) pen inject up to 15 units once daily with EVENING MEAL OR BEDTIME       losartan (Cozaar) 25 mg tablet Take 0.5 tablets (12.5 mg) by mouth once daily. (Patient taking differently: Take 0.5 tablets (12.5 mg) by mouth once daily. Few days ago ran out of pills) 45 tablet 1 Past Week    metFORMIN (Glucophage) 1,000 mg tablet take 1 tablet by mouth twice a day 90 180 tablet 3 1/3/2024    syringe with needle 3 mL 23 x 1\" syringe Use to inject vitamin b12 once monthly 12 each 11     topiramate (Topamax) 100 mg tablet take 1 tablet by mouth twice a day 180 tablet 0 1/2/2024 at 0800    triamcinolone (Kenalog) 0.1 % ointment APPLY 1 APPLICATOR AS NEEDED TOPICALLY ONCE DAILY AS NEEDED UNDER DENTAL TRAYS   1/2/2024    umeclidinium (Incruse Ellipta) 62.5 mcg/actuation inhalation Inhale 1 puff (62.5 mcg) once daily. 1 each 5 1/2/2024    warfarin (Coumadin) 5 mg tablet Take 1 tab daily or directed as coumadin clinic 90 tablet 1 1/1/2024 at 1800     Current medications:  Scheduled medications  amiodarone, 200 mg, oral, TID  aspirin, 81 mg, oral, Daily  atorvastatin, 20 mg, oral, Nightly  calcium carbonate, 1,500 mg, oral, q12h  dapsone, 100 mg, oral, Daily before breakfast  [Held by provider] dilTIAZem CD, 300 mg, oral, Daily  docusate sodium, 100 mg, oral, BID  ferrous sulfate (325 mg ferrous sulfate), 65 mg of iron, oral, Daily  tiotropium, 2 Inhalation, inhalation, Daily   And  fluticasone furoate-vilanteroL, 1 puff, inhalation, Daily  furosemide, 80 mg, oral, Daily  gabapentin, 200 mg, oral, BID  glimepiride, 2 mg, oral, Daily with breakfast  guaiFENesin, " 600 mg, oral, BID  insulin lispro, 0-5 Units, subcutaneous, TID with meals  ipratropium-albuteroL, 3 mL, nebulization, TID  [Held by provider] losartan, 12.5 mg, oral, Daily  [Held by provider] melatonin, 6 mg, oral, Daily  metFORMIN, 1,000 mg, oral, BID with meals  metoprolol tartrate, 25 mg, oral, q6h  pantoprazole, 40 mg, oral, Daily before breakfast   Or  pantoprazole, 40 mg, intravenous, Daily before breakfast  piperacillin-tazobactam, 4.5 g, intravenous, q6h  predniSONE, 40 mg, oral, Daily  topiramate, 100 mg, oral, BID  warfarin, 5 mg, oral, Daily      Continuous medications     PRN medications  PRN medications: acetaminophen **OR** acetaminophen **OR** acetaminophen, acetaminophen **OR** acetaminophen **OR** acetaminophen, albuterol, benzocaine-menthoL, bisacodyl, dextromethorphan-guaifenesin, dextrose 10 % in water (D10W), dextrose, glucagon, metoprolol, ondansetron ODT **OR** ondansetron, oxygen, oxygen    Review of Systems   Respiratory:  Positive for cough and shortness of breath.    All other systems reviewed and are negative.       Objective  Range of Vitals (last 24 hours)  Heart Rate:  []   Temp:  [35.8 °C (96.4 °F)-36.3 °C (97.3 °F)]   Resp:  [18-19]   BP: (102-151)/(55-71)   SpO2:  [90 %-95 %]   Daily Weight  01/12/24 : 140 kg (308 lb 13.8 oz)    Body mass index is 49.88 kg/m².     Physical Exam  Constitutional:       Appearance: Normal appearance.   HENT:      Head: Normocephalic and atraumatic.      Nose: Nose normal.   Eyes:      Extraocular Movements: Extraocular movements intact.      Conjunctiva/sclera: Conjunctivae normal.   Cardiovascular:      Rate and Rhythm: Regular rhythm.      Heart sounds: Normal heart sounds.   Pulmonary:      Breath sounds: Decreased breath sounds present.   Abdominal:      General: Bowel sounds are normal.      Palpations: Abdomen is soft.   Musculoskeletal:         General: Normal range of motion.      Cervical back: Normal range of motion and neck supple.  "  Neurological:      Mental Status: She is alert and oriented to person, place, and time.   Psychiatric:         Mood and Affect: Mood normal.         Behavior: Behavior normal.          Relevant Results  Outside Hospital Results    Labs  Results from last 72 hours   Lab Units 01/13/24 0705 01/12/24 0525 01/11/24 0527   WBC AUTO x10*3/uL 7.1 6.9 9.7   HEMOGLOBIN g/dL 8.3* 8.6* 8.4*   HEMATOCRIT % 29.2* 29.5* 29.4*   PLATELETS AUTO x10*3/uL 176 185 187   NEUTROS PCT AUTO %  --  75.2 77.8   LYMPHS PCT AUTO %  --  13.8 11.9   MONOS PCT AUTO %  --  8.3 6.9   EOS PCT AUTO %  --  2.3 2.9     Results from last 72 hours   Lab Units 01/13/24 0705 01/12/24 0525 01/11/24 0527   SODIUM mmol/L 144 146* 145   POTASSIUM mmol/L 3.4* 3.4* 3.2*   CHLORIDE mmol/L 100 100 101   CO2 mmol/L 35* 39* 40*   BUN mg/dL 31* 29* 30*   CREATININE mg/dL 0.98 0.94 0.96   GLUCOSE mg/dL 78 144* 57*   CALCIUM mg/dL 8.8 8.7 8.3*   ANION GAP mmol/L 12 10 7*   EGFR mL/min/1.73m*2 61 65 63         Estimated Creatinine Clearance: 75 mL/min (by C-G formula based on SCr of 0.98 mg/dL).  C-Reactive Protein   Date Value Ref Range Status   01/06/2024 1.41 (H) <1.00 mg/dL Final     Sedimentation Rate   Date Value Ref Range Status   01/06/2024 3 0 - 30 mm/h Final     No results found for: \"HIV1X2\", \"HIVCONF\", \"GAHIAQ6FJ\"  No results found for: \"HEPCABINIT\", \"HEPCAB\", \"HCVPCRQUANT\"  Microbiology  Reviewed  Imaging  CT angio chest for pulmonary embolism    Result Date: 1/10/2024  Interpreted By:  Yao Torres, STUDY: CT ANGIO CHEST FOR PULMONARY EMBOLISM;  1/10/2024 3:12 pm   INDICATION: Signs/Symptoms:worsening sob.   COMPARISON: 12/26/2023   ACCESSION NUMBER(S): [id number]   ORDERING CLINICIAN: OLAF SOLIMAN   TECHNIQUE: Helical data acquisition of the chest was obtained with  75 mL Omnipaque 350. Images were reformatted in axial, coronal, and sagittal planes.MIP reformatted images were also generated.   FINDINGS: Motion degraded exam.   LUNGS and " AIRWAYS: Small left and moderate right layering pleural effusions. Overlying atelectasis. Increased severe atelectasis involving the right middle lobe which is nearly collapsed. There are diffusely increased patchy areas consolidation and ground-glass opacity scattered throughout both lungs. Interlobular septal thickening is also noted in some areas. The right middle lobe bronchus is at least partially occluded. The remaining central airways otherwise appear patent. No bronchiectasis.   MEDIASTINUM and FANG, LOWER NECK AND AXILLA: The visualized thyroid gland is grossly unremarkable.   No thoracic lymphadenopathy by CT criteria.   Possible small sliding hiatal hernia.   HEART and VESSELS: Mild cardiomegaly. Dense mitral annulus calcification.   No significant pericardial effusion.   No pulmonary embolism identified on motion degraded exam. Dilatation of the main pulmonary artery to 40 mm suggests pulmonary hypertension.   Mild coronary atherosclerosis.   Thoracic aorta and great vessels are mildly atherosclerotic but otherwise patent without aneurysm.   UPPER ABDOMEN: 27 mm distal splenic artery aneurysm is probably thrombosed, not significantly changed to prior exam.   CHEST WALL and OSSEOUS STRUCTURES: No suspicious osseous lesions. Multilevel degenerative changes of the thoracic spine.       1.  No pulmonary embolism identified on motion degraded exam. 2. Pulmonary edema with right greater than left pleural effusions. 3. Near occlusion of the right middle lobe bronchus causing worsening atelectasis.     Signed by: Yao Torres 1/10/2024 3:28 PM Dictation workstation:   [id number]    ECG 12 lead    Result Date: 1/9/2024  Atrial fibrillation with rapid ventricular response Low voltage QRS Cannot rule out Anterior infarct , age undetermined Abnormal ECG When compared with ECG of 07-JAN-2024 00:28, (unconfirmed) No significant change was found    ECG 12 lead    Result Date: 1/9/2024  Atrial fibrillation with rapid  ventricular response Abnormal ECG When compared with ECG of 02-JAN-2024 17:57, No significant change was found    ECG 12 lead    Result Date: 1/8/2024  Sinus rhythm with Premature atrial complexes with Aberrant conduction Cannot rule out Anterior infarct (cited on or before 02-JAN-2024) Abnormal ECG When compared with ECG of 02-JAN-2024 17:57, Sinus rhythm has replaced Atrial fibrillation    XR chest 1 view    Result Date: 1/7/2024  Interpreted By:  Mohsen Law, STUDY: XR CHEST 1 VIEW;  1/7/2024 4:04 pm   INDICATION: Signs/Symptoms:worsening pneumonia.   COMPARISON: Chest x-ray 01/02/2024   ACCESSION NUMBER(S): [id number]   ORDERING CLINICIAN: ALBERTO PICKETT   FINDINGS: Multiple overlying leads are present.   CARDIOMEDIASTINAL SILHOUETTE: Cardiomediastinal silhouette is stable in size and configuration. Dense mitral annular calcifications noted.   LUNGS: There are bilateral patchy airspace opacities with slight interval improvement in the right lung base and slight interval worsening on the left. These findings may relate to developing edema versus infection. Small bilateral pleural effusions. No pneumothorax.   ABDOMEN: No remarkable upper abdominal findings.   BONES: Degenerative changes of the spine and bilateral shoulders.       There are patchy bilateral airspace opacities with slight interval worsening on the and slight interval improvement on the right. Findings may relate to edema versus infection. Attention on continued short-term follow-up is advised.   Small bilateral pleural effusions are noted with slight interval improvement on the right.   MACRO: None   Signed by: Mohsen Law 1/7/2024 4:18 PM Dictation workstation:   [id number]    CT head wo IV contrast    Result Date: 1/7/2024  Interpreted By:  Jaxon Fernandez, STUDY: CT ANGIO HEAD AND NECK W AND WO IV CONTRAST; CT HEAD WO IV CONTRAST; 1/7/2024 1:20 am; 1/7/2024 1:10 am   INDICATION: Signs/Symptoms:Confusion.   COMPARISON: CT and CT angiogram  dated 01/06/2024.   ACCESSION NUMBER(S): [id number]; [id number]   ORDERING CLINICIAN: JAC SANTOS   TECHNIQUE: Unenhanced CT images of the head were obtained. Subsequently,  75 mL Omnipaque 350 was administered intravenously and axial images of the head and neck were acquired.  Coronal, sagittal, and 3-D reconstructions were provided for review.   FINDINGS: There is a mostly calcified meningioma within the upper left parietal convexity measuring 1.7 x 1.5 cm and also calcified meningioma measuring 1.1 x 8.2 cm overlying the inferolateral right frontal lobe. No evidence of associated vasogenic edema or mass effect. There is no evidence of midline shift, hydrocephalus, or acute intracranial hemorrhage. Gray-white matter differentiation is maintained.   There is a small amount of fluid in the caudal mastoid air cells. There is mild to moderate polypoid mucosal thickening of the left maxillary sinus and anterior ethmoid air cells. These findings are unchanged.   CTA HEAD FINDINGS:   Anterior circulation: The bilateral intracranial internal carotid arteries, bilateral carotid terminals, bilateral proximal anterior and middle cerebral arteries are normal.   Posterior circulation: Bilateral intracranial vertebral arteries, vertebrobasilar junction, basilar artery and proximal posterior cerebral arteries are normal.   Venous contamination limits evaluation for small aneurysms, without gross evidence of intracranial aneurysm.   CTA NECK FINDINGS:   Right carotid vessels: There are mild atherosclerotic calcifications of the carotid bifurcation with 0% stenosis by NASCET criteria. The remainder of the right internal cervical carotid artery is widely patent without focal stenosis.   Left carotid vessels: The common carotid artery is normal. The carotid bifurcation is normal. The internal carotid artery in the neck is normal. There is 0% stenosis by NASCET criteria. .   Vertebral vessels:  The visualized segments of the  cervical vertebral arteries are normal in caliber.   There is redemonstration of multifocal nodular and ground-glass opacities in the visualized upper lung fields suspicious for multifocal pneumonia. There is also small right pleural effusion partially visualized.       No evidence of acute cortical infarct or acute intracranial hemorrhage. There are calcified meningiomas noted overlying the upper left frontoparietal convexity and overlying the inferolateral right frontal lobe without evidence of significant mass effect or vasogenic edema. No interval change.   No evidence for significant stenosis of the cervical vessels.   No evidence for significant stenosis or large branch vessel cutoffs of the intracranial vessels.   Partially visualized ground-glass and nodular opacities in the upper lung fields suspicious for multifocal pneumonia for example viral pneumonia.   MACRO:   None   Signed by: Jaxon Fernandez 1/7/2024 2:06 AM Dictation workstation:   [id number]    CT angio head and neck w and wo IV contrast    Result Date: 1/7/2024  Interpreted By:  Jaxon Fernandez, STUDY: CT ANGIO HEAD AND NECK W AND WO IV CONTRAST; CT HEAD WO IV CONTRAST; 1/7/2024 1:20 am; 1/7/2024 1:10 am   INDICATION: Signs/Symptoms:Confusion.   COMPARISON: CT and CT angiogram dated 01/06/2024.   ACCESSION NUMBER(S): [id number]; [id number]   ORDERING CLINICIAN: JAC SANTOS   TECHNIQUE: Unenhanced CT images of the head were obtained. Subsequently,  75 mL Omnipaque 350 was administered intravenously and axial images of the head and neck were acquired.  Coronal, sagittal, and 3-D reconstructions were provided for review.   FINDINGS: There is a mostly calcified meningioma within the upper left parietal convexity measuring 1.7 x 1.5 cm and also calcified meningioma measuring 1.1 x 8.2 cm overlying the inferolateral right frontal lobe. No evidence of associated vasogenic edema or mass effect. There is no evidence of midline shift, hydrocephalus,  or acute intracranial hemorrhage. Gray-white matter differentiation is maintained.   There is a small amount of fluid in the caudal mastoid air cells. There is mild to moderate polypoid mucosal thickening of the left maxillary sinus and anterior ethmoid air cells. These findings are unchanged.   CTA HEAD FINDINGS:   Anterior circulation: The bilateral intracranial internal carotid arteries, bilateral carotid terminals, bilateral proximal anterior and middle cerebral arteries are normal.   Posterior circulation: Bilateral intracranial vertebral arteries, vertebrobasilar junction, basilar artery and proximal posterior cerebral arteries are normal.   Venous contamination limits evaluation for small aneurysms, without gross evidence of intracranial aneurysm.   CTA NECK FINDINGS:   Right carotid vessels: There are mild atherosclerotic calcifications of the carotid bifurcation with 0% stenosis by NASCET criteria. The remainder of the right internal cervical carotid artery is widely patent without focal stenosis.   Left carotid vessels: The common carotid artery is normal. The carotid bifurcation is normal. The internal carotid artery in the neck is normal. There is 0% stenosis by NASCET criteria. .   Vertebral vessels:  The visualized segments of the cervical vertebral arteries are normal in caliber.   There is redemonstration of multifocal nodular and ground-glass opacities in the visualized upper lung fields suspicious for multifocal pneumonia. There is also small right pleural effusion partially visualized.       No evidence of acute cortical infarct or acute intracranial hemorrhage. There are calcified meningiomas noted overlying the upper left frontoparietal convexity and overlying the inferolateral right frontal lobe without evidence of significant mass effect or vasogenic edema. No interval change.   No evidence for significant stenosis of the cervical vessels.   No evidence for significant stenosis or large branch  vessel cutoffs of the intracranial vessels.   Partially visualized ground-glass and nodular opacities in the upper lung fields suspicious for multifocal pneumonia for example viral pneumonia.   MACRO:   None   Signed by: Jaxon Fernandez 1/7/2024 2:06 AM Dictation workstation:   [id number]    CT angio brain attack head w IV contrast and post procedure    Result Date: 1/6/2024  Interpreted By:  Jazmyne Teresa, STUDY: CT ANGIO BRAIN ATTACK HEAD W IV CONTRAST AND POST PROCEDURE; CT ANGIO BRAIN ATTACK NECK W IV CONTRAST AND POST PROCEDURE;  1/6/2024 5:16 pm   INDICATION: Signs/Symptoms:AMS.   COMPARISON: None.   ACCESSION NUMBER(S): [id number]; [id number]   ORDERING CLINICIAN: LYNN FLORES   TECHNIQUE: 75 mL Omnipaque 350 was administered intravenously and axial images of the head and neck were acquired.  Coronal, sagittal, and 3-D reconstructions were provided for review.   The technologist notes the patient had multiple IV malfunctions.   FINDINGS: CT head: Please see CT head performed the same day for intracranial findings.   CTA HEAD FINDINGS:   The examination is degraded by venous contamination.   Anterior circulation: The bilateral intracranial internal carotid arteries, bilateral carotid terminals, bilateral proximal anterior and middle cerebral arteries are patent.   Posterior circulation: Bilateral intracranial vertebral arteries, vertebrobasilar junction, basilar artery and proximal posterior cerebral arteries are patent.   CTA NECK FINDINGS:   The examination is limited by the timing of scan relative to the contrast injection.   Mild atherosclerotic calcification along the aortic arch. Within the limitation of patient motion artifact no significant stenosis at the origins of the great vessels.   Right carotid vessels: The common carotid artery is patent. Mild calcified plaque at the carotid bifurcation without significant stenosis by NASCET criteria. The internal carotid artery in the neck is patent  without significant stenosis.   Left carotid vessels: The common carotid artery is patent.. The carotid bifurcation is patent without significant stenosis. The internal carotid artery in the neck is patent without significant stenosis.   Vertebral vessels:  The visualized segments of the cervical vertebral arteries are patent.   The left thyroid lobe is slightly asymmetrically larger than the right with a calcification. Please see thyroid ultrasound 07/24/2023 for further details. Incidental note of multiple tonsilloliths. There is prominent ossification of the stylohyoid ligament bilaterally. Soft tissues of the neck are otherwise unremarkable.   There are patchy opacities within the visualized upper lungs bilaterally concerning for multifocal pneumonia. There is a small pleural effusion on the right. Mild degenerative changes of the cervical spine.       The examination is degraded by venous contamination, patient motion artifact and difficulties with the IV. Within this limitation:   CTA neck:   No evidence for significant stenosis of the cervical vessels.   Patchy opacities within the upper lungs bilaterally concerning for multifocal pneumonia. There is a small pleural effusion on the right.   CTA head:   No evidence for significant stenosis or large branch vessel cutoffs of the intracranial vessels.   Please see CT head performed the same day for intracranial findings.   MACRO: None   Signed by: Jazmyne Teresa 1/6/2024 5:34 PM Dictation workstation:   [id number]    CT angio brain attack neck w IV contrast and post procedure    Result Date: 1/6/2024  Interpreted By:  Jazmyne Teresa, STUDY: CT ANGIO BRAIN ATTACK HEAD W IV CONTRAST AND POST PROCEDURE; CT ANGIO BRAIN ATTACK NECK W IV CONTRAST AND POST PROCEDURE;  1/6/2024 5:16 pm   INDICATION: Signs/Symptoms:AMS.   COMPARISON: None.   ACCESSION NUMBER(S): [id number]; [id number]   ORDERING CLINICIAN: LYNN FLORES   TECHNIQUE: 75 mL Omnipaque 350 was  administered intravenously and axial images of the head and neck were acquired.  Coronal, sagittal, and 3-D reconstructions were provided for review.   The technologist notes the patient had multiple IV malfunctions.   FINDINGS: CT head: Please see CT head performed the same day for intracranial findings.   CTA HEAD FINDINGS:   The examination is degraded by venous contamination.   Anterior circulation: The bilateral intracranial internal carotid arteries, bilateral carotid terminals, bilateral proximal anterior and middle cerebral arteries are patent.   Posterior circulation: Bilateral intracranial vertebral arteries, vertebrobasilar junction, basilar artery and proximal posterior cerebral arteries are patent.   CTA NECK FINDINGS:   The examination is limited by the timing of scan relative to the contrast injection.   Mild atherosclerotic calcification along the aortic arch. Within the limitation of patient motion artifact no significant stenosis at the origins of the great vessels.   Right carotid vessels: The common carotid artery is patent. Mild calcified plaque at the carotid bifurcation without significant stenosis by NASCET criteria. The internal carotid artery in the neck is patent without significant stenosis.   Left carotid vessels: The common carotid artery is patent.. The carotid bifurcation is patent without significant stenosis. The internal carotid artery in the neck is patent without significant stenosis.   Vertebral vessels:  The visualized segments of the cervical vertebral arteries are patent.   The left thyroid lobe is slightly asymmetrically larger than the right with a calcification. Please see thyroid ultrasound 07/24/2023 for further details. Incidental note of multiple tonsilloliths. There is prominent ossification of the stylohyoid ligament bilaterally. Soft tissues of the neck are otherwise unremarkable.   There are patchy opacities within the visualized upper lungs bilaterally concerning  for multifocal pneumonia. There is a small pleural effusion on the right. Mild degenerative changes of the cervical spine.       The examination is degraded by venous contamination, patient motion artifact and difficulties with the IV. Within this limitation:   CTA neck:   No evidence for significant stenosis of the cervical vessels.   Patchy opacities within the upper lungs bilaterally concerning for multifocal pneumonia. There is a small pleural effusion on the right.   CTA head:   No evidence for significant stenosis or large branch vessel cutoffs of the intracranial vessels.   Please see CT head performed the same day for intracranial findings.   MACRO: None   Signed by: Jazmyne Teresa 1/6/2024 5:34 PM Dictation workstation:   [id number]    CT brain attack head wo IV contrast    Result Date: 1/6/2024  Interpreted By:  Richie Wren, STUDY: CT BRAIN ATTACK HEAD WO IV CONTRAST;  1/6/2024 4:37 pm   INDICATION: Signs/Symptoms:AMS.   COMPARISON: 11/18/2012   ACCESSION NUMBER(S): [id number]   ORDERING CLINICIAN: LYNN FLORES   TECHNIQUE: Noncontrast axial CT images of head were obtained with coronal and sagittal reconstructed images.   FINDINGS: BRAIN PARENCHYMA: Mild periventricular and subcortical hemispheric white matter hypodensities are most compatible with chronic small vessel ischemic disease. No acute intraparenchymal hemorrhage or parenchymal evidence of acute large territory ischemic infarct. No mass-effect. Gray-white matter distinction is preserved.   VENTRICLES and EXTRA-AXIAL SPACES: There is a 1.5 cm calcified meningioma arising from the left parasagittal frontal parietal dura. There is another 1.3 cm more densely calcified meningioma arising the right frontotemporal dura. No acute extra-axial or intraventricular hemorrhage. No effacement of cerebral sulci. Ventricles and sulci are age-concordant. There is a partial empty sella.   PARANASAL SINUSES/MASTOIDS: Trace fluid left mastoid air cells.  No hemorrhage or air-fluid levels within the visualized paranasal sinuses. The mastoids are well aerated.   CALVARIUM/ORBITS:  No skull fracture.  The orbits and globes are intact to the extent visualized.   EXTRACRANIAL SOFT TISSUES: No discernible abnormality.       1. No evidence of acute intracranial hemorrhage, mass effect, or parenchymal evidence of an acute large territory ischemic infarct.   2. Calcified meningiomas rising from the left parasagittal frontal parietal dura and right frontotemporal dura measuring 1.5 cm and 1.3 cm, respectively. No significant mass effect.     MACRO: Richie Wren discussed the significance and urgency of this critical finding by EPIC HAIKU with  LYNN FLORES on 1/6/2024 at 4:50 p.m. with confirmation of receipt. (**-RCF-**) Findings:  See findings.   Signed by: Richie Wren 1/6/2024 4:53 PM Dictation workstation:   [id number]    ECG 12 lead    Result Date: 1/3/2024  Atrial fibrillation with rapid ventricular response Low voltage QRS Cannot rule out Anterior infarct , age undetermined Abnormal ECG When compared with ECG of 13-DEC-2023 09:49, Atrial fibrillation has replaced Sinus rhythm See ED provider note for full interpretation and clinical correlation Confirmed by Jamir Salvador ([id number]) on 1/3/2024 10:49:37 PM    XR chest 1 view    Result Date: 1/2/2024  Interpreted By:  Soren Ham, STUDY: XR CHEST 1 VIEW;  1/2/2024 7:26 pm   INDICATION: Signs/Symptoms:sob.   COMPARISON: 9/26/2023   ACCESSION NUMBER(S): [id number]   ORDERING CLINICIAN: CARMEN CONKLIN   FINDINGS: The cardiac silhouette is stable in size. There are bilateral opacities concerning for infiltrates. Small right pleural effusion and basilar atelectasis or airspace disease. No pneumothorax.       Small right pleural effusion and basilar atelectasis or airspace disease and mild bilateral opacities concerning for infiltrates.   MACRO: None   Signed by: Soren Ham 1/2/2024 7:38 PM Dictation  workstation:   [id number]    CT chest wo IV contrast    Result Date: 12/26/2023  Interpreted By:  Richie Wren, STUDY: CT CHEST WO IV CONTRAST;  12/26/2023 6:03 pm   INDICATION: Signs/Symptoms:cough.   COMPARISON: 09/22/2023 CT chest   ACCESSION NUMBER(S): [id number]   ORDERING CLINICIAN: JANNET VEE   TECHNIQUE: Contiguous axial images of the chest and upper abdomen were obtained without contrast. Coronal and sagittal reformatted images were reconstructed from the axial data.   FINDINGS:     MEDIASTINUM AND LYMPH NODES:  The esophagus appears within normal limits.  No enlarged intrathoracic or axillary lymph nodes by imaging criteria. No pneumomediastinum.   VESSELS:  Normal caliber thoracic aorta. Mild aortic atherosclerosis. The pulmonary artery is enlarged, measuring up to 3.6 cm, indicative of pulmonary hypertension.   HEART: There is left atrial dilatation. Mitral annular calcifications. Mild coronary artery calcifications. No significant pericardial effusion.   LUNG, AIRWAYS, AND PLEURA: New small bilateral pleural effusions with adjacent passive atelectasis. There is new subsegmental atelectasis in the right middle lobe. There is a small amount debris in the lower trachea extending into the mainstem bronchi and bronchus intermedius. There is a small opacity in the left upper lobe and superior segment of left lower lobe consisting of tree-in-bud nodules suspicious for pneumonia the   OSSEOUS STRUCTURES: No acute osseous abnormality.   CHEST WALL SOFT TISSUES: No discernible abnormality.   UPPER ABDOMEN/OTHER: Multiple peripherally calcified splenic artery aneurysm measuring 1.3 cm, 1.3 cm, and 2.8 cm are similar to prior study. The liver appears cirrhotic.       1. Tree-in-bud opacities in the posterior left upper lobe and superior segment of the left lower lobe consistent with bronchiolitis/early pneumonia.   2. Small bilateral pleural effusions with adjacent passive atelectasis and right middle  lobe subsegmental atelectasis.   3. Small amount of debris in the trachea and bilateral proximal bronchi that could be secretions or aspiration.   4. Three splenic artery aneurysm measuring up to 2.8 cm, unchanged.   MACRO: None.   Signed by: Richie Wren 12/26/2023 6:29 PM Dictation workstation:   [id number]     Assessment/Plan   Acute on chronic respiratory failure  RSV  Bronchitis  Abnormal chest CT    Monitor off antibiotics  Supplemental oxygen as needed-wean as appropriate  Continue dapsone-PCP prophylaxis  Continue prednisone.  Pulmonary consult regarding abnormal CT  Supportive care  Monitor temperature and WBC      Paxton Garcia MD

## 2024-01-14 ENCOUNTER — APPOINTMENT (OUTPATIENT)
Dept: RADIOLOGY | Facility: HOSPITAL | Age: 74
DRG: 193 | End: 2024-01-14
Payer: MEDICARE

## 2024-01-14 LAB
ANION GAP SERPL CALC-SCNC: 10 MMOL/L (ref 10–20)
BNP SERPL-MCNC: 268 PG/ML (ref 0–99)
BUN SERPL-MCNC: 32 MG/DL (ref 6–23)
CALCIUM SERPL-MCNC: 8.7 MG/DL (ref 8.6–10.3)
CHLORIDE SERPL-SCNC: 101 MMOL/L (ref 98–107)
CO2 SERPL-SCNC: 35 MMOL/L (ref 21–32)
CREAT SERPL-MCNC: 1.06 MG/DL (ref 0.5–1.05)
EGFRCR SERPLBLD CKD-EPI 2021: 56 ML/MIN/1.73M*2
ERYTHROCYTE [DISTWIDTH] IN BLOOD BY AUTOMATED COUNT: 16.2 % (ref 11.5–14.5)
GLUCOSE BLD MANUAL STRIP-MCNC: 100 MG/DL (ref 74–99)
GLUCOSE BLD MANUAL STRIP-MCNC: 124 MG/DL (ref 74–99)
GLUCOSE BLD MANUAL STRIP-MCNC: 196 MG/DL (ref 74–99)
GLUCOSE BLD MANUAL STRIP-MCNC: 249 MG/DL (ref 74–99)
GLUCOSE BLD MANUAL STRIP-MCNC: 266 MG/DL (ref 74–99)
GLUCOSE SERPL-MCNC: 107 MG/DL (ref 74–99)
HCT VFR BLD AUTO: 29.2 % (ref 36–46)
HGB BLD-MCNC: 8.4 G/DL (ref 12–16)
INR PPP: 2.7 (ref 0.9–1.1)
MCH RBC QN AUTO: 31.3 PG (ref 26–34)
MCHC RBC AUTO-ENTMCNC: 28.8 G/DL (ref 32–36)
MCV RBC AUTO: 109 FL (ref 80–100)
NRBC BLD-RTO: 0 /100 WBCS (ref 0–0)
PLATELET # BLD AUTO: 187 X10*3/UL (ref 150–450)
POTASSIUM SERPL-SCNC: 3.3 MMOL/L (ref 3.5–5.3)
PROTHROMBIN TIME: 30.2 SECONDS (ref 9.8–12.8)
RBC # BLD AUTO: 2.68 X10*6/UL (ref 4–5.2)
SODIUM SERPL-SCNC: 143 MMOL/L (ref 136–145)
WBC # BLD AUTO: 6.8 X10*3/UL (ref 4.4–11.3)

## 2024-01-14 PROCEDURE — 2500000005 HC RX 250 GENERAL PHARMACY W/O HCPCS: Mod: IPSPLIT | Performed by: NURSE PRACTITIONER

## 2024-01-14 PROCEDURE — 71250 CT THORAX DX C-: CPT | Performed by: RADIOLOGY

## 2024-01-14 PROCEDURE — 2500000002 HC RX 250 W HCPCS SELF ADMINISTERED DRUGS (ALT 637 FOR MEDICARE OP, ALT 636 FOR OP/ED): Mod: IPSPLIT

## 2024-01-14 PROCEDURE — 2500000001 HC RX 250 WO HCPCS SELF ADMINISTERED DRUGS (ALT 637 FOR MEDICARE OP): Mod: IPSPLIT

## 2024-01-14 PROCEDURE — 85610 PROTHROMBIN TIME: CPT | Mod: IPSPLIT

## 2024-01-14 PROCEDURE — 94640 AIRWAY INHALATION TREATMENT: CPT | Mod: IPSPLIT

## 2024-01-14 PROCEDURE — 1200000002 HC GENERAL ROOM WITH TELEMETRY DAILY: Mod: IPSPLIT

## 2024-01-14 PROCEDURE — 82947 ASSAY GLUCOSE BLOOD QUANT: CPT | Mod: IPSPLIT

## 2024-01-14 PROCEDURE — 94640 AIRWAY INHALATION TREATMENT: CPT

## 2024-01-14 PROCEDURE — 85027 COMPLETE CBC AUTOMATED: CPT | Mod: IPSPLIT

## 2024-01-14 PROCEDURE — 94668 MNPJ CHEST WALL SBSQ: CPT | Mod: IPSPLIT

## 2024-01-14 PROCEDURE — 71250 CT THORAX DX C-: CPT | Mod: IPSPLIT

## 2024-01-14 PROCEDURE — 36415 COLL VENOUS BLD VENIPUNCTURE: CPT | Mod: IPSPLIT

## 2024-01-14 PROCEDURE — 2500000004 HC RX 250 GENERAL PHARMACY W/ HCPCS (ALT 636 FOR OP/ED): Mod: IPSPLIT

## 2024-01-14 PROCEDURE — 83880 ASSAY OF NATRIURETIC PEPTIDE: CPT | Mod: IPSPLIT

## 2024-01-14 PROCEDURE — 9420000001 HC RT PATIENT EDUCATION 5 MIN: Mod: IPSPLIT

## 2024-01-14 PROCEDURE — 2500000002 HC RX 250 W HCPCS SELF ADMINISTERED DRUGS (ALT 637 FOR MEDICARE OP, ALT 636 FOR OP/ED): Mod: IPSPLIT | Performed by: NURSE PRACTITIONER

## 2024-01-14 PROCEDURE — 99233 SBSQ HOSP IP/OBS HIGH 50: CPT | Performed by: INTERNAL MEDICINE

## 2024-01-14 PROCEDURE — 2500000005 HC RX 250 GENERAL PHARMACY W/O HCPCS: Mod: IPSPLIT

## 2024-01-14 PROCEDURE — 84520 ASSAY OF UREA NITROGEN: CPT | Mod: IPSPLIT

## 2024-01-14 RX ORDER — FUROSEMIDE 10 MG/ML
40 INJECTION INTRAMUSCULAR; INTRAVENOUS EVERY 12 HOURS
Status: COMPLETED | OUTPATIENT
Start: 2024-01-14 | End: 2024-01-14

## 2024-01-14 RX ORDER — PREDNISONE 20 MG/1
20 TABLET ORAL DAILY
Status: DISCONTINUED | OUTPATIENT
Start: 2024-01-14 | End: 2024-01-17 | Stop reason: HOSPADM

## 2024-01-14 RX ORDER — POTASSIUM CHLORIDE 20 MEQ/1
40 TABLET, EXTENDED RELEASE ORAL 2 TIMES DAILY
Status: DISCONTINUED | OUTPATIENT
Start: 2024-01-14 | End: 2024-01-17 | Stop reason: HOSPADM

## 2024-01-14 RX ADMIN — IPRATROPIUM BROMIDE AND ALBUTEROL SULFATE 3 ML: .5; 3 SOLUTION RESPIRATORY (INHALATION) at 21:53

## 2024-01-14 RX ADMIN — TOPIRAMATE 100 MG: 100 TABLET, FILM COATED ORAL at 10:04

## 2024-01-14 RX ADMIN — METOPROLOL TARTRATE 25 MG: 25 TABLET, FILM COATED ORAL at 23:07

## 2024-01-14 RX ADMIN — AMIODARONE HYDROCHLORIDE 200 MG: 200 TABLET ORAL at 10:04

## 2024-01-14 RX ADMIN — CALCIUM CARBONATE (ANTACID) CHEW TAB 500 MG 1500 MG: 500 CHEW TAB at 10:05

## 2024-01-14 RX ADMIN — DOCUSATE SODIUM 100 MG: 100 CAPSULE, LIQUID FILLED ORAL at 22:13

## 2024-01-14 RX ADMIN — WARFARIN SODIUM 5 MG: 5 TABLET ORAL at 17:28

## 2024-01-14 RX ADMIN — FERROUS SULFATE TAB 325 MG (65 MG ELEMENTAL FE) 1 TABLET: 325 (65 FE) TAB at 10:03

## 2024-01-14 RX ADMIN — GLIMEPIRIDE 2 MG: 2 TABLET ORAL at 10:04

## 2024-01-14 RX ADMIN — METOPROLOL TARTRATE 25 MG: 25 TABLET, FILM COATED ORAL at 17:28

## 2024-01-14 RX ADMIN — INSULIN LISPRO 2 UNITS: 100 INJECTION, SOLUTION INTRAVENOUS; SUBCUTANEOUS at 17:25

## 2024-01-14 RX ADMIN — GABAPENTIN 200 MG: 100 CAPSULE ORAL at 22:11

## 2024-01-14 RX ADMIN — GABAPENTIN 200 MG: 100 CAPSULE ORAL at 10:04

## 2024-01-14 RX ADMIN — PANTOPRAZOLE SODIUM 40 MG: 40 TABLET, DELAYED RELEASE ORAL at 06:31

## 2024-01-14 RX ADMIN — ASPIRIN 81 MG: 81 TABLET, COATED ORAL at 10:04

## 2024-01-14 RX ADMIN — METOPROLOL TARTRATE 25 MG: 25 TABLET, FILM COATED ORAL at 10:03

## 2024-01-14 RX ADMIN — GUAIFENESIN 600 MG: 600 TABLET, EXTENDED RELEASE ORAL at 22:11

## 2024-01-14 RX ADMIN — Medication 10 L/MIN: at 10:22

## 2024-01-14 RX ADMIN — METOPROLOL TARTRATE 25 MG: 25 TABLET, FILM COATED ORAL at 03:25

## 2024-01-14 RX ADMIN — GUAIFENESIN 600 MG: 600 TABLET, EXTENDED RELEASE ORAL at 10:04

## 2024-01-14 RX ADMIN — CALCIUM CARBONATE (ANTACID) CHEW TAB 500 MG 1500 MG: 500 CHEW TAB at 22:12

## 2024-01-14 RX ADMIN — POTASSIUM CHLORIDE 40 MEQ: 1500 TABLET, EXTENDED RELEASE ORAL at 12:10

## 2024-01-14 RX ADMIN — ATORVASTATIN CALCIUM 20 MG: 10 TABLET, FILM COATED ORAL at 22:13

## 2024-01-14 RX ADMIN — FLUTICASONE FUROATE AND VILANTEROL TRIFENATATE 1 PUFF: 200; 25 POWDER RESPIRATORY (INHALATION) at 10:22

## 2024-01-14 RX ADMIN — POTASSIUM CHLORIDE 40 MEQ: 1500 TABLET, EXTENDED RELEASE ORAL at 22:12

## 2024-01-14 RX ADMIN — AMIODARONE HYDROCHLORIDE 200 MG: 200 TABLET ORAL at 22:48

## 2024-01-14 RX ADMIN — IPRATROPIUM BROMIDE AND ALBUTEROL SULFATE 3 ML: .5; 3 SOLUTION RESPIRATORY (INHALATION) at 10:22

## 2024-01-14 RX ADMIN — METFORMIN HYDROCHLORIDE 1000 MG: 500 TABLET ORAL at 17:28

## 2024-01-14 RX ADMIN — IPRATROPIUM BROMIDE AND ALBUTEROL SULFATE 3 ML: .5; 3 SOLUTION RESPIRATORY (INHALATION) at 16:12

## 2024-01-14 RX ADMIN — TIOTROPIUM BROMIDE INHALATION SPRAY 2 PUFF: 3.12 SPRAY, METERED RESPIRATORY (INHALATION) at 10:22

## 2024-01-14 RX ADMIN — PREDNISONE 20 MG: 20 TABLET ORAL at 10:05

## 2024-01-14 RX ADMIN — Medication 8 L/MIN: at 16:12

## 2024-01-14 RX ADMIN — METFORMIN HYDROCHLORIDE 1000 MG: 500 TABLET ORAL at 10:04

## 2024-01-14 RX ADMIN — TOPIRAMATE 100 MG: 100 TABLET, FILM COATED ORAL at 22:12

## 2024-01-14 RX ADMIN — DAPSONE 100 MG: 25 TABLET ORAL at 06:32

## 2024-01-14 RX ADMIN — PIPERACILLIN SODIUM AND TAZOBACTAM SODIUM 4.5 G: 4; .5 INJECTION, SOLUTION INTRAVENOUS at 03:25

## 2024-01-14 RX ADMIN — AMIODARONE HYDROCHLORIDE 200 MG: 200 TABLET ORAL at 17:28

## 2024-01-14 RX ADMIN — FUROSEMIDE 40 MG: 10 INJECTION, SOLUTION INTRAMUSCULAR; INTRAVENOUS at 10:03

## 2024-01-14 RX ADMIN — FUROSEMIDE 40 MG: 10 INJECTION, SOLUTION INTRAMUSCULAR; INTRAVENOUS at 22:13

## 2024-01-14 ASSESSMENT — PAIN SCALES - GENERAL
PAINLEVEL_OUTOF10: 0 - NO PAIN

## 2024-01-14 ASSESSMENT — ENCOUNTER SYMPTOMS
SHORTNESS OF BREATH: 1
COUGH: 1

## 2024-01-14 ASSESSMENT — PAIN - FUNCTIONAL ASSESSMENT
PAIN_FUNCTIONAL_ASSESSMENT: 0-10

## 2024-01-14 NOTE — CARE PLAN
The patient's goals for the shift include increase activity this shift    The clinical goals for the shift include Pt will tolerate IVP lasix this shift.    Pt remained safe and stable throughout shift. VS were stable and pt denied pain. Pt currently on 10L high flow. Plan is to go to rehab when medically ready. No other complaints. Pt resting in chair with call light within reach.

## 2024-01-14 NOTE — CARE PLAN
The patient's goals for the shift include increase activity this shift    The clinical goals for the shift include Pt will tolerate IV abx throughout shift.  Patient tolerated ABX. Remained Afib on Tele with a controled rate. BP remained adequate with the use of rate controlling meds. Air bed was comfortable and rotated her through the night. Flaca worked well while she slept to keep her dry.

## 2024-01-14 NOTE — PROGRESS NOTES
"Frannie Blanco is a 73 y.o. female on day 10 of admission presenting with RSV (respiratory syncytial virus infection).    Subjective   Patient sitting in chair, remains on 10 L high flow oxygen, and SOB with conversation.  Patient no longer wishes to go to [facility] on Discharge, would like to discharge to [facility], will discuss with care coordinator tomorrow.  Discussed treatment plan with patient, states understanding and agrees.         Objective     Physical Exam  Constitutional:       Appearance: She is obese.   HENT:      Head: Normocephalic and atraumatic.      Nose: Nose normal.      Mouth/Throat:      Mouth: Mucous membranes are moist.   Eyes:      Extraocular Movements: Extraocular movements intact.   Cardiovascular:      Rate and Rhythm: Normal rate and regular rhythm.      Pulses: Normal pulses.      Heart sounds: Normal heart sounds.   Pulmonary:      Breath sounds: Rhonchi present.      Comments: Bases  Abdominal:      General: Bowel sounds are normal.      Palpations: Abdomen is soft.   Musculoskeletal:      Cervical back: Normal range of motion.      Right lower leg: Edema present.      Left lower leg: Edema present.   Skin:     General: Skin is warm and dry.      Capillary Refill: Capillary refill takes less than 2 seconds.      Findings: Bruising present.   Neurological:      Mental Status: She is alert. Mental status is at baseline.      Motor: Weakness present.      Gait: Gait abnormal.   Psychiatric:         Mood and Affect: Mood normal.         Behavior: Behavior normal.         Last Recorded Vitals  Blood pressure 108/58, pulse 76, temperature 35.9 °C (96.6 °F), temperature source Temporal, resp. rate 16, height 1.676 m (5' 5.98\"), weight 140 kg (308 lb 13.8 oz), SpO2 95 %.  Intake/Output last 3 Shifts:  I/O last 3 completed shifts:  In: 1900 (13.6 mL/kg) [P.O.:1260; IV Piggyback:640]  Out: 2000 (14.3 mL/kg) [Urine:2000 (0.4 mL/kg/hr)]  Weight: 140.1 kg     Relevant " Results  Scheduled medications  amiodarone, 200 mg, oral, TID  aspirin, 81 mg, oral, Daily  atorvastatin, 20 mg, oral, Nightly  calcium carbonate, 1,500 mg, oral, q12h  dapsone, 100 mg, oral, Daily before breakfast  [Held by provider] dilTIAZem CD, 300 mg, oral, Daily  docusate sodium, 100 mg, oral, BID  ferrous sulfate (325 mg ferrous sulfate), 65 mg of iron, oral, Daily  tiotropium, 2 Inhalation, inhalation, Daily   And  fluticasone furoate-vilanteroL, 1 puff, inhalation, Daily  furosemide, 40 mg, intravenous, q12h  gabapentin, 200 mg, oral, BID  glimepiride, 2 mg, oral, Daily with breakfast  guaiFENesin, 600 mg, oral, BID  insulin lispro, 0-5 Units, subcutaneous, TID with meals  ipratropium-albuteroL, 3 mL, nebulization, TID  [Held by provider] losartan, 12.5 mg, oral, Daily  [Held by provider] melatonin, 6 mg, oral, Daily  metFORMIN, 1,000 mg, oral, BID with meals  metoprolol tartrate, 25 mg, oral, q6h  pantoprazole, 40 mg, oral, Daily before breakfast   Or  pantoprazole, 40 mg, intravenous, Daily before breakfast  predniSONE, 20 mg, oral, Daily  topiramate, 100 mg, oral, BID  warfarin, 5 mg, oral, Daily      Continuous medications     PRN medications  PRN medications: acetaminophen **OR** acetaminophen **OR** acetaminophen, acetaminophen **OR** acetaminophen **OR** acetaminophen, albuterol, benzocaine-menthoL, bisacodyl, dextromethorphan-guaifenesin, dextrose 10 % in water (D10W), dextrose, glucagon, metoprolol, ondansetron ODT **OR** ondansetron, oxygen, oxygen    Results for orders placed or performed during the hospital encounter of 01/02/24 (from the past 24 hour(s))   POCT GLUCOSE   Result Value Ref Range    POCT Glucose 150 (H) 74 - 99 mg/dL   POCT GLUCOSE   Result Value Ref Range    POCT Glucose 321 (H) 74 - 99 mg/dL   POCT GLUCOSE   Result Value Ref Range    POCT Glucose 309 (H) 74 - 99 mg/dL   CBC   Result Value Ref Range    WBC 6.8 4.4 - 11.3 x10*3/uL    nRBC 0.0 0.0 - 0.0 /100 WBCs    RBC 2.68 (L)  4.00 - 5.20 x10*6/uL    Hemoglobin 8.4 (L) 12.0 - 16.0 g/dL    Hematocrit 29.2 (L) 36.0 - 46.0 %     (H) 80 - 100 fL    MCH 31.3 26.0 - 34.0 pg    MCHC 28.8 (L) 32.0 - 36.0 g/dL    RDW 16.2 (H) 11.5 - 14.5 %    Platelets 187 150 - 450 x10*3/uL   Basic Metabolic Panel   Result Value Ref Range    Glucose 107 (H) 74 - 99 mg/dL    Sodium 143 136 - 145 mmol/L    Potassium 3.3 (L) 3.5 - 5.3 mmol/L    Chloride 101 98 - 107 mmol/L    Bicarbonate 35 (H) 21 - 32 mmol/L    Anion Gap 10 10 - 20 mmol/L    Urea Nitrogen 32 (H) 6 - 23 mg/dL    Creatinine 1.06 (H) 0.50 - 1.05 mg/dL    eGFR 56 (L) >60 mL/min/1.73m*2    Calcium 8.7 8.6 - 10.3 mg/dL   Protime-INR   Result Value Ref Range    Protime 30.2 (H) 9.8 - 12.8 seconds    INR 2.7 (H) 0.9 - 1.1   B-type natriuretic peptide   Result Value Ref Range     (H) 0 - 99 pg/mL   POCT GLUCOSE   Result Value Ref Range    POCT Glucose 100 (H) 74 - 99 mg/dL     CT angio chest for pulmonary embolism    Result Date: 1/10/2024  Interpreted By:  Yao Torres, STUDY: CT ANGIO CHEST FOR PULMONARY EMBOLISM;  1/10/2024 3:12 pm   INDICATION: Signs/Symptoms:worsening sob.   COMPARISON: 12/26/2023   ACCESSION NUMBER(S): [id number]   ORDERING CLINICIAN: OLAF SOLIMAN   TECHNIQUE: Helical data acquisition of the chest was obtained with  75 mL Omnipaque 350. Images were reformatted in axial, coronal, and sagittal planes.MIP reformatted images were also generated.   FINDINGS: Motion degraded exam.   LUNGS and AIRWAYS: Small left and moderate right layering pleural effusions. Overlying atelectasis. Increased severe atelectasis involving the right middle lobe which is nearly collapsed. There are diffusely increased patchy areas consolidation and ground-glass opacity scattered throughout both lungs. Interlobular septal thickening is also noted in some areas. The right middle lobe bronchus is at least partially occluded. The remaining central airways otherwise appear patent. No bronchiectasis.    MEDIASTINUM and FANG, LOWER NECK AND AXILLA: The visualized thyroid gland is grossly unremarkable.   No thoracic lymphadenopathy by CT criteria.   Possible small sliding hiatal hernia.   HEART and VESSELS: Mild cardiomegaly. Dense mitral annulus calcification.   No significant pericardial effusion.   No pulmonary embolism identified on motion degraded exam. Dilatation of the main pulmonary artery to 40 mm suggests pulmonary hypertension.   Mild coronary atherosclerosis.   Thoracic aorta and great vessels are mildly atherosclerotic but otherwise patent without aneurysm.   UPPER ABDOMEN: 27 mm distal splenic artery aneurysm is probably thrombosed, not significantly changed to prior exam.   CHEST WALL and OSSEOUS STRUCTURES: No suspicious osseous lesions. Multilevel degenerative changes of the thoracic spine.       1.  No pulmonary embolism identified on motion degraded exam. 2. Pulmonary edema with right greater than left pleural effusions. 3. Near occlusion of the right middle lobe bronchus causing worsening atelectasis.     Signed by: Yao Torres 1/10/2024 3:28 PM Dictation workstation:   [id number]    ECG 12 lead    Result Date: 1/9/2024  Atrial fibrillation with rapid ventricular response Low voltage QRS Cannot rule out Anterior infarct , age undetermined Abnormal ECG When compared with ECG of 07-JAN-2024 00:28, (unconfirmed) No significant change was found    ECG 12 lead    Result Date: 1/9/2024  Atrial fibrillation with rapid ventricular response Abnormal ECG When compared with ECG of 02-JAN-2024 17:57, No significant change was found    ECG 12 lead    Result Date: 1/8/2024  Sinus rhythm with Premature atrial complexes with Aberrant conduction Cannot rule out Anterior infarct (cited on or before 02-JAN-2024) Abnormal ECG When compared with ECG of 02-JAN-2024 17:57, Sinus rhythm has replaced Atrial fibrillation    XR chest 1 view    Result Date: 1/7/2024  Interpreted By:  Mohsen Law, STUDY: XR CHEST 1  VIEW;  1/7/2024 4:04 pm   INDICATION: Signs/Symptoms:worsening pneumonia.   COMPARISON: Chest x-ray 01/02/2024   ACCESSION NUMBER(S): [id number]   ORDERING CLINICIAN: ALBERTO PICKETT   FINDINGS: Multiple overlying leads are present.   CARDIOMEDIASTINAL SILHOUETTE: Cardiomediastinal silhouette is stable in size and configuration. Dense mitral annular calcifications noted.   LUNGS: There are bilateral patchy airspace opacities with slight interval improvement in the right lung base and slight interval worsening on the left. These findings may relate to developing edema versus infection. Small bilateral pleural effusions. No pneumothorax.   ABDOMEN: No remarkable upper abdominal findings.   BONES: Degenerative changes of the spine and bilateral shoulders.       There are patchy bilateral airspace opacities with slight interval worsening on the and slight interval improvement on the right. Findings may relate to edema versus infection. Attention on continued short-term follow-up is advised.   Small bilateral pleural effusions are noted with slight interval improvement on the right.   MACRO: None   Signed by: Mohsen Law 1/7/2024 4:18 PM Dictation workstation:   [id number]    CT head wo IV contrast    Result Date: 1/7/2024  Interpreted By:  Jaxon Fernandez, STUDY: CT ANGIO HEAD AND NECK W AND WO IV CONTRAST; CT HEAD WO IV CONTRAST; 1/7/2024 1:20 am; 1/7/2024 1:10 am   INDICATION: Signs/Symptoms:Confusion.   COMPARISON: CT and CT angiogram dated 01/06/2024.   ACCESSION NUMBER(S): [id number]; [id number]   ORDERING CLINICIAN: JAC SANTOS   TECHNIQUE: Unenhanced CT images of the head were obtained. Subsequently,  75 mL Omnipaque 350 was administered intravenously and axial images of the head and neck were acquired.  Coronal, sagittal, and 3-D reconstructions were provided for review.   FINDINGS: There is a mostly calcified meningioma within the upper left parietal convexity measuring 1.7 x 1.5 cm and also  calcified meningioma measuring 1.1 x 8.2 cm overlying the inferolateral right frontal lobe. No evidence of associated vasogenic edema or mass effect. There is no evidence of midline shift, hydrocephalus, or acute intracranial hemorrhage. Gray-white matter differentiation is maintained.   There is a small amount of fluid in the caudal mastoid air cells. There is mild to moderate polypoid mucosal thickening of the left maxillary sinus and anterior ethmoid air cells. These findings are unchanged.   CTA HEAD FINDINGS:   Anterior circulation: The bilateral intracranial internal carotid arteries, bilateral carotid terminals, bilateral proximal anterior and middle cerebral arteries are normal.   Posterior circulation: Bilateral intracranial vertebral arteries, vertebrobasilar junction, basilar artery and proximal posterior cerebral arteries are normal.   Venous contamination limits evaluation for small aneurysms, without gross evidence of intracranial aneurysm.   CTA NECK FINDINGS:   Right carotid vessels: There are mild atherosclerotic calcifications of the carotid bifurcation with 0% stenosis by NASCET criteria. The remainder of the right internal cervical carotid artery is widely patent without focal stenosis.   Left carotid vessels: The common carotid artery is normal. The carotid bifurcation is normal. The internal carotid artery in the neck is normal. There is 0% stenosis by NASCET criteria. .   Vertebral vessels:  The visualized segments of the cervical vertebral arteries are normal in caliber.   There is redemonstration of multifocal nodular and ground-glass opacities in the visualized upper lung fields suspicious for multifocal pneumonia. There is also small right pleural effusion partially visualized.       No evidence of acute cortical infarct or acute intracranial hemorrhage. There are calcified meningiomas noted overlying the upper left frontoparietal convexity and overlying the inferolateral right frontal  lobe without evidence of significant mass effect or vasogenic edema. No interval change.   No evidence for significant stenosis of the cervical vessels.   No evidence for significant stenosis or large branch vessel cutoffs of the intracranial vessels.   Partially visualized ground-glass and nodular opacities in the upper lung fields suspicious for multifocal pneumonia for example viral pneumonia.   MACRO:   None   Signed by: Jaxon Fernandez 1/7/2024 2:06 AM Dictation workstation:   [id number]    CT angio head and neck w and wo IV contrast    Result Date: 1/7/2024  Interpreted By:  Jaxon Fernandez, STUDY: CT ANGIO HEAD AND NECK W AND WO IV CONTRAST; CT HEAD WO IV CONTRAST; 1/7/2024 1:20 am; 1/7/2024 1:10 am   INDICATION: Signs/Symptoms:Confusion.   COMPARISON: CT and CT angiogram dated 01/06/2024.   ACCESSION NUMBER(S): [id number]; [id number]   ORDERING CLINICIAN: JAC SANTOS   TECHNIQUE: Unenhanced CT images of the head were obtained. Subsequently,  75 mL Omnipaque 350 was administered intravenously and axial images of the head and neck were acquired.  Coronal, sagittal, and 3-D reconstructions were provided for review.   FINDINGS: There is a mostly calcified meningioma within the upper left parietal convexity measuring 1.7 x 1.5 cm and also calcified meningioma measuring 1.1 x 8.2 cm overlying the inferolateral right frontal lobe. No evidence of associated vasogenic edema or mass effect. There is no evidence of midline shift, hydrocephalus, or acute intracranial hemorrhage. Gray-white matter differentiation is maintained.   There is a small amount of fluid in the caudal mastoid air cells. There is mild to moderate polypoid mucosal thickening of the left maxillary sinus and anterior ethmoid air cells. These findings are unchanged.   CTA HEAD FINDINGS:   Anterior circulation: The bilateral intracranial internal carotid arteries, bilateral carotid terminals, bilateral proximal anterior and middle cerebral  arteries are normal.   Posterior circulation: Bilateral intracranial vertebral arteries, vertebrobasilar junction, basilar artery and proximal posterior cerebral arteries are normal.   Venous contamination limits evaluation for small aneurysms, without gross evidence of intracranial aneurysm.   CTA NECK FINDINGS:   Right carotid vessels: There are mild atherosclerotic calcifications of the carotid bifurcation with 0% stenosis by NASCET criteria. The remainder of the right internal cervical carotid artery is widely patent without focal stenosis.   Left carotid vessels: The common carotid artery is normal. The carotid bifurcation is normal. The internal carotid artery in the neck is normal. There is 0% stenosis by NASCET criteria. .   Vertebral vessels:  The visualized segments of the cervical vertebral arteries are normal in caliber.   There is redemonstration of multifocal nodular and ground-glass opacities in the visualized upper lung fields suspicious for multifocal pneumonia. There is also small right pleural effusion partially visualized.       No evidence of acute cortical infarct or acute intracranial hemorrhage. There are calcified meningiomas noted overlying the upper left frontoparietal convexity and overlying the inferolateral right frontal lobe without evidence of significant mass effect or vasogenic edema. No interval change.   No evidence for significant stenosis of the cervical vessels.   No evidence for significant stenosis or large branch vessel cutoffs of the intracranial vessels.   Partially visualized ground-glass and nodular opacities in the upper lung fields suspicious for multifocal pneumonia for example viral pneumonia.   MACRO:   None   Signed by: Jaxon Fernandez 1/7/2024 2:06 AM Dictation workstation:   [id number]    CT angio brain attack head w IV contrast and post procedure    Result Date: 1/6/2024  Interpreted By:  Jazmyne Teresa, STUDY: CT ANGIO BRAIN ATTACK HEAD W IV CONTRAST AND POST  PROCEDURE; CT ANGIO BRAIN ATTACK NECK W IV CONTRAST AND POST PROCEDURE;  1/6/2024 5:16 pm   INDICATION: Signs/Symptoms:AMS.   COMPARISON: None.   ACCESSION NUMBER(S): [id number]; [id number]   ORDERING CLINICIAN: LYNN FLORES   TECHNIQUE: 75 mL Omnipaque 350 was administered intravenously and axial images of the head and neck were acquired.  Coronal, sagittal, and 3-D reconstructions were provided for review.   The technologist notes the patient had multiple IV malfunctions.   FINDINGS: CT head: Please see CT head performed the same day for intracranial findings.   CTA HEAD FINDINGS:   The examination is degraded by venous contamination.   Anterior circulation: The bilateral intracranial internal carotid arteries, bilateral carotid terminals, bilateral proximal anterior and middle cerebral arteries are patent.   Posterior circulation: Bilateral intracranial vertebral arteries, vertebrobasilar junction, basilar artery and proximal posterior cerebral arteries are patent.   CTA NECK FINDINGS:   The examination is limited by the timing of scan relative to the contrast injection.   Mild atherosclerotic calcification along the aortic arch. Within the limitation of patient motion artifact no significant stenosis at the origins of the great vessels.   Right carotid vessels: The common carotid artery is patent. Mild calcified plaque at the carotid bifurcation without significant stenosis by NASCET criteria. The internal carotid artery in the neck is patent without significant stenosis.   Left carotid vessels: The common carotid artery is patent.. The carotid bifurcation is patent without significant stenosis. The internal carotid artery in the neck is patent without significant stenosis.   Vertebral vessels:  The visualized segments of the cervical vertebral arteries are patent.   The left thyroid lobe is slightly asymmetrically larger than the right with a calcification. Please see thyroid ultrasound 07/24/2023 for  further details. Incidental note of multiple tonsilloliths. There is prominent ossification of the stylohyoid ligament bilaterally. Soft tissues of the neck are otherwise unremarkable.   There are patchy opacities within the visualized upper lungs bilaterally concerning for multifocal pneumonia. There is a small pleural effusion on the right. Mild degenerative changes of the cervical spine.       The examination is degraded by venous contamination, patient motion artifact and difficulties with the IV. Within this limitation:   CTA neck:   No evidence for significant stenosis of the cervical vessels.   Patchy opacities within the upper lungs bilaterally concerning for multifocal pneumonia. There is a small pleural effusion on the right.   CTA head:   No evidence for significant stenosis or large branch vessel cutoffs of the intracranial vessels.   Please see CT head performed the same day for intracranial findings.   MACRO: None   Signed by: Jazmyne Teresa 1/6/2024 5:34 PM Dictation workstation:   [id number]    CT angio brain attack neck w IV contrast and post procedure    Result Date: 1/6/2024  Interpreted By:  Jazmyne Teresa, STUDY: CT ANGIO BRAIN ATTACK HEAD W IV CONTRAST AND POST PROCEDURE; CT ANGIO BRAIN ATTACK NECK W IV CONTRAST AND POST PROCEDURE;  1/6/2024 5:16 pm   INDICATION: Signs/Symptoms:AMS.   COMPARISON: None.   ACCESSION NUMBER(S): [id number]; [id number]   ORDERING CLINICIAN: LYNN FLORES   TECHNIQUE: 75 mL Omnipaque 350 was administered intravenously and axial images of the head and neck were acquired.  Coronal, sagittal, and 3-D reconstructions were provided for review.   The technologist notes the patient had multiple IV malfunctions.   FINDINGS: CT head: Please see CT head performed the same day for intracranial findings.   CTA HEAD FINDINGS:   The examination is degraded by venous contamination.   Anterior circulation: The bilateral intracranial internal carotid arteries, bilateral carotid  terminals, bilateral proximal anterior and middle cerebral arteries are patent.   Posterior circulation: Bilateral intracranial vertebral arteries, vertebrobasilar junction, basilar artery and proximal posterior cerebral arteries are patent.   CTA NECK FINDINGS:   The examination is limited by the timing of scan relative to the contrast injection.   Mild atherosclerotic calcification along the aortic arch. Within the limitation of patient motion artifact no significant stenosis at the origins of the great vessels.   Right carotid vessels: The common carotid artery is patent. Mild calcified plaque at the carotid bifurcation without significant stenosis by NASCET criteria. The internal carotid artery in the neck is patent without significant stenosis.   Left carotid vessels: The common carotid artery is patent.. The carotid bifurcation is patent without significant stenosis. The internal carotid artery in the neck is patent without significant stenosis.   Vertebral vessels:  The visualized segments of the cervical vertebral arteries are patent.   The left thyroid lobe is slightly asymmetrically larger than the right with a calcification. Please see thyroid ultrasound 07/24/2023 for further details. Incidental note of multiple tonsilloliths. There is prominent ossification of the stylohyoid ligament bilaterally. Soft tissues of the neck are otherwise unremarkable.   There are patchy opacities within the visualized upper lungs bilaterally concerning for multifocal pneumonia. There is a small pleural effusion on the right. Mild degenerative changes of the cervical spine.       The examination is degraded by venous contamination, patient motion artifact and difficulties with the IV. Within this limitation:   CTA neck:   No evidence for significant stenosis of the cervical vessels.   Patchy opacities within the upper lungs bilaterally concerning for multifocal pneumonia. There is a small pleural effusion on the right.    CTA head:   No evidence for significant stenosis or large branch vessel cutoffs of the intracranial vessels.   Please see CT head performed the same day for intracranial findings.   MACRO: None   Signed by: Jazmyne Teresa 1/6/2024 5:34 PM Dictation workstation:   [id number]    CT brain attack head wo IV contrast    Result Date: 1/6/2024  Interpreted By:  Richie Wren, STUDY: CT BRAIN ATTACK HEAD WO IV CONTRAST;  1/6/2024 4:37 pm   INDICATION: Signs/Symptoms:AMS.   COMPARISON: 11/18/2012   ACCESSION NUMBER(S): [id number]   ORDERING CLINICIAN: LYNN FLORES   TECHNIQUE: Noncontrast axial CT images of head were obtained with coronal and sagittal reconstructed images.   FINDINGS: BRAIN PARENCHYMA: Mild periventricular and subcortical hemispheric white matter hypodensities are most compatible with chronic small vessel ischemic disease. No acute intraparenchymal hemorrhage or parenchymal evidence of acute large territory ischemic infarct. No mass-effect. Gray-white matter distinction is preserved.   VENTRICLES and EXTRA-AXIAL SPACES: There is a 1.5 cm calcified meningioma arising from the left parasagittal frontal parietal dura. There is another 1.3 cm more densely calcified meningioma arising the right frontotemporal dura. No acute extra-axial or intraventricular hemorrhage. No effacement of cerebral sulci. Ventricles and sulci are age-concordant. There is a partial empty sella.   PARANASAL SINUSES/MASTOIDS: Trace fluid left mastoid air cells. No hemorrhage or air-fluid levels within the visualized paranasal sinuses. The mastoids are well aerated.   CALVARIUM/ORBITS:  No skull fracture.  The orbits and globes are intact to the extent visualized.   EXTRACRANIAL SOFT TISSUES: No discernible abnormality.       1. No evidence of acute intracranial hemorrhage, mass effect, or parenchymal evidence of an acute large territory ischemic infarct.   2. Calcified meningiomas rising from the left parasagittal frontal parietal  dura and right frontotemporal dura measuring 1.5 cm and 1.3 cm, respectively. No significant mass effect.     MACRO: Richie Wren discussed the significance and urgency of this critical finding by EPIC HAIKU with  LYNN FLORES on 1/6/2024 at 4:50 p.m. with confirmation of receipt. (**-RCF-**) Findings:  See findings.   Signed by: Richie Wren 1/6/2024 4:53 PM Dictation workstation:   [id number]    ECG 12 lead    Result Date: 1/3/2024  Atrial fibrillation with rapid ventricular response Low voltage QRS Cannot rule out Anterior infarct , age undetermined Abnormal ECG When compared with ECG of 13-DEC-2023 09:49, Atrial fibrillation has replaced Sinus rhythm See ED provider note for full interpretation and clinical correlation Confirmed by Jamir Salvador ([id number]) on 1/3/2024 10:49:37 PM    XR chest 1 view    Result Date: 1/2/2024  Interpreted By:  Soren Ham, STUDY: XR CHEST 1 VIEW;  1/2/2024 7:26 pm   INDICATION: Signs/Symptoms:sob.   COMPARISON: 9/26/2023   ACCESSION NUMBER(S): [id number]   ORDERING CLINICIAN: CARMEN CONKLIN   FINDINGS: The cardiac silhouette is stable in size. There are bilateral opacities concerning for infiltrates. Small right pleural effusion and basilar atelectasis or airspace disease. No pneumothorax.       Small right pleural effusion and basilar atelectasis or airspace disease and mild bilateral opacities concerning for infiltrates.   MACRO: None   Signed by: Soren Ham 1/2/2024 7:38 PM Dictation workstation:   [id number]    CT chest wo IV contrast    Result Date: 12/26/2023  Interpreted By:  Richie Wren, STUDY: CT CHEST WO IV CONTRAST;  12/26/2023 6:03 pm   INDICATION: Signs/Symptoms:cough.   COMPARISON: 09/22/2023 CT chest   ACCESSION NUMBER(S): [id number]   ORDERING CLINICIAN: JANNET VEE   TECHNIQUE: Contiguous axial images of the chest and upper abdomen were obtained without contrast. Coronal and sagittal reformatted images were reconstructed from the axial  data.   FINDINGS:     MEDIASTINUM AND LYMPH NODES:  The esophagus appears within normal limits.  No enlarged intrathoracic or axillary lymph nodes by imaging criteria. No pneumomediastinum.   VESSELS:  Normal caliber thoracic aorta. Mild aortic atherosclerosis. The pulmonary artery is enlarged, measuring up to 3.6 cm, indicative of pulmonary hypertension.   HEART: There is left atrial dilatation. Mitral annular calcifications. Mild coronary artery calcifications. No significant pericardial effusion.   LUNG, AIRWAYS, AND PLEURA: New small bilateral pleural effusions with adjacent passive atelectasis. There is new subsegmental atelectasis in the right middle lobe. There is a small amount debris in the lower trachea extending into the mainstem bronchi and bronchus intermedius. There is a small opacity in the left upper lobe and superior segment of left lower lobe consisting of tree-in-bud nodules suspicious for pneumonia the   OSSEOUS STRUCTURES: No acute osseous abnormality.   CHEST WALL SOFT TISSUES: No discernible abnormality.   UPPER ABDOMEN/OTHER: Multiple peripherally calcified splenic artery aneurysm measuring 1.3 cm, 1.3 cm, and 2.8 cm are similar to prior study. The liver appears cirrhotic.       1. Tree-in-bud opacities in the posterior left upper lobe and superior segment of the left lower lobe consistent with bronchiolitis/early pneumonia.   2. Small bilateral pleural effusions with adjacent passive atelectasis and right middle lobe subsegmental atelectasis.   3. Small amount of debris in the trachea and bilateral proximal bronchi that could be secretions or aspiration.   4. Three splenic artery aneurysm measuring up to 2.8 cm, unchanged.   MACRO: None.   Signed by: Richie Wren 12/26/2023 6:29 PM Dictation workstation:   [id number]       Assessment/Plan   Principal Problem:    RSV (respiratory syncytial virus infection)  Active Problems:    Shortness of breath    #Encephalopathy 2/2 infection vs. AF  RVR (resolved)   - RRT called overnight 1/7 for change in mental status; patient was previously A&O x 3 but became confused and unable to tell staff the year or her birthday  - CT head and CTA head/neck showed no acute infarct or hemorrhage; no significant stenosis of the vessels. Calcified meningiomas noted without evidence of mass effect or vasogenic edema (noted to be no interval change)  - NIHSS 0 today  - Patient cardioverted 1/8, remains in AF with rate controlled in the 90s   - Antibiotics escalated for increasing O2 requirement; patient now on Vancomycin (MRSA Negative) stopped and Zosyn (Day 7) completed  - Medications reviewed with attending, Solumedrol IV changed to Prednisone 40 PO > Now 20 mg PO   - Hold melatonin; gabapentin decreased to 200 mg BID   - Neuro checks q4h; discontinue      #Acute on chronic hypoxic respiratory failure 2/2 RSV, community acquired pneumonia, improving   #COPD, in acute exacerbation  - Patient was seen in [facility] 12/26, discharged on dexamethasone and doxycycline  - WBC 13.9 >> 5.3 > 11.4 > 10.8 > 9.7 > 7.1 > 6.8  - Lactate 1.9  - COVID, flu A/B negative  - RSV positive  - Procal 0.75  - BCx no growth- final report   - sputum culture contained significant salivary contamination; repeat pending   - CXR: Small right pleural effusion and basilar atelectasis or airspace disease and mild bilateral opacities concerning for infiltrates.    - CT chest (1/10): 1.  No pulmonary embolism identified on motion degraded exam.  2. Pulmonary edema with right greater than left pleural effusions.  3. Near occlusion of the right middle lobe bronchus causing worsening  atelectasis.  - Start metaneb, continue mucinex  - Tylenol PRN for fever   - Mucinex BID, Robitussin DM q4h PRN for cough   - DuoNebs TID    - Breo and Spiriva ordered (pharmacy substitute for home Trelegy)  - RT eval and treat protocol  - Bronchial hygiene  - Isolation protocol for RSV for 10 days, discontinue isolation   -  Baseline O2 5L continuously   - Wean O2 as tolerated; patient currently requiring 10 L [place]   - Solumedrol 40 mg IVP q8h changed to prednisone 40 mg PO every day due to encephalopathy   - CXR 1/8: There are patchy bilateral airspace opacities with slight interval   worsening on the and slight interval improvement on the right. Findings may relate to edema versus infection. Attention on continued short-term follow-up is advised. Small bilateral pleural effusions are noted with slight interval improvement on the right.   - ID consulted for worsening pneumonia, appreciate recs   -CXR Ordered  -  - Lasix 80 mg PO Daily >  40 mg IV BID  - CT Chest  - If effusion remaining or enlarging, consult Dr. Joseph for possible thoracentesis.       #Atrial fibrillation with RVR, s/p cardioversion   #HTN  #HLD  #HFpEF, concern for acute exacerbation  #Supratherapeutic INR on warfarin   - Recent D[facility]V on 12/13 at [facility], follows with Dr. Siegel   - Trop 7 > 7   - >285>368 > 243 > 268  - Telemetry monitoring- notified by charge RN this morning that patient's HR has been consistently in the 120s-130s on telemetry. Upon reviewing the flowsheet documentation for 1/5-1/6, only two instances with HR > 110 bpm are charted  - Patient was transferred to ICU overnight and placed on a diltiazem drip for HR persistently in the 130s  - Cardioversion completed today; patient remains in AF with rate controlled in the 90s   - Start amiodarone 200 mg PO TID x 7 days per cardiology   - Lopressor 5 mg IVP q4h PRN for HR > 120 bpm sustained for > 5 mins   - Continue aspirin, atorvastatin, furosemide, and losartan  - INR 2.3 > 3.0 > 3.8 > 3.1 > 2.2 > 2.7  - Warfarin on hold; restart warfarin   - Goal INR 2-3, daily PT/INR while inpatient   - Strict I&Os: LOS - 9471.4  - Daily weights  - 2g Na diet, 1800 mL FR   - Cardiology consulted, appreciate recs   - Patient back into afib with RVR on 1/10  - Cardiology added metop TID for rate  control     #Hematuria  - UA: 3+ blood, > 20 RBCs, 1+ bacteria  - UCx with no significant growth   - Patient denies gross blood in urine or difficulties with urination   - Monitor UOP for blood - none reported as of 1/8  - Trend CBC; H/H stable 8.4/29.2     #T2DM with neuropathy   - Continue Lantus, metformin and glimepiride   - Gabapentin decreased to 200 mg BID due to change in mental status  - SSI three times a day before meals while on steroids   - Hypoglycemia protocol  - Accuchecks ac/hs/prn  - Diabetic diet   - HbA1c 4.6      #Anemia, macrocytic  - H/H stable 8.3/29.2 > 8.4/29.2 with MCV  111 > 109  - B12 level was 267 last month per chart review   - Folate 6.2   - Iron studies: Fe 28, TIBC 272, 10% saturation  - Patient receives monthly B12 injections and is on ferrous sulfate; continue   - Monitor for active bleeding  - Daily CBC to trend H/H     #History of mucous membrane pemphigoid  - Continue dapsone  - Resume outpatient derm follow up on discharge     #Hypokalemia   - K+ 3.4 > 3.3  - K+ 40 meq given 1/13  - K+ 80 meq given 1/14       DVT ppx  -warfarin     F: PRN  E: Replete per protocol  N: ADA, Cardiac, 1800ml FR  A: PIV     Disposition: Pt requires more than 2 inpatient days at this time   Code Status: Full Code     Total accumulated time spent face to face and not face to face preparing to see the patient, obtaining and reviewing separately obtained history; performing a medically appropriate examination and/or evaluation; counseling and educating the patient, family; ordering medications, tests, or procedures; referring and communicating with other health care professionals; documenting clinical information in the patient's medical record; independently interpreting results and communicating the results to the patient, family; and care coordination was 45 minutes.       JOSELITO Ramirez-CNP

## 2024-01-15 ENCOUNTER — APPOINTMENT (OUTPATIENT)
Dept: CARDIOLOGY | Facility: CLINIC | Age: 74
End: 2024-01-15
Payer: MEDICARE

## 2024-01-15 LAB
ANION GAP SERPL CALC-SCNC: 10 MMOL/L (ref 10–20)
BNP SERPL-MCNC: 238 PG/ML (ref 0–99)
BUN SERPL-MCNC: 38 MG/DL (ref 6–23)
CALCIUM SERPL-MCNC: 9.1 MG/DL (ref 8.6–10.3)
CHLORIDE SERPL-SCNC: 101 MMOL/L (ref 98–107)
CO2 SERPL-SCNC: 37 MMOL/L (ref 21–32)
CREAT SERPL-MCNC: 1.02 MG/DL (ref 0.5–1.05)
EGFRCR SERPLBLD CKD-EPI 2021: 59 ML/MIN/1.73M*2
ERYTHROCYTE [DISTWIDTH] IN BLOOD BY AUTOMATED COUNT: 16.2 % (ref 11.5–14.5)
GLUCOSE BLD MANUAL STRIP-MCNC: 175 MG/DL (ref 74–99)
GLUCOSE BLD MANUAL STRIP-MCNC: 194 MG/DL (ref 74–99)
GLUCOSE BLD MANUAL STRIP-MCNC: 293 MG/DL (ref 74–99)
GLUCOSE BLD MANUAL STRIP-MCNC: 74 MG/DL (ref 74–99)
GLUCOSE SERPL-MCNC: 83 MG/DL (ref 74–99)
HCT VFR BLD AUTO: 29.1 % (ref 36–46)
HGB BLD-MCNC: 8.4 G/DL (ref 12–16)
INR PPP: 3.2 (ref 0.9–1.1)
INR PPP: 3.4 (ref 0.9–1.1)
MCH RBC QN AUTO: 31.6 PG (ref 26–34)
MCHC RBC AUTO-ENTMCNC: 28.9 G/DL (ref 32–36)
MCV RBC AUTO: 109 FL (ref 80–100)
NRBC BLD-RTO: 0 /100 WBCS (ref 0–0)
PLATELET # BLD AUTO: 193 X10*3/UL (ref 150–450)
POTASSIUM SERPL-SCNC: 3.8 MMOL/L (ref 3.5–5.3)
PROTHROMBIN TIME: 36.8 SECONDS (ref 9.8–12.8)
PROTHROMBIN TIME: 39.4 SECONDS (ref 9.8–12.8)
RBC # BLD AUTO: 2.66 X10*6/UL (ref 4–5.2)
SODIUM SERPL-SCNC: 144 MMOL/L (ref 136–145)
WBC # BLD AUTO: 7.3 X10*3/UL (ref 4.4–11.3)

## 2024-01-15 PROCEDURE — 94640 AIRWAY INHALATION TREATMENT: CPT | Mod: IPSPLIT

## 2024-01-15 PROCEDURE — 97110 THERAPEUTIC EXERCISES: CPT | Mod: GP,CQ,IPSPLIT

## 2024-01-15 PROCEDURE — 2500000004 HC RX 250 GENERAL PHARMACY W/ HCPCS (ALT 636 FOR OP/ED): Mod: IPSPLIT

## 2024-01-15 PROCEDURE — 2500000005 HC RX 250 GENERAL PHARMACY W/O HCPCS: Mod: IPSPLIT | Performed by: NURSE PRACTITIONER

## 2024-01-15 PROCEDURE — 2500000001 HC RX 250 WO HCPCS SELF ADMINISTERED DRUGS (ALT 637 FOR MEDICARE OP): Mod: IPSPLIT

## 2024-01-15 PROCEDURE — 1200000002 HC GENERAL ROOM WITH TELEMETRY DAILY: Mod: IPSPLIT

## 2024-01-15 PROCEDURE — 85027 COMPLETE CBC AUTOMATED: CPT | Mod: IPSPLIT

## 2024-01-15 PROCEDURE — 36415 COLL VENOUS BLD VENIPUNCTURE: CPT | Mod: IPSPLIT

## 2024-01-15 PROCEDURE — 97116 GAIT TRAINING THERAPY: CPT | Mod: GP,CQ,IPSPLIT

## 2024-01-15 PROCEDURE — 94668 MNPJ CHEST WALL SBSQ: CPT | Mod: IPSPLIT

## 2024-01-15 PROCEDURE — 85610 PROTHROMBIN TIME: CPT | Mod: IPSPLIT

## 2024-01-15 PROCEDURE — 2500000002 HC RX 250 W HCPCS SELF ADMINISTERED DRUGS (ALT 637 FOR MEDICARE OP, ALT 636 FOR OP/ED): Mod: IPSPLIT

## 2024-01-15 PROCEDURE — 2500000002 HC RX 250 W HCPCS SELF ADMINISTERED DRUGS (ALT 637 FOR MEDICARE OP, ALT 636 FOR OP/ED): Mod: IPSPLIT | Performed by: NURSE PRACTITIONER

## 2024-01-15 PROCEDURE — 80048 BASIC METABOLIC PNL TOTAL CA: CPT | Mod: IPSPLIT

## 2024-01-15 PROCEDURE — 9420000001 HC RT PATIENT EDUCATION 5 MIN: Mod: IPSPLIT

## 2024-01-15 PROCEDURE — 99233 SBSQ HOSP IP/OBS HIGH 50: CPT | Performed by: INTERNAL MEDICINE

## 2024-01-15 PROCEDURE — 83880 ASSAY OF NATRIURETIC PEPTIDE: CPT | Mod: IPSPLIT

## 2024-01-15 PROCEDURE — 94640 AIRWAY INHALATION TREATMENT: CPT

## 2024-01-15 PROCEDURE — 82947 ASSAY GLUCOSE BLOOD QUANT: CPT | Mod: IPSPLIT

## 2024-01-15 RX ORDER — FUROSEMIDE 10 MG/ML
20 INJECTION INTRAMUSCULAR; INTRAVENOUS EVERY 12 HOURS
Status: DISCONTINUED | OUTPATIENT
Start: 2024-01-15 | End: 2024-01-16

## 2024-01-15 RX ADMIN — FUROSEMIDE 20 MG: 10 INJECTION, SOLUTION INTRAVENOUS at 08:18

## 2024-01-15 RX ADMIN — METOPROLOL TARTRATE 25 MG: 25 TABLET, FILM COATED ORAL at 23:00

## 2024-01-15 RX ADMIN — TOPIRAMATE 100 MG: 100 TABLET, FILM COATED ORAL at 21:25

## 2024-01-15 RX ADMIN — METFORMIN HYDROCHLORIDE 1000 MG: 500 TABLET ORAL at 08:20

## 2024-01-15 RX ADMIN — METOPROLOL TARTRATE 25 MG: 25 TABLET, FILM COATED ORAL at 10:05

## 2024-01-15 RX ADMIN — GUAIFENESIN 600 MG: 600 TABLET, EXTENDED RELEASE ORAL at 21:26

## 2024-01-15 RX ADMIN — POTASSIUM CHLORIDE 40 MEQ: 1500 TABLET, EXTENDED RELEASE ORAL at 08:19

## 2024-01-15 RX ADMIN — DOCUSATE SODIUM 100 MG: 100 CAPSULE, LIQUID FILLED ORAL at 21:25

## 2024-01-15 RX ADMIN — CALCIUM CARBONATE (ANTACID) CHEW TAB 500 MG 1500 MG: 500 CHEW TAB at 08:26

## 2024-01-15 RX ADMIN — FUROSEMIDE 20 MG: 10 INJECTION, SOLUTION INTRAVENOUS at 21:25

## 2024-01-15 RX ADMIN — POTASSIUM CHLORIDE 40 MEQ: 1500 TABLET, EXTENDED RELEASE ORAL at 21:25

## 2024-01-15 RX ADMIN — IPRATROPIUM BROMIDE AND ALBUTEROL SULFATE 3 ML: .5; 3 SOLUTION RESPIRATORY (INHALATION) at 16:22

## 2024-01-15 RX ADMIN — DOCUSATE SODIUM 100 MG: 100 CAPSULE, LIQUID FILLED ORAL at 08:19

## 2024-01-15 RX ADMIN — IPRATROPIUM BROMIDE AND ALBUTEROL SULFATE 3 ML: .5; 3 SOLUTION RESPIRATORY (INHALATION) at 10:28

## 2024-01-15 RX ADMIN — ATORVASTATIN CALCIUM 20 MG: 10 TABLET, FILM COATED ORAL at 21:25

## 2024-01-15 RX ADMIN — DAPSONE 100 MG: 25 TABLET ORAL at 06:40

## 2024-01-15 RX ADMIN — Medication 8 L/MIN: at 10:30

## 2024-01-15 RX ADMIN — INSULIN LISPRO 3 UNITS: 100 INJECTION, SOLUTION INTRAVENOUS; SUBCUTANEOUS at 17:57

## 2024-01-15 RX ADMIN — METFORMIN HYDROCHLORIDE 1000 MG: 500 TABLET ORAL at 16:00

## 2024-01-15 RX ADMIN — GABAPENTIN 200 MG: 100 CAPSULE ORAL at 21:24

## 2024-01-15 RX ADMIN — ASPIRIN 81 MG: 81 TABLET, COATED ORAL at 08:19

## 2024-01-15 RX ADMIN — AMIODARONE HYDROCHLORIDE 200 MG: 200 TABLET ORAL at 10:05

## 2024-01-15 RX ADMIN — METOPROLOL TARTRATE 25 MG: 25 TABLET, FILM COATED ORAL at 15:57

## 2024-01-15 RX ADMIN — IPRATROPIUM BROMIDE AND ALBUTEROL SULFATE 3 ML: .5; 3 SOLUTION RESPIRATORY (INHALATION) at 20:58

## 2024-01-15 RX ADMIN — PREDNISONE 20 MG: 20 TABLET ORAL at 08:20

## 2024-01-15 RX ADMIN — FLUTICASONE FUROATE AND VILANTEROL TRIFENATATE 1 PUFF: 200; 25 POWDER RESPIRATORY (INHALATION) at 10:46

## 2024-01-15 RX ADMIN — GLIMEPIRIDE 2 MG: 2 TABLET ORAL at 08:20

## 2024-01-15 RX ADMIN — FERROUS SULFATE TAB 325 MG (65 MG ELEMENTAL FE) 1 TABLET: 325 (65 FE) TAB at 08:20

## 2024-01-15 RX ADMIN — CALCIUM CARBONATE (ANTACID) CHEW TAB 500 MG 1500 MG: 500 CHEW TAB at 21:25

## 2024-01-15 RX ADMIN — TIOTROPIUM BROMIDE INHALATION SPRAY 2 PUFF: 3.12 SPRAY, METERED RESPIRATORY (INHALATION) at 10:45

## 2024-01-15 RX ADMIN — TOPIRAMATE 100 MG: 100 TABLET, FILM COATED ORAL at 08:19

## 2024-01-15 RX ADMIN — INSULIN LISPRO 1 UNITS: 100 INJECTION, SOLUTION INTRAVENOUS; SUBCUTANEOUS at 13:30

## 2024-01-15 RX ADMIN — AMIODARONE HYDROCHLORIDE 200 MG: 200 TABLET ORAL at 23:02

## 2024-01-15 RX ADMIN — GABAPENTIN 200 MG: 100 CAPSULE ORAL at 08:19

## 2024-01-15 RX ADMIN — METOPROLOL TARTRATE 25 MG: 25 TABLET, FILM COATED ORAL at 04:42

## 2024-01-15 RX ADMIN — AMIODARONE HYDROCHLORIDE 200 MG: 200 TABLET ORAL at 16:00

## 2024-01-15 RX ADMIN — GUAIFENESIN 600 MG: 600 TABLET, EXTENDED RELEASE ORAL at 08:19

## 2024-01-15 RX ADMIN — PANTOPRAZOLE SODIUM 40 MG: 40 TABLET, DELAYED RELEASE ORAL at 06:40

## 2024-01-15 RX ADMIN — Medication 5 L/MIN: at 16:21

## 2024-01-15 ASSESSMENT — PAIN - FUNCTIONAL ASSESSMENT
PAIN_FUNCTIONAL_ASSESSMENT: 0-10

## 2024-01-15 ASSESSMENT — COGNITIVE AND FUNCTIONAL STATUS - GENERAL
MOBILITY SCORE: 13
CLIMB 3 TO 5 STEPS WITH RAILING: TOTAL
MOVING FROM LYING ON BACK TO SITTING ON SIDE OF FLAT BED WITH BEDRAILS: A LOT
TURNING FROM BACK TO SIDE WHILE IN FLAT BAD: A LOT
STANDING UP FROM CHAIR USING ARMS: A LITTLE
MOVING TO AND FROM BED TO CHAIR: A LOT
WALKING IN HOSPITAL ROOM: A LITTLE

## 2024-01-15 ASSESSMENT — PAIN SCALES - GENERAL
PAINLEVEL_OUTOF10: 0 - NO PAIN

## 2024-01-15 NOTE — PROGRESS NOTES
Physical Therapy    Physical Therapy Treatment    Patient Name: Frannie Blanco  MRN: [id number]  Today's Date: 1/15/2024  Time Calculation  Start Time: 0936  Stop Time: 1000  Time Calculation (min): 24 min       Assessment/Plan   PT Assessment  PT Assessment Results: Decreased strength, Decreased range of motion, Decreased endurance, Decreased mobility, Obesity  End of Session Communication: Bedside nurse, PCT/NA/CTA  End of Session Patient Position: Up in chair, Alarm off, not on at start of session  PT Plan  Inpatient/Swing Bed or Outpatient: Inpatient  PT Plan  Treatment/Interventions: Transfer training, Gait training, Therapeutic exercise  PT Plan: Skilled PT  PT Frequency: 3 times per week  PT Discharge Recommendations: Moderate intensity level of continued care  PT Recommended Transfer Status: Assist x1  PT - OK to Discharge: Yes      General Visit Information:   PT  Visit  PT Received On: 01/15/24     Precautions:  Precautions  Medical Precautions: Fall precautions    Objective   Pain:  Pain Assessment  Pain Assessment: 0-10  Pain Score: 0 - No pain  Cognition:  Cognition  Overall Cognitive Status: Within Functional Limits    Treatments:  Therapeutic Exercise  Therapeutic Exercise Performed: Yes  Therapeutic Exercise Activity 1: laqs x 20  Therapeutic Exercise Activity 2: hip flexion seated x 20 with vcs requirec to use full available ROM  Therapeutic Exercise Activity 3: iso hip abduction/adduction x 2-  Therapeutic Exercise Activity 4: ankle pumps x 20    Ambulation/Gait Training  Ambulation/Gait Training Performed: Yes  Ambulation/Gait Training 1  Surface 1: Level tile  Device 1: Rolling walker  Gait Support Devices: Gait belt  Assistance 1: Minimal verbal cues, Minimum assistance (Vcs required to maintain erect posture, min assist required due to weakness and fatigue. Patient able to demonstrate improved endurance with increased ambulation tolerance this session.)  Quality of Gait 1: Decreased step  length, Forward flexed posture, Antalgic  Comments/Distance (ft) 1: 10x2  Transfers  Transfer: Yes  Transfer 1  Technique 1: Sit to stand, Stand to sit  Transfer Device 1: Walker, Gait belt  Transfer Level of Assistance 1: Minimum assistance (Min assist required for push off and for eccentric control with stand to sit.)    Outcome Measures:  [facility] Basic Mobility  Turning from your back to your side while in a flat bed without using bedrails: A lot  Moving from lying on your back to sitting on the side of a flat bed without using bedrails: A lot  Moving to and from bed to chair (including a wheelchair): A lot  Standing up from a chair using your arms (e.g. wheelchair or bedside chair): A little  To walk in hospital room: A little  Climbing 3-5 steps with railing: Total  Basic Mobility - Total Score: 13    Education Documentation  Home Exercise Program, taught by Soledad Dennis PTA at 1/15/2024 10:57 AM.  Learner: Patient  Readiness: Acceptance  Method: Explanation  Response: Demonstrated Understanding  Comment: Educated patient on LE strengthening exercises, patient able to demonstrate good understanding with vcs.    Mobility Training, taught by Soledad Dennis PTA at 1/15/2024 10:57 AM.  Learner: Patient  Readiness: Acceptance  Method: Explanation  Response: Demonstrated Understanding  Comment: Educated patient on LE strengthening exercises, patient able to demonstrate good understanding with vcs.    Education Comments  No comments found.      Encounter Problems       Encounter Problems (Active)       Balance       STG - Maintains dynamic standing balance without upper extremity support >=5 min  (Progressing)       Start:  01/04/24    Expected End:  01/18/24               Mobility       LTG - Patient will ambulate household distance with WW Leela  (Progressing)       Start:  01/04/24    Expected End:  01/18/24            LTG - Patient will navigate 4 steps with 1 rail and cane with CGA (Progressing)        Start:  01/04/24    Expected End:  01/18/24               Pain - Adult          Transfers       STG - Patient will perform bed mobility with SBA  (Progressing)       Start:  01/04/24    Expected End:  01/18/24            STG - Patient will transfer sit to and from stand Leela with WW  (Progressing)       Start:  01/04/24    Expected End:  01/18/24

## 2024-01-15 NOTE — CARE PLAN
The patient's goals for the shift include increase activity this shift    The clinical goals for the shift include Patient to tolerate weaning of oxygen this shiftPatient tolerated 8L hi na without desaturation this shift. She rested well in her air bed that rotates her and kept dry using a purewick. Telemetry was afib with a controlled rate.

## 2024-01-15 NOTE — PROGRESS NOTES
Met with patient at bedside to discuss discharge planning, patient states that she does not want to admit to [facility].  [facility] is facility of choice.   Referral updated and sent to [facility] for review, awaiting response.   No precert needed.    UPDATE 1252:  Discussed plan of care in interdisciplinary rounds,  patient is currently on 8LNC, baseline is 5LNC.  Continue IV diuretics and wean oxygen as tolerated.      SNF referral reviewed, [facility] is still reviewing.  [facility] is able to accept.   [facility] will continue to follow for discharge planning.      UPDATE 1511:  [facility] is willing and able to accept if patient is able to be weaned to baseline 5LNC, [facility] will continue to follow for changes in discharge planning needs.     Discharge plan NOT secure  DO NOT DC without speaking to care coordination   MD will need to sign/certify the [place] [facility] for skilled rehab prior to discharge

## 2024-01-15 NOTE — PROGRESS NOTES
"Frannie Blanco is a 73 y.o. female on day 11 of admission presenting with RSV (respiratory syncytial virus infection).    Subjective   Patient sitting in bed eating, continues to have SOB but does report it is improving.  Patient now on 8L NC, decreased from 10L NC, will continue to decrease as tolerated.  Patient agrees with continued treatment plan, will discharge to SNF ([facility]) once medically stable.        Objective     Physical Exam  Constitutional:       Appearance: She is obese.   HENT:      Head: Normocephalic and atraumatic.      Nose: Nose normal.      Mouth/Throat:      Mouth: Mucous membranes are moist.   Eyes:      Extraocular Movements: Extraocular movements intact.   Cardiovascular:      Rate and Rhythm: Normal rate and regular rhythm.      Pulses: Normal pulses.      Heart sounds: Normal heart sounds.   Pulmonary:      Comments: Diminished Bases- Increased respirations  Abdominal:      General: Bowel sounds are normal.      Palpations: Abdomen is soft.   Musculoskeletal:      Cervical back: Normal range of motion.      Right lower leg: Edema present.      Left lower leg: Edema present.      Comments: Non pitting   Skin:     General: Skin is warm and dry.      Capillary Refill: Capillary refill takes less than 2 seconds.   Neurological:      Mental Status: She is alert. Mental status is at baseline.      Motor: Weakness present.      Gait: Gait abnormal.   Psychiatric:         Mood and Affect: Mood normal.         Behavior: Behavior normal.         Last Recorded Vitals  Blood pressure 123/72, pulse 93, temperature 36.1 °C (97 °F), temperature source Temporal, resp. rate 18, height 1.676 m (5' 5.98\"), weight 140 kg (308 lb 13.8 oz), SpO2 94 %.  Intake/Output last 3 Shifts:  I/O last 3 completed shifts:  In: 1860 (13.3 mL/kg) [P.O.:1620; IV Piggyback:240]  Out: 2350 (16.8 mL/kg) [Urine:2350 (0.5 mL/kg/hr)]  Weight: 140.1 kg     Relevant Results  Scheduled medications  amiodarone, 200 mg, " oral, TID  aspirin, 81 mg, oral, Daily  atorvastatin, 20 mg, oral, Nightly  calcium carbonate, 1,500 mg, oral, q12h  dapsone, 100 mg, oral, Daily before breakfast  [Held by provider] dilTIAZem CD, 300 mg, oral, Daily  docusate sodium, 100 mg, oral, BID  ferrous sulfate (325 mg ferrous sulfate), 65 mg of iron, oral, Daily  tiotropium, 2 Inhalation, inhalation, Daily   And  fluticasone furoate-vilanteroL, 1 puff, inhalation, Daily  furosemide, 20 mg, intravenous, q12h  gabapentin, 200 mg, oral, BID  glimepiride, 2 mg, oral, Daily with breakfast  guaiFENesin, 600 mg, oral, BID  insulin lispro, 0-5 Units, subcutaneous, TID with meals  ipratropium-albuteroL, 3 mL, nebulization, TID  [Held by provider] losartan, 12.5 mg, oral, Daily  [Held by provider] melatonin, 6 mg, oral, Daily  metFORMIN, 1,000 mg, oral, BID with meals  metoprolol tartrate, 25 mg, oral, q6h  pantoprazole, 40 mg, oral, Daily before breakfast   Or  pantoprazole, 40 mg, intravenous, Daily before breakfast  potassium chloride CR, 40 mEq, oral, BID  predniSONE, 20 mg, oral, Daily  topiramate, 100 mg, oral, BID  [Held by provider] warfarin, 5 mg, oral, Daily      Continuous medications     PRN medications  PRN medications: acetaminophen **OR** acetaminophen **OR** acetaminophen, acetaminophen **OR** acetaminophen **OR** acetaminophen, albuterol, benzocaine-menthoL, bisacodyl, dextromethorphan-guaifenesin, dextrose 10 % in water (D10W), dextrose, glucagon, metoprolol, ondansetron ODT **OR** ondansetron, oxygen, oxygen    Results for orders placed or performed during the hospital encounter of 01/02/24 (from the past 24 hour(s))   POCT GLUCOSE   Result Value Ref Range    POCT Glucose 249 (H) 74 - 99 mg/dL   POCT GLUCOSE   Result Value Ref Range    POCT Glucose 266 (H) 74 - 99 mg/dL   POCT GLUCOSE   Result Value Ref Range    POCT Glucose 196 (H) 74 - 99 mg/dL   CBC   Result Value Ref Range    WBC 7.3 4.4 - 11.3 x10*3/uL    nRBC 0.0 0.0 - 0.0 /100 WBCs    RBC  2.66 (L) 4.00 - 5.20 x10*6/uL    Hemoglobin 8.4 (L) 12.0 - 16.0 g/dL    Hematocrit 29.1 (L) 36.0 - 46.0 %     (H) 80 - 100 fL    MCH 31.6 26.0 - 34.0 pg    MCHC 28.9 (L) 32.0 - 36.0 g/dL    RDW 16.2 (H) 11.5 - 14.5 %    Platelets 193 150 - 450 x10*3/uL   Basic Metabolic Panel   Result Value Ref Range    Glucose 83 74 - 99 mg/dL    Sodium 144 136 - 145 mmol/L    Potassium 3.8 3.5 - 5.3 mmol/L    Chloride 101 98 - 107 mmol/L    Bicarbonate 37 (H) 21 - 32 mmol/L    Anion Gap 10 10 - 20 mmol/L    Urea Nitrogen 38 (H) 6 - 23 mg/dL    Creatinine 1.02 0.50 - 1.05 mg/dL    eGFR 59 (L) >60 mL/min/1.73m*2    Calcium 9.1 8.6 - 10.3 mg/dL   Protime-INR   Result Value Ref Range    Protime 39.4 (H) 9.8 - 12.8 seconds    INR 3.4 (H) 0.9 - 1.1   B-type natriuretic peptide   Result Value Ref Range     (H) 0 - 99 pg/mL   POCT GLUCOSE   Result Value Ref Range    POCT Glucose 74 74 - 99 mg/dL   Protime-INR   Result Value Ref Range    Protime 36.8 (H) 9.8 - 12.8 seconds    INR 3.2 (H) 0.9 - 1.1   POCT GLUCOSE   Result Value Ref Range    POCT Glucose 175 (H) 74 - 99 mg/dL     CT chest wo IV contrast    Result Date: 1/15/2024  Interpreted By:  Alanis Tirado, STUDY: CT CHEST WO IV CONTRAST;  1/14/2024 9:19 am   INDICATION: Signs/Symptoms:Increasing SOB.   COMPARISON: Chest radiograph 01/13/2024 chest CT 01/14/2024, 01/10/2024, 12/26/2023, 09/22/2023, oldest chest CT 10/25/2026   ACCESSION NUMBER(S): [id number]   ORDERING CLINICIAN: NATHALIE RUCKER   TECHNIQUE: Helical data acquisition of the chest was obtained  without IV contrast material.  Images were reformatted in axial, coronal, and sagittal planes.   FINDINGS: LUNGS and AIRWAYS: There are small bilateral pleural effusions. The right effusion is smaller There is right lower lobe infiltrate with bandlike volume loss abutting the pleural effusion. There is left lower lobe infiltrate with volume loss abutting the pleural effusion. The left lower infiltrate has progressed .  There are bilateral patchy ground-glass opacities. There has been marked improvement. The previously seen partial volume loss of the right middle lobe has improved with re-expansion. There is biapical scarring   There is no pulmonary nodule.   FANG AND MEDIASTINUM: There is no hilar mediastinal adenopathy. There is no axillary adenopathy.     HEART and VESSELS: There is atherosclerotic calcification of the thoracic aorta   There is enlargement of the main pulmonary outflow tract and right and left main pulmonary arteries which may be secondary to pulmonary artery hypertension.   There is atherosclerotic calcification of the coronary arteries the study is not optimized for evaluation of coronary arteries.   The cardiac chambers are not enlarged. There is calcification of the aortic valve   No evidence of pericardial effusion.   UPPER ABDOMEN: The visualized subdiaphragmatic structures demonstrate no remarkable findings. There is a 2.6 cm eggshell calcification in the splenic hilum. There is a 1.4 cm eggshell calcification of the splenic artery. There is a 1.1 cm eggshell calcification of the splenic artery. Findings are consistent with a calcified splenic artery aneurysms   CHEST WALL and OSSEOUS STRUCTURES: There are no suspicious osseous lesions. Multilevel degenerative changes are present.   The chest wall is unremarkable.   There is no soft tissue abnormality.       1. Small right pleural effusion decreased in size. 2. Right lower lobe infiltrate with partial volume loss, unchanged. 3. Small left pleural effusion, unchanged. 4. Progressive left lower lobe infiltrate with volume loss. 5. Marked improvement in bilateral patchy ground-glass opacities. Re-expansion right middle lobe.   MACRO: None   Signed by: Alanis Tirado 1/15/2024 9:05 AM Dictation workstation:   [id number]    XR chest 1 view    Result Date: 1/15/2024  Interpreted By:  Kamaljit Tracy, STUDY: XR CHEST 1 VIEW;  1/13/2024 4:13 pm    INDICATION: Signs/Symptoms:Incresing oxygen demand.   COMPARISON: 01/07/2024 AP chest x-ray, the 01/10/2024 thoracic CTA and the 01/14/2024 unenhanced thoracic CT.   ACCESSION NUMBER(S): [id number]   ORDERING CLINICIAN: NATHALIE RUCKER   FINDINGS: AP upright chest x-ray 01/13/2024 16:11 p.m.:       CARDIOMEDIASTINAL SILHOUETTE: Borderline heart size allowing for the AP projection. Mild vascular and pronounced mitral valve annulus calcification is noted. Coronary artery calcification demonstrated on CT can not be appreciated.   LUNGS: Small bilateral pleural effusions with adjacent basal lower lobe compression atelectasis. Widespread bilateral ground-glass pulmonary changes present on 01/07/2024 and the 01/10/2024 CTA have resolved aside from mild right perihilar and medial lower lobe residual.   ABDOMEN: Calcified splenic artery aneurysms.   BONES: No acute osseous changes.       1. Small bilateral pleural effusions with associated compression atelectasis. 2. Mild right lung ground-glass changes, much improved since 01/07/2020.       MACRO: None   Signed by: Kamaljit Tracy 1/15/2024 9:04 AM Dictation workstation:   [id number]    CT angio chest for pulmonary embolism    Result Date: 1/10/2024  Interpreted By:  Yao Torres, STUDY: CT ANGIO CHEST FOR PULMONARY EMBOLISM;  1/10/2024 3:12 pm   INDICATION: Signs/Symptoms:worsening sob.   COMPARISON: 12/26/2023   ACCESSION NUMBER(S): [id number]   ORDERING CLINICIAN: OLAF SOLIMAN   TECHNIQUE: Helical data acquisition of the chest was obtained with  75 mL Omnipaque 350. Images were reformatted in axial, coronal, and sagittal planes.MIP reformatted images were also generated.   FINDINGS: Motion degraded exam.   LUNGS and AIRWAYS: Small left and moderate right layering pleural effusions. Overlying atelectasis. Increased severe atelectasis involving the right middle lobe which is nearly collapsed. There are diffusely increased patchy areas consolidation and ground-glass  opacity scattered throughout both lungs. Interlobular septal thickening is also noted in some areas. The right middle lobe bronchus is at least partially occluded. The remaining central airways otherwise appear patent. No bronchiectasis.   MEDIASTINUM and FANG, LOWER NECK AND AXILLA: The visualized thyroid gland is grossly unremarkable.   No thoracic lymphadenopathy by CT criteria.   Possible small sliding hiatal hernia.   HEART and VESSELS: Mild cardiomegaly. Dense mitral annulus calcification.   No significant pericardial effusion.   No pulmonary embolism identified on motion degraded exam. Dilatation of the main pulmonary artery to 40 mm suggests pulmonary hypertension.   Mild coronary atherosclerosis.   Thoracic aorta and great vessels are mildly atherosclerotic but otherwise patent without aneurysm.   UPPER ABDOMEN: 27 mm distal splenic artery aneurysm is probably thrombosed, not significantly changed to prior exam.   CHEST WALL and OSSEOUS STRUCTURES: No suspicious osseous lesions. Multilevel degenerative changes of the thoracic spine.       1.  No pulmonary embolism identified on motion degraded exam. 2. Pulmonary edema with right greater than left pleural effusions. 3. Near occlusion of the right middle lobe bronchus causing worsening atelectasis.     Signed by: Yao Torres 1/10/2024 3:28 PM Dictation workstation:   [id number]    ECG 12 lead    Result Date: 1/9/2024  Atrial fibrillation with rapid ventricular response Low voltage QRS Cannot rule out Anterior infarct , age undetermined Abnormal ECG When compared with ECG of 07-JAN-2024 00:28, (unconfirmed) No significant change was found    ECG 12 lead    Result Date: 1/9/2024  Atrial fibrillation with rapid ventricular response Abnormal ECG When compared with ECG of 02-JAN-2024 17:57, No significant change was found    ECG 12 lead    Result Date: 1/8/2024  Sinus rhythm with Premature atrial complexes with Aberrant conduction Cannot rule out Anterior  infarct (cited on or before 02-JAN-2024) Abnormal ECG When compared with ECG of 02-JAN-2024 17:57, Sinus rhythm has replaced Atrial fibrillation    XR chest 1 view    Result Date: 1/7/2024  Interpreted By:  Mohsen Law, STUDY: XR CHEST 1 VIEW;  1/7/2024 4:04 pm   INDICATION: Signs/Symptoms:worsening pneumonia.   COMPARISON: Chest x-ray 01/02/2024   ACCESSION NUMBER(S): [id number]   ORDERING CLINICIAN: ALBERTO PICKETT   FINDINGS: Multiple overlying leads are present.   CARDIOMEDIASTINAL SILHOUETTE: Cardiomediastinal silhouette is stable in size and configuration. Dense mitral annular calcifications noted.   LUNGS: There are bilateral patchy airspace opacities with slight interval improvement in the right lung base and slight interval worsening on the left. These findings may relate to developing edema versus infection. Small bilateral pleural effusions. No pneumothorax.   ABDOMEN: No remarkable upper abdominal findings.   BONES: Degenerative changes of the spine and bilateral shoulders.       There are patchy bilateral airspace opacities with slight interval worsening on the and slight interval improvement on the right. Findings may relate to edema versus infection. Attention on continued short-term follow-up is advised.   Small bilateral pleural effusions are noted with slight interval improvement on the right.   MACRO: None   Signed by: Mohsen Law 1/7/2024 4:18 PM Dictation workstation:   [id number]    CT head wo IV contrast    Result Date: 1/7/2024  Interpreted By:  Jaxon Fernandez, STUDY: CT ANGIO HEAD AND NECK W AND WO IV CONTRAST; CT HEAD WO IV CONTRAST; 1/7/2024 1:20 am; 1/7/2024 1:10 am   INDICATION: Signs/Symptoms:Confusion.   COMPARISON: CT and CT angiogram dated 01/06/2024.   ACCESSION NUMBER(S): [id number]; [id number]   ORDERING CLINICIAN: JAC SANTOS   TECHNIQUE: Unenhanced CT images of the head were obtained. Subsequently,  75 mL Omnipaque 350 was administered intravenously and axial  images of the head and neck were acquired.  Coronal, sagittal, and 3-D reconstructions were provided for review.   FINDINGS: There is a mostly calcified meningioma within the upper left parietal convexity measuring 1.7 x 1.5 cm and also calcified meningioma measuring 1.1 x 8.2 cm overlying the inferolateral right frontal lobe. No evidence of associated vasogenic edema or mass effect. There is no evidence of midline shift, hydrocephalus, or acute intracranial hemorrhage. Gray-white matter differentiation is maintained.   There is a small amount of fluid in the caudal mastoid air cells. There is mild to moderate polypoid mucosal thickening of the left maxillary sinus and anterior ethmoid air cells. These findings are unchanged.   CTA HEAD FINDINGS:   Anterior circulation: The bilateral intracranial internal carotid arteries, bilateral carotid terminals, bilateral proximal anterior and middle cerebral arteries are normal.   Posterior circulation: Bilateral intracranial vertebral arteries, vertebrobasilar junction, basilar artery and proximal posterior cerebral arteries are normal.   Venous contamination limits evaluation for small aneurysms, without gross evidence of intracranial aneurysm.   CTA NECK FINDINGS:   Right carotid vessels: There are mild atherosclerotic calcifications of the carotid bifurcation with 0% stenosis by NASCET criteria. The remainder of the right internal cervical carotid artery is widely patent without focal stenosis.   Left carotid vessels: The common carotid artery is normal. The carotid bifurcation is normal. The internal carotid artery in the neck is normal. There is 0% stenosis by NASCET criteria. .   Vertebral vessels:  The visualized segments of the cervical vertebral arteries are normal in caliber.   There is redemonstration of multifocal nodular and ground-glass opacities in the visualized upper lung fields suspicious for multifocal pneumonia. There is also small right pleural effusion  partially visualized.       No evidence of acute cortical infarct or acute intracranial hemorrhage. There are calcified meningiomas noted overlying the upper left frontoparietal convexity and overlying the inferolateral right frontal lobe without evidence of significant mass effect or vasogenic edema. No interval change.   No evidence for significant stenosis of the cervical vessels.   No evidence for significant stenosis or large branch vessel cutoffs of the intracranial vessels.   Partially visualized ground-glass and nodular opacities in the upper lung fields suspicious for multifocal pneumonia for example viral pneumonia.   MACRO:   None   Signed by: Jaxon Fernandez 1/7/2024 2:06 AM Dictation workstation:   [id number]    CT angio head and neck w and wo IV contrast    Result Date: 1/7/2024  Interpreted By:  Jaxon Fernandez, STUDY: CT ANGIO HEAD AND NECK W AND WO IV CONTRAST; CT HEAD WO IV CONTRAST; 1/7/2024 1:20 am; 1/7/2024 1:10 am   INDICATION: Signs/Symptoms:Confusion.   COMPARISON: CT and CT angiogram dated 01/06/2024.   ACCESSION NUMBER(S): [id number]; [id number]   ORDERING CLINICIAN: JAC SANTOS   TECHNIQUE: Unenhanced CT images of the head were obtained. Subsequently,  75 mL Omnipaque 350 was administered intravenously and axial images of the head and neck were acquired.  Coronal, sagittal, and 3-D reconstructions were provided for review.   FINDINGS: There is a mostly calcified meningioma within the upper left parietal convexity measuring 1.7 x 1.5 cm and also calcified meningioma measuring 1.1 x 8.2 cm overlying the inferolateral right frontal lobe. No evidence of associated vasogenic edema or mass effect. There is no evidence of midline shift, hydrocephalus, or acute intracranial hemorrhage. Gray-white matter differentiation is maintained.   There is a small amount of fluid in the caudal mastoid air cells. There is mild to moderate polypoid mucosal thickening of the left maxillary sinus and  anterior ethmoid air cells. These findings are unchanged.   CTA HEAD FINDINGS:   Anterior circulation: The bilateral intracranial internal carotid arteries, bilateral carotid terminals, bilateral proximal anterior and middle cerebral arteries are normal.   Posterior circulation: Bilateral intracranial vertebral arteries, vertebrobasilar junction, basilar artery and proximal posterior cerebral arteries are normal.   Venous contamination limits evaluation for small aneurysms, without gross evidence of intracranial aneurysm.   CTA NECK FINDINGS:   Right carotid vessels: There are mild atherosclerotic calcifications of the carotid bifurcation with 0% stenosis by NASCET criteria. The remainder of the right internal cervical carotid artery is widely patent without focal stenosis.   Left carotid vessels: The common carotid artery is normal. The carotid bifurcation is normal. The internal carotid artery in the neck is normal. There is 0% stenosis by NASCET criteria. .   Vertebral vessels:  The visualized segments of the cervical vertebral arteries are normal in caliber.   There is redemonstration of multifocal nodular and ground-glass opacities in the visualized upper lung fields suspicious for multifocal pneumonia. There is also small right pleural effusion partially visualized.       No evidence of acute cortical infarct or acute intracranial hemorrhage. There are calcified meningiomas noted overlying the upper left frontoparietal convexity and overlying the inferolateral right frontal lobe without evidence of significant mass effect or vasogenic edema. No interval change.   No evidence for significant stenosis of the cervical vessels.   No evidence for significant stenosis or large branch vessel cutoffs of the intracranial vessels.   Partially visualized ground-glass and nodular opacities in the upper lung fields suspicious for multifocal pneumonia for example viral pneumonia.   MACRO:   None   Signed by: Jaxon eFrnandez  1/7/2024 2:06 AM Dictation workstation:   [id number]    CT angio brain attack head w IV contrast and post procedure    Result Date: 1/6/2024  Interpreted By:  Jazmyne Teresa, STUDY: CT ANGIO BRAIN ATTACK HEAD W IV CONTRAST AND POST PROCEDURE; CT ANGIO BRAIN ATTACK NECK W IV CONTRAST AND POST PROCEDURE;  1/6/2024 5:16 pm   INDICATION: Signs/Symptoms:AMS.   COMPARISON: None.   ACCESSION NUMBER(S): [id number]; [id number]   ORDERING CLINICIAN: LYNN FLORES   TECHNIQUE: 75 mL Omnipaque 350 was administered intravenously and axial images of the head and neck were acquired.  Coronal, sagittal, and 3-D reconstructions were provided for review.   The technologist notes the patient had multiple IV malfunctions.   FINDINGS: CT head: Please see CT head performed the same day for intracranial findings.   CTA HEAD FINDINGS:   The examination is degraded by venous contamination.   Anterior circulation: The bilateral intracranial internal carotid arteries, bilateral carotid terminals, bilateral proximal anterior and middle cerebral arteries are patent.   Posterior circulation: Bilateral intracranial vertebral arteries, vertebrobasilar junction, basilar artery and proximal posterior cerebral arteries are patent.   CTA NECK FINDINGS:   The examination is limited by the timing of scan relative to the contrast injection.   Mild atherosclerotic calcification along the aortic arch. Within the limitation of patient motion artifact no significant stenosis at the origins of the great vessels.   Right carotid vessels: The common carotid artery is patent. Mild calcified plaque at the carotid bifurcation without significant stenosis by NASCET criteria. The internal carotid artery in the neck is patent without significant stenosis.   Left carotid vessels: The common carotid artery is patent.. The carotid bifurcation is patent without significant stenosis. The internal carotid artery in the neck is patent without significant stenosis.    Vertebral vessels:  The visualized segments of the cervical vertebral arteries are patent.   The left thyroid lobe is slightly asymmetrically larger than the right with a calcification. Please see thyroid ultrasound 07/24/2023 for further details. Incidental note of multiple tonsilloliths. There is prominent ossification of the stylohyoid ligament bilaterally. Soft tissues of the neck are otherwise unremarkable.   There are patchy opacities within the visualized upper lungs bilaterally concerning for multifocal pneumonia. There is a small pleural effusion on the right. Mild degenerative changes of the cervical spine.       The examination is degraded by venous contamination, patient motion artifact and difficulties with the IV. Within this limitation:   CTA neck:   No evidence for significant stenosis of the cervical vessels.   Patchy opacities within the upper lungs bilaterally concerning for multifocal pneumonia. There is a small pleural effusion on the right.   CTA head:   No evidence for significant stenosis or large branch vessel cutoffs of the intracranial vessels.   Please see CT head performed the same day for intracranial findings.   MACRO: None   Signed by: Jazmyne Teresa 1/6/2024 5:34 PM Dictation workstation:   [id number]    CT angio brain attack neck w IV contrast and post procedure    Result Date: 1/6/2024  Interpreted By:  Jazmyne Teresa, STUDY: CT ANGIO BRAIN ATTACK HEAD W IV CONTRAST AND POST PROCEDURE; CT ANGIO BRAIN ATTACK NECK W IV CONTRAST AND POST PROCEDURE;  1/6/2024 5:16 pm   INDICATION: Signs/Symptoms:AMS.   COMPARISON: None.   ACCESSION NUMBER(S): [id number]; [id number]   ORDERING CLINICIAN: LYNN FLORES   TECHNIQUE: 75 mL Omnipaque 350 was administered intravenously and axial images of the head and neck were acquired.  Coronal, sagittal, and 3-D reconstructions were provided for review.   The technologist notes the patient had multiple IV malfunctions.   FINDINGS: CT head: Please  see CT head performed the same day for intracranial findings.   CTA HEAD FINDINGS:   The examination is degraded by venous contamination.   Anterior circulation: The bilateral intracranial internal carotid arteries, bilateral carotid terminals, bilateral proximal anterior and middle cerebral arteries are patent.   Posterior circulation: Bilateral intracranial vertebral arteries, vertebrobasilar junction, basilar artery and proximal posterior cerebral arteries are patent.   CTA NECK FINDINGS:   The examination is limited by the timing of scan relative to the contrast injection.   Mild atherosclerotic calcification along the aortic arch. Within the limitation of patient motion artifact no significant stenosis at the origins of the great vessels.   Right carotid vessels: The common carotid artery is patent. Mild calcified plaque at the carotid bifurcation without significant stenosis by NASCET criteria. The internal carotid artery in the neck is patent without significant stenosis.   Left carotid vessels: The common carotid artery is patent.. The carotid bifurcation is patent without significant stenosis. The internal carotid artery in the neck is patent without significant stenosis.   Vertebral vessels:  The visualized segments of the cervical vertebral arteries are patent.   The left thyroid lobe is slightly asymmetrically larger than the right with a calcification. Please see thyroid ultrasound 07/24/2023 for further details. Incidental note of multiple tonsilloliths. There is prominent ossification of the stylohyoid ligament bilaterally. Soft tissues of the neck are otherwise unremarkable.   There are patchy opacities within the visualized upper lungs bilaterally concerning for multifocal pneumonia. There is a small pleural effusion on the right. Mild degenerative changes of the cervical spine.       The examination is degraded by venous contamination, patient motion artifact and difficulties with the IV. Within  this limitation:   CTA neck:   No evidence for significant stenosis of the cervical vessels.   Patchy opacities within the upper lungs bilaterally concerning for multifocal pneumonia. There is a small pleural effusion on the right.   CTA head:   No evidence for significant stenosis or large branch vessel cutoffs of the intracranial vessels.   Please see CT head performed the same day for intracranial findings.   MACRO: None   Signed by: Jazmyne Teresa 1/6/2024 5:34 PM Dictation workstation:   [id number]    CT brain attack head wo IV contrast    Result Date: 1/6/2024  Interpreted By:  Richie Wren, STUDY: CT BRAIN ATTACK HEAD WO IV CONTRAST;  1/6/2024 4:37 pm   INDICATION: Signs/Symptoms:AMS.   COMPARISON: 11/18/2012   ACCESSION NUMBER(S): [id number]   ORDERING CLINICIAN: LYNN FLORES   TECHNIQUE: Noncontrast axial CT images of head were obtained with coronal and sagittal reconstructed images.   FINDINGS: BRAIN PARENCHYMA: Mild periventricular and subcortical hemispheric white matter hypodensities are most compatible with chronic small vessel ischemic disease. No acute intraparenchymal hemorrhage or parenchymal evidence of acute large territory ischemic infarct. No mass-effect. Gray-white matter distinction is preserved.   VENTRICLES and EXTRA-AXIAL SPACES: There is a 1.5 cm calcified meningioma arising from the left parasagittal frontal parietal dura. There is another 1.3 cm more densely calcified meningioma arising the right frontotemporal dura. No acute extra-axial or intraventricular hemorrhage. No effacement of cerebral sulci. Ventricles and sulci are age-concordant. There is a partial empty sella.   PARANASAL SINUSES/MASTOIDS: Trace fluid left mastoid air cells. No hemorrhage or air-fluid levels within the visualized paranasal sinuses. The mastoids are well aerated.   CALVARIUM/ORBITS:  No skull fracture.  The orbits and globes are intact to the extent visualized.   EXTRACRANIAL SOFT TISSUES: No  discernible abnormality.       1. No evidence of acute intracranial hemorrhage, mass effect, or parenchymal evidence of an acute large territory ischemic infarct.   2. Calcified meningiomas rising from the left parasagittal frontal parietal dura and right frontotemporal dura measuring 1.5 cm and 1.3 cm, respectively. No significant mass effect.     MACRO: Richie Wren discussed the significance and urgency of this critical finding by EPIC HAIKU with  LYNN FLORES on 1/6/2024 at 4:50 p.m. with confirmation of receipt. (**-RCF-**) Findings:  See findings.   Signed by: Richie Wren 1/6/2024 4:53 PM Dictation workstation:   [id number]    ECG 12 lead    Result Date: 1/3/2024  Atrial fibrillation with rapid ventricular response Low voltage QRS Cannot rule out Anterior infarct , age undetermined Abnormal ECG When compared with ECG of 13-DEC-2023 09:49, Atrial fibrillation has replaced Sinus rhythm See ED provider note for full interpretation and clinical correlation Confirmed by Jamir Salvador ([id number]) on 1/3/2024 10:49:37 PM    XR chest 1 view    Result Date: 1/2/2024  Interpreted By:  Soren Ham, STUDY: XR CHEST 1 VIEW;  1/2/2024 7:26 pm   INDICATION: Signs/Symptoms:sob.   COMPARISON: 9/26/2023   ACCESSION NUMBER(S): [id number]   ORDERING CLINICIAN: CARMEN CONKLIN   FINDINGS: The cardiac silhouette is stable in size. There are bilateral opacities concerning for infiltrates. Small right pleural effusion and basilar atelectasis or airspace disease. No pneumothorax.       Small right pleural effusion and basilar atelectasis or airspace disease and mild bilateral opacities concerning for infiltrates.   MACRO: None   Signed by: Soren Ham 1/2/2024 7:38 PM Dictation workstation:   [id number]    CT chest wo IV contrast    Result Date: 12/26/2023  Interpreted By:  Richie Wren, STUDY: CT CHEST WO IV CONTRAST;  12/26/2023 6:03 pm   INDICATION: Signs/Symptoms:cough.   COMPARISON: 09/22/2023 CT chest    ACCESSION NUMBER(S): [id number]   ORDERING CLINICIAN: JANNET VEE   TECHNIQUE: Contiguous axial images of the chest and upper abdomen were obtained without contrast. Coronal and sagittal reformatted images were reconstructed from the axial data.   FINDINGS:     MEDIASTINUM AND LYMPH NODES:  The esophagus appears within normal limits.  No enlarged intrathoracic or axillary lymph nodes by imaging criteria. No pneumomediastinum.   VESSELS:  Normal caliber thoracic aorta. Mild aortic atherosclerosis. The pulmonary artery is enlarged, measuring up to 3.6 cm, indicative of pulmonary hypertension.   HEART: There is left atrial dilatation. Mitral annular calcifications. Mild coronary artery calcifications. No significant pericardial effusion.   LUNG, AIRWAYS, AND PLEURA: New small bilateral pleural effusions with adjacent passive atelectasis. There is new subsegmental atelectasis in the right middle lobe. There is a small amount debris in the lower trachea extending into the mainstem bronchi and bronchus intermedius. There is a small opacity in the left upper lobe and superior segment of left lower lobe consisting of tree-in-bud nodules suspicious for pneumonia the   OSSEOUS STRUCTURES: No acute osseous abnormality.   CHEST WALL SOFT TISSUES: No discernible abnormality.   UPPER ABDOMEN/OTHER: Multiple peripherally calcified splenic artery aneurysm measuring 1.3 cm, 1.3 cm, and 2.8 cm are similar to prior study. The liver appears cirrhotic.       1. Tree-in-bud opacities in the posterior left upper lobe and superior segment of the left lower lobe consistent with bronchiolitis/early pneumonia.   2. Small bilateral pleural effusions with adjacent passive atelectasis and right middle lobe subsegmental atelectasis.   3. Small amount of debris in the trachea and bilateral proximal bronchi that could be secretions or aspiration.   4. Three splenic artery aneurysm measuring up to 2.8 cm, unchanged.   MACRO: None.   Signed by:  Richie Wren 12/26/2023 6:29 PM Dictation workstation:   [id number]       Assessment/Plan   Principal Problem:    RSV (respiratory syncytial virus infection)  Active Problems:    Shortness of breath    #Acute on chronic hypoxic respiratory failure 2/2 RSV, community acquired pneumonia, improving   #COPD, in acute exacerbation  - Patient was seen in [place] ED 12/26, discharged on dexamethasone and doxycycline  - WBC 13.9 >> 5.3 > 11.4 > 10.8 > 9.7 > 7.1 > 6.8  - Lactate 1.9  - COVID, flu A/B negative  - RSV positive  - Procal 0.75  - BCx no growth- final report   - sputum culture contained significant salivary contamination; repeat pending   - CXR: Small right pleural effusion and basilar atelectasis or airspace disease and mild bilateral opacities concerning for infiltrates.    - CT chest (1/10): 1.  No pulmonary embolism identified on motion degraded exam.  2. Pulmonary edema with right greater than left pleural effusions.  3. Near occlusion of the right middle lobe bronchus causing worsening  atelectasis.  - Start metaneb, continue mucinex  - Tylenol PRN for fever   - Mucinex BID, Robitussin DM q4h PRN for cough   - DuoNebs TID    - Breo and Spiriva ordered (pharmacy substitute for home Trelegy)  - RT eval and treat protocol  - Bronchial hygiene  - Isolation protocol for RSV for 10 days, discontinue isolation   - Baseline O2 5L continuously   - Wean O2 as tolerated; patient currently requiring 8L [place]   - Solumedrol 40 mg IVP q8h changed to prednisone 40 mg PO every day due to encephalopathy   - CXR 1/8: There are patchy bilateral airspace opacities with slight interval   worsening on the and slight interval improvement on the right. Findings may relate to edema versus infection. Attention on continued short-term follow-up is advised. Small bilateral pleural effusions are noted with slight interval improvement on the right.   - ID consulted for worsening pneumonia, appreciate recs   -CXR   1. Small  bilateral pleural effusions with associated compression  atelectasis.  2. Mild right lung ground-glass changes, much improved since  01/07/2020.  -  - Lasix 80 mg PO Daily >  40 mg IV BID > 20 mg IV BID  - CT Chest  1. Small right pleural effusion decreased in size.  2. Right lower lobe infiltrate with partial volume loss, unchanged.  3. Small left pleural effusion, unchanged.  4. Progressive left lower lobe infiltrate with volume loss.  5. Marked improvement in bilateral patchy ground-glass opacities.  Re-expansion right middle lobe.     #Atrial fibrillation with RVR, s/p cardioversion   #HTN  #HLD  #HFpEF, concern for acute exacerbation  #Supratherapeutic INR on warfarin   - Recent DCCV on 12/13 at [facility], follows with Dr. Siegel   - Trop 7 > 7   - >285>368 > 243 > 268 > 238  - Telemetry monitoring- notified by charge RN this morning that patient's HR has been consistently in the 120s-130s on telemetry. Upon reviewing the flowsheet documentation for 1/5-1/6, only two instances with HR > 110 bpm are charted  - Patient was transferred to ICU overnight and placed on a diltiazem drip for HR persistently in the 130s  - Cardioversion completed today; patient remains in AF with rate controlled in the 90s   - Start amiodarone 200 mg PO TID x 7 days per cardiology   - Lopressor 5 mg IVP q4h PRN for HR > 120 bpm sustained for > 5 mins   - Continue aspirin, atorvastatin, furosemide, and losartan  - INR 2.3 > 3.0 > 3.8 > 3.1 > 2.2 > 2.7 > 3.2  - Warfarin on hold; restart warfarin   - Goal INR 2-3, daily PT/INR while inpatient   - Strict I&Os: LOS - 9471.4  - Daily weights  - 2g Na diet, 1800 mL FR   - Cardiology consulted, appreciate recs   - Patient back into afib with RVR on 1/10  - Cardiology added metop TID for rate control  - HR 150s - 170s with ambulation, resolves with rest.     #Hematuria  - UA: 3+ blood, > 20 RBCs, 1+ bacteria  - UCx with no significant growth   - Patient denies gross blood in urine  or difficulties with urination   - Monitor UOP for blood - none reported as of 1/8  - Trend CBC; H/H stable 8.4/29.2 > 8.4/29.1     #T2DM with neuropathy   - Continue Lantus, metformin and glimepiride   - Gabapentin decreased to 200 mg BID due to change in mental status  - SSI three times a day before meals while on steroids   - Hypoglycemia protocol  - Accuchecks ac/hs/prn  - Diabetic diet   - HbA1c 4.6      #Anemia, macrocytic  - H/H stable 8.4/29.2 > 8.4/29.1  with MCV  109 > 109  - B12 level was 267 last month per chart review   - Folate 6.2   - Iron studies: Fe 28, TIBC 272, 10% saturation  - Patient receives monthly B12 injections and is on ferrous sulfate; continue   - Monitor for active bleeding  - Daily CBC to trend H/H     #History of mucous membrane pemphigoid  - Continue dapsone  - Resume outpatient derm follow up on discharge      #Hypokalemia   - K+ 3.4 > 3.3 > 3.8  - K+ 40 meq given 1/13  - K+ 80 meq given 1/14        DVT ppx  -warfarin     F: PRN  E: Replete per protocol  N: ADA, Cardiac, 1800ml FR  A: PIV     Disposition: Pt requires more than 2 inpatient days at this time   Code Status: Full Code     Total accumulated time spent face to face and not face to face preparing to see the patient, obtaining and reviewing separately obtained history; performing a medically appropriate examination and/or evaluation; counseling and educating the patient, family; ordering medications, tests, or procedures; referring and communicating with other health care professionals; documenting clinical information in the patient's medical record; independently interpreting results and communicating the results to the patient, family; and care coordination was 45 minutes.       Kadie Luque, APRN-CNP

## 2024-01-15 NOTE — CARE PLAN
The patient's goals for the shift include increase activity this shift    The clinical goals for the shift include Patient to tolerate weaning of oxygen this shift      Problem: Chronic Conditions and Co-morbidities  Goal: Patient's chronic conditions and co-morbidity symptoms are monitored and maintained or improved  Outcome: Progressing     OOB to a chair. SpO2 remained > 90% room air with 5L oxygen. HR remains in a-fib < 100

## 2024-01-16 ENCOUNTER — APPOINTMENT (OUTPATIENT)
Dept: RADIOLOGY | Facility: HOSPITAL | Age: 74
DRG: 193 | End: 2024-01-16
Payer: MEDICARE

## 2024-01-16 LAB
ANION GAP SERPL CALC-SCNC: 11 MMOL/L (ref 10–20)
BNP SERPL-MCNC: 274 PG/ML (ref 0–99)
BUN SERPL-MCNC: 39 MG/DL (ref 6–23)
CALCIUM SERPL-MCNC: 9.1 MG/DL (ref 8.6–10.3)
CHLORIDE SERPL-SCNC: 103 MMOL/L (ref 98–107)
CO2 SERPL-SCNC: 33 MMOL/L (ref 21–32)
CREAT SERPL-MCNC: 1.06 MG/DL (ref 0.5–1.05)
EGFRCR SERPLBLD CKD-EPI 2021: 56 ML/MIN/1.73M*2
ERYTHROCYTE [DISTWIDTH] IN BLOOD BY AUTOMATED COUNT: 16.4 % (ref 11.5–14.5)
GLUCOSE BLD MANUAL STRIP-MCNC: 161 MG/DL (ref 74–99)
GLUCOSE BLD MANUAL STRIP-MCNC: 168 MG/DL (ref 74–99)
GLUCOSE BLD MANUAL STRIP-MCNC: 244 MG/DL (ref 74–99)
GLUCOSE BLD MANUAL STRIP-MCNC: 91 MG/DL (ref 74–99)
GLUCOSE SERPL-MCNC: 76 MG/DL (ref 74–99)
HCT VFR BLD AUTO: 29 % (ref 36–46)
HGB BLD-MCNC: 8.4 G/DL (ref 12–16)
INR PPP: 3.3 (ref 0.9–1.1)
MCH RBC QN AUTO: 31.8 PG (ref 26–34)
MCHC RBC AUTO-ENTMCNC: 29 G/DL (ref 32–36)
MCV RBC AUTO: 110 FL (ref 80–100)
NRBC BLD-RTO: 0 /100 WBCS (ref 0–0)
PLATELET # BLD AUTO: 206 X10*3/UL (ref 150–450)
POTASSIUM SERPL-SCNC: 4 MMOL/L (ref 3.5–5.3)
PROTHROMBIN TIME: 37.7 SECONDS (ref 9.8–12.8)
RBC # BLD AUTO: 2.64 X10*6/UL (ref 4–5.2)
SODIUM SERPL-SCNC: 143 MMOL/L (ref 136–145)
WBC # BLD AUTO: 7.9 X10*3/UL (ref 4.4–11.3)

## 2024-01-16 PROCEDURE — 85027 COMPLETE CBC AUTOMATED: CPT | Mod: IPSPLIT

## 2024-01-16 PROCEDURE — 2500000002 HC RX 250 W HCPCS SELF ADMINISTERED DRUGS (ALT 637 FOR MEDICARE OP, ALT 636 FOR OP/ED): Mod: IPSPLIT | Performed by: NURSE PRACTITIONER

## 2024-01-16 PROCEDURE — 2500000004 HC RX 250 GENERAL PHARMACY W/ HCPCS (ALT 636 FOR OP/ED): Mod: IPSPLIT

## 2024-01-16 PROCEDURE — 1200000002 HC GENERAL ROOM WITH TELEMETRY DAILY: Mod: IPSPLIT

## 2024-01-16 PROCEDURE — 2500000001 HC RX 250 WO HCPCS SELF ADMINISTERED DRUGS (ALT 637 FOR MEDICARE OP): Mod: IPSPLIT

## 2024-01-16 PROCEDURE — 99233 SBSQ HOSP IP/OBS HIGH 50: CPT | Performed by: INTERNAL MEDICINE

## 2024-01-16 PROCEDURE — 85610 PROTHROMBIN TIME: CPT | Mod: IPSPLIT

## 2024-01-16 PROCEDURE — 2500000002 HC RX 250 W HCPCS SELF ADMINISTERED DRUGS (ALT 637 FOR MEDICARE OP, ALT 636 FOR OP/ED): Mod: IPSPLIT

## 2024-01-16 PROCEDURE — 82947 ASSAY GLUCOSE BLOOD QUANT: CPT | Mod: IPSPLIT

## 2024-01-16 PROCEDURE — 94640 AIRWAY INHALATION TREATMENT: CPT | Mod: IPSPLIT

## 2024-01-16 PROCEDURE — 94640 AIRWAY INHALATION TREATMENT: CPT

## 2024-01-16 PROCEDURE — 97110 THERAPEUTIC EXERCISES: CPT | Mod: GP,CQ,IPSPLIT

## 2024-01-16 PROCEDURE — 9420000001 HC RT PATIENT EDUCATION 5 MIN: Mod: IPSPLIT

## 2024-01-16 PROCEDURE — 83880 ASSAY OF NATRIURETIC PEPTIDE: CPT | Mod: IPSPLIT

## 2024-01-16 PROCEDURE — 94668 MNPJ CHEST WALL SBSQ: CPT | Mod: IPSPLIT

## 2024-01-16 PROCEDURE — 36415 COLL VENOUS BLD VENIPUNCTURE: CPT | Mod: IPSPLIT

## 2024-01-16 PROCEDURE — 97530 THERAPEUTIC ACTIVITIES: CPT | Mod: GO,IPSPLIT | Performed by: OCCUPATIONAL THERAPIST

## 2024-01-16 PROCEDURE — 80048 BASIC METABOLIC PNL TOTAL CA: CPT | Mod: IPSPLIT

## 2024-01-16 PROCEDURE — 71045 X-RAY EXAM CHEST 1 VIEW: CPT | Mod: IPSPLIT

## 2024-01-16 PROCEDURE — 71045 X-RAY EXAM CHEST 1 VIEW: CPT | Performed by: RADIOLOGY

## 2024-01-16 PROCEDURE — 97116 GAIT TRAINING THERAPY: CPT | Mod: GP,CQ,IPSPLIT

## 2024-01-16 RX ORDER — FUROSEMIDE 80 MG/1
TABLET ORAL
Status: COMPLETED
Start: 2024-01-16 | End: 2024-01-16

## 2024-01-16 RX ORDER — FUROSEMIDE 80 MG/1
80 TABLET ORAL DAILY
Status: DISCONTINUED | OUTPATIENT
Start: 2024-01-16 | End: 2024-01-17 | Stop reason: HOSPADM

## 2024-01-16 RX ADMIN — DOCUSATE SODIUM 100 MG: 100 CAPSULE, LIQUID FILLED ORAL at 21:49

## 2024-01-16 RX ADMIN — IPRATROPIUM BROMIDE AND ALBUTEROL SULFATE 3 ML: .5; 3 SOLUTION RESPIRATORY (INHALATION) at 16:08

## 2024-01-16 RX ADMIN — GABAPENTIN 200 MG: 100 CAPSULE ORAL at 08:34

## 2024-01-16 RX ADMIN — FERROUS SULFATE TAB 325 MG (65 MG ELEMENTAL FE) 1 TABLET: 325 (65 FE) TAB at 08:33

## 2024-01-16 RX ADMIN — AMIODARONE HYDROCHLORIDE 200 MG: 200 TABLET ORAL at 16:49

## 2024-01-16 RX ADMIN — INSULIN LISPRO 2 UNITS: 100 INJECTION, SOLUTION INTRAVENOUS; SUBCUTANEOUS at 17:10

## 2024-01-16 RX ADMIN — METOPROLOL TARTRATE 25 MG: 25 TABLET, FILM COATED ORAL at 16:49

## 2024-01-16 RX ADMIN — POTASSIUM CHLORIDE 40 MEQ: 1500 TABLET, EXTENDED RELEASE ORAL at 08:33

## 2024-01-16 RX ADMIN — CALCIUM CARBONATE (ANTACID) CHEW TAB 500 MG 1500 MG: 500 CHEW TAB at 08:41

## 2024-01-16 RX ADMIN — METFORMIN HYDROCHLORIDE 1000 MG: 500 TABLET ORAL at 08:33

## 2024-01-16 RX ADMIN — TOPIRAMATE 100 MG: 100 TABLET, FILM COATED ORAL at 21:49

## 2024-01-16 RX ADMIN — AMIODARONE HYDROCHLORIDE 200 MG: 200 TABLET ORAL at 22:23

## 2024-01-16 RX ADMIN — FUROSEMIDE 80 MG: 80 TABLET ORAL at 09:39

## 2024-01-16 RX ADMIN — DAPSONE 100 MG: 25 TABLET ORAL at 08:39

## 2024-01-16 RX ADMIN — METOPROLOL TARTRATE 25 MG: 25 TABLET, FILM COATED ORAL at 04:55

## 2024-01-16 RX ADMIN — CALCIUM CARBONATE (ANTACID) CHEW TAB 500 MG 1500 MG: 500 CHEW TAB at 21:49

## 2024-01-16 RX ADMIN — DOCUSATE SODIUM 100 MG: 100 CAPSULE, LIQUID FILLED ORAL at 08:33

## 2024-01-16 RX ADMIN — GLIMEPIRIDE 2 MG: 2 TABLET ORAL at 08:28

## 2024-01-16 RX ADMIN — GABAPENTIN 200 MG: 100 CAPSULE ORAL at 21:49

## 2024-01-16 RX ADMIN — IPRATROPIUM BROMIDE AND ALBUTEROL SULFATE 3 ML: .5; 3 SOLUTION RESPIRATORY (INHALATION) at 10:23

## 2024-01-16 RX ADMIN — METOPROLOL TARTRATE 25 MG: 25 TABLET, FILM COATED ORAL at 22:23

## 2024-01-16 RX ADMIN — PREDNISONE 20 MG: 20 TABLET ORAL at 08:34

## 2024-01-16 RX ADMIN — POTASSIUM CHLORIDE 40 MEQ: 1500 TABLET, EXTENDED RELEASE ORAL at 21:49

## 2024-01-16 RX ADMIN — GUAIFENESIN 600 MG: 600 TABLET, EXTENDED RELEASE ORAL at 21:49

## 2024-01-16 RX ADMIN — FLUTICASONE FUROATE AND VILANTEROL TRIFENATATE 1 PUFF: 200; 25 POWDER RESPIRATORY (INHALATION) at 10:23

## 2024-01-16 RX ADMIN — INSULIN LISPRO 1 UNITS: 100 INJECTION, SOLUTION INTRAVENOUS; SUBCUTANEOUS at 12:47

## 2024-01-16 RX ADMIN — ATORVASTATIN CALCIUM 20 MG: 10 TABLET, FILM COATED ORAL at 21:49

## 2024-01-16 RX ADMIN — TIOTROPIUM BROMIDE INHALATION SPRAY 2 PUFF: 3.12 SPRAY, METERED RESPIRATORY (INHALATION) at 10:23

## 2024-01-16 RX ADMIN — GUAIFENESIN 600 MG: 600 TABLET, EXTENDED RELEASE ORAL at 08:33

## 2024-01-16 RX ADMIN — IPRATROPIUM BROMIDE AND ALBUTEROL SULFATE 3 ML: .5; 3 SOLUTION RESPIRATORY (INHALATION) at 21:44

## 2024-01-16 RX ADMIN — METFORMIN HYDROCHLORIDE 1000 MG: 500 TABLET ORAL at 16:49

## 2024-01-16 RX ADMIN — AMIODARONE HYDROCHLORIDE 200 MG: 200 TABLET ORAL at 10:05

## 2024-01-16 RX ADMIN — PANTOPRAZOLE SODIUM 40 MG: 40 TABLET, DELAYED RELEASE ORAL at 06:44

## 2024-01-16 RX ADMIN — METOPROLOL TARTRATE 25 MG: 25 TABLET, FILM COATED ORAL at 10:05

## 2024-01-16 RX ADMIN — ASPIRIN 81 MG: 81 TABLET, COATED ORAL at 08:34

## 2024-01-16 RX ADMIN — TOPIRAMATE 100 MG: 100 TABLET, FILM COATED ORAL at 08:34

## 2024-01-16 ASSESSMENT — COGNITIVE AND FUNCTIONAL STATUS - GENERAL
MOVING FROM LYING ON BACK TO SITTING ON SIDE OF FLAT BED WITH BEDRAILS: A LOT
HELP NEEDED FOR BATHING: A LOT
PERSONAL GROOMING: A LITTLE
MOVING TO AND FROM BED TO CHAIR: A LOT
STANDING UP FROM CHAIR USING ARMS: A LITTLE
DRESSING REGULAR LOWER BODY CLOTHING: A LOT
MOBILITY SCORE: 14
WALKING IN HOSPITAL ROOM: A LITTLE
DAILY ACTIVITIY SCORE: 16
CLIMB 3 TO 5 STEPS WITH RAILING: A LOT
DRESSING REGULAR UPPER BODY CLOTHING: A LITTLE
TOILETING: A LOT
TURNING FROM BACK TO SIDE WHILE IN FLAT BAD: A LOT

## 2024-01-16 ASSESSMENT — PAIN - FUNCTIONAL ASSESSMENT
PAIN_FUNCTIONAL_ASSESSMENT: 0-10
PAIN_FUNCTIONAL_ASSESSMENT: 0-10

## 2024-01-16 ASSESSMENT — PAIN SCALES - GENERAL
PAINLEVEL_OUTOF10: 0 - NO PAIN

## 2024-01-16 NOTE — CARE PLAN
Problem: Pain - Adult  Goal: Verbalizes/displays adequate comfort level or baseline comfort level  1/16/2024 0004 by Favio Arrieta RN  Outcome: Progressing  1/15/2024 2355 by Favio Arrieta RN  Outcome: Progressing     Problem: Fall/Injury  Goal: Verbalize understanding of personal risk factors for fall in the hospital  1/16/2024 0004 by Favio Arrieta RN  Outcome: Progressing  1/15/2024 2355 by Favio Arrieta RN  Outcome: Progressing  Goal: Verbalize understanding of risk factor reduction measures to prevent injury from fall in the home  1/16/2024 0004 by Favio Arrieta RN  Outcome: Progressing  1/15/2024 2355 by Favio Arrieta RN  Outcome: Progressing  Goal: Use assistive devices by end of the shift  1/16/2024 0004 by Favio Arrieta RN  Outcome: Progressing  1/15/2024 2355 by Favio Arrieta RN  Outcome: Progressing  Goal: Pace activities to prevent fatigue by end of the shift  1/16/2024 0004 by Favio Arrieta RN  Outcome: Progressing  1/15/2024 2355 by Favio Arrieta RN  Outcome: Progressing     Problem: Respiratory  Goal: Clear secretions with interventions this shift  1/16/2024 0004 by Favio Arrieta RN  Outcome: Progressing  1/15/2024 2355 by Favio Arrieta RN  Outcome: Progressing  Goal: Minimize anxiety/maximize coping throughout shift  1/16/2024 0004 by Favio Arrieat RN  Outcome: Progressing  1/15/2024 2355 by Favio Arrieta RN  Outcome: Progressing  Goal: Minimal/no exertional discomfort or dyspnea this shift  1/16/2024 0004 by Favio Arrieta RN  Outcome: Progressing  1/15/2024 2355 by Favio Arrieta RN  Outcome: Progressing  Goal: Tolerate pulmonary toileting this shift  1/16/2024 0004 by Favio Arrieta RN  Outcome: Progressing  1/15/2024 2355 by Favio Arrieta RN  Outcome: Progressing  Goal: Verbalize decreased shortness of breath this shift  1/16/2024 0004 by Favio Arrieta RN  Outcome: Progressing  1/15/2024 2355 by Favio Arrieta RN  Outcome: Progressing  Goal: Wean oxygen to  maintain O2 saturation per order/standard this shift  1/16/2024 0004 by Favio Arrieta RN  Outcome: Progressing  1/15/2024 2355 by Favio Arrieta RN  Outcome: Progressing  Goal: Increase self care and/or family involvement in next 24 hours  1/16/2024 0004 by Favio Arrieta RN  Outcome: Progressing  1/15/2024 2355 by Favio Arrieta RN  Outcome: Progressing     Problem: Discharge Planning  Goal: Discharge to home or other facility with appropriate resources  1/16/2024 0004 by Favio Arrieta RN  Outcome: Progressing  1/15/2024 2355 by Favio Arrieta RN  Outcome: Progressing     Problem: Chronic Conditions and Co-morbidities  Goal: Patient's chronic conditions and co-morbidity symptoms are monitored and maintained or improved  1/16/2024 0004 by Favio Arrieta RN  Outcome: Progressing  1/15/2024 2355 by Favio Arrieta RN  Outcome: Progressing     Problem: Arrythmia/Dysrhythmia  Goal: Lab values return to normal range  1/16/2024 0004 by Favio Arrieta RN  Outcome: Progressing  1/15/2024 2355 by Favio Arrieta RN  Outcome: Progressing  Goal: No evidence of post procedure complications  1/16/2024 0004 by Favio Arrieta RN  Outcome: Progressing  1/15/2024 2355 by Favio Arrieta RN  Outcome: Progressing  Goal: Promote self management  1/16/2024 0004 by Favio Arrieta RN  Outcome: Progressing  1/15/2024 2355 by Favio Arrieta RN  Outcome: Progressing  Goal: Serial ECG will return to baseline  1/16/2024 0004 by Favio Arrieta RN  Outcome: Progressing  1/15/2024 2355 by Favio Arrieta RN  Outcome: Progressing  Goal: Verbalize understanding of procedures/devices  1/16/2024 0004 by Favio Arrieta RN  Outcome: Progressing  1/15/2024 2355 by Favio Arrieta RN  Outcome: Progressing  Goal: Vital signs return to baseline  1/16/2024 0004 by Favio Arrieta RN  Outcome: Progressing  1/15/2024 2355 by Favio Arrieta RN  Outcome: Progressing     Problem: Skin  Goal: Prevent/minimize sheer/friction injuries  1/16/2024  0004 by Favio Arrieta RN  Outcome: Progressing  1/15/2024 2355 by Favio Arrieta RN  Outcome: Progressing  Goal: Decreased wound size/increased tissue granulation at next dressing change  1/16/2024 0004 by Favio Arrieta RN  Outcome: Progressing  1/15/2024 2355 by Favio Arrieta RN  Outcome: Progressing  Goal: Participates in plan/prevention/treatment measures  1/16/2024 0004 by Favio Arrieta RN  Outcome: Progressing  1/15/2024 2355 by Favio Arrieta RN  Outcome: Progressing  Goal: Prevent/manage excess moisture  1/16/2024 0004 by Favio Arrieta RN  Outcome: Progressing  1/15/2024 2355 by Favio Arrieta RN  Outcome: Progressing  Goal: Promote/optimize nutrition  1/16/2024 0004 by Favio Arrieta RN  Outcome: Progressing  1/15/2024 2355 by Favio Arrieta RN  Outcome: Progressing  Goal: Promote skin healing  1/16/2024 0004 by Favio Arrieta RN  Outcome: Progressing  1/15/2024 2355 by Favio Arrieta RN  Outcome: Progressing   The patient's goals for the shift include increase activity this shift    The clinical goals for the shift include Patient to tolerate weaning of oxygen this shift

## 2024-01-16 NOTE — CARE PLAN
The patient's goals for the shift include increase activity this shift    The clinical goals for the shift include remian on baseline oxygen of 5liters nc    SpO2 remained > 90% baseline oxygen of 5L    Plan to discharge to [facility] for rehab.

## 2024-01-16 NOTE — CONSULTS
"Nutrition Assessment Note  Nutrition Assessment      Reason for Assessment  Reason for Assessment: Dietitian discretion (Self-referral, case finding, LOS = 10 days.  Noted MST = 0.)    Per H&P / Progress Notes:  Pt presented to ED from home due to cough, SOB, hypoxic with SpO2 in 80's on 5 L O2 (baseline), HR in 100's.   (H).  +RSV.  Chest xray showed \"Small right pleural effusion and basilar atelectasis or airspace disease and mild bilateral opacities concerning for infiltrates.\"  She received antibiotics, Solu-Medrol, Lasix and was admitted to [facility] [facility] on 1/2 with Acute on chronic hypoxic respiratory failure secondary to RSV, community acquired pneumonia and COPD, in acute exacerbation.  Bowel regimen started 1/5 for constipation.  RRT x 2 called overnight 1/6 for change in mental status; NIH documented as 3; possible facial droop per nursing documentation.  CT head and CTA head/neck negative for any acute findings.  Pt requiring 6L O2 on 1/7, antibiotics escalated and Cardizem dose was increased due to persistent tachycardia on telemetry; Solumedrol IV changed to Prednisone PO due to encephalopathy.  IV Lasix x 1 given due to 4 kg weight gain since admission with increased O2 needs up to 6L.  Pt transferred to ICU overnight 1/8 due to HR persistently in the 130s and started on a diltiazem drip.  She was seen by cardiology and underwent cardioversion.  Remains in atrial fibrillation on telemetry; rate better controlled in the 90s.  Chest xray 1/8 showed \"There are patchy bilateral airspace opacities with slight interval worsening on the and slight interval improvement on the right. Findings may relate to edema versus infection. Attention on continued short-term follow-up is advised. Small bilateral pleural effusions are noted with slight interval improvement on the right.\"  ID consulted for worsening pneumonia.   Patient back in afib with RVR on 1/10; Cardiology added metoprolol TID for rate control. "  Pt remains in ICU as a Med/Surg boarder wearing baseline 5 L O2.  Current diet order is CCD 75 grams carbs per meal, 2 to 3 g sodium with 1800 mL fluid limit.    Per discussion at IDT Rounds, pt planned for discharge to SNF when medically ready.    Past Medical History:   Diagnosis Date    Acute upper respiratory infection, unspecified 09/25/2019     Acute upper respiratory infection    Acute upper respiratory infection, unspecified 10/11/2016     Acute upper respiratory infection    COPD (chronic obstructive pulmonary disease) (CMS/[facility])      Diabetes mellitus (CMS/[facility])      Encounter for screening mammogram for malignant neoplasm of breast 03/10/2015     Visit for screening mammogram    Morbid (severe) obesity due to excess calories (CMS/[facility]) 09/17/2018     Morbid or severe obesity due to excess calories    Personal history of nicotine dependence 10/26/2020     Personal history of nicotine dependence    Personal history of other diseases of the respiratory system       Personal history of asthma    Personal history of other drug therapy 08/17/2020     History of pneumococcal vaccination    Personal history of other endocrine, nutritional and metabolic disease       History of diabetes mellitus    Personal history of other specified conditions 10/04/2016     History of edema    Personal history of other specified conditions 07/15/2019     History of abnormal mammogram      COPD- 5L chronic  HFpEF   AF (on coumadin)   Macrocytic anemia   Lymphedema  Breast cancer     Past Surgical History:   Procedure Laterality Date    BI MAMMO GUIDED LOCALIZATION BREAST RIGHT Right 12/13/2018     BI MAMMO GUIDED LOCALIZATION BREAST RIGHT 12/13/2018 [facility]    BREAST LUMPECTOMY   11/14/2012     Breast Surgery Lumpectomy    COLONOSCOPY   06/25/2013     Complete Colonoscopy    NASAL SEPTUM SURGERY   11/14/2012     Nasal Septal Deviation Repair    OTHER SURGICAL HISTORY   12/14/2018     Breast biopsy excisional    OTHER  "SURGICAL HISTORY   01/08/2021     Cataract surgery    OTHER SURGICAL HISTORY   08/17/2020     Renal lithotripsy    TUBAL LIGATION   11/14/2012     Tubal Ligation     Medications and allergies reviewed.    Pertinent Labs:  1/12/24:  POCT Glu 101 to 148  H/H 8.6/29.5 (L)   (H)  MCHC 29.2 (L)  Glu 144  Na 146 (H)  K+ 3.4 (L)  Bicarb 39 (H)  BUN 29 (H)    Last Recorded Vitals  BP 87/52   Pulse 83   Temp 35.9 °C (96.6 °F) (Temporal)   Resp 20   Wt 140 kg (308 lb 13.8 oz)   SpO2 90%    History:  Food and Nutrient History  Energy Intake: Good > 75 %  Food and Nutrient History: Visited Frannie in room, she is sitting up in chair.  She states that she ate eggs, sausage, oatmeal, yogurt for breakfast and chicken parm and tomato soup for lunch today.  Reports eating sporadically at home prior to admission, eats one meal per day and orders take out from fast food restaurants.  Presently, she denies nausea, stomacy pain, difficulty chewing / swallowing.  Reports variable weight which fluctuates by 30 lbs;  to 330 lbs.  Denies SMBG consistently.  +BM x 1 large, loose on 1/12 per nursing flowsheet.  Vitamin/Herbal Supplement Use: Pt denies although Vit B 12 and ferrous sulfate documented in chart.         Food and Nutrient Administration History  Additional Food and Nutrient Administration History: PO Intake per flowsheet:  Pt is eating 100% of meals per nursing flowsheet.                   Anthropometrics:  Height: 167.6 cm (5' 5.98\")  Weight: 140 kg (308 lb 10.3 oz)  BMI (Calculated): 49.84    Weight Change: -0.07    Weight Change  Weight History / % Weight Change: UBW (self-reported):  300 to 330 lbs; Weight history per chart:  1/12/24- 140 kg; 1/2/24- 133 kg- weight gain since admission likely due to fluid shifts; 12/26/23- 150 kg (6.7% loss x ~2 weeks)- significant, suspect fluid shifts  Significant Weight Loss: Yes  Interpretation of Weight Loss: >2% in 1 week (suspect fluid shifts.)  Significant Weight " "Gain: Fluid related         IBW/kg (Dietitian Calculated): 65 kg  Percent of IBW: 215 %       Wt Readings from Last 10 Encounters:   01/12/24 140 kg (308 lb 13.8 oz)   01/02/24 133 kg (293 lb)   12/26/23 150 kg (330 lb)   12/13/23 150 kg (330 lb)   08/23/23 140 kg (307 lb 14.4 oz)   06/20/23 139 kg (307 lb)   02/21/23 140 kg (307 lb 9 oz)   02/13/23 138 kg (303 lb 8 oz)   12/28/22 135 kg (298 lb)   11/28/22 141 kg (311 lb 3.2 oz)                             Energy Needs:  Calculated Energy Needs Using Equations  Height: 167.6 cm (5' 5.98\")  Weight Used for Equation Calculations: 65 kg (143 lb 4.8 oz)  [place]- [place] Jeor Equation (Overweight or Obese Patients): 1177  Temp: 36.5 °C (97.7 °F)    Estimated Energy Needs  Total Energy Estimated Needs (kCal): 1950 kCal  Total Estimated Energy Need per Day (kCal/kg): 30 kCal/kg  Method for Estimating Needs: 30 kcal/kg IBW    Estimated Protein Needs  Total Protein Estimated Needs (g): 78 g  Total Protein Estimated Needs (g/kg): 1.2 g/kg  Method for Estimating Needs: 1.2 g/kg IBW    Estimated Fluid Needs  Method for Estimating Needs: Fluid per medical team.         Nutrition Focused Physical Findings:  Subcutaneous Fat Loss  Orbital Fat Pads: Defer (NFPE deferred; pt with adequately nourished appearance.)         Edema  Edema: +1 trace  Edema Location: b/l LE per nursing flowsheet.         Physical Findings (Nutrition Deficiency/Toxicity)  Skin: Positive (Stage 1 L buttocks per nursing flowsheet.)       Nutrition Diagnosis   Malnutrition Diagnosis  Patient has Malnutrition Diagnosis: No    Patient has Nutrition Diagnosis: Yes  Nutrition Diagnosis 1: Food and nutrition related knowledge deficit  Diagnosis Status (1): New  Related to (1): lack of prior diet education  As Evidenced by (1): diet recall reflecting inconsistent meal pattern and frequent intake of fast food.  Additional Assessment Information (1): Pt is eating adequately to meet > 75% of estimated needs.              "                            Nutrition Interventions/Recommendations   Nutrition Prescription  Individualized Nutrition Prescription Provided for : diet, diet education    Food and/or Nutrient Delivery Interventions  Meals and Snacks: Carbohydrate-modified diet, Energy-modified diet, Fluid-modified diet, General healthful diet, Modify composition of meals/snacks, Modify schedule of foods/fluids, Protein-modified diet  Goal: Continue CCD 75 gram carb, 2 to 3 gram sodium diet with fluid limit per medical team; Encourage intake of protein foods at each meal; Eat three meals and one to two small snacks per day on a regular schedule.                                                                  Additional Interventions: 1.  Monitor lytes and replace as needed;  2.  Daily weight for trends.         Coordination of Nutrition Care by a Nutrition Professional  Collaboration and Referral of Nutrition Care: Collaboration by nutrition professional with other providers  Goal: Pt reviewed at IDT Rounds.    Education Documentation  No documentation found.    -Reviewed low sodium diet education, fluid limit, My Plate with emphasis on types of foods and portion sizes for each. Provided information for Mom's Meals therapeutic low sodium and diabetes-friendly meals as an alerntaive to fast food.        Nutrition Monitoring and Evaluation   Food and Nutrient Related History  Energy Intake: Estimated energy intake  Criteria: Nutritional intake meeting > 75% of estimated needs.    Fluid Intake: Estimated fluid intake  Criteria: Fluid intake meeting estimated needs.                                  Anthropometrics: Body Composition/Growth/Weight History  Weight: Measured weight  Criteria: Reduce weight from edema and fluid.                   Biochemical Data, Medical Tests and Procedures  Electrolyte and Renal Panel: BUN, Creatinine, Sodium, Potassium  Criteria: Renal labs / lytes wnl.                        Nutrition Focused Physical  Findings                      Skin: Impaired wound healing  Criteria: wound healing / skin wnl              Follow Up  Time Spent (min): 95 minutes  Last Date of Nutrition Visit: 01/12/24  Nutrition Follow-Up Needed?: Dietitian to reassess per policy, 5-7 days  Follow up Comment: % meals; weight change

## 2024-01-16 NOTE — CARE PLAN
Problem: Pain - Adult  Goal: Verbalizes/displays adequate comfort level or baseline comfort level  1/16/2024 0004 by Favio Arrieta RN  Outcome: Progressing  1/15/2024 2355 by Favio Arrieta RN  Outcome: Progressing     Problem: Fall/Injury  Goal: Verbalize understanding of personal risk factors for fall in the hospital  1/16/2024 0004 by Favio Arrieta RN  Outcome: Progressing  1/15/2024 2355 by Favio Arrieta RN  Outcome: Progressing  Goal: Verbalize understanding of risk factor reduction measures to prevent injury from fall in the home  1/16/2024 0004 by Favio Arrieta RN  Outcome: Progressing  1/15/2024 2355 by Favio Arrieta RN  Outcome: Progressing  Goal: Use assistive devices by end of the shift  1/16/2024 0004 by Favio Arrieta RN  Outcome: Progressing  1/15/2024 2355 by Favio Arrieta RN  Outcome: Progressing  Goal: Pace activities to prevent fatigue by end of the shift  1/16/2024 0004 by Favio Arrieta RN  Outcome: Progressing  1/15/2024 2355 by Favio Arrieta RN  Outcome: Progressing     Problem: Respiratory  Goal: Clear secretions with interventions this shift  1/16/2024 0004 by Favio Arrieta RN  Outcome: Progressing  1/15/2024 2355 by Favio Arrieta RN  Outcome: Progressing  Goal: Minimize anxiety/maximize coping throughout shift  1/16/2024 0004 by Favio Arrieta RN  Outcome: Progressing  1/15/2024 2355 by Favio Arrieta RN  Outcome: Progressing  Goal: Minimal/no exertional discomfort or dyspnea this shift  1/16/2024 0004 by Favio Arrieta RN  Outcome: Progressing  1/15/2024 2355 by Favio Arrieta RN  Outcome: Progressing  Goal: Tolerate pulmonary toileting this shift  1/16/2024 0004 by Favio Arrieta RN  Outcome: Progressing  1/15/2024 2355 by Favio Arrieta RN  Outcome: Progressing  Goal: Verbalize decreased shortness of breath this shift  1/16/2024 0004 by Favio Arrieta RN  Outcome: Progressing  1/15/2024 2355 by Favio Arrieta RN  Outcome: Progressing  Goal: Wean oxygen to  maintain O2 saturation per order/standard this shift  1/16/2024 0004 by Favio Arrieta RN  Outcome: Progressing  1/15/2024 2355 by Favio Arrieta RN  Outcome: Progressing  Goal: Increase self care and/or family involvement in next 24 hours  1/16/2024 0004 by Favio Arrieta RN  Outcome: Progressing  1/15/2024 2355 by Favio Arrieta RN  Outcome: Progressing     Problem: Discharge Planning  Goal: Discharge to home or other facility with appropriate resources  1/16/2024 0004 by Favio Arrieta RN  Outcome: Progressing  1/15/2024 2355 by Favio Arrieta RN  Outcome: Progressing     Problem: Chronic Conditions and Co-morbidities  Goal: Patient's chronic conditions and co-morbidity symptoms are monitored and maintained or improved  1/16/2024 0004 by Favio Arrieta RN  Outcome: Progressing  1/15/2024 2355 by Favio Arrieta RN  Outcome: Progressing     Problem: Arrythmia/Dysrhythmia  Goal: Lab values return to normal range  1/16/2024 0004 by Favio Arrieta RN  Outcome: Progressing  1/15/2024 2355 by Favio Arrieta RN  Outcome: Progressing  Goal: No evidence of post procedure complications  1/16/2024 0004 by Favio Arrieta RN  Outcome: Progressing  1/15/2024 2355 by Favio Arrieta RN  Outcome: Progressing  Goal: Promote self management  1/16/2024 0004 by Favio Arrieta RN  Outcome: Progressing  1/15/2024 2355 by Favio Arrieta RN  Outcome: Progressing  Goal: Serial ECG will return to baseline  1/16/2024 0004 by Favio Arrieta RN  Outcome: Progressing  1/15/2024 2355 by Favio Arrieta RN  Outcome: Progressing  Goal: Verbalize understanding of procedures/devices  1/16/2024 0004 by Favio Arrieta RN  Outcome: Progressing  1/15/2024 2355 by Favio Arrieta RN  Outcome: Progressing  Goal: Vital signs return to baseline  1/16/2024 0004 by Favio Arrieta RN  Outcome: Progressing  1/15/2024 2355 by Favio Arrieta RN  Outcome: Progressing     Problem: Skin  Goal: Prevent/minimize sheer/friction injuries  1/16/2024  0006 by Favio Arrieta RN  Outcome: Progressing  1/16/2024 0004 by Favio Arrieta RN  Outcome: Progressing  1/15/2024 2355 by Favio Arrieta RN  Outcome: Progressing  Goal: Decreased wound size/increased tissue granulation at next dressing change  1/16/2024 0006 by Favio Arrieta RN  Outcome: Progressing  1/16/2024 0004 by Favio Arrieta RN  Outcome: Progressing  1/15/2024 2355 by Favio Arrieta RN  Outcome: Progressing  Goal: Participates in plan/prevention/treatment measures  1/16/2024 0006 by Favio Arrieta RN  Outcome: Progressing  1/16/2024 0004 by Favio Arrieta RN  Outcome: Progressing  1/15/2024 2355 by Favio Arrieta RN  Outcome: Progressing  Goal: Prevent/manage excess moisture  1/16/2024 0006 by Favio Arrieta RN  Outcome: Progressing  1/16/2024 0004 by Favio Arrieta RN  Outcome: Progressing  1/15/2024 2355 by Favio Arrieta RN  Outcome: Progressing  Goal: Promote/optimize nutrition  1/16/2024 0006 by Favio Arrieta RN  Outcome: Progressing  1/16/2024 0004 by Favio Arrieta RN  Outcome: Progressing  1/15/2024 2355 by Favio Arrieta RN  Outcome: Progressing  Goal: Promote skin healing  1/16/2024 0006 by Favio Arrieta RN  Outcome: Progressing  1/16/2024 0004 by Favio Arrieta RN  Outcome: Progressing  1/15/2024 2355 by Favio Arrieta RN  Outcome: Progressing   The patient's goals for the shift include increase activity this shift    The clinical goals for the shift include remian on baseline oxygen of 5liters nc

## 2024-01-16 NOTE — PROGRESS NOTES
Physical Therapy    Physical Therapy Treatment    Patient Name: Frannie Blanco  MRN: [id number]  Today's Date: 1/16/2024  Time Calculation  Start Time: 0920  Stop Time: 0943  Time Calculation (min): 23 min       Assessment/Plan   PT Assessment  PT Assessment Results: Decreased strength, Decreased range of motion, Decreased endurance, Decreased mobility, Obesity  End of Session Communication: Bedside nurse, PCT/NA/CTA  End of Session Patient Position: Alarm off, not on at start of session, Up in chair  PT Plan  Inpatient/Swing Bed or Outpatient: Inpatient  PT Plan  Treatment/Interventions: Transfer training, Gait training, Therapeutic exercise  PT Plan: Skilled PT  PT Frequency: 3 times per week  PT Discharge Recommendations: Moderate intensity level of continued care  PT Recommended Transfer Status: Assist x1  PT - OK to Discharge: Yes      General Visit Information:   PT  Visit  PT Received On: 01/16/24     Precautions:  Precautions  Medical Precautions: Fall precautions  Objective   Pain:  Pain Assessment  Pain Assessment: 0-10  Pain Score: 0 - No pain  Cognition:  Cognition  Overall Cognitive Status: Within Functional Limits    Treatments:  Therapeutic Exercise  Therapeutic Exercise Performed: Yes (Patient demonstrated good understanding of HEP.)  Therapeutic Exercise Activity 1: laqs x 20  Therapeutic Exercise Activity 2: hip flexion seated x 20  Therapeutic Exercise Activity 3: iso hip abduction/adduction seated x 20  Therapeutic Exercise Activity 4: ankle pumps x 20    Ambulation/Gait Training  Ambulation/Gait Training Performed: Yes  Ambulation/Gait Training 1  Surface 1: Level tile  Device 1: Rolling walker  Gait Support Devices: Gait belt  Assistance 1: Minimal verbal cues, Contact guard (Vcs required to maintain erect posture and for directional changes. CGA required due to weakness and fatigue. distance limited by O2 line.)  Quality of Gait 1: Decreased step length, Forward flexed posture,  Antalgic  Comments/Distance (ft) 1: 20x2  Transfers  Transfer: Yes  Transfer 1  Technique 1: Sit to stand, Stand to sit  Transfer Device 1: Gait belt, Walker  Transfer Level of Assistance 1: Contact guard    Outcome Measures:  [facility] Basic Mobility  Turning from your back to your side while in a flat bed without using bedrails: A lot  Moving from lying on your back to sitting on the side of a flat bed without using bedrails: A lot  Moving to and from bed to chair (including a wheelchair): A lot  Standing up from a chair using your arms (e.g. wheelchair or bedside chair): A little  To walk in hospital room: A little  Climbing 3-5 steps with railing: A lot  Basic Mobility - Total Score: 14    Education Documentation  Home Exercise Program, taught by Soledad Dennis PTA at 1/16/2024 10:33 AM.  Learner: Patient  Readiness: Acceptance  Method: Explanation  Response: Demonstrated Understanding  Comment: Educated patient on directional changes with short O2 line, demonstrated good understanding.    Mobility Training, taught by Soledad Dennis PTA at 1/16/2024 10:33 AM.  Learner: Patient  Readiness: Acceptance  Method: Explanation  Response: Demonstrated Understanding  Comment: Educated patient on directional changes with short O2 line, demonstrated good understanding.    Education Comments  No comments found.      Encounter Problems       Encounter Problems (Active)       Balance       STG - Maintains dynamic standing balance without upper extremity support >=5 min  (Progressing)       Start:  01/04/24    Expected End:  01/18/24               Mobility       LTG - Patient will ambulate household distance with WW Leela  (Progressing)       Start:  01/04/24    Expected End:  01/18/24            LTG - Patient will navigate 4 steps with 1 rail and cane with CGA (Progressing)       Start:  01/04/24    Expected End:  01/18/24               Pain - Adult          Transfers       STG - Patient will perform bed mobility with  SBA  (Progressing)       Start:  01/04/24    Expected End:  01/18/24            STG - Patient will transfer sit to and from stand Leela with WW  (Progressing)       Start:  01/04/24    Expected End:  01/18/24

## 2024-01-16 NOTE — PROGRESS NOTES
"Frannie Blanco is a 73 y.o. female on day 12 of admission presenting with RSV (respiratory syncytial virus infection).    Subjective   Patient sitting in bed, states she continues to feel more SOB than her normal, patient now on baseline O2.  Patient to be changed from IV Lasix to PO, if medically stable may discharge tomorrow.  Patient pending acceptance from [facility].         Objective     Physical Exam  Constitutional:       Appearance: She is obese.   HENT:      Head: Normocephalic and atraumatic.      Nose: Nose normal.      Mouth/Throat:      Mouth: Mucous membranes are moist.   Eyes:      Extraocular Movements: Extraocular movements intact.   Cardiovascular:      Rate and Rhythm: Normal rate. Rhythm irregular.      Pulses: Normal pulses.      Heart sounds: Normal heart sounds.   Pulmonary:      Effort: Pulmonary effort is normal.      Comments: Diminished  Abdominal:      General: Bowel sounds are normal.      Palpations: Abdomen is soft.   Musculoskeletal:         General: Normal range of motion.      Cervical back: Normal range of motion.      Right lower leg: Edema present.      Left lower leg: Edema present.   Skin:     General: Skin is warm and dry.      Capillary Refill: Capillary refill takes less than 2 seconds.      Findings: Bruising present.   Neurological:      Mental Status: She is alert. Mental status is at baseline.      Motor: Weakness present.      Gait: Gait abnormal.   Psychiatric:         Mood and Affect: Mood normal.         Behavior: Behavior normal.         Last Recorded Vitals  Blood pressure 108/51, pulse 82, temperature 36.5 °C (97.7 °F), temperature source Temporal, resp. rate 20, height 1.676 m (5' 5.98\"), weight 140 kg (308 lb 10.3 oz), SpO2 90 %.  Intake/Output last 3 Shifts:  I/O last 3 completed shifts:  In: 840 (6 mL/kg) [P.O.:840]  Out: 2000 (14.3 mL/kg) [Urine:2000 (0.4 mL/kg/hr)]  Weight: 140 kg     Relevant Results  Scheduled medications  amiodarone, 200 mg, " oral, TID  aspirin, 81 mg, oral, Daily  atorvastatin, 20 mg, oral, Nightly  calcium carbonate, 1,500 mg, oral, q12h  dapsone, 100 mg, oral, Daily before breakfast  [Held by provider] dilTIAZem CD, 300 mg, oral, Daily  docusate sodium, 100 mg, oral, BID  ferrous sulfate (325 mg ferrous sulfate), 65 mg of iron, oral, Daily  tiotropium, 2 Inhalation, inhalation, Daily   And  fluticasone furoate-vilanteroL, 1 puff, inhalation, Daily  furosemide, 80 mg, oral, Daily  gabapentin, 200 mg, oral, BID  glimepiride, 2 mg, oral, Daily with breakfast  guaiFENesin, 600 mg, oral, BID  insulin lispro, 0-5 Units, subcutaneous, TID with meals  ipratropium-albuteroL, 3 mL, nebulization, TID  [Held by provider] losartan, 12.5 mg, oral, Daily  [Held by provider] melatonin, 6 mg, oral, Daily  metFORMIN, 1,000 mg, oral, BID with meals  metoprolol tartrate, 25 mg, oral, q6h  pantoprazole, 40 mg, oral, Daily before breakfast   Or  pantoprazole, 40 mg, intravenous, Daily before breakfast  potassium chloride CR, 40 mEq, oral, BID  predniSONE, 20 mg, oral, Daily  topiramate, 100 mg, oral, BID  [Held by provider] warfarin, 5 mg, oral, Daily      Continuous medications     PRN medications  PRN medications: acetaminophen **OR** acetaminophen **OR** acetaminophen, acetaminophen **OR** acetaminophen **OR** acetaminophen, albuterol, benzocaine-menthoL, bisacodyl, dextromethorphan-guaifenesin, dextrose 10 % in water (D10W), dextrose, glucagon, metoprolol, ondansetron ODT **OR** ondansetron, oxygen, oxygen    Results for orders placed or performed during the hospital encounter of 01/02/24 (from the past 24 hour(s))   POCT GLUCOSE   Result Value Ref Range    POCT Glucose 175 (H) 74 - 99 mg/dL   POCT GLUCOSE   Result Value Ref Range    POCT Glucose 293 (H) 74 - 99 mg/dL   POCT GLUCOSE   Result Value Ref Range    POCT Glucose 194 (H) 74 - 99 mg/dL   Protime-INR   Result Value Ref Range    Protime 37.7 (H) 9.8 - 12.8 seconds    INR 3.3 (H) 0.9 - 1.1    Basic metabolic panel   Result Value Ref Range    Glucose 76 74 - 99 mg/dL    Sodium 143 136 - 145 mmol/L    Potassium 4.0 3.5 - 5.3 mmol/L    Chloride 103 98 - 107 mmol/L    Bicarbonate 33 (H) 21 - 32 mmol/L    Anion Gap 11 10 - 20 mmol/L    Urea Nitrogen 39 (H) 6 - 23 mg/dL    Creatinine 1.06 (H) 0.50 - 1.05 mg/dL    eGFR 56 (L) >60 mL/min/1.73m*2    Calcium 9.1 8.6 - 10.3 mg/dL   B-type natriuretic peptide   Result Value Ref Range     (H) 0 - 99 pg/mL   CBC   Result Value Ref Range    WBC 7.9 4.4 - 11.3 x10*3/uL    nRBC 0.0 0.0 - 0.0 /100 WBCs    RBC 2.64 (L) 4.00 - 5.20 x10*6/uL    Hemoglobin 8.4 (L) 12.0 - 16.0 g/dL    Hematocrit 29.0 (L) 36.0 - 46.0 %     (H) 80 - 100 fL    MCH 31.8 26.0 - 34.0 pg    MCHC 29.0 (L) 32.0 - 36.0 g/dL    RDW 16.4 (H) 11.5 - 14.5 %    Platelets 206 150 - 450 x10*3/uL   POCT GLUCOSE   Result Value Ref Range    POCT Glucose 91 74 - 99 mg/dL     XR chest 1 view    Result Date: 1/16/2024  Interpreted By:  Kamaljit Tracy, STUDY: XR CHEST 1 VIEW;  1/16/2024 8:21 am   INDICATION: Signs/Symptoms:SOB.   COMPARISON: 01/13/2024 AP chest x-ray and the 01/14/2024 unenhanced thoracic CT.   ACCESSION NUMBER(S): [id number]   ORDERING CLINICIAN: NATHALIE RUCKER   FINDINGS: AP upright chest x-ray 01/16/2024 8:20 a.m.:       CARDIOMEDIASTINAL SILHOUETTE: The heart seems enlarged, but its size is almost certainly exaggerated by the AP lordotic projection. Mild vascular and pronounced mitral valve annulus calcification is noted. Coronary artery calcification also reported on CT can not be appreciated.   LUNGS: Very small bilateral pleural effusions demonstrated on the recent CT blunt the lateral costophrenic angles. Adjacent dense bilateral posteromedial lower lobe consolidation suggesting compression atelectasis seen on CT is vaguely visible. Markedly improved almost entirely right lung ground-glass changes seen on CT further improved since 01/13/2024 are difficult to appreciate.The  diaphragm is better defined than on 01/13/2024.   ABDOMEN: Peripherally calcified splenic artery aneurysms seen on CT measure up to 2.6 cm with magnification.   BONES: No acute osseous changes.       1. Continued improvement since 01/13/2024. Very small bilateral pleural effusions and associated lower lobe consolidation persist.       MACRO: None   Signed by: Kamaljit Tracy 1/16/2024 9:18 AM Dictation workstation:   [id number]    CT chest wo IV contrast    Result Date: 1/15/2024  Interpreted By:  Alanis Tirado, STUDY: CT CHEST WO IV CONTRAST;  1/14/2024 9:19 am   INDICATION: Signs/Symptoms:Increasing SOB.   COMPARISON: Chest radiograph 01/13/2024 chest CT 01/14/2024, 01/10/2024, 12/26/2023, 09/22/2023, oldest chest CT 10/25/2026   ACCESSION NUMBER(S): [id number]   ORDERING CLINICIAN: NATHALIE RCUKER   TECHNIQUE: Helical data acquisition of the chest was obtained  without IV contrast material.  Images were reformatted in axial, coronal, and sagittal planes.   FINDINGS: LUNGS and AIRWAYS: There are small bilateral pleural effusions. The right effusion is smaller There is right lower lobe infiltrate with bandlike volume loss abutting the pleural effusion. There is left lower lobe infiltrate with volume loss abutting the pleural effusion. The left lower infiltrate has progressed . There are bilateral patchy ground-glass opacities. There has been marked improvement. The previously seen partial volume loss of the right middle lobe has improved with re-expansion. There is biapical scarring   There is no pulmonary nodule.   FANG AND MEDIASTINUM: There is no hilar mediastinal adenopathy. There is no axillary adenopathy.     HEART and VESSELS: There is atherosclerotic calcification of the thoracic aorta   There is enlargement of the main pulmonary outflow tract and right and left main pulmonary arteries which may be secondary to pulmonary artery hypertension.   There is atherosclerotic calcification of the coronary  arteries the study is not optimized for evaluation of coronary arteries.   The cardiac chambers are not enlarged. There is calcification of the aortic valve   No evidence of pericardial effusion.   UPPER ABDOMEN: The visualized subdiaphragmatic structures demonstrate no remarkable findings. There is a 2.6 cm eggshell calcification in the splenic hilum. There is a 1.4 cm eggshell calcification of the splenic artery. There is a 1.1 cm eggshell calcification of the splenic artery. Findings are consistent with a calcified splenic artery aneurysms   CHEST WALL and OSSEOUS STRUCTURES: There are no suspicious osseous lesions. Multilevel degenerative changes are present.   The chest wall is unremarkable.   There is no soft tissue abnormality.       1. Small right pleural effusion decreased in size. 2. Right lower lobe infiltrate with partial volume loss, unchanged. 3. Small left pleural effusion, unchanged. 4. Progressive left lower lobe infiltrate with volume loss. 5. Marked improvement in bilateral patchy ground-glass opacities. Re-expansion right middle lobe.   MACRO: None   Signed by: Alanis Tirado 1/15/2024 9:05 AM Dictation workstation:   [id number]    XR chest 1 view    Result Date: 1/15/2024  Interpreted By:  Kamaljit Tracy, STUDY: XR CHEST 1 VIEW;  1/13/2024 4:13 pm   INDICATION: Signs/Symptoms:Incresing oxygen demand.   COMPARISON: 01/07/2024 AP chest x-ray, the 01/10/2024 thoracic CTA and the 01/14/2024 unenhanced thoracic CT.   ACCESSION NUMBER(S): [id number]   ORDERING CLINICIAN: NATHALIE RUCKER   FINDINGS: AP upright chest x-ray 01/13/2024 16:11 p.m.:       CARDIOMEDIASTINAL SILHOUETTE: Borderline heart size allowing for the AP projection. Mild vascular and pronounced mitral valve annulus calcification is noted. Coronary artery calcification demonstrated on CT can not be appreciated.   LUNGS: Small bilateral pleural effusions with adjacent basal lower lobe compression atelectasis. Widespread bilateral  ground-glass pulmonary changes present on 01/07/2024 and the 01/10/2024 CTA have resolved aside from mild right perihilar and medial lower lobe residual.   ABDOMEN: Calcified splenic artery aneurysms.   BONES: No acute osseous changes.       1. Small bilateral pleural effusions with associated compression atelectasis. 2. Mild right lung ground-glass changes, much improved since 01/07/2020.       MACRO: None   Signed by: Kamaljit Tracy 1/15/2024 9:04 AM Dictation workstation:   [id number]    CT angio chest for pulmonary embolism    Result Date: 1/10/2024  Interpreted By:  Yao Torres, STUDY: CT ANGIO CHEST FOR PULMONARY EMBOLISM;  1/10/2024 3:12 pm   INDICATION: Signs/Symptoms:worsening sob.   COMPARISON: 12/26/2023   ACCESSION NUMBER(S): [id number]   ORDERING CLINICIAN: OLAF SOLIMAN   TECHNIQUE: Helical data acquisition of the chest was obtained with  75 mL Omnipaque 350. Images were reformatted in axial, coronal, and sagittal planes.MIP reformatted images were also generated.   FINDINGS: Motion degraded exam.   LUNGS and AIRWAYS: Small left and moderate right layering pleural effusions. Overlying atelectasis. Increased severe atelectasis involving the right middle lobe which is nearly collapsed. There are diffusely increased patchy areas consolidation and ground-glass opacity scattered throughout both lungs. Interlobular septal thickening is also noted in some areas. The right middle lobe bronchus is at least partially occluded. The remaining central airways otherwise appear patent. No bronchiectasis.   MEDIASTINUM and FANG, LOWER NECK AND AXILLA: The visualized thyroid gland is grossly unremarkable.   No thoracic lymphadenopathy by CT criteria.   Possible small sliding hiatal hernia.   HEART and VESSELS: Mild cardiomegaly. Dense mitral annulus calcification.   No significant pericardial effusion.   No pulmonary embolism identified on motion degraded exam. Dilatation of the main pulmonary artery to 40 mm  suggests pulmonary hypertension.   Mild coronary atherosclerosis.   Thoracic aorta and great vessels are mildly atherosclerotic but otherwise patent without aneurysm.   UPPER ABDOMEN: 27 mm distal splenic artery aneurysm is probably thrombosed, not significantly changed to prior exam.   CHEST WALL and OSSEOUS STRUCTURES: No suspicious osseous lesions. Multilevel degenerative changes of the thoracic spine.       1.  No pulmonary embolism identified on motion degraded exam. 2. Pulmonary edema with right greater than left pleural effusions. 3. Near occlusion of the right middle lobe bronchus causing worsening atelectasis.     Signed by: Yao Torres 1/10/2024 3:28 PM Dictation workstation:   [id number]    ECG 12 lead    Result Date: 1/9/2024  Atrial fibrillation with rapid ventricular response Low voltage QRS Cannot rule out Anterior infarct , age undetermined Abnormal ECG When compared with ECG of 07-JAN-2024 00:28, (unconfirmed) No significant change was found    ECG 12 lead    Result Date: 1/9/2024  Atrial fibrillation with rapid ventricular response Abnormal ECG When compared with ECG of 02-JAN-2024 17:57, No significant change was found    ECG 12 lead    Result Date: 1/8/2024  Sinus rhythm with Premature atrial complexes with Aberrant conduction Cannot rule out Anterior infarct (cited on or before 02-JAN-2024) Abnormal ECG When compared with ECG of 02-JAN-2024 17:57, Sinus rhythm has replaced Atrial fibrillation    XR chest 1 view    Result Date: 1/7/2024  Interpreted By:  Mohsen Law, STUDY: XR CHEST 1 VIEW;  1/7/2024 4:04 pm   INDICATION: Signs/Symptoms:worsening pneumonia.   COMPARISON: Chest x-ray 01/02/2024   ACCESSION NUMBER(S): [id number]   ORDERING CLINICIAN: ALBERTO PICKETT   FINDINGS: Multiple overlying leads are present.   CARDIOMEDIASTINAL SILHOUETTE: Cardiomediastinal silhouette is stable in size and configuration. Dense mitral annular calcifications noted.   LUNGS: There are bilateral patchy  airspace opacities with slight interval improvement in the right lung base and slight interval worsening on the left. These findings may relate to developing edema versus infection. Small bilateral pleural effusions. No pneumothorax.   ABDOMEN: No remarkable upper abdominal findings.   BONES: Degenerative changes of the spine and bilateral shoulders.       There are patchy bilateral airspace opacities with slight interval worsening on the and slight interval improvement on the right. Findings may relate to edema versus infection. Attention on continued short-term follow-up is advised.   Small bilateral pleural effusions are noted with slight interval improvement on the right.   MACRO: None   Signed by: Mohsen Law 1/7/2024 4:18 PM Dictation workstation:   [id number]    CT head wo IV contrast    Result Date: 1/7/2024  Interpreted By:  Jaxon Fernandez, STUDY: CT ANGIO HEAD AND NECK W AND WO IV CONTRAST; CT HEAD WO IV CONTRAST; 1/7/2024 1:20 am; 1/7/2024 1:10 am   INDICATION: Signs/Symptoms:Confusion.   COMPARISON: CT and CT angiogram dated 01/06/2024.   ACCESSION NUMBER(S): [id number]; [id number]   ORDERING CLINICIAN: JAC SANTOS   TECHNIQUE: Unenhanced CT images of the head were obtained. Subsequently,  75 mL Omnipaque 350 was administered intravenously and axial images of the head and neck were acquired.  Coronal, sagittal, and 3-D reconstructions were provided for review.   FINDINGS: There is a mostly calcified meningioma within the upper left parietal convexity measuring 1.7 x 1.5 cm and also calcified meningioma measuring 1.1 x 8.2 cm overlying the inferolateral right frontal lobe. No evidence of associated vasogenic edema or mass effect. There is no evidence of midline shift, hydrocephalus, or acute intracranial hemorrhage. Gray-white matter differentiation is maintained.   There is a small amount of fluid in the caudal mastoid air cells. There is mild to moderate polypoid mucosal thickening of the  left maxillary sinus and anterior ethmoid air cells. These findings are unchanged.   CTA HEAD FINDINGS:   Anterior circulation: The bilateral intracranial internal carotid arteries, bilateral carotid terminals, bilateral proximal anterior and middle cerebral arteries are normal.   Posterior circulation: Bilateral intracranial vertebral arteries, vertebrobasilar junction, basilar artery and proximal posterior cerebral arteries are normal.   Venous contamination limits evaluation for small aneurysms, without gross evidence of intracranial aneurysm.   CTA NECK FINDINGS:   Right carotid vessels: There are mild atherosclerotic calcifications of the carotid bifurcation with 0% stenosis by NASCET criteria. The remainder of the right internal cervical carotid artery is widely patent without focal stenosis.   Left carotid vessels: The common carotid artery is normal. The carotid bifurcation is normal. The internal carotid artery in the neck is normal. There is 0% stenosis by NASCET criteria. .   Vertebral vessels:  The visualized segments of the cervical vertebral arteries are normal in caliber.   There is redemonstration of multifocal nodular and ground-glass opacities in the visualized upper lung fields suspicious for multifocal pneumonia. There is also small right pleural effusion partially visualized.       No evidence of acute cortical infarct or acute intracranial hemorrhage. There are calcified meningiomas noted overlying the upper left frontoparietal convexity and overlying the inferolateral right frontal lobe without evidence of significant mass effect or vasogenic edema. No interval change.   No evidence for significant stenosis of the cervical vessels.   No evidence for significant stenosis or large branch vessel cutoffs of the intracranial vessels.   Partially visualized ground-glass and nodular opacities in the upper lung fields suspicious for multifocal pneumonia for example viral pneumonia.   MACRO:   None    Signed by: Jaxon Fernandez 1/7/2024 2:06 AM Dictation workstation:   [id number]    CT angio head and neck w and wo IV contrast    Result Date: 1/7/2024  Interpreted By:  Jaxon Fernandez, STUDY: CT ANGIO HEAD AND NECK W AND WO IV CONTRAST; CT HEAD WO IV CONTRAST; 1/7/2024 1:20 am; 1/7/2024 1:10 am   INDICATION: Signs/Symptoms:Confusion.   COMPARISON: CT and CT angiogram dated 01/06/2024.   ACCESSION NUMBER(S): [id number]; [id number]   ORDERING CLINICIAN: JAC SANTOS   TECHNIQUE: Unenhanced CT images of the head were obtained. Subsequently,  75 mL Omnipaque 350 was administered intravenously and axial images of the head and neck were acquired.  Coronal, sagittal, and 3-D reconstructions were provided for review.   FINDINGS: There is a mostly calcified meningioma within the upper left parietal convexity measuring 1.7 x 1.5 cm and also calcified meningioma measuring 1.1 x 8.2 cm overlying the inferolateral right frontal lobe. No evidence of associated vasogenic edema or mass effect. There is no evidence of midline shift, hydrocephalus, or acute intracranial hemorrhage. Gray-white matter differentiation is maintained.   There is a small amount of fluid in the caudal mastoid air cells. There is mild to moderate polypoid mucosal thickening of the left maxillary sinus and anterior ethmoid air cells. These findings are unchanged.   CTA HEAD FINDINGS:   Anterior circulation: The bilateral intracranial internal carotid arteries, bilateral carotid terminals, bilateral proximal anterior and middle cerebral arteries are normal.   Posterior circulation: Bilateral intracranial vertebral arteries, vertebrobasilar junction, basilar artery and proximal posterior cerebral arteries are normal.   Venous contamination limits evaluation for small aneurysms, without gross evidence of intracranial aneurysm.   CTA NECK FINDINGS:   Right carotid vessels: There are mild atherosclerotic calcifications of the carotid bifurcation with 0%  stenosis by NASCET criteria. The remainder of the right internal cervical carotid artery is widely patent without focal stenosis.   Left carotid vessels: The common carotid artery is normal. The carotid bifurcation is normal. The internal carotid artery in the neck is normal. There is 0% stenosis by NASCET criteria. .   Vertebral vessels:  The visualized segments of the cervical vertebral arteries are normal in caliber.   There is redemonstration of multifocal nodular and ground-glass opacities in the visualized upper lung fields suspicious for multifocal pneumonia. There is also small right pleural effusion partially visualized.       No evidence of acute cortical infarct or acute intracranial hemorrhage. There are calcified meningiomas noted overlying the upper left frontoparietal convexity and overlying the inferolateral right frontal lobe without evidence of significant mass effect or vasogenic edema. No interval change.   No evidence for significant stenosis of the cervical vessels.   No evidence for significant stenosis or large branch vessel cutoffs of the intracranial vessels.   Partially visualized ground-glass and nodular opacities in the upper lung fields suspicious for multifocal pneumonia for example viral pneumonia.   MACRO:   None   Signed by: Jaxon Fernandez 1/7/2024 2:06 AM Dictation workstation:   [id number]    CT angio brain attack head w IV contrast and post procedure    Result Date: 1/6/2024  Interpreted By:  Jazmyne Teresa, STUDY: CT ANGIO BRAIN ATTACK HEAD W IV CONTRAST AND POST PROCEDURE; CT ANGIO BRAIN ATTACK NECK W IV CONTRAST AND POST PROCEDURE;  1/6/2024 5:16 pm   INDICATION: Signs/Symptoms:AMS.   COMPARISON: None.   ACCESSION NUMBER(S): [id number]; [id number]   ORDERING CLINICIAN: LYNN FLORES   TECHNIQUE: 75 mL Omnipaque 350 was administered intravenously and axial images of the head and neck were acquired.  Coronal, sagittal, and 3-D reconstructions were provided for review.    The technologist notes the patient had multiple IV malfunctions.   FINDINGS: CT head: Please see CT head performed the same day for intracranial findings.   CTA HEAD FINDINGS:   The examination is degraded by venous contamination.   Anterior circulation: The bilateral intracranial internal carotid arteries, bilateral carotid terminals, bilateral proximal anterior and middle cerebral arteries are patent.   Posterior circulation: Bilateral intracranial vertebral arteries, vertebrobasilar junction, basilar artery and proximal posterior cerebral arteries are patent.   CTA NECK FINDINGS:   The examination is limited by the timing of scan relative to the contrast injection.   Mild atherosclerotic calcification along the aortic arch. Within the limitation of patient motion artifact no significant stenosis at the origins of the great vessels.   Right carotid vessels: The common carotid artery is patent. Mild calcified plaque at the carotid bifurcation without significant stenosis by NASCET criteria. The internal carotid artery in the neck is patent without significant stenosis.   Left carotid vessels: The common carotid artery is patent.. The carotid bifurcation is patent without significant stenosis. The internal carotid artery in the neck is patent without significant stenosis.   Vertebral vessels:  The visualized segments of the cervical vertebral arteries are patent.   The left thyroid lobe is slightly asymmetrically larger than the right with a calcification. Please see thyroid ultrasound 07/24/2023 for further details. Incidental note of multiple tonsilloliths. There is prominent ossification of the stylohyoid ligament bilaterally. Soft tissues of the neck are otherwise unremarkable.   There are patchy opacities within the visualized upper lungs bilaterally concerning for multifocal pneumonia. There is a small pleural effusion on the right. Mild degenerative changes of the cervical spine.       The examination is  degraded by venous contamination, patient motion artifact and difficulties with the IV. Within this limitation:   CTA neck:   No evidence for significant stenosis of the cervical vessels.   Patchy opacities within the upper lungs bilaterally concerning for multifocal pneumonia. There is a small pleural effusion on the right.   CTA head:   No evidence for significant stenosis or large branch vessel cutoffs of the intracranial vessels.   Please see CT head performed the same day for intracranial findings.   MACRO: None   Signed by: Jazmyne Teresa 1/6/2024 5:34 PM Dictation workstation:   [id number]    CT angio brain attack neck w IV contrast and post procedure    Result Date: 1/6/2024  Interpreted By:  Jazmyne Teresa, STUDY: CT ANGIO BRAIN ATTACK HEAD W IV CONTRAST AND POST PROCEDURE; CT ANGIO BRAIN ATTACK NECK W IV CONTRAST AND POST PROCEDURE;  1/6/2024 5:16 pm   INDICATION: Signs/Symptoms:AMS.   COMPARISON: None.   ACCESSION NUMBER(S): [id number]; [id number]   ORDERING CLINICIAN: LYNN FLORES   TECHNIQUE: 75 mL Omnipaque 350 was administered intravenously and axial images of the head and neck were acquired.  Coronal, sagittal, and 3-D reconstructions were provided for review.   The technologist notes the patient had multiple IV malfunctions.   FINDINGS: CT head: Please see CT head performed the same day for intracranial findings.   CTA HEAD FINDINGS:   The examination is degraded by venous contamination.   Anterior circulation: The bilateral intracranial internal carotid arteries, bilateral carotid terminals, bilateral proximal anterior and middle cerebral arteries are patent.   Posterior circulation: Bilateral intracranial vertebral arteries, vertebrobasilar junction, basilar artery and proximal posterior cerebral arteries are patent.   CTA NECK FINDINGS:   The examination is limited by the timing of scan relative to the contrast injection.   Mild atherosclerotic calcification along the aortic arch. Within  the limitation of patient motion artifact no significant stenosis at the origins of the great vessels.   Right carotid vessels: The common carotid artery is patent. Mild calcified plaque at the carotid bifurcation without significant stenosis by NASCET criteria. The internal carotid artery in the neck is patent without significant stenosis.   Left carotid vessels: The common carotid artery is patent.. The carotid bifurcation is patent without significant stenosis. The internal carotid artery in the neck is patent without significant stenosis.   Vertebral vessels:  The visualized segments of the cervical vertebral arteries are patent.   The left thyroid lobe is slightly asymmetrically larger than the right with a calcification. Please see thyroid ultrasound 07/24/2023 for further details. Incidental note of multiple tonsilloliths. There is prominent ossification of the stylohyoid ligament bilaterally. Soft tissues of the neck are otherwise unremarkable.   There are patchy opacities within the visualized upper lungs bilaterally concerning for multifocal pneumonia. There is a small pleural effusion on the right. Mild degenerative changes of the cervical spine.       The examination is degraded by venous contamination, patient motion artifact and difficulties with the IV. Within this limitation:   CTA neck:   No evidence for significant stenosis of the cervical vessels.   Patchy opacities within the upper lungs bilaterally concerning for multifocal pneumonia. There is a small pleural effusion on the right.   CTA head:   No evidence for significant stenosis or large branch vessel cutoffs of the intracranial vessels.   Please see CT head performed the same day for intracranial findings.   MACRO: None   Signed by: Jazmyne Teresa 1/6/2024 5:34 PM Dictation workstation:   [id number]    CT brain attack head wo IV contrast    Result Date: 1/6/2024  Interpreted By:  Richie Wren, STUDY: CT BRAIN ATTACK HEAD WO IV CONTRAST;   1/6/2024 4:37 pm   INDICATION: Signs/Symptoms:AMS.   COMPARISON: 11/18/2012   ACCESSION NUMBER(S): [id number]   ORDERING CLINICIAN: LYNN FLORES   TECHNIQUE: Noncontrast axial CT images of head were obtained with coronal and sagittal reconstructed images.   FINDINGS: BRAIN PARENCHYMA: Mild periventricular and subcortical hemispheric white matter hypodensities are most compatible with chronic small vessel ischemic disease. No acute intraparenchymal hemorrhage or parenchymal evidence of acute large territory ischemic infarct. No mass-effect. Gray-white matter distinction is preserved.   VENTRICLES and EXTRA-AXIAL SPACES: There is a 1.5 cm calcified meningioma arising from the left parasagittal frontal parietal dura. There is another 1.3 cm more densely calcified meningioma arising the right frontotemporal dura. No acute extra-axial or intraventricular hemorrhage. No effacement of cerebral sulci. Ventricles and sulci are age-concordant. There is a partial empty sella.   PARANASAL SINUSES/MASTOIDS: Trace fluid left mastoid air cells. No hemorrhage or air-fluid levels within the visualized paranasal sinuses. The mastoids are well aerated.   CALVARIUM/ORBITS:  No skull fracture.  The orbits and globes are intact to the extent visualized.   EXTRACRANIAL SOFT TISSUES: No discernible abnormality.       1. No evidence of acute intracranial hemorrhage, mass effect, or parenchymal evidence of an acute large territory ischemic infarct.   2. Calcified meningiomas rising from the left parasagittal frontal parietal dura and right frontotemporal dura measuring 1.5 cm and 1.3 cm, respectively. No significant mass effect.     MACRO: Richie Wren discussed the significance and urgency of this critical finding [facility]  LYNN FLORES on 1/6/2024 at 4:50 p.m. with confirmation of receipt. (**-RCF-**) Findings:  See findings.   Signed by: Richie Wren 1/6/2024 4:53 PM Dictation workstation:    [id number]    ECG 12 lead    Result Date: 1/3/2024  Atrial fibrillation with rapid ventricular response Low voltage QRS Cannot rule out Anterior infarct , age undetermined Abnormal ECG When compared with ECG of 13-DEC-2023 09:49, Atrial fibrillation has replaced Sinus rhythm See ED provider note for full interpretation and clinical correlation Confirmed by Jamir Salvador ([id number]) on 1/3/2024 10:49:37 PM    XR chest 1 view    Result Date: 1/2/2024  Interpreted By:  Soren Ham, STUDY: XR CHEST 1 VIEW;  1/2/2024 7:26 pm   INDICATION: Signs/Symptoms:sob.   COMPARISON: 9/26/2023   ACCESSION NUMBER(S): [id number]   ORDERING CLINICIAN: CARMEN CONKLIN   FINDINGS: The cardiac silhouette is stable in size. There are bilateral opacities concerning for infiltrates. Small right pleural effusion and basilar atelectasis or airspace disease. No pneumothorax.       Small right pleural effusion and basilar atelectasis or airspace disease and mild bilateral opacities concerning for infiltrates.   MACRO: None   Signed by: Soren Ham 1/2/2024 7:38 PM Dictation workstation:   [id number]    CT chest wo IV contrast    Result Date: 12/26/2023  Interpreted By:  Richie Wren, STUDY: CT CHEST WO IV CONTRAST;  12/26/2023 6:03 pm   INDICATION: Signs/Symptoms:cough.   COMPARISON: 09/22/2023 CT chest   ACCESSION NUMBER(S): [id number]   ORDERING CLINICIAN: JANNET VEE   TECHNIQUE: Contiguous axial images of the chest and upper abdomen were obtained without contrast. Coronal and sagittal reformatted images were reconstructed from the axial data.   FINDINGS:     MEDIASTINUM AND LYMPH NODES:  The esophagus appears within normal limits.  No enlarged intrathoracic or axillary lymph nodes by imaging criteria. No pneumomediastinum.   VESSELS:  Normal caliber thoracic aorta. Mild aortic atherosclerosis. The pulmonary artery is enlarged, measuring up to 3.6 cm, indicative of pulmonary hypertension.   HEART: There is left atrial dilatation.  Mitral annular calcifications. Mild coronary artery calcifications. No significant pericardial effusion.   LUNG, AIRWAYS, AND PLEURA: New small bilateral pleural effusions with adjacent passive atelectasis. There is new subsegmental atelectasis in the right middle lobe. There is a small amount debris in the lower trachea extending into the mainstem bronchi and bronchus intermedius. There is a small opacity in the left upper lobe and superior segment of left lower lobe consisting of tree-in-bud nodules suspicious for pneumonia the   OSSEOUS STRUCTURES: No acute osseous abnormality.   CHEST WALL SOFT TISSUES: No discernible abnormality.   UPPER ABDOMEN/OTHER: Multiple peripherally calcified splenic artery aneurysm measuring 1.3 cm, 1.3 cm, and 2.8 cm are similar to prior study. The liver appears cirrhotic.       1. Tree-in-bud opacities in the posterior left upper lobe and superior segment of the left lower lobe consistent with bronchiolitis/early pneumonia.   2. Small bilateral pleural effusions with adjacent passive atelectasis and right middle lobe subsegmental atelectasis.   3. Small amount of debris in the trachea and bilateral proximal bronchi that could be secretions or aspiration.   4. Three splenic artery aneurysm measuring up to 2.8 cm, unchanged.   MACRO: None.   Signed by: Richie Wren 12/26/2023 6:29 PM Dictation workstation:   [id number]       Assessment/Plan   Principal Problem:    RSV (respiratory syncytial virus infection)  Active Problems:    Shortness of breath    #Acute on chronic hypoxic respiratory failure 2/2 RSV, community acquired pneumonia, improving   #COPD, in acute exacerbation  - Patient was seen in [place] ED 12/26, discharged on dexamethasone and doxycycline  - WBC 13.9 >> 5.3 > 11.4 > 10.8 > 9.7 > 7.1 > 6.8 > 7.9  - Lactate 1.9  - COVID, flu A/B negative  - RSV positive  - Procal 0.75  - BCx no growth- final report   - sputum culture contained significant salivary contamination;  repeat pending   - CXR: Small right pleural effusion and basilar atelectasis or airspace disease and mild bilateral opacities concerning for infiltrates.    - CT chest (1/10): 1.  No pulmonary embolism identified on motion degraded exam.  2. Pulmonary edema with right greater than left pleural effusions.  3. Near occlusion of the right middle lobe bronchus causing worsening  atelectasis.  - Start metaneb, continue mucinex  - Tylenol PRN for fever   - Mucinex BID, Robitussin DM q4h PRN for cough   - DuoNebs TID    - Breo and Spiriva ordered (pharmacy substitute for home Trelegy)  - RT eval and treat protocol  - Bronchial hygiene  - Isolation protocol for RSV for 10 days, discontinue isolation   - Baseline O2 5L continuously   - Wean O2 as tolerated; patient currently requiring 5L  - Solumedrol 40 mg IVP q8h changed to prednisone 40 mg PO every day due to encephalopathy   - CXR 1/8: There are patchy bilateral airspace opacities with slight interval   worsening on the and slight interval improvement on the right. Findings may relate to edema versus infection. Attention on continued short-term follow-up is advised. Small bilateral pleural effusions are noted with slight interval improvement on the right.   - ID consulted for worsening pneumonia, appreciate recs   -CXR   1. Small bilateral pleural effusions with associated compression  atelectasis.  2. Mild right lung ground-glass changes, much improved since  01/07/2020.  -  - Lasix 80 mg PO Daily   - CT Chest  1. Small right pleural effusion decreased in size.  2. Right lower lobe infiltrate with partial volume loss, unchanged.  3. Small left pleural effusion, unchanged.  4. Progressive left lower lobe infiltrate with volume loss.  5. Marked improvement in bilateral patchy ground-glass opacities.  Re-expansion right middle lobe.  -CXR ordered     #Atrial fibrillation with RVR, s/p cardioversion   #HTN  #HLD  #HFpEF, concern for acute  exacerbation  #Supratherapeutic INR on warfarin   - Recent D[facility]V on 12/13 at [facility], follows with Dr. Siegel   - Trop 7 > 7   - >285>368 > 243 > 268 > 238 > 274  - Telemetry monitoring- notified by charge RN this morning that patient's HR has been consistently in the 120s-130s on telemetry. Upon reviewing the flowsheet documentation for 1/5-1/6, only two instances with HR > 110 bpm are charted  - Patient was transferred to ICU overnight and placed on a diltiazem drip for HR persistently in the 130s  - Cardioversion completed today; patient remains in AF with rate controlled in the 90s   - Start amiodarone 200 mg PO TID x 7 days per cardiology   - Lopressor 5 mg IVP q4h PRN for HR > 120 bpm sustained for > 5 mins   - Continue aspirin, atorvastatin, furosemide, and losartan  - INR 2.3 > 3.0 > 3.8 > 3.1 > 2.2 > 2.7 > 3.2  - Warfarin on hold  - Goal INR 2-3, daily PT/INR while inpatient   - Strict I&Os: LOS - 9471.4  - Daily weights  - 2g Na diet, 1800 mL FR   - Cardiology consulted, appreciate recs   - Patient back into afib with RVR on 1/10  - Cardiology added metop TID for rate control  - HR 150s - 170s with ambulation, resolves with rest.     #Hematuria  - UA: 3+ blood, > 20 RBCs, 1+ bacteria  - UCx with no significant growth   - Patient denies gross blood in urine or difficulties with urination   - Monitor UOP for blood - none reported as of 1/8  - Trend CBC; H/H stable      #T2DM with neuropathy   - Continue Lantus, metformin and glimepiride   - Gabapentin decreased to 200 mg BID due to change in mental status  - SSI three times a day before meals while on steroids   - Hypoglycemia protocol  - Accuchecks ac/hs/prn  - Diabetic diet   - HbA1c 4.6      #Anemia, macrocytic  - H/H stable 8.4/29.2 > 8.4/29.1  > 8.4/29 with MCV  109 > 109 > 110  - B12 level was 267 last month per chart review   - Folate 6.2   - Iron studies: Fe 28, TIBC 272, 10% saturation  - Patient receives monthly B12 injections and is on  ferrous sulfate; continue   - Monitor for active bleeding  - Daily CBC to trend H/H     #History of mucous membrane pemphigoid  - Continue dapsone  - Resume outpatient derm follow up on discharge      #Hypokalemia   - K+ 3.4 > 3.3 > 3.8 > 4.0  - K+ 40 meq given 1/13  - K+ 80 meq given 1/14        DVT ppx  -warfarin     F: PRN  E: Replete per protocol  N: ADA, Cardiac, 1800ml FR  A: PIV     Disposition: Pt requires more than 2 inpatient days at this time   Code Status: Full Code     Total accumulated time spent face to face and not face to face preparing to see the patient, obtaining and reviewing separately obtained history; performing a medically appropriate examination and/or evaluation; counseling and educating the patient, family; ordering medications, tests, or procedures; referring and communicating with other health care professionals; documenting clinical information in the patient's medical record; independently interpreting results and communicating the results to the patient, family; and care coordination was 45 minutes.       Kadie Luque, APRN-CNP

## 2024-01-16 NOTE — CARE PLAN
The patient's goals for the shift include increase activity this shift    The clinical goals for the shift include Patient to tolerate weaning of oxygen this shift      Problem: Pain - Adult  Goal: Verbalizes/displays adequate comfort level or baseline comfort level  Outcome: Progressing     Problem: Fall/Injury  Goal: Verbalize understanding of personal risk factors for fall in the hospital  Outcome: Progressing  Goal: Verbalize understanding of risk factor reduction measures to prevent injury from fall in the home  Outcome: Progressing  Goal: Use assistive devices by end of the shift  Outcome: Progressing  Goal: Pace activities to prevent fatigue by end of the shift  Outcome: Progressing     Problem: Respiratory  Goal: Clear secretions with interventions this shift  Outcome: Progressing  Goal: Minimize anxiety/maximize coping throughout shift  Outcome: Progressing  Goal: Minimal/no exertional discomfort or dyspnea this shift  Outcome: Progressing  Goal: Tolerate pulmonary toileting this shift  Outcome: Progressing  Goal: Verbalize decreased shortness of breath this shift  Outcome: Progressing  Goal: Wean oxygen to maintain O2 saturation per order/standard this shift  Outcome: Progressing  Goal: Increase self care and/or family involvement in next 24 hours  Outcome: Progressing     Problem: Discharge Planning  Goal: Discharge to home or other facility with appropriate resources  Outcome: Progressing     Problem: Chronic Conditions and Co-morbidities  Goal: Patient's chronic conditions and co-morbidity symptoms are monitored and maintained or improved  Outcome: Progressing     Problem: Arrythmia/Dysrhythmia  Goal: Lab values return to normal range  Outcome: Progressing  Goal: No evidence of post procedure complications  Outcome: Progressing  Goal: Promote self management  Outcome: Progressing  Goal: Serial ECG will return to baseline  Outcome: Progressing  Goal: Verbalize understanding of  procedures/devices  Outcome: Progressing  Goal: Vital signs return to baseline  Outcome: Progressing     Problem: Skin  Goal: Prevent/minimize sheer/friction injuries  Outcome: Progressing  Goal: Decreased wound size/increased tissue granulation at next dressing change  Outcome: Progressing  Goal: Participates in plan/prevention/treatment measures  Outcome: Progressing  Goal: Prevent/manage excess moisture  Outcome: Progressing  Goal: Promote/optimize nutrition  Outcome: Progressing  Goal: Promote skin healing  Outcome: Progressing

## 2024-01-16 NOTE — CARE PLAN
Problem: Pain - Adult  Goal: Verbalizes/displays adequate comfort level or baseline comfort level  1/16/2024 0008 by Favio Arrieta RN  Outcome: Progressing  1/16/2024 0004 by Favio Arrieta RN  Outcome: Progressing  1/15/2024 2355 by Favio Arrieta RN  Outcome: Progressing     Problem: Fall/Injury  Goal: Verbalize understanding of personal risk factors for fall in the hospital  1/16/2024 0008 by Favio Arrieta RN  Outcome: Progressing  1/16/2024 0004 by Favio Arrieta RN  Outcome: Progressing  1/15/2024 2355 by Favio Arrieta RN  Outcome: Progressing  Goal: Verbalize understanding of risk factor reduction measures to prevent injury from fall in the home  1/16/2024 0008 by Favio Arrieta RN  Outcome: Progressing  1/16/2024 0004 by Favio Arrieta RN  Outcome: Progressing  1/15/2024 2355 by Favio Arrieta RN  Outcome: Progressing  Goal: Use assistive devices by end of the shift  1/16/2024 0008 by Favio Arrieta RN  Outcome: Progressing  1/16/2024 0004 by Favio Arrieta RN  Outcome: Progressing  1/15/2024 2355 by Favio Arrieta RN  Outcome: Progressing  Goal: Pace activities to prevent fatigue by end of the shift  1/16/2024 0008 by Favio Arrieta RN  Outcome: Progressing  1/16/2024 0004 by Favio Arrieta RN  Outcome: Progressing  1/15/2024 2355 by Favio Arrieta RN  Outcome: Progressing     Problem: Respiratory  Goal: Clear secretions with interventions this shift  1/16/2024 0008 by Favio Arrieta RN  Outcome: Progressing  1/16/2024 0004 by Favio Arrieta RN  Outcome: Progressing  1/15/2024 2355 by Favio Arrieta RN  Outcome: Progressing  Goal: Minimize anxiety/maximize coping throughout shift  1/16/2024 0008 by Favio Arrieta RN  Outcome: Progressing  1/16/2024 0004 by Favio Arrieta RN  Outcome: Progressing  1/15/2024 2355 by Favio Arrieta RN  Outcome: Progressing  Goal: Minimal/no exertional discomfort or dyspnea this shift  1/16/2024 0008 by Favio Arrieta RN  Outcome:  Progressing  1/16/2024 0004 by Favio Arrieta RN  Outcome: Progressing  1/15/2024 2355 by Favio Arrieta RN  Outcome: Progressing  Goal: Tolerate pulmonary toileting this shift  1/16/2024 0008 by Favio Arrieta RN  Outcome: Progressing  1/16/2024 0004 by Favio Arrieta RN  Outcome: Progressing  1/15/2024 2355 by Favio Arrieta RN  Outcome: Progressing  Goal: Verbalize decreased shortness of breath this shift  1/16/2024 0008 by Favio Arrieta RN  Outcome: Progressing  1/16/2024 0004 by Favio Arrieta RN  Outcome: Progressing  1/15/2024 2355 by Favio Arrieta RN  Outcome: Progressing  Goal: Wean oxygen to maintain O2 saturation per order/standard this shift  1/16/2024 0008 by Favio Arrieta RN  Outcome: Progressing  1/16/2024 0004 by Favio Arrieta RN  Outcome: Progressing  1/15/2024 2355 by Favio Arrieta RN  Outcome: Progressing  Goal: Increase self care and/or family involvement in next 24 hours  1/16/2024 0008 by Favio Arrieta RN  Outcome: Progressing  1/16/2024 0004 by Favio Arrieta RN  Outcome: Progressing  1/15/2024 2355 by Favio Arrieta RN  Outcome: Progressing     Problem: Discharge Planning  Goal: Discharge to home or other facility with appropriate resources  1/16/2024 0008 by Favio Arrieta RN  Outcome: Progressing  1/16/2024 0004 by Favio Arrieta RN  Outcome: Progressing  1/15/2024 2355 by Favio Arrieta RN  Outcome: Progressing     Problem: Chronic Conditions and Co-morbidities  Goal: Patient's chronic conditions and co-morbidity symptoms are monitored and maintained or improved  1/16/2024 0008 by Favio Arrieta RN  Outcome: Progressing  1/16/2024 0004 by Favio Arrieta RN  Outcome: Progressing  1/15/2024 2355 by Favio Arrieta RN  Outcome: Progressing     Problem: Arrythmia/Dysrhythmia  Goal: Lab values return to normal range  1/16/2024 0008 by Favio Arrieta RN  Outcome: Progressing  1/16/2024 0004 by Favio Arrieta RN  Outcome: Progressing  1/15/2024 2355 by Favio Arrieta  RN  Outcome: Progressing  Goal: No evidence of post procedure complications  1/16/2024 0008 by Favio Arrieta RN  Outcome: Progressing  1/16/2024 0004 by Favio Arrieta RN  Outcome: Progressing  1/15/2024 2355 by Favio Arrieta RN  Outcome: Progressing  Goal: Promote self management  1/16/2024 0008 by Favio Arrieta RN  Outcome: Progressing  1/16/2024 0004 by Favio Arrieta RN  Outcome: Progressing  1/15/2024 2355 by Favio Arrieta RN  Outcome: Progressing  Goal: Serial ECG will return to baseline  1/16/2024 0008 by Favio Arrieta RN  Outcome: Progressing  1/16/2024 0004 by Favio Arrieta RN  Outcome: Progressing  1/15/2024 2355 by Favio Arrieta RN  Outcome: Progressing  Goal: Verbalize understanding of procedures/devices  1/16/2024 0008 by Favio Arrieta RN  Outcome: Progressing  1/16/2024 0004 by Favio Arrieta RN  Outcome: Progressing  1/15/2024 2355 by Favio Arrieta RN  Outcome: Progressing  Goal: Vital signs return to baseline  1/16/2024 0008 by Favio Arrieta RN  Outcome: Progressing  1/16/2024 0004 by Favio Arrieta RN  Outcome: Progressing  1/15/2024 2355 by Favio Arrieta RN  Outcome: Progressing     Problem: Skin  Goal: Prevent/minimize sheer/friction injuries  1/16/2024 0008 by Favio Arrieta RN  Outcome: Progressing  1/16/2024 0006 by Favio Arrieta RN  Outcome: Progressing  1/16/2024 0004 by Favio Arrieta RN  Outcome: Progressing  1/15/2024 2355 by Favio Arrieta RN  Outcome: Progressing  Goal: Decreased wound size/increased tissue granulation at next dressing change  1/16/2024 0008 by Favio Arrieta RN  Outcome: Progressing  1/16/2024 0006 by Favio Arrieta RN  Outcome: Progressing  1/16/2024 0004 by Favio Arrieta RN  Outcome: Progressing  1/15/2024 2355 by Favio Arrieta RN  Outcome: Progressing  Goal: Participates in plan/prevention/treatment measures  1/16/2024 0008 by Favio Arrieta RN  Outcome: Progressing  1/16/2024 0006 by Favio Arrieta RN  Outcome:  Progressing  1/16/2024 0004 by Favio Arrieta RN  Outcome: Progressing  1/15/2024 2355 by Favio Arrieta RN  Outcome: Progressing  Goal: Prevent/manage excess moisture  1/16/2024 0008 by Favio Arrieta RN  Outcome: Progressing  1/16/2024 0006 by Favio Arrieta RN  Outcome: Progressing  1/16/2024 0004 by Favio Arrieta RN  Outcome: Progressing  1/15/2024 2355 by Favio Arrieta RN  Outcome: Progressing  Goal: Promote/optimize nutrition  1/16/2024 0008 by Favio Arrieta RN  Outcome: Progressing  1/16/2024 0006 by Favio Arrieta RN  Outcome: Progressing  1/16/2024 0004 by Favio Arrieta RN  Outcome: Progressing  1/15/2024 2355 by Favio Arrieta RN  Outcome: Progressing  Goal: Promote skin healing  1/16/2024 0008 by Favio Arrieta RN  Outcome: Progressing  1/16/2024 0006 by Favio Arrieta RN  Outcome: Progressing  1/16/2024 0004 by Favio Arrieta RN  Outcome: Progressing  1/15/2024 2355 by Favio Arrieta RN  Outcome: Progressing   The patient's goals for the shift include increase activity this shift    The clinical goals for the shift include remian on baseline oxygen of 5liters nc

## 2024-01-16 NOTE — PROGRESS NOTES
"Patient is day 12 admission for respiratory failure related to RSV/PNE.  ADOD 1 day.  Discussed plan of care in interdisciplinary rounds,  patient was weaned to baseline 5LNC and maintaining well.   Anticipating discharge for tomorrow.      Met with patient at bedside to discuss discharge planning, patient maintains plans for [facility] as FOC and [facility] as second choice.   Referral updated and sent to both facilities.      UPDATE 1410:  [facility] is willing and able to accept on discharge.   [facility] is still reviewing, facility recommending LTACH prior to discharge to SNF.   Facility updated that patient is at her baseline 5LNC and does not have indication for LTACH.   Facility states, \"Ok, will continue to follow.. Thank you\", no confirmed acceptance at this time.     [facility] will need to send updates to [facility] in the morning, if they are still unable to confirm that they can accept, patient is agreeable to discharge to [facility].   No precert will be needed.    Discharge plan NOT secure  DO NOT DC without speaking to care coordination   MD will need to sign/certify the [place] for skilled rehab prior to discharge        "

## 2024-01-16 NOTE — PROGRESS NOTES
Occupational Therapy    Occupational Therapy Treatment    Name: Frannie Blanco  MRN: [id number]  : 1950  Date: 24  Time Calculation  Start Time: 1358  Stop Time: 1417  Time Calculation (min): 19 min    Assessment:  OT Assessment: Pt. displays increased  participation wtih education and encouragement provided today. Pt. displays decreased endurance and would benefit from OT for continued activity to return to [facility].  Prognosis: Good  Barriers to Discharge: None  Evaluation/Treatment Tolerance: Patient tolerated treatment well  Medical Staff Made Aware: Yes  End of Session Communication: Bedside nurse  End of Session Patient Position: Alarm off, not on at start of session  Plan:  Treatment Interventions: ADL retraining, Functional transfer training, Endurance training, Patient/family training, Equipment evaluation/education, Neuromuscular reeducation  OT Frequency: 3 times per week  OT Discharge Recommendations: Moderate intensity level of continued care  OT Recommended Transfer Status: Stand by assist, Assist of 1    Subjective   Previous Visit Info:  OT Last Visit  OT Received On: 24  General:  General  Family/Caregiver Present: No  Prior to Session Communication: Bedside nurse  Patient Position Received: Up in chair, Alarm off, not on at start of session  General Comment: purewick, HF O2 5LPM  Precautions:  Medical Precautions: Fall precautions  Vitals:  Vital Signs  SpO2: 92 % (went down to 81 with O2 off for hygiene; recovered <1 min)  Pain Assessment:  Pain Assessment  Pain Assessment: 0-10  Pain Score: 0 - No pain     Objective   Activities of Daily Living: Grooming  Grooming Level of Assistance: Close supervision  Grooming Where Assessed: Standing sinkside  Grooming Comments: oral hygiene completed    Functional Standing Tolerance:  Functional Standing Tolerance  Time: 5 minutes  Activity: oral hygiene  Functional Standing Tolerance Comments: no LOB  Bed Mobility/Transfers:  Transfers  Transfer: Yes  Transfer 1  Transfer From 1: Chair with arms to  Transfer to 1: Stand, Sit  Technique 1: Sit to stand, Stand to sit  Transfer Device 1: Walker  Transfer Level of Assistance 1: Close supervision  Transfers 2  Transfer From 2: Chair with arms to  Transfer to 2: Stand, Sit  Technique 2: Sit to stand, Stand to sit  Transfer Device 2: Walker  Transfer Level of Assistance 2: Close supervision  Trials/Comments 2: x10 reps    Ambulation/Gait Training:  Ambulation/Gait Training  Ambulation/Gait Training Performed: Yes  Ambulation/Gait Training 1  Surface 1: Level tile  Device 1: Rolling walker  Assistance 1: Close supervision  Comments/Distance (ft) 1: ~10ft  Therapy/Activity: Therapeutic Exercise  Therapeutic Exercise Performed: Yes  Therapeutic Exercise Activity 1: STS transfers x10 reps  Therapeutic Exercise Activity 2: marches 10 reps    Outcome Measures:  [facility] Daily Activity  Putting on and taking off regular lower body clothing: A lot  Bathing (including washing, rinsing, drying): A lot  Putting on and taking off regular upper body clothing: A little  Toileting, which includes using toilet, bedpan or urinal: A lot  Taking care of personal grooming such as brushing teeth: A little  Eating Meals: None  Daily Activity - Total Score: 16    Education Documentation  Body Mechanics, taught by Mariza Bocanegra OT at 1/16/2024  3:34 PM.  Learner: Patient  Readiness: Acceptance  Method: Explanation  Response: Needs Reinforcement  Comment: Edu on increased activity. hand placment for transfers and endurance    Precautions, taught by Mariza Bocanegra OT at 1/16/2024  3:34 PM.  Learner: Patient  Readiness: Acceptance  Method: Explanation  Response: Needs Reinforcement  Comment: Edu on increased activity. hand placment for transfers and endurance    ADL Training, taught by Mariza Bocanegra OT at 1/16/2024  3:34 PM.  Learner: Patient  Readiness: Acceptance  Method: Explanation  Response: Needs Reinforcement  Comment:  Edu on increased activity. hand placment for transfers and endurance    Education Comments  No comments found.      Goals:  Encounter Problems       Encounter Problems (Active)       ADLs       Patient will perform UB and LB bathing  with supervision level of assistance.       Start:  01/04/24    Expected End:  01/18/24            Patient with complete lower body dressing with set-up and supervision level of assistance donning and doffing all LE clothes  with PRN adaptive equipment while edge of bed  (Not Progressing)       Start:  01/04/24    Expected End:  01/18/24            Patient will complete toileting including hygiene clothing management/hygiene with stand by assist level of assistance and raised toilet seat, grab bars, and bedside commode. (Progressing)       Start:  01/04/24    Expected End:  01/18/24               ADLs       Pt. will identify and engage in 3 EC/WS techniques during treatment sessions to increase independence and endurance for participation in daily tasks of choice.  (Progressing)       Start:  01/05/24    Expected End:  01/19/24               BALANCE       Patient will tolerate standing for 10 minutes to modified independent level of assistance with least restrictive device in order to improve functional activity tolerance for ADL tasks. (Progressing)       Start:  01/05/24    Expected End:  01/19/24               TRANSFERS       Patient will complete sit to stand transfer with modified independent level of assistance and least restrictive device in order to improve safety and prepare for out of bed mobility. (Progressing)       Start:  01/04/24    Expected End:  01/18/24

## 2024-01-17 VITALS
RESPIRATION RATE: 18 BRPM | HEIGHT: 66 IN | HEART RATE: 97 BPM | SYSTOLIC BLOOD PRESSURE: 141 MMHG | TEMPERATURE: 98.1 F | OXYGEN SATURATION: 95 % | WEIGHT: 293 LBS | DIASTOLIC BLOOD PRESSURE: 72 MMHG | BODY MASS INDEX: 47.09 KG/M2

## 2024-01-17 LAB
ANION GAP SERPL CALC-SCNC: 10 MMOL/L (ref 10–20)
BNP SERPL-MCNC: 233 PG/ML (ref 0–99)
BUN SERPL-MCNC: 39 MG/DL (ref 6–23)
CALCIUM SERPL-MCNC: 9.2 MG/DL (ref 8.6–10.3)
CHLORIDE SERPL-SCNC: 101 MMOL/L (ref 98–107)
CO2 SERPL-SCNC: 36 MMOL/L (ref 21–32)
CREAT SERPL-MCNC: 1.11 MG/DL (ref 0.5–1.05)
EGFRCR SERPLBLD CKD-EPI 2021: 53 ML/MIN/1.73M*2
ERYTHROCYTE [DISTWIDTH] IN BLOOD BY AUTOMATED COUNT: 16.4 % (ref 11.5–14.5)
GLUCOSE BLD MANUAL STRIP-MCNC: 194 MG/DL (ref 74–99)
GLUCOSE BLD MANUAL STRIP-MCNC: 80 MG/DL (ref 74–99)
GLUCOSE SERPL-MCNC: 72 MG/DL (ref 74–99)
HCT VFR BLD AUTO: 30 % (ref 36–46)
HGB BLD-MCNC: 8.4 G/DL (ref 12–16)
HOLD SPECIMEN: NORMAL
INR PPP: 2.1 (ref 0.9–1.1)
MCH RBC QN AUTO: 31.7 PG (ref 26–34)
MCHC RBC AUTO-ENTMCNC: 28 G/DL (ref 32–36)
MCV RBC AUTO: 113 FL (ref 80–100)
NRBC BLD-RTO: ABNORMAL /100{WBCS}
PLATELET # BLD AUTO: 228 X10*3/UL (ref 150–450)
POTASSIUM SERPL-SCNC: 4.1 MMOL/L (ref 3.5–5.3)
PROTHROMBIN TIME: 23.3 SECONDS (ref 9.8–12.8)
RBC # BLD AUTO: 2.65 X10*6/UL (ref 4–5.2)
SODIUM SERPL-SCNC: 143 MMOL/L (ref 136–145)
WBC # BLD AUTO: 7.7 X10*3/UL (ref 4.4–11.3)

## 2024-01-17 PROCEDURE — 2500000002 HC RX 250 W HCPCS SELF ADMINISTERED DRUGS (ALT 637 FOR MEDICARE OP, ALT 636 FOR OP/ED): Mod: IPSPLIT

## 2024-01-17 PROCEDURE — 36415 COLL VENOUS BLD VENIPUNCTURE: CPT | Mod: IPSPLIT

## 2024-01-17 PROCEDURE — 2500000001 HC RX 250 WO HCPCS SELF ADMINISTERED DRUGS (ALT 637 FOR MEDICARE OP): Mod: IPSPLIT

## 2024-01-17 PROCEDURE — 80048 BASIC METABOLIC PNL TOTAL CA: CPT | Mod: IPSPLIT

## 2024-01-17 PROCEDURE — 85027 COMPLETE CBC AUTOMATED: CPT | Mod: IPSPLIT

## 2024-01-17 PROCEDURE — 94640 AIRWAY INHALATION TREATMENT: CPT | Mod: IPSPLIT

## 2024-01-17 PROCEDURE — 94668 MNPJ CHEST WALL SBSQ: CPT | Mod: IPSPLIT

## 2024-01-17 PROCEDURE — 83880 ASSAY OF NATRIURETIC PEPTIDE: CPT | Mod: IPSPLIT

## 2024-01-17 PROCEDURE — 2500000002 HC RX 250 W HCPCS SELF ADMINISTERED DRUGS (ALT 637 FOR MEDICARE OP, ALT 636 FOR OP/ED): Mod: IPSPLIT | Performed by: NURSE PRACTITIONER

## 2024-01-17 PROCEDURE — 2500000004 HC RX 250 GENERAL PHARMACY W/ HCPCS (ALT 636 FOR OP/ED): Mod: IPSPLIT

## 2024-01-17 PROCEDURE — 99231 SBSQ HOSP IP/OBS SF/LOW 25: CPT

## 2024-01-17 PROCEDURE — 85610 PROTHROMBIN TIME: CPT | Mod: IPSPLIT

## 2024-01-17 PROCEDURE — 82947 ASSAY GLUCOSE BLOOD QUANT: CPT | Mod: IPSPLIT

## 2024-01-17 RX ORDER — METOPROLOL TARTRATE 25 MG/1
25 TABLET, FILM COATED ORAL 2 TIMES DAILY
Qty: 120 TABLET | Refills: 0
Start: 2024-01-17 | End: 2024-03-25 | Stop reason: SDUPTHER

## 2024-01-17 RX ORDER — AMIODARONE HYDROCHLORIDE 200 MG/1
200 TABLET ORAL 2 TIMES DAILY
Qty: 14 TABLET | Refills: 0
Start: 2024-01-17 | End: 2024-03-25 | Stop reason: ALTCHOICE

## 2024-01-17 RX ORDER — ZINC OXIDE 20 G/100G
1 OINTMENT TOPICAL 3 TIMES DAILY
Status: DISCONTINUED | OUTPATIENT
Start: 2024-01-17 | End: 2024-01-17 | Stop reason: HOSPADM

## 2024-01-17 RX ORDER — AMIODARONE HYDROCHLORIDE 200 MG/1
200 TABLET ORAL DAILY
Qty: 30 TABLET | Refills: 0
Start: 2024-01-24 | End: 2024-03-25 | Stop reason: SDUPTHER

## 2024-01-17 RX ADMIN — IPRATROPIUM BROMIDE AND ALBUTEROL SULFATE 3 ML: .5; 3 SOLUTION RESPIRATORY (INHALATION) at 10:01

## 2024-01-17 RX ADMIN — METFORMIN HYDROCHLORIDE 1000 MG: 500 TABLET ORAL at 08:52

## 2024-01-17 RX ADMIN — FERROUS SULFATE TAB 325 MG (65 MG ELEMENTAL FE) 1 TABLET: 325 (65 FE) TAB at 08:52

## 2024-01-17 RX ADMIN — GABAPENTIN 200 MG: 100 CAPSULE ORAL at 08:52

## 2024-01-17 RX ADMIN — ASPIRIN 81 MG: 81 TABLET, COATED ORAL at 08:52

## 2024-01-17 RX ADMIN — INSULIN LISPRO 1 UNITS: 100 INJECTION, SOLUTION INTRAVENOUS; SUBCUTANEOUS at 11:19

## 2024-01-17 RX ADMIN — TIOTROPIUM BROMIDE INHALATION SPRAY 2 PUFF: 3.12 SPRAY, METERED RESPIRATORY (INHALATION) at 10:01

## 2024-01-17 RX ADMIN — PANTOPRAZOLE SODIUM 40 MG: 40 TABLET, DELAYED RELEASE ORAL at 06:11

## 2024-01-17 RX ADMIN — METOPROLOL TARTRATE 25 MG: 25 TABLET, FILM COATED ORAL at 11:23

## 2024-01-17 RX ADMIN — AMIODARONE HYDROCHLORIDE 200 MG: 200 TABLET ORAL at 11:23

## 2024-01-17 RX ADMIN — POTASSIUM CHLORIDE 40 MEQ: 1500 TABLET, EXTENDED RELEASE ORAL at 08:52

## 2024-01-17 RX ADMIN — GUAIFENESIN 600 MG: 600 TABLET, EXTENDED RELEASE ORAL at 08:52

## 2024-01-17 RX ADMIN — CALCIUM CARBONATE (ANTACID) CHEW TAB 500 MG 1500 MG: 500 CHEW TAB at 08:53

## 2024-01-17 RX ADMIN — FUROSEMIDE 80 MG: 80 TABLET ORAL at 08:52

## 2024-01-17 RX ADMIN — TOPIRAMATE 100 MG: 100 TABLET, FILM COATED ORAL at 08:53

## 2024-01-17 RX ADMIN — FLUTICASONE FUROATE AND VILANTEROL TRIFENATATE 1 PUFF: 200; 25 POWDER RESPIRATORY (INHALATION) at 10:01

## 2024-01-17 RX ADMIN — DOCUSATE SODIUM 100 MG: 100 CAPSULE, LIQUID FILLED ORAL at 08:53

## 2024-01-17 RX ADMIN — GLIMEPIRIDE 2 MG: 2 TABLET ORAL at 08:53

## 2024-01-17 RX ADMIN — DAPSONE 100 MG: 25 TABLET ORAL at 06:11

## 2024-01-17 RX ADMIN — PREDNISONE 20 MG: 20 TABLET ORAL at 08:52

## 2024-01-17 RX ADMIN — METOPROLOL TARTRATE 25 MG: 25 TABLET, FILM COATED ORAL at 05:05

## 2024-01-17 ASSESSMENT — COGNITIVE AND FUNCTIONAL STATUS - GENERAL
STANDING UP FROM CHAIR USING ARMS: A LITTLE
TOILETING: A LOT
DAILY ACTIVITIY SCORE: 16
WALKING IN HOSPITAL ROOM: A LITTLE
CLIMB 3 TO 5 STEPS WITH RAILING: A LOT
MOVING FROM LYING ON BACK TO SITTING ON SIDE OF FLAT BED WITH BEDRAILS: A LOT
MOBILITY SCORE: 14
DRESSING REGULAR LOWER BODY CLOTHING: A LOT
PERSONAL GROOMING: A LITTLE
DRESSING REGULAR UPPER BODY CLOTHING: A LITTLE
TURNING FROM BACK TO SIDE WHILE IN FLAT BAD: A LOT
MOVING TO AND FROM BED TO CHAIR: A LOT
HELP NEEDED FOR BATHING: A LOT

## 2024-01-17 ASSESSMENT — PAIN SCALES - GENERAL: PAINLEVEL_OUTOF10: 0 - NO PAIN

## 2024-01-17 NOTE — CARE PLAN
The patient's goals for the shift include increase activity this shift    The clinical goals for the shift include 1/16/2024      Problem: Pain - Adult  Goal: Verbalizes/displays adequate comfort level or baseline comfort level  Outcome: Progressing     Problem: Fall/Injury  Goal: Verbalize understanding of personal risk factors for fall in the hospital  Outcome: Progressing  Goal: Verbalize understanding of risk factor reduction measures to prevent injury from fall in the home  Outcome: Progressing  Goal: Use assistive devices by end of the shift  Outcome: Progressing  Goal: Pace activities to prevent fatigue by end of the shift  Outcome: Progressing     Problem: Respiratory  Goal: Clear secretions with interventions this shift  Outcome: Progressing  Goal: Minimize anxiety/maximize coping throughout shift  Outcome: Progressing  Goal: Minimal/no exertional discomfort or dyspnea this shift  Outcome: Progressing  Goal: Tolerate pulmonary toileting this shift  Outcome: Progressing  Goal: Verbalize decreased shortness of breath this shift  Outcome: Progressing  Goal: Wean oxygen to maintain O2 saturation per order/standard this shift  Outcome: Progressing  Goal: Increase self care and/or family involvement in next 24 hours  Outcome: Progressing     Problem: Discharge Planning  Goal: Discharge to home or other facility with appropriate resources  Outcome: Progressing     Problem: Chronic Conditions and Co-morbidities  Goal: Patient's chronic conditions and co-morbidity symptoms are monitored and maintained or improved  Outcome: Progressing     Problem: Arrythmia/Dysrhythmia  Goal: Lab values return to normal range  Outcome: Progressing  Goal: No evidence of post procedure complications  Outcome: Progressing  Goal: Promote self management  Outcome: Progressing  Goal: Serial ECG will return to baseline  Outcome: Progressing  Goal: Verbalize understanding of procedures/devices  Outcome: Progressing  Goal: Vital signs return  to baseline  Outcome: Progressing     Problem: Skin  Goal: Prevent/minimize sheer/friction injuries  Outcome: Progressing  Goal: Decreased wound size/increased tissue granulation at next dressing change  Outcome: Progressing  Goal: Participates in plan/prevention/treatment measures  Outcome: Progressing  Goal: Prevent/manage excess moisture  Outcome: Progressing  Goal: Promote/optimize nutrition  Outcome: Progressing  Goal: Promote skin healing  Outcome: Progressing

## 2024-01-17 NOTE — NURSING NOTE
Per Damien Najera NP verbal order- Discontinue telemetry monitoring. Pending potential discharge later today.

## 2024-01-17 NOTE — PROGRESS NOTES
Patient is medically ready for discharge. Patient follow up information is on discharge instructions. Patient will be going to [facility]. They confirmed acceptance this morning.  Patient is in agreement with discharge plan. [facility] will send final orders, complete 7000 and arrange transportation.

## 2024-01-17 NOTE — DISCHARGE SUMMARY
"Discharge Diagnosis  RSV (respiratory syncytial virus infection)    Issues Requiring Follow-Up  Discharged to SNF     Discharge Meds     Your medication list        START taking these medications        Instructions Last Dose Given Next Dose Due   amiodarone 200 mg tablet  Commonly known as: Pacerone      Take 1 tablet (200 mg) by mouth 2 times a day for 7 days.       amiodarone 200 mg tablet  Commonly known as: Pacerone  Start taking on: January 24, 2024      Take 1 tablet (200 mg) by mouth once daily. Do not start before January 24, 2024.       metoprolol tartrate 25 mg tablet  Commonly known as: Lopressor      Take 1 tablet (25 mg) by mouth 2 times a day.              CONTINUE taking these medications        Instructions Last Dose Given Next Dose Due   albuterol 90 mcg/actuation inhaler           aspirin 81 mg EC tablet           atorvastatin 20 mg tablet  Commonly known as: Lipitor      Take 1 tablet (20 mg) by mouth once daily at bedtime.       BD Alcohol Swabs pads, medicated  Generic drug: alcohol swabs           BD Dorothy 2nd Gen Pen Needle 32 gauge x 5/32\" needle  Generic drug: pen needle, diabetic      Use with vitamin B12 injections monthly       calcium carbonate 600 mg calcium (1,500 mg) tablet           CALCIUM CARBONATE-VITAMIN D3 ORAL           cholecalciferol 50,000 unit capsule  Commonly known as: Vitamin D-3           cyanocobalamin 1,000 mcg/mL injection  Commonly known as: Dodex      Inject 1 mL (1,000 mcg) into the shoulder, thigh, or buttocks every 30 (thirty) days.       dapsone 25 mg tablet           dapsone 100 mg tablet           ergocalciferol 1.25 MG (86936 UT) capsule  Commonly known as: Vitamin D-2           ferrous sulfate 325 (65 Fe) MG EC tablet           fluticasone-umeclidin-vilanter 200-62.5-25 mcg blister with device  Commonly known as: TRELEGY-ELLIPTA      Inhale 1 puff early in the morning.. Rinse mouth after taking dose       furosemide 40 mg tablet  Commonly known as: Lasix   " "   Take 2 tablets (80 mg) by mouth once daily.       gabapentin 300 mg capsule  Commonly known as: Neurontin           glimepiride 2 mg tablet  Commonly known as: Amaryl      take 1 tablet by mouth once daily Once a day 90 days       Incruse Ellipta 62.5 mcg/actuation inhalation  Generic drug: umeclidinium      Inhale 1 puff (62.5 mcg) once daily.       LEVEMIR FLEXTOUCH U100 INSULIN SUBQ           Levemir FlexPen 100 unit/mL (3 mL) pen  Generic drug: insulin detemir           metFORMIN 1,000 mg tablet  Commonly known as: Glucophage      take 1 tablet by mouth twice a day 90       syringe with needle 3 mL 23 x 1\" syringe      Use to inject vitamin b12 once monthly       topiramate 100 mg tablet  Commonly known as: Topamax      take 1 tablet by mouth twice a day       triamcinolone 0.1 % ointment  Commonly known as: Kenalog           warfarin 5 mg tablet  Commonly known as: Coumadin      Take as directed. If you are unsure how to take this medication, talk to your nurse or doctor.  Original instructions: Take 1 tab daily or directed as coumadin clinic              STOP taking these medications      dexAMETHasone 6 mg tablet  Commonly known as: Decadron        dilTIAZem  mg 24 hr capsule  Commonly known as: Cardizem CD        doxycycline 100 mg tablet  Commonly known as: Adoxa        losartan 25 mg tablet  Commonly known as: Cozaar                  Where to Get Your Medications        Information about where to get these medications is not yet available    Ask your nurse or doctor about these medications  amiodarone 200 mg tablet  amiodarone 200 mg tablet  metoprolol tartrate 25 mg tablet         Test Results Pending At Discharge  Pending Labs       No current pending labs.            Hospital Course   Frannie Blanco is a 73 y.o. female presenting with cough and shortness of breath. Patient has a history of COPD and wears 5L O2 continuously at baseline. She was seen in the [place] ED on 12/26 for the same " symptoms and discharged home on doxycycline and dexamethasone for COPD exacerbation. She reports that her shortness of breath and cough continued to worsen at home and she felt weak so she came in to the ED for further evaluation. She was found to be hypoxic in the 80s on her baseline 5L O2 and was initially requiring 6L O2. HR was elevated in the 100s; patient has a known history of atrial fibrillation. ED labs were significant for , WBC 13.9, H/H 9.0/30.4. VBG showed pH 7.33, pCO2 52. Patient was found to be RSV positive. CXR was concerning for a right-sided infiltrate. Patient was given azithromycin, ceftriaxone, solumedrol, and lasix PO in the ED. Patient was initially slated for transfer to [facility] due to no bed availability at [facility] but provider at [facility] did not feel that patient was appropriate for transfer due to tachycardia (HR 110s). Patient remained in the [facility] ED overnight on antibiotics awaiting an inpatient bed. She was admitted to med/surg this morning for further treatment of community acquired pneumonia.     Hospital Course:  #Acute on chronic hypoxic respiratory failure 2/2 RSV, community acquired pneumonia, improving   #COPD, in acute exacerbation  - Patient was seen in [facility] ED 12/26, discharged on dexamethasone and doxycycline  - WBC 13.9 >> 5.3 > 11.4 > 10.8 > 9.7 > 7.1 > 6.8 > 7.9  - Lactate 1.9  - COVID, flu A/B negative  - RSV positive  - Procal 0.75  - BCx no growth- final report   - sputum culture contained significant salivary contamination; repeat pending   - CXR: Small right pleural effusion and basilar atelectasis or airspace disease and mild bilateral opacities concerning for infiltrates.    - CT chest (1/10): 1.  No pulmonary embolism identified on motion degraded exam.  2. Pulmonary edema with right greater than left pleural effusions.  3. Near occlusion of the right middle lobe bronchus causing worsening  atelectasis.  - Start metaneb, continue mucinex  - Tylenol PRN  for fever   - Mucinex BID, Robitussin DM q4h PRN for cough   - DuoNebs TID    - Breo and Spiriva ordered (pharmacy substitute for home Trelegy)  - RT eval and treat protocol  - Bronchial hygiene  - Isolation protocol for RSV for 10 days, discontinue isolation   - Baseline O2 5L continuously   - Wean O2 as tolerated; patient currently requiring 5L  - Solumedrol 40 mg IVP q8h changed to prednisone 40 mg PO every day due to encephalopathy   - CXR 1/8: There are patchy bilateral airspace opacities with slight interval   worsening on the and slight interval improvement on the right. Findings may relate to edema versus infection. Attention on continued short-term follow-up is advised. Small bilateral pleural effusions are noted with slight interval improvement on the right.   - ID consulted for worsening pneumonia, appreciate recs   -CXR   1. Small bilateral pleural effusions with associated compression  atelectasis.  2. Mild right lung ground-glass changes, much improved since  01/07/2020.  -  - Lasix 80 mg PO Daily   - CT Chest  1. Small right pleural effusion decreased in size.  2. Right lower lobe infiltrate with partial volume loss, unchanged.  3. Small left pleural effusion, unchanged.  4. Progressive left lower lobe infiltrate with volume loss.  5. Marked improvement in bilateral patchy ground-glass opacities.  Re-expansion right middle lobe.  -CXR ordered     #Atrial fibrillation with RVR, s/p cardioversion   #HTN  #HLD  #HFpEF, concern for acute exacerbation  #Supratherapeutic INR on warfarin   - Recent DCCV on 12/13 at [facility], follows with Dr. Siegel   - Trop 7 > 7   - >285>368 > 243 > 268 > 238 > 274  - Telemetry monitoring- notified by charge RN this morning that patient's HR has been consistently in the 120s-130s on telemetry. Upon reviewing the flowsheet documentation for 1/5-1/6, only two instances with HR > 110 bpm are charted  - Patient was transferred to ICU overnight and placed on a  diltiazem drip for HR persistently in the 130s  - Cardioversion completed today; patient remains in AF with rate controlled in the 90s   - Start amiodarone 200 mg PO TID x 7 days per cardiology   - Lopressor 5 mg IVP q4h PRN for HR > 120 bpm sustained for > 5 mins   - Continue aspirin, atorvastatin, furosemide, and losartan  - INR 2.3 > 3.0 > 3.8 > 3.1 > 2.2 > 2.7 > 3.2  - Warfarin on hold  - Goal INR 2-3, daily PT/INR while inpatient   - Strict I&Os: LOS - 9471.4  - Daily weights  - 2g Na diet, 1800 mL FR   - Cardiology consulted, appreciate recs   - Patient back into afib with RVR on 1/10  - Cardiology added metop TID for rate control  - HR 150s - 170s with ambulation, resolves with rest.     #Hematuria  - UA: 3+ blood, > 20 RBCs, 1+ bacteria  - UCx with no significant growth   - Patient denies gross blood in urine or difficulties with urination   - Monitor UOP for blood - none reported as of 1/8  - Trend CBC; H/H stable      #T2DM with neuropathy   - Continue Lantus, metformin and glimepiride   - Gabapentin decreased to 200 mg BID due to change in mental status  - SSI three times a day before meals while on steroids   - Hypoglycemia protocol  - Accuchecks ac/hs/prn  - Diabetic diet   - HbA1c 4.6      #Anemia, macrocytic  - H/H stable 8.4/29.2 > 8.4/29.1  > 8.4/29 with MCV  109 > 109 > 110  - B12 level was 267 last month per chart review   - Folate 6.2   - Iron studies: Fe 28, TIBC 272, 10% saturation  - Patient receives monthly B12 injections and is on ferrous sulfate; continue   - Monitor for active bleeding  - Daily CBC to trend H/H     #History of mucous membrane pemphigoid  - Continue dapsone  - Resume outpatient derm follow up on discharge      #Hypokalemia   - K+ 3.4 > 3.3 > 3.8 > 4.0  - K+ 40 meq given 1/13  - K+ 80 meq given 1/14        DVT ppx  -warfarin     F: PRN  E: Replete per protocol  N: ADA, Cardiac, 1800ml FR  A: PIV     Disposition: Pt stable to discharge to [facility]    Code Status: Full  Code    Total cumulative time spent in preparation of this discharge including documentation review, coordination of care with the medical team including PT/SW/care coordinators and treating consultants, discussion with patient and pertinent family members and finalization of prescriptions, followup appointments and this discharge summary was approximately  45 minutes. Over 50% of the time was spent face-to-face counseling the patient on diagnosis, prescriptions, and follow up appointments.     Pertinent Physical Exam At Time of Discharge  Physical Exam  Constitutional:       Appearance: She is obese.   HENT:      Head: Normocephalic and atraumatic.      Nose: Nose normal.      Mouth/Throat:      Mouth: Mucous membranes are moist.   Eyes:      Extraocular Movements: Extraocular movements intact.   Cardiovascular:      Rate and Rhythm: Normal rate. Rhythm irregular.      Pulses: Normal pulses.      Heart sounds: Normal heart sounds.   Pulmonary:      Effort: Pulmonary effort is normal.      Comments: Diminished  Abdominal:      General: Bowel sounds are normal.      Palpations: Abdomen is soft.   Musculoskeletal:         General: Normal range of motion.      Cervical back: Normal range of motion.      Right lower leg: Edema present.      Left lower leg: Edema present.   Skin:     General: Skin is warm and dry.      Capillary Refill: Capillary refill takes less than 2 seconds.      Findings: Bruising present.   Neurological:      Mental Status: She is alert. Mental status is at baseline.      Motor: Weakness present.      Gait: Gait abnormal.   Psychiatric:         Mood and Affect: Mood normal.         Behavior: Behavior normal.     Outpatient Follow-Up  Future Appointments   Date Time Provider Department [place]   2/12/2024 11:15 AM Siobhan Kay PharmD [id number] [facility]   2/26/2024  3:15 PM Terra Young MD [id number] [facility]   4/23/2024  1:40 PM Divina Escobar MD [id number] [facility]         Daija Lea  APRN-CNP

## 2024-01-19 LAB
ATRIAL RATE: 108 BPM
ATRIAL RATE: 133 BPM
ATRIAL RATE: 92 BPM
P AXIS: 64 DEGREES
P OFFSET: 203 MS
P ONSET: 146 MS
PR INTERVAL: 142 MS
Q ONSET: 216 MS
Q ONSET: 217 MS
Q ONSET: 219 MS
QRS COUNT: 15 BEATS
QRS COUNT: 18 BEATS
QRS COUNT: 22 BEATS
QRS DURATION: 92 MS
QRS DURATION: 94 MS
QRS DURATION: 96 MS
QT INTERVAL: 274 MS
QT INTERVAL: 326 MS
QT INTERVAL: 364 MS
QTC CALCULATION(BAZETT): 403 MS
QTC CALCULATION(BAZETT): 449 MS
QTC CALCULATION(BAZETT): 450 MS
QTC FREDERICIA: 354 MS
QTC FREDERICIA: 403 MS
QTC FREDERICIA: 419 MS
R AXIS: -11 DEGREES
R AXIS: -20 DEGREES
R AXIS: -22 DEGREES
T AXIS: 36 DEGREES
T AXIS: 47 DEGREES
T AXIS: 58 DEGREES
T OFFSET: 353 MS
T OFFSET: 382 MS
T OFFSET: 399 MS
VENTRICULAR RATE: 114 BPM
VENTRICULAR RATE: 130 BPM
VENTRICULAR RATE: 92 BPM

## 2024-01-19 NOTE — PROGRESS NOTES
Pt discharged from [facility] and admitted to SNF on 1/17/24.  INR on 1/17 was 2.1 in therapeutic range.

## 2024-01-23 ENCOUNTER — NURSING HOME VISIT (OUTPATIENT)
Dept: POST ACUTE CARE | Facility: EXTERNAL LOCATION | Age: 74
End: 2024-01-23
Payer: MEDICARE

## 2024-01-23 DIAGNOSIS — I50.32 CHRONIC DIASTOLIC CONGESTIVE HEART FAILURE (MULTI): ICD-10-CM

## 2024-01-23 DIAGNOSIS — Z79.4 TYPE 2 DIABETES MELLITUS WITHOUT COMPLICATION, WITH LONG-TERM CURRENT USE OF INSULIN (MULTI): ICD-10-CM

## 2024-01-23 DIAGNOSIS — N18.30 STAGE 3 CHRONIC KIDNEY DISEASE, UNSPECIFIED WHETHER STAGE 3A OR 3B CKD (MULTI): Primary | ICD-10-CM

## 2024-01-23 DIAGNOSIS — D64.9 ANEMIA, UNSPECIFIED TYPE: ICD-10-CM

## 2024-01-23 DIAGNOSIS — E11.9 TYPE 2 DIABETES MELLITUS WITHOUT COMPLICATION, WITH LONG-TERM CURRENT USE OF INSULIN (MULTI): ICD-10-CM

## 2024-01-23 DIAGNOSIS — E11.42 DIABETIC PERIPHERAL NEUROPATHY (MULTI): ICD-10-CM

## 2024-01-23 DIAGNOSIS — J18.9 PNEUMONIA DUE TO INFECTIOUS ORGANISM, UNSPECIFIED LATERALITY, UNSPECIFIED PART OF LUNG: ICD-10-CM

## 2024-01-23 DIAGNOSIS — B33.8 RSV (RESPIRATORY SYNCYTIAL VIRUS INFECTION): ICD-10-CM

## 2024-01-23 DIAGNOSIS — E78.2 MIXED HYPERLIPIDEMIA: ICD-10-CM

## 2024-01-23 DIAGNOSIS — L12.1 CICATRICIAL PEMPHIGOID (MULTI): ICD-10-CM

## 2024-01-23 DIAGNOSIS — I48.91 ATRIAL FIBRILLATION, UNSPECIFIED TYPE (MULTI): ICD-10-CM

## 2024-01-23 DIAGNOSIS — J44.9 CHRONIC OBSTRUCTIVE PULMONARY DISEASE, UNSPECIFIED COPD TYPE (MULTI): ICD-10-CM

## 2024-01-23 DIAGNOSIS — R53.81 PHYSICAL DECONDITIONING: ICD-10-CM

## 2024-01-23 PROCEDURE — 99309 SBSQ NF CARE MODERATE MDM 30: CPT | Performed by: NURSE PRACTITIONER

## 2024-01-23 NOTE — LETTER
Patient: Frannie Blanco  : 1950    Encounter Date: 2024    Patient ID: Frannie Blanco is a 73 y.o. female who is acute skilled care being seen and evaluated for multiple medical problems.  [id number]   1950    /82   Pulse 76   Temp 36.6 °C (97.9 °F)   Resp 15   Wt 137 kg (303 lb)   SpO2 96%   BMI 48.93 kg/m²     Assessment/Plan  Problem List Items Addressed This Visit       Pneumonia     While hospitalized  Vancomycin  Azithromycin  Ceftriaxone  Afebrile          Cicatricial pemphigoid     Dapsone 150 mg by mouth daily          Diabetic peripheral neuropathy (CMS/[facility])     Gabapentin 300 mg by mouth TID         Diabetes mellitus (CMS/[facility])     Lantus 15 units subcutaneous daily   Metformin 1000 mg by mouth daily   Glimepiride 2 mg  by mouth daily   Glucose checks BID         Atrial fibrillation (CMS/[facility])     Amiodarone 200 mg by mouth daily   Metoprolol  tart 25 mg by mouth BID  Coumadin 5 mg by mouth daily   PT/INR every 2024 INR 1.8           Mixed hyperlipidemia     Atorvastatin 20 mg by mouth every HS          COPD (chronic obstructive pulmonary disease) (CMS/[facility])     Trelegy 200/62.5/25 mcg one inhalation daily   Albuterol 90 mcg inhaler two inhalations every 4 hrs as needed          Chronic diastolic congestive heart failure (CMS/[facility])     1800 ml fluid restriction   Lasix 80 mg by mouth daily          RSV (respiratory syncytial virus infection)     Currently Afebrile          Stage 3 chronic kidney disease, unspecified whether stage 3a or 3b CKD (CMS/[facility]) - Primary     Avoid NSAIDS  Monitor renal panel   2024 BUN/Creat 36/1.08, Potassium 3.6         Physical deconditioning     PT/OT         Anemia     Ferrous sulfate 325 mg by mouth daily   B12 1000 mcg IM monthly               HPI: Client was admitted at [facility] from 1/3/2024- 2024 with the final diagnosis of RSV, Pneumonia, exacerbation of COPD, and AFIB with RVR. ID consulted and antibiotics completed  during hospitalization. Cardiology consulted and client was cardioverted 1/8/2024, AFIB with RVR. She was prescribed Amiodarone and Metoprolol. She had hematuria- Urine culture (-). She has a PMH of PE. Client denies HA, dizziness, or lightheadedness. Denies CP, SOB, or increased edema. 5 lit NC. Denies abdominal pain, N/V, diarrhea, or constipation. Denies dysuria. She C/O chronic hip pain.     Review of Systems ROS as described in in HPI     Physical Exam  Constitutional:       Comments: Resting in bed    HENT:      Mouth/Throat:      Mouth: Mucous membranes are moist.   Cardiovascular:      Rate and Rhythm: Normal rate.      Comments: H/O AFIB with recent cardioversion  Pulmonary:      Effort: Pulmonary effort is normal.      Comments: 5 lit NC  Abdominal:      General: Bowel sounds are normal.   Genitourinary:     Comments: Denies dysuria   Musculoskeletal:      Cervical back: Neck supple.      Right lower leg: Edema (slight) present.      Left lower leg: Edema (slight) present.      Comments: PT/OT   Skin:     General: Skin is warm and dry.      Comments: Band-Aid intact LLE- shin (Per client cat scratch)    Neurological:      Mental Status: She is alert. Mental status is at baseline.   Psychiatric:         Mood and Affect: Mood normal.      Comments: Conversational                    Electronically Signed By: KIKE Lucsa   1/26/24  9:49 AM

## 2024-01-26 VITALS
RESPIRATION RATE: 15 BRPM | HEART RATE: 76 BPM | SYSTOLIC BLOOD PRESSURE: 122 MMHG | DIASTOLIC BLOOD PRESSURE: 82 MMHG | TEMPERATURE: 97.9 F | WEIGHT: 293 LBS | BODY MASS INDEX: 48.93 KG/M2 | OXYGEN SATURATION: 96 %

## 2024-01-26 PROBLEM — R06.00 DYSPNEA: Status: RESOLVED | Noted: 2023-06-20 | Resolved: 2024-01-26

## 2024-01-26 PROBLEM — R93.89 ABNORMAL X-RAY: Status: RESOLVED | Noted: 2023-06-20 | Resolved: 2024-01-26

## 2024-01-26 PROBLEM — N18.30 STAGE 3 CHRONIC KIDNEY DISEASE, UNSPECIFIED WHETHER STAGE 3A OR 3B CKD (MULTI): Status: ACTIVE | Noted: 2024-01-26

## 2024-01-26 PROBLEM — R53.81 PHYSICAL DECONDITIONING: Status: ACTIVE | Noted: 2024-01-26

## 2024-01-26 PROBLEM — J01.90 ACUTE SINUSITIS: Status: RESOLVED | Noted: 2023-06-20 | Resolved: 2024-01-26

## 2024-01-26 PROBLEM — R05.9 COUGH: Status: RESOLVED | Noted: 2023-06-20 | Resolved: 2024-01-26

## 2024-01-26 PROBLEM — J45.909 ASTHMA (HHS-HCC): Status: RESOLVED | Noted: 2023-06-20 | Resolved: 2024-01-26

## 2024-01-26 PROBLEM — R79.9 ABNORMAL BLOOD CHEMISTRY: Status: RESOLVED | Noted: 2023-06-20 | Resolved: 2024-01-26

## 2024-01-26 PROBLEM — J32.9 CHRONIC SINUSITIS: Status: RESOLVED | Noted: 2023-06-20 | Resolved: 2024-01-26

## 2024-01-26 PROBLEM — R09.02 HYPOXIA: Status: RESOLVED | Noted: 2023-06-20 | Resolved: 2024-01-26

## 2024-01-26 PROBLEM — R06.02 SHORTNESS OF BREATH: Status: RESOLVED | Noted: 2024-01-04 | Resolved: 2024-01-26

## 2024-01-26 PROBLEM — R60.9 EDEMA: Status: RESOLVED | Noted: 2023-06-20 | Resolved: 2024-01-26

## 2024-01-26 PROBLEM — R53.83 FATIGUE: Status: RESOLVED | Noted: 2023-06-20 | Resolved: 2024-01-26

## 2024-01-26 PROBLEM — D64.9 ANEMIA: Status: ACTIVE | Noted: 2024-01-26

## 2024-01-26 PROBLEM — R63.2 POLYPHAGIA: Status: RESOLVED | Noted: 2023-06-20 | Resolved: 2024-01-26

## 2024-01-26 PROBLEM — J96.20 ACUTE ON CHRONIC RESPIRATORY FAILURE (MULTI): Status: RESOLVED | Noted: 2023-11-22 | Resolved: 2024-01-26

## 2024-01-26 PROBLEM — I50.9 ACUTE ON CHRONIC CONGESTIVE HEART FAILURE (MULTI): Status: RESOLVED | Noted: 2023-11-22 | Resolved: 2024-01-26

## 2024-01-26 NOTE — ASSESSMENT & PLAN NOTE
Amiodarone 200 mg by mouth daily   Metoprolol  tart 25 mg by mouth BID  Coumadin 5 mg by mouth daily   PT/INR every Monday 1/22/2024 INR 1.8

## 2024-01-26 NOTE — ASSESSMENT & PLAN NOTE
Trelegy 200/62.5/25 mcg one inhalation daily   Albuterol 90 mcg inhaler two inhalations every 4 hrs as needed

## 2024-01-26 NOTE — PROGRESS NOTES
Patient ID: Frannie Blanco is a 73 y.o. female who is acute skilled care being seen and evaluated for multiple medical problems.  [id number]   1950    /82   Pulse 76   Temp 36.6 °C (97.9 °F)   Resp 15   Wt 137 kg (303 lb)   SpO2 96%   BMI 48.93 kg/m²     Assessment/Plan  Problem List Items Addressed This Visit       Pneumonia     While hospitalized  Vancomycin  Azithromycin  Ceftriaxone  Afebrile          Cicatricial pemphigoid     Dapsone 150 mg by mouth daily          Diabetic peripheral neuropathy (CMS/[facility])     Gabapentin 300 mg by mouth TID         Diabetes mellitus (CMS/[facility])     Lantus 15 units subcutaneous daily   Metformin 1000 mg by mouth daily   Glimepiride 2 mg  by mouth daily   Glucose checks BID         Atrial fibrillation (CMS/[facility])     Amiodarone 200 mg by mouth daily   Metoprolol  tart 25 mg by mouth BID  Coumadin 5 mg by mouth daily   PT/INR every Monday 1/22/2024 INR 1.8           Mixed hyperlipidemia     Atorvastatin 20 mg by mouth every HS          COPD (chronic obstructive pulmonary disease) (CMS/[facility])     Trelegy 200/62.5/25 mcg one inhalation daily   Albuterol 90 mcg inhaler two inhalations every 4 hrs as needed          Chronic diastolic congestive heart failure (CMS/[facility])     1800 ml fluid restriction   Lasix 80 mg by mouth daily          RSV (respiratory syncytial virus infection)     Currently Afebrile          Stage 3 chronic kidney disease, unspecified whether stage 3a or 3b CKD (CMS/[facility]) - Primary     Avoid NSAIDS  Monitor renal panel   1/18/2024 BUN/Creat 36/1.08, Potassium 3.6         Physical deconditioning     PT/OT         Anemia     Ferrous sulfate 325 mg by mouth daily   B12 1000 mcg IM monthly               HPI: Client was admitted at [facility] from 1/3/2024- 1/17/2024 with the final diagnosis of RSV, Pneumonia, exacerbation of COPD, and AFIB with RVR. ID consulted and antibiotics completed during hospitalization. Cardiology consulted and client was cardioverted  1/8/2024, AFIB with RVR. She was prescribed Amiodarone and Metoprolol. She had hematuria- Urine culture (-). She has a PMH of PE. Client denies HA, dizziness, or lightheadedness. Denies CP, SOB, or increased edema. 5 lit NC. Denies abdominal pain, N/V, diarrhea, or constipation. Denies dysuria. She C/O chronic hip pain.     Review of Systems ROS as described in in HPI     Physical Exam  Constitutional:       Comments: Resting in bed    HENT:      Mouth/Throat:      Mouth: Mucous membranes are moist.   Cardiovascular:      Rate and Rhythm: Normal rate.      Comments: H/O AFIB with recent cardioversion  Pulmonary:      Effort: Pulmonary effort is normal.      Comments: 5 lit NC  Abdominal:      General: Bowel sounds are normal.   Genitourinary:     Comments: Denies dysuria   Musculoskeletal:      Cervical back: Neck supple.      Right lower leg: Edema (slight) present.      Left lower leg: Edema (slight) present.      Comments: PT/OT   Skin:     General: Skin is warm and dry.      Comments: Band-Aid intact LLE- shin (Per client cat scratch)    Neurological:      Mental Status: She is alert. Mental status is at baseline.   Psychiatric:         Mood and Affect: Mood normal.      Comments: Conversational

## 2024-01-26 NOTE — ASSESSMENT & PLAN NOTE
Lantus 15 units subcutaneous daily   Metformin 1000 mg by mouth daily   Glimepiride 2 mg  by mouth daily   Glucose checks BID

## 2024-01-30 ENCOUNTER — NURSING HOME VISIT (OUTPATIENT)
Dept: POST ACUTE CARE | Facility: EXTERNAL LOCATION | Age: 74
End: 2024-01-30
Payer: MEDICARE

## 2024-01-30 DIAGNOSIS — R53.81 PHYSICAL DECONDITIONING: ICD-10-CM

## 2024-01-30 DIAGNOSIS — E11.65 TYPE 2 DIABETES MELLITUS WITH HYPERGLYCEMIA, WITH LONG-TERM CURRENT USE OF INSULIN (MULTI): ICD-10-CM

## 2024-01-30 DIAGNOSIS — I50.32 CHRONIC DIASTOLIC CONGESTIVE HEART FAILURE (MULTI): ICD-10-CM

## 2024-01-30 DIAGNOSIS — Z79.4 TYPE 2 DIABETES MELLITUS WITH HYPERGLYCEMIA, WITH LONG-TERM CURRENT USE OF INSULIN (MULTI): ICD-10-CM

## 2024-01-30 DIAGNOSIS — I48.0 PAROXYSMAL ATRIAL FIBRILLATION (MULTI): Primary | ICD-10-CM

## 2024-01-30 DIAGNOSIS — J44.9 CHRONIC OBSTRUCTIVE PULMONARY DISEASE, UNSPECIFIED COPD TYPE (MULTI): ICD-10-CM

## 2024-01-30 PROCEDURE — 99308 SBSQ NF CARE LOW MDM 20: CPT | Performed by: NURSE PRACTITIONER

## 2024-01-30 NOTE — LETTER
Patient: Frannie Blanco  : 1950    Encounter Date: 2024    Patient ID: Frannie Blanco is a 73 y.o. female who is acute skilled care being seen and evaluated for multiple medical problems.  [id number]   1950    /74   Pulse 88   Temp 36.2 °C (97.2 °F)   Resp 16   Wt 144 kg (317 lb)   SpO2 94%   BMI 51.19 kg/m²     Assessment/Plan  Problem List Items Addressed This Visit       Atrial fibrillation (CMS/[facility]) - Primary     Amiodarone 200 mg by mouth daily   Metoprolol  tart 25 mg by mouth BID  Coumadin 5 mg by mouth daily on HOLD  PT/INR every 2024 INR 5.7  Repeat PT/INR 2024          Type 2 diabetes mellitus with hyperglycemia (CMS/[facility])     Lantus 15 units subcutaneous daily   Metformin 1000 mg by mouth daily   Glimepiride 2 mg  by mouth daily   Glucose checks BID         COPD (chronic obstructive pulmonary disease) (CMS/[facility])     Trelegy 200/62.5/25 mcg one inhalation daily   Albuterol 90 mcg inhaler two inhalations every 4 hrs as needed          Chronic diastolic congestive heart failure (CMS/[facility])     1800 ml fluid restriction   Lasix 80 mg by mouth every am  Add Lasix 20 mg by mouth daily at 1 pm  BMP in one week          Physical deconditioning     PT              HPI: Client continues to receive PT services. Staff report client has 14 lb weight gain, 1800 ml fluid restriction. Client states she is compliant with restriction. Client denies HA, dizziness, or lightheadedness. Denies CP or SOB. 5 lit NC. She C/O leg edema. Denies abdominal pain, N/V, diarrhea, or constipation. Denies dysuria. Denies change in appetite. She has chronic hip pain.     Review of Systems ROS as described in in HPI     Physical Exam  Constitutional:       Comments: Resting in bed    HENT:      Head: Normocephalic.      Mouth/Throat:      Mouth: Mucous membranes are moist.   Cardiovascular:      Rate and Rhythm: Normal rate.      Comments: PAF  Pulmonary:      Effort: Pulmonary effort is  normal.      Breath sounds: Normal breath sounds.      Comments: 5 lit NC  Abdominal:      General: Bowel sounds are normal.   Genitourinary:     Comments: Denies dysuria   Musculoskeletal:      Cervical back: Neck supple.      Comments: PT/OT   Positive for BLE edema    Skin:     General: Skin is warm and dry.   Neurological:      Mental Status: She is alert. Mental status is at baseline.   Psychiatric:         Mood and Affect: Mood normal.      Comments: Conversational                    Electronically Signed By: KIKE Lucas   2/2/24  5:22 PM

## 2024-02-02 VITALS
BODY MASS INDEX: 51.19 KG/M2 | DIASTOLIC BLOOD PRESSURE: 74 MMHG | HEART RATE: 88 BPM | SYSTOLIC BLOOD PRESSURE: 122 MMHG | RESPIRATION RATE: 16 BRPM | TEMPERATURE: 97.2 F | WEIGHT: 293 LBS | OXYGEN SATURATION: 94 %

## 2024-02-02 PROBLEM — E11.9 DIABETES MELLITUS (MULTI): Status: RESOLVED | Noted: 2023-06-20 | Resolved: 2024-02-02

## 2024-02-02 PROBLEM — R30.0 DYSURIA: Status: RESOLVED | Noted: 2023-06-20 | Resolved: 2024-02-02

## 2024-02-02 PROBLEM — R31.9 HEMATURIA: Status: RESOLVED | Noted: 2023-06-20 | Resolved: 2024-02-02

## 2024-02-02 NOTE — ASSESSMENT & PLAN NOTE
Amiodarone 200 mg by mouth daily   Metoprolol  tart 25 mg by mouth BID  Coumadin 5 mg by mouth daily on HOLD  PT/INR every Monday 1/29/2024 INR 5.7  Repeat PT/INR 1/31/2024

## 2024-02-02 NOTE — PROGRESS NOTES
Patient ID: Frannie Blanco is a 73 y.o. female who is acute skilled care being seen and evaluated for multiple medical problems.  [id number]   1950    /74   Pulse 88   Temp 36.2 °C (97.2 °F)   Resp 16   Wt 144 kg (317 lb)   SpO2 94%   BMI 51.19 kg/m²     Assessment/Plan  Problem List Items Addressed This Visit       Atrial fibrillation (CMS/[facility]) - Primary     Amiodarone 200 mg by mouth daily   Metoprolol  tart 25 mg by mouth BID  Coumadin 5 mg by mouth daily on HOLD  PT/INR every Monday 1/29/2024 INR 5.7  Repeat PT/INR 1/31/2024          Type 2 diabetes mellitus with hyperglycemia (CMS/[facility])     Lantus 15 units subcutaneous daily   Metformin 1000 mg by mouth daily   Glimepiride 2 mg  by mouth daily   Glucose checks BID         COPD (chronic obstructive pulmonary disease) (CMS/[facility])     Trelegy 200/62.5/25 mcg one inhalation daily   Albuterol 90 mcg inhaler two inhalations every 4 hrs as needed          Chronic diastolic congestive heart failure (CMS/[facility])     1800 ml fluid restriction   Lasix 80 mg by mouth every am  Add Lasix 20 mg by mouth daily at 1 pm  BMP in one week          Physical deconditioning     PT              HPI: Client continues to receive PT services. Staff report client has 14 lb weight gain, 1800 ml fluid restriction. Client states she is compliant with restriction. Client denies HA, dizziness, or lightheadedness. Denies CP or SOB. 5 lit NC. She C/O leg edema. Denies abdominal pain, N/V, diarrhea, or constipation. Denies dysuria. Denies change in appetite. She has chronic hip pain.     Review of Systems ROS as described in in HPI     Physical Exam  Constitutional:       Comments: Resting in bed    HENT:      Head: Normocephalic.      Mouth/Throat:      Mouth: Mucous membranes are moist.   Cardiovascular:      Rate and Rhythm: Normal rate.      Comments: PAF  Pulmonary:      Effort: Pulmonary effort is normal.      Breath sounds: Normal breath sounds.      Comments: 5 lit  NC  Abdominal:      General: Bowel sounds are normal.   Genitourinary:     Comments: Denies dysuria   Musculoskeletal:      Cervical back: Neck supple.      Comments: PT/OT   Positive for BLE edema    Skin:     General: Skin is warm and dry.   Neurological:      Mental Status: She is alert. Mental status is at baseline.   Psychiatric:         Mood and Affect: Mood normal.      Comments: Conversational

## 2024-02-02 NOTE — ASSESSMENT & PLAN NOTE
1800 ml fluid restriction   Lasix 80 mg by mouth every am  Add Lasix 20 mg by mouth daily at 1 pm  BMP in one week

## 2024-02-05 ENCOUNTER — ANTICOAGULATION - WARFARIN VISIT (OUTPATIENT)
Dept: CARDIOLOGY | Facility: CLINIC | Age: 74
End: 2024-02-05
Payer: MEDICARE

## 2024-02-06 ENCOUNTER — NURSING HOME VISIT (OUTPATIENT)
Dept: POST ACUTE CARE | Facility: EXTERNAL LOCATION | Age: 74
End: 2024-02-06
Payer: MEDICARE

## 2024-02-06 DIAGNOSIS — I50.32 CHRONIC DIASTOLIC CONGESTIVE HEART FAILURE (MULTI): ICD-10-CM

## 2024-02-06 DIAGNOSIS — J44.9 CHRONIC OBSTRUCTIVE PULMONARY DISEASE, UNSPECIFIED COPD TYPE (MULTI): ICD-10-CM

## 2024-02-06 DIAGNOSIS — R53.81 PHYSICAL DECONDITIONING: ICD-10-CM

## 2024-02-06 DIAGNOSIS — I48.91 ATRIAL FIBRILLATION, UNSPECIFIED TYPE (MULTI): Primary | ICD-10-CM

## 2024-02-06 DIAGNOSIS — K59.00 CONSTIPATION, UNSPECIFIED CONSTIPATION TYPE: ICD-10-CM

## 2024-02-06 PROCEDURE — 99308 SBSQ NF CARE LOW MDM 20: CPT | Performed by: NURSE PRACTITIONER

## 2024-02-06 NOTE — LETTER
Patient: Frannie Blanco  : 1950    Encounter Date: 2024    Patient ID: Frannie Blanco is a 73 y.o. female who is acute skilled care being seen and evaluated for multiple medical problems.  [id number]   1950    /72   Pulse 88   Temp 36.7 °C (98.1 °F)   Resp 16   Wt 144 kg (318 lb)   SpO2 97%   BMI 51.35 kg/m²     Assessment/Plan  Problem List Items Addressed This Visit       Atrial fibrillation (CMS/[facility]) - Primary     Amiodarone 200 mg by mouth daily   Metoprolol  tart 25 mg by mouth BID  PT/INR every 2024 INR 5.7  2024 INR 1.5  Coumadin 5 mg by mouth every Wednesday  Coumadin 2.5 mg by mouth every Sat, Sun, Mon, Tues, Thur, Friday.            COPD (chronic obstructive pulmonary disease) (CMS/[facility])     Trelegy 200/62.5/25 mcg one inhalation daily   Albuterol 90 mcg inhaler two inhalations every 4 hrs as needed          Chronic diastolic congestive heart failure (CMS/[facility])     1800 ml fluid restriction   Lasix 80 mg by mouth every am  Lasix 20 mg by mouth daily at 1 pm  BMP 2024            Physical deconditioning     PT         Constipation     Increase Colace to 100 mg by mouth BID              HPI: Client continues to receive PT services, physical deconditioning. Client denies HA, dizziness, or lightheadedness. Denies CP, SOB, or increased edema. O2 5 lit NC. States had upset stomach this am, however has since resolved. Denies abdominal pain, N/V, or diarrhea. C/O episodes of constipation. Denies dysuria. Denies change in appetite. No current C/O pain.     Review of Systems ROS as described in in HPI     Physical Exam  Constitutional:       Comments: Sitting in WC   HENT:      Mouth/Throat:      Mouth: Mucous membranes are moist.   Cardiovascular:      Rate and Rhythm: Normal rate and regular rhythm.      Comments: PAF  Pulmonary:      Effort: Pulmonary effort is normal.      Comments: 5 lit NC  Abdominal:      General: Bowel sounds are normal.   Genitourinary:      Comments: Denies dysuria   Musculoskeletal:         General: Normal range of motion.      Cervical back: Neck supple.      Comments: PT/OT   Skin:     General: Skin is warm and dry.   Neurological:      Mental Status: She is alert and oriented to person, place, and time.   Psychiatric:         Mood and Affect: Mood normal.                   Electronically Signed By: KIKE Lucas   2/7/24 11:23 AM

## 2024-02-07 VITALS
BODY MASS INDEX: 51.35 KG/M2 | SYSTOLIC BLOOD PRESSURE: 124 MMHG | RESPIRATION RATE: 16 BRPM | TEMPERATURE: 98.1 F | HEART RATE: 88 BPM | OXYGEN SATURATION: 97 % | WEIGHT: 293 LBS | DIASTOLIC BLOOD PRESSURE: 72 MMHG

## 2024-02-07 PROBLEM — K59.00 CONSTIPATION: Status: ACTIVE | Noted: 2024-02-07

## 2024-02-07 NOTE — ASSESSMENT & PLAN NOTE
1800 ml fluid restriction   Lasix 80 mg by mouth every am  Lasix 20 mg by mouth daily at 1 pm  BMP 2/7/2024

## 2024-02-07 NOTE — ASSESSMENT & PLAN NOTE
Amiodarone 200 mg by mouth daily   Metoprolol  tart 25 mg by mouth BID  PT/INR every Monday 1/29/2024 INR 5.7  2/5/2024 INR 1.5  Coumadin 5 mg by mouth every Wednesday  Coumadin 2.5 mg by mouth every Sat, Sun, Mon, Tues, Thur, Friday.

## 2024-02-07 NOTE — PROGRESS NOTES
Patient ID: Frannie Blanco is a 73 y.o. female who is acute skilled care being seen and evaluated for multiple medical problems.  [id number]   1950    /72   Pulse 88   Temp 36.7 °C (98.1 °F)   Resp 16   Wt 144 kg (318 lb)   SpO2 97%   BMI 51.35 kg/m²     Assessment/Plan  Problem List Items Addressed This Visit       Atrial fibrillation (CMS/[facility]) - Primary     Amiodarone 200 mg by mouth daily   Metoprolol  tart 25 mg by mouth BID  PT/INR every Monday 1/29/2024 INR 5.7  2/5/2024 INR 1.5  Coumadin 5 mg by mouth every Wednesday  Coumadin 2.5 mg by mouth every Sat, Sun, Mon, Tues, Thur, Friday.            COPD (chronic obstructive pulmonary disease) (CMS/[facility])     Trelegy 200/62.5/25 mcg one inhalation daily   Albuterol 90 mcg inhaler two inhalations every 4 hrs as needed          Chronic diastolic congestive heart failure (CMS/[facility])     1800 ml fluid restriction   Lasix 80 mg by mouth every am  Lasix 20 mg by mouth daily at 1 pm  BMP 2/7/2024            Physical deconditioning     PT         Constipation     Increase Colace to 100 mg by mouth BID              HPI: Client continues to receive PT services, physical deconditioning. Client denies HA, dizziness, or lightheadedness. Denies CP, SOB, or increased edema. O2 5 lit NC. States had upset stomach this am, however has since resolved. Denies abdominal pain, N/V, or diarrhea. C/O episodes of constipation. Denies dysuria. Denies change in appetite. No current C/O pain.     Review of Systems ROS as described in in HPI     Physical Exam  Constitutional:       Comments: Sitting in WC   HENT:      Mouth/Throat:      Mouth: Mucous membranes are moist.   Cardiovascular:      Rate and Rhythm: Normal rate and regular rhythm.      Comments: PAF  Pulmonary:      Effort: Pulmonary effort is normal.      Comments: 5 lit NC  Abdominal:      General: Bowel sounds are normal.   Genitourinary:     Comments: Denies dysuria   Musculoskeletal:         General: Normal  range of motion.      Cervical back: Neck supple.      Comments: PT/OT   Skin:     General: Skin is warm and dry.   Neurological:      Mental Status: She is alert and oriented to person, place, and time.   Psychiatric:         Mood and Affect: Mood normal.

## 2024-02-12 ENCOUNTER — APPOINTMENT (OUTPATIENT)
Dept: ENDOCRINOLOGY | Facility: CLINIC | Age: 74
End: 2024-02-12
Payer: MEDICARE

## 2024-02-13 ENCOUNTER — NURSING HOME VISIT (OUTPATIENT)
Dept: POST ACUTE CARE | Facility: EXTERNAL LOCATION | Age: 74
End: 2024-02-13
Payer: MEDICARE

## 2024-02-13 DIAGNOSIS — I48.91 ATRIAL FIBRILLATION, UNSPECIFIED TYPE (MULTI): Primary | ICD-10-CM

## 2024-02-13 DIAGNOSIS — J44.9 CHRONIC OBSTRUCTIVE PULMONARY DISEASE, UNSPECIFIED COPD TYPE (MULTI): ICD-10-CM

## 2024-02-13 DIAGNOSIS — R53.81 PHYSICAL DECONDITIONING: ICD-10-CM

## 2024-02-13 DIAGNOSIS — E87.6 HYPOKALEMIA: ICD-10-CM

## 2024-02-13 PROCEDURE — 99308 SBSQ NF CARE LOW MDM 20: CPT | Performed by: NURSE PRACTITIONER

## 2024-02-13 NOTE — LETTER
Patient: Frannie Blanco  : 1950    Encounter Date: 2024    Patient ID: Frannie Blanco is a 73 y.o. female who is acute skilled care being seen and evaluated for multiple medical problems.  [id number]   1950    /70   Pulse 86   Temp 36.6 °C (97.9 °F)   Resp 16   Wt 143 kg (316 lb)   SpO2 97%   BMI 51.03 kg/m²     Assessment/Plan  Problem List Items Addressed This Visit       Atrial fibrillation (CMS/[facility]) - Primary     Amiodarone 200 mg by mouth daily   Metoprolol  tart 25 mg by mouth BID  PT/INR every 2024 INR 2.1  Coumadin 5 mg by mouth every Wednesday  Coumadin 2.5 mg by mouth every Sat, Sun, Mon, Tues, Thur, Friday.             COPD (chronic obstructive pulmonary disease) (CMS/[facility])     Trelegy 200/62.5/25 mcg one inhalation daily   Albuterol 90 mcg inhaler two inhalations every 4 hrs as needed          Physical deconditioning     PT/OT         Hypokalemia     2024 Potassium 3.0  Potassium 20 meq by mouth daily   Repeat BMP 2024               HPI: Radu continues to receive PT services. Client denies HA or dizziness. Denies CP, SOB, or increased edema. O2 4 lit NC. Denies abdominal pain or N/V. Denies dysuria. Denies change in appetite. No current C/O pain.     Review of Systems ROS as described in in HPI     Physical Exam  Constitutional:       Comments: Sitting in chair   HENT:      Mouth/Throat:      Mouth: Mucous membranes are moist.   Cardiovascular:      Rate and Rhythm: Normal rate.      Comments: H/O AFIB  Pulmonary:      Effort: Pulmonary effort is normal.      Comments: 4 lit NC  Abdominal:      General: Bowel sounds are normal.   Genitourinary:     Comments: Denies dysuria   Musculoskeletal:      Cervical back: Neck supple.      Comments: PT/OT   Skin:     General: Skin is warm and dry.   Neurological:      Mental Status: She is alert. Mental status is at baseline.   Psychiatric:         Mood and Affect: Mood normal.                   Electronically  Signed By: Silvana Hodges, JOSELITO-CNP   2/15/24  7:28 AM

## 2024-02-15 VITALS
SYSTOLIC BLOOD PRESSURE: 118 MMHG | HEART RATE: 86 BPM | OXYGEN SATURATION: 97 % | BODY MASS INDEX: 51.03 KG/M2 | TEMPERATURE: 97.9 F | DIASTOLIC BLOOD PRESSURE: 70 MMHG | WEIGHT: 293 LBS | RESPIRATION RATE: 16 BRPM

## 2024-02-15 PROBLEM — E87.6 HYPOKALEMIA: Status: ACTIVE | Noted: 2024-02-15

## 2024-02-15 NOTE — ASSESSMENT & PLAN NOTE
Amiodarone 200 mg by mouth daily   Metoprolol  tart 25 mg by mouth BID  PT/INR every Monday 2/12/2024 INR 2.1  Coumadin 5 mg by mouth every Wednesday  Coumadin 2.5 mg by mouth every Sat, Sun, Mon, Tues, Thur, Friday.

## 2024-02-15 NOTE — PROGRESS NOTES
Patient ID: Frannie Blanco is a 73 y.o. female who is acute skilled care being seen and evaluated for multiple medical problems.  [id number]   1950    /70   Pulse 86   Temp 36.6 °C (97.9 °F)   Resp 16   Wt 143 kg (316 lb)   SpO2 97%   BMI 51.03 kg/m²     Assessment/Plan  Problem List Items Addressed This Visit       Atrial fibrillation (CMS/[facility]) - Primary     Amiodarone 200 mg by mouth daily   Metoprolol  tart 25 mg by mouth BID  PT/INR every Monday 2/12/2024 INR 2.1  Coumadin 5 mg by mouth every Wednesday  Coumadin 2.5 mg by mouth every Sat, Sun, Mon, Tues, Thur, Friday.             COPD (chronic obstructive pulmonary disease) (CMS/[facility])     Trelegy 200/62.5/25 mcg one inhalation daily   Albuterol 90 mcg inhaler two inhalations every 4 hrs as needed          Physical deconditioning     PT/OT         Hypokalemia     2/7/2024 Potassium 3.0  Potassium 20 meq by mouth daily   Repeat BMP 2/14/2024               HPI: Radu continues to receive PT services. Client denies HA or dizziness. Denies CP, SOB, or increased edema. O2 4 lit NC. Denies abdominal pain or N/V. Denies dysuria. Denies change in appetite. No current C/O pain.     Review of Systems ROS as described in in HPI     Physical Exam  Constitutional:       Comments: Sitting in chair   HENT:      Mouth/Throat:      Mouth: Mucous membranes are moist.   Cardiovascular:      Rate and Rhythm: Normal rate.      Comments: H/O AFIB  Pulmonary:      Effort: Pulmonary effort is normal.      Comments: 4 lit NC  Abdominal:      General: Bowel sounds are normal.   Genitourinary:     Comments: Denies dysuria   Musculoskeletal:      Cervical back: Neck supple.      Comments: PT/OT   Skin:     General: Skin is warm and dry.   Neurological:      Mental Status: She is alert. Mental status is at baseline.   Psychiatric:         Mood and Affect: Mood normal.

## 2024-02-19 NOTE — PROGRESS NOTES
Secure chat message sent to Silvana Hodges CNP requesting update on when pt may be discharged from SNF.

## 2024-02-20 ENCOUNTER — NURSING HOME VISIT (OUTPATIENT)
Dept: POST ACUTE CARE | Facility: EXTERNAL LOCATION | Age: 74
End: 2024-02-20
Payer: MEDICARE

## 2024-02-20 DIAGNOSIS — J44.9 CHRONIC OBSTRUCTIVE PULMONARY DISEASE, UNSPECIFIED COPD TYPE (MULTI): ICD-10-CM

## 2024-02-20 DIAGNOSIS — E87.6 HYPOKALEMIA: ICD-10-CM

## 2024-02-20 DIAGNOSIS — I48.91 ATRIAL FIBRILLATION, UNSPECIFIED TYPE (MULTI): ICD-10-CM

## 2024-02-20 DIAGNOSIS — R53.81 PHYSICAL DECONDITIONING: Primary | ICD-10-CM

## 2024-02-20 PROCEDURE — 99308 SBSQ NF CARE LOW MDM 20: CPT | Performed by: NURSE PRACTITIONER

## 2024-02-20 NOTE — LETTER
Patient: Frannie Blanco  : 1950    Encounter Date: 2024    Patient ID: Frannie Blanco is a 73 y.o. female who is acute skilled care being seen and evaluated for multiple medical problems.  [id number]   1950    /65   Pulse 89   Temp 36.7 °C (98 °F)   Resp 16   Wt 143 kg (316 lb)   SpO2 97%   BMI 51.03 kg/m²     Assessment/Plan  Problem List Items Addressed This Visit       Atrial fibrillation (CMS/[facility])     Amiodarone 200 mg by mouth daily   Metoprolol  tart 25 mg by mouth BID  PT/INR every 2024 INR 2.0  Coumadin 5 mg by mouth every Wednesday  Coumadin 2.5 mg by mouth every Sat, Sun, Mon, Tues, Thur, Friday.         COPD (chronic obstructive pulmonary disease) (CMS/[facility])     Trelegy 200/62.5/25 mcg one inhalation daily   Albuterol 90 mcg inhaler two inhalations every 4 hrs as needed         Physical deconditioning - Primary     PT/OT         Hypokalemia     Potassium 20 meq by mouth BID  2024 K- 3.3  Repeat BMP 2024              HPI: Client continues to receive PT/OT services. Client denies HA, dizziness, or lightheadedness. Denies CP, SOB, or increased edema. O2 4 lit NC. Denies abdominal pain, N/V, diarrhea, or constipation. Denies dysuria. Denies change in appetite.     Review of Systems ROS as described in HPI     Physical Exam  HENT:      Head: Normocephalic.      Mouth/Throat:      Mouth: Mucous membranes are moist.   Cardiovascular:      Rate and Rhythm: Normal rate.   Pulmonary:      Effort: Pulmonary effort is normal.      Breath sounds: Normal breath sounds.      Comments: 4 lit NC  Abdominal:      General: Bowel sounds are normal.   Genitourinary:     Comments: Denies dysuria   Musculoskeletal:      Cervical back: Neck supple.      Comments: PT/OT   Skin:     General: Skin is warm and dry.   Neurological:      Mental Status: She is alert and oriented to person, place, and time.   Psychiatric:         Mood and Affect: Mood normal.      Comments:  Conversational                    Electronically Signed By: JOSELITO Lucas-CNP   2/23/24  8:57 AM

## 2024-02-23 VITALS
DIASTOLIC BLOOD PRESSURE: 65 MMHG | SYSTOLIC BLOOD PRESSURE: 122 MMHG | HEART RATE: 89 BPM | OXYGEN SATURATION: 97 % | WEIGHT: 293 LBS | RESPIRATION RATE: 16 BRPM | TEMPERATURE: 98 F | BODY MASS INDEX: 51.03 KG/M2

## 2024-02-23 NOTE — ASSESSMENT & PLAN NOTE
Amiodarone 200 mg by mouth daily   Metoprolol  tart 25 mg by mouth BID  PT/INR every Monday 2/19/2024 INR 2.0  Coumadin 5 mg by mouth every Wednesday  Coumadin 2.5 mg by mouth every Sat, Sun, Mon, Tues, Thur, Friday.

## 2024-02-23 NOTE — PROGRESS NOTES
Patient ID: Frannie Blanco is a 73 y.o. female who is acute skilled care being seen and evaluated for multiple medical problems.  [id number]   1950    /65   Pulse 89   Temp 36.7 °C (98 °F)   Resp 16   Wt 143 kg (316 lb)   SpO2 97%   BMI 51.03 kg/m²     Assessment/Plan  Problem List Items Addressed This Visit       Atrial fibrillation (CMS/[facility])     Amiodarone 200 mg by mouth daily   Metoprolol  tart 25 mg by mouth BID  PT/INR every Monday 2/19/2024 INR 2.0  Coumadin 5 mg by mouth every Wednesday  Coumadin 2.5 mg by mouth every Sat, Sun, Mon, Tues, Thur, Friday.         COPD (chronic obstructive pulmonary disease) (CMS/[facility])     Trelegy 200/62.5/25 mcg one inhalation daily   Albuterol 90 mcg inhaler two inhalations every 4 hrs as needed         Physical deconditioning - Primary     PT/OT         Hypokalemia     Potassium 20 meq by mouth BID  2/19/2024 K- 3.3  Repeat BMP 2/26/2024              HPI: Client continues to receive PT/OT services. Client denies HA, dizziness, or lightheadedness. Denies CP, SOB, or increased edema. O2 4 lit NC. Denies abdominal pain, N/V, diarrhea, or constipation. Denies dysuria. Denies change in appetite.     Review of Systems ROS as described in HPI     Physical Exam  HENT:      Head: Normocephalic.      Mouth/Throat:      Mouth: Mucous membranes are moist.   Cardiovascular:      Rate and Rhythm: Normal rate.   Pulmonary:      Effort: Pulmonary effort is normal.      Breath sounds: Normal breath sounds.      Comments: 4 lit NC  Abdominal:      General: Bowel sounds are normal.   Genitourinary:     Comments: Denies dysuria   Musculoskeletal:      Cervical back: Neck supple.      Comments: PT/OT   Skin:     General: Skin is warm and dry.   Neurological:      Mental Status: She is alert and oriented to person, place, and time.   Psychiatric:         Mood and Affect: Mood normal.      Comments: Conversational

## 2024-02-26 ENCOUNTER — APPOINTMENT (OUTPATIENT)
Dept: PRIMARY CARE | Facility: CLINIC | Age: 74
End: 2024-02-26
Payer: MEDICARE

## 2024-02-27 ENCOUNTER — NURSING HOME VISIT (OUTPATIENT)
Dept: POST ACUTE CARE | Facility: EXTERNAL LOCATION | Age: 74
End: 2024-02-27
Payer: MEDICARE

## 2024-02-27 DIAGNOSIS — R53.81 PHYSICAL DECONDITIONING: ICD-10-CM

## 2024-02-27 DIAGNOSIS — J44.9 CHRONIC OBSTRUCTIVE PULMONARY DISEASE, UNSPECIFIED COPD TYPE (MULTI): Primary | ICD-10-CM

## 2024-02-27 DIAGNOSIS — I48.0 PAROXYSMAL ATRIAL FIBRILLATION (MULTI): ICD-10-CM

## 2024-02-27 PROCEDURE — 99308 SBSQ NF CARE LOW MDM 20: CPT | Performed by: NURSE PRACTITIONER

## 2024-02-27 NOTE — LETTER
Patient: Frannie Blanco  : 1950    Encounter Date: 2024    Patient ID: Frannie Blanco is a 73 y.o. female who is acute skilled care being seen and evaluated for multiple medical problems.  [id number]   1950    /82   Pulse 71   Temp 36.7 °C (98 °F)   Resp 16   Wt 142 kg (314 lb)   SpO2 96%   BMI 50.71 kg/m²     Assessment/Plan  Problem List Items Addressed This Visit       Atrial fibrillation (CMS/[facility])     Amiodarone 200 mg by mouth daily   Metoprolol  tart 25 mg by mouth BID  PT/INR every Monday   Coumadin 5 mg by mouth every Wednesday  Coumadin 2.5 mg by mouth every Sat, Sun, Mon, Tues, Thur, Friday.         COPD (chronic obstructive pulmonary disease) (CMS/[facility]) - Primary     Trelegy 200/62.5/25 mcg one inhalation daily   Albuterol 90 mcg inhaler two inhalations every 4 hrs as needed         Physical deconditioning     PT/OT              HPI: Client continues to receive PT/OT services. She is pending DC to home 2024. Client denies HA, dizziness, or lightheadedness. Denies CP, SOB,  or increased edema. 5 lit NC. Denies abdominal pain or N/V. Denies dysuria. Denies change in appetite. She C/O chronic lumbar pain.     Review of Systems ROS as described in HPI     Physical Exam  HENT:      Head: Normocephalic.      Mouth/Throat:      Mouth: Mucous membranes are moist.   Cardiovascular:      Rate and Rhythm: Normal rate.      Comments: PAF  Pulmonary:      Effort: Pulmonary effort is normal.      Breath sounds: Normal breath sounds.      Comments: 5 lit NC  Abdominal:      General: Bowel sounds are normal.   Genitourinary:     Comments: Denies dysuria   Musculoskeletal:      Cervical back: Neck supple.      Comments: PT/OT   Skin:     General: Skin is warm and dry.   Neurological:      Mental Status: She is alert and oriented to person, place, and time.   Psychiatric:         Mood and Affect: Mood normal.      Comments: Conversational                    Electronically Signed By: Silvana  LILIA Hodges, JOSELITO-CNP   3/1/24  8:40 AM

## 2024-02-28 NOTE — PROGRESS NOTES
Called pt, as no weekly nursing note entered 2/27.  Pt states she will be going home today.  She agreed to test Friday, 3/1.  Not aware of INR results while in SNF.

## 2024-03-01 ENCOUNTER — ANTICOAGULATION - WARFARIN VISIT (OUTPATIENT)
Dept: CARDIOLOGY | Facility: CLINIC | Age: 74
End: 2024-03-01
Payer: MEDICARE

## 2024-03-01 VITALS
DIASTOLIC BLOOD PRESSURE: 82 MMHG | WEIGHT: 293 LBS | BODY MASS INDEX: 50.71 KG/M2 | TEMPERATURE: 98 F | SYSTOLIC BLOOD PRESSURE: 126 MMHG | HEART RATE: 71 BPM | OXYGEN SATURATION: 96 % | RESPIRATION RATE: 16 BRPM

## 2024-03-01 PROBLEM — R06.02 SOB (SHORTNESS OF BREATH) ON EXERTION: Status: RESOLVED | Noted: 2023-06-20 | Resolved: 2024-03-01

## 2024-03-01 NOTE — PROGRESS NOTES
Patient ID: Frannie Blanco is a 73 y.o. female who is acute skilled care being seen and evaluated for multiple medical problems.  [id number]   1950    /82   Pulse 71   Temp 36.7 °C (98 °F)   Resp 16   Wt 142 kg (314 lb)   SpO2 96%   BMI 50.71 kg/m²     Assessment/Plan  Problem List Items Addressed This Visit       Atrial fibrillation (CMS/[facility])     Amiodarone 200 mg by mouth daily   Metoprolol  tart 25 mg by mouth BID  PT/INR every Monday   Coumadin 5 mg by mouth every Wednesday  Coumadin 2.5 mg by mouth every Sat, Sun, Mon, Tues, Thur, Friday.         COPD (chronic obstructive pulmonary disease) (CMS/[facility]) - Primary     Trelegy 200/62.5/25 mcg one inhalation daily   Albuterol 90 mcg inhaler two inhalations every 4 hrs as needed         Physical deconditioning     PT/OT              HPI: Client continues to receive PT/OT services. She is pending DC to home 2/28/2024. Client denies HA, dizziness, or lightheadedness. Denies CP, SOB,  or increased edema. 5 lit NC. Denies abdominal pain or N/V. Denies dysuria. Denies change in appetite. She C/O chronic lumbar pain.     Review of Systems ROS as described in HPI     Physical Exam  HENT:      Head: Normocephalic.      Mouth/Throat:      Mouth: Mucous membranes are moist.   Cardiovascular:      Rate and Rhythm: Normal rate.      Comments: PAF  Pulmonary:      Effort: Pulmonary effort is normal.      Breath sounds: Normal breath sounds.      Comments: 5 lit NC  Abdominal:      General: Bowel sounds are normal.   Genitourinary:     Comments: Denies dysuria   Musculoskeletal:      Cervical back: Neck supple.      Comments: PT/OT   Skin:     General: Skin is warm and dry.   Neurological:      Mental Status: She is alert and oriented to person, place, and time.   Psychiatric:         Mood and Affect: Mood normal.      Comments: Conversational

## 2024-03-01 NOTE — ASSESSMENT & PLAN NOTE
Amiodarone 200 mg by mouth daily   Metoprolol  tart 25 mg by mouth BID  PT/INR every Monday   Coumadin 5 mg by mouth every Wednesday  Coumadin 2.5 mg by mouth every Sat, Sun, Mon, Tues, Thur, Friday.

## 2024-03-04 DIAGNOSIS — Z79.01 ENCOUNTER FOR CURRENT LONG-TERM USE OF ANTICOAGULANTS: Primary | ICD-10-CM

## 2024-03-04 LAB
INR IN PPP BY COAGULATION ASSAY EXTERNAL: 1.8
PROTHROMBIN TIME (PT) IN PPP BY COAGULATION ASSAY EXTERNAL: NORMAL SECONDS

## 2024-03-04 NOTE — PROGRESS NOTES
Patient identification verified with 2 identifiers.    Location: [facility] Patient Self-Testing Program [phone]    Referring Physician: Siobhan Palafox  Enrollment/ Re-enrollment date: 24   INR Goal: 2.0-3.0  INR monitoring is per [facility] protocol.  Anticoagulation Medication: warfarin  Indication: Pulmonary Embolism (PE)    Subjective   Bleeding signs/symptoms: No    Bruising: No   Major bleeding event: No  Thrombosis signs/symptoms: No  Thromboembolic event: No  Missed doses: No  Extra doses: No  Medication changes: No  Dietary changes: No  Change in health: No  Change in activity: No  Alcohol: No  Other concerns: No    Upcoming Procedures:  Does the Patient Have any upcoming procedures that require interruption in anticoagulation therapy? no  Does the patient require bridging? no      Anticoagulation Summary  As of 3/1/2024      INR goal:  2.0-3.0   TTR:  26.8 % (2.9 mo)   INR used for dosin.80 (3/4/2024)   Weekly warfarin total:  20 mg               Assessment/Plan   Subtherapeutic     1. New dose:  Will increase dose and patient will retest in 1 week.     2. Next INR: 1 week      Education provided to patient during the visit:  Patient instructed to call in interim with questions, concerns and changes.

## 2024-03-06 NOTE — CONSULTS
Service:   Service: Cardiology     Consult:  Consult requested by (Attending Name): Terra Young   Reason: CHF     History of Present Illness:   HPI:    PATRICIA CHANG is a 73 year old Female from home present to [facility] ED with complaints of SOB x3 days. Typically will wear 2-3L O2 at baseline. Over the last  couple of days she has been feeling worse and pulse ox at home as been worsening. Went to urgent care yesterday for pulse ox in the low 80s. Attempted to increase oxygen without improvement. Typically only wears at night but since not feeling well has  bee wearing continuous. Denies appetite changes, orthopnea. Reports weight fluctuate, worsened  edema when sitting up right, improves with elevation. OF note for the last couple of days depending on positioning in bed will cause her to cough a lot more.  Patient of Dr. Siegel, seen in the last year and follows every 6 months. She is suppose to be taking furosemide at home daily but has not been. + sick contact grandson and daughter    12 system review is negative except as listed above  All available ECGs independently reviewed  PMHx significant for Type II DM, Chronic afib on warfarin, HLD, morbid obesity, COPD, chronic respiratory failure on O2 HS, pernicious anemia, lumbar spondylosis     Review Family/Social History and ROS:     Review Family/Social History and ROS:       I have reviewed the family and social history and review of systems from the History and Physical.    Family History:  Family History: reviewed and not pertinent to presenting  problem     Social History:    Smoking Status: occasional user (use that is infrequent,  sporadic, not on a daily basis) (1)   Alcohol Use: denies (1)   Drug Use: denies  (1)   Social History:    lives at home with daughter and BRITTANY           Allergies:  ·  No Known Allergies :     Objective:     Objective Information:        T   P  R  BP   MAP  SpO2    Value  36.5  85  18  134/73   77  91%  Date/Time 9/23 6:45 9/23 6:45 9/23 6:45 9/23 6:45  9/22 18:35 9/23 6:45  Range  (36.4C - 36.5C )  (68 - 137 )  (17 - 27 )  (106 - 152 )/ (52 - 109 )  (73 - 77 )  (85% - 92% )   As of 23-Sep-2023 06:45:00, patient is on 6 L/min of oxygen via nasal cannula.    Physical Exam by System:    Constitutional: sitting up in bed, awake and alert,  no distress, cooperative   Eyes: sclera white, EOMI   ENMT: mucous membranes moist, no apparent injury,  no lesions seen   Head/Neck: Neck supple, no apparent injury, brisk  upstroke JVP while sitting upright   Respiratory/Thorax: Posterior bilateral lung fields  diminished to auscultation, expiratory wheezing throughout, no rhonchi or rales, equal chest expansion, mild conversational dyspnea, equal chest expansion, thorax symmetric   Cardiovascular: irregular rhythm, regular rate,   soft grade 2/6 systolic murmur   Gastrointestinal: soft, nontender, +BS   Musculoskeletal: ROM intact, no joint swelling   Extremities: No Edema, +2 DP/PT   Neurological: Alert and Oriented x3   Psychological: Appropriate mood and behavior   Skin: Warm and dry     Medications:    Medications:    Home Medications:     aspirin 81 mg oral tablet: 1 tab(s) oral once a day  Calcium 600+D: 1  oral 2 times a day  gabapentin 300 mg oral tablet: 1  orally 2 times a day  glimepiride 2 mg oral tablet: 1 tab(s) oral once a day  metFORMIN 1000 mg oral tablet: 1 tab(s) oral 2 times a day  warfarin 5 mg oral tablet: 1 tab(s) oral once a day on Mondays and Fridays  topiramate 100 mg oral tablet: 1 tab(s) oral 2 times a day  acetaminophen 500 mg oral tablet: 1 tab(s) orally every 6 hours   dapsone 100 mg oral tablet: 1 tab(s) orally once a day  Albuterol (Eqv-Proventil HFA) 90 mcg/inh inhalation aerosol: 2 puff(s) inhaled every 4 hours, As Needed  atorvastatin 20 mg oral tablet: 1 tab(s) orally once a day  cyanocobalamin 2 mcg/mL intramuscular solution: 1000 microgram(s)  intramuscular once a month  dapsone 25 mg oral tablet: 1 tab(s) orally once a day  DilTIAZem (Eqv-Cardizem CD) 240 mg/24 hours oral capsule, extended release: 1 cap(s) orally once a day  furosemide 40 mg oral tablet: 1 tab(s) orally once a day  Levemir FlexPen: 15 units subcutaneous once a day (at bedtime)  losartan 25 mg oral tablet: 0.5 tab(s) orally once a day (at bedtime)  Vitamin D2 50,000 intl units (1.25 mg) oral capsule: 1 cap(s) orally every 2 weeks, on thursdays  warfarin 2.5 mg oral tablet: 1 tab(s) orally once a day on Tuesdays ,Wednesdays, Thursdays, Saturdays and Sundays        Continuous Medications       --------------------------------  No continuous medications are active       Scheduled Medications       --------------------------------    1. Aspirin Enteric Coated:  81  mg  Oral  Daily    2. Atorvastatin:  20  mg  Oral  Daily    3. Azithromycin:  500  mg  Oral  Every 24 Hours    4. Calcium 500 mg - Vitamin D 200 Units:  1  tablet(s)  Oral  2 Times a Day    5. dilTIAZem (CARDIZEM CD) Extended Release (24 hour):  240  mg  Oral  Every 24 Hours    6. Gabapentin:  300  mg  Oral  2 Times a Day    7. Glimepiride:  2  mg  Oral  Daily    8. Insulin Glargine (Lantus) Injectable:  10  unit(s)  SubCutaneous  At Bedtime    9. Insulin Lispro Moderate Corrective Scale:  unit(s)  SubCutaneous  3 Times a Day Before Meals    10. Losartan:  12.5  mg  Oral  Daily    11. predniSONE:  50  mg  Oral  Every 24 Hours    12. Topiramate:  100  mg  Oral  2 Times a Day         PRN Medications       --------------------------------    1. Acetaminophen:  650  mg  Oral  Every 4 Hours    2. Acetaminophen:  650  mg  Oral  Every 4 Hours    3. Albuterol 2.5 mg - Ipratropium 0.5 mg/ 3 mL Neb Soln:  3  mL  Inhalation  Every 6 Hours    4. Dextromethorphan - guaiFENesin Oral Liquid:  5  mL  Oral  Every 4 Hours    5. Glucagon Injectable:  1  mg  IntraMuscular  Every 15 Minutes    6. guaiFENesin Extended Release:  600  mg  Oral  Every 12  Hours    7. LORazepam:  0.5  mg  Oral  Every 6 Hours    8. Magnesium Hydroxide -Al Hydrox -Simethicone Oral Liquid:  30  mL  Oral  Every 6 Hours    9. Magnesium Hydroxide Oral Liquid:  30  mL  Oral  Every 24 Hours    10. Ondansetron Injectable:  4  mg  IntraVenous Push  Every 4 Hours    11. Sodium Chloride 0.9% Injectable Flush:  10  mL  IntraVenous Flush  Every 8 Hours and as Needed    12. Sore Throat Lozenge:  1  lozenge(s)  Oral  Every 2 Hours    13. traZODone:  25  mg  Oral  At Bedtime           Currently Suspended Medications       --------------------------------    1. Furosemide:  40  mg  Oral  Daily    2. metFORMIN (GLUCOPHAGE):  1000  mg  Oral  2 Times a Day With Meals      Recent Lab Results:    Results:        I have reviewed these laboratory results:    Complete Blood Count  23-Sep-2023 06:02:00      Result Value    White Blood Cell Count  8.2    Nucleated Erythrocyte Count  0.2    Red Blood Cell Count  2.72   L   HGB  9.2   L   HCT  31.4   L   MCV  115   H   MCHC  29.3   L   PLT  249    RDW-CV  14.8   H     Basic Metabolic Panel  23-Sep-2023 06:02:00      Result Value    Glucose, Serum  243   H   NA  138    K  4.1    CL  105    Bicarbonate, Serum  23    Anion Gap, Serum  14    BUN  21    CREAT  0.88    GFR Female  70    Calcium, Serum  8.8      PT + INR, Plasma  23-Sep-2023 06:02:00      Result Value    Prothrombin Time, Plasma  41.4   H   International Normalized Ratio, Plasma  3.6   H     Glucose_POCT  Trending View      Result 23-Sep-2023 05:56:00  22-Sep-2023 22:32:00    Glucose-POCT 245   H   309   H        Troponin I, High Sensitivity  Trending View      Result 22-Sep-2023 13:57:00  22-Sep-2023 12:43:00    Troponin I, High Sensitivity 7   7        Coronavirus 2019 by PCR  22-Sep-2023 12:59:00      Result Value    Fluid Source  Nasal, Nasopharyngeal    Coronavirus 2019,PCR  NOT DETECTED  Reference Range: Not Detected .This test has received FDA Emergency Use Authorization (EUA) and has been  verified by [facility]. This test is only authorized for the duration of time that circumsta      Complete Blood Count + Differential  22-Sep-2023 12:43:00      Result Value    White Blood Cell Count  16.5   H   Red Blood Cell Count  3.04   L   HGB  10.5   L   HCT  34.7   L   MCV  114   H   MCHC  30.3   L   PLT  275    RDW-CV  14.8   H   Neutrophil %  87.2    Immature Granulocytes %  0.4    Lymphocyte %  6.5    Monocyte %  3.9    Eosinophil %  1.5    Basophil %  0.5    Neutrophil Count  14.41   H   Lymphocyte Count  1.07    Monocyte Count  0.64    Eosinophil Count  0.25    Basophil Count  0.08      Comprehensive Metabolic Panel  22-Sep-2023 12:43:00      Result Value    Glucose, Serum  143   H   NA  141    K  3.9    CL  107    Bicarbonate, Serum  26    Anion Gap, Serum  12    BUN  17    CREAT  0.93    GFR Female  65    Calcium, Serum  9.0    ALB  3.8    ALKP  66    T Pro  6.6    T Bili  1.1    Alanine Aminotransferase, Serum  10    Aspartate Transaminase, Serum  11      RBC Morphology  22-Sep-2023 12:43:00      Result Value    Red Blood Cell Morphology  See Below    Polychromasia  Mild      Brain Natriuretic Peptide  22-Sep-2023 12:43:00      Result Value    Brain Natriuretic Peptide  222   H       Radiology Results:    Results:        Impression:    1. No pulmonary embolism or other acute intrathoracic process.  2. Mild to moderate coronary artery calcifications.  3. Hepatic steatosis with some morphologic changes raising the  possibility of underlying cirrhosis.  4. Rim calcified splenic artery aneurysms measuring up to 2.3 cm  unchanged from prior.        Signed by Main Chung MD     TH CT Angio Chest for PE [Sep 22 2023  3:54PM]      Impression:       1. No acute cardiopulmonary disease..        Signed by Kashif Baig MD     Xray Chest 1 View [Sep 22 2023  2:18PM]        Assessment:    72 yo women admitted with acute on chronic hypoxic respiratory failure 2/2 COPD exac vs  "Viral illness vs acute CHF exacerbation  Chronic Diastolic HF  Chronic Afib on Warfarin  Type II DM  obesity  HTN  HLD    Recommendations  Echo 10/2022 preserved LVEF, mild concentric LVH, moderate mitral stenosis  Repeat Echo Monday to assess LVEF and Valve  Difficult to asses fluid status given body habitus, however given presenting symptoms and imaging consistent with CHF exacerbation   Furosemide gtt 5mg/hr for next 24 hrs reevaluate tomorrow  Strict Is and Os  Daily weights  Close monitoring of electrolytes and kidney function  Replace as needed for goal K>4 and Mag >2   Supportive treatment per primary team    Further recommendations to follow    Thank you for the consult, please reach out with any questions.   Discussed with Dr. Muñoz and Alison Aaron PA-C    Consult Status:  Consult Order ID: [id number]       Electronic Signatures:  Siobhan Sams (APRN-CNP)  (Signed 23-Sep-2023 09:08)   Authored: Service, History of Present Illness, Review  Family/Social History and ROS, Allergies, Objective, Assessment/Recommendations, Note Completion      Last Updated: 23-Sep-2023 09:08 by Siobhan Sams (APRN-CNP)    References:  1.  Data Referenced From \"History and Physical\" 23-Sep-2023 08:10   "

## 2024-03-07 ENCOUNTER — TELEPHONE (OUTPATIENT)
Dept: CARDIOLOGY | Facility: CLINIC | Age: 74
End: 2024-03-07
Payer: MEDICARE

## 2024-03-07 ENCOUNTER — ANTICOAGULATION - WARFARIN VISIT (OUTPATIENT)
Dept: CARDIOLOGY | Facility: CLINIC | Age: 74
End: 2024-03-07
Payer: MEDICARE

## 2024-03-07 NOTE — TELEPHONE ENCOUNTER
Called [facility] regarding patient's account being deactivated and they are unable to reactive account until updated order received from .  Patient last reported INR on 2/5/24 and then was in rehab facility.  She was unable to report results to [facility] but did leave result on PST  yesterday and was managed by [facility].  Electronic order sent to Dr. Young yesterday and [facility] order faxed today tp  for signature and return to [facility] for processing.     Per previous [facility] note patient will test again 3/11/24 and continue to phone in result to PST line until account is reactivated.

## 2024-03-08 PROBLEM — Z79.01 ENCOUNTER FOR CURRENT LONG-TERM USE OF ANTICOAGULANTS: Status: ACTIVE | Noted: 2024-03-08

## 2024-03-11 ENCOUNTER — ANTICOAGULATION - WARFARIN VISIT (OUTPATIENT)
Dept: CARDIOLOGY | Facility: CLINIC | Age: 74
End: 2024-03-11
Payer: MEDICARE

## 2024-03-11 DIAGNOSIS — Z79.01 ENCOUNTER FOR CURRENT LONG-TERM USE OF ANTICOAGULANTS: Primary | ICD-10-CM

## 2024-03-11 DIAGNOSIS — E11.65 TYPE 2 DIABETES MELLITUS WITH HYPERGLYCEMIA, UNSPECIFIED WHETHER LONG TERM INSULIN USE (MULTI): ICD-10-CM

## 2024-03-11 RX ORDER — METFORMIN HYDROCHLORIDE 1000 MG/1
TABLET ORAL
Qty: 180 TABLET | Refills: 3 | Status: SHIPPED | OUTPATIENT
Start: 2024-03-11

## 2024-03-11 NOTE — PROGRESS NOTES
Location: [facility] Patient Self-Testing Program [phone]    Referring Physician:  Dr. ANISA Young MD  Enrollment/ Re-enrollment date: 3/8/2025   INR Goal: 2.0-3.0  INR monitoring is per [facility] protocol.  Anticoagulation Medication: warfarin  Indication: Pulmonary Embolism (PE)    Subjective   Bleeding signs/symptoms: No    Bruising: No   Major bleeding event: No  Thrombosis signs/symptoms: No  Thromboembolic event: No  Missed doses: No  Extra doses: No  Medication changes: No  Dietary changes: No  Change in health: No  Change in activity: No  Alcohol: No  Other concerns: No    Upcoming Procedures:  Does the Patient Have any upcoming procedures that require interruption in anticoagulation therapy? no  Does the patient require bridging? no      Anticoagulation Summary  As of 3/11/2024      INR goal:  2.0-3.0   TTR:  28.5 % (3.1 mo)   INR used for dosin.20 (3/11/2024)   Weekly warfarin total:  20 mg               Assessment/Plan   Therapeutic     1. New dose: no change    2. Next INR: 2 weeks      Education provided to patient during the visit:  Patient instructed to call in interim with questions, concerns and changes.            Assessment/Plan   Therapeutic     1. New dose: no change    2. Next INR: 2 weeks      Education provided to patient during the visit:  Patient instructed to call in interim with questions, concerns and changes.        
No

## 2024-03-18 ENCOUNTER — ANTICOAGULATION - WARFARIN VISIT (OUTPATIENT)
Dept: CARDIOLOGY | Facility: CLINIC | Age: 74
End: 2024-03-18
Payer: MEDICARE

## 2024-03-18 DIAGNOSIS — Z79.01 ENCOUNTER FOR CURRENT LONG-TERM USE OF ANTICOAGULANTS: Primary | ICD-10-CM

## 2024-03-18 LAB
INR IN PPP BY COAGULATION ASSAY EXTERNAL: 2.1
PROTHROMBIN TIME (PT) IN PPP BY COAGULATION ASSAY EXTERNAL: NORMAL SECONDS

## 2024-03-18 NOTE — PROGRESS NOTES
Patient identification verified with 2 identifiers.    Location: [facility] Patient Self-Testing Program [phone]    Referring Physician: DR. VÁZQUEZ  Enrollment/ Re-enrollment date: 3/8/25   INR Goal: 2.0-3.0  INR monitoring is per [facility] protocol.  Anticoagulation Medication: warfarin  Indication: Pulmonary Embolism (PE)    Subjective   Bleeding signs/symptoms: No    Bruising: No   Major bleeding event: No  Thrombosis signs/symptoms: No  Thromboembolic event: No  Missed doses: No  Extra doses: No  Medication changes: No  Dietary changes: No  Change in health: No  Change in activity: No  Alcohol: No  Other concerns: No    Upcoming Procedures:  Does the Patient Have any upcoming procedures that require interruption in anticoagulation therapy? no  Does the patient require bridging? no      Anticoagulation Summary  As of 3/18/2024      INR goal:  2.0-3.0   TTR:  33.5 % (3.4 mo)   INR used for dosin.10 (3/18/2024)   Weekly warfarin total:  20 mg               Assessment/Plan   Therapeutic     1. New dose: no change    2. Next INR: 2 weeks  I SPOKE TO PT CONFIRMING CURRENT WARFARIN DOSING.   PT WILL MAINTAIN CURRENT WEEKLY DOSE SCHEDULE.  PT VERBALIZED UNDERSTANDING OF THESE DOSING INSTRUCTIONS.        Education provided to patient during the visit:  Patient instructed to call in interim with questions, concerns and changes.   Patient educated on signs of bleeding/clotting.   Patient educated on compliance with dosing, follow up appointments, and prescribed plan of care.

## 2024-03-25 ENCOUNTER — OFFICE VISIT (OUTPATIENT)
Dept: PRIMARY CARE | Facility: CLINIC | Age: 74
End: 2024-03-25
Payer: MEDICARE

## 2024-03-25 ENCOUNTER — LAB (OUTPATIENT)
Dept: LAB | Facility: LAB | Age: 74
End: 2024-03-25
Payer: MEDICARE

## 2024-03-25 VITALS
SYSTOLIC BLOOD PRESSURE: 103 MMHG | HEIGHT: 65 IN | DIASTOLIC BLOOD PRESSURE: 48 MMHG | HEART RATE: 74 BPM | BODY MASS INDEX: 48.82 KG/M2 | OXYGEN SATURATION: 91 % | WEIGHT: 293 LBS

## 2024-03-25 DIAGNOSIS — I10 PRIMARY HYPERTENSION: ICD-10-CM

## 2024-03-25 DIAGNOSIS — D64.9 ANEMIA, UNSPECIFIED TYPE: ICD-10-CM

## 2024-03-25 DIAGNOSIS — J44.9 CHRONIC OBSTRUCTIVE PULMONARY DISEASE, UNSPECIFIED COPD TYPE (MULTI): ICD-10-CM

## 2024-03-25 DIAGNOSIS — I48.91 ATRIAL FIBRILLATION WITH RAPID VENTRICULAR RESPONSE (MULTI): ICD-10-CM

## 2024-03-25 DIAGNOSIS — E53.8 VITAMIN B12 DEFICIENCY: ICD-10-CM

## 2024-03-25 DIAGNOSIS — I50.32 CHRONIC DIASTOLIC CONGESTIVE HEART FAILURE (MULTI): ICD-10-CM

## 2024-03-25 DIAGNOSIS — E11.42 DIABETIC PERIPHERAL NEUROPATHY (MULTI): ICD-10-CM

## 2024-03-25 DIAGNOSIS — Z12.31 ENCOUNTER FOR SCREENING MAMMOGRAM FOR MALIGNANT NEOPLASM OF BREAST: ICD-10-CM

## 2024-03-25 DIAGNOSIS — Z00.00 MEDICARE ANNUAL WELLNESS VISIT, SUBSEQUENT: ICD-10-CM

## 2024-03-25 DIAGNOSIS — Z12.11 COLON CANCER SCREENING: ICD-10-CM

## 2024-03-25 DIAGNOSIS — E87.6 HYPOKALEMIA: ICD-10-CM

## 2024-03-25 DIAGNOSIS — E04.1 THYROID NODULE: ICD-10-CM

## 2024-03-25 DIAGNOSIS — I48.0 PAROXYSMAL ATRIAL FIBRILLATION (MULTI): ICD-10-CM

## 2024-03-25 DIAGNOSIS — E78.2 MIXED HYPERLIPIDEMIA: ICD-10-CM

## 2024-03-25 DIAGNOSIS — Z78.0 POSTMENOPAUSAL: ICD-10-CM

## 2024-03-25 LAB
BASOPHILS # BLD AUTO: 0.08 X10*3/UL (ref 0–0.1)
BASOPHILS NFR BLD AUTO: 0.8 %
EOSINOPHIL # BLD AUTO: 0.63 X10*3/UL (ref 0–0.4)
EOSINOPHIL NFR BLD AUTO: 6.7 %
ERYTHROCYTE [DISTWIDTH] IN BLOOD BY AUTOMATED COUNT: 14 % (ref 11.5–14.5)
FERRITIN SERPL-MCNC: 66 NG/ML (ref 8–150)
HCT VFR BLD AUTO: 32.4 % (ref 36–46)
HGB BLD-MCNC: 9.8 G/DL (ref 12–16)
IMM GRANULOCYTES # BLD AUTO: 0.01 X10*3/UL (ref 0–0.5)
IMM GRANULOCYTES NFR BLD AUTO: 0.1 % (ref 0–0.9)
IRON SATN MFR SERPL: 21 % (ref 25–45)
IRON SERPL-MCNC: 61 UG/DL (ref 35–150)
LYMPHOCYTES # BLD AUTO: 1.52 X10*3/UL (ref 0.8–3)
LYMPHOCYTES NFR BLD AUTO: 16.1 %
MCH RBC QN AUTO: 35.9 PG (ref 26–34)
MCHC RBC AUTO-ENTMCNC: 30.2 G/DL (ref 32–36)
MCV RBC AUTO: 119 FL (ref 80–100)
MONOCYTES # BLD AUTO: 0.54 X10*3/UL (ref 0.05–0.8)
MONOCYTES NFR BLD AUTO: 5.7 %
NEUTROPHILS # BLD AUTO: 6.64 X10*3/UL (ref 1.6–5.5)
NEUTROPHILS NFR BLD AUTO: 70.6 %
NRBC BLD-RTO: 0 /100 WBCS (ref 0–0)
PLATELET # BLD AUTO: 216 X10*3/UL (ref 150–450)
RBC # BLD AUTO: 2.73 X10*6/UL (ref 4–5.2)
TIBC SERPL-MCNC: 297 UG/DL (ref 240–445)
UIBC SERPL-MCNC: 236 UG/DL (ref 110–370)
WBC # BLD AUTO: 9.4 X10*3/UL (ref 4.4–11.3)

## 2024-03-25 PROCEDURE — 3074F SYST BP LT 130 MM HG: CPT | Performed by: INTERNAL MEDICINE

## 2024-03-25 PROCEDURE — 83540 ASSAY OF IRON: CPT

## 2024-03-25 PROCEDURE — 85025 COMPLETE CBC W/AUTO DIFF WBC: CPT

## 2024-03-25 PROCEDURE — 1159F MED LIST DOCD IN RCRD: CPT | Performed by: INTERNAL MEDICINE

## 2024-03-25 PROCEDURE — 1170F FXNL STATUS ASSESSED: CPT | Performed by: INTERNAL MEDICINE

## 2024-03-25 PROCEDURE — 1125F AMNT PAIN NOTED PAIN PRSNT: CPT | Performed by: INTERNAL MEDICINE

## 2024-03-25 PROCEDURE — 3078F DIAST BP <80 MM HG: CPT | Performed by: INTERNAL MEDICINE

## 2024-03-25 PROCEDURE — G0439 PPPS, SUBSEQ VISIT: HCPCS | Performed by: INTERNAL MEDICINE

## 2024-03-25 PROCEDURE — 36415 COLL VENOUS BLD VENIPUNCTURE: CPT

## 2024-03-25 PROCEDURE — 99214 OFFICE O/P EST MOD 30 MIN: CPT | Performed by: INTERNAL MEDICINE

## 2024-03-25 PROCEDURE — 82746 ASSAY OF FOLIC ACID SERUM: CPT

## 2024-03-25 PROCEDURE — 82607 VITAMIN B-12: CPT

## 2024-03-25 PROCEDURE — 83550 IRON BINDING TEST: CPT

## 2024-03-25 PROCEDURE — 82728 ASSAY OF FERRITIN: CPT

## 2024-03-25 RX ORDER — ATORVASTATIN CALCIUM 20 MG/1
20 TABLET, FILM COATED ORAL NIGHTLY
Qty: 90 TABLET | Refills: 1 | Status: SHIPPED | OUTPATIENT
Start: 2024-03-25

## 2024-03-25 RX ORDER — FUROSEMIDE 80 MG/1
80 TABLET ORAL DAILY
Qty: 90 TABLET | Refills: 1 | Status: SHIPPED | OUTPATIENT
Start: 2024-03-25

## 2024-03-25 RX ORDER — AMIODARONE HYDROCHLORIDE 200 MG/1
200 TABLET ORAL DAILY
Qty: 30 TABLET | Refills: 0 | Status: SHIPPED | OUTPATIENT
Start: 2024-03-25 | End: 2024-05-10 | Stop reason: SDUPTHER

## 2024-03-25 RX ORDER — CYANOCOBALAMIN 1000 UG/ML
1000 INJECTION, SOLUTION INTRAMUSCULAR; SUBCUTANEOUS
Qty: 10 ML | Refills: 0 | Status: SHIPPED | OUTPATIENT
Start: 2024-03-25 | End: 2024-03-26 | Stop reason: SDUPTHER

## 2024-03-25 RX ORDER — POTASSIUM CHLORIDE 20 MEQ/1
40 TABLET, EXTENDED RELEASE ORAL DAILY
Qty: 180 TABLET | Refills: 1 | Status: SHIPPED | OUTPATIENT
Start: 2024-03-25 | End: 2025-03-25

## 2024-03-25 RX ORDER — POTASSIUM CHLORIDE 20 MEQ/1
20 TABLET, EXTENDED RELEASE ORAL EVERY EVENING
Qty: 90 TABLET | Refills: 1 | Status: SHIPPED | OUTPATIENT
Start: 2024-03-25

## 2024-03-25 RX ORDER — METOPROLOL TARTRATE 25 MG/1
25 TABLET, FILM COATED ORAL 2 TIMES DAILY
Qty: 120 TABLET | Refills: 0 | Status: SHIPPED | OUTPATIENT
Start: 2024-03-25 | End: 2024-05-13

## 2024-03-25 RX ORDER — POTASSIUM CHLORIDE 20 MEQ/1
20 TABLET, EXTENDED RELEASE ORAL
COMMUNITY
Start: 2024-03-01 | End: 2024-03-25 | Stop reason: SDUPTHER

## 2024-03-25 ASSESSMENT — PATIENT HEALTH QUESTIONNAIRE - PHQ9
SUM OF ALL RESPONSES TO PHQ9 QUESTIONS 1 AND 2: 0
2. FEELING DOWN, DEPRESSED OR HOPELESS: NOT AT ALL
1. LITTLE INTEREST OR PLEASURE IN DOING THINGS: NOT AT ALL

## 2024-03-25 ASSESSMENT — ACTIVITIES OF DAILY LIVING (ADL)
TAKING_MEDICATION: INDEPENDENT
GROCERY_SHOPPING: NEEDS ASSISTANCE
MANAGING_FINANCES: INDEPENDENT
DRESSING: INDEPENDENT
DOING_HOUSEWORK: NEEDS ASSISTANCE
BATHING: INDEPENDENT

## 2024-03-25 ASSESSMENT — PAIN SCALES - GENERAL: PAINLEVEL: 4

## 2024-03-25 NOTE — PROGRESS NOTES
Patient ID: She states she has been doing ok since being discharged from nursing home. She states she is incontinent of urine but that is not new for her. She denies any depression. She states she has been sleeping ok. She states walking is painful for her hip and back. She states she has to have oxygen in place at all times. She denies any rash or blisters.     HEALTH MAINTENANCE: Annual Wellness Physical  Mammogram (40-75): 7/24/2023  -- pt's sister passed away from breast cancer  Pap smear (21-65): 4/2022  Labs: BMP and CBC on 9/27/2023  HgbA1c 6.4 on 2/22/2023  Colonoscopy (45-75): -ve in 2007, DUE  Lung cancer screening (55-80 + 30 pack year + smoking/quit in last 15 years):   DEXA (65+, q 2 years): this has been ordered multiple times but it does not appear that it has been done.  - 2D echo on 09/25/23 revealed: 1. Left ventricular systolic function is normal with a 60-65% estimated ejection fraction 2. Spectral Doppler shows an abnormal pattern of left ventricular diastolic filling 3. The left atrium is moderate to severely dilated 4. The right atrium is mild to moderately dilated 5. There is moderate thickening and calcification of the anterior and posterior mitral valve leaflets 6. There is moderate to severe mitral annular calcification with mild mitral stenosis and mild mitral regurgitation.7. The patient is in atrial fibrillation which may influence the estimate of left ventricular function and transvalvular flows 8. Aortic valve sclerosis.  - CT angio chest on 09/22/23 revealed: 1. No pulmonary embolism or other acute intrathoracic process 2. Mild to moderate coronary artery calcifications 3. Hepatic steatosis with some morphologic changes raising the possibility of underlying cirrhosis 4. Rim calcified splenic artery aneurysms measuring up to 2.3 cm unchanged from prior.    HPI: Frannie Blanco is a 73 y.o. female with PMH remarkable for COPD, tobacco dependence, CHF, Afib, lung nodules  who presents  "to the office today for Medicare Annual Wellness Visit Subsequent.    Social History     Tobacco Use    Smoking status: Former     Types: Cigarettes     Quit date: 2023     Years since quittin.5    Smokeless tobacco: Never   Vaping Use    Vaping Use: Never used   Substance Use Topics    Alcohol use: Never    Drug use: Never       Immunization History   Administered Date(s) Administered    Flu vaccine, quadrivalent, high-dose, preservative free, age 65y+ (FLUZONE) 10/05/2020    Influenza, High Dose Seasonal, Preservative Free 10/03/2022    Influenza, Unspecified 2011, 09/10/2012, 10/01/2013, 2014, 10/03/2022    Influenza, injectable, quadrivalent 2019    Influenza, seasonal, injectable 2011, 09/10/2012, 10/01/2013, 2014, 10/19/2015, 2016    Influenza, trivalent, adjuvanted 10/29/2019    Moderna SARS-CoV-2 Vaccination 2021, 2021, 2021, 2022    Novel influenza-H1N1-09, preservative-free 2010    Pneumococcal Conjugate PCV 7 1951    Pneumococcal conjugate vaccine, 13-valent (PREVNAR 13) 10/05/2020    Pneumococcal polysaccharide vaccine, 23-valent, age 2 years and older (PNEUMOVAX 23) 2008, 2020    Td vaccine, age 7 years and older (TDVAX) 2008    Tdap vaccine, age 7 year and older (BOOSTRIX, ADACEL) 2013     Review of Systems   Constitutional: Negative.    Respiratory: Negative.     Cardiovascular: Negative.    Gastrointestinal: Negative.    Genitourinary: Negative.         + Urinary incontinence   Musculoskeletal:  Positive for arthralgias and back pain.   Neurological: Negative.    Psychiatric/Behavioral: Negative.         No Known Allergies    Visit Vitals  BP (!) 103/48 (BP Location: Left arm)   Pulse 74   Ht 1.651 m (5' 5\")   Wt 148 kg (326 lb)   SpO2 91% Comment: on 5 LPM via N/C   BMI 54.25 kg/m²   OB Status Unknown   Smoking Status Former   BSA 2.61 m²     Physical Exam  Vitals reviewed.   Constitutional:       " "General: She is not in acute distress.     Appearance: Normal appearance. She is obese.   HENT:      Head: Normocephalic and atraumatic.   Neck:      Thyroid: Thyromegaly present.   Cardiovascular:      Rate and Rhythm: Normal rate and regular rhythm.      Pulses: Normal pulses.      Heart sounds: Normal heart sounds.   Pulmonary:      Effort: Pulmonary effort is normal.      Breath sounds: Normal breath sounds.   Abdominal:      General: Bowel sounds are normal.      Palpations: Abdomen is soft.   Musculoskeletal:         General: Normal range of motion.   Neurological:      General: No focal deficit present.      Mental Status: She is alert and oriented to person, place, and time.   Psychiatric:         Mood and Affect: Mood normal.         Behavior: Behavior normal.         Current Outpatient Medications   Medication Instructions    albuterol 90 mcg/actuation inhaler inhale 1 to 2 puffs by mouth and INTO THE LUNGS every 4 to 6 hours if needed    amiodarone (PACERONE) 200 mg, oral, Daily    aspirin 81 mg EC tablet 1 tablet, oral, Daily    atorvastatin (LIPITOR) 20 mg, oral, Nightly    BD Alcohol Swabs pads, medicated     BD Dorothy 2nd Gen Pen Needle 32 gauge x 5/32\" needle Use with vitamin B12 injections monthly    CALCIUM CARBONATE-VITAMIN D3 ORAL 1 tablet, oral, 2 times daily    calcium carbonate 600 mg, oral, Every 12 hours, + D per directive    cholecalciferol (VITAMIN D-3) 50,000 Units, oral, Weekly, Every 2 weeks    cyanocobalamin (DODEX) 1,000 mcg, intramuscular, Every 30 days    dapsone 100 mg tablet 1 tablet, oral, Daily before breakfast    dapsone 25 mg tablet 2 tablets, oral, Daily before breakfast    ergocalciferol (Vitamin D-2) 1.25 MG (62827 UT) capsule 1 capsule, oral, Every 14 days    ferrous sulfate 325 (65 Fe) MG EC tablet oral, Daily    fluticasone-umeclidin-vilanter (TRELEGY-ELLIPTA) 200-62.5-25 mcg blister with device 1 puff, inhalation, Daily, Rinse mouth after taking dose    furosemide (LASIX) " "80 mg, oral, Daily    gabapentin (Neurontin) 300 mg capsule take 1 capsule by mouth three times a day for 90 DAYS    glimepiride (Amaryl) 2 mg tablet take 1 tablet by mouth once daily Once a day 90 days    Incruse Ellipta 62.5 mcg, inhalation, Daily    insulin detemir (LEVEMIR FLEXTOUCH U100 INSULIN SUBQ) subcutaneous, As directed    Levemir FlexPen 100 unit/mL (3 mL) pen inject up to 15 units once daily with EVENING MEAL OR BEDTIME    metFORMIN (Glucophage) 1,000 mg tablet take 1 tablet by mouth twice a day 90    metoprolol tartrate (LOPRESSOR) 25 mg, oral, 2 times daily    syringe with needle 3 mL 23 x 1\" syringe Use to inject vitamin b12 once monthly    topiramate (TOPAMAX) 100 mg, oral, 2 times daily    triamcinolone (Kenalog) 0.1 % ointment APPLY 1 APPLICATOR AS NEEDED TOPICALLY ONCE DAILY AS NEEDED UNDER DENTAL TRAYS    warfarin (Coumadin) 5 mg tablet Take 1 tab daily or directed as coumadin clinic       Lab Results   Component Value Date    WBC 7.7 01/17/2024    HGB 8.4 (L) 01/17/2024    HCT 30.0 (L) 01/17/2024     01/17/2024    CHOL 123 12/02/2023    TRIG 111 12/02/2023    HDL 37.8 12/02/2023    ALT 10 01/06/2024    AST 6 (L) 01/06/2024     01/17/2024    K 4.1 01/17/2024     01/17/2024    CREATININE 1.11 (H) 01/17/2024    BUN 39 (H) 01/17/2024    CO2 36 (H) 01/17/2024    TSH 2.10 12/02/2023    INR 2.10 03/18/2024    HGBA1C 4.0 (L) 01/18/2024       Assessment/Plan   Diagnoses and all orders for this visit: Healthcare Maintenance: Medicare Wellness exam, nursing home follow up  - she was hospitalized at [facility] from 12/26/23-->01/17/24 for RSV and then discharged to [facility] for PT/OT, discharged to home on 02/23/24  - she states she has been doing ok at home, uses wheelchair for ambulation(long distances) and has supplemental oxygen in place at all times    Mixed hyperlipidemia  -     atorvastatin (Lipitor) 20 mg tablet; Take 1 tablet (20 mg) by mouth once daily at " bedtime.    Primary hypertension/CHF/AFIB/Aortic stenosis        -     BP today is 103/48        -     furosemide (Lasix) 80 mg tablet; Take 1 tablet (80 mg) by mouth once daily.  -     amiodarone (Pacerone) 200 mg tablet; Take 1 tablet (200 mg) by mouth once daily.  -     metoprolol tartrate (Lopressor) 25 mg tablet; Take 1 tablet (25 mg) by mouth 2 times a day.  -     continue with coumadin for stroke prevention, INR monitoring    Hypokalemia  -   continue with potassium supplement    Postmenopausal  -     XR DEXA bone density; Future    Anemia, unspecified type  -     CBC and Auto Differential; Future  -     Iron and TIBC; Future  -     Ferritin; Future  -     continue with iron supplement    Colon cancer screening  -     Colonoscopy Screening; Average Risk Patient; Future    Thyroid nodule  -     Referral to ENT; Future    Encounter for screening mammogram for malignant neoplasm of breast  -     BI mammo bilateral screening tomosynthesis; Future    Mucous pemphigoid  -continue with dapsone     COPD  - continue with supplemental 02  - continue with inhalers  - follow up with pulmonology     DM Type 2 with neuropathy  - hgbA1c 4.0 on 01/18/24  - continue with Glimepiride and Metformin, Levemir  - continue with gabapentin for neuropathy     HLD  - c/w statin  --------------------  Written by Jazmyne Jesus LPN, acting as a scribe for Dr. Muñoz. This note accurately reflects the work and decisions made by Dr. Muñoz.     I, Dr. Muñoz, attest all medical record entries made by the scribe were under my direction and were personally dictated by me. I have reviewed the chart and agree that the record accurately reflects my performance of the history, physical exam, and assessment and plan.

## 2024-03-25 NOTE — PATIENT INSTRUCTIONS
It was nice to see you today for your annual Medicare Wellness visit.  As discussed during our visit today ...  Please get bloodwork drawn as soon as you are able. We will call you with results.  I have ordered you a mammogram to be done as soon as you are able.  Someone will call you soon to schedule this.   We will call the results.  We ordered a dexa scan to check for osteoporosis. Please get this completed as soon as you are able. We will call you with results.  We have placed referral to Dr. Morris for further evaluation on your thyroid nodule  I have placed a referral for you to have a colonoscopy with Dr. Joseph at [facility].   Dr. Joseph's office will reach out to you to either make an appointment for the colonoscopy OR for a consultation in the office first.     Dr Joseph's Office Location:  [facility]  [address] [place], [place] [id number]  Appointments: [phone]

## 2024-03-26 DIAGNOSIS — E53.8 VITAMIN B12 DEFICIENCY: ICD-10-CM

## 2024-03-26 DIAGNOSIS — D64.9 ANEMIA, UNSPECIFIED TYPE: Primary | ICD-10-CM

## 2024-03-26 DIAGNOSIS — D51.0 PERNICIOUS ANEMIA: ICD-10-CM

## 2024-03-26 LAB
FOLATE SERPL-MCNC: 7.5 NG/ML
VIT B12 SERPL-MCNC: 183 PG/ML (ref 211–911)

## 2024-03-26 RX ORDER — CYANOCOBALAMIN 1000 UG/ML
1000 INJECTION, SOLUTION INTRAMUSCULAR; SUBCUTANEOUS
Qty: 10 ML | Refills: 0 | Status: SHIPPED | OUTPATIENT
Start: 2024-03-26

## 2024-03-26 NOTE — TELEPHONE ENCOUNTER
Pt was updated on test results and reminded staff that she was in need of her injectable b12 refilled.

## 2024-03-27 ENCOUNTER — TELEMEDICINE (OUTPATIENT)
Dept: SURGERY | Facility: CLINIC | Age: 74
End: 2024-03-27
Payer: MEDICARE

## 2024-03-27 DIAGNOSIS — Z12.11 ENCOUNTER FOR SCREENING COLONOSCOPY: Primary | ICD-10-CM

## 2024-03-27 DIAGNOSIS — Z79.01 ENCOUNTER FOR CURRENT LONG-TERM USE OF ANTICOAGULANTS: ICD-10-CM

## 2024-03-27 PROCEDURE — 1159F MED LIST DOCD IN RCRD: CPT | Performed by: SURGERY

## 2024-03-27 PROCEDURE — 99442 PR PHYS/QHP TELEPHONE EVALUATION 11-20 MIN: CPT | Performed by: SURGERY

## 2024-03-27 PROCEDURE — 3008F BODY MASS INDEX DOCD: CPT | Performed by: SURGERY

## 2024-03-27 PROCEDURE — 1036F TOBACCO NON-USER: CPT | Performed by: SURGERY

## 2024-03-27 NOTE — PROGRESS NOTES
Frannie Blanco      An interactive audio  telecommunication system which permits real time communications between the patient (at the originating site) and provider (at the distant site) was utilized to provide this telehealth service.   Verbal consent was requested and obtained from Frannie Blanco on March 27, 2024 for a telehealth visit.      Patient was referred for colonoscopy.  On Coumadin for pulmonary emboli 2 years ago.  Has had a pulm embolus while off Coumadin.  Will need Lovenox bridge.  Long discussion regarding the risk-benefit ratio was of an elective colonoscopy.  Patient also has a body mass index of 54.    Problem List Items Addressed This Visit       BMI 50.0-59.9, adult (CMS/[facility])    Encounter for current long-term use of anticoagulants    Encounter for screening colonoscopy - Primary    Relevant Orders    Cologuard® colon cancer screening      We discussed Cologuard screening and she is willing to perform that.  If that is negative then she would not require colonoscopy if that is positive then she will require colonoscopy.    Time equals 11 minutes

## 2024-03-27 NOTE — PATIENT INSTRUCTIONS
We discussed the risk-benefit ratio of performing colonoscopy.  You had a pulmonary embolus and you are on Coumadin.  Stopping Coumadin will require Lovenox bridge.  Your body mass index is also 54.  You have agreed to the Cologuard procedure which is a low risk intervention which if negative would preclude you needing a colonoscopy.  I have ordered this test and we will contact you with the result

## 2024-03-28 DIAGNOSIS — D64.9 ANEMIA, UNSPECIFIED TYPE: ICD-10-CM

## 2024-03-28 NOTE — PROGRESS NOTES
Pt was already receiving b12 injections for home use monthly, this request reflects the 3 doses of weekly injections.

## 2024-03-29 RX ORDER — CYANOCOBALAMIN, ISOPROPYL ALCOHOL 1000MCG/ML
1 KIT INJECTION
Qty: 3 KIT | Refills: 0 | Status: SHIPPED | OUTPATIENT
Start: 2024-03-29 | End: 2024-04-13

## 2024-03-29 ASSESSMENT — ENCOUNTER SYMPTOMS
GASTROINTESTINAL NEGATIVE: 1
CONSTITUTIONAL NEGATIVE: 1
BACK PAIN: 1
RESPIRATORY NEGATIVE: 1
ARTHRALGIAS: 1
NEUROLOGICAL NEGATIVE: 1
PSYCHIATRIC NEGATIVE: 1
CARDIOVASCULAR NEGATIVE: 1

## 2024-04-01 ENCOUNTER — ANTICOAGULATION - WARFARIN VISIT (OUTPATIENT)
Dept: CARDIOLOGY | Facility: CLINIC | Age: 74
End: 2024-04-01
Payer: MEDICARE

## 2024-04-01 DIAGNOSIS — Z79.01 ENCOUNTER FOR CURRENT LONG-TERM USE OF ANTICOAGULANTS: Primary | ICD-10-CM

## 2024-04-01 NOTE — PROGRESS NOTES
Patient identification verified with 2 identifiers.    Location: [facility] Patient Self-Testing Program [phone]    Referring Physician: DR. VÁZQUEZ  Enrollment/ Re-enrollment date: 3/8/25   INR Goal: 2.0-3.0  INR monitoring is per [facility] protocol.  Anticoagulation Medication: warfarin  Indication: Pulmonary Embolism (PE)    Subjective   Bleeding signs/symptoms: No    Bruising: No   Major bleeding event: No  Thrombosis signs/symptoms: No  Thromboembolic event: No  Missed doses: No  Extra doses: No  Medication changes: No  Dietary changes: No  Change in health: No  Change in activity: No  Alcohol: No  Other concerns: No    Upcoming Procedures:  Does the Patient Have any upcoming procedures that require interruption in anticoagulation therapy? no  Does the patient require bridging? no      Anticoagulation Summary  As of 2024      INR goal:  2.0-3.0   TTR:  41.6 % (3.8 mo)   INR used for dosin.20 (2024)   Weekly warfarin total:  20 mg               Received faxed INR self-test results and called patient.  VM left for pt to report any complications related to ACT therapy, changes in diet, medications, social habits or general health. Current dose schedule also left as part of VM with instructions to return call if different from what is currently being taken.  Pt instructed to call PST voicemail at [phone] in interim with questions, concerns or changes.  YASH Duran RN    Assessment/Plan   Therapeutic     1. New dose: no change    2. Next INR: 2 weeks      Education provided to patient during the visit:  Patient instructed to call in interim with questions, concerns and changes.

## 2024-04-02 PROBLEM — I26.99 PULMONARY ARTERY THROMBOSIS (MULTI): Status: RESOLVED | Noted: 2023-06-20 | Resolved: 2024-04-02

## 2024-04-05 ENCOUNTER — TELEPHONE (OUTPATIENT)
Dept: CARDIOLOGY | Facility: CLINIC | Age: 74
End: 2024-04-05
Payer: MEDICARE

## 2024-04-05 DIAGNOSIS — I48.0 PAROXYSMAL ATRIAL FIBRILLATION (MULTI): ICD-10-CM

## 2024-04-05 DIAGNOSIS — Z79.01 ENCOUNTER FOR CURRENT LONG-TERM USE OF ANTICOAGULANTS: Primary | ICD-10-CM

## 2024-04-05 DIAGNOSIS — I26.99 PULMONARY EMBOLISM, UNSPECIFIED CHRONICITY, UNSPECIFIED PULMONARY EMBOLISM TYPE, UNSPECIFIED WHETHER ACUTE COR PULMONALE PRESENT (MULTI): ICD-10-CM

## 2024-04-05 NOTE — TELEPHONE ENCOUNTER
New referring provider;  Renewal faxed to Dr. Young for signature and return to [facility] for scanning. Remote INR order already received.

## 2024-04-09 ENCOUNTER — OFFICE VISIT (OUTPATIENT)
Dept: PULMONOLOGY | Facility: CLINIC | Age: 74
End: 2024-04-09
Payer: MEDICARE

## 2024-04-09 VITALS
OXYGEN SATURATION: 92 % | HEART RATE: 78 BPM | DIASTOLIC BLOOD PRESSURE: 77 MMHG | BODY MASS INDEX: 54.91 KG/M2 | WEIGHT: 293 LBS | SYSTOLIC BLOOD PRESSURE: 124 MMHG

## 2024-04-09 DIAGNOSIS — J44.9 CHRONIC OBSTRUCTIVE PULMONARY DISEASE, UNSPECIFIED COPD TYPE (MULTI): Primary | ICD-10-CM

## 2024-04-09 DIAGNOSIS — R09.02 HYPOXIA: ICD-10-CM

## 2024-04-09 PROCEDURE — 1159F MED LIST DOCD IN RCRD: CPT | Performed by: PEDIATRICS

## 2024-04-09 PROCEDURE — 1160F RVW MEDS BY RX/DR IN RCRD: CPT | Performed by: PEDIATRICS

## 2024-04-09 PROCEDURE — 99215 OFFICE O/P EST HI 40 MIN: CPT | Performed by: PEDIATRICS

## 2024-04-09 PROCEDURE — 3078F DIAST BP <80 MM HG: CPT | Performed by: PEDIATRICS

## 2024-04-09 PROCEDURE — 3008F BODY MASS INDEX DOCD: CPT | Performed by: PEDIATRICS

## 2024-04-09 PROCEDURE — 3074F SYST BP LT 130 MM HG: CPT | Performed by: PEDIATRICS

## 2024-04-09 PROCEDURE — 1036F TOBACCO NON-USER: CPT | Performed by: PEDIATRICS

## 2024-04-09 RX ORDER — ZINC OXIDE 20 G/100G
1 OINTMENT TOPICAL EVERY 8 HOURS
COMMUNITY
Start: 2024-01-17

## 2024-04-09 RX ORDER — DOCUSATE SODIUM 100 MG/1
100 CAPSULE, LIQUID FILLED ORAL EVERY 12 HOURS
COMMUNITY
Start: 2024-01-05

## 2024-04-09 ASSESSMENT — PATIENT HEALTH QUESTIONNAIRE - PHQ9
1. LITTLE INTEREST OR PLEASURE IN DOING THINGS: NOT AT ALL
2. FEELING DOWN, DEPRESSED OR HOPELESS: NOT AT ALL
SUM OF ALL RESPONSES TO PHQ9 QUESTIONS 1 AND 2: 0

## 2024-04-09 NOTE — PROGRESS NOTES
"Subjective   Patient ID: Frannie Blanco is a 73 y.o. female who presents for COPD, hypoxia    HPI    Ms Blanco is doing ok now,  she was admitted for a month in January with RSV, then to a SNF, she is now home and doing ok.  She is using both incruse and trelegy (that is what she was given in the SNF).  She is on oxygen continuous but has tanks and would like a POC.      Previous note 11/14/2023:  She has requested changing to a virtual appointment.  The video for the appt worked but the audio did not so we converted to a phone visit.   Ms Blanco is having significant issues with lower extremity edema and due to that is not able to move around much at all.   She has increased her lasix dose and the edema is getting a little better.  She has an appointment with cardiology in the next week or so.  She feels her breathing is  Worse but still better than when she was admitted.  She is using oxygen continuously.  We have ordered a POC through [employer] but that has not happened as yet.  She was ordered incruse and spiriva but those were too costly so she is just using albuterol.        Previous note 8/23/2023:  Ms Blanco feels about the same, however when walking into the office her saturation was 83% in room air. with sitting she remained at 86%. we titrated oxygen for her and she needed 3lpm to maintain saturation (93%) she occasionally uses albuterol and does not feel the need for using it any more.     previous note 2/22/2023: Ms Blanco is doing well. SHe is still using oxygen at night. She has albuterol but only needs it rarely (usually when she has a \"cold\")   She ahd a PFT which shows moderate obstruction with bronchodilator response.  She ahd a repeat CT which shows resolution of previous pneumonia and a few tiny nodules that are unchanged.. There is mention of enlarged PA which could represent pulmonary hypertension however there is an echo done May 2022 which is normal.        previous note 12/28/2022: Ms " "Francis is a 72yr old that was admitted to [facility] in November with MRSA pneumonia, CHF and A Fib. She feel she is now recovered. She was needing oxygen during the day but has weaned off that. She does use oxygen at night which is not new. She uses albuterol but only when she \"gets a cold\".   She apparently had a sleep study a few years ago and that found no EULALIA but hypoxia      I reviewed the CT report from 10/31/2022 which reports patchy infiltrates consistent with pneumonia and several <8mm nodules.      She is a current smoker 1ppd for 50 years (she is down to a few cigarettes a day now        Review of Systems    See scanned documents attached to this note for review of systems, and appropriate scales/scores for this visit.     Objective   Physical Exam  Constitutional:       Appearance: Normal appearance.   HENT:      Head: Normocephalic and atraumatic.      Mouth/Throat:      Pharynx: Oropharynx is clear.   Cardiovascular:      Rate and Rhythm: Normal rate and regular rhythm.      Pulses: Normal pulses.      Heart sounds: Normal heart sounds.   Pulmonary:      Effort: Pulmonary effort is normal.      Breath sounds: Normal breath sounds. No wheezing, rhonchi or rales.   Abdominal:      General: Bowel sounds are normal.      Palpations: Abdomen is soft.   Musculoskeletal:         General: Normal range of motion.   Skin:     General: Skin is warm and dry.   Neurological:      General: No focal deficit present.      Mental Status: She is alert and oriented to person, place, and time.   Psychiatric:         Mood and Affect: Mood normal.             Assessment/Plan     73yr old with obesity, OSHEA and nocturnal hypoxia        COPD/SOB:  s/p RSV in January   better   - stop incruse, contiinue trelegy  - continue albuterol as needed     2. lung nodules:  - repeat CT stable nodules  [facility]     3. hypoxia: now needs continuous.  Has tanks, would benefit from POC for ease of mobility.  Needs to " show use of tanks to qualify for POC.   She will use the tanks more regularly  - will order POC once she qualifies based on tank use.     Follow up 3 months      Nahum Méndez MD 04/09/24 9:08 AM

## 2024-04-11 DIAGNOSIS — E11.65 TYPE 2 DIABETES MELLITUS WITH HYPERGLYCEMIA, UNSPECIFIED WHETHER LONG TERM INSULIN USE (MULTI): Primary | ICD-10-CM

## 2024-04-11 NOTE — TELEPHONE ENCOUNTER
"Levamir 100 Units flex pen is no longer covered under patient's insurance plan.     She is requesting the alternative \"Margarita Reyna\" be sent into [facility] pharmacy .    Rx pending to be sent into pharmacy of choice.     Zoraida   "

## 2024-04-12 RX ORDER — INSULIN GLARGINE 100 [IU]/ML
INJECTION, SOLUTION SUBCUTANEOUS
Qty: 15 ML | Refills: 3 | Status: SHIPPED | OUTPATIENT
Start: 2024-04-12

## 2024-04-15 ENCOUNTER — ANTICOAGULATION - WARFARIN VISIT (OUTPATIENT)
Dept: CARDIOLOGY | Facility: CLINIC | Age: 74
End: 2024-04-15
Payer: MEDICARE

## 2024-04-15 DIAGNOSIS — Z79.01 ENCOUNTER FOR CURRENT LONG-TERM USE OF ANTICOAGULANTS: ICD-10-CM

## 2024-04-15 NOTE — PROGRESS NOTES
Patient identification verified with 2 identifiers.    Location: [facility] Patient Self-Testing Program [phone]    Referring Physician: DR. LUCI VÁZQUEZ  Enrollment/ Re-enrollment date: 3/8/2025   INR Goal: 2.0-3.0  INR monitoring is per [facility] protocol.  Anticoagulation Medication: warfarin  Indication: Pulmonary Embolism (PE)    Subjective   Bleeding signs/symptoms: No    Bruising: No   Major bleeding event: No  Thrombosis signs/symptoms: No  Thromboembolic event: No  Missed doses: No  Extra doses: No  Medication changes: No  Dietary changes: No  Change in health: No  Change in activity: No  Alcohol: No  Other concerns: No    Upcoming Procedures:  Does the Patient Have any upcoming procedures that require interruption in anticoagulation therapy? no  Does the patient require bridging? no      Anticoagulation Summary  As of 4/15/2024      INR goal:  2.0-3.0   TTR:  47.9% (4.3 mo)   INR used for dosin.10 (4/15/2024)   Weekly warfarin total:  20 mg               Assessment/Plan   Therapeutic     1. New dose: no change  SPOKE TO PT. CONFIRMED DOSING AND REPEATED BACK CORRECTLY  2. Next INR: 2 weeks      Education provided to patient during the visit:  Patient instructed to call in interim with questions, concerns and changes.   Patient educated on compliance with dosing, follow up appointments, and prescribed plan of care.

## 2024-04-23 ENCOUNTER — APPOINTMENT (OUTPATIENT)
Dept: GYNECOLOGIC ONCOLOGY | Facility: CLINIC | Age: 74
End: 2024-04-23
Payer: MEDICARE

## 2024-04-30 ENCOUNTER — ANTICOAGULATION - WARFARIN VISIT (OUTPATIENT)
Dept: CARDIOLOGY | Facility: CLINIC | Age: 74
End: 2024-04-30
Payer: MEDICARE

## 2024-04-30 DIAGNOSIS — Z79.01 ENCOUNTER FOR CURRENT LONG-TERM USE OF ANTICOAGULANTS: ICD-10-CM

## 2024-05-01 ENCOUNTER — ANTICOAGULATION - WARFARIN VISIT (OUTPATIENT)
Dept: CARDIOLOGY | Facility: CLINIC | Age: 74
End: 2024-05-01
Payer: MEDICARE

## 2024-05-01 ENCOUNTER — TELEPHONE (OUTPATIENT)
Dept: SURGERY | Facility: CLINIC | Age: 74
End: 2024-05-01
Payer: MEDICARE

## 2024-05-01 DIAGNOSIS — Z79.01 ENCOUNTER FOR CURRENT LONG-TERM USE OF ANTICOAGULANTS: ICD-10-CM

## 2024-05-01 LAB
INR IN PPP BY COAGULATION ASSAY EXTERNAL: 3.1
NONINV COLON CA DNA+OCC BLD SCRN STL QL: NEGATIVE
PROTHROMBIN TIME (PT) IN PPP BY COAGULATION ASSAY EXTERNAL: NORMAL SECONDS

## 2024-05-01 NOTE — TELEPHONE ENCOUNTER
----- Message from Vikas Joseph MD sent at 5/1/2024 11:56 AM EDT -----  Let her know the Cologuard was negative.  She will not need any further testing done

## 2024-05-01 NOTE — PROGRESS NOTES
Patient identification verified with 2 identifiers.    Location: [facility] Patient Self-Testing Program [phone]    Referring Physician: DR. VÁZQUEZ  Enrollment/ Re-enrollment date: 3/8/2025   INR Goal: 2.0-3.0  INR monitoring is per [facility] protocol.  Anticoagulation Medication: warfarin  Indication: Pulmonary Embolism (PE)    Subjective   Bleeding signs/symptoms: No    Bruising: No   Major bleeding event: No  Thrombosis signs/symptoms: No  Thromboembolic event: No  Missed doses: No  Extra doses: No  Medication changes: No  Dietary changes: No  Change in health: No  Change in activity: No  Alcohol: No  Other concerns: No    Upcoming Procedures:  Does the Patient Have any upcoming procedures that require interruption in anticoagulation therapy? no  Does the patient require bridging? no      Anticoagulation Summary  As of 5/1/2024      INR goal:  2.0-3.0   TTR:  52.5% (4.8 mo)   INR used for dosing:  3.10 (5/1/2024)   Weekly warfarin total:  20 mg               Assessment/Plan   Supratherapeutic     1. New dose: no change PT HAS BEEN THERAPEUTIC ON THIS DOSE, WILL EAT MORE VIT K THIS WEEK.   2. Next INR: 1 week      Education provided to patient during the visit:  Patient instructed to call in interim with questions, concerns and changes.   Patient educated on interactions between medications and warfarin.   Patient educated on dietary consistency in vitamin k consumption.   Patient educated on affects of alcohol consumption while taking warfarin.   Patient educated on signs of bleeding/clotting.   Patient educated on compliance with dosing, follow up appointments, and prescribed plan of care.

## 2024-05-08 ENCOUNTER — ANTICOAGULATION - WARFARIN VISIT (OUTPATIENT)
Dept: CARDIOLOGY | Facility: CLINIC | Age: 74
End: 2024-05-08
Payer: MEDICARE

## 2024-05-08 NOTE — PROGRESS NOTES
Patient identification verified with 2 identifiers.    Location: [facility] Patient Self-Testing Program [phone]    Referring Physician: DR. VÁZQUEZ  Enrollment/ Re-enrollment date: 3/8/25   INR Goal: 2.0-3.0  INR monitoring is per [facility] protocol.  Anticoagulation Medication: warfarin  Indication: Pulmonary Embolism (PE)    Subjective   Bleeding signs/symptoms: No    Bruising: No   Major bleeding event: No  Thrombosis signs/symptoms: No  Thromboembolic event: No  Missed doses: No  Extra doses: No  Medication changes: No  Dietary changes: Yes  PT STATES SHE HASN'T EATEN ANY GREEN VEGETABLES THIS PAST WEEK DUE TO UNAVAILIBILTY OF THEM.  Change in health: No  Change in activity: No  Alcohol: No  Other concerns: No    Upcoming Procedures:  Does the Patient Have any upcoming procedures that require interruption in anticoagulation therapy? no  Does the patient require bridging? no      Anticoagulation Summary  As of 5/8/2024      INR goal:  2.0-3.0   TTR:  50.1% (5.1 mo)   INR used for dosing:  3.10 (5/8/2024)   Weekly warfarin total:  17.5 mg               Assessment/Plan   Supratherapeutic     1. New dose:  WILL DECREASE WEEKLY DOSE      2. Next INR: 1 week  I SPOKE TO PT CONFIRMING CURRENT WARFARIN DOSING.   PT WILL DECREASE  WEEKLY DOSE SCHEDULE.  PT VERBALIZED UNDERSTANDING OF THESE DOSING INSTRUCTIONS.        Education provided to patient during the visit:  Patient instructed to call in interim with questions, concerns and changes.   Patient educated on dietary consistency in vitamin k consumption.

## 2024-05-10 DIAGNOSIS — I50.32 CHRONIC DIASTOLIC CONGESTIVE HEART FAILURE (MULTI): ICD-10-CM

## 2024-05-10 DIAGNOSIS — I48.91 ATRIAL FIBRILLATION WITH RAPID VENTRICULAR RESPONSE (MULTI): ICD-10-CM

## 2024-05-10 DIAGNOSIS — E11.65 TYPE 2 DIABETES MELLITUS WITH HYPERGLYCEMIA, UNSPECIFIED WHETHER LONG TERM INSULIN USE (MULTI): Primary | ICD-10-CM

## 2024-05-10 RX ORDER — AMIODARONE HYDROCHLORIDE 200 MG/1
200 TABLET ORAL DAILY
Qty: 90 TABLET | Refills: 0 | Status: SHIPPED | OUTPATIENT
Start: 2024-05-10 | End: 2024-08-08

## 2024-05-10 RX ORDER — AMIODARONE HYDROCHLORIDE 200 MG/1
200 TABLET ORAL DAILY
Qty: 30 TABLET | Refills: 0 | OUTPATIENT
Start: 2024-05-10

## 2024-05-13 RX ORDER — METOPROLOL TARTRATE 25 MG/1
25 TABLET, FILM COATED ORAL 2 TIMES DAILY
Qty: 120 TABLET | Refills: 0 | Status: SHIPPED | OUTPATIENT
Start: 2024-05-13

## 2024-05-13 RX ORDER — GABAPENTIN 300 MG/1
CAPSULE ORAL
Qty: 270 CAPSULE | Refills: 1 | Status: SHIPPED | OUTPATIENT
Start: 2024-05-13

## 2024-05-15 ENCOUNTER — ANTICOAGULATION - WARFARIN VISIT (OUTPATIENT)
Dept: CARDIOLOGY | Facility: CLINIC | Age: 74
End: 2024-05-15
Payer: MEDICARE

## 2024-05-15 LAB
INR IN PPP BY COAGULATION ASSAY EXTERNAL: 2.7
PROTHROMBIN TIME (PT) IN PPP BY COAGULATION ASSAY EXTERNAL: NORMAL SECONDS

## 2024-05-15 NOTE — PROGRESS NOTES
Patient identification verified with 2 identifiers.    Location: [facility] Patient Self-Testing Program [phone]    Referring Physician: DR. VÁZQUEZ  Enrollment/ Re-enrollment date: 3/8/25   INR Goal: 2.0-3.0  INR monitoring is per [facility] protocol.  Anticoagulation Medication: warfarin  Indication: Pulmonary Embolism (PE)    Subjective   Bleeding signs/symptoms: No    Bruising: No   Major bleeding event: No  Thrombosis signs/symptoms: No  Thromboembolic event: No  Missed doses: No  Extra doses: No  Medication changes: No  Dietary changes: No  Change in health: No  Change in activity: No  Alcohol: No  Other concerns: No    Upcoming Procedures:  Does the Patient Have any upcoming procedures that require interruption in anticoagulation therapy? no  Does the patient require bridging? no      Anticoagulation Summary  As of 5/15/2024      INR goal:  2.0-3.0   TTR:  51.2% (5.3 mo)   INR used for dosin.70 (5/15/2024)   Weekly warfarin total:  17.5 mg               Assessment/Plan   Therapeutic     1. New dose: no change    2. Next INR: 1 week  I SPOKE TO PT CONFIRMING CURRENT WARFARIN DOSING.   PT WILL MAINTAIN WEEKLY DOSE SCHEDULE.  PT VERBALIZED UNDERSTANDING OF THESE DOSING INSTRUCTIONS.        Education provided to patient during the visit:  Patient instructed to call in interim with questions, concerns and changes.

## 2024-05-22 ENCOUNTER — ANTICOAGULATION - WARFARIN VISIT (OUTPATIENT)
Dept: CARDIOLOGY | Facility: CLINIC | Age: 74
End: 2024-05-22
Payer: MEDICARE

## 2024-05-22 LAB
INR IN PPP BY COAGULATION ASSAY EXTERNAL: 2.4
PROTHROMBIN TIME (PT) IN PPP BY COAGULATION ASSAY EXTERNAL: NORMAL SECONDS

## 2024-05-22 NOTE — PROGRESS NOTES
Patient identification verified with 2 identifiers.    Location: [facility] Patient Self-Testing Program [phone]    Referring Physician: DR. VÁZQUEZ  Enrollment/ Re-enrollment date: 3/8/25   INR Goal: 2.0-3.0  INR monitoring is per [facility] protocol.  Anticoagulation Medication: warfarin  Indication: Pulmonary Embolism (PE)    Subjective   Bleeding signs/symptoms: No    Bruising: No   Major bleeding event: No  Thrombosis signs/symptoms: No  Thromboembolic event: No  Missed doses: No  Extra doses: No  Medication changes: No  Dietary changes: No  Change in health: No  Change in activity: No  Alcohol: No  Other concerns: No    Upcoming Procedures:  Does the Patient Have any upcoming procedures that require interruption in anticoagulation therapy? no  Does the patient require bridging? no      Anticoagulation Summary  As of 2024      INR goal:  2.0-3.0   TTR:  53.3% (5.5 mo)   INR used for dosin.40 (2024)   Weekly warfarin total:  17.5 mg               Assessment/Plan   Therapeutic     1. New dose: no change    2. Next INR: 2 weeks  I SPOKE TO PT CONFIRMING CURRENT WARFARIN DOSING.   PT WILL MAINTAIN CURRENT  WEEKLY DOSE SCHEDULE.  PT VERBALIZED UNDERSTANDING OF THESE DOSING INSTRUCTIONS.        Education provided to patient during the visit:  Patient instructed to call in interim with questions, concerns and changes.   Patient educated on signs of bleeding/clotting.

## 2024-06-05 ENCOUNTER — ANTICOAGULATION - WARFARIN VISIT (OUTPATIENT)
Dept: CARDIOLOGY | Facility: CLINIC | Age: 74
End: 2024-06-05
Payer: MEDICARE

## 2024-06-05 NOTE — PROGRESS NOTES
Patient identification verified with 2 identifiers.    Location: [facility] Patient Self-Testing Program [phone]    Referring Physician: DR. MICKEY VÁZQUEZ  Enrollment/ Re-enrollment date: 3/8/2025   INR Goal: 2.0-3.0  INR monitoring is per [facility] protocol.  Anticoagulation Medication: warfarin  Indication: Pulmonary Embolism (PE)    Subjective   Bleeding signs/symptoms: No    Bruising: No   Major bleeding event: No  Thrombosis signs/symptoms: No  Thromboembolic event: No  Missed doses: No  Extra doses: No  Medication changes: No  Dietary changes: No  Change in health: No  Change in activity: No  Alcohol: No  Other concerns: No    Upcoming Procedures:  Does the Patient Have any upcoming procedures that require interruption in anticoagulation therapy? no  Does the patient require bridging? no      Anticoagulation Summary  As of 2024      INR goal:  2.0-3.0   TTR:  56.9% (6 mo)   INR used for dosin.40 (2024)   Weekly warfarin total:  17.5 mg               Assessment/Plan   Therapeutic     1. New dose: no change  SPOKE TO PT. SHE STATES INR WAS 2.4 TODAY. CONFIRMED CURRENT DOSING SCHEDULE AND REPEATED BACK CORRECTLY BY PT  2. Next INR: 2 weeks      Education provided to patient during the visit:  Patient instructed to call in interim with questions, concerns and changes.   Patient educated on compliance with dosing, follow up appointments, and prescribed plan of care.

## 2024-06-19 ENCOUNTER — ANTICOAGULATION - WARFARIN VISIT (OUTPATIENT)
Dept: CARDIOLOGY | Facility: CLINIC | Age: 74
End: 2024-06-19
Payer: MEDICARE

## 2024-06-19 LAB
INR IN PPP BY COAGULATION ASSAY EXTERNAL: 2.4 (ref 2–3)
PROTHROMBIN TIME (PT) IN PPP BY COAGULATION ASSAY EXTERNAL: NORMAL SECONDS

## 2024-06-19 NOTE — PROGRESS NOTES
Patient identification verified with 2 identifiers.    Location: [facility] Patient Self-Testing Program [phone]    Referring Physician: DR. VÁZQUEZ  Enrollment/ Re-enrollment date: 3/8/2025   INR Goal: 2.0-3.0  INR monitoring is per [facility] protocol.  Anticoagulation Medication: warfarin  Indication: Pulmonary Embolism (PE)    Subjective   Bleeding signs/symptoms: No    Bruising: No   Major bleeding event: No  Thrombosis signs/symptoms: No  Thromboembolic event: No  Missed doses: No  Extra doses: No  Medication changes: No  Dietary changes: No  Change in health: No  Change in activity: No  Alcohol: No  Other concerns: No    Upcoming Procedures:  Does the Patient Have any upcoming procedures that require interruption in anticoagulation therapy? no  Does the patient require bridging? no      Anticoagulation Summary  As of 2024      INR goal:  2.0-3.0   TTR:  60.0% (6.5 mo)   INR used for dosin.40 (2024)   Weekly warfarin total:  17.5 mg               Assessment/Plan   Therapeutic     1. New dose: no change Spoke with pt and confirmed dosing schedule.  2. Next INR: 2 weeks      Education provided to patient during the visit:  Patient instructed to call in interim with questions, concerns and changes.   Patient educated on interactions between medications and warfarin.   Patient educated on dietary consistency in vitamin k consumption.   Patient educated on compliance with dosing, follow up appointments, and prescribed plan of care.

## 2024-06-21 DIAGNOSIS — M51.9 LUMBAR DISC DISEASE: ICD-10-CM

## 2024-06-21 RX ORDER — TOPIRAMATE 100 MG/1
100 TABLET, FILM COATED ORAL 2 TIMES DAILY
Qty: 180 TABLET | Refills: 0 | Status: SHIPPED | OUTPATIENT
Start: 2024-06-21

## 2024-07-03 ENCOUNTER — ANTICOAGULATION - WARFARIN VISIT (OUTPATIENT)
Dept: CARDIOLOGY | Facility: CLINIC | Age: 74
End: 2024-07-03
Payer: MEDICARE

## 2024-07-03 NOTE — PROGRESS NOTES
Patient identification verified with 2 identifiers.    Location: [facility] Patient Self-Testing Program [phone]    Referring Physician: LUCI VÁZQUEZ  Enrollment/ Re-enrollment date: 24   INR Goal: 2.0-3.0  INR monitoring is per [facility] protocol.  Anticoagulation Medication: warfarin  Indication: Pulmonary Embolism (PE)    Subjective   Bleeding signs/symptoms: No    Bruising: No   Major bleeding event: No  Thrombosis signs/symptoms: No  Thromboembolic event: No  Missed doses: No  Extra doses: No  Medication changes: No  Dietary changes: No  Change in health: No  Change in activity: No  Alcohol: No  Other concerns: No    Upcoming Procedures:  Does the Patient Have any upcoming procedures that require interruption in anticoagulation therapy? no  Does the patient require bridging? no      Anticoagulation Summary  As of 7/3/2024      INR goal:  2.0-3.0   TTR:  62.7% (6.9 mo)   INR used for dosin.20 (7/3/2024)   Weekly warfarin total:  17.5 mg               Assessment/Plan   Therapeutic     1. New dose: no change    2. Next INR: 2 weeks      Education provided to patient during the visit:  Patient instructed to call in interim with questions, concerns and changes.   Patient educated on interactions between medications and warfarin.   Patient educated on dietary consistency in vitamin k consumption.   Patient educated on affects of alcohol consumption while taking warfarin.   Patient educated on signs of bleeding/clotting.   Patient educated on compliance with dosing, follow up appointments, and prescribed plan of care.

## 2024-07-09 ENCOUNTER — APPOINTMENT (OUTPATIENT)
Dept: GYNECOLOGIC ONCOLOGY | Facility: CLINIC | Age: 74
End: 2024-07-09
Payer: MEDICARE

## 2024-07-09 ENCOUNTER — TELEPHONE (OUTPATIENT)
Dept: PULMONOLOGY | Facility: CLINIC | Age: 74
End: 2024-07-09

## 2024-07-09 ENCOUNTER — APPOINTMENT (OUTPATIENT)
Dept: PULMONOLOGY | Facility: CLINIC | Age: 74
End: 2024-07-09
Payer: MEDICARE

## 2024-07-09 ENCOUNTER — OFFICE VISIT (OUTPATIENT)
Dept: GYNECOLOGIC ONCOLOGY | Facility: CLINIC | Age: 74
End: 2024-07-09
Payer: MEDICARE

## 2024-07-09 VITALS
TEMPERATURE: 97.5 F | SYSTOLIC BLOOD PRESSURE: 115 MMHG | BODY MASS INDEX: 53.38 KG/M2 | DIASTOLIC BLOOD PRESSURE: 79 MMHG | HEART RATE: 82 BPM | RESPIRATION RATE: 20 BRPM | OXYGEN SATURATION: 93 % | WEIGHT: 293 LBS

## 2024-07-09 VITALS
DIASTOLIC BLOOD PRESSURE: 82 MMHG | SYSTOLIC BLOOD PRESSURE: 137 MMHG | WEIGHT: 293 LBS | OXYGEN SATURATION: 89 % | BODY MASS INDEX: 52.25 KG/M2 | HEART RATE: 76 BPM

## 2024-07-09 DIAGNOSIS — R09.02 HYPOXIA: ICD-10-CM

## 2024-07-09 DIAGNOSIS — B37.2 YEAST INFECTION OF THE SKIN: Primary | ICD-10-CM

## 2024-07-09 DIAGNOSIS — J44.9 CHRONIC OBSTRUCTIVE PULMONARY DISEASE, UNSPECIFIED COPD TYPE (MULTI): ICD-10-CM

## 2024-07-09 DIAGNOSIS — F17.210 NICOTINE DEPENDENCE, CIGARETTES, UNCOMPLICATED: ICD-10-CM

## 2024-07-09 DIAGNOSIS — F17.200 NICOTINE USE DISORDER: Primary | ICD-10-CM

## 2024-07-09 PROCEDURE — 3074F SYST BP LT 130 MM HG: CPT | Performed by: NURSE PRACTITIONER

## 2024-07-09 PROCEDURE — 99215 OFFICE O/P EST HI 40 MIN: CPT | Performed by: PEDIATRICS

## 2024-07-09 PROCEDURE — 3079F DIAST BP 80-89 MM HG: CPT | Performed by: PEDIATRICS

## 2024-07-09 PROCEDURE — 99214 OFFICE O/P EST MOD 30 MIN: CPT | Performed by: NURSE PRACTITIONER

## 2024-07-09 PROCEDURE — 1036F TOBACCO NON-USER: CPT | Performed by: PEDIATRICS

## 2024-07-09 PROCEDURE — 3008F BODY MASS INDEX DOCD: CPT | Performed by: PEDIATRICS

## 2024-07-09 PROCEDURE — 3078F DIAST BP <80 MM HG: CPT | Performed by: NURSE PRACTITIONER

## 2024-07-09 PROCEDURE — 1159F MED LIST DOCD IN RCRD: CPT | Performed by: NURSE PRACTITIONER

## 2024-07-09 PROCEDURE — 3008F BODY MASS INDEX DOCD: CPT | Performed by: NURSE PRACTITIONER

## 2024-07-09 PROCEDURE — 1036F TOBACCO NON-USER: CPT | Performed by: NURSE PRACTITIONER

## 2024-07-09 PROCEDURE — 1159F MED LIST DOCD IN RCRD: CPT | Performed by: PEDIATRICS

## 2024-07-09 PROCEDURE — 1125F AMNT PAIN NOTED PAIN PRSNT: CPT | Performed by: NURSE PRACTITIONER

## 2024-07-09 PROCEDURE — 1160F RVW MEDS BY RX/DR IN RCRD: CPT | Performed by: PEDIATRICS

## 2024-07-09 PROCEDURE — 3075F SYST BP GE 130 - 139MM HG: CPT | Performed by: PEDIATRICS

## 2024-07-09 RX ORDER — NYSTATIN 100000 U/G
OINTMENT TOPICAL 2 TIMES DAILY
Qty: 30 G | Refills: 2 | Status: SHIPPED | OUTPATIENT
Start: 2024-07-09 | End: 2025-07-09

## 2024-07-09 ASSESSMENT — PAIN SCALES - GENERAL: PAINLEVEL: 5

## 2024-07-09 NOTE — PROGRESS NOTES
"Subjective   Patient ID: Frannie Blanco is a 74 y.o. female who presents for COPD, hypoxia, lung nodules    HPI    7/9/2024:  Ms Blanco is doing well, trelegy is working for her.  She is still using oxygen continuously, she has not used enough tanks to qualify for a POC yet, mostly because she was not leaving her house very much.    Previous note 4/9/2024:  Ms Blanco is doing ok now,  she was admitted for a month in January with RSV, then to a SNF, she is now home and doing ok.  She is using both incruse and trelegy (that is what she was given in the SNF).  She is on oxygen continuous but has tanks and would like a POC.      Previous note 11/14/2023:  She has requested changing to a virtual appointment.  The video for the appt worked but the audio did not so we converted to a phone visit.   Ms Blanco is having significant issues with lower extremity edema and due to that is not able to move around much at all.   She has increased her lasix dose and the edema is getting a little better.  She has an appointment with cardiology in the next week or so.  She feels her breathing is  Worse but still better than when she was admitted.  She is using oxygen continuously.  We have ordered a POC through [employer] but that has not happened as yet.  She was ordered incruse and spiriva but those were too costly so she is just using albuterol.     Previous note 8/23/2023:  Ms Blanco feels about the same, however when walking into the office her saturation was 83% in room air. with sitting she remained at 86%. we titrated oxygen for her and she needed 3lpm to maintain saturation (93%) she occasionally uses albuterol and does not feel the need for using it any more.     previous note 2/22/2023: Ms Blanco is doing well. SHe is still using oxygen at night. She has albuterol but only needs it rarely (usually when she has a \"cold\")   She ahd a PFT which shows moderate obstruction with bronchodilator response.  She ahd a repeat CT which " "shows resolution of previous pneumonia and a few tiny nodules that are unchanged.. There is mention of enlarged PA which could represent pulmonary hypertension however there is an echo done May 2022 which is normal.      previous note 12/28/2022: Ms Blanco is a 72yr old that was admitted to [facility] in November with MRSA pneumonia, CHF and A Fib. She feel she is now recovered. She was needing oxygen during the day but has weaned off that. She does use oxygen at night which is not new. She uses albuterol but only when she \"gets a cold\".   She apparently had a sleep study a few years ago and that found no EULALIA but hypoxia      I reviewed the CT report from 10/31/2022 which reports patchy infiltrates consistent with pneumonia and several <8mm nodules.      She is a current smoker 1ppd for 50 years (she is down to a few cigarettes a day now     Review of Systems    See scanned documents attached to this note for review of systems, and appropriate scales/scores for this visit.     Objective   Physical Exam  Constitutional:       Appearance: Normal appearance. She is obese.   HENT:      Head: Normocephalic and atraumatic.      Mouth/Throat:      Pharynx: Oropharynx is clear.   Cardiovascular:      Rate and Rhythm: Normal rate and regular rhythm.      Pulses: Normal pulses.      Heart sounds: Normal heart sounds.   Pulmonary:      Effort: Pulmonary effort is normal.      Breath sounds: Normal breath sounds. No wheezing, rhonchi or rales.   Abdominal:      General: Bowel sounds are normal.      Palpations: Abdomen is soft.   Musculoskeletal:         General: Normal range of motion.   Skin:     General: Skin is warm and dry.   Neurological:      General: No focal deficit present.      Mental Status: She is alert and oriented to person, place, and time.   Psychiatric:         Mood and Affect: Mood normal.             Assessment/Plan     74yr old with obesity, OSHEA and nocturnal hypoxia        COPD/SOB:  s/p RSV in January  now at " baseline  - contiinue trelegy  - continue albuterol as needed     2. lung nodules:  - repeat CT stable nodules  - f/u CT in 1 year on/after Primo 15 2025     3. hypoxia: now needs continuous.  Has tanks, would benefit from POC for ease of mobility.  Needs to show use of tanks to qualify for POC.   She will use the tanks more regularly  - will order POC once she qualifies based on tank use. (Order sent today)    Follow up 6 months after LDCT       Nahum Méndez MD 07/09/24 8:35 AM
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
## 2024-07-09 NOTE — TELEPHONE ENCOUNTER
Shawna from [facility] called and said that the pt isn't eligible for the poc and she is in the Medicare cap of 38 months pt will not be eligible for the poc after 60 month cap     Thank you!

## 2024-07-10 DIAGNOSIS — N95.0 POST-MENOPAUSAL BLEEDING: Primary | ICD-10-CM

## 2024-07-15 DIAGNOSIS — I48.91 ATRIAL FIBRILLATION WITH RAPID VENTRICULAR RESPONSE (MULTI): ICD-10-CM

## 2024-07-15 RX ORDER — WARFARIN SODIUM 5 MG/1
TABLET ORAL
Qty: 90 TABLET | Refills: 1 | Status: SHIPPED | OUTPATIENT
Start: 2024-07-15

## 2024-07-16 ENCOUNTER — OFFICE VISIT (OUTPATIENT)
Dept: GYNECOLOGIC ONCOLOGY | Facility: CLINIC | Age: 74
End: 2024-07-16
Payer: MEDICARE

## 2024-07-16 VITALS
TEMPERATURE: 97.5 F | OXYGEN SATURATION: 94 % | SYSTOLIC BLOOD PRESSURE: 124 MMHG | RESPIRATION RATE: 18 BRPM | DIASTOLIC BLOOD PRESSURE: 76 MMHG | HEART RATE: 85 BPM

## 2024-07-16 DIAGNOSIS — N95.0 POSTMENOPAUSAL VAGINAL BLEEDING: Primary | ICD-10-CM

## 2024-07-16 DIAGNOSIS — E11.65 TYPE 2 DIABETES MELLITUS WITH HYPERGLYCEMIA, UNSPECIFIED WHETHER LONG TERM INSULIN USE (MULTI): ICD-10-CM

## 2024-07-16 PROCEDURE — 3078F DIAST BP <80 MM HG: CPT | Performed by: NURSE PRACTITIONER

## 2024-07-16 PROCEDURE — 58100 BIOPSY OF UTERUS LINING: CPT | Performed by: NURSE PRACTITIONER

## 2024-07-16 PROCEDURE — 58301 REMOVE INTRAUTERINE DEVICE: CPT | Performed by: NURSE PRACTITIONER

## 2024-07-16 PROCEDURE — 88305 TISSUE EXAM BY PATHOLOGIST: CPT | Mod: TC,GEALAB | Performed by: NURSE PRACTITIONER

## 2024-07-16 PROCEDURE — 1159F MED LIST DOCD IN RCRD: CPT | Performed by: NURSE PRACTITIONER

## 2024-07-16 PROCEDURE — 3074F SYST BP LT 130 MM HG: CPT | Performed by: NURSE PRACTITIONER

## 2024-07-16 PROCEDURE — 1036F TOBACCO NON-USER: CPT | Performed by: NURSE PRACTITIONER

## 2024-07-16 PROCEDURE — 3008F BODY MASS INDEX DOCD: CPT | Performed by: NURSE PRACTITIONER

## 2024-07-16 PROCEDURE — 1125F AMNT PAIN NOTED PAIN PRSNT: CPT | Performed by: NURSE PRACTITIONER

## 2024-07-16 RX ORDER — METFORMIN HYDROCHLORIDE 1000 MG/1
TABLET ORAL
Qty: 180 TABLET | Refills: 3 | Status: SHIPPED | OUTPATIENT
Start: 2024-07-16

## 2024-07-16 ASSESSMENT — PAIN SCALES - GENERAL: PAINLEVEL: 5
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
## 2024-07-17 ENCOUNTER — ANTICOAGULATION - WARFARIN VISIT (OUTPATIENT)
Dept: CARDIOLOGY | Facility: CLINIC | Age: 74
End: 2024-07-17
Payer: MEDICARE

## 2024-07-17 LAB
INR IN PPP BY COAGULATION ASSAY EXTERNAL: 2.8
PROTHROMBIN TIME (PT) IN PPP BY COAGULATION ASSAY EXTERNAL: NORMAL

## 2024-07-17 NOTE — PROGRESS NOTES
Patient identification verified with 2 identifiers.    Location: [facility] Patient Self-Testing Program [phone]    Referring Physician: DR. LUCI VÁZQUEZ  Enrollment/ Re-enrollment date: 3/8/2025   INR Goal: 2.0-3.0  INR monitoring is per [facility] protocol.  Anticoagulation Medication: warfarin  Indication: Pulmonary Embolism (PE)    Subjective   Bleeding signs/symptoms: No    Bruising: No   Major bleeding event: No  Thrombosis signs/symptoms: No  Thromboembolic event: No  Missed doses: No  Extra doses: No  Medication changes: No  Dietary changes: No  Change in health: No  Change in activity: No  Alcohol: No  Other concerns: No    Upcoming Procedures:  Does the Patient Have any upcoming procedures that require interruption in anticoagulation therapy? no  Does the patient require bridging? no      Anticoagulation Summary  As of 2024      INR goal:  2.0-3.0   TTR:  65.0% (7.4 mo)   INR used for dosin.80 (2024)   Weekly warfarin total:  17.5 mg               Assessment/Plan   Therapeutic     1. New dose: no change  SPOKE TO PT, CONFIRMED CURRENT DOSING INSTRUCTIONS. PT VERBALIZED CORRECTLY  2. Next INR: 2 weeks      Education provided to patient during the visit:  Patient instructed to call in interim with questions, concerns and changes.   Patient educated on compliance with dosing, follow up appointments, and prescribed plan of care.

## 2024-07-22 LAB
LABORATORY COMMENT REPORT: NORMAL
PATH REPORT.FINAL DX SPEC: NORMAL
PATH REPORT.GROSS SPEC: NORMAL
PATH REPORT.TOTAL CANCER: NORMAL

## 2024-07-23 ENCOUNTER — TELEMEDICINE (OUTPATIENT)
Dept: PAIN MEDICINE | Facility: HOSPITAL | Age: 74
End: 2024-07-23
Payer: MEDICARE

## 2024-07-23 DIAGNOSIS — M54.16 LUMBAR RADICULOPATHY: ICD-10-CM

## 2024-07-23 DIAGNOSIS — M51.9 LUMBAR DISC DISEASE: ICD-10-CM

## 2024-07-23 DIAGNOSIS — M54.12 CERVICAL RADICULITIS: ICD-10-CM

## 2024-07-23 PROCEDURE — 99213 OFFICE O/P EST LOW 20 MIN: CPT | Performed by: ANESTHESIOLOGY

## 2024-07-23 PROCEDURE — 1125F AMNT PAIN NOTED PAIN PRSNT: CPT | Performed by: ANESTHESIOLOGY

## 2024-07-23 RX ORDER — TOPIRAMATE 100 MG/1
100 TABLET, FILM COATED ORAL 2 TIMES DAILY
Qty: 180 TABLET | Refills: 3 | Status: SHIPPED | OUTPATIENT
Start: 2024-07-23

## 2024-07-23 ASSESSMENT — PAIN SCALES - GENERAL: PAINLEVEL: 6

## 2024-07-23 NOTE — PROGRESS NOTES
"Chief complaint: Back pain    ANTONIETTA Kathleen is a 74-year-old female with a history of back and leg symptoms.  The patient was last seen on 9/7/2022 at which time she underwent a bilateral L3 transforaminal.  The patient says that that injection gave her 75% or more relief and lasted for 6 months or more at a minimum.  She says that more recently she has been having bilateral low back pain and she feels that her \"back is a wreck.\"  She is following with pulmonology and does use home oxygen.  She has to use oxygen at 100% of the time now.  She also has neck pain in addition to low back pain.  She would like to continue with conservative measures and despite having good relief with the prior injection and would like to pursue physical therapy and she also needs a refill on her topiramate.  She does feel like topiramate is helpful in relieving her pain as well as improving her function.  In general her pain is worsened with activity and better with rest.    ROS: 13 point review of systems is complete and is negative listed above in HPI    Past Medical History:   Diagnosis Date    Acute upper respiratory infection, unspecified 09/25/2019    Acute upper respiratory infection    Acute upper respiratory infection, unspecified 10/11/2016    Acute upper respiratory infection    COPD (chronic obstructive pulmonary disease) (Multi)     Diabetes mellitus (Multi)     Encounter for screening mammogram for malignant neoplasm of breast 03/10/2015    Visit for screening mammogram    Morbid (severe) obesity due to excess calories (Multi) 09/17/2018    Morbid or severe obesity due to excess calories    Personal history of nicotine dependence 10/26/2020    Personal history of nicotine dependence    Personal history of other diseases of the respiratory system     Personal history of asthma    Personal history of other drug therapy 08/17/2020    History of pneumococcal vaccination    Personal history of other endocrine, nutritional and " "metabolic disease     History of diabetes mellitus    Personal history of other specified conditions 10/04/2016    History of edema    Personal history of other specified conditions 07/15/2019    History of abnormal mammogram       Past Surgical History:   Procedure Laterality Date    BI MAMMO GUIDED BREAST RIGHT LOCALIZATION Right 2018    BI MAMMO GUIDED LOCALIZATION BREAST RIGHT 2018 [facility]    BREAST LUMPECTOMY  2012    Breast Surgery Lumpectomy    COLONOSCOPY  2013    Complete Colonoscopy    NASAL SEPTUM SURGERY  2012    Nasal Septal Deviation Repair    OTHER SURGICAL HISTORY  2018    Breast biopsy excisional    OTHER SURGICAL HISTORY  2021    Cataract surgery    OTHER SURGICAL HISTORY  2020    Renal lithotripsy    TUBAL LIGATION  2012    Tubal Ligation       Family History   Problem Relation Name Age of Onset    Alzheimer's disease Mother      Coronary artery disease Father      Breast cancer Other family history        Social History     Tobacco Use    Smoking status: Former     Current packs/day: 0.00     Types: Cigarettes     Quit date: 2023     Years since quittin.8    Smokeless tobacco: Never   Vaping Use    Vaping status: Never Used   Substance Use Topics    Alcohol use: Never    Drug use: Never       Current Outpatient Medications on File Prior to Visit   Medication Sig Dispense Refill    albuterol 90 mcg/actuation inhaler inhale 1 to 2 puffs by mouth and INTO THE LUNGS every 4 to 6 hours if needed      amiodarone (Pacerone) 200 mg tablet Take 1 tablet (200 mg) by mouth once daily. 90 tablet 0    aspirin 81 mg EC tablet Take 1 tablet (81 mg) by mouth once daily.      atorvastatin (Lipitor) 20 mg tablet Take 1 tablet (20 mg) by mouth once daily at bedtime. 90 tablet 1    BD Alcohol Swabs pads, medicated       BD Dorothy 2nd Gen Pen Needle 32 gauge x \" needle Use with vitamin B12 injections monthly 12 each 11    calcium carbonate 600 " mg calcium (1,500 mg) tablet Take 1 tablet (600 mg) by mouth every 12 hours. + D per directive      CALCIUM CARBONATE-VITAMIN D3 ORAL Take 1 tablet by mouth 2 times a day.      cholecalciferol (Vitamin D-3) 1,250 mcg (50,000 unit) capsule Take 1 capsule (50,000 Units) by mouth 1 (one) time per week. Every 2 weeks      cyanocobalamin (Dodex) 1,000 mcg/mL injection Inject 1 mL (1,000 mcg) into the muscle every 30 (thirty) days. 10 mL 0    dapsone 100 mg tablet Take 1 tablet (100 mg) by mouth once daily in the morning. Take before meals.      dapsone 25 mg tablet Take 2 tablets (50 mg) by mouth once daily in the morning. Take before meals.      docusate sodium (Colace) 100 mg capsule Take 1 capsule (100 mg) by mouth every 12 hours.      ergocalciferol (Vitamin D-2) 1.25 MG (87842 UT) capsule Take 1 capsule (1,250 mcg) by mouth every 14 (fourteen) days.      ferrous sulfate 325 (65 Fe) MG EC tablet Take by mouth once daily.      fluticasone-umeclidin-vilanter (TRELEGY-ELLIPTA) 200-62.5-25 mcg blister with device Inhale 1 puff early in the morning.. Rinse mouth after taking dose 1 each 11    furosemide (Lasix) 80 mg tablet Take 1 tablet (80 mg) by mouth once daily. 90 tablet 1    gabapentin (Neurontin) 300 mg capsule take 1 capsule by mouth three times a day for 90 DAYS 270 capsule 1    glimepiride (Amaryl) 2 mg tablet take 1 tablet by mouth once daily Once a day 90 days 90 tablet 3    insulin detemir (LEVEMIR FLEXTOUCH U100 INSULIN SUBQ) Inject under the skin. As directed      insulin glargine (Basaglar KwikPen U-100 Insulin) 100 unit/mL (3 mL) pen Inject 15 units daily Take as directed per insulin instructions. 15 mL 3    Levemir FlexPen 100 unit/mL (3 mL) pen inject up to 15 units once daily with EVENING MEAL OR BEDTIME      metFORMIN (Glucophage) 1,000 mg tablet TAKE 1 TABLET BY MOUTH TWICE DAILY 180 tablet 3    metoprolol tartrate (Lopressor) 25 mg tablet take 1 tablet by mouth twice a day 120 tablet 0    nystatin  "(Mycostatin) ointment Apply topically 2 times a day. 30 g 2    potassium chloride CR (Klor-Con M20) 20 mEq ER tablet Take 2 tablets (40 mEq) by mouth once daily. Do not crush or chew. 180 tablet 1    potassium chloride CR 20 mEq ER tablet Take 1 tablet (20 mEq) by mouth once daily in the evening. 90 tablet 1    syringe with needle 3 mL 23 x 1\" syringe Use to inject vitamin b12 once monthly 12 each 11    topiramate (Topamax) 100 mg tablet take 1 tablet by mouth twice a day 180 tablet 0    triamcinolone (Kenalog) 0.1 % ointment APPLY 1 APPLICATOR AS NEEDED TOPICALLY ONCE DAILY AS NEEDED UNDER DENTAL TRAYS      umeclidinium (Incruse Ellipta) 62.5 mcg/actuation inhalation Inhale 1 puff (62.5 mcg) once daily. (Patient taking differently: Inhale 1 puff (62.5 mcg) once daily. Pt will not refill when completed as the cost is too high although will be able to start again after meeting deductible.) 1 each 5    warfarin (Coumadin) 5 mg tablet Take 1 tab daily or directed as coumadin clinic 90 tablet 1    zinc oxide 20 % ointment Apply 1 Application topically every 8 hours.       No current facility-administered medications on file prior to visit.        No Known Allergies       Imaging:  MRN: [id number]  Patient Name: PATRICIA CHANG     STUDY:  MRI L-SPINE WO;  8/7/2021 11:22 am     INDICATION:  back pain.     COMPARISON:  April 2014.     ACCESSION NUMBER(S):  [id number]     ORDERING CLINICIAN:  BETH WRAY     TECHNIQUE:  The lumbar spine was studied in the sagital, axial and coronal planes  utiliing T1 and T2 weighted images.     FINDINGS:  The marrow signal and vertebral body height are normal. The conus and  sacrum are normal.  Images at each interspace reveal the following:  L1/L2  There is normal alignment and vertebral body height. The disc space  is normal. There is no evidence of canal or foraminal narrowing.  There is no evidence of bulging or herniated disc.  L2/L3  There is normal alignment and vertebral body " height. The disc space  is normal. There is no evidence of canal or foraminal narrowing.  There is no evidence of bulging or herniated disc.  L3/L4  Circumferential bulging intervertebral disc and bilateral facet  hypertrophy with deformity of the thecal sac and flattening of the  thecal sac measuring approximately 8 mm in AP dimension in the  midline. Superimposed asymmetrical bulging or broad-based herniation  to the left with focal narrowing of the left lateral recess and  neural foramen.  L4/L5  Circumferential bulging intervertebral disc without canal or  foraminal narrowing  L5/S1  Circumferential bulging intervertebral disc without canal or  foraminal narrowing        IMPRESSION:  * Focal narrowing of the left lateral recess and neural foramen at  L3/L4.      Impression/Plan:  74-year-old female with a history of back and leg symptoms as well as neck pain.  She is following up for a refill on her topiramate and she would like to pursue physical therapy.  She has had prior injections with significant improvement in her pain however she would like to start with most conservative measures at this time.    -Will refer for cervical and lumbar physical therapy.  If pain persists beyond that we could consider advanced imaging of the neck, updated imaging of the lumbar spine, or pursuing epidural injections for either area.  MRI would need to be obtained before any intervention for the neck.    -Will renew topiramate.  Side effect profile and risk reviewed.  If we stop this later would wean it down slowly rather than stop it abruptly.

## 2024-07-26 ENCOUNTER — TELEPHONE (OUTPATIENT)
Dept: GYNECOLOGIC ONCOLOGY | Facility: HOSPITAL | Age: 74
End: 2024-07-26
Payer: MEDICARE

## 2024-07-26 NOTE — TELEPHONE ENCOUNTER
Phoned patient to notify that EMB showed no abnormal findings and Dia Tyler CNP recommends keeping pelvic ultrasound and appointment with Dia as scheduled in October.  Patient verbalized her understanding of information given.

## 2024-07-31 ENCOUNTER — ANTICOAGULATION - WARFARIN VISIT (OUTPATIENT)
Dept: CARDIOLOGY | Facility: CLINIC | Age: 74
End: 2024-07-31
Payer: MEDICARE

## 2024-07-31 DIAGNOSIS — I26.99 PULMONARY EMBOLISM WITHOUT ACUTE COR PULMONALE, UNSPECIFIED CHRONICITY, UNSPECIFIED PULMONARY EMBOLISM TYPE (MULTI): Primary | ICD-10-CM

## 2024-07-31 NOTE — PROGRESS NOTES
Patient identification verified with 2 identifiers.    Location: [facility] Patient Self-Testing Program [phone]    Referring Physician: LUCI VÁZQUEZ  Enrollment/ Re-enrollment date: 2024 -Renewal sent    INR Goal: 2.0-3.0  INR monitoring is per [facility] protocol.  Anticoagulation Medication: warfarin  Indication: Pulmonary Embolism (PE)    Subjective   Bleeding signs/symptoms: No    Bruising: No   Major bleeding event: No  Thrombosis signs/symptoms: No  Thromboembolic event: No  Missed doses: No  Extra doses: No  Medication changes: No  Dietary changes: No  Change in health: No  Change in activity: No  Alcohol: No  Other concerns: No    Upcoming Procedures:  Does the Patient Have any upcoming procedures that require interruption in anticoagulation therapy? no  Does the patient require bridging? no      Anticoagulation Summary  As of 2024      INR goal:  2.0-3.0   TTR:  67.1% (7.9 mo)   INR used for dosin.80 (2024)   Weekly warfarin total:  17.5 mg               Assessment/Plan   Therapeutic     1. New dose: no change    2. Next INR: 2 weeks      Education provided to patient during the visit:  Patient instructed to call in interim with questions, concerns and changes.   Patient educated on interactions between medications and warfarin.   Patient educated on dietary consistency in vitamin k consumption.   Patient educated on affects of alcohol consumption while taking warfarin.   Patient educated on signs of bleeding/clotting.   Patient educated on compliance with dosing, follow up appointments, and prescribed plan of care.

## 2024-08-14 ENCOUNTER — ANTICOAGULATION - WARFARIN VISIT (OUTPATIENT)
Dept: CARDIOLOGY | Facility: CLINIC | Age: 74
End: 2024-08-14
Payer: MEDICARE

## 2024-08-14 DIAGNOSIS — I26.99 PULMONARY EMBOLISM WITHOUT ACUTE COR PULMONALE, UNSPECIFIED CHRONICITY, UNSPECIFIED PULMONARY EMBOLISM TYPE (MULTI): Primary | ICD-10-CM

## 2024-08-14 LAB
INR IN PPP BY COAGULATION ASSAY EXTERNAL: 2.8 (ref 2–3)
PROTHROMBIN TIME (PT) IN PPP BY COAGULATION ASSAY EXTERNAL: NORMAL

## 2024-08-14 NOTE — PROGRESS NOTES
Patient identification verified with 2 identifiers.    Location: [facility] Patient Self-Testing Program [phone]    Referring Physician: LUCI VÁZQUEZ  Enrollment/ Re-enrollment date: 2024 -Renewal sent    INR Goal: 2.0-3.0  INR monitoring is per [facility] protocol.  Anticoagulation Medication: warfarin  Indication: Pulmonary Embolism (PE)    Subjective   Bleeding signs/symptoms: No    Bruising: No   Major bleeding event: No  Thrombosis signs/symptoms: No  Thromboembolic event: No  Missed doses: No  Extra doses: No  Medication changes: No  Dietary changes: No  Change in health: No  Change in activity: No  Alcohol: No  Other concerns: No    Upcoming Procedures:  Does the Patient Have any upcoming procedures that require interruption in anticoagulation therapy? no  Does the patient require bridging? no      Anticoagulation Summary  As of 2024      INR goal:  2.0-3.0   TTR:  68.9% (8.3 mo)   INR used for dosin.80 (2024)   Weekly warfarin total:  17.5 mg               Assessment/Plan   Therapeutic     1. New dose: no change  Spoke with pt and confirmed dosing schedule.  2. Next INR: 2 weeks      Education provided to patient during the visit:  Patient instructed to call in interim with questions, concerns and changes.

## 2024-08-16 DIAGNOSIS — I48.91 ATRIAL FIBRILLATION WITH RAPID VENTRICULAR RESPONSE (MULTI): ICD-10-CM

## 2024-08-16 RX ORDER — ERGOCALCIFEROL 1.25 MG/1
1 CAPSULE ORAL
Qty: 6 CAPSULE | Refills: 2 | OUTPATIENT
Start: 2024-08-16

## 2024-08-16 RX ORDER — AMIODARONE HYDROCHLORIDE 200 MG/1
200 TABLET ORAL DAILY
Qty: 90 TABLET | Refills: 0 | Status: SHIPPED | OUTPATIENT
Start: 2024-08-16 | End: 2024-11-14

## 2024-08-20 DIAGNOSIS — E78.2 MIXED HYPERLIPIDEMIA: ICD-10-CM

## 2024-08-20 DIAGNOSIS — E55.9 VITAMIN D DEFICIENCY, UNSPECIFIED: ICD-10-CM

## 2024-08-20 DIAGNOSIS — N18.30 STAGE 3 CHRONIC KIDNEY DISEASE, UNSPECIFIED WHETHER STAGE 3A OR 3B CKD (MULTI): ICD-10-CM

## 2024-08-28 ENCOUNTER — ANTICOAGULATION - WARFARIN VISIT (OUTPATIENT)
Dept: CARDIOLOGY | Facility: CLINIC | Age: 74
End: 2024-08-28
Payer: MEDICARE

## 2024-08-28 DIAGNOSIS — I26.99 PULMONARY EMBOLISM WITHOUT ACUTE COR PULMONALE, UNSPECIFIED CHRONICITY, UNSPECIFIED PULMONARY EMBOLISM TYPE (MULTI): Primary | ICD-10-CM

## 2024-08-28 LAB
INR IN PPP BY COAGULATION ASSAY EXTERNAL: 2.6 (ref 2–3)
PROTHROMBIN TIME (PT) IN PPP BY COAGULATION ASSAY EXTERNAL: NORMAL

## 2024-08-28 NOTE — PROGRESS NOTES
Patient identification verified with 2 identifiers.    Location: [facility] Patient Self-Testing Program [phone]    Referring Physician: LUCI VÁZQUEZ  Enrollment/ Re-enrollment date: 7/31/2025   INR Goal: 2.0-3.0  INR monitoring is per [facility] protocol.  Anticoagulation Medication: warfarin  Indication: Pulmonary Embolism (PE)    Subjective   Bleeding signs/symptoms: No    Bruising: No   Major bleeding event: No  Thrombosis signs/symptoms: No  Thromboembolic event: No  Missed doses: No  Extra doses: No  Medication changes: No  Dietary changes: No  Change in health: No  Change in activity: No  Alcohol: No  Other concerns: No    Upcoming Procedures:  Does the Patient Have any upcoming procedures that require interruption in anticoagulation therapy? no  Does the patient require bridging? no      Anticoagulation Summary  As of 8/28/2024      INR goal:  2.0-3.0   TTR:  68.9% (8.3 mo)   INR used for dosing:  --               Assessment/Plan   Therapeutic     1. New dose: no change  Spoke with pt and confirmed dosing schedule.  2. Next INR: 2 weeks      Education provided to patient during the visit:  Patient instructed to call in interim with questions, concerns and changes.

## 2024-09-11 ENCOUNTER — ANTICOAGULATION - WARFARIN VISIT (OUTPATIENT)
Dept: CARDIOLOGY | Facility: CLINIC | Age: 74
End: 2024-09-11
Payer: MEDICARE

## 2024-09-11 DIAGNOSIS — I26.99 PULMONARY EMBOLISM WITHOUT ACUTE COR PULMONALE, UNSPECIFIED CHRONICITY, UNSPECIFIED PULMONARY EMBOLISM TYPE (MULTI): Primary | ICD-10-CM

## 2024-09-11 LAB
INR IN PPP BY COAGULATION ASSAY EXTERNAL: 2.6
PROTHROMBIN TIME (PT) IN PPP BY COAGULATION ASSAY EXTERNAL: NORMAL

## 2024-09-11 NOTE — PROGRESS NOTES
Patient identification verified with 2 identifiers.    Location: [facility] Patient Self-Testing Program [phone]    Referring Physician: DR. LUCI VÁZQUEZ   Enrollment/ Re-enrollment date: 2025   INR Goal: 2.0-3.0  INR monitoring is per [facility] protocol.  Anticoagulation Medication: warfarin  Indication: Pulmonary Embolism (PE)    Subjective   Bleeding signs/symptoms: No    Bruising: No   Major bleeding event: No  Thrombosis signs/symptoms: No  Thromboembolic event: No  Missed doses: No  Extra doses: No  Medication changes: No  Dietary changes: No  Change in health: No  Change in activity: No  Alcohol: No  Other concerns: No    Upcoming Procedures:  Does the Patient Have any upcoming procedures that require interruption in anticoagulation therapy? no  Does the patient require bridging? no      Anticoagulation Summary  As of 2024      INR goal:  2.0-3.0   TTR:  72.1% (9.3 mo)   INR used for dosin.60 (2024)   Weekly warfarin total:  17.5 mg               Assessment/Plan   Therapeutic     1. New dose: no change  SPOKE TO PT CONFIRMED CURRENT DOSING INSTRUCTIONS. PT REPEATED CORRECTLY  2. Next INR: 2 weeks      Education provided to patient during the visit:  Patient instructed to call in interim with questions, concerns and changes.   Patient educated on compliance with dosing, follow up appointments, and prescribed plan of care.

## 2024-09-13 DIAGNOSIS — E11.65 TYPE 2 DIABETES MELLITUS WITH HYPERGLYCEMIA, UNSPECIFIED WHETHER LONG TERM INSULIN USE (MULTI): ICD-10-CM

## 2024-09-13 RX ORDER — METFORMIN HYDROCHLORIDE 1000 MG/1
TABLET ORAL
Qty: 180 TABLET | Refills: 0 | Status: SHIPPED | OUTPATIENT
Start: 2024-09-13

## 2024-09-16 DIAGNOSIS — E78.2 MIXED HYPERLIPIDEMIA: ICD-10-CM

## 2024-09-16 DIAGNOSIS — E87.6 HYPOKALEMIA: ICD-10-CM

## 2024-09-16 RX ORDER — POTASSIUM CHLORIDE 20 MEQ/1
20 TABLET, EXTENDED RELEASE ORAL EVERY EVENING
Qty: 90 TABLET | Refills: 1 | Status: SHIPPED | OUTPATIENT
Start: 2024-09-16

## 2024-09-16 RX ORDER — ATORVASTATIN CALCIUM 20 MG/1
20 TABLET, FILM COATED ORAL NIGHTLY
Qty: 90 TABLET | Refills: 1 | Status: SHIPPED | OUTPATIENT
Start: 2024-09-16

## 2024-09-25 ENCOUNTER — ANTICOAGULATION - WARFARIN VISIT (OUTPATIENT)
Dept: CARDIOLOGY | Facility: CLINIC | Age: 74
End: 2024-09-25
Payer: MEDICARE

## 2024-09-25 DIAGNOSIS — I26.99 PULMONARY EMBOLISM WITHOUT ACUTE COR PULMONALE, UNSPECIFIED CHRONICITY, UNSPECIFIED PULMONARY EMBOLISM TYPE (MULTI): Primary | ICD-10-CM

## 2024-09-25 LAB
INR IN PPP BY COAGULATION ASSAY EXTERNAL: 2.3
PROTHROMBIN TIME (PT) IN PPP BY COAGULATION ASSAY EXTERNAL: NORMAL

## 2024-09-25 NOTE — PROGRESS NOTES
Patient identification verified with 2 identifiers.    Location: [facility] Patient Self-Testing Program [phone]    Referring Physician: DR. LUCI VÁZQUEZ   Enrollment/ Re-enrollment date: 2025   INR Goal: 2.0-3.0  INR monitoring is per [facility] protocol.  Anticoagulation Medication: warfarin  Indication: Pulmonary Embolism (PE)    Subjective   Bleeding signs/symptoms: No    Bruising: No   Major bleeding event: No  Thrombosis signs/symptoms: No  Thromboembolic event: No  Missed doses: No  Extra doses: No  Medication changes: No  Dietary changes: No  Change in health: No  Change in activity: No  Alcohol: No  Other concerns: No    Upcoming Procedures:  Does the Patient Have any upcoming procedures that require interruption in anticoagulation therapy? no  Does the patient require bridging? no      Anticoagulation Summary  As of 2024      INR goal:  2.0-3.0   TTR:  73.4% (9.7 mo)   INR used for dosin.30 (2024)   Weekly warfarin total:  17.5 mg               Assessment/Plan   Therapeutic     1. New dose: Left VM with dosing instructions and next testing date.  Will maintain dose and patient will retest in 2 weeks.    2. Next INR: 2 weeks      Education provided to patient during the visit:  Patient instructed to call in interim with questions, concerns and changes.

## 2024-10-09 ENCOUNTER — ANTICOAGULATION - WARFARIN VISIT (OUTPATIENT)
Dept: CARDIOLOGY | Facility: CLINIC | Age: 74
End: 2024-10-09
Payer: MEDICARE

## 2024-10-09 DIAGNOSIS — I26.99 PULMONARY EMBOLISM WITHOUT ACUTE COR PULMONALE, UNSPECIFIED CHRONICITY, UNSPECIFIED PULMONARY EMBOLISM TYPE (MULTI): Primary | ICD-10-CM

## 2024-10-10 NOTE — PROGRESS NOTES
Patient identification verified with 2 identifiers.    Location: [facility] Patient Self-Testing Program [phone]    Referring Physician: DR. VÁZQUEZ  Enrollment/ Re-enrollment date: 25   INR Goal: 2.0-3.0  INR monitoring is per [facility] protocol.  Anticoagulation Medication: warfarin  Indication: Pulmonary Embolism (PE)    Subjective   Bleeding signs/symptoms: No    Bruising: No   Major bleeding event: No  Thrombosis signs/symptoms: No  Thromboembolic event: No  Missed doses: No  Extra doses: No  Medication changes: No  Dietary changes: No  Change in health: No  Change in activity: No  Alcohol: No  Other concerns: No    Upcoming Procedures:  Does the Patient Have any upcoming procedures that require interruption in anticoagulation therapy? no  Does the patient require bridging? no      Anticoagulation Summary  As of 10/9/2024      INR goal:  2.0-3.0   TTR:  74.6% (10.2 mo)   INR used for dosin.60 (10/9/2024)   Weekly warfarin total:  17.5 mg               Assessment/Plan   Therapeutic     1. New dose: no change    2. Next INR: 2 weeks      Education provided to patient during the visit:  Patient instructed to call in interim with questions, concerns and changes.   Patient educated on interactions between medications and warfarin.   Patient educated on dietary consistency in vitamin k consumption.   Patient educated on affects of alcohol consumption while taking warfarin.   Patient educated on signs of bleeding/clotting.   Patient educated on compliance with dosing, follow up appointments, and prescribed plan of care.

## 2024-10-15 DIAGNOSIS — I10 PRIMARY HYPERTENSION: ICD-10-CM

## 2024-10-15 DIAGNOSIS — R79.9 ELEVATED BUN: Primary | ICD-10-CM

## 2024-10-15 RX ORDER — FUROSEMIDE 80 MG/1
80 TABLET ORAL DAILY
Qty: 90 TABLET | Refills: 0 | Status: SHIPPED | OUTPATIENT
Start: 2024-10-15

## 2024-10-19 ENCOUNTER — APPOINTMENT (OUTPATIENT)
Dept: RADIOLOGY | Facility: HOSPITAL | Age: 74
End: 2024-10-19
Payer: MEDICARE

## 2024-10-21 ENCOUNTER — APPOINTMENT (OUTPATIENT)
Dept: GYNECOLOGIC ONCOLOGY | Facility: CLINIC | Age: 74
End: 2024-10-21
Payer: MEDICARE

## 2024-10-24 ENCOUNTER — ANTICOAGULATION - WARFARIN VISIT (OUTPATIENT)
Dept: CARDIOLOGY | Facility: CLINIC | Age: 74
End: 2024-10-24
Payer: MEDICARE

## 2024-10-24 DIAGNOSIS — I26.99 PULMONARY EMBOLISM WITHOUT ACUTE COR PULMONALE, UNSPECIFIED CHRONICITY, UNSPECIFIED PULMONARY EMBOLISM TYPE (MULTI): Primary | ICD-10-CM

## 2024-10-24 NOTE — PROGRESS NOTES
Patient identification verified with 2 identifiers.    Location: [facility] Patient Self-Testing Program [phone]    Referring Physician: DR. LUCI VÁZQUEZ  Enrollment/ Re-enrollment date: 2025   INR Goal: 2.0-3.0  INR monitoring is per [facility] protocol.  Anticoagulation Medication: warfarin  Indication: Pulmonary Embolism (PE)    Subjective   Bleeding signs/symptoms: No    Bruising: No   Major bleeding event: No  Thrombosis signs/symptoms: No  Thromboembolic event: No  Missed doses: No  Extra doses: No  Medication changes: No  Dietary changes: No  Change in health: No  Change in activity: No  Alcohol: No  Other concerns: No    Upcoming Procedures:  Does the Patient Have any upcoming procedures that require interruption in anticoagulation therapy? no  Does the patient require bridging? no      Anticoagulation Summary  As of 10/24/2024      INR goal:  2.0-3.0   TTR:  75.8% (10.7 mo)   INR used for dosin.60 (10/24/2024)   Weekly warfarin total:  17.5 mg               Assessment/Plan   Therapeutic     1. New dose: no change SPOKE TO PT. CONFIRMED CURRENT DOSING INSTRUCTIONS. PT VERBALIZED CORRECTLY   2. Next INR: 2 weeks      Education provided to patient during the visit:  Patient instructed to call in interim with questions, concerns and changes.   Patient educated on compliance with dosing, follow up appointments, and prescribed plan of care.

## 2024-10-26 ENCOUNTER — HOSPITAL ENCOUNTER (OUTPATIENT)
Dept: RADIOLOGY | Facility: HOSPITAL | Age: 74
Discharge: HOME | End: 2024-10-26
Payer: MEDICARE

## 2024-10-26 ENCOUNTER — LAB (OUTPATIENT)
Dept: LAB | Facility: LAB | Age: 74
End: 2024-10-26
Payer: MEDICARE

## 2024-10-26 DIAGNOSIS — N18.30 STAGE 3 CHRONIC KIDNEY DISEASE, UNSPECIFIED WHETHER STAGE 3A OR 3B CKD (MULTI): ICD-10-CM

## 2024-10-26 DIAGNOSIS — N95.0 POSTMENOPAUSAL VAGINAL BLEEDING: ICD-10-CM

## 2024-10-26 DIAGNOSIS — R79.9 ELEVATED BUN: ICD-10-CM

## 2024-10-26 DIAGNOSIS — E78.2 MIXED HYPERLIPIDEMIA: ICD-10-CM

## 2024-10-26 DIAGNOSIS — E55.9 VITAMIN D DEFICIENCY, UNSPECIFIED: ICD-10-CM

## 2024-10-26 LAB
25(OH)D3 SERPL-MCNC: 69 NG/ML (ref 30–100)
ALBUMIN SERPL BCP-MCNC: 4 G/DL (ref 3.4–5)
ALP SERPL-CCNC: 53 U/L (ref 33–136)
ALT SERPL W P-5'-P-CCNC: 15 U/L (ref 7–45)
ANION GAP SERPL CALC-SCNC: 13 MMOL/L (ref 10–20)
AST SERPL W P-5'-P-CCNC: 14 U/L (ref 9–39)
BILIRUB SERPL-MCNC: 0.9 MG/DL (ref 0–1.2)
BUN SERPL-MCNC: 36 MG/DL (ref 6–23)
CALCIUM SERPL-MCNC: 8.4 MG/DL (ref 8.6–10.3)
CHLORIDE SERPL-SCNC: 108 MMOL/L (ref 98–107)
CHOLEST SERPL-MCNC: 129 MG/DL (ref 0–199)
CHOLESTEROL/HDL RATIO: 2.4
CO2 SERPL-SCNC: 25 MMOL/L (ref 21–32)
CREAT SERPL-MCNC: 1.45 MG/DL (ref 0.5–1.05)
EGFRCR SERPLBLD CKD-EPI 2021: 38 ML/MIN/1.73M*2
GLUCOSE SERPL-MCNC: 117 MG/DL (ref 74–99)
HDLC SERPL-MCNC: 52.7 MG/DL
LDLC SERPL CALC-MCNC: 60 MG/DL
NON HDL CHOLESTEROL: 76 MG/DL (ref 0–149)
POTASSIUM SERPL-SCNC: 4.3 MMOL/L (ref 3.5–5.3)
PROT SERPL-MCNC: 6.2 G/DL (ref 6.4–8.2)
SODIUM SERPL-SCNC: 142 MMOL/L (ref 136–145)
TRIGL SERPL-MCNC: 80 MG/DL (ref 0–149)
VLDL: 16 MG/DL (ref 0–40)

## 2024-10-26 PROCEDURE — 76857 US EXAM PELVIC LIMITED: CPT | Performed by: STUDENT IN AN ORGANIZED HEALTH CARE EDUCATION/TRAINING PROGRAM

## 2024-10-26 PROCEDURE — 82306 VITAMIN D 25 HYDROXY: CPT

## 2024-10-26 PROCEDURE — 80053 COMPREHEN METABOLIC PANEL: CPT

## 2024-10-26 PROCEDURE — 36415 COLL VENOUS BLD VENIPUNCTURE: CPT

## 2024-10-26 PROCEDURE — 76856 US EXAM PELVIC COMPLETE: CPT

## 2024-10-26 PROCEDURE — 76830 TRANSVAGINAL US NON-OB: CPT | Performed by: STUDENT IN AN ORGANIZED HEALTH CARE EDUCATION/TRAINING PROGRAM

## 2024-10-26 PROCEDURE — 80061 LIPID PANEL: CPT

## 2024-10-29 ENCOUNTER — TELEMEDICINE (OUTPATIENT)
Dept: GYNECOLOGIC ONCOLOGY | Facility: CLINIC | Age: 74
End: 2024-10-29
Payer: MEDICARE

## 2024-10-29 DIAGNOSIS — N95.0 POSTMENOPAUSAL VAGINAL BLEEDING: Primary | ICD-10-CM

## 2024-10-29 PROCEDURE — 3048F LDL-C <100 MG/DL: CPT | Performed by: NURSE PRACTITIONER

## 2024-10-29 PROCEDURE — 99213 OFFICE O/P EST LOW 20 MIN: CPT | Performed by: NURSE PRACTITIONER

## 2024-10-30 DIAGNOSIS — N18.30 STAGE 3 CHRONIC KIDNEY DISEASE, UNSPECIFIED WHETHER STAGE 3A OR 3B CKD (MULTI): ICD-10-CM

## 2024-11-04 ENCOUNTER — PATIENT OUTREACH (OUTPATIENT)
Dept: PRIMARY CARE | Facility: CLINIC | Age: 74
End: 2024-11-04
Payer: MEDICARE

## 2024-11-04 RX ORDER — GUAIFENESIN/DEXTROMETHORPHAN 100-10MG/5
5 SYRUP ORAL 3 TIMES DAILY PRN
COMMUNITY

## 2024-11-04 NOTE — PROGRESS NOTES
Discharge Facility: [facility]  Discharge Diagnosis: Dissiness, Fall  Admission Date: 30 Oct 24  Discharge Date: 2 Nov 24    PCP Appointment Date: Tasked to office  Specialist Appointment Date: 21 Nov 24 (Eliza Nielson)  Hospital Encounter and Summary Linked: Yes    See discharge assessment below for further details     Engagement  Call Start Time: 1017 (Spoke with patient) (11/4/2024 10:27 AM)    Medications  Medications reviewed with patient/caregiver?: Yes (11/4/2024 10:27 AM)  Is the patient having any side effects they believe may be caused by any medication additions or changes?: No (11/4/2024 10:27 AM)  Does the patient have all medications ordered at discharge?: Yes (11/4/2024 10:27 AM)  Prescription Comments: Pt able to obtain and afford all medications. (11/4/2024 10:27 AM)  Medication Comments: pt unsure why she was prescribed a cough medication. reviewed pt's chart with patient and found complaint of Non Productive cough. pt states she has no cough at this time but will  medication since it will be nice to have in case a cough shows itself later on. (11/4/2024 10:27 AM)    Appointments  Does the patient have a primary care provider?: Yes (Tasked to office) (11/4/2024 10:27 AM)  Has the patient kept scheduled appointments due by today?: Yes (11/4/2024 10:27 AM)    Self Management  What is the home health agency?: None (11/4/2024 10:27 AM)  Has home health visited the patient within 72 hours of discharge?: Not applicable (11/4/2024 10:27 AM)  What Durable Medical Equipment (DME) was ordered?: None (11/4/2024 10:27 AM)    Patient Teaching  Does the patient have access to their discharge instructions?: Yes (11/4/2024 10:27 AM)  Care Management Interventions: Reviewed instructions with patient (11/4/2024 10:27 AM)  What is the patient's perception of their health status since discharge?: Improving (11/4/2024 10:27 AM)  Is the patient/caregiver able to teach back the hierarchy of who to call/visit  for symptoms/problems? PCP, Specialist, Home Health nurse, Urgent Care, ED, 911: Yes (11/4/2024 10:27 AM)  Patient/Caregiver Education Comments: None (11/4/2024 10:27 AM)    Wrap Up  Wrap Up Additional Comments: None (11/4/2024 10:27 AM)  Call End Time: 1027 (11/4/2024 10:27 AM)    Pt grateful for outreach call and is agreeable to being contacted in 2 wks to see how they are doing.

## 2024-11-08 ENCOUNTER — ANTICOAGULATION - WARFARIN VISIT (OUTPATIENT)
Dept: CARDIOLOGY | Facility: CLINIC | Age: 74
End: 2024-11-08
Payer: MEDICARE

## 2024-11-08 DIAGNOSIS — I26.99 PULMONARY EMBOLISM WITHOUT ACUTE COR PULMONALE, UNSPECIFIED CHRONICITY, UNSPECIFIED PULMONARY EMBOLISM TYPE (MULTI): Primary | ICD-10-CM

## 2024-11-08 DIAGNOSIS — Z79.01 LONG TERM (CURRENT) USE OF ANTICOAGULANTS: ICD-10-CM

## 2024-11-08 LAB
INR IN PPP BY COAGULATION ASSAY EXTERNAL: 2
PROTHROMBIN TIME (PT) IN PPP BY COAGULATION ASSAY EXTERNAL: NORMAL

## 2024-11-08 NOTE — PROGRESS NOTES
Patient identification verified with 2 identifiers.    Location: [facility] Patient Self-Testing Program [phone]    Referring Physician: Terra Young MD   Enrollment/ Re-enrollment date: 2025   INR Goal: 2.0-3.0  INR monitoring is per [facility] protocol.  Anticoagulation Medication: warfarin  Indication: Pulmonary Embolism (PE)    Subjective   Bleeding signs/symptoms: No    Bruising: No   Major bleeding event: No  Thrombosis signs/symptoms: No  Thromboembolic event: No  Missed doses: No  Extra doses: No  Medication changes: No  Dietary changes: No  Change in health: No  Change in activity: No  Alcohol: No  Other concerns: yes - Patient recently hospitalized for treatment of a viral infection and fever. Patient denies changes in medication regimen s/p hospitalization.    Upcoming Procedures:  Does the Patient Have any upcoming procedures that require interruption in anticoagulation therapy? no  Does the patient require bridging? no      Anticoagulation Summary  As of 2024      INR goal:  2.0-3.0   TTR:  76.9% (11.2 mo)   INR used for dosin.00 (2024)   Weekly warfarin total:  17.5 mg               Assessment/Plan   Therapeutic     1. New dose: no change    2. Next INR: 2 weeks  Patient prefers to test on  and will perform PST on Wednesday, 2024.    PATIENT ALSO OVERDUE FOR ANNUAL METER REVIEW.  APPOINTMENT SCHEDULED FOR 2024, AT 1445HRS, AT THE [facility] ([facility]).      Education provided to patient during the visit:  Patient instructed to call in interim with questions, concerns and changes.   Patient educated on interactions between medications and warfarin.   Patient educated on dietary consistency in vitamin k consumption.   Patient educated on affects of alcohol consumption while taking warfarin.   Patient educated on signs of bleeding/clotting.   Patient educated on compliance with dosing, follow up appointments, and  prescribed plan of care.

## 2024-11-11 ENCOUNTER — APPOINTMENT (OUTPATIENT)
Dept: PRIMARY CARE | Facility: CLINIC | Age: 74
End: 2024-11-11
Payer: MEDICARE

## 2024-11-11 DIAGNOSIS — M51.9 LUMBAR DISC DISEASE: ICD-10-CM

## 2024-11-11 RX ORDER — TOPIRAMATE 100 MG/1
100 TABLET, FILM COATED ORAL 2 TIMES DAILY
Qty: 180 TABLET | Refills: 3 | Status: SHIPPED | OUTPATIENT
Start: 2024-11-11

## 2024-11-12 ENCOUNTER — APPOINTMENT (OUTPATIENT)
Dept: PRIMARY CARE | Facility: CLINIC | Age: 74
End: 2024-11-12
Payer: MEDICARE

## 2024-11-12 VITALS
DIASTOLIC BLOOD PRESSURE: 79 MMHG | HEART RATE: 81 BPM | BODY MASS INDEX: 48.82 KG/M2 | RESPIRATION RATE: 18 BRPM | HEIGHT: 65 IN | SYSTOLIC BLOOD PRESSURE: 143 MMHG | OXYGEN SATURATION: 91 % | WEIGHT: 293 LBS

## 2024-11-12 DIAGNOSIS — Z09 HOSPITAL DISCHARGE FOLLOW-UP: ICD-10-CM

## 2024-11-12 DIAGNOSIS — I26.99 PULMONARY EMBOLISM WITHOUT ACUTE COR PULMONALE, UNSPECIFIED CHRONICITY, UNSPECIFIED PULMONARY EMBOLISM TYPE (MULTI): ICD-10-CM

## 2024-11-12 DIAGNOSIS — E08.00 DIABETES MELLITUS DUE TO UNDERLYING CONDITION WITH HYPEROSMOLARITY WITHOUT COMA, UNSPECIFIED WHETHER LONG TERM INSULIN USE: ICD-10-CM

## 2024-11-12 DIAGNOSIS — R79.9 ABNORMAL FINDING OF BLOOD CHEMISTRY, UNSPECIFIED: ICD-10-CM

## 2024-11-12 DIAGNOSIS — E55.9 VITAMIN D DEFICIENCY, UNSPECIFIED: ICD-10-CM

## 2024-11-12 DIAGNOSIS — E04.1 THYROID NODULE: ICD-10-CM

## 2024-11-12 PROBLEM — R42 VERTIGO: Status: ACTIVE | Noted: 2024-10-31

## 2024-11-12 PROCEDURE — 3078F DIAST BP <80 MM HG: CPT | Performed by: INTERNAL MEDICINE

## 2024-11-12 PROCEDURE — 1159F MED LIST DOCD IN RCRD: CPT | Performed by: INTERNAL MEDICINE

## 2024-11-12 PROCEDURE — 1123F ACP DISCUSS/DSCN MKR DOCD: CPT | Performed by: INTERNAL MEDICINE

## 2024-11-12 PROCEDURE — 3008F BODY MASS INDEX DOCD: CPT | Performed by: INTERNAL MEDICINE

## 2024-11-12 PROCEDURE — 99495 TRANSJ CARE MGMT MOD F2F 14D: CPT | Performed by: INTERNAL MEDICINE

## 2024-11-12 PROCEDURE — 1160F RVW MEDS BY RX/DR IN RCRD: CPT | Performed by: INTERNAL MEDICINE

## 2024-11-12 PROCEDURE — 3048F LDL-C <100 MG/DL: CPT | Performed by: INTERNAL MEDICINE

## 2024-11-12 PROCEDURE — 1125F AMNT PAIN NOTED PAIN PRSNT: CPT | Performed by: INTERNAL MEDICINE

## 2024-11-12 PROCEDURE — 1036F TOBACCO NON-USER: CPT | Performed by: INTERNAL MEDICINE

## 2024-11-12 PROCEDURE — 3077F SYST BP >= 140 MM HG: CPT | Performed by: INTERNAL MEDICINE

## 2024-11-12 RX ORDER — CHOLECALCIFEROL (VITAMIN D3) 25 MCG
1000 TABLET ORAL DAILY
COMMUNITY

## 2024-11-12 ASSESSMENT — ENCOUNTER SYMPTOMS
CARDIOVASCULAR NEGATIVE: 1
GASTROINTESTINAL NEGATIVE: 1
EYES NEGATIVE: 1
PSYCHIATRIC NEGATIVE: 1
RESPIRATORY NEGATIVE: 1
ENDOCRINE NEGATIVE: 1

## 2024-11-12 ASSESSMENT — PAIN SCALES - GENERAL: PAINLEVEL_OUTOF10: 8

## 2024-11-12 NOTE — PATIENT INSTRUCTIONS
It was nice to see you in the office today!  As discussed during our visit...      Start on vitamin D 1000u every day.     Please have your blood drawn in the next 1-2 weeks.   We will contact you with the results of your blood work and any necessary adjustments to your plan of care.  If you do not hear from us within 3-5 days of having your blood drawn, please call the [place] office at [phone].     The two closest Outpatient Lab's to this office is:  [facility]  [address] [place] [address]    Hours:   Monday through Friday 7:30am - 4:30pm (Closed 12:30-1pm for lunch)     Or you can go to ...  [facility]  [address]  [place] [address] [phone]    Hours:   Monday through Friday 7:30am - 4:30pm (Closed 12:30-1pm for lunch)   Saturday 8am - 12pm    Website to confirm hours and location OR look up more locations ... [phone]

## 2024-11-12 NOTE — PROGRESS NOTES
"Patient ID:   Frannie Blanco is a 74 y.o. female with PMH remarkable for COPD on chronic 02 @ 5L, tobacco dependence, CHF, Afib, DM2, HTN, bullous pemphigoid, Multiple splenic artery aneurysms (stable 10/30/2024), lung nodules who presents to the office today for Hospital Follow-up.    Hospitalization Discharge Follow Up:  Date(s): 10/30 to 2024  Location:  [facility]  Reason Presented to ER: generalized weakness, nonproductive cough  Synopsis: EKG shows A-fib with RVR, chest x-ray is clear, CT PE is negative for PE, CT abdomen shows bilateral nonobstructing nephroliths and colonic diverticulosis. There are multiple splenic aneurysms unchanged from previous. In addition there is a suspicious 1.8 cm left thyroid nodule. The patient was given 2 boluses of Lopressor IV and A-fib with RVR resolved. She reports no dizziness at this time. patient was febrile on admission. viral panel was negative. she was noted a leukocytosis. she defervesced while she was in house. urine cultures were negative. leukocytosis also resolved. she received supportive care. she reports feeling back to her baseline and this morning reports that she feels \"good. \"she is tolerating oral intake without nausea vomiting or diarrhea. she expresses wishes of wanting to return home. her ct scan results were discussed and forwarded to her primary care provider.   Recommended FU: PCP    OK to take vitamin D 1000u every day  Level was 69 on 10/26/2024  Sinuses are open.  Suspected 1.8 cm LEFT thyroid nodule. Recommend nonemergent outpatient thyroid ultrasound.   - will order    Social History     Tobacco Use    Smoking status: Former     Current packs/day: 0.00     Types: Cigarettes     Quit date: 2023     Years since quittin.2    Smokeless tobacco: Never   Vaping Use    Vaping status: Never Used   Substance Use Topics    Alcohol use: Never    Drug use: Never     Review of Systems   Constitutional:  Positive for fatigue.   HENT: Negative.   " "  Eyes: Negative.    Respiratory: Negative.     Cardiovascular: Negative.    Gastrointestinal: Negative.    Endocrine: Negative.    Genitourinary: Negative.    Musculoskeletal:  Positive for arthralgias, gait problem and myalgias.   Skin: Negative.    Neurological:  Positive for weakness.   Psychiatric/Behavioral: Negative.     All other systems reviewed and are negative.    Visit Vitals  /79 (BP Location: Left arm, Patient Position: Sitting)   Pulse 81   Resp 18   Ht 1.651 m (5' 5\")   Wt (!) 151 kg (332 lb)   SpO2 91%   BMI 55.25 kg/m²   OB Status Unknown   Smoking Status Former   BSA 2.63 m²     No Known Allergies     Physical Exam  Vitals reviewed. Exam conducted with a chaperone present.   Constitutional:       Appearance: Normal appearance. She is well-developed. She is obese.   HENT:      Head: Normocephalic.      Right Ear: External ear normal.      Left Ear: External ear normal.      Nose: Nose normal.      Mouth/Throat:      Lips: Pink.      Mouth: Mucous membranes are moist.   Eyes:      General: Lids are normal.      Pupils: Pupils are equal, round, and reactive to light.   Neck:      Trachea: Trachea normal.   Cardiovascular:      Rate and Rhythm: Normal rate and regular rhythm.      Heart sounds: Normal heart sounds.   Pulmonary:      Effort: Pulmonary effort is normal.      Breath sounds: Decreased breath sounds present.   Abdominal:      General: Bowel sounds are normal.      Palpations: Abdomen is soft.   Musculoskeletal:      Cervical back: Full passive range of motion without pain.   Skin:     General: Skin is warm and moist.      Findings: Bruising present.   Neurological:      General: No focal deficit present.      Mental Status: She is alert and oriented to person, place, and time. Mental status is at baseline.      Motor: Weakness present.   Psychiatric:         Attention and Perception: Attention normal.         Mood and Affect: Mood normal.         Speech: Speech normal.         " "Behavior: Behavior is cooperative.         Thought Content: Thought content normal.         Cognition and Memory: Cognition normal.         Judgment: Judgment normal.         Current Outpatient Medications   Medication Instructions    albuterol 90 mcg/actuation inhaler inhale 1 to 2 puffs by mouth and INTO THE LUNGS every 4 to 6 hours if needed    amiodarone (PACERONE) 200 mg, oral, Daily    aspirin 81 mg EC tablet 1 tablet, Daily    atorvastatin (LIPITOR) 20 mg, oral, Nightly    BD Alcohol Swabs pads, medicated     BD Dorothy 2nd Gen Pen Needle 32 gauge x 5/32\" needle Use with vitamin B12 injections monthly    calcium carbonate-vitamin D3 1,000 mg-20 mcg (800 unit) tablet 1 tablet, 2 times daily    cholecalciferol (VITAMIN D-3) 50,000 Units, Once Weekly    cyanocobalamin (DODEX) 1,000 mcg, intramuscular, Every 30 days    dapsone 100 mg tablet 1 tablet, Daily before breakfast    dapsone 25 mg tablet 2 tablets, Daily before breakfast    dextromethorphan-guaifenesin (Robitussin DM)  mg/5 mL oral liquid 5 mL, 3 times daily PRN    docusate sodium (COLACE) 100 mg, 2 times daily PRN    ferrous sulfate 65 mg, Daily    fluticasone-umeclidin-vilanter (TRELEGY-ELLIPTA) 200-62.5-25 mcg blister with device 1 puff, inhalation, Daily, Rinse mouth after taking dose    furosemide (LASIX) 80 mg, oral, Daily    gabapentin (Neurontin) 300 mg capsule take 1 capsule by mouth three times a day for 90 DAYS    glimepiride (Amaryl) 2 mg tablet take 1 tablet by mouth once daily Once a day 90 days    Incruse Ellipta 62.5 mcg, inhalation, Daily    insulin detemir (LEVEMIR FLEXTOUCH U100 INSULIN SUBQ) subcutaneous, As directed    insulin glargine (Basaglar KwikPen U-100 Insulin) 100 unit/mL (3 mL) pen Inject 15 units daily Take as directed per insulin instructions.    Levemir FlexPen 100 unit/mL (3 mL) pen inject up to 15 units once daily with EVENING MEAL OR BEDTIME    metFORMIN (Glucophage) 1,000 mg tablet TAKE 1 TABLET BY MOUTH TWICE " "DAILY    metoprolol tartrate (LOPRESSOR) 25 mg, oral, 2 times daily    nystatin (Mycostatin) ointment Topical, 2 times daily    potassium chloride CR (Klor-Con M20) 20 mEq ER tablet 40 mEq, oral, Daily, Do not crush or chew.    potassium chloride CR 20 mEq ER tablet 20 mEq, oral, Every evening    syringe with needle 3 mL 23 x 1\" syringe Use to inject vitamin b12 once monthly    topiramate (TOPAMAX) 100 mg, oral, 2 times daily    triamcinolone (Kenalog) 0.1 % ointment APPLY 1 APPLICATOR AS NEEDED TOPICALLY ONCE DAILY AS NEEDED UNDER DENTAL TRAYS    warfarin (Coumadin) 5 mg tablet Take 1 tab daily or directed as coumadin clinic    zinc oxide 20 % ointment 1 Application, Every 8 hours      Lab Results   Component Value Date    WBC 9.4 03/25/2024    HGB 9.8 (L) 03/25/2024    HCT 32.4 (L) 03/25/2024     03/25/2024    CHOL 129 10/26/2024    TRIG 80 10/26/2024    HDL 52.7 10/26/2024    ALT 15 10/26/2024    AST 14 10/26/2024     10/26/2024    K 4.3 10/26/2024     (H) 10/26/2024    CREATININE 1.45 (H) 10/26/2024    BUN 36 (H) 10/26/2024    CO2 25 10/26/2024    TSH 2.10 12/02/2023    INR 2.00 11/08/2024    HGBA1C 4.0 (L) 01/18/2024     Problem List Items Addressed This Visit             ICD-10-CM    Diabetes mellitus due to underlying condition with hyperosmolarity without coma E08.00     - HgbA1c 4 on 1/18/2024  - will recheck with thyroid labs that she is due for  - c/w metformin, amaryl, lantus and levemir insulin as prescribed         Relevant Orders    Hemoglobin A1c    Vitamin D deficiency, unspecified E55.9     OK to take vitamin D 1000u every day  Level was 69 on 10/26/2024         Thyroid nodule E04.1     Suspected 1.8 cm LEFT thyroid nodule. Recommend nonemergent outpatient thyroid ultrasound.   - will order  - check TSH and free t4  - last TSH 2.10 on 12/2/2023  - pt is not on synthroid at this time.   - pt is on amiodarone medication         Relevant Orders    TSH    T4, free    US thyroid    " "Pulmonary embolism without acute cor pulmonale, unspecified chronicity, unspecified pulmonary embolism type (Multi) I26.99    Hospital discharge follow-up Z09     Date(s): 10/30 to 11/2/2024  Location:  [facility]  Reason Presented to ER: generalized weakness, nonproductive cough  Synopsis: EKG shows A-fib with RVR, chest x-ray is clear, CT PE is negative for PE, CT abdomen shows bilateral nonobstructing nephroliths and colonic diverticulosis. There are multiple splenic aneurysms unchanged from previous. In addition there is a suspicious 1.8 cm left thyroid nodule. The patient was given 2 boluses of Lopressor IV and A-fib with RVR resolved. She reports no dizziness at this time. patient was febrile on admission. viral panel was negative. she was noted a leukocytosis. she defervesced while she was in house. urine cultures were negative. leukocytosis also resolved. she received supportive care. she reports feeling back to her baseline and this morning reports that she feels \"good. \"she is tolerating oral intake without nausea vomiting or diarrhea. she expresses wishes of wanting to return home. her ct scan results were discussed and forwarded to her primary care provider.   Recommended FU: PCP         Relevant Orders    Comprehensive metabolic panel    CBC and Auto Differential     Other Visit Diagnoses         Codes    Abnormal finding of blood chemistry, unspecified     R79.9    Relevant Orders    CBC and Auto Differential            --------------------  Written by Deandra Baptiste RN, acting as a scribe for Dr. Muñoz. This note accurately reflects the work and decisions made by Dr. Muñoz.     I, Dr. Muñoz, attest all medical record entries made by the scribe were under my direction and were personally dictated by me. I have reviewed the chart and agree that the record accurately reflects my performance of the history, physical exam, and assessment and plan.   "

## 2024-11-14 PROBLEM — E04.1 THYROID NODULE: Status: ACTIVE | Noted: 2023-08-18

## 2024-11-14 PROBLEM — E08.00 DIABETES MELLITUS DUE TO UNDERLYING CONDITION WITH HYPEROSMOLARITY WITHOUT COMA: Status: ACTIVE | Noted: 2023-06-20

## 2024-11-14 ASSESSMENT — ENCOUNTER SYMPTOMS
MYALGIAS: 1
ARTHRALGIAS: 1
WEAKNESS: 1
FATIGUE: 1

## 2024-11-14 NOTE — ASSESSMENT & PLAN NOTE
- HgbA1c 4 on 1/18/2024  - will recheck with thyroid labs that she is due for  - c/w metformin, amaryl, lantus and levemir insulin as prescribed

## 2024-11-14 NOTE — ASSESSMENT & PLAN NOTE
Suspected 1.8 cm LEFT thyroid nodule. Recommend nonemergent outpatient thyroid ultrasound.   - will order  - check TSH and free t4  - last TSH 2.10 on 12/2/2023  - pt is not on synthroid at this time.   - pt is on amiodarone medication

## 2024-11-14 NOTE — ASSESSMENT & PLAN NOTE
"Date(s): 10/30 to 11/2/2024  Location:  [facility]  Reason Presented to ER: generalized weakness, nonproductive cough  Synopsis: EKG shows A-fib with RVR, chest x-ray is clear, CT PE is negative for PE, CT abdomen shows bilateral nonobstructing nephroliths and colonic diverticulosis. There are multiple splenic aneurysms unchanged from previous. In addition there is a suspicious 1.8 cm left thyroid nodule. The patient was given 2 boluses of Lopressor IV and A-fib with RVR resolved. She reports no dizziness at this time. patient was febrile on admission. viral panel was negative. she was noted a leukocytosis. she defervesced while she was in house. urine cultures were negative. leukocytosis also resolved. she received supportive care. she reports feeling back to her baseline and this morning reports that she feels \"good. \"she is tolerating oral intake without nausea vomiting or diarrhea. she expresses wishes of wanting to return home. her ct scan results were discussed and forwarded to her primary care provider.   Recommended FU: PCP  "

## 2024-11-15 DIAGNOSIS — J44.9 CHRONIC OBSTRUCTIVE PULMONARY DISEASE, UNSPECIFIED COPD TYPE (MULTI): ICD-10-CM

## 2024-11-15 DIAGNOSIS — E11.65 TYPE 2 DIABETES MELLITUS WITH HYPERGLYCEMIA, UNSPECIFIED WHETHER LONG TERM INSULIN USE (MULTI): ICD-10-CM

## 2024-11-15 DIAGNOSIS — I48.91 ATRIAL FIBRILLATION WITH RAPID VENTRICULAR RESPONSE (MULTI): ICD-10-CM

## 2024-11-15 RX ORDER — AMIODARONE HYDROCHLORIDE 200 MG/1
200 TABLET ORAL DAILY
Qty: 90 TABLET | Refills: 0 | Status: SHIPPED | OUTPATIENT
Start: 2024-11-15 | End: 2025-02-13

## 2024-11-15 RX ORDER — METFORMIN HYDROCHLORIDE 1000 MG/1
TABLET ORAL
Qty: 180 TABLET | Refills: 0 | Status: SHIPPED | OUTPATIENT
Start: 2024-11-15

## 2024-11-18 DIAGNOSIS — E87.6 HYPOKALEMIA: ICD-10-CM

## 2024-11-18 RX ORDER — FLUTICASONE FUROATE, UMECLIDINIUM BROMIDE AND VILANTEROL TRIFENATATE 200; 62.5; 25 UG/1; UG/1; UG/1
POWDER RESPIRATORY (INHALATION)
Qty: 60 EACH | Refills: 11 | Status: SHIPPED | OUTPATIENT
Start: 2024-11-18

## 2024-11-18 RX ORDER — POTASSIUM CHLORIDE 20 MEQ/1
40 TABLET, EXTENDED RELEASE ORAL DAILY
Qty: 180 TABLET | Refills: 1 | Status: SHIPPED | OUTPATIENT
Start: 2024-11-18 | End: 2025-11-18

## 2024-11-18 NOTE — TELEPHONE ENCOUNTER
Lv 11/12  Pharmacy canceled her refill because they said it was discontinued. She is still taking potassium

## 2024-11-20 ENCOUNTER — PATIENT OUTREACH (OUTPATIENT)
Dept: PRIMARY CARE | Facility: CLINIC | Age: 74
End: 2024-11-20
Payer: MEDICARE

## 2024-11-20 ENCOUNTER — ANTICOAGULATION - WARFARIN VISIT (OUTPATIENT)
Dept: CARDIOLOGY | Facility: CLINIC | Age: 74
End: 2024-11-20
Payer: MEDICARE

## 2024-11-20 DIAGNOSIS — I26.99 PULMONARY EMBOLISM WITHOUT ACUTE COR PULMONALE, UNSPECIFIED CHRONICITY, UNSPECIFIED PULMONARY EMBOLISM TYPE (MULTI): Primary | ICD-10-CM

## 2024-11-20 DIAGNOSIS — Z79.01 LONG TERM (CURRENT) USE OF ANTICOAGULANTS: ICD-10-CM

## 2024-11-20 LAB
INR IN PPP BY COAGULATION ASSAY EXTERNAL: 2.9
PROTHROMBIN TIME (PT) IN PPP BY COAGULATION ASSAY EXTERNAL: NORMAL

## 2024-11-20 NOTE — PROGRESS NOTES
Patient identification verified with 2 identifiers.    Location: [facility] Patient Self-Testing Program [phone]    Referring Physician: Terra Young MD   Enrollment/ Re-enrollment date: 2025   INR Goal: 2.0-3.0  INR monitoring is per [facility] protocol.  Anticoagulation Medication: warfarin  Indication: Pulmonary Embolism (PE)    Subjective   Bleeding signs/symptoms: No    Bruising: No   Major bleeding event: No  Thrombosis signs/symptoms: No  Thromboembolic event: No  Missed doses: No  Extra doses: No  Medication changes: No  Dietary changes: No  Change in health: No  Change in activity: No  Alcohol: No  Other concerns: No    Upcoming Procedures:  Does the Patient Have any upcoming procedures that require interruption in anticoagulation therapy? no  Does the patient require bridging? no      Anticoagulation Summary  As of 2024      INR goal:  2.0-3.0   TTR:  77.7% (11.6 mo)   INR used for dosin.90 (2024)   Weekly warfarin total:  17.5 mg               Assessment/Plan   Therapeutic     1. New dose: Spoke to patient with dosing instructions and next testing date.    2. Next INR: 2 weeks      Education provided to patient during the visit:  Patient instructed to call in interim with questions, concerns and changes.

## 2024-11-20 NOTE — PROGRESS NOTES
Call regarding appt. with PCP on (12 Nov 24) after hospitalization.  At time of outreach call the patient feels as if their condition has improved since last visit.  Reviewed the PCP appointment with the pt and addressed any questions or concerns. Pt concern that she forgot to speak with PCP about forwarded to PCP for consideration

## 2024-11-21 ENCOUNTER — APPOINTMENT (OUTPATIENT)
Dept: ENDOCRINOLOGY | Facility: CLINIC | Age: 74
End: 2024-11-21
Payer: MEDICARE

## 2024-11-21 VITALS
HEART RATE: 77 BPM | DIASTOLIC BLOOD PRESSURE: 69 MMHG | SYSTOLIC BLOOD PRESSURE: 110 MMHG | BODY MASS INDEX: 55.98 KG/M2 | WEIGHT: 293 LBS

## 2024-11-21 DIAGNOSIS — E78.2 MIXED HYPERLIPIDEMIA: ICD-10-CM

## 2024-11-21 DIAGNOSIS — I10 PRIMARY HYPERTENSION: ICD-10-CM

## 2024-11-21 DIAGNOSIS — E11.65 TYPE 2 DIABETES MELLITUS WITH HYPERGLYCEMIA, UNSPECIFIED WHETHER LONG TERM INSULIN USE (MULTI): Primary | ICD-10-CM

## 2024-11-21 DIAGNOSIS — E04.1 THYROID NODULE: ICD-10-CM

## 2024-11-21 DIAGNOSIS — R20.0 NUMBNESS AND TINGLING IN BOTH HANDS: ICD-10-CM

## 2024-11-21 DIAGNOSIS — R20.2 NUMBNESS AND TINGLING IN BOTH HANDS: ICD-10-CM

## 2024-11-21 LAB — POC HEMOGLOBIN A1C: 4.7 % (ref 4.2–6.5)

## 2024-11-21 PROCEDURE — 99214 OFFICE O/P EST MOD 30 MIN: CPT | Performed by: INTERNAL MEDICINE

## 2024-11-21 PROCEDURE — 83036 HEMOGLOBIN GLYCOSYLATED A1C: CPT | Performed by: INTERNAL MEDICINE

## 2024-11-21 NOTE — PROGRESS NOTES
"HPI     75 yo with Diabetes 2, stage 0 breast cancer, Dyslipidemia , vitd def, wearing O2 presents for followup with family member. A1c 4.6% last visit, 4.7% today (falsely low given anemia).  Last visit Jan 2024.    Pt is testing sugars <1 times per day. Pt is having low sugars 0 times/week. No recent tests. Pt is trying to follow a carb controlled diet and knows reasonable carb allowances. Pt is able to afford some of her medications. Pt is not exercising.    Taking metformin, glimepiride 2mg, 15 units basaglar at bedtime.  -took trulicity in past, came off d/t cost     Taking atorvastatin 20mg for lipids tolerating without side effects.    Taking losartan 25mg, diltiazem 240mg cd, lasix 40mg daily    -taking weekly b12 shots    Pt having issues with walking, using walker.    Pt had sleep study in 2021, now wearing 2L o2 overnight while sleeping.    Patient was found to have a thyroid nodule during a CT Scan, then had a dedicated US Thyroid (07/223) showing a solid nodule in the left thyroid lobe measuring 2.4 cm. She has no compressive/obstructive neck complaints. No changes in her voice.         Current Outpatient Medications:     albuterol 90 mcg/actuation inhaler, inhale 1 to 2 puffs by mouth and INTO THE LUNGS every 4 to 6 hours if needed, Disp: , Rfl:     amiodarone (Pacerone) 200 mg tablet, Take 1 tablet (200 mg) by mouth once daily., Disp: 90 tablet, Rfl: 0    aspirin 81 mg EC tablet, Take 1 tablet (81 mg) by mouth once daily., Disp: , Rfl:     atorvastatin (Lipitor) 20 mg tablet, Take 1 tablet (20 mg) by mouth once daily at bedtime., Disp: 90 tablet, Rfl: 1    BD Alcohol Swabs pads, medicated, , Disp: , Rfl:     BD Dorothy 2nd Gen Pen Needle 32 gauge x 5/32\" needle, Use with vitamin B12 injections monthly, Disp: 12 each, Rfl: 11    calcium carbonate-vitamin D3 1,000 mg-20 mcg (800 unit) tablet, Take 1 tablet by mouth 2 times a day., Disp: , Rfl:     cholecalciferol (Vitamin D3) 25 MCG (1000 UT) tablet, Take " "1 tablet (1,000 Units) by mouth once daily., Disp: , Rfl:     cyanocobalamin (Dodex) 1,000 mcg/mL injection, Inject 1 mL (1,000 mcg) into the muscle every 30 (thirty) days., Disp: 10 mL, Rfl: 0    dapsone 100 mg tablet, Take 1 tablet (100 mg) by mouth once daily in the morning. Take before meals., Disp: , Rfl:     dapsone 25 mg tablet, Take 2 tablets (50 mg) by mouth once daily in the morning. Take before meals., Disp: , Rfl:     dextromethorphan-guaifenesin (Robitussin DM)  mg/5 mL oral liquid, Take 5 mL by mouth 3 times a day as needed for cough., Disp: , Rfl:     docusate sodium (Colace) 100 mg capsule, Take 1 capsule (100 mg) by mouth 2 times a day as needed., Disp: , Rfl:     ferrous sulfate 325 (65 Fe) MG EC tablet, Take 65 mg by mouth once daily., Disp: , Rfl:     fluticasone-umeclidin-vilanter (Trelegy Ellipta) 200-62.5-25 mcg blister with device, INHALE 1 PUFF BY MOUTH IN THE MORNING Rinse mouth after taking dose, Disp: 60 each, Rfl: 11    furosemide (Lasix) 80 mg tablet, Take 1 tablet (80 mg) by mouth once daily., Disp: 90 tablet, Rfl: 0    gabapentin (Neurontin) 300 mg capsule, take 1 capsule by mouth three times a day for 90 DAYS, Disp: 270 capsule, Rfl: 1    insulin detemir (LEVEMIR FLEXTOUCH U100 INSULIN SUBQ), Inject under the skin. As directed, Disp: , Rfl:     insulin glargine (Basaglar KwikPen U-100 Insulin) 100 unit/mL (3 mL) pen, Inject 15 units daily Take as directed per insulin instructions., Disp: 15 mL, Rfl: 3    metFORMIN (Glucophage) 1,000 mg tablet, TAKE 1 TABLET BY MOUTH TWICE DAILY, Disp: 180 tablet, Rfl: 0    nystatin (Mycostatin) ointment, Apply topically 2 times a day., Disp: 30 g, Rfl: 2    potassium chloride CR (Klor-Con M20) 20 mEq ER tablet, Take 2 tablets (40 mEq) by mouth once daily. Do not crush or chew., Disp: 180 tablet, Rfl: 1    syringe with needle 3 mL 23 x 1\" syringe, Use to inject vitamin b12 once monthly, Disp: 12 each, Rfl: 11    topiramate (Topamax) 100 mg " tablet, Take 1 tablet (100 mg) by mouth 2 times a day., Disp: 180 tablet, Rfl: 3    triamcinolone (Kenalog) 0.1 % ointment, APPLY 1 APPLICATOR AS NEEDED TOPICALLY ONCE DAILY AS NEEDED UNDER DENTAL TRAYS, Disp: , Rfl:     warfarin (Coumadin) 5 mg tablet, Take 1 tab daily or directed as coumadin clinic, Disp: 90 tablet, Rfl: 1    zinc oxide 20 % ointment, Apply 1 Application topically every 8 hours., Disp: , Rfl:     glimepiride (Amaryl) 2 mg tablet, take 1 tablet by mouth once daily Once a day 90 days, Disp: 90 tablet, Rfl: 3    Allergies as of 11/21/2024    (No Known Allergies)       /69   Pulse 77   Wt (!) 153 kg (336 lb 6.4 oz)   BMI 55.98 kg/m²     Labs:   Lab Results   Component Value Date    WBC 9.4 03/25/2024    NRBC 0.0 03/25/2024    RBC 2.73 (L) 03/25/2024    HGB 9.8 (L) 03/25/2024    HCT 32.4 (L) 03/25/2024     03/25/2024     Lab Results   Component Value Date    CALCIUM 8.4 (L) 10/26/2024    AST 14 10/26/2024    ALKPHOS 53 10/26/2024    BILITOT 0.9 10/26/2024    PROT 6.2 (L) 10/26/2024    ALBUMIN 4.0 10/26/2024     10/26/2024    K 4.3 10/26/2024     (H) 10/26/2024    CO2 25 10/26/2024    ANIONGAP 13 10/26/2024    BUN 36 (H) 10/26/2024    CREATININE 1.45 (H) 10/26/2024    GLUCOSE 117 (H) 10/26/2024    ALT 15 10/26/2024    EGFR 38 (L) 10/26/2024     Lab Results   Component Value Date    CHOL 129 10/26/2024    TRIG 80 10/26/2024    HDL 52.7 10/26/2024    LDLCALC 60 10/26/2024     Lab Results   Component Value Date    MICROALBCREA 106.5 (H) 02/22/2023     Lab Results   Component Value Date    TSH 2.10 12/02/2023     Lab Results   Component Value Date    IONORWBC80 183 (L) 03/25/2024     Lab Results   Component Value Date    HGBA1C 4.7 11/21/2024         Assessment/Plan   1. Type 2 diabetes mellitus with hyperglycemia, unspecified whether long term insulin use (Multi) (Primary)    -A1c ordered & reviewed (falsely low given anemia)  -labs reviewed    -needs to test daily at different  times of day   -would benefit from weekly glp1 if able to get via  PAP    Pending approval  Patient Assistance:   -begin Mounjaro 2.5mg weekly for 4 weeks.  Then, increase 5mg weekly thereafter   -decrease insulin 5 unit intervals every 3 days as needed for sugars dropping below 100   -if off insulin & still dropping below 100,  stop Glimepiride       -cgm is an option if doesn't qualify for PAP / remains on insulin       Patient Assistance Screening (VAF)  Patient verbally unsure if monthly or yearly income is less than 400% federal poverty level.  Application for program would be submitted for the following medications: mounjaro  - pt to provide copy of most recent 1040 Form to start application process      2. Mixed hyperlipidemia  -on statin, tolerating    3. Primary hypertension  -at target on therapy     4. Thyroid nodule  -had suggested establishing with Dr. Newton, pt hasn't done this  -pending repeat US w/ pcp,  happy to assist with this in future if needed       Follow up: 4mon bb    -labs/tests/notes reviewed  -reviewed and counseled patient on medication monitoring and side effects    Treatment and plan discussed with Dr. Tapia.  YASH Kay, PharmD, BC-ADM, CDCES.     Medical Decision Making  Complexity of problem: Chronic illness of diabetes mellitus uncontrolled, progressing  Data analyzed and reviewed: Reviewed prior notes, blood glucose data, labs including HgbA1c, lipids, serum chemistries.  Ordered tests.   Risk of complications and morbidities: Is definite because of use of insulin and risk of hypoglycemia.  Prescription medications reviewed and modifications made.  Compliance assessed.  Addressed social determinants of health including food insecurity.

## 2024-11-21 NOTE — PROGRESS NOTES
Attestation signed by Bi Tapia MD on 11/21/24 at 4:00 PM.    I, Dr Bi Tapia, have reviewed this progress note, medication list, vital signs, any pertinent lab values, and any CGM data if present with the [facility] and  face to face during this visit today. This note reflects the treatment plan that was made under my direction after reviewing the above mentioned elements while face to face with the patient and CDE.  I personally answered and addressed any questions and concerns the patient had during the visit today.  The CDE entered the data in this note under my direction and I personally reviewed it, signed any lab or medication orders that I instructed to be completed. I am the billing provider for this visit and the level of service was determined by my involvement in the Medical Decision Making Component of this visit while face to face with the patient.

## 2024-11-21 NOTE — PATIENT INSTRUCTIONS
Pending approval  Patient Assistance:   -begin Mounjaro 2.5mg weekly for 4 weeks.  Then, increase 5mg weekly thereafter   -decrease insulin 5 unit intervals every 3 days as needed for sugars dropping below 100   -if off insulin & still dropping below 100,  stop Glimepiride     Test blood sugar daily at different times of day     Get Siobhan a copy most recent 8377 form for application to  Patient Assistance Program

## 2024-11-27 ENCOUNTER — TELEMEDICINE (OUTPATIENT)
Dept: PHARMACY | Facility: HOSPITAL | Age: 74
End: 2024-11-27
Payer: MEDICARE

## 2024-11-27 DIAGNOSIS — E11.65 TYPE 2 DIABETES MELLITUS WITH HYPERGLYCEMIA, UNSPECIFIED WHETHER LONG TERM INSULIN USE (MULTI): ICD-10-CM

## 2024-11-27 RX ORDER — TIRZEPATIDE 2.5 MG/.5ML
2.5 INJECTION, SOLUTION SUBCUTANEOUS
Qty: 2 ML | Refills: 0 | Status: SHIPPED | OUTPATIENT
Start: 2024-11-27

## 2024-11-27 RX ORDER — TIRZEPATIDE 5 MG/.5ML
5 INJECTION, SOLUTION SUBCUTANEOUS
Qty: 6 ML | Refills: 3 | Status: SHIPPED | OUTPATIENT
Start: 2024-11-27

## 2024-11-27 NOTE — PROGRESS NOTES
"[facility]      Frannie Blanco a 74 y.o. patient was referred to the Clinical Pharmacy Team for diabetes management.  Presents today via telephone for initial assessment and management.      Referring Provider: Bi Tapia MD       Taking metformin, glimepiride 2mg, 15 units basaglar at bedtime.  -took trulicity in past, came off d/t cost     Taking atorvastatin 20mg for lipids tolerating without side effects.    Taking losartan 25mg, diltiazem 240mg cd, lasix 40mg daily    -taking weekly b12 shots    Pt having issues with walking, using walker.    Pt had sleep study in 2021, now wearing 2L o2 overnight while sleeping.        Current Outpatient Medications:     albuterol 90 mcg/actuation inhaler, inhale 1 to 2 puffs by mouth and INTO THE LUNGS every 4 to 6 hours if needed, Disp: , Rfl:     amiodarone (Pacerone) 200 mg tablet, Take 1 tablet (200 mg) by mouth once daily., Disp: 90 tablet, Rfl: 0    aspirin 81 mg EC tablet, Take 1 tablet (81 mg) by mouth once daily., Disp: , Rfl:     atorvastatin (Lipitor) 20 mg tablet, Take 1 tablet (20 mg) by mouth once daily at bedtime., Disp: 90 tablet, Rfl: 1    BD Alcohol Swabs pads, medicated, , Disp: , Rfl:     BD Dorothy 2nd Gen Pen Needle 32 gauge x 5/32\" needle, Use with vitamin B12 injections monthly, Disp: 12 each, Rfl: 11    calcium carbonate-vitamin D3 1,000 mg-20 mcg (800 unit) tablet, Take 1 tablet by mouth 2 times a day., Disp: , Rfl:     cholecalciferol (Vitamin D3) 25 MCG (1000 UT) tablet, Take 1 tablet (1,000 Units) by mouth once daily., Disp: , Rfl:     cyanocobalamin (Dodex) 1,000 mcg/mL injection, Inject 1 mL (1,000 mcg) into the muscle every 30 (thirty) days., Disp: 10 mL, Rfl: 0    dapsone 100 mg tablet, Take 1 tablet (100 mg) by mouth once daily in the morning. Take before meals., Disp: , Rfl:     dapsone 25 mg tablet, Take 2 tablets (50 mg) by mouth once daily in the morning. Take before meals., Disp: , Rfl:     " "dextromethorphan-guaifenesin (Robitussin DM)  mg/5 mL oral liquid, Take 5 mL by mouth 3 times a day as needed for cough., Disp: , Rfl:     docusate sodium (Colace) 100 mg capsule, Take 1 capsule (100 mg) by mouth 2 times a day as needed., Disp: , Rfl:     ferrous sulfate 325 (65 Fe) MG EC tablet, Take 65 mg by mouth once daily., Disp: , Rfl:     fluticasone-umeclidin-vilanter (Trelegy Ellipta) 200-62.5-25 mcg blister with device, INHALE 1 PUFF BY MOUTH IN THE MORNING Rinse mouth after taking dose, Disp: 60 each, Rfl: 11    furosemide (Lasix) 80 mg tablet, Take 1 tablet (80 mg) by mouth once daily., Disp: 90 tablet, Rfl: 0    gabapentin (Neurontin) 300 mg capsule, take 1 capsule by mouth three times a day for 90 DAYS, Disp: 270 capsule, Rfl: 1    glimepiride (Amaryl) 2 mg tablet, take 1 tablet by mouth once daily Once a day 90 days, Disp: 90 tablet, Rfl: 3    insulin glargine (Basaglar KwikPen U-100 Insulin) 100 unit/mL (3 mL) pen, Inject 15 units daily Take as directed per insulin instructions., Disp: 15 mL, Rfl: 3    metFORMIN (Glucophage) 1,000 mg tablet, TAKE 1 TABLET BY MOUTH TWICE DAILY, Disp: 180 tablet, Rfl: 0    nystatin (Mycostatin) ointment, Apply topically 2 times a day., Disp: 30 g, Rfl: 2    potassium chloride CR (Klor-Con M20) 20 mEq ER tablet, Take 2 tablets (40 mEq) by mouth once daily. Do not crush or chew., Disp: 180 tablet, Rfl: 1    syringe with needle 3 mL 23 x 1\" syringe, Use to inject vitamin b12 once monthly, Disp: 12 each, Rfl: 11    topiramate (Topamax) 100 mg tablet, Take 1 tablet (100 mg) by mouth 2 times a day., Disp: 180 tablet, Rfl: 3    triamcinolone (Kenalog) 0.1 % ointment, APPLY 1 APPLICATOR AS NEEDED TOPICALLY ONCE DAILY AS NEEDED UNDER DENTAL TRAYS, Disp: , Rfl:     warfarin (Coumadin) 5 mg tablet, Take 1 tab daily or directed as coumadin clinic, Disp: 90 tablet, Rfl: 1    zinc oxide 20 % ointment, Apply 1 Application topically every 8 hours., Disp: , Rfl:      Allergies as " of 11/27/2024    (No Known Allergies)     Lab Results   Component Value Date    HGBA1C 4.7 11/21/2024     Lab Results   Component Value Date    BILITOT 0.9 10/26/2024    CALCIUM 8.4 (L) 10/26/2024    CO2 25 10/26/2024     (H) 10/26/2024    CREATININE 1.45 (H) 10/26/2024    GLUCOSE 117 (H) 10/26/2024    ALKPHOS 53 10/26/2024    K 4.3 10/26/2024    PROT 6.2 (L) 10/26/2024     10/26/2024    AST 14 10/26/2024    ALT 15 10/26/2024    BUN 36 (H) 10/26/2024    ANIONGAP 13 10/26/2024    MG 2.13 01/06/2024    ALBUMIN 4.0 10/26/2024    EGFR 38 (L) 10/26/2024    GFRF 53 (A) 09/27/2023     Lab Results   Component Value Date    CHOL 129 10/26/2024    TRIG 80 10/26/2024    HDL 52.7 10/26/2024    LDLCALC 60 10/26/2024     Lab Results   Component Value Date    MICROALBCREA 106.5 (H) 02/22/2023        Patient Assistance Screening (VAF)  Patient verbally reports monthly or yearly income which is less than 400% federal poverty level.  Application for program has been submitted for the following medications: Mounjaro  Patient has been informed that program team will be reaching out to them to discuss necessary documentation, instructed to answer phone/return voicemail.   Patient aware this process may take up to 6 weeks.   If approved medication must be filled through [facility] pharmacy and may be picked up or mailed to patient.    PATIENT EDUCATION/DISCUSSION:  - Counseled patient on MOA, expectations, side effects, duration of therapy, contraindications, administration, and monitoring parameters  - Answered all patient questions and concerns    Assessment/Plan   1. Type 2 diabetes mellitus with hyperglycemia, unspecified whether long term insulin use (Multi)    -needs to test daily at different times of day   -would benefit from weekly glp1 if able to get via  PAP    Pending approval  Patient Assistance:   -begin Mounjaro 2.5mg weekly for 4 weeks.  Then, increase 5mg weekly thereafter   -decrease insulin 5 unit  intervals every 3 days as needed for sugars dropping below 100   -if off insulin & still dropping below 100,  stop Glimepiride         Continue all meds under the continuation of care with the referring provider and clinical pharmacy team.  Prescription(s) sent to [facility] pharmacy for assistance on authorization and copay. Medication will be mailed to patient.    Follow up as scheduled with Dr. Tapia & team in endocrinology office.  Follow up annually with clinical pharmacist for re-enrollment in [facility]    Thank you,   Siobhan Kay, PharmD, BC-[facility], [facility]     Verbal consent to manage patient's drug therapy was obtained from the patient and/or an individual authorized to act on behalf of a patient. They were informed they may decline to participate or withdraw from participation in pharmacy services at any time.

## 2024-11-30 ENCOUNTER — APPOINTMENT (OUTPATIENT)
Dept: CARDIOLOGY | Facility: CLINIC | Age: 74
End: 2024-11-30
Payer: MEDICARE

## 2024-12-02 ENCOUNTER — PATIENT OUTREACH (OUTPATIENT)
Dept: PRIMARY CARE | Facility: CLINIC | Age: 74
End: 2024-12-02
Payer: MEDICARE

## 2024-12-02 DIAGNOSIS — I48.91 ATRIAL FIBRILLATION WITH RAPID VENTRICULAR RESPONSE (MULTI): ICD-10-CM

## 2024-12-02 DIAGNOSIS — E11.65 TYPE 2 DIABETES MELLITUS WITH HYPERGLYCEMIA, UNSPECIFIED WHETHER LONG TERM INSULIN USE (MULTI): ICD-10-CM

## 2024-12-02 PROCEDURE — RXMED WILLOW AMBULATORY MEDICATION CHARGE

## 2024-12-02 RX ORDER — GLIMEPIRIDE 2 MG/1
2 TABLET ORAL
Qty: 90 TABLET | Refills: 3 | Status: SHIPPED | OUTPATIENT
Start: 2024-12-02 | End: 2025-12-02

## 2024-12-02 RX ORDER — AMIODARONE HYDROCHLORIDE 200 MG/1
200 TABLET ORAL DAILY
Qty: 90 TABLET | Refills: 0 | Status: SHIPPED | OUTPATIENT
Start: 2024-12-02 | End: 2025-03-02

## 2024-12-02 NOTE — PROGRESS NOTES
Successful one month outreach to patient regarding hospitalization as patient continues TCM program.   At time of outreach call the patient feels as if their condition has improved since initial visit with PCP or specialist.  Questions or concerns addressed at this time with patient.   Provided contact information to patient if any further non-emergent needs arise. Pt request for med refill forwarded to PCP for consideration.

## 2024-12-03 ENCOUNTER — PHARMACY VISIT (OUTPATIENT)
Dept: PHARMACY | Facility: CLINIC | Age: 74
End: 2024-12-03
Payer: MEDICARE

## 2024-12-03 DIAGNOSIS — R05.9 COUGH, UNSPECIFIED TYPE: Primary | ICD-10-CM

## 2024-12-03 RX ORDER — GUAIFENESIN/DEXTROMETHORPHAN 100-10MG/5
5 SYRUP ORAL 3 TIMES DAILY PRN
Qty: 118 ML | Refills: 0 | Status: SHIPPED | OUTPATIENT
Start: 2024-12-03

## 2024-12-04 ENCOUNTER — ANTICOAGULATION - WARFARIN VISIT (OUTPATIENT)
Dept: CARDIOLOGY | Facility: CLINIC | Age: 74
End: 2024-12-04
Payer: MEDICARE

## 2024-12-04 DIAGNOSIS — I26.99 PULMONARY EMBOLISM WITHOUT ACUTE COR PULMONALE, UNSPECIFIED CHRONICITY, UNSPECIFIED PULMONARY EMBOLISM TYPE (MULTI): Primary | ICD-10-CM

## 2024-12-04 DIAGNOSIS — Z79.01 LONG TERM (CURRENT) USE OF ANTICOAGULANTS: ICD-10-CM

## 2024-12-04 LAB
INR IN PPP BY COAGULATION ASSAY EXTERNAL: 3.1
PROTHROMBIN TIME (PT) IN PPP BY COAGULATION ASSAY EXTERNAL: NORMAL

## 2024-12-04 NOTE — PROGRESS NOTES
Patient identification verified with 2 identifiers.    Location: [facility] Patient Self-Testing Program [phone]    Referring Physician: Terra Young MD   Enrollment/ Re-enrollment date: July 31, 2025   INR Goal: 2.0-3.0  INR monitoring is per [facility] protocol.  Anticoagulation Medication: warfarin  Indication: Pulmonary Embolism (PE)    Subjective   Bleeding signs/symptoms: No    Bruising: No   Major bleeding event: No  Thrombosis signs/symptoms: No  Thromboembolic event: No  Missed doses: No  Extra doses: No  Medication changes:   Dietary changes: No  Change in health: No  Change in activity: No  Alcohol: No  Other concerns: No    Upcoming Procedures:  Does the Patient Have any upcoming procedures that require interruption in anticoagulation therapy? no  Does the patient require bridging? no      Anticoagulation Summary  As of 12/4/2024      INR goal:  2.0-3.0   TTR:  76.6% (1 y)   INR used for dosing:  3.10 (12/4/2024)   Weekly warfarin total:  17.5 mg               Assessment/Plan   Supratherapeutic     1. New dose: Will maintain dose since patient is usually therapeutic on this dosing, and patient will retest on 12/6 prior to the weekend.    2. Next INR:  3 days      Education provided to patient during the visit:  Patient instructed to call in interim with questions, concerns and changes.

## 2024-12-06 ENCOUNTER — ANTICOAGULATION - WARFARIN VISIT (OUTPATIENT)
Dept: CARDIOLOGY | Facility: CLINIC | Age: 74
End: 2024-12-06
Payer: MEDICARE

## 2024-12-06 DIAGNOSIS — I26.99 PULMONARY EMBOLISM WITHOUT ACUTE COR PULMONALE, UNSPECIFIED CHRONICITY, UNSPECIFIED PULMONARY EMBOLISM TYPE (MULTI): Primary | ICD-10-CM

## 2024-12-06 DIAGNOSIS — Z79.01 LONG TERM (CURRENT) USE OF ANTICOAGULANTS: ICD-10-CM

## 2024-12-06 LAB
INR IN PPP BY COAGULATION ASSAY EXTERNAL: 4.2
PROTHROMBIN TIME (PT) IN PPP BY COAGULATION ASSAY EXTERNAL: NORMAL

## 2024-12-06 NOTE — PROGRESS NOTES
Patient identification verified with 2 identifiers.    Location: [facility] Patient Self-Testing Program [phone]    Referring Physician: Terra Young MD   Enrollment/ Re-enrollment date: 2025   INR Goal: 2.0-3.0  INR monitoring is per [facility] protocol.  Anticoagulation Medication: warfarin  Indication: Pulmonary Embolism (PE)    Subjective   Bleeding signs/symptoms: No    Bruising: No   Major bleeding event: No  Thrombosis signs/symptoms: No  Thromboembolic event: No  Missed doses: No  Extra doses: No  Medication changes: Yes  Patient is taking acetaminophen, cold and flu and started 8 days ago.  Dietary changes: No  Change in health: Yes  Patient has nasal congestion.  Change in activity: No  Alcohol: No  Other concerns: No    Upcoming Procedures:  Does the Patient Have any upcoming procedures that require interruption in anticoagulation therapy? no  Does the patient require bridging? no      Anticoagulation Summary  As of 2024      INR goal:  2.0-3.0   TTR:  76.2% (1 y)   INR used for dosin.20 (2024)   Weekly warfarin total:  17.5 mg               Assessment/Plan   Supratherapeutic     1. New dose:  Will hold 2 days and maintain weekly dose d/t patient having a cold and taking OTC medication     2. Next INR:  3 days      Education provided to patient during the visit:  Patient instructed to call in interim with questions, concerns and changes.   Patient educated on interactions between medications and warfarin.

## 2024-12-07 ENCOUNTER — APPOINTMENT (OUTPATIENT)
Dept: RADIOLOGY | Facility: HOSPITAL | Age: 74
End: 2024-12-07
Payer: MEDICARE

## 2024-12-09 ENCOUNTER — ANTICOAGULATION - WARFARIN VISIT (OUTPATIENT)
Dept: CARDIOLOGY | Facility: CLINIC | Age: 74
End: 2024-12-09
Payer: MEDICARE

## 2024-12-09 DIAGNOSIS — I26.99 PULMONARY EMBOLISM WITHOUT ACUTE COR PULMONALE, UNSPECIFIED CHRONICITY, UNSPECIFIED PULMONARY EMBOLISM TYPE (MULTI): Primary | ICD-10-CM

## 2024-12-09 DIAGNOSIS — Z79.01 LONG TERM (CURRENT) USE OF ANTICOAGULANTS: ICD-10-CM

## 2024-12-09 LAB
INR IN PPP BY COAGULATION ASSAY EXTERNAL: 1.8
PROTHROMBIN TIME (PT) IN PPP BY COAGULATION ASSAY EXTERNAL: NORMAL

## 2024-12-09 NOTE — PROGRESS NOTES
Patient identification verified with 2 identifiers.    Location: [facility] Patient Self-Testing Program [phone]    Referring Physician: Terra Young MD   Enrollment/ Re-enrollment date: 2025   INR Goal: 2.0-3.0  INR monitoring is per [facility] protocol.  Anticoagulation Medication: warfarin  Indication: Pulmonary Embolism (PE)    Subjective   Bleeding signs/symptoms: No    Bruising: No   Major bleeding event: No  Thrombosis signs/symptoms: No  Thromboembolic event: No  Missed doses: No  Extra doses: No  Medication changes: Yes  Patient is taking Mucinex, cough medication, and saline spray. Neither of which have DM.  Dietary changes: No  Change in health: No  Change in activity: No  Alcohol: No  Other concerns: No    Upcoming Procedures:  Does the Patient Have any upcoming procedures that require interruption in anticoagulation therapy? no  Does the patient require bridging? no      Anticoagulation Summary  As of 2024      INR goal:  2.0-3.0   TTR:  75.9% (1 y)   INR used for dosin.80 (2024)   Weekly warfarin total:  17.5 mg               Assessment/Plan   Subtherapeutic     1. New dose: no change    2. Next INR:  4 days      Education provided to patient during the visit:  Patient instructed to call in interim with questions, concerns and changes.   Patient educated on interactions between medications and warfarin.

## 2024-12-13 ENCOUNTER — ANTICOAGULATION - WARFARIN VISIT (OUTPATIENT)
Dept: CARDIOLOGY | Facility: CLINIC | Age: 74
End: 2024-12-13
Payer: MEDICARE

## 2024-12-13 DIAGNOSIS — Z79.01 LONG TERM (CURRENT) USE OF ANTICOAGULANTS: ICD-10-CM

## 2024-12-13 DIAGNOSIS — I26.99 PULMONARY EMBOLISM WITHOUT ACUTE COR PULMONALE, UNSPECIFIED CHRONICITY, UNSPECIFIED PULMONARY EMBOLISM TYPE (MULTI): Primary | ICD-10-CM

## 2024-12-13 NOTE — PROGRESS NOTES
Patient identification verified with 2 identifiers.    Location: [facility] Patient Self-Testing Program [phone]    Referring Physician: Terra Young MD   Enrollment/ Re-enrollment date: 2025   INR Goal: 2.0-3.0  INR monitoring is per [facility] protocol.  Anticoagulation Medication: warfarin  Indication: Pulmonary Embolism (PE)    Subjective   Bleeding signs/symptoms: No    Bruising: No   Major bleeding event: No  Thrombosis signs/symptoms: No  Thromboembolic event: No  Missed doses: No  Extra doses: No  Medication changes: No  Dietary changes: No  Change in health: No  Change in activity: No  Alcohol: No  Other concerns: No    Upcoming Procedures:  Does the Patient Have any upcoming procedures that require interruption in anticoagulation therapy? no  Does the patient require bridging? no      Anticoagulation Summary  As of 2024      INR goal:  2.0-3.0   TTR:  75.7% (1 y)   INR used for dosin.30 (2024)   Weekly warfarin total:  17.5 mg               Assessment/Plan   Therapeutic     1. New dose: Left VM with dosing instructions and next testing date.     2. Next INR: 1 week      Education provided to patient during the visit:  Patient instructed to call in interim with questions, concerns and changes.

## 2024-12-18 ENCOUNTER — ANTICOAGULATION - WARFARIN VISIT (OUTPATIENT)
Dept: CARDIOLOGY | Facility: CLINIC | Age: 74
End: 2024-12-18
Payer: MEDICARE

## 2024-12-18 DIAGNOSIS — Z79.01 LONG TERM (CURRENT) USE OF ANTICOAGULANTS: ICD-10-CM

## 2024-12-18 DIAGNOSIS — I26.99 PULMONARY EMBOLISM WITHOUT ACUTE COR PULMONALE, UNSPECIFIED CHRONICITY, UNSPECIFIED PULMONARY EMBOLISM TYPE (MULTI): Primary | ICD-10-CM

## 2024-12-18 NOTE — PROGRESS NOTES
Patient identification verified with 2 identifiers.    Location: [facility] Patient Self-Testing Program [phone]    Referring Physician: Terra Young MD   Enrollment/ Re-enrollment date: 2025   INR Goal: 2.0-3.0  INR monitoring is per [facility] protocol.  Anticoagulation Medication: warfarin  Indication: Pulmonary Embolism (PE)    Subjective   Bleeding signs/symptoms: No    Bruising: No   Major bleeding event: No  Thrombosis signs/symptoms: No  Thromboembolic event: No  Missed doses: No  Extra doses: No  Medication changes: No  Dietary changes: No  Change in health: No  Change in activity: No  Alcohol: No  Other concerns: No    Upcoming Procedures:  Does the Patient Have any upcoming procedures that require interruption in anticoagulation therapy? no  Does the patient require bridging? no      Anticoagulation Summary  As of 2024      INR goal:  2.0-3.0   TTR:  76.1% (1 y)   INR used for dosin.90 (2024)   Weekly warfarin total:  17.5 mg               Assessment/Plan   Therapeutic     1. New dose: Patient not on any OTC medications anymore.  Patient will retest in 5 days.    2. Next INR:  5 days      Education provided to patient during the visit:  Patient instructed to call in interim with questions, concerns and changes.

## 2024-12-21 ENCOUNTER — HOSPITAL ENCOUNTER (OUTPATIENT)
Dept: RADIOLOGY | Facility: HOSPITAL | Age: 74
Discharge: HOME | End: 2024-12-21
Payer: MEDICARE

## 2024-12-21 DIAGNOSIS — E04.1 THYROID NODULE: ICD-10-CM

## 2024-12-21 PROCEDURE — 76536 US EXAM OF HEAD AND NECK: CPT | Performed by: RADIOLOGY

## 2024-12-21 PROCEDURE — 76536 US EXAM OF HEAD AND NECK: CPT

## 2024-12-24 ENCOUNTER — ANTICOAGULATION - WARFARIN VISIT (OUTPATIENT)
Dept: CARDIOLOGY | Facility: CLINIC | Age: 74
End: 2024-12-24
Payer: MEDICARE

## 2024-12-24 DIAGNOSIS — Z79.01 LONG TERM (CURRENT) USE OF ANTICOAGULANTS: ICD-10-CM

## 2024-12-24 DIAGNOSIS — I26.99 PULMONARY EMBOLISM WITHOUT ACUTE COR PULMONALE, UNSPECIFIED CHRONICITY, UNSPECIFIED PULMONARY EMBOLISM TYPE (MULTI): Primary | ICD-10-CM

## 2024-12-24 LAB
INR IN PPP BY COAGULATION ASSAY EXTERNAL: 3.2
PROTHROMBIN TIME (PT) IN PPP BY COAGULATION ASSAY EXTERNAL: NORMAL

## 2024-12-24 NOTE — PROGRESS NOTES
Patient identification verified with 2 identifiers.    Location: [facility] Patient Self-Testing Program [phone]    Referring Physician: Terra Young MD   Enrollment/ Re-enrollment date: 7/31/2025   INR Goal: 2.0-3.0  INR monitoring is per [facility] protocol.  Anticoagulation Medication: warfarin  Indication: Pulmonary Embolism (PE)    Subjective   Bleeding signs/symptoms: No    Bruising: No   Major bleeding event: No  Thrombosis signs/symptoms: No  Thromboembolic event: No  Missed doses: No  Extra doses: No  Medication changes: No  currently IV antibiotics for cellulitis  Dietary changes: No  Change in health: No  Change in activity: No  Alcohol: No  Other concerns: No    Upcoming Procedures:  Does the Patient Have any upcoming procedures that require interruption in anticoagulation therapy? no  Does the patient require bridging? no      Anticoagulation Summary  As of 12/24/2024      INR goal:  2.0-3.0   TTR:  75.4% (1 y)   INR used for dosing:  3.20 (12/24/2024)   Weekly warfarin total:  17.5 mg               Assessment/Plan   Supratherapeutic     1. New dose: no change  being managed by hosp staff  2. Next INR:  will follow up in one week      Education provided to patient during the visit:  Patient instructed to call in interim with questions, concerns and changes.

## 2024-12-30 ENCOUNTER — ANTICOAGULATION - WARFARIN VISIT (OUTPATIENT)
Dept: CARDIOLOGY | Facility: CLINIC | Age: 74
End: 2024-12-30
Payer: MEDICARE

## 2024-12-30 DIAGNOSIS — Z79.01 LONG TERM (CURRENT) USE OF ANTICOAGULANTS: ICD-10-CM

## 2024-12-30 DIAGNOSIS — I26.99 PULMONARY EMBOLISM WITHOUT ACUTE COR PULMONALE, UNSPECIFIED CHRONICITY, UNSPECIFIED PULMONARY EMBOLISM TYPE (MULTI): Primary | ICD-10-CM

## 2024-12-30 LAB
INR IN PPP BY COAGULATION ASSAY EXTERNAL: 1.7 (ref 2–3)
PROTHROMBIN TIME (PT) IN PPP BY COAGULATION ASSAY EXTERNAL: ABNORMAL

## 2024-12-30 NOTE — PROGRESS NOTES
Patient identification verified with 2 identifiers.    Location: [facility] Patient Self-Testing Program [phone]    Referring Physician: Terra Young MD   Enrollment/ Re-enrollment date: 2025   INR Goal: 2.0-3.0  INR monitoring is per [facility] protocol.  Anticoagulation Medication: warfarin  Indication: Pulmonary Embolism (PE)    Subjective   Bleeding signs/symptoms: No    Bruising: No   Major bleeding event: No  Thrombosis signs/symptoms: No  Thromboembolic event: No  Missed doses: No  Extra doses: No  Medication changes: Yes  Dietary changes: No  Change in health: Yes  Change in activity: No  Alcohol: No  Other concerns: No    Upcoming Procedures:  Does the Patient Have any upcoming procedures that require interruption in anticoagulation therapy? no  Does the patient require bridging? no      Anticoagulation Summary  As of 2024      INR goal:  2.0-3.0   TTR:  75.3% (1.1 y)   INR used for dosin.70 (2024)   Weekly warfarin total:  20 mg               Assessment/Plan Spoke with pt who was recently discharged from hospital.  Pt is taking antibiotics and has started Mounjaro.    Subtherapeutic     1. New dose:  TWD increased per protocol   Pt states understanding of dosing schedule.  2. Next INR: 1 week      Education provided to patient during the visit:  Patient instructed to call in interim with questions, concerns and changes.   Patient educated on interactions between medications and warfarin.   Patient educated on dietary consistency in vitamin k consumption.   Patient educated on signs of bleeding/clotting.   Patient educated on compliance with dosing, follow up appointments, and prescribed plan of care.

## 2024-12-31 DIAGNOSIS — I10 PRIMARY HYPERTENSION: ICD-10-CM

## 2025-01-02 RX ORDER — FUROSEMIDE 80 MG/1
80 TABLET ORAL DAILY
Qty: 90 TABLET | Refills: 0 | Status: SHIPPED | OUTPATIENT
Start: 2025-01-02

## 2025-01-03 DIAGNOSIS — E11.65 TYPE 2 DIABETES MELLITUS WITH HYPERGLYCEMIA, UNSPECIFIED WHETHER LONG TERM INSULIN USE (MULTI): ICD-10-CM

## 2025-01-03 PROCEDURE — RXMED WILLOW AMBULATORY MEDICATION CHARGE

## 2025-01-03 RX ORDER — TIRZEPATIDE 2.5 MG/.5ML
2.5 INJECTION, SOLUTION SUBCUTANEOUS
Qty: 2 ML | Refills: 0 | Status: CANCELLED | OUTPATIENT
Start: 2025-01-03

## 2025-01-03 RX ORDER — TIRZEPATIDE 5 MG/.5ML
5 INJECTION, SOLUTION SUBCUTANEOUS
Qty: 6 ML | Refills: 3 | Status: CANCELLED | OUTPATIENT
Start: 2025-01-05

## 2025-01-06 ENCOUNTER — ANTICOAGULATION - WARFARIN VISIT (OUTPATIENT)
Dept: CARDIOLOGY | Facility: CLINIC | Age: 75
End: 2025-01-06
Payer: MEDICARE

## 2025-01-06 DIAGNOSIS — Z79.01 LONG TERM (CURRENT) USE OF ANTICOAGULANTS: ICD-10-CM

## 2025-01-06 DIAGNOSIS — I26.99 PULMONARY EMBOLISM WITHOUT ACUTE COR PULMONALE, UNSPECIFIED CHRONICITY, UNSPECIFIED PULMONARY EMBOLISM TYPE (MULTI): Primary | ICD-10-CM

## 2025-01-06 LAB
INR IN PPP BY COAGULATION ASSAY EXTERNAL: 3.9
PROTHROMBIN TIME (PT) IN PPP BY COAGULATION ASSAY EXTERNAL: NORMAL

## 2025-01-06 NOTE — PROGRESS NOTES
Patient identification verified with 2 identifiers.    Location: [facility] Patient Self-Testing Program [phone]    Referring Physician: DR. VÁZQUEZ  Enrollment/ Re-enrollment date: 7/31/2025   INR Goal: 2.0-3.0  INR monitoring is per [facility] protocol.  Anticoagulation Medication: warfarin  Indication: Pulmonary Embolism (PE)    Subjective   Bleeding signs/symptoms: No    Bruising: No   Major bleeding event: No  Thrombosis signs/symptoms: No  Thromboembolic event: No  Missed doses: No  Extra doses: No  Medication changes: Yes  PT FINISHED A 10 DAY COURSE OF AMOXICILLIN AND DOXYCLYCLINE 4 DAYS AGO.  Dietary changes: Yes  PT STATES SHE DOESN'T EAT WELL AND OFTEN ONLY EATS 1 MEAL/DAY  Change in health: No  Change in activity: No  Alcohol: No  Other concerns: No    Upcoming Procedures:  Does the Patient Have any upcoming procedures that require interruption in anticoagulation therapy? no  Does the patient require bridging? no      Anticoagulation Summary  As of 1/6/2025      INR goal:  2.0-3.0   TTR:  75.3% (1.1 y)   INR used for dosing:  --   Weekly warfarin total:  17.5 mg               Assessment/Plan   Supratherapeutic     1. New dose:  WILL HOLD TODAYS DOSE AND DECREASE TWD. SPOKE TO PT, CONFIRMED NEW DOSING INSTRUCTIONS     2. Next INR: 1 week      Education provided to patient during the visit:  Patient instructed to call in interim with questions, concerns and changes.   Patient educated on interactions between medications and warfarin.   Patient educated on dietary consistency in vitamin k consumption.   Patient educated on compliance with dosing, follow up appointments, and prescribed plan of care.

## 2025-01-08 ENCOUNTER — PHARMACY VISIT (OUTPATIENT)
Dept: PHARMACY | Facility: CLINIC | Age: 75
End: 2025-01-08
Payer: MEDICARE

## 2025-01-13 ENCOUNTER — ANTICOAGULATION - WARFARIN VISIT (OUTPATIENT)
Dept: CARDIOLOGY | Facility: CLINIC | Age: 75
End: 2025-01-13
Payer: MEDICARE

## 2025-01-13 DIAGNOSIS — I26.99 PULMONARY EMBOLISM WITHOUT ACUTE COR PULMONALE, UNSPECIFIED CHRONICITY, UNSPECIFIED PULMONARY EMBOLISM TYPE (MULTI): Primary | ICD-10-CM

## 2025-01-13 DIAGNOSIS — Z79.01 LONG TERM (CURRENT) USE OF ANTICOAGULANTS: ICD-10-CM

## 2025-01-13 LAB
INR IN PPP BY COAGULATION ASSAY EXTERNAL: 1.9 (ref 2–3)
PROTHROMBIN TIME (PT) IN PPP BY COAGULATION ASSAY EXTERNAL: ABNORMAL

## 2025-01-13 NOTE — PROGRESS NOTES
Patient identification verified with 2 identifiers.    Location: [facility] Patient Self-Testing Program [phone]    Referring Physician: DR. VÁZQUEZ  Enrollment/ Re-enrollment date: 2025   INR Goal: 2.0-3.0  INR monitoring is per [facility] protocol.  Anticoagulation Medication: warfarin  Indication: Pulmonary Embolism (PE)    Subjective   Bleeding signs/symptoms: No    Bruising: No   Major bleeding event: No  Thrombosis signs/symptoms: No  Thromboembolic event: No  Missed doses: No  Extra doses: No  Medication changes: No  Dietary changes: No  Change in health: No  Change in activity: No  Alcohol: No  Other concerns: No    Upcoming Procedures:  Does the Patient Have any upcoming procedures that require interruption in anticoagulation therapy? no  Does the patient require bridging? no      Anticoagulation Summary  As of 2025      INR goal:  2.0-3.0   TTR:  74.3% (1.1 y)   INR used for dosin.90 (2025)   Weekly warfarin total:  17.5 mg               Assessment/Plan   Subtherapeutic Pt had additional vitamin K this week.  Will resume normal diet.    1. New dose: no change  Spoke with pt and confirmed dosing schedule.    2. Next INR: 1 week      Education provided to patient during the visit:  Patient instructed to call in interim with questions, concerns and changes.   Patient educated on interactions between medications and warfarin.   Patient educated on dietary consistency in vitamin k consumption.   Patient educated on compliance with dosing, follow up appointments, and prescribed plan of care.

## 2025-01-20 ENCOUNTER — APPOINTMENT (OUTPATIENT)
Dept: ORTHOPEDIC SURGERY | Facility: CLINIC | Age: 75
End: 2025-01-20
Payer: MEDICARE

## 2025-01-20 ENCOUNTER — TELEPHONE (OUTPATIENT)
Dept: PRIMARY CARE | Facility: CLINIC | Age: 75
End: 2025-01-20

## 2025-01-20 ENCOUNTER — LAB (OUTPATIENT)
Dept: LAB | Facility: LAB | Age: 75
End: 2025-01-20
Payer: MEDICARE

## 2025-01-20 DIAGNOSIS — G56.03 BILATERAL CARPAL TUNNEL SYNDROME: ICD-10-CM

## 2025-01-20 DIAGNOSIS — R35.0 URINARY FREQUENCY: ICD-10-CM

## 2025-01-20 DIAGNOSIS — E08.00 DIABETES MELLITUS DUE TO UNDERLYING CONDITION WITH HYPEROSMOLARITY WITHOUT COMA, UNSPECIFIED WHETHER LONG TERM INSULIN USE: ICD-10-CM

## 2025-01-20 DIAGNOSIS — E04.1 THYROID NODULE: ICD-10-CM

## 2025-01-20 DIAGNOSIS — Z09 HOSPITAL DISCHARGE FOLLOW-UP: ICD-10-CM

## 2025-01-20 DIAGNOSIS — R79.9 ABNORMAL FINDING OF BLOOD CHEMISTRY, UNSPECIFIED: ICD-10-CM

## 2025-01-20 DIAGNOSIS — N18.32 CHRONIC KIDNEY DISEASE, STAGE 3B (MULTI): Primary | ICD-10-CM

## 2025-01-20 DIAGNOSIS — M65.30 TRIGGER FINGER, ACQUIRED: Primary | ICD-10-CM

## 2025-01-20 DIAGNOSIS — R20.2 NUMBNESS AND TINGLING IN BOTH HANDS: ICD-10-CM

## 2025-01-20 DIAGNOSIS — R20.0 NUMBNESS AND TINGLING IN BOTH HANDS: ICD-10-CM

## 2025-01-20 DIAGNOSIS — N18.32 CHRONIC KIDNEY DISEASE, STAGE 3B (MULTI): ICD-10-CM

## 2025-01-20 LAB
ALBUMIN SERPL BCP-MCNC: 3.5 G/DL (ref 3.4–5)
ALP SERPL-CCNC: 56 U/L (ref 33–136)
ALT SERPL W P-5'-P-CCNC: 11 U/L (ref 7–45)
ANION GAP SERPL CALC-SCNC: 19 MMOL/L (ref 10–20)
APPEARANCE UR: ABNORMAL
AST SERPL W P-5'-P-CCNC: 10 U/L (ref 9–39)
BACTERIA #/AREA URNS AUTO: ABNORMAL /HPF
BASOPHILS # BLD AUTO: 0.06 X10*3/UL (ref 0–0.1)
BASOPHILS NFR BLD AUTO: 0.5 %
BILIRUB SERPL-MCNC: 0.4 MG/DL (ref 0–1.2)
BILIRUB UR STRIP.AUTO-MCNC: NEGATIVE MG/DL
BUN SERPL-MCNC: 26 MG/DL (ref 6–23)
CALCIUM SERPL-MCNC: 8.4 MG/DL (ref 8.6–10.3)
CHLORIDE SERPL-SCNC: 105 MMOL/L (ref 98–107)
CO2 SERPL-SCNC: 19 MMOL/L (ref 21–32)
COLOR UR: ABNORMAL
CREAT SERPL-MCNC: 1.77 MG/DL (ref 0.5–1.05)
EGFRCR SERPLBLD CKD-EPI 2021: 30 ML/MIN/1.73M*2
EOSINOPHIL # BLD AUTO: 0.11 X10*3/UL (ref 0–0.4)
EOSINOPHIL NFR BLD AUTO: 1 %
ERYTHROCYTE [DISTWIDTH] IN BLOOD BY AUTOMATED COUNT: 13.8 % (ref 11.5–14.5)
GLUCOSE SERPL-MCNC: 133 MG/DL (ref 74–99)
GLUCOSE UR STRIP.AUTO-MCNC: NORMAL MG/DL
HCT VFR BLD AUTO: 41 % (ref 36–46)
HGB BLD-MCNC: 12.5 G/DL (ref 12–16)
HYALINE CASTS #/AREA URNS AUTO: ABNORMAL /LPF
IMM GRANULOCYTES # BLD AUTO: 0.03 X10*3/UL (ref 0–0.5)
IMM GRANULOCYTES NFR BLD AUTO: 0.3 % (ref 0–0.9)
KETONES UR STRIP.AUTO-MCNC: NEGATIVE MG/DL
LEUKOCYTE ESTERASE UR QL STRIP.AUTO: ABNORMAL
LYMPHOCYTES # BLD AUTO: 1.34 X10*3/UL (ref 0.8–3)
LYMPHOCYTES NFR BLD AUTO: 11.6 %
MCH RBC QN AUTO: 32.6 PG (ref 26–34)
MCHC RBC AUTO-ENTMCNC: 30.5 G/DL (ref 32–36)
MCV RBC AUTO: 107 FL (ref 80–100)
MONOCYTES # BLD AUTO: 0.58 X10*3/UL (ref 0.05–0.8)
MONOCYTES NFR BLD AUTO: 5 %
MUCOUS THREADS #/AREA URNS AUTO: ABNORMAL /LPF
NEUTROPHILS # BLD AUTO: 9.42 X10*3/UL (ref 1.6–5.5)
NEUTROPHILS NFR BLD AUTO: 81.6 %
NITRITE UR QL STRIP.AUTO: NEGATIVE
NRBC BLD-RTO: 0 /100 WBCS (ref 0–0)
PH UR STRIP.AUTO: 5.5 [PH]
PHOSPHATE SERPL-MCNC: 3 MG/DL (ref 2.5–4.9)
PLATELET # BLD AUTO: 289 X10*3/UL (ref 150–450)
POTASSIUM SERPL-SCNC: 4.1 MMOL/L (ref 3.5–5.3)
PROT SERPL-MCNC: 6.3 G/DL (ref 6.4–8.2)
PROT UR STRIP.AUTO-MCNC: ABNORMAL MG/DL
RBC # BLD AUTO: 3.83 X10*6/UL (ref 4–5.2)
RBC # UR STRIP.AUTO: ABNORMAL /UL
RBC #/AREA URNS AUTO: >20 /HPF
SODIUM SERPL-SCNC: 139 MMOL/L (ref 136–145)
SP GR UR STRIP.AUTO: 1.02
SQUAMOUS #/AREA URNS AUTO: ABNORMAL /HPF
T4 FREE SERPL-MCNC: 1.12 NG/DL (ref 0.61–1.12)
TRANS CELLS #/AREA UR COMP ASSIST: ABNORMAL /HPF
TSH SERPL-ACNC: 2.05 MIU/L (ref 0.44–3.98)
URATE SERPL-MCNC: 8.5 MG/DL (ref 2.3–6.7)
UROBILINOGEN UR STRIP.AUTO-MCNC: NORMAL MG/DL
WBC # BLD AUTO: 11.5 X10*3/UL (ref 4.4–11.3)
WBC #/AREA URNS AUTO: >50 /HPF

## 2025-01-20 PROCEDURE — 85025 COMPLETE CBC W/AUTO DIFF WBC: CPT

## 2025-01-20 PROCEDURE — 81001 URINALYSIS AUTO W/SCOPE: CPT

## 2025-01-20 PROCEDURE — 20550 NJX 1 TENDON SHEATH/LIGAMENT: CPT | Performed by: ORTHOPAEDIC SURGERY

## 2025-01-20 PROCEDURE — 84100 ASSAY OF PHOSPHORUS: CPT

## 2025-01-20 PROCEDURE — 1123F ACP DISCUSS/DSCN MKR DOCD: CPT | Performed by: ORTHOPAEDIC SURGERY

## 2025-01-20 PROCEDURE — 86160 COMPLEMENT ANTIGEN: CPT

## 2025-01-20 PROCEDURE — 84155 ASSAY OF PROTEIN SERUM: CPT

## 2025-01-20 PROCEDURE — 84165 PROTEIN E-PHORESIS SERUM: CPT

## 2025-01-20 PROCEDURE — 80053 COMPREHEN METABOLIC PANEL: CPT

## 2025-01-20 PROCEDURE — 86334 IMMUNOFIX E-PHORESIS SERUM: CPT

## 2025-01-20 PROCEDURE — 84166 PROTEIN E-PHORESIS/URINE/CSF: CPT | Performed by: INTERNAL MEDICINE

## 2025-01-20 PROCEDURE — 82306 VITAMIN D 25 HYDROXY: CPT

## 2025-01-20 PROCEDURE — 84443 ASSAY THYROID STIM HORMONE: CPT

## 2025-01-20 PROCEDURE — 84165 PROTEIN E-PHORESIS SERUM: CPT | Performed by: INTERNAL MEDICINE

## 2025-01-20 PROCEDURE — 86038 ANTINUCLEAR ANTIBODIES: CPT

## 2025-01-20 PROCEDURE — 86325 OTHER IMMUNOELECTROPHORESIS: CPT | Performed by: INTERNAL MEDICINE

## 2025-01-20 PROCEDURE — 84166 PROTEIN E-PHORESIS/URINE/CSF: CPT

## 2025-01-20 PROCEDURE — 99204 OFFICE O/P NEW MOD 45 MIN: CPT | Performed by: ORTHOPAEDIC SURGERY

## 2025-01-20 PROCEDURE — 83036 HEMOGLOBIN GLYCOSYLATED A1C: CPT

## 2025-01-20 PROCEDURE — 84439 ASSAY OF FREE THYROXINE: CPT

## 2025-01-20 PROCEDURE — L3908 WHO COCK-UP NONMOLDE PRE OTS: HCPCS | Performed by: ORTHOPAEDIC SURGERY

## 2025-01-20 PROCEDURE — 84156 ASSAY OF PROTEIN URINE: CPT

## 2025-01-20 PROCEDURE — 86320 SERUM IMMUNOELECTROPHORESIS: CPT | Performed by: INTERNAL MEDICINE

## 2025-01-20 PROCEDURE — 87086 URINE CULTURE/COLONY COUNT: CPT

## 2025-01-20 PROCEDURE — 1036F TOBACCO NON-USER: CPT | Performed by: ORTHOPAEDIC SURGERY

## 2025-01-20 PROCEDURE — 1159F MED LIST DOCD IN RCRD: CPT | Performed by: ORTHOPAEDIC SURGERY

## 2025-01-20 PROCEDURE — 83970 ASSAY OF PARATHORMONE: CPT

## 2025-01-20 PROCEDURE — 84550 ASSAY OF BLOOD/URIC ACID: CPT

## 2025-01-20 RX ORDER — TRIAMCINOLONE ACETONIDE 40 MG/ML
40 INJECTION, SUSPENSION INTRA-ARTICULAR; INTRAMUSCULAR
Status: COMPLETED | OUTPATIENT
Start: 2025-01-20 | End: 2025-01-20

## 2025-01-20 RX ORDER — LIDOCAINE HYDROCHLORIDE 10 MG/ML
0.5 INJECTION, SOLUTION INFILTRATION; PERINEURAL
Status: COMPLETED | OUTPATIENT
Start: 2025-01-20 | End: 2025-01-20

## 2025-01-20 RX ORDER — LIDOCAINE HYDROCHLORIDE 10 MG/ML
1 INJECTION, SOLUTION INFILTRATION; PERINEURAL
Status: COMPLETED | OUTPATIENT
Start: 2025-01-20 | End: 2025-01-20

## 2025-01-20 RX ADMIN — LIDOCAINE HYDROCHLORIDE 0.5 ML: 10 INJECTION, SOLUTION INFILTRATION; PERINEURAL at 23:01

## 2025-01-20 RX ADMIN — LIDOCAINE HYDROCHLORIDE 1 ML: 10 INJECTION, SOLUTION INFILTRATION; PERINEURAL at 23:00

## 2025-01-20 RX ADMIN — TRIAMCINOLONE ACETONIDE 40 MG: 40 INJECTION, SUSPENSION INTRA-ARTICULAR; INTRAMUSCULAR at 23:01

## 2025-01-20 RX ADMIN — TRIAMCINOLONE ACETONIDE 40 MG: 40 INJECTION, SUSPENSION INTRA-ARTICULAR; INTRAMUSCULAR at 23:00

## 2025-01-20 ASSESSMENT — PAIN - FUNCTIONAL ASSESSMENT: PAIN_FUNCTIONAL_ASSESSMENT: NO/DENIES PAIN

## 2025-01-20 NOTE — CARE PLAN
On 1/20/2025CANDIDA Sarah E Daugherty, CT scribed the services personally performed by Yolie Spence DC.    Date of Injury: 01/20/25    Initial Treatment Date: 01/20/25  Previous Treatment Date: 01/14/2025  Current Treatment Date: 1/20/2025       Chief Complaint   Patient presents with    Pain    Office Visit        Mechanism of Injury: Gradually  Date informed consent signed: 01/06/2025    Visit Number of Current Episode: PRN  Occupation: Retired  Referred by: N/A  Primary Care Physician: Jenny Patino NP       SUBJECTIVE:  Chastity is a pleasant 65 year old female that presents to our office today for treatment of lower back pain with radicular symptoms.    Had some lower back stiffness this morning when getting out of bed. The lower back is feeling better now. Started playing ping pong and is helpful to keep moving during the colder months.       Presenting Subjective History:  Originally it started back in 2012. Was walking at lunch and would have burning sensation in right thigh. Got progressively worse and started to get pain in her back on the right side. Had to stop working it was so bad. This was before she became disables.  Early 2013 was off of work for surgery of a double laminectomy at L4/5. Went back to work but wasn't able to do it full time but employer let her go. Then filed for disability. Does seen much issue on the left side. Sometimes with massage therapy they will work on the left side.  Lower back right side and then wraps around to hip area and down her thigh. Sometimes when its bad she can feel an aching down into her calf. This might be more referred pain. Sometimes her tailbone bothers her as well. No numbness or tingling down the legs. No burning in the feet. If it feels like its burning its down the front of the right thigh.  March 2019 had a fusion in right SI joint.  Also has a spine stimulator which went in 2017 and is still functioning. Cannot sleep on her right side, it  The patient's goals for the shift include  decreased SOB     The clinical goals for the shift include patient will report less SOB by end of shift    Over the shift, the patient did  make progress toward the following goals.      will cause her right side to ache. Has done Chiropractic care in the past 2019/2020. Has had PT before, which did not do much. PT lasted about a month or two, in 2022. Driving can also be difficult when using her right leg.           Patient is currently taking Calcium am, D3 and Multivitamin, Iron, and Potassium     Patient rates the current pain at a 2/10 with 10 being the worst.   Patient describes pain as aching and constant  Patient’s current work status: Retired  Patient notes that the pain is worse when standing, sitting, walking, lying down and sleeping (Depends On Type Of Chair)  Patient notes that the pain is reduced with prescription medication , massage and acupuncture   Patient has sought prior medical treatment for this episode.  Patient has not sought prior Chiropractic treatment for this episode.    Diagnostic Studies relevant to this episode: None  Hospitalizations relevant to this episode: NO    MRI Lumbar Spine (07/27/2021)  IMPRESSION:  1.  Multilevel spondylosis, most pronounced at L3-4 and L4-5, without  herniation or significant central stenosis, in the setting of levoscoliosis  and L3-L4 laminectomies.  2.  Foraminal stenosis is mild bilaterally at L3-4, and on the left at L4-5  and L5-S1.    Xray Lumbar Spine (07/13/2021)  IMPRESSION:  1.  Postoperative changes of right sacroiliac fusion.  2.  Thoracic spinal stimulator in place with partially imaged leads intact.  3.  5 nonrib-bearing lumbar-type vertebral bodies.  4.  Straightening of lumbar lordosis, moderate levocurvature of the lumbar  spine centered at L3-4 and 13 mm of left lateral subluxation of L4 relative  to L5, similar to prior.  5.  No evidence for instability with flexion or extension.  6.  Preserved vertebral body heights. No evidence for fracture.  7.  Multilevel degenerative disc space narrowing and endplate osteophytosis  predominantly involving L3-S1 and worst at L4-5 and right laterally at  L3-4.    ________________________________________________________________________    I have reviewed the past medical history, past surgical history, family history, social history, medications and allergies listed in the medical record as obtained by my support staff and agree with their documentation.      REVIEW OF SYSTEMS  Constitutional: Does not report fever chills, malaise, weight loss/gain, or loss of appetite.    Skin: Negative for rash, or persistent itching in region examined / lower back  Respiratory: Does not present with cough, wheezing, or shortness of breath.    Cardiovascular: Does not report chest pain, chest pressure, palpitations, leg cramps, or lower extremity edema.  Gastrointestinal: Deniess nausea, vomiting, diarrhea, abdominal pain, constipation, or heartburn.    Genitourinary: Denies dysuria, frequency, hematuria, or nocturia.   Extremities:  Negative for joint swelling or joint pain in LE  Psychiatric: Negative for change in mood, mentation, anxiety, current depression, or insomnia.   Neurologic: Denies visual changes, loss of balance, dizziness, or tremors.      OBJECTIVE FINDINGS:   Visit Vitals  LMP 01/04/2014      Patient scored a 15/40 on the [facility]    *Lumbar Left Convexity Scoliosis  *Pain stimulator is located in the Left QL area    Problem focus examination revealed:                              **NOTE- FUSION RIGHT SACROILIAC JOINT                                       -BILATERAL LAMINECTOMY L2/3-L3/4**    (Thoracic spine) Thoracic spine facet joint function is within normal limits except for T7/8 extension that exhibited limited passive range of motion and segmental restriction and tenderness upon palpation; The following muscles were examined for normal flexibility and tone; left scapulothoracic, right scapulothoracic, right thoracic paraspinals, and left thoracic paraspinals. These muscles were within normal limits except for left scapulothoracic, right thoracic paraspinals,  left thoracic paraspinals, and bilateral trapezius that exhibited limited flexibility and abnormal resting tone.    (Thoracolumbar and Pelvic spine) Thoracolumbar spine facet joint function is within normal limits except for long axis extension/static bilateral lateral flexion T10-L5 that exhibited limited passive range of motion and segmental restriction and tenderness on palpation; sacroiliac joint function is within normal limits. The following muscles were examined for flexibility and tone at rest; left gluteus carlee, right gluteus carlee, left gluteus medius, right gluteus medius, left hamstring, right hamstring, left hip flexor, right hip flexor, left lumbar paraspinals, right lumbar paraspinals, left lumbosacral, right lumbosacral, left piriformis, right piriformis, left quadratus lumborum, right quadratus lumborum, left quadriceps, right quadriceps, right internal hip rotators and left internal hip rotators. These muscles were found to be within normal limits except for right gluteus medius, right iliotibial band, right lumbosacral, left quadratus lumborum, and right thoracolumbar  that exhibited limited flexibility and were hypertonic at rest.      ASSESSMENT:  1. Somatic dysfunction of lumbar region    2. Right lumbar radiculopathy    3. Thoracic segment dysfunction    4. Dorsalgia of thoracolumbar region    5. Degenerative scoliosis in adult patient          PLAN:  Following reassessment of joint function and soft tissue tonicity, Chastity was treated with thoracolumbar distraction manipulation utilizing Guzman Protocol 2 to stretch thoracolumbar paraspinal muscles, to increase intersegmental joint function, and to decrease intradiscal pressure and neural tension of her thoracolumbar spine. Subsequently treated with low force, prone thoracic manipulation to improve function and passive range of motion of facet joints noted.    Prior to manipulative therapies, Chastity was treated with brief soft tissue  mobilization to muscles noted as taut in objective findings to increase circulation, improve flexibility and decrease strain to associated spinal structures.      Goal of care is to improve muscular and skeletal function and provide symptom relief. Chastity would like to return next week for continued care. Total treatment time was 15 minutes.    The documentation recorded by PA Munguia accurately and completely reflects the service(s), I personally performed and the decisions made by me, Yolie Patiño D.C.

## 2025-01-20 NOTE — TELEPHONE ENCOUNTER
Pt called c/o uti symptoms, urinary frequency, pain and odor   Put in urine test req abx  Pt has been on doxy for cellulitis but still has the discomfort req more doxy  Pt has appt with you tomorrow for hospital follow up    eloina

## 2025-01-21 ENCOUNTER — OFFICE VISIT (OUTPATIENT)
Dept: PRIMARY CARE | Facility: CLINIC | Age: 75
End: 2025-01-21
Payer: MEDICARE

## 2025-01-21 VITALS
HEART RATE: 87 BPM | SYSTOLIC BLOOD PRESSURE: 123 MMHG | WEIGHT: 293 LBS | BODY MASS INDEX: 48.82 KG/M2 | HEIGHT: 65 IN | DIASTOLIC BLOOD PRESSURE: 79 MMHG | OXYGEN SATURATION: 96 % | RESPIRATION RATE: 18 BRPM

## 2025-01-21 DIAGNOSIS — N18.30 STAGE 3 CHRONIC KIDNEY DISEASE, UNSPECIFIED WHETHER STAGE 3A OR 3B CKD (MULTI): ICD-10-CM

## 2025-01-21 DIAGNOSIS — E11.65 TYPE 2 DIABETES MELLITUS WITH HYPERGLYCEMIA, UNSPECIFIED WHETHER LONG TERM INSULIN USE (MULTI): ICD-10-CM

## 2025-01-21 DIAGNOSIS — I48.0 PAROXYSMAL ATRIAL FIBRILLATION (MULTI): ICD-10-CM

## 2025-01-21 DIAGNOSIS — R31.9 URINARY TRACT INFECTION WITH HEMATURIA, SITE UNSPECIFIED: ICD-10-CM

## 2025-01-21 DIAGNOSIS — N18.30 STAGE 3 CHRONIC KIDNEY DISEASE, UNSPECIFIED WHETHER STAGE 3A OR 3B CKD (MULTI): Primary | ICD-10-CM

## 2025-01-21 DIAGNOSIS — L03.116 CELLULITIS OF LEFT LOWER EXTREMITY: ICD-10-CM

## 2025-01-21 DIAGNOSIS — Z09 HOSPITAL DISCHARGE FOLLOW-UP: ICD-10-CM

## 2025-01-21 DIAGNOSIS — N39.0 URINARY TRACT INFECTION WITH HEMATURIA, SITE UNSPECIFIED: ICD-10-CM

## 2025-01-21 LAB
25(OH)D3 SERPL-MCNC: 75 NG/ML (ref 30–100)
ANA SER QL HEP2 SUBST: NEGATIVE
C3 SERPL-MCNC: 135 MG/DL (ref 87–200)
C4 SERPL-MCNC: 20 MG/DL (ref 10–50)
EST. AVERAGE GLUCOSE BLD GHB EST-MCNC: 71 MG/DL
HBA1C MFR BLD: 4.1 %
HOLD SPECIMEN: NORMAL
PROT SERPL-MCNC: 6.4 G/DL (ref 6.4–8.2)
PROT UR-ACNC: 148 MG/DL (ref 5–25)
PTH-INTACT SERPL-MCNC: 72.9 PG/ML (ref 18.5–88)

## 2025-01-21 PROCEDURE — 3008F BODY MASS INDEX DOCD: CPT | Performed by: INTERNAL MEDICINE

## 2025-01-21 PROCEDURE — 1125F AMNT PAIN NOTED PAIN PRSNT: CPT | Performed by: INTERNAL MEDICINE

## 2025-01-21 PROCEDURE — 1159F MED LIST DOCD IN RCRD: CPT | Performed by: INTERNAL MEDICINE

## 2025-01-21 PROCEDURE — 99214 OFFICE O/P EST MOD 30 MIN: CPT | Performed by: INTERNAL MEDICINE

## 2025-01-21 PROCEDURE — 3078F DIAST BP <80 MM HG: CPT | Performed by: INTERNAL MEDICINE

## 2025-01-21 PROCEDURE — 3044F HG A1C LEVEL LT 7.0%: CPT | Performed by: INTERNAL MEDICINE

## 2025-01-21 PROCEDURE — 1160F RVW MEDS BY RX/DR IN RCRD: CPT | Performed by: INTERNAL MEDICINE

## 2025-01-21 PROCEDURE — 3074F SYST BP LT 130 MM HG: CPT | Performed by: INTERNAL MEDICINE

## 2025-01-21 PROCEDURE — 1036F TOBACCO NON-USER: CPT | Performed by: INTERNAL MEDICINE

## 2025-01-21 PROCEDURE — 1123F ACP DISCUSS/DSCN MKR DOCD: CPT | Performed by: INTERNAL MEDICINE

## 2025-01-21 RX ORDER — CEPHALEXIN 500 MG/1
500 CAPSULE ORAL 3 TIMES DAILY
Qty: 21 CAPSULE | Refills: 0 | Status: SHIPPED | OUTPATIENT
Start: 2025-01-21 | End: 2025-01-21 | Stop reason: DRUGHIGH

## 2025-01-21 RX ORDER — CEPHALEXIN 500 MG/1
500 CAPSULE ORAL 4 TIMES DAILY
Qty: 40 CAPSULE | Refills: 0 | Status: SHIPPED | OUTPATIENT
Start: 2025-01-21 | End: 2025-01-31

## 2025-01-21 ASSESSMENT — PAIN SCALES - GENERAL: PAINLEVEL_OUTOF10: 1

## 2025-01-21 ASSESSMENT — PATIENT HEALTH QUESTIONNAIRE - PHQ9
SUM OF ALL RESPONSES TO PHQ9 QUESTIONS 1 AND 2: 0
1. LITTLE INTEREST OR PLEASURE IN DOING THINGS: NOT AT ALL
2. FEELING DOWN, DEPRESSED OR HOPELESS: NOT AT ALL

## 2025-01-21 NOTE — PROGRESS NOTES
History of Present Illness:  Chief Complaint   Patient presents with    Right Hand - Numbness    Left Hand - Numbness       Patient presents today for evaluation of  right middle and left index and middle finger   pain. Endorses catching of the digits with flexion. Denies any traumatic event or injury. The patient notes that symptoms are worse with periods of disuse and in the morning.   She also notes occasional numbness and tingling that seems to resolve with arm repositioning.    Past Medical History:   Diagnosis Date    Acute upper respiratory infection, unspecified 09/25/2019    Acute upper respiratory infection    Acute upper respiratory infection, unspecified 10/11/2016    Acute upper respiratory infection    COPD (chronic obstructive pulmonary disease) (Multi)     Diabetes mellitus (Multi)     Encounter for screening mammogram for malignant neoplasm of breast 03/10/2015    Visit for screening mammogram    Morbid (severe) obesity due to excess calories (Multi) 09/17/2018    Morbid or severe obesity due to excess calories    Personal history of nicotine dependence 10/26/2020    Personal history of nicotine dependence    Personal history of other diseases of the respiratory system     Personal history of asthma    Personal history of other drug therapy 08/17/2020    History of pneumococcal vaccination    Personal history of other endocrine, nutritional and metabolic disease     History of diabetes mellitus    Personal history of other specified conditions 10/04/2016    History of edema    Personal history of other specified conditions 07/15/2019    History of abnormal mammogram       Medication Documentation Review Audit       Reviewed by Tamia Iqbal CMA (Medical Assistant) on 01/20/25 at 1121      Medication Order Taking? Sig Documenting Provider Last Dose Status   albuterol 90 mcg/actuation inhaler [id number] No inhale 1 to 2 puffs by mouth and INTO THE LUNGS every 4 to 6 hours if needed Historical  "Provider, MD Taking Active   amiodarone (Pacerone) 200 mg tablet [id number]  Take 1 tablet (200 mg) by mouth once daily. Terra Young MD  Active   aspirin 81 mg EC tablet [id number]  Take 1 tablet (81 mg) by mouth once daily. Historical MD Yisel  Active   atorvastatin (Lipitor) 20 mg tablet [id number]  Take 1 tablet (20 mg) by mouth once daily at bedtime. Terra Young MD  Active   BD Alcohol Swabs pads, medicated [id number] No  Historical Provider, MD Taking Active   BD Dorothy 2nd Gen Pen Needle 32 gauge x 5/32\" needle [id number] No Use with vitamin B12 injections monthly Siobhan Palafox PA-C Taking Active   calcium carbonate-vitamin D3 1,000 mg-20 mcg (800 unit) tablet [id number] No Take 1 tablet by mouth 2 times a day. Historical MD Yisel Taking Active   cholecalciferol (Vitamin D3) 25 MCG (1000 UT) tablet [id number]  Take 1 tablet (1,000 Units) by mouth once daily. Historical Provider, MD  Active   cyanocobalamin (Dodex) 1,000 mcg/mL injection [id number] No Inject 1 mL (1,000 mcg) into the muscle every 30 (thirty) days. Terra Young MD Taking Active   dapsone 100 mg tablet [id number] No Take 1 tablet (100 mg) by mouth once daily in the morning. Take before meals. Historical MD Yisel Taking Active   dapsone 25 mg tablet [id number] No Take 2 tablets (50 mg) by mouth once daily in the morning. Take before meals. Historical MD Yisel Taking Active   dextromethorphan-guaifenesin (Robitussin DM)  mg/5 mL oral liquid [id number]  Take 5 mL by mouth 3 times a day as needed for cough. Terra Young MD  Active   docusate sodium (Colace) 100 mg capsule [id number] No Take 1 capsule (100 mg) by mouth 2 times a day as needed. Manpreet Morillo MD Taking Active   ferrous sulfate 325 (65 Fe) MG EC tablet [id number] No Take 65 mg by mouth once daily. Historical MD Yisel Taking Active   fluticasone-umeclidin-vilanter (Trelegy Ellipta) 200-62.5-25 mcg blister with device [id number]  INHALE 1 PUFF BY MOUTH IN THE " "MORNING Rinse mouth after taking dose Nahum Méndez MD  Active   furosemide (Lasix) 80 mg tablet [id number]  Take 1 tablet (80 mg) by mouth once daily. Terra Young MD  Active   gabapentin (Neurontin) 300 mg capsule [id number] No take 1 capsule by mouth three times a day for 90 DAYS Bi Tapia MD Taking Active   glimepiride (Amaryl) 2 mg tablet [id number]  Take 1 tablet (2 mg) by mouth once daily in the morning. Take before meals. Bi Tapia MD  Active   insulin glargine (Basaglar KwikPen U-100 Insulin) 100 unit/mL (3 mL) pen [id number] No Inject 15 units daily Take as directed per insulin instructions. JOSELITO Gusman-CNP Taking Active   metFORMIN (Glucophage) 1,000 mg tablet [id number]  TAKE 1 TABLET BY MOUTH TWICE DAILY Bi Tapia MD  Active   nystatin (Mycostatin) ointment [id number] No Apply topically 2 times a day. Dia Tyler APRN-CNP Taking Active   potassium chloride CR (Klor-Con M20) 20 mEq ER tablet [id number]  Take 2 tablets (40 mEq) by mouth once daily. Do not crush or chew. Terra Young MD  Active   syringe with needle 3 mL 23 x 1\" syringe [id number] No Use to inject vitamin b12 once monthly Terra Young MD Taking Active   tirzepatide (Mounjaro) 5 mg/0.5 mL pen injector [id number]  Inject 5 mg under the skin 1 (one) time per week. Bi Tapia MD  Active   topiramate (Topamax) 100 mg tablet [id number]  Take 1 tablet (100 mg) by mouth 2 times a day. Angely Hand MD  Active   triamcinolone (Kenalog) 0.1 % ointment [id number] No APPLY 1 APPLICATOR AS NEEDED TOPICALLY ONCE DAILY AS NEEDED UNDER DENTAL TRAYS Historical Provider, MD Taking Active   warfarin (Coumadin) 5 mg tablet [id number] No Take 1 tab daily or directed as coumadin clinic Terra Yougn MD Taking Active   zinc oxide 20 % ointment [id number] No Apply 1 Application topically every 8 hours. Historical Provider, MD Taking Active                    No Known Allergies    Social History "     Socioeconomic History    Marital status:      Spouse name: Not on file    Number of children: Not on file    Years of education: Not on file    Highest education level: Not on file   Occupational History    Not on file   Tobacco Use    Smoking status: Former     Current packs/day: 0.00     Types: Cigarettes     Quit date: 2023     Years since quittin.3    Smokeless tobacco: Never   Vaping Use    Vaping status: Never Used   Substance and Sexual Activity    Alcohol use: Never    Drug use: Never    Sexual activity: Not on file   Other Topics Concern    Not on file   Social History Narrative    Not on file     Social Drivers of Health     Financial Resource Strain: Low Risk  (1/3/2024)    Overall Financial Resource Strain (CARDIA)     Difficulty of Paying Living Expenses: Not hard at all   Food Insecurity: No Food Insecurity (2024)    Received from [facility]    Hunger Vital Sign     Worried About Running Out of Food in the Last Year: Never true     Ran Out of Food in the Last Year: Never true   Transportation Needs: No Transportation Needs (2024)    Received from [facility]    PRAPARE - Transportation     Lack of Transportation (Medical): No     Lack of Transportation (Non-Medical): No   Physical Activity: Not on file   Stress: Not on file   Social Connections: Not on file   Intimate Partner Violence: Not At Risk (1/3/2024)    Humiliation, Afraid, Rape, and Kick questionnaire     Fear of Current or Ex-Partner: No     Emotionally Abused: No     Physically Abused: No     Sexually Abused: No   Housing Stability: Low Risk  (2024)    Received from [facility]    Housing Stability Vital Sign     Unable to Pay for Housing in the Last Year: No     Number of Times Moved in the Last Year: 0     Homeless in the Last Year: No       Past Surgical History:   Procedure Laterality Date    BI MAMMO GUIDED BREAST RIGHT LOCALIZATION Right 2018    BI MAMMO GUIDED LOCALIZATION  BREAST RIGHT 12/13/2018 [facility]    BREAST LUMPECTOMY  11/14/2012    Breast Surgery Lumpectomy    COLONOSCOPY  06/25/2013    Complete Colonoscopy    NASAL SEPTUM SURGERY  11/14/2012    Nasal Septal Deviation Repair    OTHER SURGICAL HISTORY  12/14/2018    Breast biopsy excisional    OTHER SURGICAL HISTORY  01/08/2021    Cataract surgery    OTHER SURGICAL HISTORY  08/17/2020    Renal lithotripsy    TUBAL LIGATION  11/14/2012    Tubal Ligation          Review of Systems   GENERAL: Negative for malaise, significant weight loss, fever  MUSCULOSKELETAL: see HPI  NEURO:  Negative     Physical Examination  Constitutional: Appears well-developed and well-nourished.  Head: Normocephalic and atraumatic.  Eyes: EOMI grossly  Cardiovascular: Intact distal pulses.   Respiratory: Effort normal. No respiratory distress.  Neurologic: Alert and oriented to person, place, and time.  Skin: Skin is warm and dry.  Hematologic / Lymphatic: No lymphedema, lymphangitis.  Psychiatric: normal mood and affect. Behavior is normal.   Musculoskeletal:  bilateral hand  No obvious atrophy, no skin changes  Tenderness, palpable nodule along the  right middle finger and left index and middle finger  flexor tendon sheaths  Palpable catching/crepitus with active flexion and extension   No subluxation of the extensor tendon appreciated over the MP/IP joints.  Sensation intact distally.  Capillary refill less than 2 seconds distally.  Negative Tinel's at level of carpal tunnel.  Negative Durkan's/Phalen's.     Assessment:  Patient with right middle finger and left index and middle finger stenosing tenosynovitis.  Likely mild carpal tunnel syndrome without specific exam findings at this time.     Plan:  Nature of the diagnosis was discussed with the patient.  We also discussed the risks and benefits of treatment options for trigger finger.  These include splinting, corticosteroid injections and, if conservative options fail, surgical release.   Longer standing trigger finger reduces likelihood of successful resolution with corticosteroid injection.    Patients with diabetes have lower chance of permanent successful resolution of symptoms with injections in addition to the fact that corticosteroid injection will raise blood glucose levels for about 5 days after injection.     She would like to proceed with corticosteroid injections for her right middle finger as well as her left index and middle fingers.  She also begin with overnight bracing for mild carpal tunnel symptoms.  She will return to the office if any persistent or recurrent symptoms.    Patient ID: Frannie Blanco is a 74 y.o. female.    Hand / UE Inj/Asp: L index A1 for trigger finger on 1/20/2025 11:00 PM  Indications: pain (triggering)  Details: 25 G needle, volar approach  Medications: 1 mL lidocaine 10 mg/mL (1 %); 40 mg triamcinolone acetonide 40 mg/mL  Outcome: tolerated well, no immediate complications  Procedure, treatment alternatives, risks and benefits explained, specific risks discussed. Consent was given by the patient. Immediately prior to procedure a time out was called to verify the correct patient, procedure, equipment, support staff and site/side marked as required.       Hand / UE Inj/Asp: bilateral long A1 for trigger finger on 1/20/2025 11:01 PM  Indications: pain (triggering)  Details: 25 G needle, volar approach  Medications (Right): 40 mg triamcinolone acetonide 40 mg/mL; 0.5 mL lidocaine 10 mg/mL (1 %)  Medications (Left): 40 mg triamcinolone acetonide 40 mg/mL; 0.5 mL lidocaine 10 mg/mL (1 %)  Outcome: tolerated well, no immediate complications  Procedure, treatment alternatives, risks and benefits explained, specific risks discussed. Consent was given by the patient. Immediately prior to procedure a time out was called to verify the correct patient, procedure, equipment, support staff and site/side marked as required.

## 2025-01-21 NOTE — PATIENT INSTRUCTIONS
It was nice to see you in the office today!  As discussed during our visit...     Regarding the diabetes:  STOP glimepiride (amaryl) and metformin due to your kidney function.  START on jardiance 25mg daily.  -- if this is too expensive, let us know and we can work on some patient assistance for you.    Regarding the UTI and cellulitis:  START keflex 500mg four times a day for 10 days for cellulitis and UTI.    Call the office on Friday and let us know how you feel. If no improvement in your leg, Dr Muñoz may order a CT scan of your left leg.  ---------------------------  Please contact the office if there are any questions regarding your care or treatment.   [facility] [phone].

## 2025-01-21 NOTE — PROGRESS NOTES
Patient ID:   Frannie Blanco is a 74 y.o. female with PMH remarkable for COPD on chronic 02 @ 5L, tobacco dependence, CHF, Afib, DM2, HTN, bullous pemphigoid, Multiple splenic artery aneurysms (stable 10/30/2024), lung nodules who presents to the office today for Hospital Follow-up (Cellulitis has returned, /Discuss recent labs and nephrology ).    LAST OFFICE VISIT: 2024 for hospitalization FU.     Hospitalization Discharge Follow Up:  Date(s):  to 2024  Location: [facility]  Reason Presented to ER: cat scratch with pain, tenderness, edema of LLE  Synopsis: pt had cat scratched her left lower extremity while she was at sleep, the there a clear scratch marker, and surrounding erythema, warmth, swelling and tenderness spread to a big area of about 15 X12 cm, she has no fever or chills, Labs showed WBCs 15.07, Hb 11.2, INR 2.8, Creatinine 1.7, Bun 28, glucose 152, iv Zosyn Doxycycline were administered and she was recommended to be hospitalized for further management. She was admitted to the regular nursing floor. She received IV antibiotics. She had improvement in erythema and warmth of her left lower extremity. Venous evaluation was negative for DVT. Leukocytosis improved and resolved. Blood cultures remain negative during the hospitalization. She is being transitioned to oral antibiotics today. She was maintained on anticoagulation per pharmacy's recommendations. INR is therapeutic today at 2.4. She will continue her home regimen, discussed with pharmacy. Repeat INR in 2 days. Prior to discharge home health was offered and the patient declined this option. She will follow-up with primary care after discharge.   Recommended FU: PCP    Has a UTI and keflex was started. Called into pharmacy today.    Social History     Tobacco Use    Smoking status: Former     Current packs/day: 0.00     Types: Cigarettes     Quit date: 2023     Years since quittin.3    Smokeless tobacco: Never   Vaping Use     "Vaping status: Never Used   Substance Use Topics    Alcohol use: Never    Drug use: Never     Review of Systems  Visit Vitals  /79   Pulse 87   Resp 18   Ht 1.651 m (5' 5\")   Wt 143 kg (314 lb 3.2 oz)   SpO2 96%   BMI 52.29 kg/m²   OB Status Unknown   Smoking Status Former   BSA 2.56 m²     No Known Allergies   Physical Exam  Current Outpatient Medications   Medication Instructions    albuterol 90 mcg/actuation inhaler inhale 1 to 2 puffs by mouth and INTO THE LUNGS every 4 to 6 hours if needed    amiodarone (PACERONE) 200 mg, oral, Daily    aspirin 81 mg EC tablet 1 tablet, Daily    atorvastatin (LIPITOR) 20 mg, oral, Nightly    BD Alcohol Swabs pads, medicated     BD Dorothy 2nd Gen Pen Needle 32 gauge x 5/32\" needle Use with vitamin B12 injections monthly    calcium carbonate-vitamin D3 1,000 mg-20 mcg (800 unit) tablet 1 tablet, 2 times daily    cephalexin (KEFLEX) 500 mg, oral, 4 times daily    cholecalciferol (VITAMIN D3) 1,000 Units, Daily    cyanocobalamin (DODEX) 1,000 mcg, intramuscular, Every 30 days    dapsone 25 mg tablet 2 tablets, Daily before breakfast    dextromethorphan-guaifenesin (Robitussin DM)  mg/5 mL oral liquid 5 mL, oral, 3 times daily PRN    docusate sodium (COLACE) 100 mg, 2 times daily PRN    empagliflozin (JARDIANCE) 25 mg, oral, Daily    ferrous sulfate 65 mg, Daily    fluticasone-umeclidin-vilanter (Trelegy Ellipta) 200-62.5-25 mcg blister with device INHALE 1 PUFF BY MOUTH IN THE MORNING Rinse mouth after taking dose    furosemide (LASIX) 80 mg, oral, Daily    gabapentin (Neurontin) 300 mg capsule take 1 capsule by mouth three times a day for 90 DAYS    insulin glargine (Basaglar KwikPen U-100 Insulin) 100 unit/mL (3 mL) pen Inject 15 units daily Take as directed per insulin instructions.    metFORMIN (Glucophage) 1,000 mg tablet TAKE 1 TABLET BY MOUTH TWICE DAILY    Mounjaro 5 mg, subcutaneous, Once Weekly    nystatin (Mycostatin) ointment Topical, 2 times daily    " "potassium chloride CR (Klor-Con M20) 20 mEq ER tablet 40 mEq, oral, Daily, Do not crush or chew.    syringe with needle 3 mL 23 x 1\" syringe Use to inject vitamin b12 once monthly    topiramate (TOPAMAX) 100 mg, oral, 2 times daily    triamcinolone (Kenalog) 0.1 % ointment APPLY 1 APPLICATOR AS NEEDED TOPICALLY ONCE DAILY AS NEEDED UNDER DENTAL TRAYS    warfarin (Coumadin) 5 mg tablet Take 1 tab daily or directed as coumadin clinic    zinc oxide 20 % ointment 1 Application, Every 8 hours      Lab Results   Component Value Date    WBC 11.5 (H) 01/20/2025    HGB 12.5 01/20/2025    HCT 41.0 01/20/2025     01/20/2025    CHOL 129 10/26/2024    TRIG 80 10/26/2024    HDL 52.7 10/26/2024    ALT 11 01/20/2025    AST 10 01/20/2025     01/20/2025    K 4.1 01/20/2025     01/20/2025    CREATININE 1.77 (H) 01/20/2025    BUN 26 (H) 01/20/2025    CO2 19 (L) 01/20/2025    TSH 2.05 01/20/2025    INR 3.10 01/22/2025    HGBA1C 4.1 01/20/2025       Problem List Items Addressed This Visit             ICD-10-CM    Atrial fibrillation (Multi) I48.91     - c/w coumadin for stroke prevention  - c/w amiodarone for rate control         Type 2 diabetes mellitus with hyperglycemia (Multi) E11.65     - c/w lantus 15u, metformin 1g BID, mounjaro weekly  - last HgbA1c was 4.1 on 1/20/2025  - Patient educated about hypoglycemia and hyperglycemia  - advised 1800 ADA diet  - Education and counseling was done  - Annual Diabetic retinopathy exam  - Annual Podiatry foot exam and self check every day.  - Regular exercise is encouraged  - Regular home monitoring of the blood sugar is advised  - Advised to take the medication as advised and compliance as needed.         Relevant Medications    empagliflozin (Jardiance) 25 mg    Stage 3 chronic kidney disease, unspecified whether stage 3a or 3b CKD (Multi) N18.30     - reviewed last kidney function  - saw nephro today and they ordered an US of kidney  - will call pt tomorrow to assist with " scheduling this         Hospital discharge follow-up Z09     Date(s): 12/22 to 12/25/2024  Location: [facility]  Reason Presented to ER: cat scratch with pain, tenderness, edema of LLE  Synopsis: pt had cat scratched her left lower extremity while she was at sleep, the there a clear scratch marker, and surrounding erythema, warmth, swelling and tenderness spread to a big area of about 15 X12 cm, she has no fever or chills, Labs showed WBCs 15.07, Hb 11.2, INR 2.8, Creatinine 1.7, Bun 28, glucose 152, iv Zosyn Doxycycline were administered and she was recommended to be hospitalized for further management. She was admitted to the regular nursing floor. She received IV antibiotics. She had improvement in erythema and warmth of her left lower extremity. Venous evaluation was negative for DVT. Leukocytosis improved and resolved. Blood cultures remain negative during the hospitalization. She is being transitioned to oral antibiotics today. She was maintained on anticoagulation per pharmacy's recommendations. INR is therapeutic today at 2.4. She will continue her home regimen, discussed with pharmacy. Repeat INR in 2 days. Prior to discharge home health was offered and the patient declined this option. She will follow-up with primary care after discharge.   Recommended FU: PCP         Cellulitis of left lower extremity L03.116     - CHANGE keflex to 500mg QID x10d  - CANCEL prev script that was sent earlier today for UTI as it appears that she still has some cellulitis  - monitor the area  - call on Friday if no imp         Relevant Medications    cephalexin (Keflex) 500 mg capsule       --------------------  Written by Deandra Baptiste RN, acting as a scribe for Dr. Muñoz. This note accurately reflects the work and decisions made by Dr. Muñoz.     I, Dr. Muñoz, attest all medical record entries made by the scribe were under my direction and were personally dictated by me. I have reviewed the chart and agree that the record  accurately reflects my performance of the history, physical exam, and assessment and plan.

## 2025-01-22 ENCOUNTER — ANTICOAGULATION - WARFARIN VISIT (OUTPATIENT)
Dept: CARDIOLOGY | Facility: CLINIC | Age: 75
End: 2025-01-22
Payer: MEDICARE

## 2025-01-22 DIAGNOSIS — Z79.01 LONG TERM (CURRENT) USE OF ANTICOAGULANTS: ICD-10-CM

## 2025-01-22 DIAGNOSIS — I26.99 PULMONARY EMBOLISM WITHOUT ACUTE COR PULMONALE, UNSPECIFIED CHRONICITY, UNSPECIFIED PULMONARY EMBOLISM TYPE (MULTI): Primary | ICD-10-CM

## 2025-01-22 PROBLEM — B33.8 RSV (RESPIRATORY SYNCYTIAL VIRUS INFECTION): Status: RESOLVED | Noted: 2024-01-03 | Resolved: 2025-01-22

## 2025-01-22 PROBLEM — L03.116 CELLULITIS OF LEFT LOWER EXTREMITY: Status: ACTIVE | Noted: 2025-01-22

## 2025-01-22 PROBLEM — J11.1 INFLUENZA: Status: RESOLVED | Noted: 2023-06-20 | Resolved: 2025-01-22

## 2025-01-22 LAB
ALBUMIN MFR UR ELPH: 53.9 %
ALBUMIN: 3.4 G/DL (ref 3.4–5)
ALPHA 1 GLOBULIN: 0.5 G/DL (ref 0.2–0.6)
ALPHA 2 GLOBULIN: 0.9 G/DL (ref 0.4–1.1)
ALPHA1 GLOB MFR UR ELPH: 3.2 %
ALPHA2 GLOB MFR UR ELPH: 9.8 %
B-GLOBULIN MFR UR ELPH: 21 %
BACTERIA UR CULT: NORMAL
BETA GLOBULIN: 1 G/DL (ref 0.5–1.2)
GAMMA GLOB MFR UR ELPH: 12.1 %
GAMMA GLOBULIN: 0.7 G/DL (ref 0.5–1.4)
IMMUNOFIXATION COMMENT: NORMAL
IMMUNOFIXATION COMMENT: NORMAL
INR IN PPP BY COAGULATION ASSAY EXTERNAL: 3.1
PATH REVIEW - SERUM IMMUNOFIXATION: NORMAL
PATH REVIEW - URINE IMMUNOFIXATION: NORMAL
PATH REVIEW-SERUM PROTEIN ELECTROPHORESIS: NORMAL
PATH REVIEW-URINE PROTEIN ELECTROPHORESIS: NORMAL
PROTEIN ELECTROPHORESIS COMMENT: NORMAL
PROTHROMBIN TIME (PT) IN PPP BY COAGULATION ASSAY EXTERNAL: NORMAL
URINE ELECTROPHORESIS COMMENT: NORMAL

## 2025-01-22 RX ORDER — DAPAGLIFLOZIN 10 MG/1
10 TABLET, FILM COATED ORAL DAILY
Qty: 90 TABLET | Refills: 1 | Status: CANCELLED | OUTPATIENT
Start: 2025-01-22 | End: 2025-07-21

## 2025-01-22 NOTE — ASSESSMENT & PLAN NOTE
Date(s): 12/22 to 12/25/2024  Location: [facility]  Reason Presented to ER: cat scratch with pain, tenderness, edema of LLE  Synopsis: pt had cat scratched her left lower extremity while she was at sleep, the there a clear scratch marker, and surrounding erythema, warmth, swelling and tenderness spread to a big area of about 15 X12 cm, she has no fever or chills, Labs showed WBCs 15.07, Hb 11.2, INR 2.8, Creatinine 1.7, Bun 28, glucose 152, iv Zosyn Doxycycline were administered and she was recommended to be hospitalized for further management. She was admitted to the regular nursing floor. She received IV antibiotics. She had improvement in erythema and warmth of her left lower extremity. Venous evaluation was negative for DVT. Leukocytosis improved and resolved. Blood cultures remain negative during the hospitalization. She is being transitioned to oral antibiotics today. She was maintained on anticoagulation per pharmacy's recommendations. INR is therapeutic today at 2.4. She will continue her home regimen, discussed with pharmacy. Repeat INR in 2 days. Prior to discharge home health was offered and the patient declined this option. She will follow-up with primary care after discharge.   Recommended FU: PCP

## 2025-01-22 NOTE — ASSESSMENT & PLAN NOTE
- c/w lantus 15u, metformin 1g BID, mounjaro weekly  - last HgbA1c was 4.1 on 1/20/2025  - Patient educated about hypoglycemia and hyperglycemia  - advised 1800 ADA diet  - Education and counseling was done  - Annual Diabetic retinopathy exam  - Annual Podiatry foot exam and self check every day.  - Regular exercise is encouraged  - Regular home monitoring of the blood sugar is advised  - Advised to take the medication as advised and compliance as needed.

## 2025-01-22 NOTE — ASSESSMENT & PLAN NOTE
- CHANGE keflex to 500mg QID x10d  - CANCEL prev script that was sent earlier today for UTI as it appears that she still has some cellulitis  - monitor the area  - call on Friday if no imp

## 2025-01-22 NOTE — PROGRESS NOTES
Patient identification verified with 2 identifiers.    Location: [facility] Patient Self-Testing Program [phone]    Referring Physician: DR. LUCI VÁZQUEZ  Enrollment/ Re-enrollment date: 7/31/2025   INR Goal: 2.0-3.0  INR monitoring is per [facility] protocol.  Anticoagulation Medication: warfarin  Indication: Pulmonary Embolism (PE)    Subjective   Bleeding signs/symptoms: No    Bruising: No   Major bleeding event: No  Thrombosis signs/symptoms: No  Thromboembolic event: No  Missed doses: No  Extra doses: No  Medication changes: Yes  PT STARTED CEPHALEXIN YESTERDAY 1/21 FOR CELLULITIS. . SHE WILL BE ON IT FOR 10 DAYS TOTAL.  Dietary changes: Yes  PT WILL INCREASE HER SPINACH INTAKE WHILE ON ABX  Change in health: No  Change in activity: No  Alcohol: No  Other concerns: No    Upcoming Procedures:  Does the Patient Have any upcoming procedures that require interruption in anticoagulation therapy? no  Does the patient require bridging? no      Anticoagulation Summary  As of 1/22/2025      INR goal:  2.0-3.0   TTR:  74.5% (1.1 y)   INR used for dosing:  3.10 (1/22/2025)   Weekly warfarin total:  17.5 mg               Assessment/Plan   Supratherapeutic     1. New dose: no change  SPOKE TO PT. SHE WILL EAT EXTRA SPINACH WHILE ON ABX. RETEST TOMORROW. IF INR REMAINS ELEVATED PT TWD WILL NEED TO BE DECREASED.  I SENT DR. VÁZQUEZ A SECURE CHAT REQUESTING A NEW PRESCRIPTION BE SENT TO PHARMACY FOR 2.5MG TABLETS. PT VERBALIZED UNDERSTANDING OF INSTRUCTIONS  2. Next INR:  1/23/25      Education provided to patient during the visit:  Patient instructed to call in interim with questions, concerns and changes.   Patient educated on interactions between medications and warfarin.   Patient educated on dietary consistency in vitamin k consumption.   Patient educated on compliance with dosing, follow up appointments, and prescribed plan of care.

## 2025-01-22 NOTE — ASSESSMENT & PLAN NOTE
- reviewed last kidney function  - saw nephro today and they ordered an US of kidney  - will call pt tomorrow to assist with scheduling this

## 2025-01-23 ENCOUNTER — ANTICOAGULATION - WARFARIN VISIT (OUTPATIENT)
Dept: CARDIOLOGY | Facility: CLINIC | Age: 75
End: 2025-01-23
Payer: MEDICARE

## 2025-01-23 DIAGNOSIS — Z79.01 LONG TERM (CURRENT) USE OF ANTICOAGULANTS: ICD-10-CM

## 2025-01-23 DIAGNOSIS — I26.99 PULMONARY EMBOLISM WITHOUT ACUTE COR PULMONALE, UNSPECIFIED CHRONICITY, UNSPECIFIED PULMONARY EMBOLISM TYPE (MULTI): Primary | ICD-10-CM

## 2025-01-23 NOTE — PROGRESS NOTES
Patient identification verified with 2 identifiers.    Location: [facility] Patient Self-Testing Program [phone]    Referring Physician: DR. LUCI VÁZQUEZ  Enrollment/ Re-enrollment date: 2025   INR Goal: 2.0-3.0  INR monitoring is per [facility] protocol.  Anticoagulation Medication: warfarin  Indication: Pulmonary Embolism (PE)    Subjective   Bleeding signs/symptoms: No    Bruising: No   Major bleeding event: No  Thrombosis signs/symptoms: No  Thromboembolic event: No  Missed doses: No  Extra doses: No  Medication changes: Yes  Pt on Keflex, day3/10.  Dietary changes: No  Change in health: No  Change in activity: No  Alcohol: No  Other concerns: No    Upcoming Procedures:  Does the Patient Have any upcoming procedures that require interruption in anticoagulation therapy? no  Does the patient require bridging? no      Anticoagulation Summary  As of 2025      INR goal:  2.0-3.0   TTR:  74.5% (1.1 y)   INR used for dosin.80 (2025)   Weekly warfarin total:  17.5 mg               Assessment/Plan   Therapeutic     1. New dose: Patient states she will eat 1/2 can of spinach every other day while she is on the Keflex.  Will maintain dose and patient will retest after the weekend().    2. Next INR:  4 days      Education provided to patient during the visit:  Patient instructed to call in interim with questions, concerns and changes.

## 2025-01-25 ENCOUNTER — HOSPITAL ENCOUNTER (OUTPATIENT)
Dept: RADIOLOGY | Facility: HOSPITAL | Age: 75
Discharge: HOME | End: 2025-01-25
Payer: MEDICARE

## 2025-01-25 DIAGNOSIS — N18.32 CHRONIC KIDNEY DISEASE, STAGE 3B (MULTI): ICD-10-CM

## 2025-01-25 PROCEDURE — 76770 US EXAM ABDO BACK WALL COMP: CPT

## 2025-01-25 PROCEDURE — 76770 US EXAM ABDO BACK WALL COMP: CPT | Performed by: STUDENT IN AN ORGANIZED HEALTH CARE EDUCATION/TRAINING PROGRAM

## 2025-01-27 ENCOUNTER — ANTICOAGULATION - WARFARIN VISIT (OUTPATIENT)
Dept: CARDIOLOGY | Facility: CLINIC | Age: 75
End: 2025-01-27
Payer: MEDICARE

## 2025-01-27 DIAGNOSIS — Z79.01 LONG TERM (CURRENT) USE OF ANTICOAGULANTS: ICD-10-CM

## 2025-01-27 DIAGNOSIS — I26.99 PULMONARY EMBOLISM WITHOUT ACUTE COR PULMONALE, UNSPECIFIED CHRONICITY, UNSPECIFIED PULMONARY EMBOLISM TYPE (MULTI): Primary | ICD-10-CM

## 2025-01-27 LAB
INR IN PPP BY COAGULATION ASSAY EXTERNAL: 1.7
PROTHROMBIN TIME (PT) IN PPP BY COAGULATION ASSAY EXTERNAL: NORMAL

## 2025-01-27 NOTE — PROGRESS NOTES
Patient identification verified with 2 identifiers.    Location: [facility] Patient Self-Testing Program [phone]    Referring Physician: DR. VÁZQUEZ  Enrollment/ Re-enrollment date: 25   INR Goal: 2.0-3.0  INR monitoring is per [facility] protocol.  Anticoagulation Medication: warfarin  Indication: Pulmonary Embolism (PE)    Subjective   Bleeding signs/symptoms: No    Bruising: No   Major bleeding event: No  Thrombosis signs/symptoms: No  Thromboembolic event: No  Missed doses: No  Extra doses: No  Medication changes: Yes  PT HAS ABOUT 3 MORE DAYS OF KEFLEX.  Dietary changes: No  PT ATE SPINACH AS INSTRUCTED.  SHE WILL NOW JUST EAT WHATEVER GREEN VEGETABLES SHE RECEIVES WITH MEALS ON WHEELS AND IF SHE DOESN'T GET ANY GREEN VEGETABLES THEN WILL EAT A LITTLE SPINACH WHILE TAKING KEFLEX.  Change in health: No  Change in activity: No  Alcohol: No  Other concerns: No    Upcoming Procedures:  Does the Patient Have any upcoming procedures that require interruption in anticoagulation therapy? no  Does the patient require bridging? no      Anticoagulation Summary  As of 2025      INR goal:  2.0-3.0   TTR:  74.5% (1.1 y)   INR used for dosin.70 (2025)   Weekly warfarin total:  17.5 mg               Assessment/Plan   Subtherapeutic     1. New dose: no change    2. Next INR:  4 DAYS    I SPOKE TO PT CONFIRMING CURRENT WARFARIN DOSING.   PT WILL MAINTAIN CURRENT  WEEKLY DOSE SCHEDULE.  PT VERBALIZED UNDERSTANDING OF THESE DOSING INSTRUCTIONS.  Pt  NEEDS IN PERSON METER REVIEW.   SECURE CHAT SENT TO [facility] RN TO CALL PT TO SCHEDULE ASAP BASED ON PT'S DAUGHTER'S AVAILABILITY TO BRING HER  Education provided to patient during the visit:  Patient instructed to call in interim with questions, concerns and changes.   Patient educated on interactions between medications and warfarin.   Patient educated on dietary consistency in vitamin k consumption.   Patient educated on signs of bleeding/clotting.

## 2025-01-29 ENCOUNTER — APPOINTMENT (OUTPATIENT)
Dept: RADIOLOGY | Facility: HOSPITAL | Age: 75
End: 2025-01-29
Payer: MEDICARE

## 2025-01-29 ENCOUNTER — TELEPHONE (OUTPATIENT)
Dept: PRIMARY CARE | Facility: CLINIC | Age: 75
End: 2025-01-29
Payer: MEDICARE

## 2025-01-29 ENCOUNTER — HOSPITAL ENCOUNTER (EMERGENCY)
Facility: HOSPITAL | Age: 75
Discharge: HOME | End: 2025-01-29
Payer: MEDICARE

## 2025-01-29 VITALS
BODY MASS INDEX: 45.99 KG/M2 | HEIGHT: 67 IN | WEIGHT: 293 LBS | SYSTOLIC BLOOD PRESSURE: 122 MMHG | TEMPERATURE: 98.3 F | HEART RATE: 87 BPM | RESPIRATION RATE: 20 BRPM | DIASTOLIC BLOOD PRESSURE: 74 MMHG | OXYGEN SATURATION: 98 %
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
## 2025-01-29 DIAGNOSIS — J10.1 INFLUENZA A: ICD-10-CM

## 2025-01-29 DIAGNOSIS — L02.91 ABSCESS: ICD-10-CM

## 2025-01-29 LAB
ALBUMIN SERPL BCP-MCNC: 4.2 G/DL (ref 3.4–5)
ALP SERPL-CCNC: 72 U/L (ref 33–136)
ALT SERPL W P-5'-P-CCNC: 17 U/L (ref 7–45)
ANION GAP SERPL CALC-SCNC: 16 MMOL/L (ref 10–20)
AST SERPL W P-5'-P-CCNC: 15 U/L (ref 9–39)
BASOPHILS # BLD AUTO: 0.05 X10*3/UL (ref 0–0.1)
BASOPHILS NFR BLD AUTO: 0.4 %
BILIRUB SERPL-MCNC: 0.4 MG/DL (ref 0–1.2)
BUN SERPL-MCNC: 18 MG/DL (ref 6–23)
CALCIUM SERPL-MCNC: 9.6 MG/DL (ref 8.6–10.3)
CHLORIDE SERPL-SCNC: 103 MMOL/L (ref 98–107)
CO2 SERPL-SCNC: 23 MMOL/L (ref 21–32)
CREAT SERPL-MCNC: 1.66 MG/DL (ref 0.5–1.05)
EGFRCR SERPLBLD CKD-EPI 2021: 32 ML/MIN/1.73M*2
EOSINOPHIL # BLD AUTO: 0.08 X10*3/UL (ref 0–0.4)
EOSINOPHIL NFR BLD AUTO: 0.7 %
ERYTHROCYTE [DISTWIDTH] IN BLOOD BY AUTOMATED COUNT: 13.6 % (ref 11.5–14.5)
FLUAV RNA RESP QL NAA+PROBE: DETECTED
FLUBV RNA RESP QL NAA+PROBE: NOT DETECTED
GLUCOSE SERPL-MCNC: 233 MG/DL (ref 74–99)
HCT VFR BLD AUTO: 45.3 % (ref 36–46)
HGB BLD-MCNC: 14.2 G/DL (ref 12–16)
IMM GRANULOCYTES # BLD AUTO: 0.04 X10*3/UL (ref 0–0.5)
IMM GRANULOCYTES NFR BLD AUTO: 0.4 % (ref 0–0.9)
INR PPP: 1.5 (ref 0.9–1.1)
LACTATE SERPL-SCNC: 1.7 MMOL/L (ref 0.4–2)
LYMPHOCYTES # BLD AUTO: 0.69 X10*3/UL (ref 0.8–3)
LYMPHOCYTES NFR BLD AUTO: 6.2 %
MCH RBC QN AUTO: 32.1 PG (ref 26–34)
MCHC RBC AUTO-ENTMCNC: 31.3 G/DL (ref 32–36)
MCV RBC AUTO: 102 FL (ref 80–100)
MONOCYTES # BLD AUTO: 0.54 X10*3/UL (ref 0.05–0.8)
MONOCYTES NFR BLD AUTO: 4.8 %
NEUTROPHILS # BLD AUTO: 9.78 X10*3/UL (ref 1.6–5.5)
NEUTROPHILS NFR BLD AUTO: 87.5 %
NRBC BLD-RTO: 0 /100 WBCS (ref 0–0)
PLATELET # BLD AUTO: 348 X10*3/UL (ref 150–450)
POTASSIUM SERPL-SCNC: 4 MMOL/L (ref 3.5–5.3)
PROT SERPL-MCNC: 8 G/DL (ref 6.4–8.2)
PROTHROMBIN TIME: 17.1 SECONDS (ref 9.8–12.8)
RBC # BLD AUTO: 4.43 X10*6/UL (ref 4–5.2)
RSV RNA RESP QL NAA+PROBE: NOT DETECTED
SARS-COV-2 RNA RESP QL NAA+PROBE: NOT DETECTED
SODIUM SERPL-SCNC: 138 MMOL/L (ref 136–145)
WBC # BLD AUTO: 11.2 X10*3/UL (ref 4.4–11.3)

## 2025-01-29 PROCEDURE — 73502 X-RAY EXAM HIP UNI 2-3 VIEWS: CPT | Mod: RIGHT SIDE | Performed by: RADIOLOGY

## 2025-01-29 PROCEDURE — 87637 SARSCOV2&INF A&B&RSV AMP PRB: CPT | Performed by: PHYSICIAN ASSISTANT

## 2025-01-29 PROCEDURE — 10061 I&D ABSCESS COMP/MULTIPLE: CPT | Performed by: PHYSICIAN ASSISTANT

## 2025-01-29 PROCEDURE — 85025 COMPLETE CBC W/AUTO DIFF WBC: CPT | Performed by: PHYSICIAN ASSISTANT

## 2025-01-29 PROCEDURE — 2500000004 HC RX 250 GENERAL PHARMACY W/ HCPCS (ALT 636 FOR OP/ED): Performed by: PHYSICIAN ASSISTANT

## 2025-01-29 PROCEDURE — 80053 COMPREHEN METABOLIC PANEL: CPT | Performed by: PHYSICIAN ASSISTANT

## 2025-01-29 PROCEDURE — 85610 PROTHROMBIN TIME: CPT | Performed by: PHYSICIAN ASSISTANT

## 2025-01-29 PROCEDURE — 99284 EMERGENCY DEPT VISIT MOD MDM: CPT

## 2025-01-29 PROCEDURE — 87075 CULTR BACTERIA EXCEPT BLOOD: CPT | Mod: GENLAB,59 | Performed by: PHYSICIAN ASSISTANT

## 2025-01-29 PROCEDURE — 87077 CULTURE AEROBIC IDENTIFY: CPT | Mod: GENLAB | Performed by: PHYSICIAN ASSISTANT

## 2025-01-29 PROCEDURE — 73502 X-RAY EXAM HIP UNI 2-3 VIEWS: CPT | Mod: RT

## 2025-01-29 PROCEDURE — 36415 COLL VENOUS BLD VENIPUNCTURE: CPT | Performed by: PHYSICIAN ASSISTANT

## 2025-01-29 PROCEDURE — 83605 ASSAY OF LACTIC ACID: CPT | Performed by: PHYSICIAN ASSISTANT

## 2025-01-29 RX ORDER — LIDOCAINE HYDROCHLORIDE 10 MG/ML
10 INJECTION, SOLUTION INFILTRATION; PERINEURAL ONCE
Status: COMPLETED | OUTPATIENT
Start: 2025-01-29 | End: 2025-01-29

## 2025-01-29 RX ORDER — DOXYCYCLINE 100 MG/1
100 TABLET ORAL 2 TIMES DAILY
Qty: 14 TABLET | Refills: 0 | Status: SHIPPED | OUTPATIENT
Start: 2025-01-29 | End: 2025-02-05

## 2025-01-29 RX ADMIN — LIDOCAINE HYDROCHLORIDE 10 ML: 10 INJECTION, SOLUTION INFILTRATION; PERINEURAL at 17:15

## 2025-01-29 ASSESSMENT — PAIN SCALES - GENERAL
PAINLEVEL_OUTOF10: 5 - MODERATE PAIN
PAINLEVEL_OUTOF10: 0 - NO PAIN

## 2025-01-29 ASSESSMENT — COLUMBIA-SUICIDE SEVERITY RATING SCALE - C-SSRS
6. HAVE YOU EVER DONE ANYTHING, STARTED TO DO ANYTHING, OR PREPARED TO DO ANYTHING TO END YOUR LIFE?: NO
2. HAVE YOU ACTUALLY HAD ANY THOUGHTS OF KILLING YOURSELF?: NO
1. IN THE PAST MONTH, HAVE YOU WISHED YOU WERE DEAD OR WISHED YOU COULD GO TO SLEEP AND NOT WAKE UP?: NO

## 2025-01-29 ASSESSMENT — PAIN DESCRIPTION - LOCATION: LOCATION: LEG

## 2025-01-29 ASSESSMENT — PAIN DESCRIPTION - DESCRIPTORS: DESCRIPTORS: ACHING;BURNING

## 2025-01-29 ASSESSMENT — PAIN DESCRIPTION - ORIENTATION: ORIENTATION: LEFT

## 2025-01-29 ASSESSMENT — PAIN - FUNCTIONAL ASSESSMENT
PAIN_FUNCTIONAL_ASSESSMENT: 0-10
PAIN_FUNCTIONAL_ASSESSMENT: 0-10

## 2025-01-29 NOTE — ED TRIAGE NOTES
Patient fell down to her knees in the shower . She denies hitting her head and denies any pain , she state she is on antibiotics for cellulitis

## 2025-01-30 ENCOUNTER — APPOINTMENT (OUTPATIENT)
Dept: PULMONOLOGY | Facility: CLINIC | Age: 75
End: 2025-01-30
Payer: MEDICARE

## 2025-01-30 NOTE — TELEPHONE ENCOUNTER
01/29/2025 Patient is concerned, The cellulitis is not improved fully and only has one more day of antibiotics left. Still painful   Daughter phoned and informed patient went to ED for a fall and generalized weakness.

## 2025-01-31 ENCOUNTER — ANTICOAGULATION - WARFARIN VISIT (OUTPATIENT)
Dept: CARDIOLOGY | Facility: CLINIC | Age: 75
End: 2025-01-31

## 2025-01-31 DIAGNOSIS — I26.99 PULMONARY EMBOLISM WITHOUT ACUTE COR PULMONALE, UNSPECIFIED CHRONICITY, UNSPECIFIED PULMONARY EMBOLISM TYPE (MULTI): Primary | ICD-10-CM

## 2025-01-31 DIAGNOSIS — Z79.01 LONG TERM (CURRENT) USE OF ANTICOAGULANTS: ICD-10-CM

## 2025-01-31 NOTE — PROGRESS NOTES
INR not received; called her and she reports that he is currently admitted [facility] and expects to be discharged Sunday to [facility] in [place] (P) [phone]. She will call with update when discharged.

## 2025-02-01 LAB
BACTERIA SPEC CULT: ABNORMAL
GRAM STN SPEC: ABNORMAL
GRAM STN SPEC: ABNORMAL

## 2025-02-01 NOTE — ED PROVIDER NOTES
HPI   Chief Complaint   Patient presents with    Fall     Patient fell down to her knees in the shower . She denies hitting her head and denies any pain , she state she is on antibiotics for cellulitis        History of present illness:  74-year-old female presents emergency room for complaints of multiple complaints.  The patient states that she was at home today and she states that she was trying to get out of the shower when she caught her leg on the edge of the shower causing her to lose her balance and fall.  She states that she fell into the shower and struck her right hip on the wall and then on the ground.  She states it been very sore and painful to walk on it since then.  She states in addition that she has been having a slight cough and bodyaches and runny nose and general malaise for the past couple days.  She states that she has not had a fever and does not recall any sick contacts.  She would also like us to look at her left leg stating that she has a wound on her left leg that she has been trying to get it to heal and she has seen the wound care clinic in the past.  She states the area around it appears to be improving slightly but she is concerned that it is now abscessed or possibly cellulitic.  She denies any other symptoms at this time.    Social history: Negative for alcohol and drug use.    Review of systems:   Gen.: No weight loss, fatigue, anorexia, insomnia, fever.   Eyes: No vision loss, double vision, drainage, eye pain.   ENT: No pharyngitis, neck pain, headache   Cardiac: No chest pain, palpitations, syncope, near syncope.   Pulmonary: No shortness of breath,  hemoptysis.   Heme/lymph: No swollen glands, fever, bleeding.   GI: No abdominal pain, change in bowel habits, melena, hematemesis, hematochezia, nausea, vomiting, diarrhea.   : No discharge, dysuria, frequency, urgency, hematuria.   Musculoskeletal: No limb pain, joint pain, joint swelling.   Skin: No rashes.   Review of systems  is otherwise negative unless stated above or in history of present illness.        Physical exam:  General: Vitals noted, no distress. Afebrile.   EENT: No lymphadenopathy appreciated  Cardiac: Regular, rate, rhythm, no murmur.   Pulmonary: Lungs clear bilaterally with good aeration. No adventitious breath sounds.   Abdomen: Soft, nonsurgical. Nontender. No peritoneal signs. Normoactive bowel sounds.   Extremities: No peripheral edema.  The inside of the left calf shows a abscess with minor cellulitic changes around it about midway down, no active draining present at this time, patient complains of pain to palpation over the right hip but is able to walk on it and also move the leg with minimal difficulty  Skin: No rash.   Neuro: No focal neurologic deficits          Medical decision making:   Testing: CBC CMP lactate unremarkable white count 11.2 lactate 1.7 CMP shows creatinine 1.66 and GFR 32 consistent patient's history of chronic kidney disease influenza A is positive RSV COVID testing negative, wound culture sent, left hip x-rays as interpreted by radiology showed no acute findings  Treatment: Incision and drainage of the abscess performed  Reevaluation:   Plan: Home-going.  Discussed differential. Will follow-up with the primary physician in the next 2-3 days. Return if worse. They understand return precautions and discharge instructions. Patient and family/friend/caregiver are in agreement with this plan. 74-year-old female presents emergency room for complaints of multiple complaints.  The patient states that she was at home today and she states that she was trying to get out of the shower when she caught her leg on the edge of the shower causing her to lose her balance and fall.  She states that she fell into the shower and struck her right hip on the wall and then on the ground.  She states it been very sore and painful to walk on it since then.  She states in addition that she has been having a slight  cough and bodyaches and runny nose and general malaise for the past couple days.  She states that she has not had a fever and does not recall any sick contacts.  She would also like us to look at her left leg stating that she has a wound on her left leg that she has been trying to get it to heal and she has seen the wound care clinic in the past.  She states the area around it appears to be improving slightly but she is concerned that it is now abscessed or possibly cellulitic.  She denies any other symptoms at this time. Extremities: No peripheral edema.  The inside of the left calf shows a abscess with minor cellulitic changes around it about midway down, no active draining present at this time, patient complains of pain to palpation over the right hip but is able to walk on it and also move the leg with minimal difficulty.  The patient tolerated the procedure well and the wound was dressed by nursing staff.  I explained to her the test results and that I be sending home on doxycycline this time.  I also encouraged her to please follow-up with general surgery and also spoke with the wound care/general surgery team who agreed to reach out to the patient to schedule appointment with her.  Impression:   1.  Abscess  2.  Influenza A  3.  Fall  4.  Hip contusion          History provided by:  Patient   used: No            Patient History   Past Medical History:   Diagnosis Date    Acute upper respiratory infection, unspecified 09/25/2019    Acute upper respiratory infection    Acute upper respiratory infection, unspecified 10/11/2016    Acute upper respiratory infection    COPD (chronic obstructive pulmonary disease) (Multi)     Diabetes mellitus (Multi)     Encounter for screening mammogram for malignant neoplasm of breast 03/10/2015    Visit for screening mammogram    Morbid (severe) obesity due to excess calories (Multi) 09/17/2018    Morbid or severe obesity due to excess calories    Personal  history of nicotine dependence 10/26/2020    Personal history of nicotine dependence    Personal history of other diseases of the respiratory system     Personal history of asthma    Personal history of other drug therapy 2020    History of pneumococcal vaccination    Personal history of other endocrine, nutritional and metabolic disease     History of diabetes mellitus    Personal history of other specified conditions 10/04/2016    History of edema    Personal history of other specified conditions 07/15/2019    History of abnormal mammogram     Past Surgical History:   Procedure Laterality Date    BI MAMMO GUIDED BREAST RIGHT LOCALIZATION Right 2018    BI MAMMO GUIDED LOCALIZATION BREAST RIGHT 2018 [facility]    BREAST LUMPECTOMY  2012    Breast Surgery Lumpectomy    COLONOSCOPY  2013    Complete Colonoscopy    NASAL SEPTUM SURGERY  2012    Nasal Septal Deviation Repair    OTHER SURGICAL HISTORY  2018    Breast biopsy excisional    OTHER SURGICAL HISTORY  2021    Cataract surgery    OTHER SURGICAL HISTORY  2020    Renal lithotripsy    TUBAL LIGATION  2012    Tubal Ligation     Family History   Problem Relation Name Age of Onset    Alzheimer's disease Mother      Coronary artery disease Father      Breast cancer Other family history      Social History     Tobacco Use    Smoking status: Former     Current packs/day: 0.00     Types: Cigarettes     Quit date: 2023     Years since quittin.4    Smokeless tobacco: Never   Vaping Use    Vaping status: Never Used   Substance Use Topics    Alcohol use: Never    Drug use: Never       Physical Exam   ED Triage Vitals   Temperature Heart Rate Respirations BP   25 1500 25 1500 25 1500 25 1500   36.6 °C (97.9 °F) (!) 109 20 124/76      Pulse Ox Temp Source Heart Rate Source Patient Position   25 1500 25 1830 25 1500 25 1500   95 % Temporal Monitor Lying       BP Location FiO2 (%)     01/29/25 1500 --     Left arm        Physical Exam      ED Course & MDM   Diagnoses as of 02/01/25 1311   Fall, initial encounter   Abscess   Influenza A                 No data recorded     Ryan Coma Scale Score: 15 (01/29/25 1551 : Jo-Ann Felipe LPN)                           Medical Decision Making      Procedure  Procedures     Gurvinder Nixon PA-C  02/01/25 1318

## 2025-02-01 NOTE — ED PROCEDURE NOTE
Procedure  Incision and Drainage    Performed by: Gurvinder Nixon PA-C  Authorized by: Gurvinder Nixon PA-C    Consent:     Consent obtained:  Verbal  Universal protocol:     Patient identity confirmed:  Verbally with patient, arm band and provided demographic data  Location:     Type:  Abscess    Size:  2    Location:  Lower extremity    Lower extremity location:  Leg    Leg location:  L lower leg  Pre-procedure details:     Skin preparation:  Povidone-iodine  Sedation:     Sedation type:  None  Anesthesia:     Anesthesia method:  Local infiltration    Local anesthetic:  Lidocaine 1% w/o epi  Procedure type:     Complexity:  Complex  Procedure details:     Ultrasound guidance: no      Needle aspiration: no      Incision types:  Stab incision    Wound management:  Probed and deloculated    Drainage:  Bloody and purulent    Drainage amount:  Moderate    Wound treatment:  Wound left open    Packing materials:  None  Post-procedure details:     Procedure completion:  Tolerated               Gurvinder Nixon PA-C  02/01/25 1318

## 2025-02-02 LAB
BACTERIA BLD CULT: NORMAL
BACTERIA BLD CULT: NORMAL

## 2025-02-03 ENCOUNTER — TELEPHONE (OUTPATIENT)
Dept: PHARMACY | Facility: HOSPITAL | Age: 75
End: 2025-02-03
Payer: MEDICARE

## 2025-02-03 LAB
BACTERIA BLD CULT: NORMAL
BACTERIA BLD CULT: NORMAL

## 2025-02-03 NOTE — PROGRESS NOTES
EDPD Note: Lab/Chart Reviewed    Reviewed Mr./Mrs./Ms. Frannie Blanco 's chart regarding a positive tissue culture/result that was taken during their recent emergency room visit. The patient  was discharged from  ED, then after returning home, fell again, and is currently admitted to [facility]  .Therefore, I have faxed this information to Alexia at fax number [phone] .    Called patient to discuss lab results, patient reports that she fell again after returning home, and was taken to [facility], where she is currently admitted. Then contacted [facility] to fax lab results.     Susceptibility data from last 90 days.  Collected Specimen Info Organism   01/29/25 Tissue/Biopsy from Skin/Superficial Abscess Pasteurella multocida       No further follow up needed from EDPD Team.     Cesia Hein, PharmD

## 2025-02-07 ENCOUNTER — APPOINTMENT (OUTPATIENT)
Dept: SURGERY | Facility: CLINIC | Age: 75
End: 2025-02-07
Payer: MEDICARE

## 2025-02-10 PROCEDURE — RXMED WILLOW AMBULATORY MEDICATION CHARGE

## 2025-02-11 ENCOUNTER — NURSING HOME VISIT (OUTPATIENT)
Dept: POST ACUTE CARE | Facility: EXTERNAL LOCATION | Age: 75
End: 2025-02-11
Payer: MEDICARE

## 2025-02-11 DIAGNOSIS — E78.2 MIXED HYPERLIPIDEMIA: ICD-10-CM

## 2025-02-11 DIAGNOSIS — R51.9 NONINTRACTABLE HEADACHE, UNSPECIFIED CHRONICITY PATTERN, UNSPECIFIED HEADACHE TYPE: ICD-10-CM

## 2025-02-11 DIAGNOSIS — R53.81 PHYSICAL DECONDITIONING: ICD-10-CM

## 2025-02-11 DIAGNOSIS — D51.9 ANEMIA DUE TO VITAMIN B12 DEFICIENCY, UNSPECIFIED B12 DEFICIENCY TYPE: ICD-10-CM

## 2025-02-11 DIAGNOSIS — E11.42 DIABETIC PERIPHERAL NEUROPATHY (MULTI): ICD-10-CM

## 2025-02-11 DIAGNOSIS — E11.9 TYPE 2 DIABETES MELLITUS WITHOUT COMPLICATION, WITH LONG-TERM CURRENT USE OF INSULIN (MULTI): ICD-10-CM

## 2025-02-11 DIAGNOSIS — I50.32 CHRONIC DIASTOLIC CONGESTIVE HEART FAILURE: ICD-10-CM

## 2025-02-11 DIAGNOSIS — Z79.4 TYPE 2 DIABETES MELLITUS WITHOUT COMPLICATION, WITH LONG-TERM CURRENT USE OF INSULIN (MULTI): ICD-10-CM

## 2025-02-11 DIAGNOSIS — N18.32 CHRONIC KIDNEY DISEASE, STAGE 3B (MULTI): ICD-10-CM

## 2025-02-11 DIAGNOSIS — E66.9 OBESITY WITH SERIOUS COMORBIDITY, UNSPECIFIED CLASS, UNSPECIFIED OBESITY TYPE: ICD-10-CM

## 2025-02-11 DIAGNOSIS — I48.0 PAROXYSMAL ATRIAL FIBRILLATION (MULTI): Primary | ICD-10-CM

## 2025-02-11 PROCEDURE — 99309 SBSQ NF CARE MODERATE MDM 30: CPT | Performed by: NURSE PRACTITIONER

## 2025-02-11 NOTE — LETTER
Patient: Frannie Blanco  : 1950    Encounter Date: 2025    Patient ID: Frannie Blanco is a 74 y.o. female who is acute skilled care being seen and evaluated for multiple medical problems.  [id number]   1950    /70   Pulse 82   Temp 36.8 °C (98.2 °F)   Resp 16   Wt 138 kg (305 lb)   SpO2 96%   BMI 47.77 kg/m²     Assessment/Plan  Problem List Items Addressed This Visit       Obesity     Lifestyle measures to treat hypertension  Weight control and loss  Healthy food choices   Increase physical activity   Stress reduction   Discussed            Hyperlipemia     Atorvastatin 20 mg  by mouth every HS          Diabetic peripheral neuropathy (Multi)     Gabapentin 300 mg by mouth TID         Atrial fibrillation (Multi) - Primary     Amiodarone 200 mg  by mouth daily   Metoprolol tart 12.5 mg by mouth BID  ASA 81 mg by mouth daily   Zio Patch   Coumadin 2.5 mg by mouth daily except on  give 5 mg by mouth  Lovenox 138 mg subcutaneous BID (DC when two INR therapeutic)   2025 INR 1.5  Repeat INR 2025            Type 2 diabetes mellitus without complication, with long-term current use of insulin (Multi)     Mounjaro 2.5 mg subcutaneous weekly   Glargine 15 units subcutaneous daily          Chronic diastolic congestive heart failure     Lasix 80 mg by mouth every am   2/10/2025 Potassium 2.9  Add Potassium 20 meq by mouth BID x 3 days then decrease to   Potassium 20 meq by mouth daily   BMP 2025          Chronic kidney disease, stage 3b (Multi)     Avoid NSAIDS   Na HCO3 650 mg by mouth TID  Monitor renal panel  2/10/2025 BUN/Creat 23/1.4         Physical deconditioning     PT/OT         Anemia     B12 1000 mcg IM monthly          Nonintractable headache     Topiramate 100 mg by mouth BID               HPI: Client was admitted at [facility] from 2025- 2025 with the final diagnosis of Fall, UTI, Influenza A, Weakness, and left calf wound. Influenza A- Tamiflu. Dr Renae  completed I/D left calf wound on 2/4/2025. Coumadin held before procedure. Levofloxacin for Pseudomonal UTI. Cardiology consulted for SVT- Amiodarone and Holter Monitor. Client denies  CP, SOB, or increased edema. Denies abdominal pain, N/V, or diarrhea. C/O episodes of Constipation- Will add Michelle Lax every other day. Denies dysuria. States appetite has decreased. She C/O chronic right hip pain.     Review of Systems ROS as described in HPI     Physical Exam  Constitutional:       Comments: Sitting in bed    HENT:      Mouth/Throat:      Mouth: Mucous membranes are moist.   Cardiovascular:      Rate and Rhythm: Normal rate.      Comments: PAF  Pulmonary:      Effort: Pulmonary effort is normal.   Genitourinary:     Comments: Denied dysuria   Musculoskeletal:      Comments: PT/OT   Skin:     General: Skin is warm and dry.   Neurological:      Mental Status: She is alert. Mental status is at baseline.   Psychiatric:         Mood and Affect: Mood normal.                   Electronically Signed By: KIKE Lucas   2/13/25  6:26 PM

## 2025-02-12 ENCOUNTER — PHARMACY VISIT (OUTPATIENT)
Dept: PHARMACY | Facility: CLINIC | Age: 75
End: 2025-02-12
Payer: MEDICARE

## 2025-02-13 VITALS
DIASTOLIC BLOOD PRESSURE: 70 MMHG | BODY MASS INDEX: 47.77 KG/M2 | OXYGEN SATURATION: 96 % | WEIGHT: 293 LBS | HEART RATE: 82 BPM | TEMPERATURE: 98.2 F | SYSTOLIC BLOOD PRESSURE: 130 MMHG | RESPIRATION RATE: 16 BRPM

## 2025-02-13 PROBLEM — Z79.4 TYPE 2 DIABETES MELLITUS WITHOUT COMPLICATION, WITH LONG-TERM CURRENT USE OF INSULIN (MULTI): Status: ACTIVE | Noted: 2023-08-18

## 2025-02-13 PROBLEM — J44.9 COPD (CHRONIC OBSTRUCTIVE PULMONARY DISEASE) (MULTI): Status: RESOLVED | Noted: 2023-09-26 | Resolved: 2025-02-13

## 2025-02-13 PROBLEM — E11.9 TYPE 2 DIABETES MELLITUS WITHOUT COMPLICATION, WITH LONG-TERM CURRENT USE OF INSULIN (MULTI): Status: ACTIVE | Noted: 2023-08-18

## 2025-02-13 PROBLEM — R51.9 NONINTRACTABLE HEADACHE: Status: ACTIVE | Noted: 2025-02-13

## 2025-02-13 PROBLEM — N18.32 CHRONIC KIDNEY DISEASE, STAGE 3B (MULTI): Status: ACTIVE | Noted: 2024-01-26

## 2025-02-13 NOTE — ASSESSMENT & PLAN NOTE
Lifestyle measures to treat hypertension  Weight control and loss  Healthy food choices   Increase physical activity   Stress reduction   Discussed

## 2025-02-13 NOTE — ASSESSMENT & PLAN NOTE
Amiodarone 200 mg  by mouth daily   Metoprolol tart 12.5 mg by mouth BID  ASA 81 mg by mouth daily   Zio Patch   Coumadin 2.5 mg by mouth daily except on Wednesdays give 5 mg by mouth  Lovenox 138 mg subcutaneous BID (DC when two INR therapeutic)   2/11/2025 INR 1.5  Repeat INR 2/13/2025

## 2025-02-13 NOTE — PROGRESS NOTES
Patient ID: Frannie Blanco is a 74 y.o. female who is acute skilled care being seen and evaluated for multiple medical problems.  [id number]   1950    /70   Pulse 82   Temp 36.8 °C (98.2 °F)   Resp 16   Wt 138 kg (305 lb)   SpO2 96%   BMI 47.77 kg/m²     Assessment/Plan  Problem List Items Addressed This Visit       Obesity     Lifestyle measures to treat hypertension  Weight control and loss  Healthy food choices   Increase physical activity   Stress reduction   Discussed            Hyperlipemia     Atorvastatin 20 mg  by mouth every HS          Diabetic peripheral neuropathy (Multi)     Gabapentin 300 mg by mouth TID         Atrial fibrillation (Multi) - Primary     Amiodarone 200 mg  by mouth daily   Metoprolol tart 12.5 mg by mouth BID  ASA 81 mg by mouth daily   Zio Patch   Coumadin 2.5 mg by mouth daily except on Wednesdays give 5 mg by mouth  Lovenox 138 mg subcutaneous BID (DC when two INR therapeutic)   2/11/2025 INR 1.5  Repeat INR 2/13/2025            Type 2 diabetes mellitus without complication, with long-term current use of insulin (Multi)     Mounjaro 2.5 mg subcutaneous weekly   Glargine 15 units subcutaneous daily          Chronic diastolic congestive heart failure     Lasix 80 mg by mouth every am   2/10/2025 Potassium 2.9  Add Potassium 20 meq by mouth BID x 3 days then decrease to   Potassium 20 meq by mouth daily   BMP 2/17/2025          Chronic kidney disease, stage 3b (Multi)     Avoid NSAIDS   Na HCO3 650 mg by mouth TID  Monitor renal panel  2/10/2025 BUN/Creat 23/1.4         Physical deconditioning     PT/OT         Anemia     B12 1000 mcg IM monthly          Nonintractable headache     Topiramate 100 mg by mouth BID               HPI: Client was admitted at [facility] from 1/29/2025- 2/7/2025 with the final diagnosis of Fall, UTI, Influenza A, Weakness, and left calf wound. Influenza A- Tamiflu. Dr Renae completed I/D left calf wound on 2/4/2025. Coumadin held before procedure.  Levofloxacin for Pseudomonal UTI. Cardiology consulted for SVT- Amiodarone and Holter Monitor. Client denies  CP, SOB, or increased edema. Denies abdominal pain, N/V, or diarrhea. C/O episodes of Constipation- Will add Michelle Lax every other day. Denies dysuria. States appetite has decreased. She C/O chronic right hip pain.     Review of Systems ROS as described in HPI     Physical Exam  Constitutional:       Comments: Sitting in bed    HENT:      Mouth/Throat:      Mouth: Mucous membranes are moist.   Cardiovascular:      Rate and Rhythm: Normal rate.      Comments: PAF  Pulmonary:      Effort: Pulmonary effort is normal.   Genitourinary:     Comments: Denied dysuria   Musculoskeletal:      Comments: PT/OT   Skin:     General: Skin is warm and dry.   Neurological:      Mental Status: She is alert. Mental status is at baseline.   Psychiatric:         Mood and Affect: Mood normal.

## 2025-02-13 NOTE — ASSESSMENT & PLAN NOTE
Lasix 80 mg by mouth every am   2/10/2025 Potassium 2.9  Add Potassium 20 meq by mouth BID x 3 days then decrease to   Potassium 20 meq by mouth daily   BMP 2/17/2025

## 2025-02-18 ENCOUNTER — NURSING HOME VISIT (OUTPATIENT)
Dept: POST ACUTE CARE | Facility: EXTERNAL LOCATION | Age: 75
End: 2025-02-18
Payer: MEDICARE

## 2025-02-18 DIAGNOSIS — R53.81 PHYSICAL DECONDITIONING: ICD-10-CM

## 2025-02-18 DIAGNOSIS — N18.32 CHRONIC KIDNEY DISEASE, STAGE 3B (MULTI): ICD-10-CM

## 2025-02-18 DIAGNOSIS — I50.32 CHRONIC DIASTOLIC CONGESTIVE HEART FAILURE: ICD-10-CM

## 2025-02-18 DIAGNOSIS — I48.0 PAROXYSMAL ATRIAL FIBRILLATION (MULTI): Primary | ICD-10-CM

## 2025-02-18 PROCEDURE — 99308 SBSQ NF CARE LOW MDM 20: CPT | Performed by: NURSE PRACTITIONER

## 2025-02-18 NOTE — LETTER
Patient: Frannie Blanco  : 1950    Encounter Date: 2025    Patient ID: Frannie Blanco is a 74 y.o. female who is acute skilled care being seen and evaluated for multiple medical problems.  [id number]   1950    /64   Pulse 68   Temp 36.1 °C (97 °F)   Resp 15   Wt 138 kg (305 lb)   SpO2 95%   BMI 47.77 kg/m²     Assessment/Plan  Problem List Items Addressed This Visit       Atrial fibrillation (Multi) - Primary     Amiodarone 200 mg  by mouth daily   Metoprolol tart 12.5 mg by mouth BID  ASA 81 mg by mouth daily   Zio Patch   Coumadin 2.5 mg by mouth daily   PT/INR every 2025 INR 2.6         Chronic diastolic congestive heart failure     Lasix 80 mg by mouth every am   Potassium 20 meq by mouth daily   2025 BUN/Creat 25/1.5, Potassium 3.9         Chronic kidney disease, stage 3b (Multi)     Avoid NSAIDS   Na HCO3 650 mg by mouth TID  Monitor renal panel  Dr Palma          Physical deconditioning     PT/OT              HPI: Client continues PT/OT services. Client denies HA, dizziness, or lightheadedness. Denies CP, SOB, or increased edema.  Denies abdominal pain, N/V, diarrhea, or constipation. Denies dysuria. Denies change in appetite. She has chronic right hip pain.     Review of Systems ROS as described in HPI     Physical Exam  Constitutional:       Comments: Sitting in bed    HENT:      Mouth/Throat:      Mouth: Mucous membranes are moist.   Cardiovascular:      Rate and Rhythm: Normal rate.      Comments: PAF  Zio patch  Pulmonary:      Effort: Pulmonary effort is normal.      Breath sounds: Normal breath sounds.   Abdominal:      General: Bowel sounds are normal.   Genitourinary:     Comments: Denies dysuria   Musculoskeletal:      Comments: PT/OT   Plus one BLE edema    Skin:     General: Skin is warm and dry.      Comments: LLE dressing D/I- SP I/D   Neurological:      Mental Status: She is alert. Mental status is at baseline.   Psychiatric:         Mood and  Affect: Mood normal.                   Electronically Signed By: KIKE Lucas   2/20/25  7:15 AM

## 2025-02-19 ENCOUNTER — APPOINTMENT (OUTPATIENT)
Dept: PRIMARY CARE | Facility: CLINIC | Age: 75
End: 2025-02-19
Payer: MEDICARE

## 2025-02-20 VITALS
OXYGEN SATURATION: 95 % | RESPIRATION RATE: 15 BRPM | HEART RATE: 68 BPM | SYSTOLIC BLOOD PRESSURE: 112 MMHG | TEMPERATURE: 97 F | BODY MASS INDEX: 47.77 KG/M2 | DIASTOLIC BLOOD PRESSURE: 64 MMHG | WEIGHT: 293 LBS

## 2025-02-20 NOTE — PROGRESS NOTES
Patient ID: Frannie Blanco is a 74 y.o. female who is acute skilled care being seen and evaluated for multiple medical problems.  [id number]   1950    /64   Pulse 68   Temp 36.1 °C (97 °F)   Resp 15   Wt 138 kg (305 lb)   SpO2 95%   BMI 47.77 kg/m²     Assessment/Plan  Problem List Items Addressed This Visit       Atrial fibrillation (Multi) - Primary     Amiodarone 200 mg  by mouth daily   Metoprolol tart 12.5 mg by mouth BID  ASA 81 mg by mouth daily   Zio Patch   Coumadin 2.5 mg by mouth daily   PT/INR every Monday 2/14/2025 INR 2.6         Chronic diastolic congestive heart failure     Lasix 80 mg by mouth every am   Potassium 20 meq by mouth daily   2/17/2025 BUN/Creat 25/1.5, Potassium 3.9         Chronic kidney disease, stage 3b (Multi)     Avoid NSAIDS   Na HCO3 650 mg by mouth TID  Monitor renal panel  Dr Palma          Physical deconditioning     PT/OT              HPI: Client continues PT/OT services. Client denies HA, dizziness, or lightheadedness. Denies CP, SOB, or increased edema.  Denies abdominal pain, N/V, diarrhea, or constipation. Denies dysuria. Denies change in appetite. She has chronic right hip pain.     Review of Systems ROS as described in HPI     Physical Exam  Constitutional:       Comments: Sitting in bed    HENT:      Mouth/Throat:      Mouth: Mucous membranes are moist.   Cardiovascular:      Rate and Rhythm: Normal rate.      Comments: PAF  Zio patch  Pulmonary:      Effort: Pulmonary effort is normal.      Breath sounds: Normal breath sounds.   Abdominal:      General: Bowel sounds are normal.   Genitourinary:     Comments: Denies dysuria   Musculoskeletal:      Comments: PT/OT   Plus one BLE edema    Skin:     General: Skin is warm and dry.      Comments: LLE dressing D/I- SP I/D   Neurological:      Mental Status: She is alert. Mental status is at baseline.   Psychiatric:         Mood and Affect: Mood normal.

## 2025-02-20 NOTE — ASSESSMENT & PLAN NOTE
Lasix 80 mg by mouth every am   Potassium 20 meq by mouth daily   2/17/2025 BUN/Creat 25/1.5, Potassium 3.9

## 2025-02-20 NOTE — ASSESSMENT & PLAN NOTE
Amiodarone 200 mg  by mouth daily   Metoprolol tart 12.5 mg by mouth BID  ASA 81 mg by mouth daily   Zio Patch   Coumadin 2.5 mg by mouth daily   PT/INR every Monday 2/14/2025 INR 2.6

## 2025-03-04 ENCOUNTER — NURSING HOME VISIT (OUTPATIENT)
Dept: POST ACUTE CARE | Facility: EXTERNAL LOCATION | Age: 75
End: 2025-03-04
Payer: MEDICARE

## 2025-03-04 DIAGNOSIS — E11.9 TYPE 2 DIABETES MELLITUS WITHOUT COMPLICATION, WITH LONG-TERM CURRENT USE OF INSULIN (MULTI): ICD-10-CM

## 2025-03-04 DIAGNOSIS — I48.0 PAROXYSMAL ATRIAL FIBRILLATION (MULTI): Primary | ICD-10-CM

## 2025-03-04 DIAGNOSIS — I50.32 CHRONIC DIASTOLIC CONGESTIVE HEART FAILURE: ICD-10-CM

## 2025-03-04 DIAGNOSIS — Z79.4 TYPE 2 DIABETES MELLITUS WITHOUT COMPLICATION, WITH LONG-TERM CURRENT USE OF INSULIN (MULTI): ICD-10-CM

## 2025-03-04 DIAGNOSIS — R53.81 PHYSICAL DECONDITIONING: ICD-10-CM

## 2025-03-04 PROCEDURE — 99308 SBSQ NF CARE LOW MDM 20: CPT | Performed by: NURSE PRACTITIONER

## 2025-03-04 NOTE — LETTER
Patient: Frannie Blanco  : 1950    Encounter Date: 2025    Patient ID: Frannie Blanco is a 74 y.o. female who is skilled being seen and evaluated for multiple medical problems.  [id number]   1950    /62   Pulse 70   Temp 36.8 °C (98.2 °F)   Resp 15   Wt 140 kg (309 lb)   SpO2 95%   BMI 48.40 kg/m²     Assessment/Plan  Problem List Items Addressed This Visit       Atrial fibrillation (Multi) - Primary     Amiodarone 200 mg  by mouth daily   Metoprolol tart 12.5 mg by mouth BID  ASA 81 mg by mouth daily   Zio Patch   Coumadin 2.5 mg by mouth daily   PT/INR every Monday   Dr Fleischer follow up apt 3/7/2025   3/3/2025 H/H 12.7/40.2         Type 2 diabetes mellitus without complication, with long-term current use of insulin (Multi)     Mounjaro 2.5 mg subcutaneous weekly   Glargine 15 units subcutaneous daily   BS BID and as needed          Chronic diastolic congestive heart failure     Lasix 80 mg by mouth every am   Potassium 20 meq by mouth daily   2025 BUN/Creat 25/1.5, Potassium 3.9  3/3/2025 BUN/Creat 24/1.6, Potassium 3.8         Physical deconditioning     PT/OT              HPI: Client continues to receive PT/OT services. Client denies HA, dizziness, or lightheadedness. Denies CP, SOB,  or increased edema. Denies abdominal pain, N/V, diarrhea, or constipation. Denies dysuria. Denies change in appetite. She has chronic right hip pain.     Review of Systems ROS as described in HPI     Physical Exam  HENT:      Mouth/Throat:      Mouth: Mucous membranes are moist.   Cardiovascular:      Rate and Rhythm: Normal rate and regular rhythm.      Comments: PAF  Pulmonary:      Effort: Pulmonary effort is normal.      Breath sounds: Normal breath sounds.   Abdominal:      General: Bowel sounds are normal.   Genitourinary:     Comments: Denies dysuria   Musculoskeletal:      Comments: Plus one BLE edema   PT/OT   Skin:     General: Skin is warm and dry.      Comments: Dressing D/I  LLE  (Below knee)    Neurological:      Mental Status: She is alert and oriented to person, place, and time.   Psychiatric:         Mood and Affect: Mood normal.                   Electronically Signed By: KIKE Lucas   3/6/25  8:44 AM

## 2025-03-06 VITALS
RESPIRATION RATE: 15 BRPM | HEART RATE: 70 BPM | BODY MASS INDEX: 48.4 KG/M2 | SYSTOLIC BLOOD PRESSURE: 121 MMHG | OXYGEN SATURATION: 95 % | TEMPERATURE: 98.2 F | DIASTOLIC BLOOD PRESSURE: 62 MMHG | WEIGHT: 293 LBS

## 2025-03-06 DIAGNOSIS — E78.2 MIXED HYPERLIPIDEMIA: ICD-10-CM

## 2025-03-06 RX ORDER — ATORVASTATIN CALCIUM 20 MG/1
20 TABLET, FILM COATED ORAL NIGHTLY
Qty: 90 TABLET | Refills: 1 | Status: SHIPPED | OUTPATIENT
Start: 2025-03-06

## 2025-03-06 NOTE — ASSESSMENT & PLAN NOTE
Mounjaro 2.5 mg subcutaneous weekly   Glargine 15 units subcutaneous daily   BS BID and as needed

## 2025-03-06 NOTE — ASSESSMENT & PLAN NOTE
Lasix 80 mg by mouth every am   Potassium 20 meq by mouth daily   2/17/2025 BUN/Creat 25/1.5, Potassium 3.9  3/3/2025 BUN/Creat 24/1.6, Potassium 3.8

## 2025-03-06 NOTE — ASSESSMENT & PLAN NOTE
Amiodarone 200 mg  by mouth daily   Metoprolol tart 12.5 mg by mouth BID  ASA 81 mg by mouth daily   Zio Patch   Coumadin 2.5 mg by mouth daily   PT/INR every Monday   Dr Fleischer follow up apt 3/7/2025   3/3/2025 H/H 12.7/40.2

## 2025-03-06 NOTE — PROGRESS NOTES
Patient ID: Frannie Blanco is a 74 y.o. female who is skilled being seen and evaluated for multiple medical problems.  [id number]   1950    /62   Pulse 70   Temp 36.8 °C (98.2 °F)   Resp 15   Wt 140 kg (309 lb)   SpO2 95%   BMI 48.40 kg/m²     Assessment/Plan  Problem List Items Addressed This Visit       Atrial fibrillation (Multi) - Primary     Amiodarone 200 mg  by mouth daily   Metoprolol tart 12.5 mg by mouth BID  ASA 81 mg by mouth daily   Zio Patch   Coumadin 2.5 mg by mouth daily   PT/INR every Monday   Dr Fleischer follow up apt 3/7/2025   3/3/2025 H/H 12.7/40.2         Type 2 diabetes mellitus without complication, with long-term current use of insulin (Multi)     Mounjaro 2.5 mg subcutaneous weekly   Glargine 15 units subcutaneous daily   BS BID and as needed          Chronic diastolic congestive heart failure     Lasix 80 mg by mouth every am   Potassium 20 meq by mouth daily   2/17/2025 BUN/Creat 25/1.5, Potassium 3.9  3/3/2025 BUN/Creat 24/1.6, Potassium 3.8         Physical deconditioning     PT/OT              HPI: Client continues to receive PT/OT services. Client denies HA, dizziness, or lightheadedness. Denies CP, SOB,  or increased edema. Denies abdominal pain, N/V, diarrhea, or constipation. Denies dysuria. Denies change in appetite. She has chronic right hip pain.     Review of Systems ROS as described in HPI     Physical Exam  HENT:      Mouth/Throat:      Mouth: Mucous membranes are moist.   Cardiovascular:      Rate and Rhythm: Normal rate and regular rhythm.      Comments: PAF  Pulmonary:      Effort: Pulmonary effort is normal.      Breath sounds: Normal breath sounds.   Abdominal:      General: Bowel sounds are normal.   Genitourinary:     Comments: Denies dysuria   Musculoskeletal:      Comments: Plus one BLE edema   PT/OT   Skin:     General: Skin is warm and dry.      Comments: Dressing D/I  LLE (Below knee)    Neurological:      Mental Status: She is alert and  oriented to person, place, and time.   Psychiatric:         Mood and Affect: Mood normal.

## 2025-03-11 ENCOUNTER — APPOINTMENT (OUTPATIENT)
Dept: PRIMARY CARE | Facility: CLINIC | Age: 75
End: 2025-03-11
Payer: MEDICARE

## 2025-03-11 ENCOUNTER — NURSING HOME VISIT (OUTPATIENT)
Dept: POST ACUTE CARE | Facility: EXTERNAL LOCATION | Age: 75
End: 2025-03-11

## 2025-03-11 DIAGNOSIS — R53.81 MALAISE AND FATIGUE: ICD-10-CM

## 2025-03-11 DIAGNOSIS — I48.20 CHRONIC ATRIAL FIBRILLATION (MULTI): Primary | ICD-10-CM

## 2025-03-11 DIAGNOSIS — R53.81 PHYSICAL DECONDITIONING: ICD-10-CM

## 2025-03-11 DIAGNOSIS — I50.32 CHRONIC DIASTOLIC CONGESTIVE HEART FAILURE: ICD-10-CM

## 2025-03-11 DIAGNOSIS — R53.83 MALAISE AND FATIGUE: ICD-10-CM

## 2025-03-11 PROCEDURE — 99308 SBSQ NF CARE LOW MDM 20: CPT | Performed by: NURSE PRACTITIONER

## 2025-03-11 NOTE — LETTER
Patient: Frannie Blanco  : 1950    Encounter Date: 2025    Patient ID: Frannie Blanco is a 74 y.o. female who is acute skilled care being seen and evaluated for multiple medical problems.  [id number]   1950    /75   Pulse 73   Temp 36.3 °C (97.3 °F)   Resp 15   Wt 140 kg (309 lb)   SpO2 97%   BMI 48.40 kg/m²     Assessment/Plan  Problem List Items Addressed This Visit       Atrial fibrillation (Multi) - Primary     Amiodarone 200 mg  by mouth daily   Metoprolol tart 12.5 mg by mouth BID  ASA 81 mg by mouth daily   Zio Patch - AFIB  Coumadin 2.5 mg by mouth daily   PT/INR every Monday   Dr Fleischer follow up apt 3/7/2025 - Completed- Consult Dr Pompa  3/11/2025 Dr Pompa  3/3/2025 H/H 12.7/40.2         Chronic diastolic congestive heart failure     Lasix 80 mg by mouth every am   Potassium 20 meq by mouth daily   2025 BUN/Creat 25/1.5, Potassium 3.9  3/3/2025 BUN/Creat 24/1.6, Potassium 3.8         Physical deconditioning     PT/OT         Malaise and fatigue     CBC with diff and BMP in am               HPI: Client continues to receive PT/OT services. She C/O malaise and fatigue. Afebrile. Client denies HA, dizziness, or lightheadedness. Denies CP, SOB, or increased edema. RA. Denies abdominal pain, N/V, diarrhea, or constipation. Denies dysuria. Denies change in appetite. She has chronic right hip pain.    Review of Systems ROS as described in HPI     Physical Exam  Constitutional:       Comments: Sitting in WC   HENT:      Mouth/Throat:      Mouth: Mucous membranes are moist.   Cardiovascular:      Rate and Rhythm: Normal rate.      Comments: PMH AFIB  Pulmonary:      Effort: Pulmonary effort is normal.      Breath sounds: Normal breath sounds.      Comments: RA  Abdominal:      General: Bowel sounds are normal.   Genitourinary:     Comments: Denies dysuria   Musculoskeletal:      Comments: PT/OT   Skin:     General: Skin is warm and dry.      Comments: Dressing D/I Left medial  leg (below knee)    Neurological:      Mental Status: She is alert and oriented to person, place, and time.   Psychiatric:         Mood and Affect: Mood normal.      Comments: Conversational                    Electronically Signed By: KIKE Lucas   3/12/25  9:21 AM

## 2025-03-11 NOTE — PROGRESS NOTES
Patient ID:   Frannie Blanco is a 74 y.o. female with PMH remarkable for COPD on chronic 02 @ 5L, tobacco dependence, CHF, Afib, DM2, HTN, bullous pemphigoid, Multiple splenic artery aneurysms (stable 10/30/2024), lung nodules who presents to the office today for No chief complaint on file..    LAST OFFICE VISIT: 1/21/2025 for hospitalization FU where she was admitted to [facility] for cat scratch causing cellulitis. Given keflex for UTI but later increased this to QID to tx UTI and cellulitis. HgbA1c was 4.1 on 1/20/2025.     Hospitalization Discharge Follow Up:  Date(s): 1/29 to 2/7/2025  Location: [facility] then [facility] @ [facility]  Reason Presented to ER: cough, SOB, weakness resulting in 2 falls.  Synopsis: found to be +ve for flu A. CxR was -ve. Placed on tamiflu. SNF recommended by PTOT. Has chronic infected left calf wound, GS consulted s/p I/D. Also with new exertional dyspnea, CTPA ruled out PE. Limited echo ordered to further evaluate dyspnea. EF 59%, no RWMA, normal pericardium. 3 episodes of NSVT on telemetry, cardiology consulted, recommended to continue amiodarone and would need outpatient follow-up for stress test and extended Holter monitor. Warfarin resumed post I&D, due to subtherapeutic INR, bridging with Lovenox. Will need bridging till INR therapeutic for 2 days. Patient now ready for discharge.   Recommended FU: Cardio, PCP    HEALTHCARE MAINTENANCE: FU  Mammogram (40-75): 7/24/2023 -[facility]  Pap smear (21-65, or hysterectomy q5yrs): 4/12/2022 -[facility]  Last Labs: 3/3/2025  Colonoscopy (45-75 or age 40 with 1st degree relative dx colon ca): 4/12/2013 with Dr Javy Sarkar, normal. Repeat in 10 yrs. DUE  Lung cancer screening (50-78 y/o x 20 pk yr for at least 20 yrs + current smoker OR quit in last 15 years, no CT w/I last year): no mention of nodules on 2/3/2025 scan from [facility]  DEXA (65+, q 2 years): DUE  ECHO 9/26/2023 showing LVEF 60-65%, impaired relaxation LA moderate to severely dilated, RA mild to  "moderately dilated moderate thickening and calcification of anterior and posterior leaflets, moderate to severe mitral calcification with mild mitral stenosis and MR. In Afib at the time of study. Aortic valve sclerosis.   US thyroid 2024 showing multinodular thyroid with no suspicious thyroid lesion on exam.     Saw Dr Ricky Stoll on 3/7/2025 whom recommended electrical CV. Referred to EP.   HPI  Social History     Tobacco Use    Smoking status: Former     Current packs/day: 0.00     Types: Cigarettes     Quit date: 2023     Years since quittin.5    Smokeless tobacco: Never   Vaping Use    Vaping status: Never Used   Substance Use Topics    Alcohol use: Never    Drug use: Never     Review of Systems  Visit Vitals  OB Status Unknown   Smoking Status Former     No Known Allergies   Physical Exam  Current Outpatient Medications   Medication Instructions    albuterol 90 mcg/actuation inhaler inhale 1 to 2 puffs by mouth and INTO THE LUNGS every 4 to 6 hours if needed    amiodarone (PACERONE) 200 mg, oral, Daily    aspirin 81 mg EC tablet 1 tablet, Daily    atorvastatin (LIPITOR) 20 mg, oral, Nightly    BD Alcohol Swabs pads, medicated     BD Dorothy 2nd Gen Pen Needle 32 gauge x \" needle Use with vitamin B12 injections monthly    calcium carbonate-vitamin D3 1,000 mg-20 mcg (800 unit) tablet 1 tablet, 2 times daily    cholecalciferol (VITAMIN D3) 1,000 Units, Daily    cyanocobalamin (DODEX) 1,000 mcg, intramuscular, Every 30 days    dapsone 25 mg tablet 2 tablets, Daily before breakfast    dextromethorphan-guaifenesin (Robitussin DM)  mg/5 mL oral liquid 5 mL, oral, 3 times daily PRN    docusate sodium (COLACE) 100 mg, 2 times daily PRN    empagliflozin (JARDIANCE) 25 mg, oral, Daily    ferrous sulfate 65 mg, Daily    fluticasone-umeclidin-vilanter (Trelegy Ellipta) 200-62.5-25 mcg blister with device INHALE 1 PUFF BY MOUTH IN THE MORNING Rinse mouth after taking dose    furosemide (LASIX) 80 mg, " "oral, Daily    gabapentin (Neurontin) 300 mg capsule take 1 capsule by mouth three times a day for 90 DAYS    insulin glargine (Basaglar KwikPen U-100 Insulin) 100 unit/mL (3 mL) pen Inject 15 units daily Take as directed per insulin instructions.    metFORMIN (Glucophage) 1,000 mg tablet TAKE 1 TABLET BY MOUTH TWICE DAILY    Mounjaro 5 mg, subcutaneous, Once Weekly    nystatin (Mycostatin) ointment Topical, 2 times daily    potassium chloride CR (Klor-Con M20) 20 mEq ER tablet 40 mEq, oral, Daily, Do not crush or chew.    syringe with needle 3 mL 23 x 1\" syringe Use to inject vitamin b12 once monthly    topiramate (TOPAMAX) 100 mg, oral, 2 times daily    triamcinolone (Kenalog) 0.1 % ointment APPLY 1 APPLICATOR AS NEEDED TOPICALLY ONCE DAILY AS NEEDED UNDER DENTAL TRAYS    warfarin (Coumadin) 5 mg tablet Take 1 tab daily or directed as coumadin clinic    zinc oxide 20 % ointment 1 Application, Every 8 hours      Lab Results   Component Value Date    WBC 11.2 01/29/2025    HGB 14.2 01/29/2025    HCT 45.3 01/29/2025     01/29/2025    CHOL 129 10/26/2024    TRIG 80 10/26/2024    HDL 52.7 10/26/2024    ALT 17 01/29/2025    AST 15 01/29/2025     01/29/2025    K 4.0 01/29/2025     01/29/2025    CREATININE 1.66 (H) 01/29/2025    BUN 18 01/29/2025    CO2 23 01/29/2025    TSH 2.05 01/20/2025    INR 1.5 (H) 01/29/2025    HGBA1C 4.6 03/03/2025       {Assess/PlanSmartLinks:25478}    --------------------  Written by Deandra Baptiste RN, acting as a scribe for Dr. Muñoz. This note accurately reflects the work and decisions made by Dr. Muñoz.     I, Dr. Muñoz, attest all medical record entries made by the scribe were under my direction and were personally dictated by me. I have reviewed the chart and agree that the record accurately reflects my performance of the history, physical exam, and assessment and plan.   "

## 2025-03-12 ENCOUNTER — APPOINTMENT (OUTPATIENT)
Dept: PRIMARY CARE | Facility: CLINIC | Age: 75
End: 2025-03-12
Payer: MEDICARE

## 2025-03-12 VITALS
TEMPERATURE: 97.3 F | WEIGHT: 293 LBS | DIASTOLIC BLOOD PRESSURE: 75 MMHG | SYSTOLIC BLOOD PRESSURE: 126 MMHG | OXYGEN SATURATION: 97 % | RESPIRATION RATE: 15 BRPM | BODY MASS INDEX: 48.4 KG/M2 | HEART RATE: 73 BPM

## 2025-03-12 PROBLEM — R53.81 MALAISE AND FATIGUE: Status: ACTIVE | Noted: 2025-03-12

## 2025-03-12 PROBLEM — R53.83 MALAISE AND FATIGUE: Status: ACTIVE | Noted: 2025-03-12

## 2025-03-12 NOTE — PROGRESS NOTES
Patient ID: Frannie Blanco is a 74 y.o. female who is acute skilled care being seen and evaluated for multiple medical problems.  [id number]   1950    /75   Pulse 73   Temp 36.3 °C (97.3 °F)   Resp 15   Wt 140 kg (309 lb)   SpO2 97%   BMI 48.40 kg/m²     Assessment/Plan  Problem List Items Addressed This Visit       Atrial fibrillation (Multi) - Primary     Amiodarone 200 mg  by mouth daily   Metoprolol tart 12.5 mg by mouth BID  ASA 81 mg by mouth daily   Zio Patch - AFIB  Coumadin 2.5 mg by mouth daily   PT/INR every Monday   Dr Fleischer follow up apt 3/7/2025 - Completed- Consult Dr Pompa  3/11/2025 Dr Pompa  3/3/2025 H/H 12.7/40.2         Chronic diastolic congestive heart failure     Lasix 80 mg by mouth every am   Potassium 20 meq by mouth daily   2/17/2025 BUN/Creat 25/1.5, Potassium 3.9  3/3/2025 BUN/Creat 24/1.6, Potassium 3.8         Physical deconditioning     PT/OT         Malaise and fatigue     CBC with diff and BMP in am               HPI: Client continues to receive PT/OT services. She C/O malaise and fatigue. Afebrile. Client denies HA, dizziness, or lightheadedness. Denies CP, SOB, or increased edema. RA. Denies abdominal pain, N/V, diarrhea, or constipation. Denies dysuria. Denies change in appetite. She has chronic right hip pain.    Review of Systems ROS as described in HPI     Physical Exam  Constitutional:       Comments: Sitting in WC   HENT:      Mouth/Throat:      Mouth: Mucous membranes are moist.   Cardiovascular:      Rate and Rhythm: Normal rate.      Comments: PMH AFIB  Pulmonary:      Effort: Pulmonary effort is normal.      Breath sounds: Normal breath sounds.      Comments: RA  Abdominal:      General: Bowel sounds are normal.   Genitourinary:     Comments: Denies dysuria   Musculoskeletal:      Comments: PT/OT   Skin:     General: Skin is warm and dry.      Comments: Dressing D/I Left medial leg (below knee)    Neurological:      Mental Status: She is alert and  oriented to person, place, and time.   Psychiatric:         Mood and Affect: Mood normal.      Comments: Conversational

## 2025-03-12 NOTE — ASSESSMENT & PLAN NOTE
Amiodarone 200 mg  by mouth daily   Metoprolol tart 12.5 mg by mouth BID  ASA 81 mg by mouth daily   Zio Patch - AFIB  Coumadin 2.5 mg by mouth daily   PT/INR every Monday   Dr Fleischer follow up apt 3/7/2025 - Completed- Consult Dr Pompa  3/11/2025 Dr Pompa  3/3/2025 H/H 12.7/40.2

## 2025-03-13 NOTE — PROGRESS NOTES
Patient ID:   Frannie Blanco is a 74 y.o. female with PMH remarkable for COPD on chronic 02 @ 5L, tobacco dependence, CHF, Afib, DM2, HTN, bullous pemphigoid, Multiple splenic artery aneurysms (stable 10/30/2024), lung nodules who presents to the office today for No chief complaint on file..    LAST OFFICE VISIT: 1/21/2025 for hospitalization FU where she was admitted to [facility] for cat scratch causing cellulitis. Given keflex for UTI but later increased this to QID to tx UTI and cellulitis. HgbA1c was 4.1 on 1/20/2025.     Hospitalization Discharge Follow Up:  Date(s): 1/29 to 2/7/2025  Location: [facility] then [facility] @ [facility]  Reason Presented to ER: cough, SOB, weakness resulting in 2 falls.  Synopsis: found to be +ve for flu A. CxR was -ve. Placed on tamiflu. SNF recommended by PTOT. Has chronic infected left calf wound, GS consulted s/p I/D. Also with new exertional dyspnea, CTPA ruled out PE. Limited echo ordered to further evaluate dyspnea. EF 59%, no RWMA, normal pericardium. 3 episodes of NSVT on telemetry, cardiology consulted, recommended to continue amiodarone and would need outpatient follow-up for stress test and extended Holter monitor. Warfarin resumed post I&D, due to subtherapeutic INR, bridging with Lovenox. Will need bridging till INR therapeutic for 2 days. Patient now ready for discharge.   Recommended FU: Cardio, PCP    HEALTHCARE MAINTENANCE: FU  Mammogram (40-75): 7/24/2023 -[facility]  Pap smear (21-65, or hysterectomy q5yrs): 4/12/2022 -[facility]  Last Labs: 3/3/2025  Colonoscopy (45-75 or age 40 with 1st degree relative dx colon ca): 4/12/2013 with Dr Javy Sarkar, normal. Repeat in 10 yrs. DUE  Lung cancer screening (50-76 y/o x 20 pk yr for at least 20 yrs + current smoker OR quit in last 15 years, no CT w/I last year): no mention of nodules on 2/3/2025 scan from [facility]  DEXA (65+, q 2 years): DUE  ECHO 9/26/2023 showing LVEF 60-65%, impaired relaxation LA moderate to severely dilated, RA mild to  "moderately dilated moderate thickening and calcification of anterior and posterior leaflets, moderate to severe mitral calcification with mild mitral stenosis and MR. In Afib at the time of study. Aortic valve sclerosis.   US thyroid 2024 showing multinodular thyroid with no suspicious thyroid lesion on exam.     Saw Dr Ricky Stoll on 3/7/2025 whom recommended electrical CV. Referred to EP.   HPI  Social History     Tobacco Use    Smoking status: Former     Current packs/day: 0.00     Types: Cigarettes     Quit date: 2023     Years since quittin.5    Smokeless tobacco: Never   Vaping Use    Vaping status: Never Used   Substance Use Topics    Alcohol use: Never    Drug use: Never     Review of Systems  Visit Vitals  OB Status Unknown   Smoking Status Former     No Known Allergies   Physical Exam  Current Outpatient Medications   Medication Instructions    albuterol 90 mcg/actuation inhaler inhale 1 to 2 puffs by mouth and INTO THE LUNGS every 4 to 6 hours if needed    amiodarone (PACERONE) 200 mg, oral, Daily    aspirin 81 mg EC tablet 1 tablet, Daily    atorvastatin (LIPITOR) 20 mg, oral, Nightly    BD Alcohol Swabs pads, medicated     BD Dorothy 2nd Gen Pen Needle 32 gauge x \" needle Use with vitamin B12 injections monthly    calcium carbonate-vitamin D3 1,000 mg-20 mcg (800 unit) tablet 1 tablet, 2 times daily    cholecalciferol (VITAMIN D3) 1,000 Units, Daily    cyanocobalamin (DODEX) 1,000 mcg, intramuscular, Every 30 days    dapsone 25 mg tablet 2 tablets, Daily before breakfast    dextromethorphan-guaifenesin (Robitussin DM)  mg/5 mL oral liquid 5 mL, oral, 3 times daily PRN    docusate sodium (COLACE) 100 mg, 2 times daily PRN    empagliflozin (JARDIANCE) 25 mg, oral, Daily    ferrous sulfate 65 mg, Daily    fluticasone-umeclidin-vilanter (Trelegy Ellipta) 200-62.5-25 mcg blister with device INHALE 1 PUFF BY MOUTH IN THE MORNING Rinse mouth after taking dose    furosemide (LASIX) 80 mg, " "oral, Daily    gabapentin (Neurontin) 300 mg capsule take 1 capsule by mouth three times a day for 90 DAYS    insulin glargine (Basaglar KwikPen U-100 Insulin) 100 unit/mL (3 mL) pen Inject 15 units daily Take as directed per insulin instructions.    metFORMIN (Glucophage) 1,000 mg tablet TAKE 1 TABLET BY MOUTH TWICE DAILY    Mounjaro 5 mg, subcutaneous, Once Weekly    nystatin (Mycostatin) ointment Topical, 2 times daily    potassium chloride CR (Klor-Con M20) 20 mEq ER tablet 40 mEq, oral, Daily, Do not crush or chew.    syringe with needle 3 mL 23 x 1\" syringe Use to inject vitamin b12 once monthly    topiramate (TOPAMAX) 100 mg, oral, 2 times daily    triamcinolone (Kenalog) 0.1 % ointment APPLY 1 APPLICATOR AS NEEDED TOPICALLY ONCE DAILY AS NEEDED UNDER DENTAL TRAYS    warfarin (Coumadin) 5 mg tablet Take 1 tab daily or directed as coumadin clinic    zinc oxide 20 % ointment 1 Application, Every 8 hours      Lab Results   Component Value Date    WBC 11.2 01/29/2025    HGB 14.2 01/29/2025    HCT 45.3 01/29/2025     01/29/2025    CHOL 129 10/26/2024    TRIG 80 10/26/2024    HDL 52.7 10/26/2024    ALT 17 01/29/2025    AST 15 01/29/2025     01/29/2025    K 4.0 01/29/2025     01/29/2025    CREATININE 1.66 (H) 01/29/2025    BUN 18 01/29/2025    CO2 23 01/29/2025    TSH 2.05 01/20/2025    INR 1.5 (H) 01/29/2025    HGBA1C 4.6 03/03/2025       {Assess/PlanSmartLinks:27532}    --------------------  Written by Deandra Baptiste RN, acting as a scribe for Dr. Muñoz. This note accurately reflects the work and decisions made by Dr. Muñoz.     I, Dr. Muñoz, attest all medical record entries made by the scribe were under my direction and were personally dictated by me. I have reviewed the chart and agree that the record accurately reflects my performance of the history, physical exam, and assessment and plan.   "

## 2025-03-17 ENCOUNTER — PATIENT OUTREACH (OUTPATIENT)
Dept: PRIMARY CARE | Facility: CLINIC | Age: 75
End: 2025-03-17
Payer: MEDICARE

## 2025-03-17 ENCOUNTER — ANTICOAGULATION - WARFARIN VISIT (OUTPATIENT)
Dept: CARDIOLOGY | Facility: CLINIC | Age: 75
End: 2025-03-17
Payer: MEDICARE

## 2025-03-17 DIAGNOSIS — Z79.01 LONG TERM (CURRENT) USE OF ANTICOAGULANTS: ICD-10-CM

## 2025-03-17 DIAGNOSIS — I26.99 PULMONARY EMBOLISM WITHOUT ACUTE COR PULMONALE, UNSPECIFIED CHRONICITY, UNSPECIFIED PULMONARY EMBOLISM TYPE (MULTI): Primary | ICD-10-CM

## 2025-03-17 PROCEDURE — RXMED WILLOW AMBULATORY MEDICATION CHARGE

## 2025-03-17 RX ORDER — METOPROLOL TARTRATE 25 MG/1
12.5 TABLET, FILM COATED ORAL 2 TIMES DAILY
COMMUNITY
Start: 2025-02-07 | End: 2025-03-18 | Stop reason: SDUPTHER

## 2025-03-17 NOTE — PROGRESS NOTES
Discharge Facility: [facility] TO [facility]   Discharge Diagnosis: Essential (primary) hypertension  Admission Date: 2/7/25  Discharge Date:  3/14/25    PCP Appointment Date:  3/26/25   Specialist Appointment Date: 4/8/25 - [facility]   Hospital Encounter and Summary Linked: Amy Sams   See discharge assessment below for further details     Wrap Up  Wrap Up Additional Comments: Pt. Is requesting a UA C&S prior to her follow up appt on 3/26/25. Pt. reports she is doing well. She is eating/drinking well. She denies any needs at this time. (3/17/2025  1:03 PM)    Engagement  Call Start Time: 1303 (Call completed with Frannie) (3/17/2025  1:03 PM)    Medications  Medications reviewed with patient/caregiver?: Yes (3/17/2025  1:03 PM)  Is the patient having any side effects they believe may be caused by any medication additions or changes?: No (3/17/2025  1:03 PM)  Does the patient have all medications ordered at discharge?: Yes (3/17/2025  1:03 PM)  Prescription Comments: Pt.  has refill request to office - Pt. will bring discharge med list to follow up appt (3/17/2025  1:03 PM)  Is the patient taking all medications as directed (includes completed medication regime)?: Yes (3/17/2025  1:03 PM)  Care Management Interventions: Notified provider (3/17/2025  1:03 PM)  Medication Comments: Pt. denies medication questions/concerns (3/17/2025  1:03 PM)    Appointments  Does the patient have a primary care provider?: Yes (3/17/2025  1:03 PM)  Care Management Interventions: Verified appointment date/time/provider (3/17/2025  1:03 PM)  Has the patient kept scheduled appointments due by today?: Yes (3/17/2025  1:03 PM)  Care Management Interventions: Advised patient to keep appointment (3/17/2025  1:03 PM)    Self Management  What is the home health agency?: [facility] - Visit on 3/16/25 (3/17/2025  1:03 PM)  Has home health visited the patient within 72 hours of discharge?: Yes (3/17/2025  1:03 PM)  What Durable  Medical Equipment (DME) was ordered?: n/a (3/17/2025  1:03 PM)    Patient Teaching  Does the patient have access to their discharge instructions?: Yes (3/17/2025  1:03 PM)  Care Management Interventions: Reviewed instructions with patient (3/17/2025  1:03 PM)  What is the patient's perception of their health status since discharge?: Improving (3/17/2025  1:03 PM)  Is the patient/caregiver able to teach back the hierarchy of who to call/visit for symptoms/problems? PCP, Specialist, Home Health nurse, Urgent Care, ED, 911: Yes (3/17/2025  1:03 PM)  Patient/Caregiver Education Comments: Pt. reports she is doing well at home. (3/17/2025  1:03 PM)

## 2025-03-17 NOTE — PROGRESS NOTES
Patient identification verified with 2 identifiers.     Location: [facility] Patient Self-Testing Program [phone]     Referring Physician: Terra Young MD   Enrollment/ Re-enrollment date: 2025   INR Goal: 2.0-3.0  INR monitoring is per [facility] protocol.  Anticoagulation Medication: warfarin  Indication: Pulmonary Embolism (PE)    Received a voicemail message from patient with self-reported INR results of 2.0 from today.  This RN called patient back and spoke with patient via telephone.    Subjective   Bleeding signs/symptoms: No    Bruising: No   Major bleeding event: No  Thrombosis signs/symptoms: No  Thromboembolic event: No  Missed doses: No  Extra doses: No  Medication changes: No  Dietary changes: No  Change in health: No  Change in activity: No  Alcohol: No  Other concerns: Patient discharged to home from SNF late last week status post hospitalization for SOB, generalized weakness and mechanical fall. Patient also states receiving routine wound care; however, not currently taking antibiotics.     Upcoming Procedures:  Does the Patient Have any upcoming procedures that require interruption in anticoagulation therapy? no  Does the patient require bridging? no    Patient reports that she is unsure as to how much warfarin she was receiving in SNF; however, believes that she was taking 2.5mg/daily.  Patient reports that INRs were tested weekly while in SNF.      Anticoagulation Summary  As of 3/17/2025      INR goal:  2.0-3.0   TTR:  66.6% (1.3 y)   INR used for dosin.00 (3/17/2025)   Weekly warfarin total:  17.5 mg               Assessment/Plan   Therapeutic     1. New dose: no change    Will maintain warfarin dose of 2,5mg/daily.  2. Next INR:  3-5 days  Patient to perform PST on Friday, 2025.  Current dose schedule reviewed with?patient and read back correctly to me.?     Education provided to patient during the visit:  Patient instructed to call in interim with questions, concerns and  changes.   Patient educated on interactions between medications and warfarin.   Patient educated on dietary consistency in vitamin k consumption.   Patient educated on affects of alcohol consumption while taking warfarin.   Patient educated on signs of bleeding/clotting.   Patient educated on compliance with dosing, follow up appointments, and prescribed plan of care.

## 2025-03-18 ENCOUNTER — APPOINTMENT (OUTPATIENT)
Dept: NEPHROLOGY | Facility: CLINIC | Age: 75
End: 2025-03-18
Payer: MEDICARE

## 2025-03-18 DIAGNOSIS — I10 PRIMARY HYPERTENSION: ICD-10-CM

## 2025-03-18 RX ORDER — METOPROLOL TARTRATE 25 MG/1
TABLET, FILM COATED ORAL
Qty: 45 TABLET | Refills: 0 | Status: SHIPPED | OUTPATIENT
Start: 2025-03-18

## 2025-03-19 ENCOUNTER — PHARMACY VISIT (OUTPATIENT)
Dept: PHARMACY | Facility: CLINIC | Age: 75
End: 2025-03-19
Payer: MEDICARE

## 2025-03-21 ENCOUNTER — ANTICOAGULATION - WARFARIN VISIT (OUTPATIENT)
Dept: CARDIOLOGY | Facility: CLINIC | Age: 75
End: 2025-03-21
Payer: MEDICARE

## 2025-03-21 DIAGNOSIS — I26.99 PULMONARY EMBOLISM WITHOUT ACUTE COR PULMONALE, UNSPECIFIED CHRONICITY, UNSPECIFIED PULMONARY EMBOLISM TYPE (MULTI): Primary | ICD-10-CM

## 2025-03-21 DIAGNOSIS — Z79.01 LONG TERM (CURRENT) USE OF ANTICOAGULANTS: ICD-10-CM

## 2025-03-21 NOTE — PROGRESS NOTES
Patient identification verified with 2 identifiers.    Location: [facility] Patient Self-Testing Program [phone]    Referring Physician: Terra Young MD   Enrollment/ Re-enrollment date: 2025   INR Goal: 2.0-3.0  INR monitoring is per [facility] protocol.  Anticoagulation Medication: warfarin  Indication: Pulmonary Embolism (PE)    Subjective   Bleeding signs/symptoms: No  Bruising: No   Major bleeding event: No  Thrombosis signs/symptoms: No  Thromboembolic event: No  Missed doses: No  Extra doses: No  Medication changes: No  Dietary changes: No  Change in health: No  Change in activity: No  Alcohol: No  Other concerns: No    Upcoming Procedures:  Does the Patient Have any upcoming procedures that require interruption in anticoagulation therapy? no  Does the patient require bridging? no      Anticoagulation Summary  As of 3/21/2025      INR goal:  2.0-3.0   TTR:  66.9% (1.3 y)   INR used for dosin.30 (3/21/2025)   Weekly warfarin total:  17.5 mg               Assessment/Plan   Therapeutic     1. New dose: no change    2. Next INR: 1 week    Received faxed INR self test result and called patient. Pt identification verified with 2 pt identifiers. Previous INR was therapeutic at 2. Today, self test reveals INR of 2.3 which is therapeutic for range of 2-3. Current incoming dose schedule reviewed with pt and read back correctly to me. Pt denies complications related to ACT therapy. Pt denies changes in diet, medications, social habits or general health. Will maintain dose and retest in 1 week. Outgoing dose schedule reviewed with pt and read back correctly to me. Pt instructed to call in interim with questions, concerns and changes.    Education provided to patient during the visit:  Patient instructed to call in interim with questions, concerns and changes.   Patient educated on interactions between medications and warfarin.   Patient educated on dietary consistency in vitamin k consumption.   Patient  educated on affects of alcohol consumption while taking warfarin.   Patient educated on signs of bleeding/clotting.   Patient educated on compliance with dosing, follow up appointments, and prescribed plan of care.

## 2025-03-24 ENCOUNTER — ANTICOAGULATION - WARFARIN VISIT (OUTPATIENT)
Dept: CARDIOLOGY | Facility: CLINIC | Age: 75
End: 2025-03-24
Payer: MEDICARE

## 2025-03-24 DIAGNOSIS — E11.65 TYPE 2 DIABETES MELLITUS WITH HYPERGLYCEMIA, UNSPECIFIED WHETHER LONG TERM INSULIN USE (MULTI): ICD-10-CM

## 2025-03-24 DIAGNOSIS — Z79.01 LONG TERM (CURRENT) USE OF ANTICOAGULANTS: ICD-10-CM

## 2025-03-24 DIAGNOSIS — I26.99 PULMONARY EMBOLISM WITHOUT ACUTE COR PULMONALE, UNSPECIFIED CHRONICITY, UNSPECIFIED PULMONARY EMBOLISM TYPE (MULTI): ICD-10-CM

## 2025-03-24 LAB
POC INR: 2.8
POC PROTHROMBIN TIME: NORMAL

## 2025-03-24 PROCEDURE — 99211 OFF/OP EST MAY X REQ PHY/QHP: CPT | Performed by: INTERNAL MEDICINE

## 2025-03-24 PROCEDURE — 85610 PROTHROMBIN TIME: CPT | Mod: QW

## 2025-03-24 RX ORDER — INSULIN GLARGINE 100 [IU]/ML
INJECTION, SOLUTION SUBCUTANEOUS
Qty: 15 ML | Refills: 3 | Status: SHIPPED | OUTPATIENT
Start: 2025-03-24

## 2025-03-24 NOTE — PROGRESS NOTES
Patient identification verified with 2 identifiers.    Location: [facility] Patient Self-Testing Program [phone]    Referring Physician: Terra Young MD   Enrollment/ Re-enrollment date: 2025   INR Goal: 2.0-3.0  INR monitoring is per [facility] protocol.  Anticoagulation Medication: warfarin  Indication: Pulmonary Embolism (PE)    Subjective   Bleeding signs/symptoms: No  Bruising: No   Major bleeding event: No  Thrombosis signs/symptoms: No  Thromboembolic event: No  Missed doses: No  Extra doses: No  Medication changes: Yes  started Doxycycline on friday 3/21/25 for total of 8 days, and taking tylenol brand cold and flu tabs since lastnight.  Dietary changes: No  Change in health: No  Change in activity: No  Alcohol: No  Other concerns: No    Upcoming Procedures:  Does the Patient Have any upcoming procedures that require interruption in anticoagulation therapy? no  Does the patient require bridging? no      Anticoagulation Summary  As of 3/24/2025      INR goal:  2.0-3.0   TTR:  67.2% (1.3 y)   INR used for dosin.80 (3/24/2025)   Weekly warfarin total:  17.5 mg               Assessment/Plan   Therapeutic     1. New dose: no change    2. Next INR: 3 days    Received faxed INR self test result and called patient. Pt identification verified with 2 pt identifiers. Previous INR was therapeutic at 2. Today, self test reveals INR of 2.3 which is therapeutic for range of 2-3. Current incoming dose schedule reviewed with pt and read back correctly to me. Pt denies complications related to ACT therapy. Pt denies changes in diet, medications, social habits or general health. Will maintain dose and retest in 1 week. Outgoing dose schedule reviewed with pt and read back correctly to me. Pt instructed to call in interim with questions, concerns and changes.    Education provided to patient during the visit:  Patient instructed to call in interim with questions, concerns and changes.   Patient educated on  interactions between medications and warfarin.   Patient educated on dietary consistency in vitamin k consumption.   Patient educated on affects of alcohol consumption while taking warfarin.   Patient educated on signs of bleeding/clotting.   Patient educated on compliance with dosing, follow up appointments, and prescribed plan of care.

## 2025-03-25 PROCEDURE — G0180 MD CERTIFICATION HHA PATIENT: HCPCS | Performed by: INTERNAL MEDICINE

## 2025-03-26 ENCOUNTER — APPOINTMENT (OUTPATIENT)
Dept: PRIMARY CARE | Facility: CLINIC | Age: 75
End: 2025-03-26
Payer: MEDICARE

## 2025-03-26 VITALS
BODY MASS INDEX: 45.99 KG/M2 | DIASTOLIC BLOOD PRESSURE: 80 MMHG | HEIGHT: 67 IN | WEIGHT: 293 LBS | OXYGEN SATURATION: 97 % | RESPIRATION RATE: 18 BRPM | HEART RATE: 87 BPM | SYSTOLIC BLOOD PRESSURE: 126 MMHG

## 2025-03-26 DIAGNOSIS — I48.91 ATRIAL FIBRILLATION, UNSPECIFIED TYPE (MULTI): ICD-10-CM

## 2025-03-26 DIAGNOSIS — E53.8 VITAMIN B12 DEFICIENCY: ICD-10-CM

## 2025-03-26 DIAGNOSIS — Z09 HOSPITAL DISCHARGE FOLLOW-UP: ICD-10-CM

## 2025-03-26 DIAGNOSIS — S81.802D WOUND OF LEFT LOWER EXTREMITY, SUBSEQUENT ENCOUNTER: ICD-10-CM

## 2025-03-26 DIAGNOSIS — N18.31 STAGE 3A CHRONIC KIDNEY DISEASE (MULTI): Primary | ICD-10-CM

## 2025-03-26 PROCEDURE — 3074F SYST BP LT 130 MM HG: CPT | Performed by: INTERNAL MEDICINE

## 2025-03-26 PROCEDURE — G2211 COMPLEX E/M VISIT ADD ON: HCPCS | Performed by: INTERNAL MEDICINE

## 2025-03-26 PROCEDURE — 1123F ACP DISCUSS/DSCN MKR DOCD: CPT | Performed by: INTERNAL MEDICINE

## 2025-03-26 PROCEDURE — 1160F RVW MEDS BY RX/DR IN RCRD: CPT | Performed by: INTERNAL MEDICINE

## 2025-03-26 PROCEDURE — 3044F HG A1C LEVEL LT 7.0%: CPT | Performed by: INTERNAL MEDICINE

## 2025-03-26 PROCEDURE — 1036F TOBACCO NON-USER: CPT | Performed by: INTERNAL MEDICINE

## 2025-03-26 PROCEDURE — 99214 OFFICE O/P EST MOD 30 MIN: CPT | Performed by: INTERNAL MEDICINE

## 2025-03-26 PROCEDURE — 1126F AMNT PAIN NOTED NONE PRSNT: CPT | Performed by: INTERNAL MEDICINE

## 2025-03-26 PROCEDURE — 1159F MED LIST DOCD IN RCRD: CPT | Performed by: INTERNAL MEDICINE

## 2025-03-26 PROCEDURE — 3008F BODY MASS INDEX DOCD: CPT | Performed by: INTERNAL MEDICINE

## 2025-03-26 PROCEDURE — 3079F DIAST BP 80-89 MM HG: CPT | Performed by: INTERNAL MEDICINE

## 2025-03-26 RX ORDER — SODIUM BICARBONATE 650 MG/1
650 TABLET ORAL 3 TIMES DAILY
Qty: 90 TABLET | Refills: 1 | Status: SHIPPED | OUTPATIENT
Start: 2025-03-26 | End: 2026-03-26

## 2025-03-26 RX ORDER — CYANOCOBALAMIN 1000 UG/ML
1000 INJECTION, SOLUTION INTRAMUSCULAR; SUBCUTANEOUS
Qty: 10 ML | Refills: 1 | Status: SHIPPED | OUTPATIENT
Start: 2025-03-26

## 2025-03-26 ASSESSMENT — PATIENT HEALTH QUESTIONNAIRE - PHQ9
1. LITTLE INTEREST OR PLEASURE IN DOING THINGS: NOT AT ALL
SUM OF ALL RESPONSES TO PHQ9 QUESTIONS 1 AND 2: 0
2. FEELING DOWN, DEPRESSED OR HOPELESS: NOT AT ALL

## 2025-03-26 ASSESSMENT — PAIN SCALES - GENERAL: PAINLEVEL_OUTOF10: 0-NO PAIN

## 2025-03-26 NOTE — PATIENT INSTRUCTIONS
It was great to see you in the office today! Here is what we discussed at your visit today:    Glad you are feeling better.   As far as your INR of 3.1 today:  Continue with same dose of Coumadin and recheck INR in one week    As far as sodium bicarbonate which was started upon discharge from hospital, ok to continue and a prescription was sent to your pharmacy for 650mg three times daily    A refill was sent for your B12 injections    Continue with all other medications    Follow up with cardiology    Follow up with Dr. Palma, kidney doctor as discussed    Follow up in three months

## 2025-03-26 NOTE — PROGRESS NOTES
Patient ID: Frannie Blanco is a 74 y.o. female with PMH remarkable for COPD on chronic 02 @ 5L, tobacco dependence, CHF, Afib, DM2, HTN, bullous pemphigoid, Multiple splenic artery aneurysms (stable 10/30/2024), lung nodules who presents to the office today for Hospital Follow-up.    LAST OFFICE VISIT: 1/21/2025 for hospitalization FU where she was admitted to [facility] for cat scratch causing cellulitis. Given keflex for UTI but later increased this to QID to tx UTI and cellulitis. HgbA1c was 4.1 on 1/20/2025.     Hospitalization Discharge Follow Up:  Date(s): 1/29 to 2/7/2025  Location: [facility] then [facility]  Reason Presented to ER: cough, SOB, weakness resulting in 2 falls.  Synopsis: found to be +ve for flu A. CxR was -ve. Placed on tamiflu. SNF recommended by PTOT. Has chronic infected left calf wound, GS consulted s/p I/D. Also with new exertional dyspnea, CTPA ruled out PE. Limited echo ordered to further evaluate dyspnea. EF 59%, no RWMA, normal pericardium. 3 episodes of NSVT on telemetry, cardiology consulted, recommended to continue amiodarone and would need outpatient follow-up for stress test and extended Holter monitor. Warfarin resumed post I&D, due to subtherapeutic INR, bridging with Lovenox. Will need bridging till INR therapeutic for 2 days. Patient now ready for discharge.   Recommended FU: Cardio, PCP    HEALTHCARE MAINTENANCE: FU  Mammogram (40-75): 7/24/2023 -[facility]  Pap smear (21-65, or hysterectomy q5yrs): 4/12/2022 -[facility]  Last Labs: 3/3/2025  Colonoscopy (45-75 or age 40 with 1st degree relative dx colon ca): 4/12/2013 with Dr Javy Sarkar, normal. Repeat in 10 yrs. DUE  Lung cancer screening (50-76 y/o x 20 pk yr for at least 20 yrs + current smoker OR quit in last 15 years, no CT w/I last year): no mention of nodules on 2/3/2025 scan from [facility]  DEXA (65+, q 2 years): DUE  ECHO 9/26/2023 showing LVEF 60-65%, impaired relaxation LA moderate to severely dilated, RA mild to moderately  "dilated moderate thickening and calcification of anterior and posterior leaflets, moderate to severe mitral calcification with mild mitral stenosis and MR. In Afib at the time of study. Aortic valve sclerosis.   US thyroid 2024 showing multinodular thyroid with no suspicious thyroid lesion on exam.     Saw Dr Ricky Stoll on 3/7/2025 whom recommended electrical CV. Referred to EP.     HPI She states that she had cardioversion this am at [facility] and came here immediately after. She states she was in Afib per cardiology when she left there. She did INR this am and it was 3.2. She has a chronic wound on her left lower extremity, states she is going to go to wound clinic this week for first time. She states she has been walking with cane. She states she has been moving her bowels ok.     Social History     Tobacco Use    Smoking status: Former     Current packs/day: 0.00     Types: Cigarettes     Quit date: 2023     Years since quittin.5    Smokeless tobacco: Never   Vaping Use    Vaping status: Never Used   Substance Use Topics    Alcohol use: Never    Drug use: Never     Review of Systems   Constitutional: Negative.    HENT: Negative.     Respiratory: Negative.     Cardiovascular: Negative.    Gastrointestinal: Negative.    Genitourinary: Negative.    Musculoskeletal:  Positive for arthralgias and gait problem.   Skin: Negative.    Neurological:  Positive for weakness.   Psychiatric/Behavioral: Negative.       Visit Vitals  /80 (BP Location: Left arm, Patient Position: Sitting)   Pulse 87   Resp 18   Ht 1.702 m (5' 7\")   Wt 137 kg (301 lb)   SpO2 97%   BMI 47.14 kg/m²   OB Status Unknown   Smoking Status Former   BSA 2.55 m²     No Known Allergies   Physical Exam  Vitals reviewed.   Constitutional:       Appearance: Normal appearance. She is obese.   HENT:      Head: Normocephalic and atraumatic.   Cardiovascular:      Rate and Rhythm: Normal rate and regular rhythm.      Pulses: Normal pulses.      " "Heart sounds: Normal heart sounds.   Pulmonary:      Effort: Pulmonary effort is normal.      Breath sounds: Normal breath sounds.   Abdominal:      General: Bowel sounds are normal.      Palpations: Abdomen is soft.   Musculoskeletal:         General: Normal range of motion.      Cervical back: Normal range of motion.      Right lower leg: Edema present.      Left lower leg: Edema present.   Skin:     General: Skin is warm and dry.             Comments: Dressing in place left inner calf   Neurological:      General: No focal deficit present.      Mental Status: She is alert and oriented to person, place, and time.   Psychiatric:         Mood and Affect: Mood normal.         Behavior: Behavior normal.       Current Outpatient Medications   Medication Instructions    albuterol 90 mcg/actuation inhaler inhale 1 to 2 puffs by mouth and INTO THE LUNGS every 4 to 6 hours if needed    amiodarone (PACERONE) 200 mg, oral, Daily    aspirin 81 mg EC tablet 1 tablet, Daily    atorvastatin (LIPITOR) 20 mg, oral, Nightly    BD Alcohol Swabs pads, medicated     BD Dorothy 2nd Gen Pen Needle 32 gauge x 5/32\" needle Use with vitamin B12 injections monthly    calcium carbonate-vitamin D3 1,000 mg-20 mcg (800 unit) tablet 1 tablet, 2 times daily    cholecalciferol (VITAMIN D3) 1,000 Units, Daily    cyanocobalamin (DODEX) 1,000 mcg, intramuscular, Every 30 days    dapsone 25 mg tablet 2 tablets, Daily before breakfast    dextromethorphan-guaifenesin (Robitussin DM)  mg/5 mL oral liquid 5 mL, oral, 3 times daily PRN    docusate sodium (COLACE) 100 mg, 2 times daily PRN    empagliflozin (JARDIANCE) 25 mg, oral, Daily    ferrous sulfate 65 mg, Daily    fluticasone-umeclidin-vilanter (Trelegy Ellipta) 200-62.5-25 mcg blister with device INHALE 1 PUFF BY MOUTH IN THE MORNING Rinse mouth after taking dose    furosemide (LASIX) 80 mg, oral, Daily    gabapentin (Neurontin) 300 mg capsule take 1 capsule by mouth three times a day for 90 " "DAYS    insulin glargine (Basaglar KwikPen U-100 Insulin) 100 unit/mL (3 mL) pen Inject 15 units daily Take as directed per insulin instructions.    metFORMIN (Glucophage) 1,000 mg tablet TAKE 1 TABLET BY MOUTH TWICE DAILY    metoprolol tartrate (Lopressor) 25 mg tablet To take half tablet by mouth twice daily, to hold if systolic BP is 100 or less or is heart rate is 55 or less    Mounjaro 5 mg, subcutaneous, Once Weekly    nystatin (Mycostatin) ointment Topical, 2 times daily    potassium chloride CR (Klor-Con M20) 20 mEq ER tablet 40 mEq, oral, Daily, Do not crush or chew.    syringe with needle 3 mL 23 x 1\" syringe Use to inject vitamin b12 once monthly    topiramate (TOPAMAX) 100 mg, oral, 2 times daily    triamcinolone (Kenalog) 0.1 % ointment APPLY 1 APPLICATOR AS NEEDED TOPICALLY ONCE DAILY AS NEEDED UNDER DENTAL TRAYS    warfarin (Coumadin) 5 mg tablet Take 1 tab daily or directed as coumadin clinic    zinc oxide 20 % ointment 1 Application, Every 8 hours      Lab Results   Component Value Date    WBC 11.2 01/29/2025    HGB 14.2 01/29/2025    HCT 45.3 01/29/2025     01/29/2025    CHOL 129 10/26/2024    TRIG 80 10/26/2024    HDL 52.7 10/26/2024    ALT 17 01/29/2025    AST 15 01/29/2025     01/29/2025    K 4.0 01/29/2025     01/29/2025    CREATININE 1.66 (H) 01/29/2025    BUN 18 01/29/2025    CO2 23 01/29/2025    TSH 2.05 01/20/2025    INR 2.80 03/24/2025    HGBA1C 4.6 03/03/2025       Problem List Items Addressed This Visit             ICD-10-CM    Atrial fibrillation (Multi) I48.91     S/p cardioversion at [facility] this am  She is in sinus rhythm on exam today  Continue with current cardiac medications  She states her INR was 3.1 this am  Advised to continue same dose and recheck INR next week  Follow up with cardiology       Hospital discharge follow-up  - she was recently discharged from [facility]  - reviewed hospital notes and test results  - she has home health nursing in " place  - she was started on Sodium bicarb upon discharge from hospital in February, advised to continue for now and follow up with nephrology, Dr. Palma [id number]    Stage 3a chronic kidney disease (Multi) - Primary N18.31    Relevant Medications    sodium bicarbonate 650 mg tablet    Vitamin B12 deficiency E53.8    Relevant Medications    cyanocobalamin (Dodex) 1,000 mcg/mL injection    Wound of left leg S81.802A     Chronic  Dressing in place  Follow up at wound clinic, she states dressing gets changed every day            --------------------  Written by Agata Jesus RN, acting as a scribe for Dr. Muñoz. This note accurately reflects the work and decisions made by Dr. Muñoz.     I, Dr. Muñoz, attest all medical record entries made by the scribe were under my direction and were personally dictated by me. I have reviewed the chart and agree that the record accurately reflects my performance of the history, physical exam, and assessment and plan.

## 2025-03-27 ENCOUNTER — ANTICOAGULATION - WARFARIN VISIT (OUTPATIENT)
Dept: CARDIOLOGY | Facility: CLINIC | Age: 75
End: 2025-03-27
Payer: MEDICARE

## 2025-03-27 DIAGNOSIS — I26.99 PULMONARY EMBOLISM WITHOUT ACUTE COR PULMONALE, UNSPECIFIED CHRONICITY, UNSPECIFIED PULMONARY EMBOLISM TYPE (MULTI): Primary | ICD-10-CM

## 2025-03-27 DIAGNOSIS — Z79.01 LONG TERM (CURRENT) USE OF ANTICOAGULANTS: ICD-10-CM

## 2025-03-27 PROBLEM — E53.8 VITAMIN B12 DEFICIENCY: Status: ACTIVE | Noted: 2025-03-27

## 2025-03-27 PROBLEM — N18.31 STAGE 3A CHRONIC KIDNEY DISEASE (MULTI): Status: ACTIVE | Noted: 2025-03-27

## 2025-03-27 PROBLEM — S81.802A WOUND OF LEFT LEG: Status: ACTIVE | Noted: 2025-03-27

## 2025-03-27 ASSESSMENT — ENCOUNTER SYMPTOMS
CARDIOVASCULAR NEGATIVE: 1
PSYCHIATRIC NEGATIVE: 1
WEAKNESS: 1
RESPIRATORY NEGATIVE: 1
ARTHRALGIAS: 1
GASTROINTESTINAL NEGATIVE: 1
CONSTITUTIONAL NEGATIVE: 1

## 2025-03-27 NOTE — PROGRESS NOTES
INR due today but not received.  Called pt to remind her to test in am and she reports she tested at 3.2 yesterday but didn't call in result.  She continues on doxycycline and will test in am for management prior to weekend.

## 2025-03-27 NOTE — ASSESSMENT & PLAN NOTE
S/p cardioversion at [facility] this am  She is in sinus rhythm on exam today  Continue with current cardiac medications  She states her INR was 3.1 this am  Advised to continue same dose and recheck INR next week

## 2025-03-28 ENCOUNTER — ANTICOAGULATION - WARFARIN VISIT (OUTPATIENT)
Dept: CARDIOLOGY | Facility: CLINIC | Age: 75
End: 2025-03-28
Payer: MEDICARE

## 2025-03-28 DIAGNOSIS — I26.99 PULMONARY EMBOLISM WITHOUT ACUTE COR PULMONALE, UNSPECIFIED CHRONICITY, UNSPECIFIED PULMONARY EMBOLISM TYPE (MULTI): Primary | ICD-10-CM

## 2025-03-28 DIAGNOSIS — Z79.01 LONG TERM (CURRENT) USE OF ANTICOAGULANTS: ICD-10-CM

## 2025-03-28 LAB
INR IN PPP BY COAGULATION ASSAY EXTERNAL: 2.5
PROTHROMBIN TIME (PT) IN PPP BY COAGULATION ASSAY EXTERNAL: NORMAL

## 2025-03-28 NOTE — PROGRESS NOTES
Patient identification verified with 2 identifiers.    Location: [facility] Patient Self-Testing Program [phone]    Referring Physician: Terra Young MD   Enrollment/ Re-enrollment date: 2025   INR Goal: 2.0-3.0  INR monitoring is per [facility] protocol.  Anticoagulation Medication: warfarin  Indication: Pulmonary Embolism (PE)    Subjective   Bleeding signs/symptoms: No    Bruising: No   Major bleeding event: No  Thrombosis signs/symptoms: No  Thromboembolic event: No  Missed doses: No  Extra doses: No  Medication changes: No  Dietary changes: No  Change in health: No  Change in activity: No  Alcohol: No  Other concerns: No    Upcoming Procedures:  Does the Patient Have any upcoming procedures that require interruption in anticoagulation therapy? no  Does the patient require bridging? no      Anticoagulation Summary  As of 3/28/2025      INR goal:  2.0-3.0   TTR:  67.1% (1.3 y)   INR used for dosin.50 (3/28/2025)   Weekly warfarin total:  17.5 mg               Assessment/Plan   Therapeutic     1. New dose: no change    2. Next INR: 1 week      Education provided to patient during the visit:  Patient instructed to call in interim with questions, concerns and changes.   Patient educated on interactions between medications and warfarin.   Patient educated on compliance with dosing, follow up appointments, and prescribed plan of care.

## 2025-04-01 ENCOUNTER — PATIENT OUTREACH (OUTPATIENT)
Dept: PRIMARY CARE | Facility: CLINIC | Age: 75
End: 2025-04-01
Payer: MEDICARE

## 2025-04-01 NOTE — PROGRESS NOTES
Call regarding Office Visit with Terra Young MD (03/26/2025) after hospitalization.  At time of outreach call the patient feels as if their condition has improved since last visit.  Reviewed the PCP appointment with the pt and addressed any questions or concerns.    Pt. Denies questions/concerns at this time.     Wound nurse to visit home on this date, to evaluate her left lower leg.

## 2025-04-03 DIAGNOSIS — E11.65 TYPE 2 DIABETES MELLITUS WITH HYPERGLYCEMIA, UNSPECIFIED WHETHER LONG TERM INSULIN USE (MULTI): ICD-10-CM

## 2025-04-03 RX ORDER — GABAPENTIN 300 MG/1
300 CAPSULE ORAL 3 TIMES DAILY
Qty: 270 CAPSULE | Refills: 1 | Status: SHIPPED | OUTPATIENT
Start: 2025-04-03 | End: 2026-04-03

## 2025-04-04 ENCOUNTER — ANTICOAGULATION - WARFARIN VISIT (OUTPATIENT)
Dept: CARDIOLOGY | Facility: CLINIC | Age: 75
End: 2025-04-04
Payer: MEDICARE

## 2025-04-04 DIAGNOSIS — Z79.01 LONG TERM (CURRENT) USE OF ANTICOAGULANTS: ICD-10-CM

## 2025-04-04 DIAGNOSIS — I26.99 PULMONARY EMBOLISM WITHOUT ACUTE COR PULMONALE, UNSPECIFIED CHRONICITY, UNSPECIFIED PULMONARY EMBOLISM TYPE (MULTI): Primary | ICD-10-CM

## 2025-04-04 NOTE — PROGRESS NOTES
Patient identification verified with 2 identifiers.    Location: [facility] Patient Self-Testing Program [phone]    Referring Physician: Trera Young MD   Enrollment/ Re-enrollment date: July 31, 2025   INR Goal: 2.0-3.0  INR monitoring is per [facility] protocol.  Anticoagulation Medication: warfarin  Indication: Pulmonary Embolism (PE)    Subjective   Bleeding signs/symptoms: No  Bruising: No   Major bleeding event: No  Thrombosis signs/symptoms: No  Thromboembolic event: No  Missed doses: No  Extra doses: No  Medication changes: Yes  Dietary changes: No  Change in health: No  Change in activity: No  Alcohol: No  Other concerns: No    Upcoming Procedures:  Does the Patient Have any upcoming procedures that require interruption in anticoagulation therapy? no  Does the patient require bridging? no      Anticoagulation Summary  As of 4/4/2025      INR goal:  2.0-3.0   TTR:  67.2% (1.3 y)   INR used for dosing:  3.20 (4/4/2025)   Weekly warfarin total:  17.5 mg               Assessment/Plan   Supratherapeutic, pt completed antibiotics today     1. New dose: no change    2. Next INR: 1 week      Education provided to patient during the visit:  Patient instructed to call in interim with questions, concerns and changes.   Patient educated on compliance with dosing, follow up appointments, and prescribed plan of care.

## 2025-04-07 ENCOUNTER — TELEPHONE (OUTPATIENT)
Dept: ENDOCRINOLOGY | Facility: CLINIC | Age: 75
End: 2025-04-07
Payer: MEDICARE

## 2025-04-08 ENCOUNTER — APPOINTMENT (OUTPATIENT)
Dept: ENDOCRINOLOGY | Facility: CLINIC | Age: 75
End: 2025-04-08
Payer: MEDICARE

## 2025-04-08 DIAGNOSIS — E04.1 THYROID NODULE: ICD-10-CM

## 2025-04-08 DIAGNOSIS — E11.65 TYPE 2 DIABETES MELLITUS WITH HYPERGLYCEMIA, UNSPECIFIED WHETHER LONG TERM INSULIN USE (MULTI): Primary | ICD-10-CM

## 2025-04-08 DIAGNOSIS — E78.2 MIXED HYPERLIPIDEMIA: ICD-10-CM

## 2025-04-08 DIAGNOSIS — I10 PRIMARY HYPERTENSION: ICD-10-CM

## 2025-04-08 ASSESSMENT — PAIN SCALES - GENERAL: PAINLEVEL_OUTOF10: 2

## 2025-04-08 NOTE — PROGRESS NOTES
Virtual or Telephone Consent    An interactive audio and video telecommunication system which permits real time communications between the patient (at the originating site) and provider (at the distant site) was utilized to provide this telehealth service.   Verbal consent was requested and obtained from Frannie Blanco on this date, 04/08/25 for a telehealth visit and the patient's location was confirmed at the time of the visit.     HPI   73 yo with Diabetes 2, stage 0 breast cancer, Dyslipidemia , ckd (seeing nephrology) vitd def, wearing O2 presents for followup with family member. A1c 4.7 last visit, last month 4.6% (pt very anemic)     Pt is testing sugars <1 times per day. Pt is having low sugars 0 times/week. No recent tests. Pt is trying to follow a carb controlled diet and knows reasonable carb allowances. Pt is able to afford some of her medications. Pt is not exercising.     Taking mounjaro 5mg ([facility] pap since last visit), 15 units basaglar at bedtime.  -took trulicity in past, came off d/t cost   -off metformin given ckd  -off glimepiride     Taking atorvastatin 20mg for lipids tolerating without side effects.     Taking toprol 25mg, lasix 80mg and kcl 40 meq daily  -off losartan  -going to see nephrology     -taking weekly b12 shots     Pt having issues with walking, using walker.     Pt had sleep study in 2021, now wearing 2L o2 overnight while sleeping.    -taking amiodarone       Current Outpatient Medications:     albuterol 90 mcg/actuation inhaler, inhale 1 to 2 puffs by mouth and INTO THE LUNGS every 4 to 6 hours if needed, Disp: , Rfl:     amiodarone (Pacerone) 200 mg tablet, Take 1 tablet (200 mg) by mouth once daily., Disp: 90 tablet, Rfl: 0    aspirin 81 mg EC tablet, Take 1 tablet (81 mg) by mouth once daily., Disp: , Rfl:     atorvastatin (Lipitor) 20 mg tablet, Take 1 tablet (20 mg) by mouth once daily at bedtime., Disp: 90 tablet, Rfl: 1    BD Alcohol Swabs pads, medicated, , Disp: , Rfl:  "    BD Dorothy 2nd Gen Pen Needle 32 gauge x 5/32\" needle, Use with vitamin B12 injections monthly, Disp: 12 each, Rfl: 11    calcium carbonate-vitamin D3 1,000 mg-20 mcg (800 unit) tablet, Take 1 tablet by mouth 2 times a day., Disp: , Rfl:     cholecalciferol (Vitamin D3) 25 MCG (1000 UT) tablet, Take 1 tablet (1,000 Units) by mouth once daily., Disp: , Rfl:     cyanocobalamin (Dodex) 1,000 mcg/mL injection, Inject 1 mL (1,000 mcg) into the muscle every 30 (thirty) days., Disp: 10 mL, Rfl: 1    dapsone 25 mg tablet, Take 2 tablets (50 mg) by mouth once daily in the morning. Take before meals., Disp: , Rfl:     dextromethorphan-guaifenesin (Robitussin DM)  mg/5 mL oral liquid, Take 5 mL by mouth 3 times a day as needed for cough., Disp: 118 mL, Rfl: 0    docusate sodium (Colace) 100 mg capsule, Take 1 capsule (100 mg) by mouth 2 times a day as needed., Disp: , Rfl:     ferrous sulfate 325 (65 Fe) MG EC tablet, Take 65 mg by mouth once daily., Disp: , Rfl:     fluticasone-umeclidin-vilanter (Trelegy Ellipta) 200-62.5-25 mcg blister with device, INHALE 1 PUFF BY MOUTH IN THE MORNING Rinse mouth after taking dose, Disp: 60 each, Rfl: 11    furosemide (Lasix) 80 mg tablet, Take 1 tablet (80 mg) by mouth once daily., Disp: 90 tablet, Rfl: 0    gabapentin (Neurontin) 300 mg capsule, Take 1 capsule (300 mg) by mouth 3 times a day., Disp: 270 capsule, Rfl: 1    insulin glargine (Basaglar KwikPen U-100 Insulin) 100 unit/mL (3 mL) pen, Inject 15 units daily Take as directed per insulin instructions., Disp: 15 mL, Rfl: 3    metoprolol tartrate (Lopressor) 25 mg tablet, To take half tablet by mouth twice daily, to hold if systolic BP is 100 or less or is heart rate is 55 or less, Disp: 45 tablet, Rfl: 0    nystatin (Mycostatin) ointment, Apply topically 2 times a day., Disp: 30 g, Rfl: 2    potassium chloride CR (Klor-Con M20) 20 mEq ER tablet, Take 2 tablets (40 mEq) by mouth once daily. Do not crush or chew., Disp: 180 " "tablet, Rfl: 1    sodium bicarbonate 650 mg tablet, Take 1 tablet (650 mg) by mouth 3 times a day., Disp: 90 tablet, Rfl: 1    syringe with needle 3 mL 23 x 1\" syringe, Use to inject vitamin b12 once monthly, Disp: 12 each, Rfl: 11    tirzepatide (Mounjaro) 5 mg/0.5 mL pen injector, Inject 5 mg under the skin 1 (one) time per week., Disp: 6 mL, Rfl: 3    topiramate (Topamax) 100 mg tablet, Take 1 tablet (100 mg) by mouth 2 times a day., Disp: 180 tablet, Rfl: 3    triamcinolone (Kenalog) 0.1 % ointment, APPLY 1 APPLICATOR AS NEEDED TOPICALLY ONCE DAILY AS NEEDED UNDER DENTAL TRAYS, Disp: , Rfl:     warfarin (Coumadin) 5 mg tablet, Take 1 tab daily or directed as coumadin clinic, Disp: 90 tablet, Rfl: 1    zinc oxide 20 % ointment, Apply 1 Application topically every 8 hours., Disp: , Rfl:       Allergies as of 04/08/2025    (No Known Allergies)             There were no vitals taken for this visit.      Labs:  Lab Results   Component Value Date    WBC 11.2 01/29/2025    NRBC 0.0 01/29/2025    RBC 4.43 01/29/2025    HGB 14.2 01/29/2025    HCT 45.3 01/29/2025     01/29/2025     Lab Results   Component Value Date    CALCIUM 9.6 01/29/2025    AST 15 01/29/2025    ALKPHOS 72 01/29/2025    BILITOT 0.4 01/29/2025    PROT 8.0 01/29/2025    ALBUMIN 4.2 01/29/2025     01/29/2025    K 4.0 01/29/2025     01/29/2025    CO2 23 01/29/2025    ANIONGAP 16 01/29/2025    BUN 18 01/29/2025    CREATININE 1.66 (H) 01/29/2025    GLUCOSE 233 (H) 01/29/2025    ALT 17 01/29/2025    EGFR 32 (L) 01/29/2025     Lab Results   Component Value Date    CHOL 129 10/26/2024    TRIG 80 10/26/2024    HDL 52.7 10/26/2024    LDLCALC 60 10/26/2024     Lab Results   Component Value Date    MICROALBCREA 106.5 (H) 02/22/2023     Lab Results   Component Value Date    TSH 2.05 01/20/2025     Lab Results   Component Value Date    AEJEKFQS64 183 (L) 03/25/2024     Lab Results   Component Value Date    HGBA1C 4.6 03/03/2025         Physical " Exam   General Appearance: pleasant, cooperative, no acute distress  HEENT: no chemosis, no proptosis, no lid lag, no lid retraction  Neck: no lymphadenopathy, no thyromegaly, no dominant thyroid nodules  Heart: no murmurs, regular rate and rhythm, S1 and S2  Lungs: no wheezes, no rhonci, no rales  Extremities: no lower extremity swelling      Assessment/Plan   1. Type 2 diabetes mellitus with hyperglycemia, unspecified whether long term insulin use (Multi) (Primary)  -A1c and labs reviewed  -glycemic values reviewed    -suggest testing at different times of day  -doing well with mounjaro 5mg, wt went from 336lbs to 301lbs  -call in when wt loss slows for next mounjaro dose  -lower basal insulin by 2 units q 3 days if am sugars <100    2. Mixed hyperlipidemia  -on statin and tolerating, will follow    3. Primary hypertension  -working with nephrology             Follow Up:  Jose Raul 4 months    -labs/tests/notes reviewed  -reviewed and counseled patient on medication monitoring and side effects

## 2025-04-11 ENCOUNTER — ANTICOAGULATION - WARFARIN VISIT (OUTPATIENT)
Dept: CARDIOLOGY | Facility: CLINIC | Age: 75
End: 2025-04-11
Payer: MEDICARE

## 2025-04-11 DIAGNOSIS — I26.99 PULMONARY EMBOLISM WITHOUT ACUTE COR PULMONALE, UNSPECIFIED CHRONICITY, UNSPECIFIED PULMONARY EMBOLISM TYPE (MULTI): ICD-10-CM

## 2025-04-11 DIAGNOSIS — Z79.01 LONG TERM (CURRENT) USE OF ANTICOAGULANTS: ICD-10-CM

## 2025-04-11 LAB
INR IN PPP BY COAGULATION ASSAY EXTERNAL: 2.2
PROTHROMBIN TIME (PT) IN PPP BY COAGULATION ASSAY EXTERNAL: NORMAL

## 2025-04-11 NOTE — PROGRESS NOTES
Patient identification verified with 2 identifiers.    Location: [facility] Patient Self-Testing Program [phone]    Referring Physician: Terra Young MD   Enrollment/ Re-enrollment date: 2025   INR Goal: 2.0-3.0  INR monitoring is per [facility] protocol.  Anticoagulation Medication: warfarin  Indication: Pulmonary Embolism (PE)    Subjective   Bleeding signs/symptoms: No  Bruising: No   Major bleeding event: No  Thrombosis signs/symptoms: No  Thromboembolic event: No  Missed doses: No  Extra doses: No  Medication changes: No  Dietary changes: No  Change in health: No  Change in activity: No  Alcohol: No  Other concerns: No    Upcoming Procedures:  Does the Patient Have any upcoming procedures that require interruption in anticoagulation therapy? no  Does the patient require bridging? no      Anticoagulation Summary  As of 2025      INR goal:  2.0-3.0   TTR:  67.4% (1.3 y)   INR used for dosin.20 (2025)   Weekly warfarin total:  17.5 mg               Assessment/Plan   Therapeutic    1. New dose: no change    2. Next INR: 1 week      Education provided to patient during the visit:  Patient instructed to call in interim with questions, concerns and changes.   Patient educated on compliance with dosing, follow up appointments, and prescribed plan of care.

## 2025-04-17 ENCOUNTER — ANTICOAGULATION - WARFARIN VISIT (OUTPATIENT)
Dept: CARDIOLOGY | Facility: CLINIC | Age: 75
End: 2025-04-17
Payer: MEDICARE

## 2025-04-17 ENCOUNTER — PATIENT OUTREACH (OUTPATIENT)
Dept: PRIMARY CARE | Facility: CLINIC | Age: 75
End: 2025-04-17
Payer: MEDICARE

## 2025-04-17 DIAGNOSIS — Z79.01 LONG TERM (CURRENT) USE OF ANTICOAGULANTS: ICD-10-CM

## 2025-04-17 DIAGNOSIS — I26.99 PULMONARY EMBOLISM WITHOUT ACUTE COR PULMONALE, UNSPECIFIED CHRONICITY, UNSPECIFIED PULMONARY EMBOLISM TYPE (MULTI): Primary | ICD-10-CM

## 2025-04-17 LAB
INR IN PPP BY COAGULATION ASSAY EXTERNAL: 2.7
PROTHROMBIN TIME (PT) IN PPP BY COAGULATION ASSAY EXTERNAL: NORMAL

## 2025-04-17 NOTE — PROGRESS NOTES
Patient identification verified with 2 identifiers.    Location: [facility] Patient Self-Testing Program [phone]    Referring Physician: Terra Young MD   Enrollment/ Re-enrollment date: 2025   INR Goal: 2.0-3.0  INR monitoring is per [facility] protocol.  Anticoagulation Medication: warfarin  Indication: Pulmonary Embolism (PE)    Subjective   Bleeding signs/symptoms: No    Bruising: No   Major bleeding event: No  Thrombosis signs/symptoms: No  Thromboembolic event: No  Missed doses: No  Extra doses: No  Medication changes: No  Dietary changes: No  Change in health: No  Change in activity: No  Alcohol: No  Other concerns: No    Upcoming Procedures:  Does the Patient Have any upcoming procedures that require interruption in anticoagulation therapy? no  Does the patient require bridging? no      Anticoagulation Summary  As of 2025      INR goal:  2.0-3.0   TTR:  67.8% (1.4 y)   INR used for dosin.70 (2025)   Weekly warfarin total:  17.5 mg               Assessment/Plan   Therapeutic     1. New dose: Spoke to patient with dosing instructions and next testing date.    2. Next INR: 2 weeks      Education provided to patient during the visit:  Patient instructed to call in interim with questions, concerns and changes.

## 2025-04-22 ENCOUNTER — APPOINTMENT (OUTPATIENT)
Dept: GYNECOLOGIC ONCOLOGY | Facility: CLINIC | Age: 75
End: 2025-04-22
Payer: MEDICARE

## 2025-05-01 ENCOUNTER — APPOINTMENT (OUTPATIENT)
Dept: RADIOLOGY | Facility: HOSPITAL | Age: 75
End: 2025-05-01
Payer: MEDICARE

## 2025-05-01 DIAGNOSIS — N95.0 POSTMENOPAUSAL VAGINAL BLEEDING: ICD-10-CM

## 2025-05-01 PROCEDURE — 76856 US EXAM PELVIC COMPLETE: CPT

## 2025-05-02 ENCOUNTER — TELEPHONE (OUTPATIENT)
Dept: PRIMARY CARE | Facility: CLINIC | Age: 75
End: 2025-05-02
Payer: MEDICARE

## 2025-05-02 DIAGNOSIS — I48.91 ATRIAL FIBRILLATION, UNSPECIFIED TYPE (MULTI): ICD-10-CM

## 2025-05-02 DIAGNOSIS — E11.42 DIABETIC PERIPHERAL NEUROPATHY (MULTI): ICD-10-CM

## 2025-05-02 DIAGNOSIS — I50.32 CHRONIC DIASTOLIC CONGESTIVE HEART FAILURE: ICD-10-CM

## 2025-05-02 DIAGNOSIS — N18.31 STAGE 3A CHRONIC KIDNEY DISEASE (MULTI): ICD-10-CM

## 2025-05-05 ENCOUNTER — ANTICOAGULATION - WARFARIN VISIT (OUTPATIENT)
Dept: CARDIOLOGY | Facility: HOSPITAL | Age: 75
End: 2025-05-05
Payer: MEDICARE

## 2025-05-05 DIAGNOSIS — Z79.01 LONG TERM (CURRENT) USE OF ANTICOAGULANTS: ICD-10-CM

## 2025-05-05 DIAGNOSIS — I26.99 PULMONARY EMBOLISM WITHOUT ACUTE COR PULMONALE, UNSPECIFIED CHRONICITY, UNSPECIFIED PULMONARY EMBOLISM TYPE (MULTI): Primary | ICD-10-CM

## 2025-05-05 NOTE — PROGRESS NOTES
Patient identification verified with 2 identifiers.    Location: [facility] Patient Self-Testing Program [phone]    Referring Physician: Terra Young MD   Enrollment/ Re-enrollment date: 2025   INR Goal: 2.0-3.0  INR monitoring is per [facility] protocol.  Anticoagulation Medication: warfarin  Indication: Pulmonary Embolism (PE)    Subjective   Bleeding signs/symptoms: No    Bruising: No   Major bleeding event: No  Thrombosis signs/symptoms: No  Thromboembolic event: No  Missed doses: No  Extra doses: No  Medication changes: No  Dietary changes: No  Change in health: No  Change in activity: No  Alcohol: No  Other concerns: No    Upcoming Procedures:  Does the Patient Have any upcoming procedures that require interruption in anticoagulation therapy? no  Does the patient require bridging? no      Anticoagulation Summary  As of 2025      INR goal:  2.0-3.0   TTR:  68.9% (1.4 y)   INR used for dosin.30 (2025)   Weekly warfarin total:  17.5 mg               Assessment/Plan   Therapeutic     1. New dose: Spoke to patient with dosing instructions and next testing date.    2. Next INR: 2 weeks      Education provided to patient during the visit:  Patient instructed to call in interim with questions, concerns and changes.

## 2025-05-06 ENCOUNTER — TELEMEDICINE (OUTPATIENT)
Dept: GYNECOLOGIC ONCOLOGY | Facility: CLINIC | Age: 75
End: 2025-05-06
Payer: MEDICARE

## 2025-05-06 DIAGNOSIS — R93.89 ENDOMETRIAL THICKENING ON ULTRASOUND: Primary | ICD-10-CM

## 2025-05-06 DIAGNOSIS — N85.00 ENDOMETRIAL HYPERPLASIA, UNSPECIFIED: ICD-10-CM

## 2025-05-06 DIAGNOSIS — N95.0 POSTMENOPAUSAL VAGINAL BLEEDING: Primary | ICD-10-CM

## 2025-05-06 PROCEDURE — G2211 COMPLEX E/M VISIT ADD ON: HCPCS | Performed by: NURSE PRACTITIONER

## 2025-05-06 PROCEDURE — RXMED WILLOW AMBULATORY MEDICATION CHARGE

## 2025-05-06 PROCEDURE — 3044F HG A1C LEVEL LT 7.0%: CPT | Performed by: NURSE PRACTITIONER

## 2025-05-06 PROCEDURE — 99213 OFFICE O/P EST LOW 20 MIN: CPT | Performed by: NURSE PRACTITIONER

## 2025-05-06 NOTE — PROGRESS NOTES
Patient ID: Frannie Blanco is a 74 y.o. female.  Referring Physician: No referring provider defined for this encounter.  Primary Care Provider: Terra Young MD    Subjective    HPI    Referred by Dr. Roldan     69-year-old female presenting with postmenopausal bleeding     08/16/2019, dilation curettage, placement of Mirena IUD.  Pathology reported as blood and fibrin was sightly debris and scant strips of cytologically normal endometrial epithelium, insufficient for complete diagnostic evaluation scant strips of endocervical and squamous epithelium with no pathologic diagnosis    7/16/24 EMB: predominantly mucus and scant strips of atrophic endometrium. Mirena IUD removed at this time.    10/26/24 US Pelvis: Grossly unremarkable endometrial thickness of 3 mm. No pelvic mass.     5/1/25 US Pelvis: IMPRESSION: The endometrium measures 3 mm in thickness. There is a heterogenous  complex area of hypoechogenicity in the expected location of the cervix measuring 2.0 x 1.2 x 1.8 cm in diameter. Further workup with tissue sampling or MRI is recommended.     PMHx:  - Afib  - DM  - Arthritis  - Hx of blood clots, 2 episodes of pulmonary embolisms, first episode was unprovoked and patient was started on coumadin, second episode occurred when patient came off coumadin for urology procedure  - Hx of kidney stones  - Breast cancer s/p lumpectomy and partial mastectomy in Jan 2019 and radiation tx      Interval History: Frannie is a 74 year old female with a history of post menopausal bleeding s/p D&C and Mirena IUD placement on 8/16/19. No definitive evidence of hyperplasia or malignancy on D&C, although specimen was scant. Patient denies any vaginal bleeding or abnormal discharge. Patient denies any changes in bowel or bladder habits. Appetite has been good. Energy levels are baseline. Does not report any abdominal pain, bloating, or vaginal bleeding. Pap April 2022 ASCUS, HPV negative. Currently following up with  wound care due to left calf wound.     Surgeries:  -  section x 2 (0754-1311)  - Tubal Ligation ()  - Deviated septum surgery ()  - Lumpectomy and partial mastectomy ()      OB/GYN Hx:  -  via C-sections  - Birth control for 1 year  - Denies hormonal replacement tx  - LMP in      Medications:  - Warfarin 5mg 1 x daily  - Gabapentin 3 mg 2 x daily  - Glimepiride 2mg 1x daily  - Simvastatin 40mg 1x daily  - Topiramate 100mg 2x daily  - Trulicity  - Vitamin D3  - Aspirin 81mg  - Calcium + D + Iron      Allergies: NKDA     FH: Sister diagnosed with breast cancer at age 45; passed away at age 47  - Otherwise denies history of gyn related cancers including ovarian, breast, uterine, cervical, and colon cancer.      SH: Smokes 1 pack x 40 years, denies alcohol and drug use. Lives alone; her   1 year ago in a car accident. 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     Performance Status:  Symptomatic; in bed <50% of the day    Assessment/Plan     Frannie is a 74 year old female with a history of post menopausal bleeding s/p D&C and Mirena IUD placement on 19. No definitive evidence of hyperplasia or malignancy on D&C, although specimen was scant. EMB in 2024 with atrophic endometrium and recent pelvic ultrasound with normal endometrial lining. Phone visit to discuss Pelvic US results.    Plan:    EMB in 2024 with atrophic endometrium and recent pelvic ultrasound with normal endometrial lining. Have previously discussed that she would need IUD placed in OR due to failed insertion in the office in 2024.     Pelvic US with new hypoechoic lesion in expected course of cervix and heterogenous myometrium with recommendation for tissue sampling. Previously difficulty with sampling in the office due to body habitus.     Discussed recommendation for D&C, possible Mirena IUD placement in the OR    Patient will need PAT prior    I performed this  visit using real-time telehealth tools, including an audio/video connection between the patient and myself. I spent 5 minutes virtually with this patient and/or family. More than 50% of the time was spent in counseling and/or coordination of care.     Dia Tyler, APRN-CNP

## 2025-05-07 ENCOUNTER — PHARMACY VISIT (OUTPATIENT)
Dept: PHARMACY | Facility: CLINIC | Age: 75
End: 2025-05-07
Payer: MEDICARE

## 2025-05-07 DIAGNOSIS — I10 PRIMARY HYPERTENSION: ICD-10-CM

## 2025-05-07 RX ORDER — METOPROLOL TARTRATE 25 MG/1
TABLET, FILM COATED ORAL
Qty: 45 TABLET | Refills: 0 | Status: SHIPPED | OUTPATIENT
Start: 2025-05-07

## 2025-05-08 ENCOUNTER — TELEPHONE (OUTPATIENT)
Dept: GYNECOLOGIC ONCOLOGY | Facility: HOSPITAL | Age: 75
End: 2025-05-08
Payer: MEDICARE

## 2025-05-08 NOTE — TELEPHONE ENCOUNTER
Patient called asking if she requires a Lovenox bridge in preparation for D & C scheduled on 6/16/25.    Patient currently taking Warfarin.    Dr. Escobar updated with patient question and states patient does not need to stop Warfarin for D & C.    Patient notified and verbalized her understanding of information given.

## 2025-05-16 ENCOUNTER — ANTICOAGULATION - WARFARIN VISIT (OUTPATIENT)
Dept: CARDIOLOGY | Facility: CLINIC | Age: 75
End: 2025-05-16
Payer: MEDICARE

## 2025-05-16 DIAGNOSIS — I26.99 PULMONARY EMBOLISM WITHOUT ACUTE COR PULMONALE, UNSPECIFIED CHRONICITY, UNSPECIFIED PULMONARY EMBOLISM TYPE (MULTI): Primary | ICD-10-CM

## 2025-05-16 DIAGNOSIS — Z79.01 LONG TERM (CURRENT) USE OF ANTICOAGULANTS: ICD-10-CM

## 2025-05-16 NOTE — PROGRESS NOTES
Patient identification verified with 2 identifiers.    Location: [facility] Patient Self-Testing Program [phone]    Referring Physician: Dr Terra Young  Enrollment/ Re-enrollment date: 2025   INR Goal: 2.0-3.0  INR monitoring is per [facility] protocol.  Anticoagulation Medication: warfarin  Indication: Pulmonary Embolism (PE)    Received faxed INR self-test result, called patient and spoke to patient.         Subjective   Bleeding signs/symptoms: No    Bruising: No   Major bleeding event: No  Thrombosis signs/symptoms: No  Thromboembolic event: No  Missed doses: No  Extra doses: No  Medication changes: No  Dietary changes: No  Change in health: No  Change in activity: No  Alcohol: No  Other concerns: No    Upcoming Procedures:  Does the Patient Have any upcoming procedures that require interruption in anticoagulation therapy? no  Does the patient require bridging? no      Anticoagulation Summary  As of 2025      INR goal:  2.0-3.0   TTR:  69.5% (1.4 y)   INR used for dosin.70 (2025)   Weekly warfarin total:  17.5 mg               Assessment/Plan   Therapeutic     1. New dose: no change  Reviewed current dosage schedule and patient read back dosing correctly.     2. Next INR: 2 weeks      Education provided to patient during the visit:  Patient instructed to call in interim with questions, concerns and changes.   Patient educated on interactions between medications and warfarin.   Patient educated on dietary consistency in vitamin k consumption.   Patient educated on affects of alcohol consumption while taking warfarin.   Patient educated on signs of bleeding/clotting.   Patient educated on compliance with dosing, follow up appointments, and prescribed plan of care.

## 2025-05-28 ENCOUNTER — CLINICAL SUPPORT (OUTPATIENT)
Dept: PREADMISSION TESTING | Facility: HOSPITAL | Age: 75
End: 2025-05-28
Payer: MEDICARE

## 2025-05-28 NOTE — CPM/PAT H&P
"[facility]/PAT Evaluation       Name: Frannie Blanco (Frannie Blanco)  /Age: 1950/74 y.o.     {[facility] Visit Type:57124}      Chief Complaint: ***    HPI    Medical History[1]    Surgical History[2]    Patient  has no history on file for sexual activity.    Family History[3]    Allergies[4]      Frannie Blanco is a 74 y.o. Female scheduled for DILATION AND CURETTAGE  INSERTION, INTRAUTERINE DEVICE, any other indicated procedures on 2025 with Dr. Diivna Escobar.       * ONLY  Prior to Admission medications    Medication Sig Start Date End Date Taking? Authorizing Provider   albuterol 90 mcg/actuation inhaler inhale 1 to 2 puffs by mouth and INTO THE LUNGS every 4 to 6 hours if needed    Historical Provider, MD   amiodarone (Pacerone) 200 mg tablet Take 1 tablet (200 mg) by mouth once daily. 12/2/24 3/26/25  Terra Young MD   aspirin 81 mg EC tablet Take 1 tablet (81 mg) by mouth once daily. 10/3/16   Historical Provider, MD   atorvastatin (Lipitor) 20 mg tablet Take 1 tablet (20 mg) by mouth once daily at bedtime. 3/6/25   Terra Young MD   BD Alcohol Swabs pads, medicated  22   Historical Provider, MD   BD Dorothy 2nd Gen Pen Needle 32 gauge x \" needle Use with vitamin B12 injections monthly 23   Siobhan Palafox PA-C   calcium carbonate-vitamin D3 1,000 mg-20 mcg (800 unit) tablet Take 1 tablet by mouth 2 times a day.    Historical Provider, MD   cholecalciferol (Vitamin D3) 25 MCG (1000 UT) tablet Take 1 tablet (1,000 Units) by mouth once daily.    Historical Provider, MD   cyanocobalamin (Dodex) 1,000 mcg/mL injection Inject 1 mL (1,000 mcg) into the muscle every 30 (thirty) days. 3/26/25   Terra Young MD   dapsone 25 mg tablet Take 2 tablets (50 mg) by mouth once daily in the morning. Take before meals.    Historical Provider, MD   dextromethorphan-guaifenesin (Robitussin DM)  mg/5 mL oral liquid Take 5 mL by mouth 3 times a day as needed for cough. " "12/3/24   Terra Young MD   docusate sodium (Colace) 100 mg capsule Take 1 capsule (100 mg) by mouth 2 times a day as needed. 1/5/24   Historical Provider, MD   ferrous sulfate 325 (65 Fe) MG EC tablet Take 65 mg by mouth once daily.    Historical Provider, MD   fluticasone-umeclidin-vilanter (Trelegy Ellipta) 200-62.5-25 mcg blister with device INHALE 1 PUFF BY MOUTH IN THE MORNING Rinse mouth after taking dose 11/18/24   Nahum Méndez MD   furosemide (Lasix) 80 mg tablet Take 1 tablet (80 mg) by mouth once daily. 1/2/25   Terra Young MD   gabapentin (Neurontin) 300 mg capsule Take 1 capsule (300 mg) by mouth 3 times a day. 4/3/25 4/3/26  Bi Tapia MD   insulin glargine (Basaglar KwikPen U-100 Insulin) 100 unit/mL (3 mL) pen Inject 15 units daily Take as directed per insulin instructions. 3/24/25   Bi Tapia MD   metoprolol tartrate (Lopressor) 25 mg tablet To take half tablet by mouth twice daily, to hold if systolic BP is 100 or less or is heart rate is 55 or less 5/7/25   Terra Young MD   nystatin (Mycostatin) ointment Apply topically 2 times a day. 7/9/24 7/9/25  Dia Tyler, APRN-CNP   potassium chloride CR (Klor-Con M20) 20 mEq ER tablet Take 2 tablets (40 mEq) by mouth once daily. Do not crush or chew. 11/18/24 11/18/25  Terra Young MD   sodium bicarbonate 650 mg tablet Take 1 tablet (650 mg) by mouth 3 times a day. 3/26/25 3/26/26  Terra Young MD   syringe with needle 3 mL 23 x 1\" syringe Use to inject vitamin b12 once monthly 3/26/24   Terra Young MD   tirzepatide (Mounjaro) 5 mg/0.5 mL pen injector Inject 5 mg under the skin 1 (one) time per week. 1/3/25   Bi Tapia MD   topiramate (Topamax) 100 mg tablet Take 1 tablet (100 mg) by mouth 2 times a day. 11/11/24   Angely Hand MD   triamcinolone (Kenalog) 0.1 % ointment APPLY 1 APPLICATOR AS NEEDED TOPICALLY ONCE DAILY AS NEEDED UNDER DENTAL TRAYS 12/5/22   Historical Provider, MD "   warfarin (Coumadin) 5 mg tablet Take 1 tab daily or directed as coumadin clinic 7/15/24   Terra Young MD   zinc oxide 20 % ointment Apply 1 Application topically every 8 hours. 1/17/24   Historical Provider, MD [facility] Physical Exam     Airway    Visit Vitals  OB Status Unknown   Smoking Status Former       DASI Risk Score    No data to display       Caprini DVT Assessment      Flowsheet Row ED to Hosp-Admission (Discharged) from 1/2/2024 in [facility] 2 with Terra Young MD   DVT Score (IF A SCORE IS NOT CALCULATING, MUST SELECT A BMI TO COMPLETE) 6 filed at 01/03/2024 0738   BMI (BMI MUST BE CHOSEN) Greater than 50 (Venous stasis syndrome) filed at 01/03/2024 0738   RETIRED: Current Status COPD filed at 01/03/2024 0738   RETIRED: Age 60-75 years filed at 01/03/2024 0738          Modified Frailty Index    No data to display       [id number] Stroke Risk Points  Current as of just now        6 0 to 9 Points:      Last Change:           The CIT8EF9-VQIi risk score (Lip SEVEN, et al. 2009. © 2010 American College [employer]) quantifies the risk of stroke for a patient with atrial fibrillation. For patients without atrial fibrillation or under the age of 18 this score appears as N/A. Higher score values generally indicate higher risk of stroke.          Points Metrics   1 Has Congestive Heart Failure:  Yes     Patients with congestive heart failure get 1 point.    Current as of just now   1 Has Hypertension:  Yes     Patients with hypertension get 1 point.    Current as of just now   1 Age:  74     Patients 65 to 74 years old get 1 point, or patients 75 years and older get 2 points.    Current as of just now   1 Has Diabetes:  Yes     Patients with diabetes get 1 point.    Current as of just now   0 Had Stroke:  No  Had TIA:  No  Had Thromboembolism:  No     Patients who have had a stroke, TIA, or thromboembolism get 2 points.    Current as of just now   1 Has Vascular  Disease:  Yes      Patients with vascular disease get 1 point.    Current as of just now   1 Clinically Relevant Sex:  Female     Patients with a clinically relevant sex of Female get 1 point.    Current as of just now             Revised Cardiac Risk Index    No data to display       Apfel Simplified Score    No data to display       Risk Analysis Index Results This Encounter    No data found in the last 10 encounters.       Prodigy: High Risk  Total Score: 19              Prodigy Age Score      Prodigy CHF score          ARISCAT Score for Postoperative Pulmonary Complications    No data to display       Harris Perioperative Risk for Myocardial Infarction or Cardiac Arrest (ROSALVA)    No data to display                                                                                                              PAT nurse screening call        Summary:       Frannie Blanco is a 74 y.o. Female scheduled for DILATION AND CURETTAGE  INSERTION, INTRAUTERINE DEVICE, any other indicated procedures on 6/16/2025 with Dr. Divina Escobar.       TESTING:      Protime-INR: 2.70 as of 5/16/2025  Creatinine: 1.4 as of 4/17/2025  BUN: 29 as of 4/17/2025  H&H: 9 - 28.9 as of 3/14/2025  A1C: 4.6% as of 3/3/2025        Vascular US Lower Extremity; 12/23/2024:  IMPRESSION:   Limited examination.   Negative study for proximal DVT in the left lower extremity within the   limitations of study.   Negative study for calf DVT in the left lower extremity within the   limitations of the study.   Negative study for superficial thrombophlebitis in the imaged segments of   the left lower extremity.       ECG 12 Lead; 4/17/2025:          Cardioversion; 3/26/2025:  Post-Procedure Details:    Post-Procedure Rhythm:  Normal sinus rhythm   Patient Tolerance: Patient tolerated the procedure well with no immediate   complications   Estimated Blood Loss: none   Specimens Sent: none         Echocardiogram; 9/26/2023:  CONCLUSIONS:  1. Left  ventricular systolic function is normal with a 60-65% estimated ejection fraction.  2. Spectral Doppler shows an abnormal pattern of left ventricular diastolic filling.  3. The left atrium is moderate to severely dilated.  4. The right atrium is mild to moderately dilated.  5. There is moderate thickening and calcification of the anterior and posterior mitral valve leaflets.  6. There is moderate to severe mitral annular calcification with mild mitral stenosis and mild mitral regurgitation.  7. The patient is in atrial fibrillation which may influence the estimate of left ventricular function and transvalvular flows.  8. Aortic valve sclerosis.  Mild tricuspid regurgitation  Physiologic pulmonic valve regurgitation        Venous Duplex US DVT; 10/27/2016:  CONCLUSIONS:  Right Lower Venous: No evidence of acute deep vein thrombus visualized in the right lower extremity. Cannot rule out thrombus in non-visualized Peroneal and posterior tibial veins. Right popliteal vein visualized in segments - can not rule out dvt in nonvisualized segments.  Left Lower Venous: No evidence of acute deep vein thrombus visualized in the left lower extremity. Cannot rule out thrombus in non-visualized posterior tibial and peroneal veins. Technically challenging due to body habitus.        CT Angio Chest; 2/3/2025:  IMPRESSION:   No CT evidence of pulmonary embolism.   Patchy groundglass left lower lobe infiltrate in keeping with pneumonitis   Stable splenic artery aneurysm.         XR Pelvis; 1/30/2025:  IMPRESSION:   No acute bony abnormality.         XR Chest 1 View; 1/29/2025L  IMPRESSION:   No acute cardiopulmonary findings.         US Renal Complete; 1/25/2025:  IMPRESSION:  1. Bilateral subcentimeter nonobstructive nephrolithiasis.  MACRO:  None        US Thyroid; 12/21/2024:  IMPRESSION:  Multinodular thyroid with no suspicious thyroid lesion based on this  exam.  TI-RADS grading system. ACR TI-RADS recommendations (apply to  nodules  which have NOT been biopsied):  TR5 (?7 points) highly suspicious - FNA if ? 1cm, follow-up if 0.5  -0.9 cm every year for 5 years. Aggregate cancer risk 35%. TR4 (4-6  points) moderately suspicious - FNA if ? 1.5cm, follow-up if 1 -1.4  cm in 1, 2, 3 and 5 years. Aggregate cancer risk 9.1% TR3 (3 points)  mildly suspicious - FNA if ? 2.5cm, follow-up if 1.5 -2.4 cm in 1, 3  and 5 years. Aggregate cancer risk 4.8% TR2 (2 points) not  suspicious. No FNA or follow-up.Aggregate cancer risk 1.5% TR1 (0  points) benign - No FNA or follow-up. Aggregate cancer risk 0.3%  MACRO:  None        CT AP; 10/30/2024:  IMPRESSION:   1. No CT evidence of pulmonary embolism.   2.  No acute findings in the chest, abdomen, or pelvis.   3.  Multiple bilateral nonobstructing nephroliths.   4.  Colonic diverticulosis.   5.  Multiple splenic artery aneurysms, unchanged from 10/31/2022.   6.  Suspected 1.8 cm LEFT thyroid nodule. Recommend nonemergent   outpatient thyroid ultrasound.         CT Head; 10/30/2024:  IMPRESSION:   No acute intracranial abnormality identified.   Couple of meningiomas, without mass effect.   Paranasal sinus disease, most pronounced at the left maxillary sinus         US Pelvis; 5/1/2025:  IMPRESSION:  The endometrium measures 3 mm in thickness. There is a heterogenous  complex area of hypoechogenicity in the expected location of the  cervix measuring 2.0 x 1.2 x 1.8 cm in diameter. Further workup with  tissue sampling or MRI is recommended.  Nonvisualization of the ovaries.  MACRO:  None        CT Chest; 1/14/2024:  IMPRESSION:  1. Small right pleural effusion decreased in size.  2. Right lower lobe infiltrate with partial volume loss, unchanged.  3. Small left pleural effusion, unchanged.  4. Progressive left lower lobe infiltrate with volume loss.  5. Marked improvement in bilateral patchy ground-glass opacities.  Re-expansion right middle lobe.  MACRO:  None        CT Angio Head and Neck;  1/7/2024:  IMPRESSION:  No evidence of acute cortical infarct or acute intracranial  hemorrhage. There are calcified meningiomas noted overlying the upper  left frontoparietal convexity and overlying the inferolateral right  frontal lobe without evidence of significant mass effect or vasogenic  edema. No interval change.   No evidence for significant stenosis of the cervical vessels.  No evidence for significant stenosis or large branch vessel cutoffs  of the intracranial vessels.  Partially visualized ground-glass and nodular opacities in the upper  lung fields suspicious for multifocal pneumonia for example viral  pneumonia.  MACRO:  None        CT Angio Brain Attack; 1/6/2024:  IMPRESSION:  The examination is degraded by venous contamination, patient motion  artifact and difficulties with the IV. Within this limitation:  CTA neck:  No evidence for significant stenosis of the cervical vessels.  Patchy opacities within the upper lungs bilaterally concerning for  multifocal pneumonia. There is a small pleural effusion on the right.  CTA head:  No evidence for significant stenosis or large branch vessel cutoffs  of the intracranial vessels.  Please see CT head performed the same day for intracranial findings.  MACRO:  None        CT Brain Attack Head; 1/6/2024:  IMPRESSION:  1. No evidence of acute intracranial hemorrhage, mass effect, or  parenchymal evidence of an acute large territory ischemic infarct.  2. Calcified meningiomas rising from the left parasagittal frontal  parietal dura and right frontotemporal dura measuring 1.5 cm and 1.3  cm, respectively. No significant mass effect.  MACRO:  Richie Wren discussed the significance and urgency of this  critical finding by EPIC HAIKU with  LYNN FLORES on 1/6/2024 at  4:50 p.m. with confirmation of receipt        MR Lumbar Spine; 8/7/2021:  IMPRESSION:  * Focal narrowing of the left lateral recess and neural foramen at  L3/L4.         XR Cervical Spine;  11/12/2019:  IMPRESSION:  Multilevel cervical degenerative change, particularly multilevel  facet arthrosis which has progressed from the prior exam.            PROVIDERS/SPECIALIST:      Wound Care: Paxton Garcia MD, LOV 5/22/2025, presents for evaluation and treatment of non-pressure chronic ulcer of lower leg leg with fat layer exposed  Wound dressing change preformed on this visit   Wound Location:  L calf   Cleanse wound with Normal Saline   Primary dressing-Iodoform packing gauze    Cover dressing consisting of foam border   Change three times per week and prn        Oncology: JOSELITO Walsh-CNP (Working with Divina Escobar MD), Telehealth Visit 5/6/2025, referred by Dr. Roldan, presents for follow up r/t h/x of postmenopausal bleeding s/p D&C and Mirena IUD placement on 8/16/2019.  No definitive evidence of hyperplasia or malignancy on D&C, although specimen was scant. EMB in July 2024 with atrophic endometrium and recent pelvic ultrasound with normal endometrial lining   Pelvic US with new hypoechoic lesion in expected course of cervix and heterogenous myometrium with recommendation for tissue sampling. Previously difficulty with sampling in the office due to body habitus.    Discussed recommendation for D&C, possible Mirena IUD placement in the OR        Nephrology: Gustavo Palma MD, LOV 1/20/2025, presents for follow up r/t h/x of CKD Stage 3  *Most recent clinic note is missing from chart*        Cardiovascular: CHAVO Buenrostro, LOV 4/17/2025, presents for follow up visit with h/x of persistent A-Fib, NSVT, and DCCV on 3/26/2025  Referred to EP to consider PVI   - there it was decided to pursue AF ablation   - normal LVEF, no symptoms of HF, and she is not aware of her AF  - she started Amiodarone Jan 2024. She is awaiting AF ablation  continuing with Warfarin as before   Return in 3 months        Cardiology: Elba Pompa MD, LOV 3/11/2025, referred by Dr. Underwood, presents  for consultation r/t AF   -No palpitations  -Improvement in SOB when her AF was restored to Sinus Rhythm   -Elected to proceed with Ablation         Cardiology: Ricky Underwood, LOV 3/7/2025, presents for evaluation and discussion of A-Fib  14-day extended Holter (ZioPatch):  100% atrial fibrillation with good rate-control  No ventricular arrhythmias  I recommend we approach electrical cardioversion now and observe results. Simultaneously, I will refer her to EP for consideration of PVI. In the long term, she has normal LVEF, no symptoms of HF, and she is not aware of her AF.   Return after cardioversion        Pulmonology: Nahum Méndez MD, LOV 7/9/2024, presents for follow up r/t h/x of COPD, hypoxia and lung nodules  S/p RSV in January now at baseline   repeat CT stable nodules  - f/u CT in 1 year on/after Primo 15 2025  Now needs continuous.  Has tanks, would benefit from POC for ease of mobility.  Needs to show use of tanks to qualify for POC.   She will use the tanks more regularly  History of PE, taking Coumadin  - will order POC once she qualifies based on tank use. (Order sent today)   Follow up 6 months after LDCT        PCP: Terra Young MD, LOV 3/26/2025, presents for well visit.         Endocrinology: Bi Tapia MD, Telehealth Visit 4/8/2025, presents for follow up related to h/x of Type 2 DM, Mixed Hyperlipidemia, and Primary Hypertension  Most recent A1C was 4.7%        Hospitalization from 1/29-2/7/2025 for cellulitis, SOS and generalized weakness, mechanical fall, two total falls        Pain Management: Angely Hand MD, LOV 7/23/2024, presents with h/x of back, leg and neck pains  She has had prior injections with significant improvement in her pain however she would like to start with most conservative measures at this time.   Will refer for cervical and lumbar physical therapy. If pain persists beyond that we could consider advanced imaging of the nec    --------------------------------------------------------------------------------------------------  * ONLY      Nurse Plan of Action: Reach out to providers for Warfarin instructions, as well as reach out for both Cardiac and Pulmonary clearance       Hernandez Levine RN  Preadmission Testing         Assessment and Plan:     {[facility] ASSESSMENT AND PLAN:41446}             [1]   Past Medical History:  Diagnosis Date    Acute upper respiratory infection, unspecified 09/25/2019    Acute upper respiratory infection    Acute upper respiratory infection, unspecified 10/11/2016    Acute upper respiratory infection    Anemia     Arrhythmia     Persistent A-Fib, Last followed with CHAVO Buenrostro on 4/17/2025    Bullous pemphigoid     Cataract     CHF (congestive heart failure)     Chronic diastolic congestive heart failure    CKD (chronic kidney disease)     Stage 3    COPD (chronic obstructive pulmonary disease) (Multi)     On chronic O2 at 5L    Diabetes mellitus (Multi)     Encounter for screening mammogram for malignant neoplasm of breast 03/10/2015    Visit for screening mammogram    GERD (gastroesophageal reflux disease)     History of multiple pulmonary nodules     History of pulmonary embolus (PE)     Being treated with Coumadin    History of thyroid nodule     Hyperlipidemia     Hypertension     Morbid (severe) obesity due to excess calories (Multi) 09/17/2018    Morbid or severe obesity due to excess calories    Nephrolithiasis     Personal history of nicotine dependence 10/26/2020    Personal history of nicotine dependence    Personal history of other diseases of the respiratory system     Personal history of asthma    Personal history of other drug therapy 08/17/2020    History of pneumococcal vaccination    Personal history of other endocrine, nutritional and metabolic disease     History of diabetes mellitus    Personal history of other specified conditions 10/04/2016    History of edema     Personal history of other specified conditions 07/15/2019    History of abnormal mammogram    Splenic artery aneurysm     History of, Multiple, stable 10/30/2024 per PCP on 4/8/2025 note    Type 2 diabetes mellitus    [2]   Past Surgical History:  Procedure Laterality Date    BI MAMMO GUIDED BREAST RIGHT LOCALIZATION Right 12/13/2018    BI MAMMO GUIDED LOCALIZATION BREAST RIGHT 12/13/2018 [facility]    BREAST LUMPECTOMY  11/14/2012    Breast Surgery Lumpectomy    CARDIOVERSION  03/26/2025    Persistent a-fib, multiple cardioversions done    COLONOSCOPY  06/25/2013    Complete Colonoscopy    INCISION AND DRAINAGE OF WOUND  02/04/2025    Lower left leg due to abscess    NASAL SEPTUM SURGERY  11/14/2012    Nasal Septal Deviation Repair    OTHER SURGICAL HISTORY  12/14/2018    Breast biopsy excisional    OTHER SURGICAL HISTORY  01/08/2021    Cataract surgery    OTHER SURGICAL HISTORY  08/17/2020    Renal lithotripsy    TUBAL LIGATION  11/14/2012    Tubal Ligation   [3]   Family History  Problem Relation Name Age of Onset    Alzheimer's disease Mother      Coronary artery disease Father      Breast cancer Other family history    [4] No Known Allergies

## 2025-05-30 ENCOUNTER — ANTICOAGULATION - WARFARIN VISIT (OUTPATIENT)
Dept: CARDIOLOGY | Facility: CLINIC | Age: 75
End: 2025-05-30
Payer: MEDICARE

## 2025-05-30 DIAGNOSIS — I26.99 PULMONARY EMBOLISM WITHOUT ACUTE COR PULMONALE, UNSPECIFIED CHRONICITY, UNSPECIFIED PULMONARY EMBOLISM TYPE (MULTI): Primary | ICD-10-CM

## 2025-05-30 DIAGNOSIS — N18.31 STAGE 3A CHRONIC KIDNEY DISEASE (MULTI): ICD-10-CM

## 2025-05-30 DIAGNOSIS — Z79.01 LONG TERM (CURRENT) USE OF ANTICOAGULANTS: ICD-10-CM

## 2025-05-30 RX ORDER — SODIUM BICARBONATE 650 MG/1
650 TABLET ORAL 3 TIMES DAILY
Qty: 90 TABLET | Refills: 0 | Status: SHIPPED | OUTPATIENT
Start: 2025-05-30 | End: 2026-05-30

## 2025-05-30 NOTE — PROGRESS NOTES
Patient identification verified with 2 identifiers.    Location: [facility] Patient Self-Testing Program [phone]    Referring Physician: Dr. Terra Young  Enrollment/ Re-enrollment date: 2025   INR Goal: 2.0-3.0  INR monitoring is per [facility] protocol.  Anticoagulation Medication: warfarin  Indication: Pulmonary Embolism (PE)    Subjective   Bleeding signs/symptoms: No  Bruising: No   Major bleeding event: No  Thrombosis signs/symptoms: No  Thromboembolic event: No  Missed doses: No  Extra doses: No  Medication changes: No  Dietary changes: No  Change in health: No  Change in activity: No  Alcohol: No  Other concerns: No    Upcoming Procedures:  Does the Patient Have any upcoming procedures that require interruption in anticoagulation therapy? no  Does the patient require bridging? no      Anticoagulation Summary  As of 2025      INR goal:  2.0-3.0   TTR:  70.3% (1.5 y)   INR used for dosin.60 (2025)   Weekly warfarin total:  17.5 mg               Assessment/Plan   Therapeutic     1. New dose: no change    2. Next INR: 2 weeks    Received faxed INR self test result and called patient. Pt identification verified with 2 pt identifiers. Previous INR was therapeutic at 2.7. Today, self test reveals INR of 2.6 which is therapeutic for range of 2-3. Current incoming dose schedule reviewed with pt and read back correctly to me. Pt denies complications related to ACT therapy. Pt denies changes in diet, medications, social habits or general health. Will maintain dose and retest in 2 weeks. Outgoing dose schedule reviewed with pt and read back correctly to me. Pt instructed to call in interim with questions, concerns and changes. Of note, pt states she is undergoing an outpatient gynecological procedure on 25 and she was instructed to NOT hold warfarin.    Education provided to patient during the visit:  Patient instructed to call in interim with questions, concerns and changes.   Patient educated on  interactions between medications and warfarin.   Patient educated on dietary consistency in vitamin k consumption.   Patient educated on affects of alcohol consumption while taking warfarin.   Patient educated on signs of bleeding/clotting.   Patient educated on compliance with dosing, follow up appointments, and prescribed plan of care.

## 2025-06-04 ENCOUNTER — PRE-ADMISSION TESTING (OUTPATIENT)
Dept: PREADMISSION TESTING | Facility: HOSPITAL | Age: 75
End: 2025-06-04
Payer: MEDICARE

## 2025-06-04 VITALS
HEIGHT: 66 IN | TEMPERATURE: 96.6 F | HEART RATE: 56 BPM | DIASTOLIC BLOOD PRESSURE: 66 MMHG | BODY MASS INDEX: 47.09 KG/M2 | WEIGHT: 293 LBS | OXYGEN SATURATION: 95 % | SYSTOLIC BLOOD PRESSURE: 120 MMHG

## 2025-06-04 DIAGNOSIS — R93.89 ENDOMETRIAL THICKENING ON ULTRASOUND: ICD-10-CM

## 2025-06-04 DIAGNOSIS — Z79.01 LONG TERM (CURRENT) USE OF ANTICOAGULANTS: ICD-10-CM

## 2025-06-04 DIAGNOSIS — Z01.818 PREOPERATIVE EXAMINATION: Primary | ICD-10-CM

## 2025-06-04 LAB
ABO GROUP (TYPE) IN BLOOD: NORMAL
ANION GAP SERPL CALC-SCNC: 14 MMOL/L (ref 10–20)
ANTIBODY SCREEN: NORMAL
APTT PPP: 37 SECONDS (ref 26–36)
BUN SERPL-MCNC: 31 MG/DL (ref 6–23)
CALCIUM SERPL-MCNC: 8.7 MG/DL (ref 8.6–10.6)
CHLORIDE SERPL-SCNC: 106 MMOL/L (ref 98–107)
CO2 SERPL-SCNC: 26 MMOL/L (ref 21–32)
CREAT SERPL-MCNC: 1.53 MG/DL (ref 0.5–1.05)
EGFRCR SERPLBLD CKD-EPI 2021: 36 ML/MIN/1.73M*2
ERYTHROCYTE [DISTWIDTH] IN BLOOD BY AUTOMATED COUNT: 13.8 % (ref 11.5–14.5)
GLUCOSE SERPL-MCNC: 173 MG/DL (ref 74–99)
HCT VFR BLD AUTO: 41.1 % (ref 36–46)
HGB BLD-MCNC: 12.9 G/DL (ref 12–16)
INR PPP: 1.6 (ref 0.9–1.1)
MCH RBC QN AUTO: 32.2 PG (ref 26–34)
MCHC RBC AUTO-ENTMCNC: 31.4 G/DL (ref 32–36)
MCV RBC AUTO: 103 FL (ref 80–100)
NRBC BLD-RTO: 0 /100 WBCS (ref 0–0)
PLATELET # BLD AUTO: 232 X10*3/UL (ref 150–450)
POTASSIUM SERPL-SCNC: 4 MMOL/L (ref 3.5–5.3)
PROTHROMBIN TIME: 17.5 SECONDS (ref 9.8–12.4)
RBC # BLD AUTO: 4.01 X10*6/UL (ref 4–5.2)
RH FACTOR (ANTIGEN D): NORMAL
SODIUM SERPL-SCNC: 142 MMOL/L (ref 136–145)
WBC # BLD AUTO: 7.2 X10*3/UL (ref 4.4–11.3)

## 2025-06-04 PROCEDURE — 85610 PROTHROMBIN TIME: CPT

## 2025-06-04 PROCEDURE — 86900 BLOOD TYPING SEROLOGIC ABO: CPT

## 2025-06-04 PROCEDURE — 36415 COLL VENOUS BLD VENIPUNCTURE: CPT

## 2025-06-04 PROCEDURE — 80048 BASIC METABOLIC PNL TOTAL CA: CPT

## 2025-06-04 PROCEDURE — 85027 COMPLETE CBC AUTOMATED: CPT

## 2025-06-04 PROCEDURE — 99214 OFFICE O/P EST MOD 30 MIN: CPT

## 2025-06-04 ASSESSMENT — DUKE ACTIVITY SCORE INDEX (DASI)
CAN YOU CLIMB A FLIGHT OF STAIRS OR WALK UP A HILL: NO
CAN YOU DO YARD WORK LIKE RAKING LEAVES, WEEDING OR PUSHING A MOWER: NO
CAN YOU DO HEAVY WORK AROUND THE HOUSE LIKE SCRUBBING FLOORS OR LIFTING AND MOVING HEAVY FURNITURE: NO
TOTAL_SCORE: 4.5
CAN YOU WALK INDOORS, SUCH AS AROUND YOUR HOUSE: YES
DASI METS SCORE: 3.3
CAN YOU TAKE CARE OF YOURSELF (EAT, DRESS, BATHE, OR USE TOILET): YES
CAN YOU PARTICIPATE IN MODERATE RECREATIONAL ACTIVITIES LIKE GOLF, BOWLING, DANCING, DOUBLES TENNIS OR THROWING A BASEBALL OR FOOTBALL: NO
CAN YOU WALK A BLOCK OR TWO ON LEVEL GROUND: NO
CAN YOU DO MODERATE WORK AROUND THE HOUSE LIKE VACUUMING, SWEEPING FLOORS OR CARRYING GROCERIES: NO
CAN YOU DO LIGHT WORK AROUND THE HOUSE LIKE DUSTING OR WASHING DISHES: NO
CAN YOU HAVE SEXUAL RELATIONS: NO
CAN YOU PARTICIPATE IN STRENOUS SPORTS LIKE SWIMMING, SINGLES TENNIS, FOOTBALL, BASKETBALL, OR SKIING: NO
CAN YOU RUN A SHORT DISTANCE: NO

## 2025-06-04 ASSESSMENT — ENCOUNTER SYMPTOMS
FEVER: 0
EXCESSIVE BLEEDING: 0
UNEXPECTED WEIGHT CHANGE: 0
SHORTNESS OF BREATH: 0
DIARRHEA: 0
SINUS CONGESTION: 0
VOMITING: 0
WHEEZING: 0
ABDOMINAL DISTENTION: 0
NECK SWELLING: 0
WEAKNESS: 0
ARTHRALGIAS: 0
HEMOPTYSIS: 0
NECK MASS: 0
LIMITED RANGE OF MOTION: 0
RHINORRHEA: 0
SKIN CHANGES: 0
BRUISES/BLEEDS EASILY: 0
LIGHT-HEADEDNESS: 0
BLOOD IN STOOL: 0
DIFFICULTY URINATING: 0
DYSPNEA AT REST: 0
DYSPNEA WITH EXERTION: 0
ABDOMINAL PAIN: 0
NAUSEA: 0
NECK PAIN: 0
VISUAL CHANGE: 0
WOUND: 1
DYSURIA: 0
PALPITATIONS: 0
EYE DISCHARGE: 0
VERTIGO: 0
CONSTIPATION: 0
NECK STIFFNESS: 0
PND: 0
NERVOUS/ANXIOUS: 0
DOUBLE VISION: 0
TREMORS: 0
MYALGIAS: 0
CONFUSION: 0
NUMBNESS: 0
VOICE CHANGE: 0
POLYDIPSIA: 0
COUGH: 0
CHILLS: 0
TROUBLE SWALLOWING: 0
JOINT SWELLING: 0

## 2025-06-04 ASSESSMENT — PAIN - FUNCTIONAL ASSESSMENT: PAIN_FUNCTIONAL_ASSESSMENT: 0-10

## 2025-06-04 ASSESSMENT — PAIN SCALES - GENERAL: PAINLEVEL_OUTOF10: 8

## 2025-06-04 ASSESSMENT — LIFESTYLE VARIABLES: SMOKING_STATUS: NONSMOKER

## 2025-06-04 NOTE — NURSING NOTE
Patient seen in PAT. Orders reviewed with PAT Provider.     Following labs obtained by documenting RN:   COAG, T/S, BMP, CBC       Patient discharged with: Pre-procedure instructions/AVS.      Raya OBREGONN, RN  [facility] Pre-Admission Testing   [facility]

## 2025-06-04 NOTE — PREPROCEDURE INSTRUCTIONS
Thank you for visiting [facility] ([facility]) today for your pre-procedure evaluation, you were seen by     Sammi Sharp PA-C   Department of Anesthesiology and Perioperative Medicine  Main phone [phone]  Fax [phone]    This summary includes instructions and information to aid you during your perioperative period.  Please read carefully. If you have any questions about your visit today, please call the number listed above.  If you become ill or have any changes to your health before your surgery, please contact your primary care provider and alert your surgeon.    General Medications Instructions (see back for further medication instructions)  Hold Vit D supplementation day of surgery  Hold all other vitamins and supplements 7 days prior to surgery  Tylenol okay to continue, please hold Aleve/naproxen/ibuprofen (NSAIDs) for 7 days prior to surgery  No lotion/moisturizers or Deoderant after last shower prior to surgery    You will be called business day prior to surgery to confirm arrival time.     Preparing for Surgery       Preoperative Fasting Guidelines  Why must I stop eating and drinking near surgery time?  With sedation, food or liquid in your stomach can enter your lungs causing serious complications  Food can increase nausea and vomiting  When do I need to stop eating and drinking before my surgery?  Do not eat any food after midnight the night before your surgery/procedure.  You may have up to 13.5 ounces of clear liquid until TWO hours before your instructed arrival time to the hospital.  This includes water, black tea/coffee, (no milk or cream) apple juice, and electrolyte drinks (Gatorade)  You may chew gum until TWO hours before your surgery/procedure    Tobacco and Alcohol;  Do not drink alcohol or smoke within 24 hours of surgery.  It is best to quit smoking for as long as possible before any surgery or procedure.        The Week before Surgery        Seven days  before Surgery  Check your [facility] medication instructions  Do the exercises provided to you by [facility]   Arrange for a responsible, adult licensed  to take you home after surgery and stay with you for 24 hours.  You will not be permitted to drive yourself home if you have received any anesthetic/sedation  Five days before surgery  Check your [facility] medication instructions  Do the exercises provided to you by [facility]   Start using Chlorhexidene (CHG) body wash if prescribed (Continue till day of surgery)      The Day before Surgery       Check your [facility] medication and all other [facility] instructions including when to stop eating and drinking  You will be called with your arrival time for surgery in the late afternoon.  If you do not receive a call please reach out to your surgeon's office.  Do not smoke or drink 24 hours before surgery  Prepare items to bring with you to the hospital  Shower with your chlorhexidine wash if prescribed  Brush your teeth and use your chlorhexidine dental rinse if prescribed    The Day of Surgery       Check your [facility] medication instructions  Shower, if prescribed use CHG.  Do not apply any lotions, creams, moisturizers, perfume or deodorant  Brush your teeth and use your CHG dental rinse if prescribed  Wear loose comfortable clothing  Avoid make-up  Remove  jewelry and piercings, consider professional piercing removal with a plastic spacer if needed  Bring photo ID and Insurance card  Bring an accurate medication list that includes medication dose, frequency and allergies  Bring a copy of your advanced directives (will, health care power of )  Bring any devices and controllers as well as medical devices you have been provided with for surgery (CPAP, slings, braces, etc.)  Dentures, eyeglasses, and contacts will be removed before surgery, please bring cases for contacts or glasses    Preoperative Deep Breathing Exercises    Why it is important to do deep breathing exercises before my surgery?   Deep breathing exercises strengthen your breathing muscles.  This helps you to recover after your surgery and decreases the chance of breathing complications.    How are the deep breathing exercises done?  Sit straight with your back supported.  Breathe in deeply and slowly through your nose. Your lower rib cage should expand and your abdomen may move forward.  Hold that breath for 3 to 5 seconds.  Breathe out through pursed lips, slowly and completely.  Rest and repeat 10 times every hour while awake.  Rest longer if you become dizzy or lightheaded.      Preoperative Brain Exercises    What are brain exercises?  A brain exercise is any activity that engages your thinking (cognitive) skills.    What types of activities are considered brain exercises?  Jigsaw puzzles, crossword puzzles, word jumble, memory games, word search, and many more.  Many can be found free online or on your phone via a mobile jj.    Why should I do brain exercises before my surgery?  More recent research has shown brain exercise before surgery can lower the risk of postoperative delirium (confusion) which can be especially important for older adults.  Patients who did brain exercises for 5 to 10 hours the days before surgery, cut their risk of postoperative delirium in half up to 1 week after surgery.    Sit-to-Stand Exercise    What is the sit-to-stand exercise?  The sit-to-stand exercise strengthens the muscles of your lower body and muscles in the center of your body (core muscles for stability) helping to maintain and improve your strength and mobility.  How do I do the sit-to-stand exercise?  The goal is to do this exercise without using your arms or hands.  If this is too difficult, use your arms and hands or a chair with armrests to help slowly push yourself to the standing position and lower yourself back to the sitting position. As the movement becomes easier use your arms and hands less.    Steps to the sit-to-stand exercise  Sit  up tall in a sturdy chair, knees bent, feet flat on the floor shoulder-width apart.  Shift your hips/pelvis forward in the chair to correctly position yourself for the next movement.  Lean forward at your hips.  Stand up straight putting equal weight on both feet.  Check to be sure you are properly aligned with the chair, in a slow controlled movement sit back down.  Repeat this exercise 10-15 times.  If needed you can do it fewer times until your strength improves.  Rest for 1 minute.  Do another 10-15 sit-to-stand exercises.  Try to do this in the morning and evening.       Simple things you can do to help prevent blood clots     Blood clots are blockages that can form in the body's veins. When a blood clot forms in your deep veins, it may be called a deep vein thrombosis, or DVT for short. Blood clots can happen in any part of the body where blood flows, but they are most common in the arms and legs. If a piece of a blood clot breaks free and travels to the lungs, it is called a pulmonary embolus (PE). A PE can be a very serious problem.      Being in the hospital or having surgery can raise your chances of getting a blood clot because you may not be well enough to move around as much as you normally do.         Ways you can help prevent blood clots in the hospital       Wearing SCDs  SCDs stands for Sequential Compression Devices.   SCDs are special sleeves that wrap around your legs. They attach to a pump that fills them with air to gently squeeze your legs every few minutes.  This helps return the blood in your legs to your heart.   SCDs should only be taken off when walking or bathing. SCDs may not be comfortable, but they can help save your life.              Pump SCD leg sleeves  Wearing compression stockings - if your doctor orders them. These special snug-fitting stockings gently squeeze your legs to help blood flow.       Walking. Walking helps move the blood in your legs.   If your doctor says it is ok,  try walking the halls at least   5 times a day. Ask us to help you get up, so you don't fall.      Taking any blood-thinning medicines your doctor orders.              Ways you can help prevent blood clots at home         Wearing compression stockings - if your doctor orders them.   Walking - to help move the blood in your legs.    Taking any blood-thinning medicines your doctor orders.      Signs of a blood clot or PE    Tell your doctor or nurse right away if you have any of the problems listed below.         If you are at home, seek medical care right away. Call 911 for chest pain or problems breathing.            Signs of a blood clot (DVT) - such as pain, swelling, redness, or warmth in your arm or legs.  Signs of a pulmonary embolism (PE) - such as chest pain or feeling short of breath

## 2025-06-04 NOTE — CPM/PAT H&P
CPM/PAT Evaluation       Name: Frannie Blanco (Franine Blanco)  /Age: 1950/75 y.o.     Visit Type:   In-Person       Chief Complaint: perioperative evaluation    HPI HPI: 76 y/o female scheduled for DILATION AND CURETTAGE INSERTION, INTRAUTERINE DEVICE, any other indicated procedures  on 2025  secondary to Endometrial thickening on ultrasound  with Dr. Divina Escobar who referred to [facility].  Presents to [facility] today for perioperative risk stratification and optimization. PMHX includes Pulmonary Hypertension, HLD, Atrial Fibrillation, CHF, Aortic stenosis, splenic artery aneurysm, HTN, COPD, chronic respiratory failure, CKD3, recurrent kindey stones, PE (roughly over 10 years ago), DCIS of breast, DM2, thyroid nodule, B12 and D deficiency, Urge Incontinence, IUD in place, Endometrial thickening, OA, lumbar spondylosis, lumbar disc disease, peripheral neuropathy, Chornic ulcer of lower leg .    PCP: Terra Young MD  Specialists:   Wound- Paxton Garcia MD   Oncology- JOSELITO Walsh-CNP   Nephrology- [facility]   Cardiology EP- Ricky Underwood MD & Elba Pompa MD   Cardiology - CHAVO Buenrostro   Pulmonology- Nahum Méndez MD   Endocrinology- Bi Tapia MD          Medical History[1]    Surgical History[2]    Patient Sexual activity questions deferred to the physician.    Family History[3]    Allergies[4]    Prior to Admission medications    Medication Sig Start Date End Date Taking? Authorizing Provider   albuterol 90 mcg/actuation inhaler inhale 1 to 2 puffs by mouth and INTO THE LUNGS every 4 to 6 hours if needed    Historical Provider, MD   amiodarone (Pacerone) 200 mg tablet Take 1 tablet (200 mg) by mouth once daily. 12/2/24 3/26/25  Terra Young MD   aspirin 81 mg EC tablet Take 1 tablet (81 mg) by mouth once daily. 10/3/16   Historical Provider, MD   atorvastatin (Lipitor) 20 mg tablet Take 1 tablet (20 mg) by mouth once daily at bedtime. 3/6/25    "Terra Young MD   BD Alcohol Swabs pads, medicated  11/7/22   Historical Provider, MD   BD Dorothy 2nd Gen Pen Needle 32 gauge x 5/32\" needle Use with vitamin B12 injections monthly 6/20/23   Siobhan Palafox PA-C   calcium carbonate-vitamin D3 1,000 mg-20 mcg (800 unit) tablet Take 1 tablet by mouth 2 times a day.    Historical Provider, MD   cholecalciferol (Vitamin D3) 25 MCG (1000 UT) tablet Take 1 tablet (1,000 Units) by mouth once daily.    Historical Provider, MD   cyanocobalamin (Dodex) 1,000 mcg/mL injection Inject 1 mL (1,000 mcg) into the muscle every 30 (thirty) days. 3/26/25   Terra Young MD   dapsone 25 mg tablet Take 2 tablets (50 mg) by mouth once daily in the morning. Take before meals.    Historical Provider, MD   dextromethorphan-guaifenesin (Robitussin DM)  mg/5 mL oral liquid Take 5 mL by mouth 3 times a day as needed for cough. 12/3/24   Terra Young MD   docusate sodium (Colace) 100 mg capsule Take 1 capsule (100 mg) by mouth 2 times a day as needed. 1/5/24   Historical Provider, MD   ferrous sulfate 325 (65 Fe) MG EC tablet Take 65 mg by mouth once daily.    Historical Provider, MD   fluticasone-umeclidin-vilanter (Trelegy Ellipta) 200-62.5-25 mcg blister with device INHALE 1 PUFF BY MOUTH IN THE MORNING Rinse mouth after taking dose 11/18/24   Nahum Méndez MD   furosemide (Lasix) 80 mg tablet Take 1 tablet (80 mg) by mouth once daily. 1/2/25   Terra Young MD   gabapentin (Neurontin) 300 mg capsule Take 1 capsule (300 mg) by mouth 3 times a day. 4/3/25 4/3/26  Bi Tapia MD   insulin glargine (Basaglar KwikPen U-100 Insulin) 100 unit/mL (3 mL) pen Inject 15 units daily Take as directed per insulin instructions. 3/24/25   Bi Tapia MD   metoprolol tartrate (Lopressor) 25 mg tablet To take half tablet by mouth twice daily, to hold if systolic BP is 100 or less or is heart rate is 55 or less 5/7/25   Terra Young MD   nystatin (Mycostatin) ointment " "Apply topically 2 times a day. 7/9/24 7/9/25  Dia Tyler, APRN-CNP   potassium chloride CR (Klor-Con M20) 20 mEq ER tablet Take 2 tablets (40 mEq) by mouth once daily. Do not crush or chew. 11/18/24 11/18/25  Terra Young MD   sodium bicarbonate 650 mg tablet Take 1 tablet (650 mg) by mouth 3 times a day. 5/30/25 5/30/26  Terra Young MD   syringe with needle 3 mL 23 x 1\" syringe Use to inject vitamin b12 once monthly 3/26/24   Terra Young MD   tirzepatide (Mounjaro) 5 mg/0.5 mL pen injector Inject 5 mg under the skin 1 (one) time per week. 1/3/25   Bi Tapia MD   topiramate (Topamax) 100 mg tablet Take 1 tablet (100 mg) by mouth 2 times a day. 11/11/24   Angely Hand MD   triamcinolone (Kenalog) 0.1 % ointment APPLY 1 APPLICATOR AS NEEDED TOPICALLY ONCE DAILY AS NEEDED UNDER DENTAL TRAYS 12/5/22   Historical Provider, MD   warfarin (Coumadin) 5 mg tablet Take 1 tab daily or directed as coumadin clinic 7/15/24   Terra Young MD   zinc oxide 20 % ointment Apply 1 Application topically every 8 hours. 1/17/24   Historical Provider, MD   sodium bicarbonate 650 mg tablet Take 1 tablet (650 mg) by mouth 3 times a day. 3/26/25 5/30/25  Terra Young MD        PAT ROS:   Constitutional:    no fever   no chills   no unexpected weight change  Neuro/Psych:    no numbness   no weakness   no light-headedness   no tremors   no confusion   not nervous/anxious   no self-injury   no suicidal ideation  Eyes:    no discharge   no vision loss   no diplopia   no visual disturbance   use of corrective lenses (readers)  Ears:    no ear pain   no hearing loss   no vertigo   no tinnitus   no hearing aides  Nose:    no nasal discharge   no sinus congestion   no epistaxis  Mouth:    no dental issues   no mouth pain   no oral bleeding   no mouth lesions  Throat:    no throat pain   no dysphagia   no voice change  Neck:    no neck pain   neck swelling   no neck stiffness   no neck masses  Cardio:    no " chest pain   no palpitations   no peripheral edema   no dyspnea   no OSHEA   no paroxysmal nocturnal dyspnea  Respiratory:    no cough   no wheezing   no hemoptysis   no shortness of breath  Endocrine:    no cold intolerance   no heat intolerance   no polydipsia  GI:    no abdominal distention   no abdominal pain   no constipation   no diarrhea   no nausea   no vomiting   no blood in stool  :    no difficulty urinating   no dysuria   no oliguria   no polyuria  Musculoskeletal:    no arthralgias   no myalgias   no decreased ROM   no swelling  Hematologic:    does not bruise/bleed easily   no excessive bleeding   no history of blood transfusion   no blood clots  Skin:   no skin changes   sores/wound (left lower leg, healing.)   no rash      Physical Exam  Vitals reviewed. Physical exam within normal limits.   Constitutional:       General: She is not in acute distress.     Appearance: Normal appearance. She is obese. She is not ill-appearing, toxic-appearing or diaphoretic.   HENT:      Head: Normocephalic.      Nose: Nose normal. No congestion or rhinorrhea.      Mouth/Throat:      Mouth: Mucous membranes are moist.      Pharynx: Oropharynx is clear. No oropharyngeal exudate or posterior oropharyngeal erythema.   Eyes:      General: No scleral icterus.        Right eye: No discharge.         Left eye: No discharge.      Conjunctiva/sclera: Conjunctivae normal.   Neck:      Vascular: No carotid bruit.   Cardiovascular:      Rate and Rhythm: Normal rate and regular rhythm.      Pulses: Normal pulses.      Heart sounds: Normal heart sounds. No murmur heard.     No friction rub. No gallop.   Pulmonary:      Effort: Pulmonary effort is normal. No respiratory distress.      Breath sounds: Normal breath sounds. No stridor. No wheezing, rhonchi or rales.   Chest:      Chest wall: No tenderness.   Musculoskeletal:         General: Swelling (mild, nonpitting bilateral leg edema,) present. No tenderness. Normal range of motion.  "     Cervical back: Normal range of motion and neck supple. No rigidity or tenderness.   Skin:     General: Skin is warm and dry.   Neurological:      General: No focal deficit present.      Mental Status: She is alert.      Motor: No weakness.   Psychiatric:         Mood and Affect: Mood normal.         Behavior: Behavior normal.         Thought Content: Thought content normal.         Judgment: Judgment normal.          PAT AIRWAY:   Airway:     Mallampati::  II    TM distance::  >3 FB    Neck ROM::  Full   Multiple missing teeth         Visit Vitals  /66   Pulse 56   Temp 35.9 °C (96.6 °F) (Temporal)   Ht 1.676 m (5' 6\")   Wt 145 kg (319 lb 11.2 oz)   SpO2 95%   BMI 51.60 kg/m²   OB Status Unknown   Smoking Status Former   BSA 2.6 m²       DASI Risk Score      Flowsheet Row Pre-Admission Testing from 6/4/20[facility] in [facility]   Can you take care of yourself (eat, dress, bathe, or use toilet)?  2.75 filed at 06/04/2025 1315   Can you walk indoors, such as around your house? 1.75 filed at 06/04/2025 1315   Can you walk a block or two on level ground?  0 filed at 06/04/2025 1315   Can you climb a flight of stairs or walk up a hill? 0 filed at 06/04/2025 1315   Can you run a short distance? 0 filed at 06/04/2025 1315   Can you do light work around the house like dusting or washing dishes? 0 filed at 06/04/2025 1315   Can you do moderate work around the house like vacuuming, sweeping floors or carrying groceries? 0 filed at 06/04/2025 1315   Can you do heavy work around the house like scrubbing floors or lifting and moving heavy furniture?  0 filed at 06/04/2025 1315   Can you do yard work like raking leaves, weeding or pushing a mower? 0 filed at 06/04/2025 1315   Can you have sexual relations? 0 filed at 06/04/2025 1315   Can you participate in moderate recreational activities like golf, bowling, dancing, doubles tennis or throwing a baseball or football? 0 filed at 06/04/2025 6955   Can you " participate in strenous sports like swimming, singles tennis, football, basketball, or skiing? 0 filed at 06/04/2025 1315   DASI SCORE 4.5 filed at 06/04/2025 1315   METS Score (Will be calculated only when all the questions are answered) 3.3 filed at 06/04/2025 1315          Caprini DVT Assessment      Flowsheet Row Pre-Admission Testing from 6/4/2025 in [facility] ED to Hosp-Admission (Discharged) from 1/2/2024 in [facility] 2 with Terra Young MD   DVT Score (IF A SCORE IS NOT CALCULATING, MUST SELECT A BMI TO COMPLETE) 13 filed at 06/04/2025 1357 6 filed at 01/03/2024 0738   Medical Factors Present cancer, chemotherapy, or previous malignancy, Swollen legs, COPD, History of DVT/PE filed at 06/04/2025 1357 --   Surgical Factors Minor surgery planned filed at 06/04/2025 1357 --   BMI (BMI MUST BE CHOSEN) Greater than 50 (Venous stasis syndrome) filed at 06/04/2025 1357 Greater than 50 (Venous stasis syndrome) filed at 01/03/2024 0738   RETIRED: Current Status -- COPD filed at 01/03/2024 0738   RETIRED: Age -- 60-75 years filed at 01/03/2024 0738          Modified Frailty Index      Flowsheet Row Pre-Admission Testing from 6/4/2025 in [facility]   Non-independent functional status (problems with dressing, bathing, personal grooming, or cooking) 0 filed at 06/04/2025 1357   History of diabetes mellitus  0.0909 filed at 06/04/2025 1357   History of COPD 0.0909 filed at 06/04/2025 1357   History of CHF 0.0909 filed at 06/04/2025 1357   History of MI 0 filed at 06/04/2025 1357   History of Percutaneous Coronary Intervention, Cardiac Surgery, or Angina 0.0909 filed at 06/04/2025 1357   Hypertension requiring the use of medication  0.0909 filed at 06/04/2025 1357   Peripheral vascular disease 0 filed at 06/04/2025 1357   Impaired sensorium (cognitive impairement or loss, clouding, or delirium) 0 filed at 06/04/2025 1357   TIA or CVA withouy residual deficit 0 filed at  06/04/2025 1357   Cerebrovascular accident with deficit 0 filed at 06/04/2025 1357   Modified Frailty Index Calculator .4545 filed at 06/04/2025 1357          [id number] Stroke Risk Points  Current as of a minute ago        6 0 to 9 Points:      Last Change:           The [id number] risk score (Lip GH, et al. 2009. © 2010 [employer]) quantifies the risk of stroke for a patient with atrial fibrillation. For patients without atrial fibrillation or under the age of 18 this score appears as N/A. Higher score values generally indicate higher risk of stroke.          Points Metrics   1 Has Congestive Heart Failure:  Yes     Patients with congestive heart failure get 1 point.    Current as of a minute ago   1 Has Hypertension:  Yes     Patients with hypertension get 1 point.    Current as of a minute ago   1 Age:  75     Patients 65 to 74 years old get 1 point, or patients 75 years and older get 2 points.    Current as of a minute ago   1 Has Diabetes:  Yes     Patients with diabetes get 1 point.    Current as of a minute ago   0 Had Stroke:  No  Had TIA:  No  Had Thromboembolism:  No     Patients who have had a stroke, TIA, or thromboembolism get 2 points.    Current as of a minute ago   1 Has Vascular Disease:  Yes      Patients with vascular disease get 1 point.    Current as of a minute ago   1 Clinically Relevant Sex:  Female     Patients with a clinically relevant sex of Female get 1 point.    Current as of a minute ago             Revised Cardiac Risk Index      Flowsheet Row Pre-Admission Testing from 6/4/2025 in [facility]   High-Risk Surgery (Intraperitoneal, Intrathoracic,Suprainguinal vascular) 0 filed at 06/04/2025 1353   History of ischemic heart disease (History of MI, History of positive exercuse test, Current chest paint considered due to myocardial ischemia, Use of nitrate therapy, ECG with pathological Q Waves) 0 filed at 06/04/2025 1353   History of  congestive heart failure (pulmonary edemia, bilateral rales or S3 gallop, Paroxysmal nocturnal dyspnea, CXR showing pulmonary vascular redistribution) 1 filed at 06/04/2025 1353   History of cerebrovascular disease (Prior TIA or stroke) 0 filed at 06/04/2025 1353   Pre-operative insulin treatment 1 filed at 06/04/2025 1353   Pre-operative creatinine>2 mg/dl 0 filed at 06/04/2025 1353   Revised Cardiac Risk Calculator 2 filed at 06/04/2025 1353          Apfel Simplified Score      Flowsheet Row Pre-Admission Testing from 6/4/2025 in [facility]   Smoking status 1 filed at 06/04/2025 1357   History of motion sickness or PONV  0 filed at 06/04/2025 1357   Use of postoperative opioids 1 filed at 06/04/2025 1357   Gender - Female 1=Yes filed at 06/04/2025 1357   Apfel Simplified Score Calculator 3 filed at 06/04/2025 1357          Risk Analysis Index Results This Encounter    No data found in the last 10 encounters.       Stop Bang Score      Flowsheet Row Pre-Admission Testing from 6/4/2025 in [facility]   Do you snore loudly? 0 filed at 06/04/2025 1316   Do you often feel tired or fatigued after your sleep? 1 filed at 06/04/2025 1316   Has anyone ever observed you stop breathing in your sleep? 0 filed at 06/04/2025 1316   Do you have or are you being treated for high blood pressure? 1 filed at 06/04/2025 1316   Recent BMI (Calculated) 47.1 filed at 06/04/2025 1316   Is BMI greater than 35 kg/m2? 1=Yes filed at 06/04/2025 1316   Age older than 50 years old? 1=Yes filed at 06/04/2025 1316   Is your neck circumference greater than 17 inches (Male) or 16 inches (Female)? 0 filed at 06/04/2025 1316   Gender - Male 0=No filed at 06/04/2025 1316   STOP-BANG Total Score 4 filed at 06/04/2025 1316          Prodigy: High Risk  Total Score: 19              Prodigy Age Score      Prodigy CHF score          ARISCAT Score for Postoperative Pulmonary Complications      Flowsheet Row Pre-Admission  Testing from 6/4/2025 in [facility]   Age Calculated Score 3 filed at 06/04/2025 1356   Preoperative SpO2 8 filed at 06/04/2025 1356   Respiratory infection in the last month Either upper or lower (i.e., URI, bronchitis, pneumonia), with fever and antibiotic treatment 0 filed at 06/04/2025 1356   Preoperative anemia (Hgb less than 10 g/dl) 0 filed at 06/04/2025 1356   Surgical incision  0 filed at 06/04/2025 1356   Duration of surgery  0 filed at 06/04/2025 1356   Emergency Procedure  0 filed at 06/04/2025 1356   ARISCAT Total Score  11 filed at 06/04/2025 1356          Steven Perioperative Risk for Myocardial Infarction or Cardiac Arrest (ROSALVA)    No data to display         Assessment and Plan:   Anesthesia/Airway:  No anesthesia complications        Neuro:    No neurologic diagnoses, however, the patient is at an increased risk for post operative delirium secondary to age >/= 65 and decreased functinoal status.  Preoperative brain exercise educational handout provided to patient.    The patient is at an increased risk for perioperative stroke secondary to a-fib, chronic renal failure , increased age, HTN, HLD, DM , female sex , general anesthesia, and hypercoagulable state.       HEENT/Airway:    No HEENT diagnosis or significant findings on chart review or clinical presentation and evaluation. No further preoperative testing/intervention indicated at this time.      Cardiovascular:    #Pulmonary Hypertension, HLD, Atrial Fibrillation, CHF, Aortic stenosis, splenic artery aneurysm, HTN, - medicated with amiodarone, aspirin, atorvastatin, Lasix, metoprolol tartrate, potassium, sodium bicarb, warfarin (continue) and follows with Cardiology. BP today is 120/66 and denies cardiovascular symptoms. Physical exam is benign. METS is <4 and scheduled for non-cardiac surgery.  Cardioverted on 3/26/25. See media for preop cards optimization, per note -     Surgeon contacted via staff message stated okay to  "continue warfarin and aspirin therapy for D&C procedure.      METS are 3.3    RCRI  2 which is 10.1% 30 day risk of MACE (risk for cardiac death, nonfatal myocardial infarction, and nonfactal cardiac arrest    ROSALVA score which indicates a   0.7 % risk of intraoperative or 30-day postoperative MACE    EKG 4/17/25         Echo 9/26/23  CONCLUSIONS:  1. Left ventricular systolic function is normal with a 60-65% estimated ejection fraction.  2. Spectral Doppler shows an abnormal pattern of left ventricular diastolic filling.  3. The left atrium is moderate to severely dilated.  4. The right atrium is mild to moderately dilated.  5. There is moderate thickening and calcification of the anterior and posterior mitral valve leaflets.  6. There is moderate to severe mitral annular calcification with mild mitral stenosis and mild mitral regurgitation.  7. The patient is in atrial fibrillation which may influence the estimate of left ventricular function and transvalvular flows.  8. Aortic valve sclerosis.    CT Angio chest 2/3/25  IMPRESSION:   No CT evidence of pulmonary embolism.   Patchy groundglass left lower lobe infiltrate in keeping with pneumonitis   Stable splenic artery aneurysm.       Pulmonary:    #COPD, chronic respiratory failure (5LNC), -patient currently follows with pulmonology, however reports she has stopped using her oxygen supplementation since last time she was seen.  Patient states she was originally on 5 L nasal cannula however she took it off and has been measuring pulse ox in the mid to high 90s.  Due to this reason patient states that she has stopped using her supplemental oxygen and she \"feels great\".  Patient states she does not use her rescue inhaler/breathing treatment unless she is actively having an upper respiratory infection.  Patient denies any recent exacerbations.  At this point in time patient appears improved from baseline and no further perioperative intervention are required.  It was " noted in patient's medical history she has sleep apnea however she denies this history.  Physical exam was benign and she denies any respiratory symptoms.  Preoperative deep breathing educational handout provided to patient.    Patient is at increased risk of perioperative complications secondary to  age > 60, COPD, obesity, heart failure, pulmonary hypertension, types of anesthetic    ARISCAT:    11  points which is a low (1.6%) risk of in-hospital post-op pulmonary complications     PRODIGY:  19  points which is a high risk of post op opioid induced respiratory depression episodes    STOP BAN   points which is a intermediate risk for moderate to severe EULALIA    Pumonary toilet education discussed, patient also provided deep breathing exercises and incentive spirometry educational handout      Renal:   #CKD, recurrent nephrolithiasis-patient reports she is currently following with nephrology who is monitoring her kidney function, repeat lab work to be completed today.  Patient states she has history of reoccurring kidney stones however she denies any GI/ symptoms at this time.    Patient is at increase risk for perioperative renal complications secondary to  Age equal to or greater than 56, BMI equal to or greater than 30, HTN, diabetes, COPD, use of an ace, arb, or NSAID       Endocrine:   #DM2, thyroid nodule, B12 and D deficiency, -patient is currently medicated with Mounjaro (do not take day of please start 24-hour prior liquid diet) as well as long-acting glargine insulin (15 units nightly).  Last A1c 4.6 on 3/3/25. No further testing or intervention is indicated at this time.      Hematologic/Oncology:      #PE (prior to ), DCIS of breast, -patient reports last time of radiation was over 8 years ago.  Patient states that she is unsure when her pulmonary embolism occurred but she knows it has been at least over 10 years and she has had 2 in the past.  Patient denies any history of blood clotting  disorder.    Patient confirmed they would accept blood products if necessary.   Preoperative DVT educational handout provided to patient.  Caprini Score:  13  points which is a highest risk of perioperative VTE      Gastrointestinal:   #Urge Incontinence, IUD in place, Endometrial thickening on ultrasound -patient is currently seeking surgical intervention for D&C with insertion of IUD.  Patient denies any GI/ symptoms at this time.      EAT-10 score of    0  : self-perceived oropharyngeal dysphagia scale (0-40)     Apfel: 3 points 61% risk for post operative N/V      Infectious disease:     No infectious diagnosis or significant findings on chart review or clinical presentation and evaluation.   Prescription provided for CHG body wash and dental rinse. CHG use instructions reviewed and provided to patient.  Staph screen collected      Musculoskeletal:    #OA, lumbar spondylosis, lumbar disc disease, peripheral neuropathy, -patient is medicated with gabapentin (continue), no further perioperative intervention  #Chronic ulcer of lower leg -patient reports she is having improved healing with her ulcer on her left lower calf.  She states it is nearly resolved and she has been changing it 3 times a week.   #dermatitis-medicated dapsone (okay to continue).    Other:     Hold Vit D supplementation day of surgery  Hold all other vitamins and supplements 7 days prior to surgery  Tylenol okay to continue, please hold Aleve/naproxen/ibuprofen/Excedrin (NSAIDs) for 7 days prior to surgery  No lotion/moisturizers or Deoderant after last shower prior to surgery        Labs ordered  Recent Results (from the past 3 weeks)   Protime-INR    Collection Time: 05/30/25 12:00 AM   Result Value Ref Range    Protime External      INR External 2.60    CBC    Collection Time: 06/04/25  1:35 PM   Result Value Ref Range    WBC 7.2 4.4 - 11.3 x10*3/uL    nRBC 0.0 0.0 - 0.0 /100 WBCs    RBC 4.01 4.00 - 5.20 x10*6/uL    Hemoglobin 12.9 12.0 -  16.0 g/dL    Hematocrit 41.1 36.0 - 46.0 %     (H) 80 - 100 fL    MCH 32.2 26.0 - 34.0 pg    MCHC 31.4 (L) 32.0 - 36.0 g/dL    RDW 13.8 11.5 - 14.5 %    Platelets 232 150 - 450 x10*3/uL   Basic Metabolic Panel    Collection Time: 06/04/25  1:35 PM   Result Value Ref Range    Glucose 173 (H) 74 - 99 mg/dL    Sodium 142 136 - 145 mmol/L    Potassium 4.0 3.5 - 5.3 mmol/L    Chloride 106 98 - 107 mmol/L    Bicarbonate 26 21 - 32 mmol/L    Anion Gap 14 10 - 20 mmol/L    Urea Nitrogen 31 (H) 6 - 23 mg/dL    Creatinine 1.53 (H) 0.50 - 1.05 mg/dL    eGFR 36 (L) >60 mL/min/1.73m*2    Calcium 8.7 8.6 - 10.6 mg/dL   Type And Screen Is this order related to pregnancy or an upcoming surgery? Yes; Where will this surgery/delivery be performed? [facility]; What is the date of the surgery? 6/16/2025; Has this patient ever had a transfusion? No; Has t...    Collection Time: 06/04/25  1:35 PM   Result Value Ref Range    ABO TYPE O     Rh TYPE POS     ANTIBODY SCREEN NEG    Coagulation Screen    Collection Time: 06/04/25  1:35 PM   Result Value Ref Range    Protime 17.5 (H) 9.8 - 12.4 seconds    INR 1.6 (H) 0.9 - 1.1    aPTT 37 (H) 26 - 36 seconds           Sammi Sharp PA-C     Medication instructions and NPO guidelines reviewed with the patient.  All questions or concerns discussed and addressed.   The above clinical summary has been dictated with voice recognition software. It has not been proofread for grammatical errors, typographical mistakes, or other semantic inconsistencies.   All labs/imaging included on this documentation were reviewed/considered in medical decision making for perioperative planning, including labs ordered day of CPM appointment.           [1]   Past Medical History:  Diagnosis Date    Acute upper respiratory infection, unspecified 09/25/2019    Acute upper respiratory infection    Acute upper respiratory infection, unspecified 10/11/2016    Acute upper respiratory infection    Anemia      Arrhythmia     Persistent A-Fib, Last followed with CHAVO Buenrostro on 4/17/2025    Arthritis     Bullous pemphigoid     Cataract     CHF (congestive heart failure)     Chronic diastolic congestive heart failure    CKD (chronic kidney disease)     follows with nephrology    COPD (chronic obstructive pulmonary disease) (Multi)     On chronic O2 at 5L    DCIS (ductal carcinoma in situ)     treated with radiation    Diabetes mellitus (Multi)     Encounter for screening mammogram for malignant neoplasm of breast 03/10/2015    Visit for screening mammogram    GERD (gastroesophageal reflux disease)     controlled    History of multiple pulmonary nodules     History of pulmonary embolus (PE)     Being treated with Coumadin    History of thyroid nodule     HL (hearing loss)     Hyperlipidemia     Hypertension     Morbid (severe) obesity due to excess calories (Multi) 09/17/2018    Morbid or severe obesity due to excess calories    Nephrolithiasis     Peripheral neuropathy     Personal history of nicotine dependence 10/26/2020    Personal history of nicotine dependence    Personal history of other diseases of the respiratory system     Personal history of asthma    Personal history of other drug therapy 08/17/2020    History of pneumococcal vaccination    Personal history of other endocrine, nutritional and metabolic disease     History of diabetes mellitus    Personal history of other specified conditions 10/04/2016    History of edema    Personal history of other specified conditions 07/15/2019    History of abnormal mammogram    Sleep apnea     Splenic artery aneurysm     History of, Multiple, stable 10/30/2024 per PCP on 4/8/2025 note    Type 2 diabetes mellitus    [2]   Past Surgical History:  Procedure Laterality Date    BI MAMMO GUIDED BREAST RIGHT LOCALIZATION Right 12/13/2018    BI MAMMO GUIDED LOCALIZATION BREAST RIGHT 12/13/2018 [facility]    BREAST LUMPECTOMY  11/14/2012    Breast Surgery  Lumpectomy    CARDIOVERSION  2025    Persistent a-fib, multiple cardioversions done     SECTION, LOW TRANSVERSE      COLONOSCOPY  2013    Complete Colonoscopy    INCISION AND DRAINAGE OF WOUND  2025    Lower left leg due to abscess    NASAL SEPTUM SURGERY  2012    Nasal Septal Deviation Repair    OTHER SURGICAL HISTORY  2018    Breast biopsy excisional    OTHER SURGICAL HISTORY  2021    Cataract surgery    OTHER SURGICAL HISTORY  2020    Renal lithotripsy    TUBAL LIGATION  2012    Tubal Ligation   [3]   Family History  Problem Relation Name Age of Onset    Alzheimer's disease Mother      Coronary artery disease Father      Heart attack Father      Stroke Maternal Grandfather      Breast cancer Other family history    [4] No Known Allergies

## 2025-06-13 ENCOUNTER — TELEPHONE (OUTPATIENT)
Dept: GYNECOLOGIC ONCOLOGY | Facility: HOSPITAL | Age: 75
End: 2025-06-13
Payer: MEDICARE

## 2025-06-13 ENCOUNTER — ANTICOAGULATION - WARFARIN VISIT (OUTPATIENT)
Dept: CARDIOLOGY | Facility: CLINIC | Age: 75
End: 2025-06-13
Payer: MEDICARE

## 2025-06-13 DIAGNOSIS — I26.99 PULMONARY EMBOLISM WITHOUT ACUTE COR PULMONALE, UNSPECIFIED CHRONICITY, UNSPECIFIED PULMONARY EMBOLISM TYPE (MULTI): Primary | ICD-10-CM

## 2025-06-13 DIAGNOSIS — Z79.01 LONG TERM (CURRENT) USE OF ANTICOAGULANTS: ICD-10-CM

## 2025-06-13 LAB
INR IN PPP BY COAGULATION ASSAY EXTERNAL: 1.9
PROTHROMBIN TIME (PT) IN PPP BY COAGULATION ASSAY EXTERNAL: NORMAL

## 2025-06-13 NOTE — TELEPHONE ENCOUNTER
The patient called with concerns about not eating or drinking for 15 hours as instructed on patient surgical instruction handout. She is diabetic and concerned about becoming hypoglycemic. I advised her to not eat or drink anything 8 hours before her procedure. I advised her to eat a small protein rich meal at 0600 as the protein will help maintain steady blood glucose levels. I told her she could have a protein shake and peanut butter crackers/ sandwich. She verbalized her understanding and did not have any additional questions.

## 2025-06-13 NOTE — HOSPITAL COURSE
Gynecologic Oncology H&P    Frannie Blanco is a 75 y.o. with post menopausal bleeding who is presenting for dilation and curettage and IUD insertion.   PMHX includes Pulmonary Hypertension, HLD, Atrial Fibrillation, CHF, Aortic stenosis, splenic artery aneurysm, HTN, COPD, chronic respiratory failure, CKD3, recurrent kindey stones, PE (roughly over 10 years ago), DCIS of breast, DM2, thyroid nodule, Urge Incontinence, OA, lumbar spondylosis, lumbar disc disease, peripheral neuropathy, Chornic ulcer of lower leg, obesity (BMI 51)  She is currently taking amiodarone, aspirin (continued), atorvastatin, Lasix, metoprolol tartrate, warfarin (continued). She is on 5L NC at baseline for COPD.    Pre-op labs: (6/4) Hgb12.9, plts 232, Cr 1.53    PAT (6/4): [id number] Stroke Risk Points: 6;     -    GYO History:    08/16/2019, dilation curettage, placement of Mirena IUD.  Pathology reported as blood and fibrin was sightly debris and scant strips of cytologically normal endometrial epithelium, insufficient for complete diagnostic evaluation scant strips of endocervical and squamous epithelium with no pathologic diagnosis     7/16/24 EMB: predominantly mucus and scant strips of atrophic endometrium. Mirena IUD removed at this time.     10/26/24 US Pelvis: Grossly unremarkable endometrial thickness of 3 mm. No pelvic mass.       Past Medical History:  Past Medical History:   Diagnosis Date    Acute upper respiratory infection, unspecified 09/25/2019    Acute upper respiratory infection    Acute upper respiratory infection, unspecified 10/11/2016    Acute upper respiratory infection    Anemia     Arrhythmia     Persistent A-Fib, Last followed with CHAVO Buenrostro on 4/17/2025    Arthritis     Bullous pemphigoid     Cataract     CHF (congestive heart failure)     Chronic diastolic congestive heart failure    CKD (chronic kidney disease)     follows with nephrology    COPD (chronic obstructive pulmonary disease) (Multi)      On chronic O2 at 5L    DCIS (ductal carcinoma in situ)     treated with radiation    Diabetes mellitus (Multi)     Encounter for screening mammogram for malignant neoplasm of breast 03/10/2015    Visit for screening mammogram    GERD (gastroesophageal reflux disease)     controlled    History of multiple pulmonary nodules     History of pulmonary embolus (PE)     Being treated with Coumadin    History of thyroid nodule     HL (hearing loss)     Hyperlipidemia     Hypertension     Morbid (severe) obesity due to excess calories (Multi) 2018    Morbid or severe obesity due to excess calories    Nephrolithiasis     Peripheral neuropathy     Personal history of nicotine dependence 10/26/2020    Personal history of nicotine dependence    Personal history of other diseases of the respiratory system     Personal history of asthma    Personal history of other drug therapy 2020    History of pneumococcal vaccination    Personal history of other endocrine, nutritional and metabolic disease     History of diabetes mellitus    Personal history of other specified conditions 10/04/2016    History of edema    Personal history of other specified conditions 07/15/2019    History of abnormal mammogram    Sleep apnea     Splenic artery aneurysm     History of, Multiple, stable 10/30/2024 per PCP on 2025 note    Type 2 diabetes mellitus         Surgical History:  Past Surgical History:   Procedure Laterality Date    BI MAMMO GUIDED BREAST RIGHT LOCALIZATION Right 2018    BI MAMMO GUIDED LOCALIZATION BREAST RIGHT 2018 [facility]    BREAST LUMPECTOMY  2012    Breast Surgery Lumpectomy    CARDIOVERSION  2025    Persistent a-fib, multiple cardioversions done     SECTION, LOW TRANSVERSE      COLONOSCOPY  2013    Complete Colonoscopy    INCISION AND DRAINAGE OF WOUND  2025    Lower left leg due to abscess    NASAL SEPTUM SURGERY  2012    Nasal Septal Deviation  Repair    OTHER SURGICAL HISTORY  2018    Breast biopsy excisional    OTHER SURGICAL HISTORY  2021    Cataract surgery    OTHER SURGICAL HISTORY  2020    Renal lithotripsy    TUBAL LIGATION  2012    Tubal Ligation        OB History   No obstetric history on file.       Social History:  Social History     Socioeconomic History    Marital status:    Tobacco Use    Smoking status: Former     Current packs/day: 0.00     Types: Cigarettes     Quit date: 2023     Years since quittin.7    Smokeless tobacco: Never    Tobacco comments:     Quit in    Vaping Use    Vaping status: Never Used   Substance and Sexual Activity    Alcohol use: Never    Drug use: Never    Sexual activity: Defer     Social Drivers of Health     Financial Resource Strain: Low Risk  (1/3/2024)    Overall Financial Resource Strain (CARDIA)     Difficulty of Paying Living Expenses: Not hard at all   Food Insecurity: No Food Insecurity (2025)    Received from [facility]    Hunger Vital Sign     Worried About Running Out of Food in the Last Year: Never true     Ran Out of Food in the Last Year: Never true   Transportation Needs: No Transportation Needs (2025)    Received from [facility]    PRAPARE - Transportation     Lack of Transportation (Medical): No     Lack of Transportation (Non-Medical): No   Intimate Partner Violence: Not At Risk (1/3/2024)    Humiliation, Afraid, Rape, and Kick questionnaire     Fear of Current or Ex-Partner: No     Emotionally Abused: No     Physically Abused: No     Sexually Abused: No   Housing Stability: Low Risk  (2025)    Received from [facility]    Housing Stability Vital Sign     Unable to Pay for Housing in the Last Year: No     Number of Times Moved in the Last Year: 0     Homeless in the Last Year: No        Family History:  Family History   Problem Relation Name Age of Onset    Alzheimer's disease Mother      Coronary artery disease Father       "Heart attack Father      Stroke Maternal Grandfather      Breast cancer Other family history         Medications:  Prior to Admission medications    Medication Sig Start Date End Date Taking? Authorizing Provider   albuterol 90 mcg/actuation inhaler inhale 1 to 2 puffs by mouth and INTO THE LUNGS every 4 to 6 hours if needed    Historical Provider, MD   amiodarone (Pacerone) 200 mg tablet Take 1 tablet (200 mg) by mouth once daily. 12/2/24 6/4/25  Terra Young MD   aspirin 81 mg EC tablet Take 1 tablet (81 mg) by mouth once daily. 10/3/16   Historical Provider, MD   atorvastatin (Lipitor) 20 mg tablet Take 1 tablet (20 mg) by mouth once daily at bedtime. 3/6/25   Terra Young MD   BD Alcohol Swabs pads, medicated  11/7/22   Historical Provider, MD   BD Dorothy 2nd Gen Pen Needle 32 gauge x 5/32\" needle Use with vitamin B12 injections monthly 6/20/23   Siobhan Palafox PA-C   calcium carbonate-vitamin D3 1,000 mg-20 mcg (800 unit) tablet Take 1 tablet by mouth 2 times a day.    Historical Provider, MD   cholecalciferol (Vitamin D3) 25 MCG (1000 UT) tablet Take 1 tablet (1,000 Units) by mouth once daily.    Historical Provider, MD   cyanocobalamin (Dodex) 1,000 mcg/mL injection Inject 1 mL (1,000 mcg) into the muscle every 30 (thirty) days. 3/26/25   Terra Young MD   dapsone 25 mg tablet Take 2 tablets (50 mg) by mouth once daily in the morning. Take before meals.    Historical Provider, MD   docusate sodium (Colace) 100 mg capsule Take 1 capsule (100 mg) by mouth 2 times a day as needed. 1/5/24   Historical Provider, MD   ferrous sulfate 325 (65 Fe) MG EC tablet Take 65 mg by mouth once daily.    Historical Provider, MD   fluticasone-umeclidin-vilanter (Trelegy Ellipta) 200-62.5-25 mcg blister with device INHALE 1 PUFF BY MOUTH IN THE MORNING Rinse mouth after taking dose 11/18/24   Nahum Méndez MD   furosemide (Lasix) 80 mg tablet Take 1 tablet (80 mg) by mouth once daily. 1/2/25   Terra Young, " "MD   gabapentin (Neurontin) 300 mg capsule Take 1 capsule (300 mg) by mouth 3 times a day. 4/3/25 4/3/26  Bi Tapia MD   insulin glargine (Basaglar KwikPen U-100 Insulin) 100 unit/mL (3 mL) pen Inject 15 units daily Take as directed per insulin instructions. 3/24/25   Bi Tapia MD   metoprolol tartrate (Lopressor) 25 mg tablet To take half tablet by mouth twice daily, to hold if systolic BP is 100 or less or is heart rate is 55 or less 5/7/25   Terra Young MD   nystatin (Mycostatin) ointment Apply topically 2 times a day. 7/9/24 7/9/25  JOSELITO Walsh-CNP   potassium chloride CR (Klor-Con M20) 20 mEq ER tablet Take 2 tablets (40 mEq) by mouth once daily. Do not crush or chew. 11/18/24 11/18/25  Terra Young MD   sodium bicarbonate 650 mg tablet Take 1 tablet (650 mg) by mouth 3 times a day. 5/30/25 5/30/26  Terra Young MD   syringe with needle 3 mL 23 x 1\" syringe Use to inject vitamin b12 once monthly 3/26/24   Terra Young MD   tirzepatide (Mounjaro) 5 mg/0.5 mL pen injector Inject 5 mg under the skin 1 (one) time per week. 1/3/25   Bi Tapia MD   topiramate (Topamax) 100 mg tablet Take 1 tablet (100 mg) by mouth 2 times a day. 11/11/24   Angely Hand MD   triamcinolone (Kenalog) 0.1 % ointment APPLY 1 APPLICATOR AS NEEDED TOPICALLY ONCE DAILY AS NEEDED UNDER DENTAL TRAYS  Patient not taking: Reported on 6/4/2025 12/5/22   Historical Provider, MD   warfarin (Coumadin) 5 mg tablet Take 1 tab daily or directed as coumadin clinic 7/15/24   Terra Young MD   zinc oxide 20 % ointment Apply 1 Application topically every 8 hours.  Patient not taking: Reported on 6/4/2025 1/17/24   Historical Provider, MD       Allergies:  Patient has no known allergies.    Objective  There were no vitals taken for this visit.     Physical exam:   General: no acute distress  HEENT: normocephalic, atraumatic  CV: warm and well perfused  Lungs: breathing comfortably on room " air  Abdomen: soft, non-tender  Extremities: moving all extremities spontaneously  Neuro: awake and conversant  Psych: appropriate mood and affect      Lab Review  Lab Results   Component Value Date    WBC 7.2 06/04/2025    HGB 12.9 06/04/2025    HCT 41.1 06/04/2025     06/04/2025    CHOL 129 10/26/2024    TRIG 80 10/26/2024    HDL 52.7 10/26/2024    ALT 17 01/29/2025    AST 15 01/29/2025     06/04/2025    K 4.0 06/04/2025     06/04/2025    CREATININE 1.53 (H) 06/04/2025    BUN 31 (H) 06/04/2025    CO2 26 06/04/2025    TSH 2.05 01/20/2025    INR 1.90 06/13/2025    HGBA1C 4.6 03/03/2025         Imaging    US PELVIS TRANSABDOMINAL WITH TRANSVAGINAL; ; 5/1/2025 7:16 pm    INDICATION: Signs/Symptoms:hx PMB, thickened endometrium.    FINDINGS:  The uterus measures 6.2 x 2.6 x 3.7 cm in diameter. The double wall endometrial thickness is 3 mm. The uterine myometrium is mildly heterogenous. There is a 2.0 x 1.2 x 1.8 cm heterogenous area of hypoechogenicity in the expected course of the cervix.    The ovaries are not visualized on transabdominal or endovaginal images. There is no sign of adnexal mass.    IMPRESSION:  The endometrium measures 3 mm in thickness. There is a heterogenous complex area of hypoechogenicity in the expected location of the cervix measuring 2.0 x 1.2 x 1.8 cm in diameter. Further workup with  tissue sampling or MRI is recommended.     Assessment & Plan     Frannie Blanco is a 75 y.o. with PMB presenting for D&C and IUD insertion    Plan to proceed with procedure.    Anticipated discharge plan: anticipate same day discharge    To be seen and d/w Dr. Luz Adamson, PGY-1  Gynecologic Oncology  Pager [phone]  Phone [phone]

## 2025-06-13 NOTE — PROGRESS NOTES
Patient identification verified with 2 identifiers.    Location: [facility] Patient Self-Testing Program [phone]    Referring Physician: Dr. Terra Young  Enrollment/ Re-enrollment date: 2025   INR Goal: 2.0-3.0  INR monitoring is per [facility] protocol.  Anticoagulation Medication: warfarin  Indication: Pulmonary Embolism (PE)    Subjective   Bleeding signs/symptoms: No    Bruising: No   Major bleeding event: No  Thrombosis signs/symptoms: No  Thromboembolic event: No  Missed doses: No  Extra doses: No  Medication changes: No  Dietary changes: No  Patient had more greens(salad) this week than she normally does.  Change in health: No  Change in activity: No  Alcohol: No  Other concerns: No    Upcoming Procedures:  Does the Patient Have any upcoming procedures that require interruption in anticoagulation therapy? no  Does the patient require bridging? no      Anticoagulation Summary  As of 2025      INR goal:  2.0-3.0   TTR:  69.2% (1.5 y)   INR used for dosin.90 (2025)   Weekly warfarin total:  17.5 mg               Assessment/Plan   Subtherapeutic     1. New dose: Will maintain dose and patient will retest in 1 week.    2. Next INR: 1 week      Education provided to patient during the visit:  Patient instructed to call in interim with questions, concerns and changes.

## 2025-06-16 ENCOUNTER — ANESTHESIA EVENT (OUTPATIENT)
Dept: OPERATING ROOM | Facility: HOSPITAL | Age: 75
End: 2025-06-16
Payer: MEDICARE

## 2025-06-16 ENCOUNTER — HOSPITAL ENCOUNTER (OUTPATIENT)
Facility: HOSPITAL | Age: 75
Setting detail: OUTPATIENT SURGERY
Discharge: HOME | End: 2025-06-16
Attending: STUDENT IN AN ORGANIZED HEALTH CARE EDUCATION/TRAINING PROGRAM | Admitting: STUDENT IN AN ORGANIZED HEALTH CARE EDUCATION/TRAINING PROGRAM
Payer: MEDICARE

## 2025-06-16 ENCOUNTER — ANESTHESIA (OUTPATIENT)
Dept: OPERATING ROOM | Facility: HOSPITAL | Age: 75
End: 2025-06-16
Payer: MEDICARE

## 2025-06-16 VITALS
OXYGEN SATURATION: 94 % | TEMPERATURE: 96.8 F | RESPIRATION RATE: 10 BRPM | DIASTOLIC BLOOD PRESSURE: 59 MMHG | SYSTOLIC BLOOD PRESSURE: 120 MMHG | HEART RATE: 53 BPM

## 2025-06-16 DIAGNOSIS — N76.0 VULVOVAGINITIS: ICD-10-CM

## 2025-06-16 DIAGNOSIS — R93.89 ENDOMETRIAL THICKENING ON ULTRASOUND: Primary | ICD-10-CM

## 2025-06-16 LAB — GLUCOSE BLD MANUAL STRIP-MCNC: 148 MG/DL (ref 74–99)

## 2025-06-16 PROCEDURE — 88305 TISSUE EXAM BY PATHOLOGIST: CPT | Mod: TC,SUR,WESLAB | Performed by: STUDENT IN AN ORGANIZED HEALTH CARE EDUCATION/TRAINING PROGRAM

## 2025-06-16 PROCEDURE — 7100000010 HC PHASE TWO TIME - EACH INCREMENTAL 1 MINUTE: Performed by: STUDENT IN AN ORGANIZED HEALTH CARE EDUCATION/TRAINING PROGRAM

## 2025-06-16 PROCEDURE — 2500000001 HC RX 250 WO HCPCS SELF ADMINISTERED DRUGS (ALT 637 FOR MEDICARE OP): Performed by: NURSE PRACTITIONER

## 2025-06-16 PROCEDURE — 3700000002 HC GENERAL ANESTHESIA TIME - EACH INCREMENTAL 1 MINUTE: Performed by: STUDENT IN AN ORGANIZED HEALTH CARE EDUCATION/TRAINING PROGRAM

## 2025-06-16 PROCEDURE — 2500000004 HC RX 250 GENERAL PHARMACY W/ HCPCS (ALT 636 FOR OP/ED): Mod: JZ,TB | Performed by: ANESTHESIOLOGY

## 2025-06-16 PROCEDURE — A58120 PR DILATION/CURETTAGE,DIAGNOSTIC

## 2025-06-16 PROCEDURE — 2500000004 HC RX 250 GENERAL PHARMACY W/ HCPCS (ALT 636 FOR OP/ED)

## 2025-06-16 PROCEDURE — 58300 INSERT INTRAUTERINE DEVICE: CPT | Performed by: STUDENT IN AN ORGANIZED HEALTH CARE EDUCATION/TRAINING PROGRAM

## 2025-06-16 PROCEDURE — A58120 PR DILATION/CURETTAGE,DIAGNOSTIC: Performed by: ANESTHESIOLOGY

## 2025-06-16 PROCEDURE — 82947 ASSAY GLUCOSE BLOOD QUANT: CPT

## 2025-06-16 PROCEDURE — 7100000009 HC PHASE TWO TIME - INITIAL BASE CHARGE: Performed by: STUDENT IN AN ORGANIZED HEALTH CARE EDUCATION/TRAINING PROGRAM

## 2025-06-16 PROCEDURE — 88305 TISSUE EXAM BY PATHOLOGIST: CPT | Performed by: STUDENT IN AN ORGANIZED HEALTH CARE EDUCATION/TRAINING PROGRAM

## 2025-06-16 PROCEDURE — 3700000001 HC GENERAL ANESTHESIA TIME - INITIAL BASE CHARGE: Performed by: STUDENT IN AN ORGANIZED HEALTH CARE EDUCATION/TRAINING PROGRAM

## 2025-06-16 PROCEDURE — 99100 ANES PT EXTEME AGE<1 YR&>70: CPT | Performed by: ANESTHESIOLOGY

## 2025-06-16 PROCEDURE — 3600000002 HC OR TIME - INITIAL BASE CHARGE - PROCEDURE LEVEL TWO: Performed by: STUDENT IN AN ORGANIZED HEALTH CARE EDUCATION/TRAINING PROGRAM

## 2025-06-16 PROCEDURE — 3600000007 HC OR TIME - EACH INCREMENTAL 1 MINUTE - PROCEDURE LEVEL TWO: Performed by: STUDENT IN AN ORGANIZED HEALTH CARE EDUCATION/TRAINING PROGRAM

## 2025-06-16 PROCEDURE — 2500000005 HC RX 250 GENERAL PHARMACY W/O HCPCS: Performed by: STUDENT IN AN ORGANIZED HEALTH CARE EDUCATION/TRAINING PROGRAM

## 2025-06-16 PROCEDURE — 58120 DILATION AND CURETTAGE: CPT | Performed by: STUDENT IN AN ORGANIZED HEALTH CARE EDUCATION/TRAINING PROGRAM

## 2025-06-16 PROCEDURE — 2500000004 HC RX 250 GENERAL PHARMACY W/ HCPCS (ALT 636 FOR OP/ED): Performed by: STUDENT IN AN ORGANIZED HEALTH CARE EDUCATION/TRAINING PROGRAM

## 2025-06-16 PROCEDURE — 6360000003 HC OR 636 NO HCPCS: Performed by: STUDENT IN AN ORGANIZED HEALTH CARE EDUCATION/TRAINING PROGRAM

## 2025-06-16 DEVICE — SYSTEM, INTRAUTERINE MIRENA: Type: IMPLANTABLE DEVICE | Site: UTERUS | Status: FUNCTIONAL

## 2025-06-16 RX ORDER — HYDROMORPHONE HYDROCHLORIDE 0.2 MG/ML
0.2 INJECTION INTRAMUSCULAR; INTRAVENOUS; SUBCUTANEOUS EVERY 5 MIN PRN
Status: DISCONTINUED | OUTPATIENT
Start: 2025-06-16 | End: 2025-06-16 | Stop reason: HOSPADM

## 2025-06-16 RX ORDER — CELECOXIB 200 MG/1
400 CAPSULE ORAL ONCE
Status: DISCONTINUED | OUTPATIENT
Start: 2025-06-16 | End: 2025-06-16 | Stop reason: HOSPADM

## 2025-06-16 RX ORDER — FENTANYL CITRATE 50 UG/ML
INJECTION, SOLUTION INTRAMUSCULAR; INTRAVENOUS AS NEEDED
Status: DISCONTINUED | OUTPATIENT
Start: 2025-06-16 | End: 2025-06-16

## 2025-06-16 RX ORDER — SODIUM CHLORIDE, SODIUM LACTATE, POTASSIUM CHLORIDE, CALCIUM CHLORIDE 600; 310; 30; 20 MG/100ML; MG/100ML; MG/100ML; MG/100ML
100 INJECTION, SOLUTION INTRAVENOUS CONTINUOUS
Status: DISCONTINUED | OUTPATIENT
Start: 2025-06-16 | End: 2025-06-16 | Stop reason: HOSPADM

## 2025-06-16 RX ORDER — GABAPENTIN 600 MG/1
600 TABLET ORAL ONCE
Status: DISCONTINUED | OUTPATIENT
Start: 2025-06-16 | End: 2025-06-16

## 2025-06-16 RX ORDER — ONDANSETRON HYDROCHLORIDE 2 MG/ML
4 INJECTION, SOLUTION INTRAVENOUS ONCE AS NEEDED
Status: DISCONTINUED | OUTPATIENT
Start: 2025-06-16 | End: 2025-06-16 | Stop reason: HOSPADM

## 2025-06-16 RX ORDER — CLOBETASOL PROPIONATE 0.5 MG/G
OINTMENT TOPICAL
Qty: 60 G | Refills: 3 | Status: SHIPPED | OUTPATIENT
Start: 2025-06-16

## 2025-06-16 RX ORDER — LIDOCAINE HYDROCHLORIDE 10 MG/ML
0.1 INJECTION, SOLUTION EPIDURAL; INFILTRATION; INTRACAUDAL; PERINEURAL ONCE
Status: DISCONTINUED | OUTPATIENT
Start: 2025-06-16 | End: 2025-06-16 | Stop reason: HOSPADM

## 2025-06-16 RX ORDER — WATER 1 ML/ML
INJECTION IRRIGATION AS NEEDED
Status: DISCONTINUED | OUTPATIENT
Start: 2025-06-16 | End: 2025-06-16 | Stop reason: HOSPADM

## 2025-06-16 RX ORDER — GABAPENTIN 300 MG/1
300 CAPSULE ORAL ONCE
Status: COMPLETED | OUTPATIENT
Start: 2025-06-16 | End: 2025-06-16

## 2025-06-16 RX ORDER — MIDAZOLAM HYDROCHLORIDE 1 MG/ML
INJECTION INTRAMUSCULAR; INTRAVENOUS AS NEEDED
Status: DISCONTINUED | OUTPATIENT
Start: 2025-06-16 | End: 2025-06-16

## 2025-06-16 RX ORDER — ACETAMINOPHEN 325 MG/1
975 TABLET ORAL ONCE
Status: DISCONTINUED | OUTPATIENT
Start: 2025-06-16 | End: 2025-06-16 | Stop reason: HOSPADM

## 2025-06-16 RX ORDER — PROPOFOL 10 MG/ML
INJECTION, EMULSION INTRAVENOUS AS NEEDED
Status: DISCONTINUED | OUTPATIENT
Start: 2025-06-16 | End: 2025-06-16

## 2025-06-16 RX ORDER — OXYCODONE HYDROCHLORIDE 5 MG/1
5 TABLET ORAL EVERY 4 HOURS PRN
Status: DISCONTINUED | OUTPATIENT
Start: 2025-06-16 | End: 2025-06-16 | Stop reason: HOSPADM

## 2025-06-16 RX ADMIN — PROPOFOL 40 MG: 10 INJECTION, EMULSION INTRAVENOUS at 16:53

## 2025-06-16 RX ADMIN — MIDAZOLAM HYDROCHLORIDE 1 MG: 2 INJECTION, SOLUTION INTRAMUSCULAR; INTRAVENOUS at 16:34

## 2025-06-16 RX ADMIN — PROPOFOL 40 MG: 10 INJECTION, EMULSION INTRAVENOUS at 16:52

## 2025-06-16 RX ADMIN — PROPOFOL 50 MG: 10 INJECTION, EMULSION INTRAVENOUS at 17:01

## 2025-06-16 RX ADMIN — PROPOFOL 40 MG: 10 INJECTION, EMULSION INTRAVENOUS at 16:42

## 2025-06-16 RX ADMIN — GABAPENTIN 300 MG: 300 CAPSULE ORAL at 14:43

## 2025-06-16 RX ADMIN — FENTANYL CITRATE 50 MCG: 50 INJECTION, SOLUTION INTRAMUSCULAR; INTRAVENOUS at 16:34

## 2025-06-16 RX ADMIN — PROPOFOL 30 MG: 10 INJECTION, EMULSION INTRAVENOUS at 16:46

## 2025-06-16 RX ADMIN — MIDAZOLAM HYDROCHLORIDE 1 MG: 2 INJECTION, SOLUTION INTRAMUSCULAR; INTRAVENOUS at 16:37

## 2025-06-16 RX ADMIN — FENTANYL CITRATE 50 MCG: 50 INJECTION, SOLUTION INTRAMUSCULAR; INTRAVENOUS at 16:49

## 2025-06-16 RX ADMIN — SODIUM CHLORIDE, SODIUM LACTATE, POTASSIUM CHLORIDE, AND CALCIUM CHLORIDE: 600; 310; 30; 20 INJECTION, SOLUTION INTRAVENOUS at 16:25

## 2025-06-16 RX ADMIN — HYDROMORPHONE HYDROCHLORIDE 0.2 MG: 0.2 INJECTION, SOLUTION INTRAMUSCULAR; INTRAVENOUS; SUBCUTANEOUS at 17:18

## 2025-06-16 RX ADMIN — PROPOFOL 20 MG: 10 INJECTION, EMULSION INTRAVENOUS at 16:48

## 2025-06-16 RX ADMIN — PROPOFOL 30 MG: 10 INJECTION, EMULSION INTRAVENOUS at 16:34

## 2025-06-16 SDOH — HEALTH STABILITY: MENTAL HEALTH: CURRENT SMOKER: 0

## 2025-06-16 ASSESSMENT — PAIN SCALES - GENERAL
PAINLEVEL_OUTOF10: 5 - MODERATE PAIN
PAINLEVEL_OUTOF10: 2
PAINLEVEL_OUTOF10: 2
PAINLEVEL_OUTOF10: 0 - NO PAIN

## 2025-06-16 ASSESSMENT — COLUMBIA-SUICIDE SEVERITY RATING SCALE - C-SSRS
6. HAVE YOU EVER DONE ANYTHING, STARTED TO DO ANYTHING, OR PREPARED TO DO ANYTHING TO END YOUR LIFE?: NO
2. HAVE YOU ACTUALLY HAD ANY THOUGHTS OF KILLING YOURSELF?: NO
1. IN THE PAST MONTH, HAVE YOU WISHED YOU WERE DEAD OR WISHED YOU COULD GO TO SLEEP AND NOT WAKE UP?: NO
1. IN THE PAST MONTH, HAVE YOU WISHED YOU WERE DEAD OR WISHED YOU COULD GO TO SLEEP AND NOT WAKE UP?: NO
2. HAVE YOU ACTUALLY HAD ANY THOUGHTS OF KILLING YOURSELF?: NO
6. HAVE YOU EVER DONE ANYTHING, STARTED TO DO ANYTHING, OR PREPARED TO DO ANYTHING TO END YOUR LIFE?: NO

## 2025-06-16 ASSESSMENT — PAIN DESCRIPTION - DESCRIPTORS
DESCRIPTORS: ACHING

## 2025-06-16 ASSESSMENT — PAIN - FUNCTIONAL ASSESSMENT: PAIN_FUNCTIONAL_ASSESSMENT: 0-10

## 2025-06-16 ASSESSMENT — PAIN DESCRIPTION - LOCATION: LOCATION: VAGINA

## 2025-06-16 NOTE — H&P
[facility]      VISIT ATTENDANCE: Patient presents with her Mother.    CC:  Daysi is a 18 year old female who presents with concerns of cough      HPI:  Daysi reports a 1 week hx of cough, runny nose and nasal congestion.  Associated with tactile fever (Tmax 99).  Reports nausea with one episode of vomiting yesterday.  Denies any diarrhea.  Has had intermittent headaches and myalgia.  No change in appetite.  No sick contacts.    Would like refill for Prozac and miralax    ROS:  Review of Systems   Constitutional: Positive for appetite change and fever.   HENT: Positive for congestion, ear pain and rhinorrhea. Negative for sore throat.    Respiratory: Positive for cough.    Gastrointestinal: Positive for nausea and vomiting. Negative for abdominal pain and diarrhea.   Musculoskeletal: Positive for myalgias.   Neurological: Positive for headaches.       ALLERGIES:    ALLERGIES:  No Known Allergies    CURRENT MEDICATIONS:    Current Outpatient Medications   Medication Sig Dispense Refill   • fluconazole (DIFLUCAN) 150 MG tablet      • FLUoxetine (PROzac) 10 MG capsule Take 1 capsule by mouth daily. 30 capsule 0   • FLUoxetine (PROzac) 20 MG capsule Take 1 capsule by mouth daily. 30 capsule 0   • polyethylene glycol (MIRALAX) 17 GM/SCOOP powder Take 17 g by mouth daily. 289 g 0   • hydrOXYzine (ATARAX) 25 MG tablet Take 1 tablet by mouth at bedtime as needed (insomnia). 30 tablet 0   • fluticasone (FLONASE) 50 MCG/ACT nasal spray SPRAY 1 SPRAY IN EACH NOSTRIL DAILY. 1 each 2   • ondansetron (ZOFRAN ODT) 8 MG disintegrating tablet      • loratadine (CLARITIN) 10 MG tablet        No current facility-administered medications for this visit.       PROBLEM LIST:  Patient Active Problem List   Diagnosis   • Seasonal allergic rhinitis due to pollen   • Chronic constipation   • Gastroesophageal reflux disease without esophagitis       PAST MEDICAL HISTORY:    No past medical history on file.    SURGICAL  Gynecologic Oncology H&P    Frannie Blanco is a 75 y.o. with post menopausal bleeding who is presenting for dilation and curettage and IUD insertion.   PMHX includes Pulmonary Hypertension, HLD, Atrial Fibrillation, CHF, Aortic stenosis, splenic artery aneurysm, HTN, COPD, chronic respiratory failure, CKD3, recurrent kindey stones, PE (roughly over 10 years ago), DCIS of breast, DM2, thyroid nodule, Urge Incontinence, OA, lumbar spondylosis, lumbar disc disease, peripheral neuropathy, Chornic ulcer of lower leg, obesity (BMI 51)  She is currently taking amiodarone, aspirin (continued), atorvastatin, Lasix, metoprolol tartrate, warfarin (continued). She is on 5L NC at baseline for COPD.    Pre-op labs: (6/4) Hgb12.9, plts 232, Cr 1.53    PAT (6/4): [id number] Stroke Risk Points: 6;     -    GYO History:    08/16/2019, dilation curettage, placement of Mirena IUD.  Pathology reported as blood and fibrin was sightly debris and scant strips of cytologically normal endometrial epithelium, insufficient for complete diagnostic evaluation scant strips of endocervical and squamous epithelium with no pathologic diagnosis     7/16/24 EMB: predominantly mucus and scant strips of atrophic endometrium. Mirena IUD removed at this time.     10/26/24 US Pelvis: Grossly unremarkable endometrial thickness of 3 mm. No pelvic mass.       Past Medical History:  Past Medical History:   Diagnosis Date    Acute upper respiratory infection, unspecified 09/25/2019    Acute upper respiratory infection    Acute upper respiratory infection, unspecified 10/11/2016    Acute upper respiratory infection    Anemia     Arrhythmia     Persistent A-Fib, Last followed with CHAVO Buenrostro on 4/17/2025    Arthritis     Bullous pemphigoid     Cataract     CHF (congestive heart failure)     Chronic diastolic congestive heart failure    CKD (chronic kidney disease)     follows with nephrology    COPD (chronic obstructive pulmonary disease) (Multi)      On chronic O2 at 5L    DCIS (ductal carcinoma in situ)     treated with radiation    Diabetes mellitus (Multi)     Encounter for screening mammogram for malignant neoplasm of breast 03/10/2015    Visit for screening mammogram    GERD (gastroesophageal reflux disease)     controlled    History of multiple pulmonary nodules     History of pulmonary embolus (PE)     Being treated with Coumadin    History of thyroid nodule     HL (hearing loss)     Hyperlipidemia     Hypertension     Morbid (severe) obesity due to excess calories (Multi) 2018    Morbid or severe obesity due to excess calories    Nephrolithiasis     Peripheral neuropathy     Personal history of nicotine dependence 10/26/2020    Personal history of nicotine dependence    Personal history of other diseases of the respiratory system     Personal history of asthma    Personal history of other drug therapy 2020    History of pneumococcal vaccination    Personal history of other endocrine, nutritional and metabolic disease     History of diabetes mellitus    Personal history of other specified conditions 10/04/2016    History of edema    Personal history of other specified conditions 07/15/2019    History of abnormal mammogram    Sleep apnea     Splenic artery aneurysm     History of, Multiple, stable 10/30/2024 per PCP on 2025 note    Type 2 diabetes mellitus         Surgical History:  Past Surgical History:   Procedure Laterality Date    BI MAMMO GUIDED BREAST RIGHT LOCALIZATION Right 2018    BI MAMMO GUIDED LOCALIZATION BREAST RIGHT 2018 [facility]    BREAST LUMPECTOMY  2012    Breast Surgery Lumpectomy    CARDIOVERSION  2025    Persistent a-fib, multiple cardioversions done     SECTION, LOW TRANSVERSE      COLONOSCOPY  2013    Complete Colonoscopy    INCISION AND DRAINAGE OF WOUND  2025    Lower left leg due to abscess    NASAL SEPTUM SURGERY  2012    Nasal Septal Deviation  HISTORY:    No past surgical history on file.    OBJECTIVE:  Objective   Vitals:/63   Pulse 90   Temp 97.9 °F (36.6 °C)   Wt 64.5 kg (142 lb 4.8 oz)   LMP 11/29/2022 (Exact Date)   Physical Exam  Constitutional:       General: She is not in acute distress.  HENT:      Head: Normocephalic and atraumatic.      Right Ear: Tympanic membrane normal.      Left Ear: Tympanic membrane normal.      Mouth/Throat:      Mouth: Mucous membranes are moist.      Pharynx: No oropharyngeal exudate or posterior oropharyngeal erythema.      Neck: Neck supple.   Eyes:      Conjunctiva/sclera: Conjunctivae normal.      Pupils: Pupils are equal, round, and reactive to light.   Cardiovascular:      Rate and Rhythm: Normal rate.      Heart sounds: Normal heart sounds. No murmur heard.  Pulmonary:      Effort: Pulmonary effort is normal. No respiratory distress.      Breath sounds: Normal breath sounds.   Lymphadenopathy:      Cervical: Cervical adenopathy present.   Neurological:      General: No focal deficit present.      Mental Status: She is alert and oriented to person, place, and time.         RESULTS:  Recent Results (from the past 168 hour(s))   POCT INFLUENZA A/B    Collection Time: 11/29/22 11:46 AM   Result Value Ref Range    Rapid Influenza A Ag Negative Negative, Indeterminate    Rapid Influenza B Ag Negative Negative, Indeterminate    Internal Procedural Controls Acceptable Yes    POCT SARS-COV-2 ANTIGEN    Collection Time: 11/29/22 11:47 AM   Result Value Ref Range    POCT SARS-COV-2 ANTIGEN Not Detected Not Detected    Internal Procedural Controls Acceptable Yes        VISIT DIAGNOSES:  1. Viral URI with cough  2. MALU (generalized anxiety disorder)  3. Panic attacks  4. Chronic constipation    ASSESSMENT:  Daysi is a 18 year old female presenting with uri symptoms and tactile.  Flu and COVID negative    PLAN:  -Counseled on natural course of viral infections  -Counseled on symptomatic management  -Advised to seek  Repair    OTHER SURGICAL HISTORY  2018    Breast biopsy excisional    OTHER SURGICAL HISTORY  2021    Cataract surgery    OTHER SURGICAL HISTORY  2020    Renal lithotripsy    TUBAL LIGATION  2012    Tubal Ligation        OB History   No obstetric history on file.       Social History:  Social History     Socioeconomic History    Marital status:    Tobacco Use    Smoking status: Former     Current packs/day: 0.00     Types: Cigarettes     Quit date: 2023     Years since quittin.7    Smokeless tobacco: Never    Tobacco comments:     Quit in    Vaping Use    Vaping status: Never Used   Substance and Sexual Activity    Alcohol use: Never    Drug use: Never    Sexual activity: Defer     Social Drivers of Health     Financial Resource Strain: Low Risk  (1/3/2024)    Overall Financial Resource Strain (CARDIA)     Difficulty of Paying Living Expenses: Not hard at all   Food Insecurity: No Food Insecurity (2025)    Received from [facility]    Hunger Vital Sign     Worried About Running Out of Food in the Last Year: Never true     Ran Out of Food in the Last Year: Never true   Transportation Needs: No Transportation Needs (2025)    Received from [facility]    PRAPARE - Transportation     Lack of Transportation (Medical): No     Lack of Transportation (Non-Medical): No   Intimate Partner Violence: Not At Risk (1/3/2024)    Humiliation, Afraid, Rape, and Kick questionnaire     Fear of Current or Ex-Partner: No     Emotionally Abused: No     Physically Abused: No     Sexually Abused: No   Housing Stability: Low Risk  (2025)    Received from [facility]    Housing Stability Vital Sign     Unable to Pay for Housing in the Last Year: No     Number of Times Moved in the Last Year: 0     Homeless in the Last Year: No        Family History:  Family History   Problem Relation Name Age of Onset    Alzheimer's disease Mother      Coronary artery disease Father       additional medical advice if symptoms worsen or do not resolve over the course of 10 days    -----------------------------------------------------------------------------------------------------------------------------------------------------------    Radha Ritter MD, MPH  Adolescent Medicine  [facility]   [address] [place] [id number]  [address] [place] [address]    (P) [phone]  (F) [phone]  (E) Perfect Serve    "Heart attack Father      Stroke Maternal Grandfather      Breast cancer Other family history         Medications:  Prior to Admission medications    Medication Sig Start Date End Date Taking? Authorizing Provider   albuterol 90 mcg/actuation inhaler inhale 1 to 2 puffs by mouth and INTO THE LUNGS every 4 to 6 hours if needed    Historical Provider, MD   amiodarone (Pacerone) 200 mg tablet Take 1 tablet (200 mg) by mouth once daily. 12/2/24 6/4/25  Terra Young MD   aspirin 81 mg EC tablet Take 1 tablet (81 mg) by mouth once daily. 10/3/16   Historical Provider, MD   atorvastatin (Lipitor) 20 mg tablet Take 1 tablet (20 mg) by mouth once daily at bedtime. 3/6/25   Terra Young MD   BD Alcohol Swabs pads, medicated  11/7/22   Historical Provider, MD   BD Dorothy 2nd Gen Pen Needle 32 gauge x 5/32\" needle Use with vitamin B12 injections monthly 6/20/23   Siobhan Palafox PA-C   calcium carbonate-vitamin D3 1,000 mg-20 mcg (800 unit) tablet Take 1 tablet by mouth 2 times a day.    Historical Provider, MD   cholecalciferol (Vitamin D3) 25 MCG (1000 UT) tablet Take 1 tablet (1,000 Units) by mouth once daily.    Historical Provider, MD   cyanocobalamin (Dodex) 1,000 mcg/mL injection Inject 1 mL (1,000 mcg) into the muscle every 30 (thirty) days. 3/26/25   Terra Young MD   dapsone 25 mg tablet Take 2 tablets (50 mg) by mouth once daily in the morning. Take before meals.    Historical Provider, MD   docusate sodium (Colace) 100 mg capsule Take 1 capsule (100 mg) by mouth 2 times a day as needed. 1/5/24   Historical Provider, MD   ferrous sulfate 325 (65 Fe) MG EC tablet Take 65 mg by mouth once daily.    Historical Provider, MD   fluticasone-umeclidin-vilanter (Trelegy Ellipta) 200-62.5-25 mcg blister with device INHALE 1 PUFF BY MOUTH IN THE MORNING Rinse mouth after taking dose 11/18/24   Nahum Méndez MD   furosemide (Lasix) 80 mg tablet Take 1 tablet (80 mg) by mouth once daily. 1/2/25   Terra Young, " "MD   gabapentin (Neurontin) 300 mg capsule Take 1 capsule (300 mg) by mouth 3 times a day. 4/3/25 4/3/26  Bi Tapia MD   insulin glargine (Basaglar KwikPen U-100 Insulin) 100 unit/mL (3 mL) pen Inject 15 units daily Take as directed per insulin instructions. 3/24/25   Bi Tapia MD   metoprolol tartrate (Lopressor) 25 mg tablet To take half tablet by mouth twice daily, to hold if systolic BP is 100 or less or is heart rate is 55 or less 5/7/25   Terra Young MD   nystatin (Mycostatin) ointment Apply topically 2 times a day. 7/9/24 7/9/25  JOSELITO Walsh-CNP   potassium chloride CR (Klor-Con M20) 20 mEq ER tablet Take 2 tablets (40 mEq) by mouth once daily. Do not crush or chew. 11/18/24 11/18/25  Terra Young MD   sodium bicarbonate 650 mg tablet Take 1 tablet (650 mg) by mouth 3 times a day. 5/30/25 5/30/26  Terra Young MD   syringe with needle 3 mL 23 x 1\" syringe Use to inject vitamin b12 once monthly 3/26/24   Terra Young MD   tirzepatide (Mounjaro) 5 mg/0.5 mL pen injector Inject 5 mg under the skin 1 (one) time per week. 1/3/25   Bi Tapia MD   topiramate (Topamax) 100 mg tablet Take 1 tablet (100 mg) by mouth 2 times a day. 11/11/24   Angely Hand MD   triamcinolone (Kenalog) 0.1 % ointment APPLY 1 APPLICATOR AS NEEDED TOPICALLY ONCE DAILY AS NEEDED UNDER DENTAL TRAYS  Patient not taking: Reported on 6/4/2025 12/5/22   Historical Provider, MD   warfarin (Coumadin) 5 mg tablet Take 1 tab daily or directed as coumadin clinic 7/15/24   Terra Young MD   zinc oxide 20 % ointment Apply 1 Application topically every 8 hours.  Patient not taking: Reported on 6/4/2025 1/17/24   Historical Provider, MD       Allergies:  Patient has no known allergies.    Objective  There were no vitals taken for this visit.     Physical exam:   General: no acute distress  HEENT: normocephalic, atraumatic  CV: warm and well perfused  Lungs: breathing comfortably on room " air  Abdomen: soft, non-tender  Extremities: moving all extremities spontaneously  Neuro: awake and conversant  Psych: appropriate mood and affect      Lab Review  Lab Results   Component Value Date    WBC 7.2 06/04/2025    HGB 12.9 06/04/2025    HCT 41.1 06/04/2025     06/04/2025    CHOL 129 10/26/2024    TRIG 80 10/26/2024    HDL 52.7 10/26/2024    ALT 17 01/29/2025    AST 15 01/29/2025     06/04/2025    K 4.0 06/04/2025     06/04/2025    CREATININE 1.53 (H) 06/04/2025    BUN 31 (H) 06/04/2025    CO2 26 06/04/2025    TSH 2.05 01/20/2025    INR 1.90 06/13/2025    HGBA1C 4.6 03/03/2025         Imaging    US PELVIS TRANSABDOMINAL WITH TRANSVAGINAL; ; 5/1/2025 7:16 pm    INDICATION: Signs/Symptoms:hx PMB, thickened endometrium.    FINDINGS:  The uterus measures 6.2 x 2.6 x 3.7 cm in diameter. The double wall endometrial thickness is 3 mm. The uterine myometrium is mildly heterogenous. There is a 2.0 x 1.2 x 1.8 cm heterogenous area of hypoechogenicity in the expected course of the cervix.    The ovaries are not visualized on transabdominal or endovaginal images. There is no sign of adnexal mass.    IMPRESSION:  The endometrium measures 3 mm in thickness. There is a heterogenous complex area of hypoechogenicity in the expected location of the cervix measuring 2.0 x 1.2 x 1.8 cm in diameter. Further workup with  tissue sampling or MRI is recommended.     Assessment & Plan     Frannie Blanco is a 75 y.o. with PMB presenting for D&C and IUD insertion    Plan to proceed with procedure.    Anticipated discharge plan: anticipate same day discharge    To be seen and d/w Dr. Luz Adamson, PGY-1  Gynecologic Oncology  Pager [phone]  Phone [phone]

## 2025-06-16 NOTE — DISCHARGE INSTRUCTIONS
Gynecologic Surgery Postoperative Instructions    Call the Gynecologic Oncology office at [phone] for any problems and/or concerns  *Walk as much as possible, it is ok to use stairs carefully  *Vaginal rest for 2 weeks (Do not put anything in your vagina. Do not use tampons or douches. Do not have sex until cleared by physician.)   *Prevent constipation by using stool softeners and staying hydrated    Call doctor right away for:  *Fever above 100.4/shaking chills  *Bright red vaginal bleeding or bleeding that soaks more than two sanitary pads per hour  *A foul smelling discharge from the vagina  *Inability to urinate  *Severe pain or bloating in your abdomen  *Persistent nausea/vomiting  *Redness, swelling, or drainage at your incision sites  *Chest pain/shortness of breath-call 911.    If you need pain medications, please take Acetaminophen or Tylenol. You can also sse a stool softener, such as Miralax to prevent constipation.

## 2025-06-16 NOTE — ANESTHESIA PREPROCEDURE EVALUATION
Patient: Frannie Blanco    Procedure Information       Anesthesia Start Date/Time: 06/16/25 1631    Procedures:       DILATION AND CURETTAGE      INSERTION, INTRAUTERINE DEVICE, any other indicated procedures    Location: [facility] OR  / Virtual [facility] OR    Surgeons: Divina Escobar MD, MS            Relevant Problems   Cardiac   (+) Aortic stenosis   (+) Atrial fibrillation (Multi)   (+) Chronic diastolic congestive heart failure   (+) Hyperlipemia   (+) Low-density-lipoid-type (LDL) hyperlipoproteinemia   (+) Mixed hyperlipidemia   (+) Primary hypertension      Pulmonary   (+) Multiple lung nodules on CT   (+) Pneumonia   (+) Pulmonary embolism without acute cor pulmonale, unspecified chronicity, unspecified pulmonary embolism type (Multi)   (+) Pulmonary hypertension (Multi)      Neuro   (+) Acute lumbar radiculopathy   (+) Diabetic peripheral neuropathy (Multi)      /Renal   (+) Calculus of kidney   (+) Chronic UTI      Endocrine   (+) Class 2 obesity due to excess calories in adult   (+) Diabetic peripheral neuropathy (Multi)   (+) Obesity   (+) Type 2 diabetes mellitus without complication, with long-term current use of insulin      Hematology   (+) Anemia   (+) Long term (current) use of anticoagulants   (+) Pernicious anemia      Musculoskeletal   (+) Lumbar disc disease   (+) Primary osteoarthritis of right hip      ID   (+) Chronic UTI   (+) Pneumonia       Clinical information reviewed:   Tobacco  Allergies  Meds   Med Hx  Surg Hx   Fam Hx  Soc Hx        NPO Detail:  No data recorded     Physical Exam    Airway  Mallampati: I  TM distance: >3 FB  Neck ROM: full     Cardiovascular - normal exam   Dental - normal exam     Pulmonary - normal exam   Abdominal - normal exam           Anesthesia Plan    History of general anesthesia?: yes  History of complications of general anesthesia?: no    ASA 3     MAC     The patient is not a current smoker.  Patient was not previously instructed to  abstain from smoking on day of procedure.  Patient did not smoke on day of procedure.    intravenous induction   Postoperative pain plan includes opioids.  Anesthetic plan and risks discussed with patient.  Use of blood products discussed with patient who.    Plan discussed with [facility].

## 2025-06-16 NOTE — ANESTHESIA POSTPROCEDURE EVALUATION
Patient: Frannie Blanco    Procedure Summary       Date: 06/16/25 Room / Location: [facility] OR 01 / [facility]    Anesthesia Start: 1625 Anesthesia Stop: 1714    Procedures:       DILATION AND CURETTAGE      INSERTION, INTRAUTERINE DEVICE Diagnosis:       Endometrial thickening on ultrasound      (Endometrial thickening on ultrasound [R93.89])    Surgeons: Divina Escobar MD, MS Responsible Provider: Richie Peterson MD    Anesthesia Type: MAC ASA Status: 3            Anesthesia Type: MAC    Vitals Value Taken Time   /58 06/16/25 17:14   Temp 36.7 06/16/25 17:14   Pulse 58 06/16/25 17:10   Resp 12 06/16/25 17:10   SpO2 96 % 06/16/25 17:10   Vitals shown include unfiled device data.    Anesthesia Post Evaluation    Patient location during evaluation: PACU  Patient participation: complete - patient participated  Level of consciousness: awake and alert  Pain management: adequate  Airway patency: patent  Cardiovascular status: hemodynamically stable and acceptable  Respiratory status: spontaneous ventilation and room air  Hydration status: acceptable  Postoperative Nausea and Vomiting: none        There were no known notable events for this encounter.

## 2025-06-16 NOTE — OP NOTE
DILATION AND CURETTAGE, INSERTION, INTRAUTERINE DEVICE Operative Note     Date: 2025  OR Location: [facility] OR    Name: Frannie Blanco, : 1950, Age: 75 y.o., MRN: [id number], Sex: female    Diagnosis  Pre-op Diagnosis      * Endometrial thickening on ultrasound [R93.89] Post-op Diagnosis     * Endometrial thickening on ultrasound [R93.89]     Procedures  DILATION AND CURETTAGE  03808 - IA DILATION & CURETTAGE DX&/THER NONOBSTETRIC    INSERTION, INTRAUTERINE DEVICE  91455 - IA INSERTION INTRAUTERINE DEVICE IUD      Surgeons      * Divina Escobar - Primary    Resident/Fellow/Other Assistant:  Surgeons and Role:     * Soledad Adamson MD MPH - Resident - Assisting     * Polo Xiao MD - Fellow    Staff:   Circulator: Deborah  Scrub Person: Kerri Long Scrub: Marry  Mercedes Circulator: Rosa    Anesthesia Staff: Anesthesiologist: Richie Peterson MD; Zeferino Alfred MD  C-AA: ROSALINE Reyes    Procedure Summary  Anesthesia: Monitor Anesthesia Care  ASA: III  Estimated Blood Loss: 5mL  Intra-op Medications:   Administrations occurring from 1540 to 1720 on 25:   Medication Name Total Dose   surgical lubricant gel 1 Application   levonorgestrel (Mirena) 20 mcg/24hr IUD 52 mg   sterile water irrigation solution 500 mL   HYDROmorphone PF (Dilaudid) injection 0.2 mg 0.2 mg   fentaNYL (Sublimaze) injection 50 mcg/mL 100 mcg   LR bolus Cannot be calculated   midazolam PF (Versed) injection 1 mg/mL 2 mg   propofol (Diprivan) injection 10 mg/mL 250 mg              Anesthesia Record               Intraprocedure I/O Totals          Intake    LR bolus 400.00 mL    Total Intake 400 mL          Specimen:   ID Type Source Tests Collected by Time   1 : ENDOMETRIAL CURETTINGS Tissue ENDOMETRIUM CURETTINGS SURGICAL PATHOLOGY EXAM Divina Escobar MD, MS 2025 1700                 Drains and/or Catheters: * None in log *        Implants:  Implants       Type Name Action Serial No.      Implant  SYSTEM, INTRAUTERINE MIRENA - [id number] Implanted               Findings: vulva with mild bilateral symmetric erythema, a few scattered small inclusion cysts, no masses or lesions.  Uterus sounded to 7.5cm, endometrial curettings with relatively scant tissue.  IUD placed and strings trimmed to 3cm.    Indications: Frannie Blanco is an 75 y.o. female who is having surgery for Endometrial thickening on ultrasound [R93.89].     The patient was seen in the preoperative area. The risks, benefits, complications, treatment options, non-operative alternatives, expected recovery and outcomes were discussed with the patient. The possibilities of reaction to medication, pulmonary aspiration, injury to surrounding structures, bleeding, recurrent infection, the need for additional procedures, failure to diagnose a condition, and creating a complication requiring transfusion or operation were discussed with the patient. The patient concurred with the proposed plan, giving informed consent.  The site of surgery was properly noted/marked if necessary per policy. The patient has been actively warmed in preoperative area. Preoperative antibiotics are not indicated. Venous thrombosis prophylaxis have been ordered including bilateral sequential compression devices    Procedure Details:   The patient was taken to the OR where a preoperative huddle was performed. Anesthesia was initiated, Preoperative antibiotics were not indicated. The patient was positioned in dorsal lithotomy position and was prepped and draped in a sterile fashion.     A speculum was placed in the vagina and tenaculum was used to grasp the anterior lip of the cervix. The uterus sounded to 7.5cm. The cervix was then serially dilated to 19F. A sharp curette was then used to collect endometrial curettings. A mirena IUD was then inserted and strings trimmed to 2cm. The tenaculum was removed and the vagina/ cervix were examined for hemostasis. It was noted to be  hemostatic.    All instruments were removed. Counts were correct x2. The patient was awakened and taken to the PACU in good condition.        Evidence of Infection: No   Complications:  None; patient tolerated the procedure well.    Disposition: PACU - hemodynamically stable.  Condition: stable     Polo Xiao MD      Attending Attestation: I was present for the entirety of the procedure(s).     Divina Escobar MD, MS     Divina Escobar  Phone Number: [phone]

## 2025-06-22 PROCEDURE — RXMED WILLOW AMBULATORY MEDICATION CHARGE

## 2025-06-23 ENCOUNTER — ANTICOAGULATION - WARFARIN VISIT (OUTPATIENT)
Dept: CARDIOLOGY | Facility: CLINIC | Age: 75
End: 2025-06-23
Payer: MEDICARE

## 2025-06-23 DIAGNOSIS — I48.91 ATRIAL FIBRILLATION WITH RAPID VENTRICULAR RESPONSE (MULTI): ICD-10-CM

## 2025-06-23 DIAGNOSIS — Z79.01 LONG TERM (CURRENT) USE OF ANTICOAGULANTS: ICD-10-CM

## 2025-06-23 DIAGNOSIS — I26.99 PULMONARY EMBOLISM WITHOUT ACUTE COR PULMONALE, UNSPECIFIED CHRONICITY, UNSPECIFIED PULMONARY EMBOLISM TYPE (MULTI): Primary | ICD-10-CM

## 2025-06-23 DIAGNOSIS — I10 PRIMARY HYPERTENSION: ICD-10-CM

## 2025-06-23 LAB
INR IN PPP BY COAGULATION ASSAY EXTERNAL: 2 (ref 2–3)
PROTHROMBIN TIME (PT) IN PPP BY COAGULATION ASSAY EXTERNAL: NORMAL

## 2025-06-23 RX ORDER — FUROSEMIDE 80 MG/1
80 TABLET ORAL DAILY
Qty: 90 TABLET | Refills: 0 | Status: SHIPPED | OUTPATIENT
Start: 2025-06-23

## 2025-06-23 RX ORDER — AMIODARONE HYDROCHLORIDE 200 MG/1
200 TABLET ORAL DAILY
Qty: 90 TABLET | Refills: 0 | Status: SHIPPED | OUTPATIENT
Start: 2025-06-23 | End: 2025-09-21

## 2025-06-23 RX ORDER — METOPROLOL TARTRATE 25 MG/1
TABLET, FILM COATED ORAL
Qty: 45 TABLET | Refills: 0 | Status: SHIPPED | OUTPATIENT
Start: 2025-06-23

## 2025-06-23 NOTE — PROGRESS NOTES
Patient identification verified with 2 identifiers.    Location: [facility] Patient Self-Testing Program [phone]    Referring Physician: Dr. Terra Young  Enrollment/ Re-enrollment date: 2025   INR Goal: 2.0-3.0  INR monitoring is per [facility] protocol.  Anticoagulation Medication: warfarin  Indication: Pulmonary Embolism (PE)    Subjective   Bleeding signs/symptoms: No    Bruising: No   Major bleeding event: No  Thrombosis signs/symptoms: No  Thromboembolic event: No  Missed doses: No  Extra doses: No  Medication changes: No  Dietary changes: No  Change in health: No  Change in activity: No  Alcohol: No  Other concerns: No    Upcoming Procedures:  Does the Patient Have any upcoming procedures that require interruption in anticoagulation therapy? no  Does the patient require bridging? no      Anticoagulation Summary  As of 2025      INR goal:  2.0-3.0   TTR:  67.9% (1.5 y)   INR used for dosin.00 (2025)   Weekly warfarin total:  17.5 mg               Assessment/Plan   Therapeutic     1. New dose: maintain  2. Next INR: 1 week      Education provided to patient during the visit:  Patient instructed to call in interim with questions, concerns and changes.

## 2025-06-24 ENCOUNTER — PHARMACY VISIT (OUTPATIENT)
Dept: PHARMACY | Facility: CLINIC | Age: 75
End: 2025-06-24
Payer: MEDICARE

## 2025-06-26 DIAGNOSIS — E87.6 HYPOKALEMIA: ICD-10-CM

## 2025-06-26 LAB
LABORATORY COMMENT REPORT: NORMAL
PATH REPORT.FINAL DX SPEC: NORMAL
PATH REPORT.GROSS SPEC: NORMAL
PATH REPORT.RELEVANT HX SPEC: NORMAL
PATH REPORT.TOTAL CANCER: NORMAL
RESIDENT REVIEW: NORMAL

## 2025-06-27 RX ORDER — POTASSIUM CHLORIDE 20 MEQ/1
40 TABLET, EXTENDED RELEASE ORAL DAILY
Qty: 180 TABLET | Refills: 0 | Status: SHIPPED | OUTPATIENT
Start: 2025-06-27 | End: 2026-06-27

## 2025-07-07 ENCOUNTER — ANTICOAGULATION - WARFARIN VISIT (OUTPATIENT)
Dept: CARDIOLOGY | Facility: CLINIC | Age: 75
End: 2025-07-07
Payer: MEDICARE

## 2025-07-07 DIAGNOSIS — I26.99 PULMONARY EMBOLISM WITHOUT ACUTE COR PULMONALE, UNSPECIFIED CHRONICITY, UNSPECIFIED PULMONARY EMBOLISM TYPE (MULTI): Primary | ICD-10-CM

## 2025-07-07 DIAGNOSIS — Z79.01 LONG TERM (CURRENT) USE OF ANTICOAGULANTS: ICD-10-CM

## 2025-07-07 DIAGNOSIS — I48.91 ATRIAL FIBRILLATION WITH RAPID VENTRICULAR RESPONSE (MULTI): ICD-10-CM

## 2025-07-07 RX ORDER — WARFARIN 2.5 MG/1
TABLET ORAL
Qty: 45 TABLET | Refills: 0 | Status: SHIPPED | OUTPATIENT
Start: 2025-07-07

## 2025-07-07 NOTE — PROGRESS NOTES
Patient identification verified with 2 identifiers.    Location: [facility] Patient Self-Testing Program [phone]    Referring Physician: Terra Young MD   Enrollment/ Re-enrollment date: 2025   INR Goal: 2.0-3.0  INR monitoring is per [facility] protocol.  Anticoagulation Medication: warfarin  Indication: Pulmonary Embolism (PE)    Subjective   Bleeding signs/symptoms: No    Bruising: No   Major bleeding event: No  Thrombosis signs/symptoms: No  Thromboembolic event: No  Missed doses: No  Extra doses: No  Medication changes: No  Dietary changes: No  Change in health: No  Change in activity: No  Alcohol: No  Other concerns: No    Upcoming Procedures:  Does the Patient Have any upcoming procedures that require interruption in anticoagulation therapy? no  Does the patient require bridging? no      Anticoagulation Summary  As of 2025      INR goal:  2.0-3.0   TTR:  66.3% (1.6 y)   INR used for dosin.80 (2025)   Weekly warfarin total:  18.75 mg               Assessment/Plan   Subtherapeutic     1. New dose: 2.5 mg daily;  3.75 mg on Tuesday.      2. Next INR: 1 week      Education provided to patient during the visit:  Patient instructed to call in interim with questions, concerns and changes.

## 2025-07-08 ENCOUNTER — APPOINTMENT (OUTPATIENT)
Dept: GYNECOLOGIC ONCOLOGY | Facility: CLINIC | Age: 75
End: 2025-07-08
Payer: MEDICARE

## 2025-07-13 PROCEDURE — G0179 MD RECERTIFICATION HHA PT: HCPCS | Performed by: INTERNAL MEDICINE

## 2025-07-14 ENCOUNTER — ANTICOAGULATION - WARFARIN VISIT (OUTPATIENT)
Dept: CARDIOLOGY | Facility: CLINIC | Age: 75
End: 2025-07-14
Payer: MEDICARE

## 2025-07-14 DIAGNOSIS — Z79.01 LONG TERM (CURRENT) USE OF ANTICOAGULANTS: ICD-10-CM

## 2025-07-14 DIAGNOSIS — I26.99 PULMONARY EMBOLISM WITHOUT ACUTE COR PULMONALE, UNSPECIFIED CHRONICITY, UNSPECIFIED PULMONARY EMBOLISM TYPE (MULTI): Primary | ICD-10-CM

## 2025-07-14 NOTE — PROGRESS NOTES
Patient ID: Frannie Blanco is a 75 y.o. female.  Referring Physician: No referring provider defined for this encounter.  Primary Care Provider: Terra Young MD    Subjective    69-year-old female presenting with postmenopausal bleeding s/p D&C and Mirena IUD in .     Interval History:   Patient doing well. Has not had any further bleeding since new IUD placement/ D&C    GYO Hx:  2019, dilation curettage, placement of Mirena IUD.  Pathology reported as blood and fibrin was sightly debris and scant strips of cytologically normal endometrial epithelium, insufficient for complete diagnostic evaluation scant strips of endocervical and squamous epithelium with no pathologic diagnosis    24 EMB: predominantly mucus and scant strips of atrophic endometrium. Mirena IUD removed at this time.    10/26/24 US Pelvis: Grossly unremarkable endometrial thickness of 3 mm. No pelvic mass.     25 US Pelvis: The endometrium measures 3 mm in thickness. There is a heterogenous complex area of hypoechogenicity in the expected location of the cervix measuring 2.0 x 1.2 x 1.8 cm in diameter. Further workup with tissue sampling or MRI is recommended.    25: D&C with endometrial curettage, Mirena IUD insertion. Pathology with scant strips of surface endometrial epithelium with scant attached stroma admixed with blood and mucus     PMHx:  - Afib  - DM  - Arthritis  - Hx of blood clots, 2 episodes of pulmonary embolisms, first episode was unprovoked and patient was started on coumadin, second episode occurred when patient came off coumadin for urology procedure  - Hx of kidney stones  - Breast cancer s/p lumpectomy and partial mastectomy in 2019 and radiation tx     Surgeries:  -  section x 2 ([id number])  - Tubal Ligation ()  - Deviated septum surgery ()  - Lumpectomy and partial mastectomy ()      OB/GYN Hx:  -  via C-sections  - Birth control for 1 year  - Denies hormonal  replacement tx  - LMP in      Medications:  - Warfarin 5mg 1 x daily  - Gabapentin 3 mg 2 x daily  - Glimepiride 2mg 1x daily  - Simvastatin 40mg 1x daily  - Topiramate 100mg 2x daily  - Trulicity  - Vitamin D3  - Aspirin 81mg  - Calcium + D + Iron      Allergies: RX Allergies[1]      Family Hx:   Sister diagnosed with breast cancer at age 45; passed away at age 47  - Otherwise denies history of gyn related cancers including ovarian, breast, uterine, cervical, and colon cancer.      Social Hx:   Smokes 1 pack x 40 years, denies alcohol and drug use. Lives alone; her   1 year ago in a car accident. 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     Objective    /69 (BP Location: Right arm, Patient Position: Sitting, BP Cuff Size: Adult)   Pulse 56   Temp 36.2 °C (97.2 °F) (Temporal)   Resp 18   Wt 139 kg (307 lb 3.4 oz)   SpO2 96%   BMI 49.59 kg/m²      Physical Exam  Constitutional:       General: She is not in acute distress.     Appearance: Normal appearance. She is not toxic-appearing.   HENT:      Head: Normocephalic.      Mouth/Throat:      Mouth: Mucous membranes are moist.      Pharynx: Oropharynx is clear.   Eyes:      Extraocular Movements: Extraocular movements intact.      Conjunctiva/sclera: Conjunctivae normal.      Pupils: Pupils are equal, round, and reactive to light.   Cardiovascular:      Rate and Rhythm: Normal rate and regular rhythm.      Heart sounds: Normal heart sounds. No murmur heard.     No friction rub. No gallop.   Pulmonary:      Effort: Pulmonary effort is normal.      Breath sounds: Normal breath sounds. No wheezing or rhonchi.   Abdominal:      General: Bowel sounds are normal. There is no distension.      Palpations: Abdomen is soft.      Tenderness: There is no abdominal tenderness.   Musculoskeletal:         General: Normal range of motion.      Cervical back: Normal range of motion.   Skin:     General: Skin is warm.    Neurological:      General: No focal deficit present.      Mental Status: She is alert and oriented to person, place, and time.   Psychiatric:         Mood and Affect: Mood normal.         Behavior: Behavior normal.           Surgical Pathology Exam: [id number]  Order: [id number]   Collected 6/16/2025 17:00       Status: Final result       Dx: Endometrial thickening on ultrasound    Test Result Released: Yes (not seen)    0 Result Notes      Component    FINAL DIAGNOSIS   A.  Endometrium, curettage:  --Scant strips of surface endometrial epithelium with scant attached stroma admixed with blood and mucus          Performance Status:  Symptomatic; in bed <50% of the day    Assessment/Plan     Frannie is a 75 y.o.  female with a history of post menopausal bleeding s/p D&C and Mirena IUD placement on 6/2025.     # Post-menopausal bleeding  - Pathology reviewed, patient counseled about benign findings   - Mirena IUD in place with no further spotting  - Follow-up in 1 year with nurse practitioner, earlier if return of PMB    Patient seen and discussed with Dr. Luz Duke, MS4  [employer]     I, or a resident under my supervision, was present with the medical student who participated in the documentation of this note.  I have personally seen and examined the patient and performed the medical decision-making components. I have reviewed the medical student documentation and/or resident documentation and verified the findings in the note as written with additions or exceptions as stated in the body of the note.    Divina Escobar MD, MS        [1] No Known Allergies

## 2025-07-15 ENCOUNTER — OFFICE VISIT (OUTPATIENT)
Dept: GYNECOLOGIC ONCOLOGY | Facility: CLINIC | Age: 75
End: 2025-07-15
Payer: MEDICARE

## 2025-07-15 VITALS
HEART RATE: 56 BPM | OXYGEN SATURATION: 96 % | WEIGHT: 293 LBS | BODY MASS INDEX: 49.59 KG/M2 | RESPIRATION RATE: 18 BRPM | SYSTOLIC BLOOD PRESSURE: 116 MMHG | TEMPERATURE: 97.2 F | DIASTOLIC BLOOD PRESSURE: 69 MMHG

## 2025-07-15 DIAGNOSIS — Z97.5 IUD (INTRAUTERINE DEVICE) IN PLACE: ICD-10-CM

## 2025-07-15 DIAGNOSIS — N95.0 POSTMENOPAUSAL VAGINAL BLEEDING: Primary | ICD-10-CM

## 2025-07-15 PROCEDURE — 3078F DIAST BP <80 MM HG: CPT | Performed by: STUDENT IN AN ORGANIZED HEALTH CARE EDUCATION/TRAINING PROGRAM

## 2025-07-15 PROCEDURE — 3074F SYST BP LT 130 MM HG: CPT | Performed by: STUDENT IN AN ORGANIZED HEALTH CARE EDUCATION/TRAINING PROGRAM

## 2025-07-15 PROCEDURE — 99211 OFF/OP EST MAY X REQ PHY/QHP: CPT | Performed by: STUDENT IN AN ORGANIZED HEALTH CARE EDUCATION/TRAINING PROGRAM

## 2025-07-15 PROCEDURE — 1036F TOBACCO NON-USER: CPT | Performed by: STUDENT IN AN ORGANIZED HEALTH CARE EDUCATION/TRAINING PROGRAM

## 2025-07-15 PROCEDURE — 3044F HG A1C LEVEL LT 7.0%: CPT | Performed by: STUDENT IN AN ORGANIZED HEALTH CARE EDUCATION/TRAINING PROGRAM

## 2025-07-15 PROCEDURE — 1160F RVW MEDS BY RX/DR IN RCRD: CPT | Performed by: STUDENT IN AN ORGANIZED HEALTH CARE EDUCATION/TRAINING PROGRAM

## 2025-07-15 PROCEDURE — 1125F AMNT PAIN NOTED PAIN PRSNT: CPT | Performed by: STUDENT IN AN ORGANIZED HEALTH CARE EDUCATION/TRAINING PROGRAM

## 2025-07-15 PROCEDURE — 1159F MED LIST DOCD IN RCRD: CPT | Performed by: STUDENT IN AN ORGANIZED HEALTH CARE EDUCATION/TRAINING PROGRAM

## 2025-07-15 SDOH — ECONOMIC STABILITY: FOOD INSECURITY: WITHIN THE PAST 12 MONTHS, YOU WORRIED THAT YOUR FOOD WOULD RUN OUT BEFORE YOU GOT THE MONEY TO BUY MORE.: NEVER TRUE

## 2025-07-15 SDOH — ECONOMIC STABILITY: FOOD INSECURITY: WITHIN THE PAST 12 MONTHS, THE FOOD YOU BOUGHT JUST DIDN'T LAST AND YOU DIDN'T HAVE MONEY TO GET MORE.: NEVER TRUE

## 2025-07-15 ASSESSMENT — PAIN SCALES - GENERAL: PAINLEVEL_OUTOF10: 2

## 2025-07-17 NOTE — PROGRESS NOTES
Patient identification verified with 2 identifiers.    Location: [facility] Patient Self-Testing Program [phone]    Referring Physician: Terra Young MD   Enrollment/ Re-enrollment date: 2025   INR Goal: 2.0-3.0  INR monitoring is per [facility] protocol.  Anticoagulation Medication: warfarin  Indication: Pulmonary Embolism (PE)    Subjective   Bleeding signs/symptoms: No    Bruising: No   Major bleeding event: No  Thrombosis signs/symptoms: No  Thromboembolic event: No  Missed doses: No  Extra doses: No  Medication changes: No  Dietary changes: No  Change in health: No  Change in activity: No  Alcohol: No  Other concerns: No    Upcoming Procedures:  Does the Patient Have any upcoming procedures that require interruption in anticoagulation therapy? no  Does the patient require bridging? no      Anticoagulation Summary  As of 2025      INR goal:  2.0-3.0   TTR:  66.3% (1.6 y)   INR used for dosin.00 (2025)   Weekly warfarin total:  18.75 mg               Assessment/Plan   Therapeutic     1. New dose: Spoke to patient with dosing instructions and next testing date.    2. Next INR: 1 week      Education provided to patient during the visit:  Patient instructed to call in interim with questions, concerns and changes.

## 2025-07-23 ENCOUNTER — PATIENT OUTREACH (OUTPATIENT)
Dept: PRIMARY CARE | Facility: CLINIC | Age: 75
End: 2025-07-23

## 2025-07-23 ENCOUNTER — OFFICE VISIT (OUTPATIENT)
Dept: PRIMARY CARE | Facility: CLINIC | Age: 75
End: 2025-07-23
Payer: MEDICARE

## 2025-07-23 VITALS
DIASTOLIC BLOOD PRESSURE: 84 MMHG | RESPIRATION RATE: 18 BRPM | BODY MASS INDEX: 47.09 KG/M2 | WEIGHT: 293 LBS | OXYGEN SATURATION: 95 % | HEIGHT: 66 IN | HEART RATE: 63 BPM | SYSTOLIC BLOOD PRESSURE: 131 MMHG

## 2025-07-23 DIAGNOSIS — Z09 HOSPITAL DISCHARGE FOLLOW-UP: Primary | ICD-10-CM

## 2025-07-23 DIAGNOSIS — K59.00 CONSTIPATION, UNSPECIFIED CONSTIPATION TYPE: ICD-10-CM

## 2025-07-23 DIAGNOSIS — N30.00 ACUTE CYSTITIS WITHOUT HEMATURIA: ICD-10-CM

## 2025-07-23 DIAGNOSIS — L30.8 DERMATITIS ASSOCIATED WITH MOISTURE: ICD-10-CM

## 2025-07-23 PROCEDURE — 1159F MED LIST DOCD IN RCRD: CPT

## 2025-07-23 PROCEDURE — 1125F AMNT PAIN NOTED PAIN PRSNT: CPT

## 2025-07-23 PROCEDURE — 99214 OFFICE O/P EST MOD 30 MIN: CPT

## 2025-07-23 PROCEDURE — 3075F SYST BP GE 130 - 139MM HG: CPT

## 2025-07-23 PROCEDURE — 3079F DIAST BP 80-89 MM HG: CPT

## 2025-07-23 PROCEDURE — 3008F BODY MASS INDEX DOCD: CPT

## 2025-07-23 PROCEDURE — 1160F RVW MEDS BY RX/DR IN RCRD: CPT

## 2025-07-23 RX ORDER — AMOXICILLIN AND CLAVULANATE POTASSIUM 875; 125 MG/1; MG/1
875 TABLET, FILM COATED ORAL 2 TIMES DAILY
COMMUNITY
Start: 2025-07-22 | End: 2025-07-28

## 2025-07-23 RX ORDER — NYSTATIN 100000 U/G
CREAM TOPICAL 2 TIMES DAILY
Qty: 30 G | Refills: 0 | Status: SHIPPED | OUTPATIENT
Start: 2025-07-23 | End: 2025-07-30

## 2025-07-23 ASSESSMENT — ENCOUNTER SYMPTOMS
WOUND: 1
DYSURIA: 0
HEMATURIA: 0
DIARRHEA: 0
COUGH: 0
CONSTIPATION: 1
APPETITE CHANGE: 0
LIGHT-HEADEDNESS: 0
WEAKNESS: 0
SHORTNESS OF BREATH: 0
ACTIVITY CHANGE: 0
DIFFICULTY URINATING: 0
FEVER: 0
CHILLS: 0
DIZZINESS: 0
ABDOMINAL PAIN: 0
FREQUENCY: 0
JOINT SWELLING: 0
VOMITING: 0
NAUSEA: 0

## 2025-07-23 ASSESSMENT — PAIN SCALES - GENERAL: PAINLEVEL_OUTOF10: 8

## 2025-07-23 NOTE — ASSESSMENT & PLAN NOTE
- Pt reports no BM since admission at [facility]  - Continue Colace  - Trial mag citrate- pt reports this has worked for her in the past and she is planning to pick some up today

## 2025-07-23 NOTE — PROGRESS NOTES
Discharge Facility: [facility]   Discharge Diagnosis: UTI  Admission Date: 19 July 25  Discharge Date: 22 July 25    PCP Appointment Date: 23 July 25  Specialist Appointment Date: 31 July 25 (Wound, [place] @ [facility])  Hospital Encounter and Summary Linked: Yes  Hospital Encounter with Promise Mc MD; Liliam De Guzman MD     See discharge assessment below for further details     Wrap Up  Wrap Up Additional Comments: Pt stating she is doing well since her discharge. pt able to obtain newly prescribed medications without difficulty. pt has no questions regarding medications or discharge instructions at this time. follow up with PCP's PA set up by me for today. pt agreeable to contact later on this week. (7/23/2025  9:46 AM)    Engagement  Call Start Time: -- (Spoke with patient) (7/23/2025  9:46 AM)    Medications  Medications reviewed with patient/caregiver?: Yes (7/23/2025  9:46 AM)  Is the patient having any side effects they believe may be caused by any medication additions or changes?: No (7/23/2025  9:46 AM)  Does the patient have all medications ordered at discharge?: Yes (7/23/2025  9:46 AM)  Prescription Comments: all newly prescribed medications, and current medications, obtained without difficulty (7/23/2025  9:46 AM)  Is the patient taking all medications as directed (includes completed medication regime)?: Yes (7/23/2025  9:46 AM)  Medication Comments: no questions on medications at this time. (7/23/2025  9:46 AM)    Appointments  Does the patient have a primary care provider?: Yes (follow up set by me for 23 july 25) (7/23/2025  9:46 AM)  Care Management Interventions: Verified appointment date/time/provider (7/23/2025  9:46 AM)  Has the patient kept scheduled appointments due by today?: Yes (7/23/2025  9:46 AM)  Care Management Interventions: Advised patient to keep appointment (7/23/2025  9:46 AM)    Self Management  What is the home health agency?: None (7/23/2025  9:46 AM)  Has home health visited  the patient within 72 hours of discharge?: Not applicable (7/23/2025  9:46 AM)  What Durable Medical Equipment (DME) was ordered?: None (7/23/2025  9:46 AM)    Patient Teaching  Does the patient have access to their discharge instructions?: Yes (7/23/2025  9:46 AM)  Care Management Interventions: Reviewed instructions with patient (7/23/2025  9:46 AM)  What is the patient's perception of their health status since discharge?: Improving (7/23/2025  9:46 AM)  Is the patient/caregiver able to teach back the hierarchy of who to call/visit for symptoms/problems? PCP, Specialist, Home Health nurse, Urgent Care, ED, 911: Yes (7/23/2025  9:46 AM)  Patient/Caregiver Education Comments: None (7/23/2025  9:46 AM)

## 2025-07-23 NOTE — ASSESSMENT & PLAN NOTE
- Erythema started after Purewick leaked onto pt's skin overnight   - Start nystatin cream BID x 7 days   - Keep affected area clean and dry  - Advised pt to contact the office for any worsening redness, fevers, chills

## 2025-07-23 NOTE — PROGRESS NOTES
"Patient ID:   Frannie Blanco is a 75 y.o. female with PMH remarkable for COPD, HTN, CHF, Afib, DM2, PE, morbid obesity, chronic ulcer of LLE (follows with ID), CKD, bullous pemphigoid, Multiple splenic artery aneurysms (stable 10/30/2024), lung nodules who presents to the office today for Hospital Follow-up.    LAST OFFICE VISIT: 3/26/25 with Dr. Muñoz     Hospitalization Discharge Follow Up:  Date(s): 7/19/25 - 7/22/25   Location: [facility]   Reason Presented to ER: Weakness   Synopsis: Per discharge summary: \"75 yr old female pt with hx of Afib , PE on coumadin , severe obesity, HTN/HLD,T2DM who presented to ED with weakness. In ED VS were stable, labs showed WBC 15k, cr 1.4, UA suggested UTI , Cx showed E fecalis, switched abx to augmnetin for total of 7 days. PTOT Recommend home PT. Patient refused. Patient stable for discharge to follow-up with her PCP\"  Recommended FU: PCP     HPI:    Saw ID (Dr. Garcia) on 7/17/25 for chronic left calf wound. Per note: \"Wound Location: L calf  Cleanse wound with Normal Saline  Primary dressing-Iodoform packing gauze   Cover dressing consisting of foam border  Change three times per week and prn  Wear home compression stockings    Okay to discharge home health services\"    Frannie presents to the office today for hospital follow up. She reports she is doing pretty well since discharge yesterday. Her weakness has improved and she is getting around with a walker. She is not having any fevers, chills, dysuria, urinary urgency/frequency, or hematuria. She does note that she has not had a BM since she was admitted to the hospital on 7/19. She took Colace this morning but has not had any relief. She has tried mag citrate in the past and it has worked for her; she is planning to pick some up from the pharmacy today. She is not having any nausea or vomiting. Frannie also notes that she has an area of redness on her medial left leg. She reports that while she was in the hospital, her " "Purewick was not functioning properly and ended up leaking on her throughout the night; she thinks that this caused some skin irritation. She has home health care services for her chronic left leg ulcer and reports that her dressing was changed this morning. She has an upcoming appointment with wound care.       Social History[1]  Review of Systems   Constitutional:  Negative for activity change, appetite change, chills and fever.   HENT:  Negative for congestion.    Respiratory:  Negative for cough and shortness of breath.    Cardiovascular:  Negative for chest pain and leg swelling.   Gastrointestinal:  Positive for constipation. Negative for abdominal pain, diarrhea, nausea and vomiting.   Genitourinary:  Negative for difficulty urinating, dysuria, frequency and hematuria.   Musculoskeletal:  Negative for joint swelling.   Skin:  Positive for rash and wound.   Neurological:  Negative for dizziness, weakness and light-headedness.   All other systems reviewed and are negative.    Visit Vitals  /84 (BP Location: Left arm, Patient Position: Sitting)   Pulse 63   Resp 18   Ht 1.676 m (5' 6\")   Wt 137 kg (303 lb)   SpO2 95%   BMI 48.91 kg/m²   OB Status Unknown   Smoking Status Former   BSA 2.53 m²     Allergies[2]   Physical Exam  Vitals and nursing note reviewed.   Constitutional:       General: She is not in acute distress.     Appearance: She is obese. She is not toxic-appearing.      Comments: Ambulatory with walker    HENT:      Head: Normocephalic and atraumatic.      Mouth/Throat:      Mouth: Mucous membranes are moist.     Eyes:      General: No scleral icterus.     Conjunctiva/sclera: Conjunctivae normal.       Cardiovascular:      Rate and Rhythm: Normal rate and regular rhythm.   Pulmonary:      Effort: Pulmonary effort is normal. No respiratory distress.      Breath sounds: Normal breath sounds.   Abdominal:      General: There is no distension.      Palpations: Abdomen is soft.      Tenderness: " "There is no abdominal tenderness.     Musculoskeletal:         General: No swelling.      Cervical back: Normal range of motion.     Skin:     General: Skin is warm and dry.      Findings: Erythema (medial left leg near skin folds of knee/calf; no blisters or open wounds) present.      Comments: Dressing in place over chronic left calf ulcer      Neurological:      Mental Status: She is alert and oriented to person, place, and time.     Psychiatric:         Mood and Affect: Mood normal.         Behavior: Behavior normal.       Current Outpatient Medications   Medication Instructions    albuterol 90 mcg/actuation inhaler inhale 1 to 2 puffs by mouth and INTO THE LUNGS every 4 to 6 hours if needed    amiodarone (PACERONE) 200 mg, oral, Daily    amoxicillin-clavulanate (Augmentin) 875-125 mg tablet 875 mg, 2 times daily    aspirin 81 mg EC tablet 1 tablet, Daily    atorvastatin (LIPITOR) 20 mg, oral, Nightly    BD Alcohol Swabs pads, medicated     BD Dorothy 2nd Gen Pen Needle 32 gauge x 5/32\" needle Use with vitamin B12 injections monthly    calcium carbonate-vitamin D3 1,000 mg-20 mcg (800 unit) tablet 1 tablet, 2 times daily    cholecalciferol (VITAMIN D3) 1,000 Units, Daily    clobetasol (Temovate) 0.05 % ointment Apply a thin layer to the affected areas of the vulva once a day at bedtime 2-3 times a week.    cyanocobalamin (DODEX) 1,000 mcg, intramuscular, Every 30 days    dapsone 25 mg tablet 2 tablets, Daily before breakfast    docusate sodium (COLACE) 100 mg, 2 times daily PRN    ferrous sulfate 65 mg, Daily    fluticasone-umeclidin-vilanter (Trelegy Ellipta) 200-62.5-25 mcg blister with device INHALE 1 PUFF BY MOUTH IN THE MORNING Rinse mouth after taking dose    furosemide (LASIX) 80 mg, oral, Daily    gabapentin (NEURONTIN) 300 mg, oral, 3 times daily    insulin glargine (Basaglar KwikPen U-100 Insulin) 100 unit/mL (3 mL) pen Inject 15 units daily Take as directed per insulin instructions.    metoprolol " "tartrate (Lopressor) 25 mg tablet To take half tablet by mouth twice daily, to hold if systolic BP is 100 or less or is heart rate is 55 or less    Mounjaro 5 mg, subcutaneous, Once Weekly    nystatin (Mycostatin) cream Topical, 2 times daily, apply to affected area    potassium chloride CR (Klor-Con M20) 20 mEq ER tablet 40 mEq, oral, Daily, Do not crush or chew.    sodium bicarbonate 650 mg, oral, 3 times daily    syringe with needle 3 mL 23 x 1\" syringe Use to inject vitamin b12 once monthly    topiramate (TOPAMAX) 100 mg, oral, 2 times daily    triamcinolone (Kenalog) 0.1 % ointment     warfarin (Coumadin) 2.5 mg tablet Take 1 tab daily x 6 days of the week,  1.5 tab on Tuesday directed as coumadin clinic      Lab Results   Component Value Date    WBC 7.2 06/04/2025    HGB 12.9 06/04/2025    HCT 41.1 06/04/2025     06/04/2025    CHOL 129 10/26/2024    TRIG 80 10/26/2024    HDL 52.7 10/26/2024    ALT 17 01/29/2025    AST 15 01/29/2025     06/04/2025    K 4.0 06/04/2025     06/04/2025    CREATININE 1.53 (H) 06/04/2025    BUN 31 (H) 06/04/2025    CO2 26 06/04/2025    TSH 2.05 01/20/2025    INR 2.00 07/17/2025    HGBA1C 4.6 03/03/2025       Problem List Items Addressed This Visit           ICD-10-CM       Gastrointestinal and Abdominal    Constipation K59.00    - Pt reports no BM since admission at [facility]  - Continue Colace  - Trial mag citrate- pt reports this has worked for her in the past and she is planning to pick some up today             Genitourinary and Reproductive    Acute cystitis without hematuria N30.00    - UCx grew enterococcus faecalis  - Continue Augmentin as prescribed              Health Encounters    Hospital discharge follow-up - Primary Z09       Skin    Dermatitis associated with moisture L30.8    - Erythema started after Purewick leaked onto pt's skin overnight   - Start nystatin cream BID x 7 days   - Keep affected area clean and dry  - Advised pt to contact the office for " any worsening redness, fevers, chills          Relevant Medications    nystatin (Mycostatin) cream       --------------------Follow up for annual Medicare wellness visit- overdue          [1]   Social History  Tobacco Use    Smoking status: Former     Current packs/day: 0.00     Average packs/day: 1 pack/day for 15.0 years (15.0 ttl pk-yrs)     Types: Cigarettes     Quit date: 2023     Years since quittin.8    Smokeless tobacco: Never    Tobacco comments:     Quit in    Vaping Use    Vaping status: Never Used   Substance Use Topics    Alcohol use: Never    Drug use: Never   [2] No Known Allergies

## 2025-07-24 ENCOUNTER — PATIENT OUTREACH (OUTPATIENT)
Dept: PRIMARY CARE | Facility: CLINIC | Age: 75
End: 2025-07-24
Payer: MEDICARE

## 2025-07-24 ENCOUNTER — ANTICOAGULATION - WARFARIN VISIT (OUTPATIENT)
Dept: CARDIOLOGY | Facility: CLINIC | Age: 75
End: 2025-07-24
Payer: MEDICARE

## 2025-07-24 DIAGNOSIS — Z79.01 LONG TERM (CURRENT) USE OF ANTICOAGULANTS: ICD-10-CM

## 2025-07-24 DIAGNOSIS — I26.99 PULMONARY EMBOLISM WITHOUT ACUTE COR PULMONALE, UNSPECIFIED CHRONICITY, UNSPECIFIED PULMONARY EMBOLISM TYPE (MULTI): Primary | ICD-10-CM

## 2025-07-24 NOTE — PROGRESS NOTES
Confirmation of at least 2 patient identifiers.    Completed telephonic follow-up with patient after recent visit with PCP's PA      Spoke to patient during outreach call.    Patient reports feeling: Improved    Patient has questions or concerns about medications: No    Have all prescribed medications been filled? Yes    Patient has necessary resources to manage their care? Yes    Patient has questions or concerns? No    Next care management follow-up approximately within one month.    Care  information provided to patient.

## 2025-07-25 ENCOUNTER — ANTICOAGULATION - WARFARIN VISIT (OUTPATIENT)
Dept: CARDIOLOGY | Facility: CLINIC | Age: 75
End: 2025-07-25
Payer: MEDICARE

## 2025-07-25 DIAGNOSIS — Z79.01 LONG TERM (CURRENT) USE OF ANTICOAGULANTS: ICD-10-CM

## 2025-07-25 DIAGNOSIS — I26.99 PULMONARY EMBOLISM WITHOUT ACUTE COR PULMONALE, UNSPECIFIED CHRONICITY, UNSPECIFIED PULMONARY EMBOLISM TYPE (MULTI): Primary | ICD-10-CM

## 2025-07-25 DIAGNOSIS — N18.31 STAGE 3A CHRONIC KIDNEY DISEASE (MULTI): ICD-10-CM

## 2025-07-25 RX ORDER — SODIUM BICARBONATE 650 MG/1
650 TABLET ORAL 3 TIMES DAILY
Qty: 90 TABLET | Refills: 0 | Status: SHIPPED | OUTPATIENT
Start: 2025-07-25 | End: 2026-07-25

## 2025-07-25 NOTE — PROGRESS NOTES
Patient identification verified with 2 identifiers.    Location: [facility] Patient Self-Testing Program [phone]    Referring Physician: Terra Young MD   Enrollment/ Re-enrollment date: 7/31/2025   INR Goal: 2.0-3.0  INR monitoring is per [facility] protocol.  Anticoagulation Medication: warfarin  Indication: Pulmonary Embolism (PE)    Subjective   Bleeding signs/symptoms: No    Bruising: No   Major bleeding event: No  Thrombosis signs/symptoms: No  Thromboembolic event: No  Missed doses: No  Extra doses: No  Medication changes: Yes  Dietary changes: No  Change in health: No  Change in activity: No  Alcohol: No  Other concerns: No    Upcoming Procedures:  Does the Patient Have any upcoming procedures that require interruption in anticoagulation therapy? no  Does the patient require bridging? no      Anticoagulation Summary  As of 7/25/2025      INR goal:  2.0-3.0   TTR:  65.4% (1.6 y)   INR used for dosing:  3.20 (7/25/2025)   Weekly warfarin total:  18.75 mg               Assessment/Plan   supraTherapeutic     1. New dose: maintain TWD    2. Next INR: 1 week      Education provided to patient during the visit:  Patient instructed to call in interim with questions, concerns and changes.

## 2025-07-26 PROCEDURE — RXMED WILLOW AMBULATORY MEDICATION CHARGE

## 2025-07-30 ENCOUNTER — PHARMACY VISIT (OUTPATIENT)
Dept: PHARMACY | Facility: CLINIC | Age: 75
End: 2025-07-30
Payer: MEDICARE

## 2025-07-30 ENCOUNTER — TELEPHONE (OUTPATIENT)
Dept: PRIMARY CARE | Facility: CLINIC | Age: 75
End: 2025-07-30
Payer: MEDICARE

## 2025-08-01 ENCOUNTER — ANTICOAGULATION - WARFARIN VISIT (OUTPATIENT)
Dept: CARDIOLOGY | Facility: CLINIC | Age: 75
End: 2025-08-01
Payer: MEDICARE

## 2025-08-01 DIAGNOSIS — I26.99 PULMONARY EMBOLISM WITHOUT ACUTE COR PULMONALE, UNSPECIFIED CHRONICITY, UNSPECIFIED PULMONARY EMBOLISM TYPE (MULTI): Primary | ICD-10-CM

## 2025-08-01 DIAGNOSIS — Z79.01 LONG TERM (CURRENT) USE OF ANTICOAGULANTS: ICD-10-CM

## 2025-08-01 LAB
INR IN PPP BY COAGULATION ASSAY EXTERNAL: 2.1 (ref 2–3)
PROTHROMBIN TIME (PT) IN PPP BY COAGULATION ASSAY EXTERNAL: NORMAL

## 2025-08-01 NOTE — PROGRESS NOTES
Patient identification verified with 2 identifiers.    Location: [facility] Patient Self-Testing Program [phone]    Referring Physician: Terra Young MD   Enrollment/ Re-enrollment date: 2025   INR Goal: 2.0-3.0  INR monitoring is per [facility] protocol.  Anticoagulation Medication: warfarin  Indication: Pulmonary Embolism (PE)    Subjective   Received Faxed INR result from Remote INR on 25.  I called and spoke to Pt via Telephone Call.  Pt identified using Two Pt identifiers, name and . INR result of 2.1 from 25 reviewed and discussed.  Current warfarin weekly schedule confirmed and verified with Pt.     Bleeding signs/symptoms: No.  Pt denies.    Bruising: No.  Pt denies.  Major bleeding event: No  Thrombosis signs/symptoms: No  Thromboembolic event: No  Missed doses: No  Extra doses: No  Medication changes: Yes.  Pt is done with ATB.  Dietary changes: No.  Pt denies.  Change in health: No.  Pt denies.  Change in activity: No  Alcohol: No  Other concerns: No    Upcoming Procedures:  Does the Patient Have any upcoming procedures that require interruption in anticoagulation therapy? no  Does the patient require bridging? no    Dose maintained after last self test INR.  Pt is taking 18.75mg of warfarin weekly.  Anticoagulation Summary  As of 2025      INR goal:  2.0-3.0   TTR:  65.6% (1.6 y)   INR used for dosin.10 (2025)   Weekly warfarin total:  18.75 mg               Assessment/Plan   Therapeutic     1. New dose:  no change.  Maintain dose.  18.75mg weekly  2. Next INR: 1 week.  Pt will self test INR on 25.  Can r/s in 2 weeks if therapeutic next week.      Education provided to patient during the visit:  Patient instructed to call in interim with questions, concerns and changes.   Patient educated on interactions between medications and warfarin.   Patient educated on dietary consistency in vitamin k consumption.   Patient educated on affects of alcohol consumption while taking  warfarin.   Patient educated on signs of bleeding/clotting.   Patient educated on compliance with dosing, follow up appointments, and prescribed plan of care.

## 2025-08-08 ENCOUNTER — ANTICOAGULATION - WARFARIN VISIT (OUTPATIENT)
Dept: CARDIOLOGY | Facility: CLINIC | Age: 75
End: 2025-08-08
Payer: MEDICARE

## 2025-08-08 DIAGNOSIS — I48.20 CHRONIC ATRIAL FIBRILLATION (MULTI): Primary | ICD-10-CM

## 2025-08-08 DIAGNOSIS — Z79.01 LONG TERM (CURRENT) USE OF ANTICOAGULANTS: ICD-10-CM

## 2025-08-08 DIAGNOSIS — I26.99 PULMONARY EMBOLISM WITHOUT ACUTE COR PULMONALE, UNSPECIFIED CHRONICITY, UNSPECIFIED PULMONARY EMBOLISM TYPE (MULTI): Primary | ICD-10-CM

## 2025-08-08 NOTE — PROGRESS NOTES
Patient identification verified with 2 identifiers.    Location: [facility] Patient Self-Testing Program [phone]    Referring Physician: Terra Young MD   Enrollment/ Re-enrollment date: 2025 (RENEWAL SENT ON 2025)  INR Goal: 2.0-3.0  INR monitoring is per [facility] protocol.  Anticoagulation Medication: warfarin  Indication: Pulmonary Embolism (PE)    Subjective   Bleeding signs/symptoms: No    Bruising: No   Major bleeding event: No  Thrombosis signs/symptoms: No  Thromboembolic event: No  Missed doses: No  Extra doses: No  Medication changes: No  Dietary changes: No  Change in health: No  Change in activity: No  Alcohol: No  Other concerns: No    Upcoming Procedures:  Does the Patient Have any upcoming procedures that require interruption in anticoagulation therapy? no  Does the patient require bridging? no      Anticoagulation Summary  As of 2025      INR goal:  2.0-3.0   TTR:  66.0% (1.7 y)   INR used for dosin.00 (2025)   Weekly warfarin total:  18.75 mg               Assessment/Plan   Therapeutic     1. New dose: Will maintain dose and patient will retest in 1 week since she is borderline.    2. Next INR: 1 week      Education provided to patient during the visit:  Patient instructed to call in interim with questions, concerns and changes.

## 2025-08-11 DIAGNOSIS — I10 PRIMARY HYPERTENSION: ICD-10-CM

## 2025-08-12 PROBLEM — I48.20 CHRONIC ATRIAL FIBRILLATION (MULTI): Status: ACTIVE | Noted: 2025-08-12

## 2025-08-12 RX ORDER — METOPROLOL TARTRATE 25 MG/1
TABLET, FILM COATED ORAL
Qty: 45 TABLET | Refills: 0 | Status: SHIPPED | OUTPATIENT
Start: 2025-08-12

## 2025-08-18 ENCOUNTER — ANTICOAGULATION - WARFARIN VISIT (OUTPATIENT)
Dept: CARDIOLOGY | Facility: CLINIC | Age: 75
End: 2025-08-18
Payer: MEDICARE

## 2025-08-18 DIAGNOSIS — Z79.01 LONG TERM (CURRENT) USE OF ANTICOAGULANTS: ICD-10-CM

## 2025-08-18 DIAGNOSIS — I48.20 CHRONIC ATRIAL FIBRILLATION (MULTI): ICD-10-CM

## 2025-08-18 DIAGNOSIS — I26.99 PULMONARY EMBOLISM WITHOUT ACUTE COR PULMONALE, UNSPECIFIED CHRONICITY, UNSPECIFIED PULMONARY EMBOLISM TYPE (MULTI): Primary | ICD-10-CM

## 2025-08-18 LAB
INR IN PPP BY COAGULATION ASSAY EXTERNAL: 2.1
PROTHROMBIN TIME (PT) IN PPP BY COAGULATION ASSAY EXTERNAL: NORMAL

## 2025-08-29 ENCOUNTER — ANTICOAGULATION - WARFARIN VISIT (OUTPATIENT)
Dept: CARDIOLOGY | Facility: CLINIC | Age: 75
End: 2025-08-29
Payer: MEDICARE

## 2025-08-29 DIAGNOSIS — I26.99 PULMONARY EMBOLISM WITHOUT ACUTE COR PULMONALE, UNSPECIFIED CHRONICITY, UNSPECIFIED PULMONARY EMBOLISM TYPE (MULTI): Primary | ICD-10-CM

## 2025-08-29 DIAGNOSIS — I48.20 CHRONIC ATRIAL FIBRILLATION (MULTI): ICD-10-CM

## 2025-08-29 DIAGNOSIS — Z79.01 LONG TERM (CURRENT) USE OF ANTICOAGULANTS: ICD-10-CM

## 2025-09-02 ENCOUNTER — TELEPHONE (OUTPATIENT)
Facility: CLINIC | Age: 75
End: 2025-09-02
Payer: MEDICARE

## 2025-09-02 ENCOUNTER — TELEPHONE (OUTPATIENT)
Dept: PRIMARY CARE | Facility: CLINIC | Age: 75
End: 2025-09-02
Payer: MEDICARE

## 2025-09-02 DIAGNOSIS — N39.41 URGE INCONTINENCE OF URINE: Primary | ICD-10-CM

## 2025-09-02 DIAGNOSIS — B36.9 FUNGAL RASH OF TORSO: Primary | ICD-10-CM

## 2025-09-02 RX ORDER — NYSTATIN 100000 U/G
CREAM TOPICAL 2 TIMES DAILY
Qty: 30 G | Refills: 0 | Status: SHIPPED | OUTPATIENT
Start: 2025-09-02 | End: 2025-09-09

## 2025-09-03 PROCEDURE — RXMED WILLOW AMBULATORY MEDICATION CHARGE

## 2025-09-04 ENCOUNTER — PHARMACY VISIT (OUTPATIENT)
Dept: PHARMACY | Facility: CLINIC | Age: 75
End: 2025-09-04
Payer: MEDICARE

## 2025-09-05 ENCOUNTER — ANTICOAGULATION - WARFARIN VISIT (OUTPATIENT)
Dept: CARDIOLOGY | Facility: CLINIC | Age: 75
End: 2025-09-05
Payer: MEDICARE

## 2025-09-05 DIAGNOSIS — Z79.01 LONG TERM (CURRENT) USE OF ANTICOAGULANTS: ICD-10-CM

## 2025-09-05 DIAGNOSIS — I48.20 CHRONIC ATRIAL FIBRILLATION (MULTI): ICD-10-CM

## 2025-09-05 DIAGNOSIS — I26.99 PULMONARY EMBOLISM WITHOUT ACUTE COR PULMONALE, UNSPECIFIED CHRONICITY, UNSPECIFIED PULMONARY EMBOLISM TYPE (MULTI): Primary | ICD-10-CM

## 2025-09-05 LAB
INR IN PPP BY COAGULATION ASSAY EXTERNAL: 2.7
PROTHROMBIN TIME (PT) IN PPP BY COAGULATION ASSAY EXTERNAL: NORMAL

## (undated) DEVICE — BASIN SET, D & C

## (undated) DEVICE — DRAPE, PAD, PREP, W/ 9 IN CUFF, 24 X 41, LF, NS

## (undated) DEVICE — MARKER, SKIN, RULER AND LABEL PACK, CUSTOM

## (undated) DEVICE — SPONGE, GAUZE, XRAY DECT, 16 PLY, 4 X 4, W/MASTER DMT,STERILE

## (undated) DEVICE — COVER, CART, 45 X 27 X 48 IN, CLEAR

## (undated) DEVICE — Device

## (undated) DEVICE — SYRINGE, LUER LOCK, 12ML

## (undated) DEVICE — CATHETER, URETHRAL, ROBNEL, 16 FR, 16 IN, LF, RED

## (undated) DEVICE — REST, HEAD, BAGEL, 9 IN

## (undated) DEVICE — SLEEVE, SURGICAL, 21.5 X 5.5 IN, LF, STERILE

## (undated) DEVICE — COVER, TABLE, 44 X 75 IN, DISPOSABLE, LF, STERILE

## (undated) DEVICE — PREP TRAY, SKIN, DRY, W/GLOVES

## (undated) DEVICE — COVER, LIGHT HANDLE, SURGICAL, FLEXIBLE, DISPOSABLE, STERILE

## (undated) DEVICE — DRESSING, NON-ADHERENT, TELFA, OUCHLESS, 3 X 8 IN, STERILE

## (undated) DEVICE — MANIFOLD, 4 PORT NEPTUNE STANDARD